# Patient Record
Sex: FEMALE | Race: WHITE | NOT HISPANIC OR LATINO | Employment: OTHER | URBAN - METROPOLITAN AREA
[De-identification: names, ages, dates, MRNs, and addresses within clinical notes are randomized per-mention and may not be internally consistent; named-entity substitution may affect disease eponyms.]

---

## 2017-04-19 ENCOUNTER — RECORDS - HEALTHEAST (OUTPATIENT)
Dept: LAB | Facility: CLINIC | Age: 72
End: 2017-04-19

## 2017-04-19 LAB
CHOLEST SERPL-MCNC: 159 MG/DL
FASTING STATUS PATIENT QL REPORTED: YES
HDLC SERPL-MCNC: 55 MG/DL
LDLC SERPL CALC-MCNC: 83 MG/DL
TRIGL SERPL-MCNC: 106 MG/DL

## 2018-04-16 ENCOUNTER — RECORDS - HEALTHEAST (OUTPATIENT)
Dept: LAB | Facility: CLINIC | Age: 73
End: 2018-04-16

## 2018-04-16 LAB
ALBUMIN SERPL-MCNC: 3.5 G/DL (ref 3.5–5)
ALP SERPL-CCNC: 71 U/L (ref 45–120)
ALT SERPL W P-5'-P-CCNC: 16 U/L (ref 0–45)
ANION GAP SERPL CALCULATED.3IONS-SCNC: 9 MMOL/L (ref 5–18)
AST SERPL W P-5'-P-CCNC: 19 U/L (ref 0–40)
BILIRUB SERPL-MCNC: 0.6 MG/DL (ref 0–1)
BUN SERPL-MCNC: 13 MG/DL (ref 8–28)
CALCIUM SERPL-MCNC: 9.6 MG/DL (ref 8.5–10.5)
CHLORIDE BLD-SCNC: 103 MMOL/L (ref 98–107)
CHOLEST SERPL-MCNC: 166 MG/DL
CO2 SERPL-SCNC: 33 MMOL/L (ref 22–31)
CREAT SERPL-MCNC: 0.78 MG/DL (ref 0.6–1.1)
FASTING STATUS PATIENT QL REPORTED: YES
GFR SERPL CREATININE-BSD FRML MDRD: >60 ML/MIN/1.73M2
GLUCOSE BLD-MCNC: 116 MG/DL (ref 70–125)
HDLC SERPL-MCNC: 58 MG/DL
LDLC SERPL CALC-MCNC: 86 MG/DL
POTASSIUM BLD-SCNC: 5.2 MMOL/L (ref 3.5–5)
PROT SERPL-MCNC: 7.3 G/DL (ref 6–8)
SODIUM SERPL-SCNC: 145 MMOL/L (ref 136–145)
TRIGL SERPL-MCNC: 110 MG/DL

## 2018-04-23 ENCOUNTER — RECORDS - HEALTHEAST (OUTPATIENT)
Dept: LAB | Facility: CLINIC | Age: 73
End: 2018-04-23

## 2018-04-23 LAB — POTASSIUM BLD-SCNC: 5.3 MMOL/L (ref 3.5–5)

## 2018-05-08 ENCOUNTER — RECORDS - HEALTHEAST (OUTPATIENT)
Dept: LAB | Facility: CLINIC | Age: 73
End: 2018-05-08

## 2018-05-09 LAB
ANION GAP SERPL CALCULATED.3IONS-SCNC: 7 MMOL/L (ref 5–18)
BUN SERPL-MCNC: 17 MG/DL (ref 8–28)
CALCIUM SERPL-MCNC: 10 MG/DL (ref 8.5–10.5)
CHLORIDE BLD-SCNC: 103 MMOL/L (ref 98–107)
CO2 SERPL-SCNC: 32 MMOL/L (ref 22–31)
CREAT SERPL-MCNC: 1.01 MG/DL (ref 0.6–1.1)
GFR SERPL CREATININE-BSD FRML MDRD: 54 ML/MIN/1.73M2
GLUCOSE BLD-MCNC: 117 MG/DL (ref 70–125)
POTASSIUM BLD-SCNC: 4.7 MMOL/L (ref 3.5–5)
SODIUM SERPL-SCNC: 142 MMOL/L (ref 136–145)

## 2018-05-25 ENCOUNTER — RECORDS - HEALTHEAST (OUTPATIENT)
Dept: LAB | Facility: CLINIC | Age: 73
End: 2018-05-25

## 2018-05-25 LAB — TSH SERPL DL<=0.005 MIU/L-ACNC: 2.14 UIU/ML (ref 0.3–5)

## 2018-05-29 LAB — 25(OH)D3 SERPL-MCNC: 62.1 NG/ML (ref 30–80)

## 2018-10-16 ENCOUNTER — RECORDS - HEALTHEAST (OUTPATIENT)
Dept: LAB | Facility: CLINIC | Age: 73
End: 2018-10-16

## 2018-10-16 LAB
ANION GAP SERPL CALCULATED.3IONS-SCNC: 10 MMOL/L (ref 5–18)
BUN SERPL-MCNC: 18 MG/DL (ref 8–28)
CALCIUM SERPL-MCNC: 10.1 MG/DL (ref 8.5–10.5)
CHLORIDE BLD-SCNC: 102 MMOL/L (ref 98–107)
CO2 SERPL-SCNC: 30 MMOL/L (ref 22–31)
CREAT SERPL-MCNC: 0.87 MG/DL (ref 0.6–1.1)
GFR SERPL CREATININE-BSD FRML MDRD: >60 ML/MIN/1.73M2
GLUCOSE BLD-MCNC: 118 MG/DL (ref 70–125)
POTASSIUM BLD-SCNC: 5.2 MMOL/L (ref 3.5–5)
SODIUM SERPL-SCNC: 142 MMOL/L (ref 136–145)

## 2018-10-29 ENCOUNTER — RECORDS - HEALTHEAST (OUTPATIENT)
Dept: LAB | Facility: CLINIC | Age: 73
End: 2018-10-29

## 2018-10-30 LAB — POTASSIUM BLD-SCNC: 4.8 MMOL/L (ref 3.5–5)

## 2019-01-22 ENCOUNTER — RECORDS - HEALTHEAST (OUTPATIENT)
Dept: LAB | Facility: CLINIC | Age: 74
End: 2019-01-22

## 2019-01-22 LAB
ALBUMIN SERPL-MCNC: 3.6 G/DL (ref 3.5–5)
ALP SERPL-CCNC: 52 U/L (ref 45–120)
ALT SERPL W P-5'-P-CCNC: 13 U/L (ref 0–45)
ANION GAP SERPL CALCULATED.3IONS-SCNC: 12 MMOL/L (ref 5–18)
AST SERPL W P-5'-P-CCNC: 18 U/L (ref 0–40)
BILIRUB SERPL-MCNC: 0.5 MG/DL (ref 0–1)
BUN SERPL-MCNC: 14 MG/DL (ref 8–28)
CALCIUM SERPL-MCNC: 9.6 MG/DL (ref 8.5–10.5)
CHLORIDE BLD-SCNC: 104 MMOL/L (ref 98–107)
CO2 SERPL-SCNC: 26 MMOL/L (ref 22–31)
CREAT SERPL-MCNC: 0.81 MG/DL (ref 0.6–1.1)
GFR SERPL CREATININE-BSD FRML MDRD: >60 ML/MIN/1.73M2
GLUCOSE BLD-MCNC: 119 MG/DL (ref 70–125)
POTASSIUM BLD-SCNC: 4.3 MMOL/L (ref 3.5–5)
PROT SERPL-MCNC: 6.9 G/DL (ref 6–8)
SODIUM SERPL-SCNC: 142 MMOL/L (ref 136–145)

## 2019-05-06 ENCOUNTER — RECORDS - HEALTHEAST (OUTPATIENT)
Dept: LAB | Facility: CLINIC | Age: 74
End: 2019-05-06

## 2019-05-06 LAB
ANION GAP SERPL CALCULATED.3IONS-SCNC: 9 MMOL/L (ref 5–18)
BUN SERPL-MCNC: 19 MG/DL (ref 8–28)
CALCIUM SERPL-MCNC: 9.8 MG/DL (ref 8.5–10.5)
CHLORIDE BLD-SCNC: 106 MMOL/L (ref 98–107)
CHOLEST SERPL-MCNC: 175 MG/DL
CO2 SERPL-SCNC: 28 MMOL/L (ref 22–31)
CREAT SERPL-MCNC: 0.79 MG/DL (ref 0.6–1.1)
FASTING STATUS PATIENT QL REPORTED: NORMAL
GFR SERPL CREATININE-BSD FRML MDRD: >60 ML/MIN/1.73M2
GLUCOSE BLD-MCNC: 108 MG/DL (ref 70–125)
HDLC SERPL-MCNC: 60 MG/DL
LDLC SERPL CALC-MCNC: 98 MG/DL
POTASSIUM BLD-SCNC: 4.7 MMOL/L (ref 3.5–5)
SODIUM SERPL-SCNC: 143 MMOL/L (ref 136–145)
TRIGL SERPL-MCNC: 84 MG/DL

## 2019-05-24 ENCOUNTER — PATIENT OUTREACH (OUTPATIENT)
Dept: CARE COORDINATION | Facility: CLINIC | Age: 74
End: 2019-05-24

## 2019-05-24 DIAGNOSIS — Z59.9 FINANCIAL DIFFICULTIES: Primary | ICD-10-CM

## 2019-05-24 SDOH — ECONOMIC STABILITY - INCOME SECURITY: PROBLEM RELATED TO HOUSING AND ECONOMIC CIRCUMSTANCES, UNSPECIFIED: Z59.9

## 2019-05-24 ASSESSMENT — ACTIVITIES OF DAILY LIVING (ADL): DEPENDENT_IADLS:: INDEPENDENT

## 2019-05-24 NOTE — PROGRESS NOTES
Contact  [facility]/Voicemail    Referral Source: Care Team- patient enrolled in [facility] followed by RN who spoke to patient about option of reverse mortgage on her condo if she needed money for repairs or other expenses.   Clinical Data:  Outreach  Outreach attempted x 1.  Busy signal, not able to leave a message.  Plan:  will try to reach patient again in 1-2 business days.    Clinic Care Coordination Contact    Clinic Care Coordination Contact  OUTREACH    Education Provided to patient: Discussed reverse mortgages and getting deferral on property taxes    Financial/Insurance:   Financial/Insurance concerns (GOAL):: No  Went to a dentist not in network and had to put 1200 on credit card that she is paying off monthly.  Wants to consider getting a reverse mortgage so she would have savings to help with expenses.     Referrals Placed: Other ([facility] financial counseling)    Patient/Caregiver understanding: Patient does not want to jeopardize her benefits if she would get reverse mortgage. Gave her phone number to call for expert information that is free. Patient will call if interested.     Plan: No further outreach by . Patient not in shared savings plans and is enrolled in [facility].    Kari Mendes,   [facility]  [phone]

## 2019-08-08 ENCOUNTER — RECORDS - HEALTHEAST (OUTPATIENT)
Dept: LAB | Facility: CLINIC | Age: 74
End: 2019-08-08

## 2019-08-08 LAB
ALBUMIN SERPL-MCNC: 3.8 G/DL (ref 3.5–5)
ALP SERPL-CCNC: 57 U/L (ref 45–120)
ALT SERPL W P-5'-P-CCNC: 17 U/L (ref 0–45)
ANION GAP SERPL CALCULATED.3IONS-SCNC: 11 MMOL/L (ref 5–18)
AST SERPL W P-5'-P-CCNC: 19 U/L (ref 0–40)
BILIRUB SERPL-MCNC: 0.5 MG/DL (ref 0–1)
BUN SERPL-MCNC: 19 MG/DL (ref 8–28)
CALCIUM SERPL-MCNC: 10.4 MG/DL (ref 8.5–10.5)
CHLORIDE BLD-SCNC: 105 MMOL/L (ref 98–107)
CO2 SERPL-SCNC: 27 MMOL/L (ref 22–31)
CREAT SERPL-MCNC: 1 MG/DL (ref 0.6–1.1)
GFR SERPL CREATININE-BSD FRML MDRD: 54 ML/MIN/1.73M2
GLUCOSE BLD-MCNC: 127 MG/DL (ref 70–125)
POTASSIUM BLD-SCNC: 4.4 MMOL/L (ref 3.5–5)
PROT SERPL-MCNC: 7.5 G/DL (ref 6–8)
SODIUM SERPL-SCNC: 143 MMOL/L (ref 136–145)
TSH SERPL DL<=0.005 MIU/L-ACNC: 2.1 UIU/ML (ref 0.3–5)

## 2019-08-21 ENCOUNTER — RECORDS - HEALTHEAST (OUTPATIENT)
Dept: LAB | Facility: CLINIC | Age: 74
End: 2019-08-21

## 2019-08-21 LAB
O+P STL MICRO: NORMAL
SHIGA TOXIN 1: NEGATIVE
SHIGA TOXIN 2: NEGATIVE

## 2019-08-23 LAB — BACTERIA SPEC CULT: NORMAL

## 2019-11-14 ENCOUNTER — RECORDS - HEALTHEAST (OUTPATIENT)
Dept: LAB | Facility: CLINIC | Age: 74
End: 2019-11-14

## 2019-11-14 LAB
ALBUMIN SERPL-MCNC: 3.4 G/DL (ref 3.5–5)
ALP SERPL-CCNC: 65 U/L (ref 45–120)
ALT SERPL W P-5'-P-CCNC: 13 U/L (ref 0–45)
ANION GAP SERPL CALCULATED.3IONS-SCNC: 8 MMOL/L (ref 5–18)
AST SERPL W P-5'-P-CCNC: 16 U/L (ref 0–40)
BILIRUB SERPL-MCNC: 0.5 MG/DL (ref 0–1)
BUN SERPL-MCNC: 15 MG/DL (ref 8–28)
CALCIUM SERPL-MCNC: 9.5 MG/DL (ref 8.5–10.5)
CHLORIDE BLD-SCNC: 104 MMOL/L (ref 98–107)
CO2 SERPL-SCNC: 31 MMOL/L (ref 22–31)
CREAT SERPL-MCNC: 0.89 MG/DL (ref 0.6–1.1)
GFR SERPL CREATININE-BSD FRML MDRD: >60 ML/MIN/1.73M2
GLUCOSE BLD-MCNC: 135 MG/DL (ref 70–125)
POTASSIUM BLD-SCNC: 5 MMOL/L (ref 3.5–5)
PROT SERPL-MCNC: 7.1 G/DL (ref 6–8)
SODIUM SERPL-SCNC: 143 MMOL/L (ref 136–145)

## 2020-05-18 ENCOUNTER — RECORDS - HEALTHEAST (OUTPATIENT)
Dept: LAB | Facility: CLINIC | Age: 75
End: 2020-05-18

## 2020-05-18 LAB
ALBUMIN SERPL-MCNC: 3.6 G/DL (ref 3.5–5)
ALP SERPL-CCNC: 65 U/L (ref 45–120)
ALT SERPL W P-5'-P-CCNC: 13 U/L (ref 0–45)
ANION GAP SERPL CALCULATED.3IONS-SCNC: 11 MMOL/L (ref 5–18)
AST SERPL W P-5'-P-CCNC: 18 U/L (ref 0–40)
BILIRUB SERPL-MCNC: 0.6 MG/DL (ref 0–1)
BUN SERPL-MCNC: 15 MG/DL (ref 8–28)
CALCIUM SERPL-MCNC: 9.6 MG/DL (ref 8.5–10.5)
CHLORIDE BLD-SCNC: 100 MMOL/L (ref 98–107)
CHOLEST SERPL-MCNC: 163 MG/DL
CO2 SERPL-SCNC: 31 MMOL/L (ref 22–31)
CREAT SERPL-MCNC: 0.82 MG/DL (ref 0.6–1.1)
FASTING STATUS PATIENT QL REPORTED: NORMAL
GFR SERPL CREATININE-BSD FRML MDRD: >60 ML/MIN/1.73M2
GLUCOSE BLD-MCNC: 118 MG/DL (ref 70–125)
HDLC SERPL-MCNC: 59 MG/DL
LDLC SERPL CALC-MCNC: 76 MG/DL
POTASSIUM BLD-SCNC: 4.3 MMOL/L (ref 3.5–5)
PROT SERPL-MCNC: 7.6 G/DL (ref 6–8)
SODIUM SERPL-SCNC: 142 MMOL/L (ref 136–145)
TRIGL SERPL-MCNC: 138 MG/DL

## 2020-11-16 ENCOUNTER — RECORDS - HEALTHEAST (OUTPATIENT)
Dept: LAB | Facility: CLINIC | Age: 75
End: 2020-11-16

## 2020-11-16 LAB
ALBUMIN SERPL-MCNC: 3.4 G/DL (ref 3.5–5)
ALP SERPL-CCNC: 69 U/L (ref 45–120)
ALT SERPL W P-5'-P-CCNC: 12 U/L (ref 0–45)
ANION GAP SERPL CALCULATED.3IONS-SCNC: 10 MMOL/L (ref 5–18)
AST SERPL W P-5'-P-CCNC: 17 U/L (ref 0–40)
BILIRUB SERPL-MCNC: 0.5 MG/DL (ref 0–1)
BUN SERPL-MCNC: 15 MG/DL (ref 8–28)
CALCIUM SERPL-MCNC: 8.9 MG/DL (ref 8.5–10.5)
CHLORIDE BLD-SCNC: 105 MMOL/L (ref 98–107)
CO2 SERPL-SCNC: 30 MMOL/L (ref 22–31)
CREAT SERPL-MCNC: 0.99 MG/DL (ref 0.6–1.1)
GFR SERPL CREATININE-BSD FRML MDRD: 55 ML/MIN/1.73M2
GLUCOSE BLD-MCNC: 117 MG/DL (ref 70–125)
POTASSIUM BLD-SCNC: 4.7 MMOL/L (ref 3.5–5)
PROT SERPL-MCNC: 7.2 G/DL (ref 6–8)
SODIUM SERPL-SCNC: 145 MMOL/L (ref 136–145)

## 2020-11-20 ENCOUNTER — RECORDS - HEALTHEAST (OUTPATIENT)
Dept: ADMINISTRATIVE | Facility: OTHER | Age: 75
End: 2020-11-20

## 2020-11-20 ENCOUNTER — RECORDS - HEALTHEAST (OUTPATIENT)
Dept: SCHEDULING | Facility: CLINIC | Age: 75
End: 2020-11-20

## 2020-11-20 DIAGNOSIS — R11.0 NAUSEA: ICD-10-CM

## 2021-05-17 ENCOUNTER — RECORDS - HEALTHEAST (OUTPATIENT)
Dept: LAB | Facility: CLINIC | Age: 76
End: 2021-05-17

## 2021-05-17 LAB
ALBUMIN SERPL-MCNC: 3.2 G/DL (ref 3.5–5)
ALP SERPL-CCNC: 70 U/L (ref 45–120)
ALT SERPL W P-5'-P-CCNC: 13 U/L (ref 0–45)
ANION GAP SERPL CALCULATED.3IONS-SCNC: 12 MMOL/L (ref 5–18)
AST SERPL W P-5'-P-CCNC: 19 U/L (ref 0–40)
BILIRUB SERPL-MCNC: 0.5 MG/DL (ref 0–1)
BUN SERPL-MCNC: 20 MG/DL (ref 8–28)
CALCIUM SERPL-MCNC: 9.2 MG/DL (ref 8.5–10.5)
CHLORIDE BLD-SCNC: 104 MMOL/L (ref 98–107)
CHOLEST SERPL-MCNC: 137 MG/DL
CO2 SERPL-SCNC: 30 MMOL/L (ref 22–31)
CREAT SERPL-MCNC: 0.9 MG/DL (ref 0.6–1.1)
FASTING STATUS PATIENT QL REPORTED: ABNORMAL
GFR SERPL CREATININE-BSD FRML MDRD: >60 ML/MIN/1.73M2
GLUCOSE BLD-MCNC: 127 MG/DL (ref 70–125)
HDLC SERPL-MCNC: 47 MG/DL
LDLC SERPL CALC-MCNC: 71 MG/DL
POTASSIUM BLD-SCNC: 4.9 MMOL/L (ref 3.5–5)
PROT SERPL-MCNC: 7.5 G/DL (ref 6–8)
SODIUM SERPL-SCNC: 146 MMOL/L (ref 136–145)
TRIGL SERPL-MCNC: 94 MG/DL

## 2021-05-25 ENCOUNTER — RECORDS - HEALTHEAST (OUTPATIENT)
Dept: LAB | Facility: CLINIC | Age: 76
End: 2021-05-25

## 2021-05-26 LAB — BACTERIA SPEC CULT: NO GROWTH

## 2021-07-28 ENCOUNTER — LAB REQUISITION (OUTPATIENT)
Dept: LAB | Facility: CLINIC | Age: 76
End: 2021-07-28

## 2021-07-28 DIAGNOSIS — M25.559 PAIN IN UNSPECIFIED HIP: ICD-10-CM

## 2021-07-28 PROCEDURE — 86141 C-REACTIVE PROTEIN HS: CPT | Performed by: PHYSICIAN ASSISTANT

## 2021-07-29 LAB — C REACTIVE PROTEIN LHE: 28.4 MG/DL (ref 0–0.8)

## 2021-08-06 ENCOUNTER — LAB REQUISITION (OUTPATIENT)
Dept: LAB | Facility: CLINIC | Age: 76
End: 2021-08-06

## 2021-08-06 DIAGNOSIS — M35.3 POLYMYALGIA RHEUMATICA (H): ICD-10-CM

## 2021-08-06 LAB — C REACTIVE PROTEIN LHE: 3.3 MG/DL (ref 0–0.8)

## 2021-08-06 PROCEDURE — 86141 C-REACTIVE PROTEIN HS: CPT | Performed by: FAMILY MEDICINE

## 2021-09-03 ENCOUNTER — LAB REQUISITION (OUTPATIENT)
Dept: LAB | Facility: CLINIC | Age: 76
End: 2021-09-03
Payer: COMMERCIAL

## 2021-09-03 DIAGNOSIS — M35.3 POLYMYALGIA RHEUMATICA (H): ICD-10-CM

## 2021-09-03 LAB — C REACTIVE PROTEIN LHE: 1.6 MG/DL (ref 0–0.8)

## 2021-09-03 PROCEDURE — 36415 COLL VENOUS BLD VENIPUNCTURE: CPT | Performed by: FAMILY MEDICINE

## 2021-09-03 PROCEDURE — 86141 C-REACTIVE PROTEIN HS: CPT | Mod: ORL | Performed by: FAMILY MEDICINE

## 2021-11-05 ENCOUNTER — HOSPITAL ENCOUNTER (OUTPATIENT)
Dept: ULTRASOUND IMAGING | Facility: CLINIC | Age: 76
Discharge: HOME | End: 2021-11-05
Attending: PHYSICIAN ASSISTANT | Admitting: PHYSICIAN ASSISTANT
Payer: COMMERCIAL

## 2021-11-05 DIAGNOSIS — M79.89 RIGHT LEG SWELLING: ICD-10-CM

## 2021-11-05 PROCEDURE — 93971 EXTREMITY STUDY: CPT | Mod: RT

## 2021-11-12 ENCOUNTER — LAB REQUISITION (OUTPATIENT)
Dept: LAB | Facility: CLINIC | Age: 76
End: 2021-11-12
Payer: COMMERCIAL

## 2021-11-12 DIAGNOSIS — M35.3 POLYMYALGIA RHEUMATICA (H): ICD-10-CM

## 2021-11-12 LAB — C REACTIVE PROTEIN LHE: 2 MG/DL (ref 0–0.8)

## 2021-11-12 PROCEDURE — 86141 C-REACTIVE PROTEIN HS: CPT | Performed by: PHYSICIAN ASSISTANT

## 2021-11-18 ENCOUNTER — LAB REQUISITION (OUTPATIENT)
Dept: LAB | Facility: CLINIC | Age: 76
End: 2021-11-18
Payer: COMMERCIAL

## 2021-11-18 DIAGNOSIS — E11.40 TYPE 2 DIABETES MELLITUS WITH DIABETIC NEUROPATHY, UNSPECIFIED (H): ICD-10-CM

## 2021-11-18 LAB
ALBUMIN SERPL-MCNC: 3 G/DL (ref 3.5–5)
ALP SERPL-CCNC: 59 U/L (ref 45–120)
ALT SERPL W P-5'-P-CCNC: 15 U/L (ref 0–45)
ANION GAP SERPL CALCULATED.3IONS-SCNC: 14 MMOL/L (ref 5–18)
AST SERPL W P-5'-P-CCNC: 14 U/L (ref 0–40)
BILIRUB SERPL-MCNC: 0.6 MG/DL (ref 0–1)
BUN SERPL-MCNC: 20 MG/DL (ref 8–28)
CALCIUM SERPL-MCNC: 9.7 MG/DL (ref 8.5–10.5)
CHLORIDE BLD-SCNC: 100 MMOL/L (ref 98–107)
CO2 SERPL-SCNC: 30 MMOL/L (ref 22–31)
CREAT SERPL-MCNC: 1.06 MG/DL (ref 0.6–1.1)
GFR SERPL CREATININE-BSD FRML MDRD: 51 ML/MIN/1.73M2
GLUCOSE BLD-MCNC: 130 MG/DL (ref 70–125)
POTASSIUM BLD-SCNC: 4.5 MMOL/L (ref 3.5–5)
PROT SERPL-MCNC: 6.4 G/DL (ref 6–8)
SODIUM SERPL-SCNC: 144 MMOL/L (ref 136–145)

## 2021-11-18 PROCEDURE — 80053 COMPREHEN METABOLIC PANEL: CPT | Mod: ORL | Performed by: FAMILY MEDICINE

## 2021-12-02 ENCOUNTER — LAB REQUISITION (OUTPATIENT)
Dept: LAB | Facility: CLINIC | Age: 76
End: 2021-12-02

## 2021-12-02 DIAGNOSIS — R35.0 FREQUENCY OF MICTURITION: ICD-10-CM

## 2021-12-02 LAB
ALBUMIN UR-MCNC: 300 MG/DL
APPEARANCE UR: ABNORMAL
BILIRUB UR QL STRIP: NEGATIVE
COLOR UR AUTO: YELLOW
GLUCOSE UR STRIP-MCNC: NEGATIVE MG/DL
HGB UR QL STRIP: ABNORMAL
KETONES UR STRIP-MCNC: NEGATIVE MG/DL
LEUKOCYTE ESTERASE UR QL STRIP: ABNORMAL
NITRATE UR QL: NEGATIVE
PH UR STRIP: 6 [PH] (ref 5–7)
RBC URINE: ABNORMAL
SP GR UR STRIP: >1.03 (ref 1–1.03)
UROBILINOGEN UR STRIP-MCNC: <2 MG/DL
WBC URINE: ABNORMAL

## 2021-12-02 PROCEDURE — 81001 URINALYSIS AUTO W/SCOPE: CPT | Performed by: FAMILY MEDICINE

## 2021-12-02 PROCEDURE — 87086 URINE CULTURE/COLONY COUNT: CPT | Performed by: FAMILY MEDICINE

## 2021-12-05 LAB
BACTERIA UR CULT: ABNORMAL
BACTERIA UR CULT: ABNORMAL

## 2021-12-20 ENCOUNTER — LAB REQUISITION (OUTPATIENT)
Dept: LAB | Facility: CLINIC | Age: 76
End: 2021-12-20

## 2021-12-20 DIAGNOSIS — N39.0 URINARY TRACT INFECTION, SITE NOT SPECIFIED: ICD-10-CM

## 2021-12-20 PROCEDURE — 87086 URINE CULTURE/COLONY COUNT: CPT | Performed by: FAMILY MEDICINE

## 2021-12-23 LAB
BACTERIA UR CULT: ABNORMAL
BACTERIA UR CULT: ABNORMAL

## 2021-12-30 ENCOUNTER — LAB REQUISITION (OUTPATIENT)
Dept: LAB | Facility: CLINIC | Age: 76
End: 2021-12-30

## 2021-12-30 DIAGNOSIS — M35.3 POLYMYALGIA RHEUMATICA (H): ICD-10-CM

## 2021-12-30 DIAGNOSIS — R06.00 DYSPNEA, UNSPECIFIED: ICD-10-CM

## 2021-12-30 LAB — C REACTIVE PROTEIN LHE: 0.4 MG/DL (ref 0–0.8)

## 2021-12-30 PROCEDURE — 86141 C-REACTIVE PROTEIN HS: CPT | Performed by: FAMILY MEDICINE

## 2022-02-21 ENCOUNTER — LAB REQUISITION (OUTPATIENT)
Dept: LAB | Facility: CLINIC | Age: 77
End: 2022-02-21

## 2022-02-21 DIAGNOSIS — E11.40 TYPE 2 DIABETES MELLITUS WITH DIABETIC NEUROPATHY, UNSPECIFIED (H): ICD-10-CM

## 2022-02-21 LAB
ALBUMIN SERPL-MCNC: 3.2 G/DL (ref 3.5–5)
ALP SERPL-CCNC: 42 U/L (ref 45–120)
ALT SERPL W P-5'-P-CCNC: <9 U/L (ref 0–45)
ANION GAP SERPL CALCULATED.3IONS-SCNC: 9 MMOL/L (ref 5–18)
AST SERPL W P-5'-P-CCNC: 13 U/L (ref 0–40)
BILIRUB SERPL-MCNC: 0.5 MG/DL (ref 0–1)
BUN SERPL-MCNC: 23 MG/DL (ref 8–28)
CALCIUM SERPL-MCNC: 10.2 MG/DL (ref 8.5–10.5)
CHLORIDE BLD-SCNC: 100 MMOL/L (ref 98–107)
CO2 SERPL-SCNC: 34 MMOL/L (ref 22–31)
CREAT SERPL-MCNC: 1.02 MG/DL (ref 0.6–1.1)
GFR SERPL CREATININE-BSD FRML MDRD: 56 ML/MIN/1.73M2
GLUCOSE BLD-MCNC: 169 MG/DL (ref 70–125)
POTASSIUM BLD-SCNC: 4.1 MMOL/L (ref 3.5–5)
PROT SERPL-MCNC: 6.4 G/DL (ref 6–8)
SODIUM SERPL-SCNC: 143 MMOL/L (ref 136–145)

## 2022-02-21 PROCEDURE — 80053 COMPREHEN METABOLIC PANEL: CPT | Performed by: FAMILY MEDICINE

## 2022-03-01 ENCOUNTER — HOSPITAL ENCOUNTER (OUTPATIENT)
Dept: ULTRASOUND IMAGING | Facility: CLINIC | Age: 77
Discharge: HOME | End: 2022-03-01
Attending: INTERNAL MEDICINE | Admitting: INTERNAL MEDICINE
Payer: COMMERCIAL

## 2022-03-01 DIAGNOSIS — I82.409 DVT (DEEP VENOUS THROMBOSIS) (H): ICD-10-CM

## 2022-03-01 PROCEDURE — 93971 EXTREMITY STUDY: CPT | Mod: RT

## 2022-04-04 ENCOUNTER — LAB REQUISITION (OUTPATIENT)
Dept: LAB | Facility: CLINIC | Age: 77
End: 2022-04-04

## 2022-04-04 DIAGNOSIS — R30.0 DYSURIA: ICD-10-CM

## 2022-04-04 PROCEDURE — 87088 URINE BACTERIA CULTURE: CPT | Performed by: FAMILY MEDICINE

## 2022-04-05 ENCOUNTER — APPOINTMENT (OUTPATIENT)
Dept: CT IMAGING | Facility: CLINIC | Age: 77
DRG: 391 | End: 2022-04-05
Attending: EMERGENCY MEDICINE
Payer: COMMERCIAL

## 2022-04-05 ENCOUNTER — HOSPITAL ENCOUNTER (INPATIENT)
Facility: CLINIC | Age: 77
LOS: 10 days | Discharge: NURSING, REHAB OR LONG-TERM CARE | DRG: 391 | End: 2022-04-15
Attending: EMERGENCY MEDICINE | Admitting: FAMILY MEDICINE
Payer: COMMERCIAL

## 2022-04-05 DIAGNOSIS — N39.0 ACUTE UTI: ICD-10-CM

## 2022-04-05 DIAGNOSIS — R52 BODY ACHES: ICD-10-CM

## 2022-04-05 DIAGNOSIS — K57.20 COLONIC DIVERTICULAR ABSCESS: Primary | ICD-10-CM

## 2022-04-05 DIAGNOSIS — K57.32 DIVERTICULITIS OF COLON: ICD-10-CM

## 2022-04-05 DIAGNOSIS — M35.3 PMR (POLYMYALGIA RHEUMATICA) (H): ICD-10-CM

## 2022-04-05 DIAGNOSIS — E83.42 HYPOMAGNESEMIA: ICD-10-CM

## 2022-04-05 LAB
ALBUMIN UR-MCNC: 50 MG/DL
ANION GAP SERPL CALCULATED.3IONS-SCNC: 15 MMOL/L (ref 5–18)
APPEARANCE UR: ABNORMAL
ATRIAL RATE - MUSE: 91 BPM
BACTERIA #/AREA URNS HPF: ABNORMAL /HPF
BILIRUB UR QL STRIP: NEGATIVE
BUN SERPL-MCNC: 14 MG/DL (ref 8–28)
CALCIUM SERPL-MCNC: 8.5 MG/DL (ref 8.5–10.5)
CHLORIDE BLD-SCNC: 101 MMOL/L (ref 98–107)
CO2 SERPL-SCNC: 26 MMOL/L (ref 22–31)
COLOR UR AUTO: YELLOW
CREAT SERPL-MCNC: 0.75 MG/DL (ref 0.6–1.1)
DIASTOLIC BLOOD PRESSURE - MUSE: 84 MMHG
ERYTHROCYTE [DISTWIDTH] IN BLOOD BY AUTOMATED COUNT: 13.9 % (ref 10–15)
FLUAV RNA SPEC QL NAA+PROBE: NEGATIVE
FLUBV RNA RESP QL NAA+PROBE: NEGATIVE
GFR SERPL CREATININE-BSD FRML MDRD: 81 ML/MIN/1.73M2
GLUCOSE BLD-MCNC: 128 MG/DL (ref 70–125)
GLUCOSE BLDC GLUCOMTR-MCNC: 125 MG/DL (ref 70–99)
GLUCOSE BLDC GLUCOMTR-MCNC: 65 MG/DL (ref 70–99)
GLUCOSE BLDC GLUCOMTR-MCNC: 72 MG/DL (ref 70–99)
GLUCOSE UR STRIP-MCNC: NEGATIVE MG/DL
HBA1C MFR BLD: 6.2 %
HCT VFR BLD AUTO: 37.7 % (ref 35–47)
HGB BLD-MCNC: 11.8 G/DL (ref 11.7–15.7)
HGB UR QL STRIP: ABNORMAL
HOLD SPECIMEN: NORMAL
INTERPRETATION ECG - MUSE: NORMAL
KETONES UR STRIP-MCNC: 20 MG/DL
LACTATE SERPL-SCNC: 0.5 MMOL/L (ref 0.7–2)
LEUKOCYTE ESTERASE UR QL STRIP: ABNORMAL
MAGNESIUM SERPL-MCNC: 1.3 MG/DL (ref 1.8–2.6)
MCH RBC QN AUTO: 30.7 PG (ref 26.5–33)
MCHC RBC AUTO-ENTMCNC: 31.3 G/DL (ref 31.5–36.5)
MCV RBC AUTO: 98 FL (ref 78–100)
MUCOUS THREADS #/AREA URNS LPF: PRESENT /LPF
NITRATE UR QL: NEGATIVE
P AXIS - MUSE: 25 DEGREES
PH UR STRIP: 6.5 [PH] (ref 5–7)
PLATELET # BLD AUTO: 431 10E3/UL (ref 150–450)
POTASSIUM BLD-SCNC: 3.8 MMOL/L (ref 3.5–5)
PR INTERVAL - MUSE: 144 MS
PROCALCITONIN SERPL-MCNC: 8.75 NG/ML (ref 0–0.49)
QRS DURATION - MUSE: 74 MS
QT - MUSE: 370 MS
QTC - MUSE: 455 MS
R AXIS - MUSE: 9 DEGREES
RBC # BLD AUTO: 3.84 10E6/UL (ref 3.8–5.2)
RBC URINE: 6 /HPF
SARS-COV-2 RNA RESP QL NAA+PROBE: NEGATIVE
SODIUM SERPL-SCNC: 142 MMOL/L (ref 136–145)
SP GR UR STRIP: 1.02 (ref 1–1.03)
SQUAMOUS EPITHELIAL: 1 /HPF
SYSTOLIC BLOOD PRESSURE - MUSE: 170 MMHG
T AXIS - MUSE: 37 DEGREES
TROPONIN I SERPL-MCNC: <0.01 NG/ML (ref 0–0.29)
UROBILINOGEN UR STRIP-MCNC: 2 MG/DL
VENTRICULAR RATE- MUSE: 91 BPM
WBC # BLD AUTO: 8.4 10E3/UL (ref 4–11)
WBC CLUMPS #/AREA URNS HPF: PRESENT /HPF
WBC URINE: >182 /HPF

## 2022-04-05 PROCEDURE — 96367 TX/PROPH/DG ADDL SEQ IV INF: CPT

## 2022-04-05 PROCEDURE — 96365 THER/PROPH/DIAG IV INF INIT: CPT | Mod: 59

## 2022-04-05 PROCEDURE — 258N000003 HC RX IP 258 OP 636: Performed by: EMERGENCY MEDICINE

## 2022-04-05 PROCEDURE — 250N000013 HC RX MED GY IP 250 OP 250 PS 637: Performed by: FAMILY MEDICINE

## 2022-04-05 PROCEDURE — 250N000011 HC RX IP 250 OP 636: Performed by: EMERGENCY MEDICINE

## 2022-04-05 PROCEDURE — 84145 PROCALCITONIN (PCT): CPT | Performed by: EMERGENCY MEDICINE

## 2022-04-05 PROCEDURE — 83735 ASSAY OF MAGNESIUM: CPT | Performed by: EMERGENCY MEDICINE

## 2022-04-05 PROCEDURE — 36415 COLL VENOUS BLD VENIPUNCTURE: CPT | Performed by: EMERGENCY MEDICINE

## 2022-04-05 PROCEDURE — 85027 COMPLETE CBC AUTOMATED: CPT | Performed by: EMERGENCY MEDICINE

## 2022-04-05 PROCEDURE — 93005 ELECTROCARDIOGRAM TRACING: CPT | Performed by: EMERGENCY MEDICINE

## 2022-04-05 PROCEDURE — 96375 TX/PRO/DX INJ NEW DRUG ADDON: CPT

## 2022-04-05 PROCEDURE — C9803 HOPD COVID-19 SPEC COLLECT: HCPCS

## 2022-04-05 PROCEDURE — 99223 1ST HOSP IP/OBS HIGH 75: CPT | Performed by: FAMILY MEDICINE

## 2022-04-05 PROCEDURE — 87086 URINE CULTURE/COLONY COUNT: CPT | Performed by: EMERGENCY MEDICINE

## 2022-04-05 PROCEDURE — 99285 EMERGENCY DEPT VISIT HI MDM: CPT | Mod: 25

## 2022-04-05 PROCEDURE — 120N000001 HC R&B MED SURG/OB

## 2022-04-05 PROCEDURE — 74177 CT ABD & PELVIS W/CONTRAST: CPT

## 2022-04-05 PROCEDURE — 84484 ASSAY OF TROPONIN QUANT: CPT | Performed by: EMERGENCY MEDICINE

## 2022-04-05 PROCEDURE — 83036 HEMOGLOBIN GLYCOSYLATED A1C: CPT | Performed by: FAMILY MEDICINE

## 2022-04-05 PROCEDURE — 80048 BASIC METABOLIC PNL TOTAL CA: CPT | Performed by: EMERGENCY MEDICINE

## 2022-04-05 PROCEDURE — 81001 URINALYSIS AUTO W/SCOPE: CPT | Performed by: EMERGENCY MEDICINE

## 2022-04-05 PROCEDURE — 83605 ASSAY OF LACTIC ACID: CPT | Performed by: EMERGENCY MEDICINE

## 2022-04-05 PROCEDURE — 87636 SARSCOV2 & INF A&B AMP PRB: CPT | Performed by: EMERGENCY MEDICINE

## 2022-04-05 PROCEDURE — 96366 THER/PROPH/DIAG IV INF ADDON: CPT

## 2022-04-05 RX ORDER — PREDNISONE 20 MG/1
20 TABLET ORAL DAILY
Status: ON HOLD | COMMUNITY
End: 2022-04-15

## 2022-04-05 RX ORDER — ACETAMINOPHEN 325 MG/1
650 TABLET ORAL EVERY 4 HOURS PRN
Status: DISCONTINUED | OUTPATIENT
Start: 2022-04-05 | End: 2022-04-15 | Stop reason: HOSPADM

## 2022-04-05 RX ORDER — ALENDRONATE SODIUM 70 MG/1
70 TABLET ORAL
COMMUNITY
End: 2023-02-01

## 2022-04-05 RX ORDER — NICOTINE POLACRILEX 4 MG
15-30 LOZENGE BUCCAL
Status: DISCONTINUED | OUTPATIENT
Start: 2022-04-05 | End: 2022-04-15 | Stop reason: HOSPADM

## 2022-04-05 RX ORDER — METFORMIN HCL 500 MG
1000 TABLET, EXTENDED RELEASE 24 HR ORAL
COMMUNITY
End: 2022-06-29

## 2022-04-05 RX ORDER — LOSARTAN POTASSIUM 25 MG/1
12.5 TABLET ORAL EVERY MORNING
COMMUNITY
End: 2023-02-01

## 2022-04-05 RX ORDER — PROCHLORPERAZINE MALEATE 5 MG
5 TABLET ORAL EVERY 6 HOURS PRN
Status: DISCONTINUED | OUTPATIENT
Start: 2022-04-05 | End: 2022-04-15 | Stop reason: HOSPADM

## 2022-04-05 RX ORDER — MONTELUKAST SODIUM 10 MG/1
10 TABLET ORAL AT BEDTIME
COMMUNITY
End: 2023-02-01

## 2022-04-05 RX ORDER — ALBUTEROL SULFATE 5 MG/ML
5 SOLUTION RESPIRATORY (INHALATION) EVERY 6 HOURS PRN
Status: DISCONTINUED | OUTPATIENT
Start: 2022-04-05 | End: 2022-04-15 | Stop reason: HOSPADM

## 2022-04-05 RX ORDER — ONDANSETRON 2 MG/ML
4 INJECTION INTRAMUSCULAR; INTRAVENOUS ONCE
Status: COMPLETED | OUTPATIENT
Start: 2022-04-05 | End: 2022-04-05

## 2022-04-05 RX ORDER — AZATHIOPRINE 50 MG/1
100 TABLET ORAL EVERY MORNING
COMMUNITY
End: 2022-04-15

## 2022-04-05 RX ORDER — MAGNESIUM SULFATE 4 G/50ML
4 INJECTION INTRAVENOUS ONCE
Status: DISCONTINUED | OUTPATIENT
Start: 2022-04-05 | End: 2022-04-05

## 2022-04-05 RX ORDER — GABAPENTIN 300 MG/1
900 CAPSULE ORAL AT BEDTIME
COMMUNITY
End: 2023-02-01

## 2022-04-05 RX ORDER — PANTOPRAZOLE SODIUM 20 MG/1
40 TABLET, DELAYED RELEASE ORAL
Status: DISCONTINUED | OUTPATIENT
Start: 2022-04-05 | End: 2022-04-15 | Stop reason: HOSPADM

## 2022-04-05 RX ORDER — IOPAMIDOL 755 MG/ML
100 INJECTION, SOLUTION INTRAVASCULAR ONCE
Status: COMPLETED | OUTPATIENT
Start: 2022-04-05 | End: 2022-04-05

## 2022-04-05 RX ORDER — ASPIRIN 81 MG/1
81 TABLET ORAL DAILY
COMMUNITY
End: 2022-06-20

## 2022-04-05 RX ORDER — ALBUTEROL SULFATE 90 UG/1
2 AEROSOL, METERED RESPIRATORY (INHALATION) EVERY 6 HOURS PRN
COMMUNITY
End: 2023-02-01

## 2022-04-05 RX ORDER — PRAVASTATIN SODIUM 20 MG
40 TABLET ORAL AT BEDTIME
Status: DISCONTINUED | OUTPATIENT
Start: 2022-04-05 | End: 2022-04-15 | Stop reason: HOSPADM

## 2022-04-05 RX ORDER — METRONIDAZOLE 500 MG/100ML
500 INJECTION, SOLUTION INTRAVENOUS EVERY 12 HOURS
Status: DISCONTINUED | OUTPATIENT
Start: 2022-04-06 | End: 2022-04-08

## 2022-04-05 RX ORDER — CEFTRIAXONE 1 G/1
1 INJECTION, POWDER, FOR SOLUTION INTRAMUSCULAR; INTRAVENOUS EVERY 24 HOURS
Status: DISCONTINUED | OUTPATIENT
Start: 2022-04-06 | End: 2022-04-08

## 2022-04-05 RX ORDER — MULTIVIT-MIN/IRON/FOLIC ACID/K 18-600-40
1000 CAPSULE ORAL DAILY
COMMUNITY
End: 2023-02-01

## 2022-04-05 RX ORDER — PREDNISONE 20 MG/1
20 TABLET ORAL DAILY
Status: DISCONTINUED | OUTPATIENT
Start: 2022-04-06 | End: 2022-04-07

## 2022-04-05 RX ORDER — ALBUTEROL SULFATE 5 MG/ML
5 SOLUTION RESPIRATORY (INHALATION) EVERY 6 HOURS PRN
COMMUNITY
End: 2023-01-27 | Stop reason: ALTCHOICE

## 2022-04-05 RX ORDER — METRONIDAZOLE 500 MG/100ML
500 INJECTION, SOLUTION INTRAVENOUS ONCE
Status: COMPLETED | OUTPATIENT
Start: 2022-04-05 | End: 2022-04-05

## 2022-04-05 RX ORDER — GABAPENTIN 300 MG/1
900 CAPSULE ORAL AT BEDTIME
Status: DISCONTINUED | OUTPATIENT
Start: 2022-04-05 | End: 2022-04-15 | Stop reason: HOSPADM

## 2022-04-05 RX ORDER — PRAVASTATIN SODIUM 40 MG
40 TABLET ORAL AT BEDTIME
COMMUNITY
End: 2023-02-01

## 2022-04-05 RX ORDER — ALBUTEROL SULFATE 90 UG/1
2 AEROSOL, METERED RESPIRATORY (INHALATION) 4 TIMES DAILY PRN
Status: DISCONTINUED | OUTPATIENT
Start: 2022-04-05 | End: 2022-04-15 | Stop reason: HOSPADM

## 2022-04-05 RX ORDER — MAGNESIUM SULFATE HEPTAHYDRATE 40 MG/ML
2 INJECTION, SOLUTION INTRAVENOUS ONCE
Status: COMPLETED | OUTPATIENT
Start: 2022-04-05 | End: 2022-04-05

## 2022-04-05 RX ORDER — CARVEDILOL 12.5 MG/1
12.5 TABLET ORAL 2 TIMES DAILY WITH MEALS
COMMUNITY
End: 2023-02-01

## 2022-04-05 RX ORDER — PROCHLORPERAZINE 25 MG
12.5 SUPPOSITORY, RECTAL RECTAL EVERY 12 HOURS PRN
Status: DISCONTINUED | OUTPATIENT
Start: 2022-04-05 | End: 2022-04-15 | Stop reason: HOSPADM

## 2022-04-05 RX ORDER — DEXTROSE MONOHYDRATE 25 G/50ML
25-50 INJECTION, SOLUTION INTRAVENOUS
Status: DISCONTINUED | OUTPATIENT
Start: 2022-04-05 | End: 2022-04-15 | Stop reason: HOSPADM

## 2022-04-05 RX ORDER — MONTELUKAST SODIUM 10 MG/1
10 TABLET ORAL AT BEDTIME
Status: DISCONTINUED | OUTPATIENT
Start: 2022-04-05 | End: 2022-04-15 | Stop reason: HOSPADM

## 2022-04-05 RX ORDER — CARVEDILOL 12.5 MG/1
12.5 TABLET ORAL 2 TIMES DAILY WITH MEALS
Status: DISCONTINUED | OUTPATIENT
Start: 2022-04-05 | End: 2022-04-15 | Stop reason: HOSPADM

## 2022-04-05 RX ORDER — UBIDECARENONE 30 MG
1 CAPSULE ORAL DAILY
COMMUNITY
End: 2023-01-27

## 2022-04-05 RX ORDER — CEFTRIAXONE 1 G/1
1 INJECTION, POWDER, FOR SOLUTION INTRAMUSCULAR; INTRAVENOUS ONCE
Status: COMPLETED | OUTPATIENT
Start: 2022-04-05 | End: 2022-04-05

## 2022-04-05 RX ORDER — LIDOCAINE 40 MG/G
CREAM TOPICAL
Status: DISCONTINUED | OUTPATIENT
Start: 2022-04-05 | End: 2022-04-15 | Stop reason: HOSPADM

## 2022-04-05 RX ORDER — FLUOXETINE 40 MG/1
40 CAPSULE ORAL EVERY MORNING
COMMUNITY
End: 2023-02-01

## 2022-04-05 RX ORDER — CEPHALEXIN 500 MG/1
500 CAPSULE ORAL EVERY 12 HOURS
Status: ON HOLD | COMMUNITY
End: 2022-04-15

## 2022-04-05 RX ADMIN — ONDANSETRON 4 MG: 2 INJECTION INTRAMUSCULAR; INTRAVENOUS at 14:47

## 2022-04-05 RX ADMIN — CEFTRIAXONE 1 G: 1 INJECTION, POWDER, FOR SOLUTION INTRAMUSCULAR; INTRAVENOUS at 15:57

## 2022-04-05 RX ADMIN — CARVEDILOL 12.5 MG: 12.5 TABLET, FILM COATED ORAL at 21:55

## 2022-04-05 RX ADMIN — SODIUM CHLORIDE 500 ML: 9 INJECTION, SOLUTION INTRAVENOUS at 14:46

## 2022-04-05 RX ADMIN — IOPAMIDOL 100 ML: 755 INJECTION, SOLUTION INTRAVENOUS at 16:15

## 2022-04-05 RX ADMIN — ACETAMINOPHEN 650 MG: 325 TABLET ORAL at 21:55

## 2022-04-05 RX ADMIN — METRONIDAZOLE 500 MG: 5 INJECTION, SOLUTION INTRAVENOUS at 17:34

## 2022-04-05 RX ADMIN — MONTELUKAST 10 MG: 10 TABLET, FILM COATED ORAL at 21:56

## 2022-04-05 RX ADMIN — PANTOPRAZOLE SODIUM 40 MG: 20 TABLET, DELAYED RELEASE ORAL at 21:56

## 2022-04-05 RX ADMIN — GABAPENTIN 900 MG: 300 CAPSULE ORAL at 21:55

## 2022-04-05 RX ADMIN — MAGNESIUM SULFATE HEPTAHYDRATE 2 G: 40 INJECTION, SOLUTION INTRAVENOUS at 14:52

## 2022-04-05 RX ADMIN — PRAVASTATIN SODIUM 40 MG: 20 TABLET ORAL at 21:55

## 2022-04-05 ASSESSMENT — ENCOUNTER SYMPTOMS
COUGH: 0
SHORTNESS OF BREATH: 0
DIARRHEA: 1
DYSURIA: 0
NECK PAIN: 0
NECK STIFFNESS: 0
APPETITE CHANGE: 1
CONFUSION: 0
FEVER: 0
NAUSEA: 1
HEADACHES: 0
BACK PAIN: 1
VOMITING: 0
ABDOMINAL PAIN: 1

## 2022-04-05 ASSESSMENT — ACTIVITIES OF DAILY LIVING (ADL)
ADLS_ACUITY_SCORE: 12

## 2022-04-05 NOTE — ED TRIAGE NOTES
Patient states she has PMR and has all over pain, she was diagnosed with at UTI last week and is on a course of antibiotics, has taken 2 pills last night and today.  Increased weakness and not able to eat.  Patient is a poor historian.

## 2022-04-05 NOTE — ED PROVIDER NOTES
EMERGENCY DEPARTMENT ENCOUNTER      NAME: Zakiya Christian  AGE: 77 year old female  YOB: 1945  MRN: [id number]  EVALUATION DATE & TIME: 2022  2:07 PM    PCP: Ami Noriega    ED PROVIDER: Evelin Gomez M.D.        Chief Complaint   Patient presents with     UTI         FINAL IMPRESSION:    1. Colonic diverticular abscess    2. Body aches    3. Hypomagnesemia    4. Acute UTI    5. Diverticulitis of colon            MEDICAL DECISION MAKIN year old female with history of polymyalgia rheumatica, hypertension, diabetes who presents emergency department with generalized body aches.  Currently being treated for UTI by primary care.  Found to have UTI as well as diverticulitis with abscess.  Possible more of a chronic diverticulitis type picture.  Overall she does have some tenderness to the abdomen, but quite mild in nature.  She states it feels like her normal polymyalgia rheumatica.  She is otherwise hemodynamically stable.  Plan is admission to the hospital for IV antibiotics and drain placement into the abscess.  I did speak with IR tonight and both IR and general radiology would be L to place a drain in this patient tomorrow.  I do not think she requires us emergently overnight and after hours.  She does not appear septic or toxic.  Patient aware of the plan and agrees.  She was accepted to the hospital by the hospitalist will go to [facility].        ED COURSE:  2:19 PM  I met with the patient to gather history and perform my exam. ED course and treatment discussed.    5:22 PM   I spoke with Dr. Noel, general surgery.  He recommends that a drain be placed and that by IR.  We will call IR to see if they were able to do this procedure here at [facility].  She is hemodynamically stable I do feel she can wait until tomorrow to have that drain placed.  Also spoke with the ED pharmacist about antibiotics.  Initially covered with ceftriaxone for the UTI and this  diverticular abscess was unexpected.  She feels that we can just add on Flagyl right now to ceftriaxone.    5:59 PM  Patient and  at bedside aware of the plan for admission to the hospital.  They are aware that she will need drain to be placed in this abscess tomorrow.  I spoke with Xiomara Teixeira and nurse with interventional radiology.  She states that that is definitely a procedure that can be done here at [facility] tomorrow either by interventional radiology or by the general radiologist.  Neither are in-house at this time.  Patient is otherwise stable I do not think she requires this emergently at this time and can wait till tomorrow morning.    6:21 PM   I discussed the case with hospitalist, Dr. Bernstein.  This admit her to inpatient to [facility].  She is otherwise hemodynamically stable.  Patient and  at bedside aware of the plan and agree.  She does not appear toxic or septic at this time.    I do not think that this represents ACS, PE, ruptured AAA, aortic dissection, bowel obstruction, bowel ischemia, cholecystitis, pancreatitis, appendicitis, kidney stone, pyelonephritis, incarcerated or strangulated hernia, ovarian torsion, viscus perforation, perforated GI ulcer, or other such etiologies at this time.      COVID-19 PPE worn during patient evaluation:  Mask: n95 and homemade masks   Eye Protection: goggles   Gown: none   Hair cover: yes  Face shield: none   Patient wearing a mask: yes     At the conclusion of the encounter I discussed the results of all of the tests and the disposition. Their questions were answered. The patient (and any family present) acknowledged understanding and were agreeable with the care plan.      CONSULTANTS:  Surgery - Dr. Sadie GA -Xiomara Cedeno RN  Hospitalist - Dr. Bernstein      MEDICATIONS GIVEN IN THE EMERGENCY:  Medications   metroNIDAZOLE (FLAGYL) infusion 500 mg (500 mg Intravenous New Bag 4/5/22 0086)   magnesium sulfate 2 g in water intermittent  infusion (0 g Intravenous Stopped 4/5/22 1552)   ondansetron (ZOFRAN) injection 4 mg (4 mg Intravenous Given 4/5/22 1447)   0.9% sodium chloride BOLUS (0 mLs Intravenous Stopped 4/5/22 1548)   iopamidol (ISOVUE-370) solution 100 mL (100 mLs Intravenous Given 4/5/22 1615)   cefTRIAXone (ROCEPHIN) 1 g vial to attach to  mL bag for ADULTS or NS 50 mL bag for PEDS (0 g Intravenous Stopped 4/5/22 1651)           NEW PRESCRIPTIONS STARTED AT TODAY'S ER VISIT     Medication List      There are no discharge medications for this visit.             CONDITION:  stable        DISPOSITION:  Med surg ip as accepted by Dr. Bernstein, hospitalist      =================================================================  =================================================================    HPI    Patient information was obtained from: patient and     Use of Intrepreter: N/A     Zakiya Christian is a 77 year old female with history of DM, obesity, and PMR who presents to the ER with complaints of 5 days now of generalized body aches, lower abdominal pain, lower back pain, nausea without vomiting, a few episodes of diarrhea, and overall just not quite feeling well.  Seen by primary care a few days ago for same.  Ultimately urine was sent and concerns for UTI and patient started on Keflex 500 mg twice daily yesterday.  She took a dose last night and today.  She states she has not been eating or drinking because of the nausea.  She is concerned she is dehydrated.  She is having difficulty getting out of bed because she fatigued and feels more comfortable in bed per her .    She is denying any actual fevers, cough, shortness of breath, dysuria.  She does complain of some generalized chest and abdominal pain.      REVIEW OF SYSTEMS  Review of Systems   Constitutional: Positive for appetite change. Negative for fever.   Respiratory: Negative for cough and shortness of breath.    Cardiovascular: Positive for chest pain.    Gastrointestinal: Positive for abdominal pain, diarrhea and nausea. Negative for vomiting.   Genitourinary: Negative for dysuria.   Musculoskeletal: Positive for back pain (lower back and lower abd pain). Negative for neck pain and neck stiffness.   Skin: Negative for rash.   Allergic/Immunologic: Negative for immunocompromised state.   Neurological: Negative for headaches.   Psychiatric/Behavioral: Negative for confusion.   All other systems reviewed and are negative.          PAST MEDICAL HISTORY:  Past Medical History:   Diagnosis Date     Diabetes (H)      Hypertension      Obese      PMR (polymyalgia rheumatica) (H)          PAST SURGICAL HISTORY:  No past surgical history on file.      CURRENT MEDICATIONS:    Prior to Admission medications    Not on File         ALLERGIES:  Allergies   Allergen Reactions     Simvastatin Hives         FAMILY HISTORY:  No family history on file.      SOCIAL HISTORY:  Social History     Socioeconomic History     Marital status: Single     Spouse name: Not on file     Number of children: Not on file     Years of education: Not on file     Highest education level: Not on file   Occupational History     Not on file   Tobacco Use     Smoking status: Not on file     Smokeless tobacco: Not on file   Substance and Sexual Activity     Alcohol use: Not on file     Drug use: Not on file     Sexual activity: Not on file   Other Topics Concern     Not on file   Social History Narrative     Not on file     Social Determinants of Health     Financial Resource Strain: Not on file   Food Insecurity: Not on file   Transportation Needs: Not on file   Physical Activity: Not on file   Stress: Not on file   Social Connections: Not on file   Intimate Partner Violence: Not on file   Housing Stability: Not on file         VITALS:  Patient Vitals for the past 24 hrs:   BP Temp Temp src Pulse Resp SpO2 Height Weight   04/05/22 1825 -- -- -- 86 21 95 % -- --   04/05/22 1808 -- -- -- 91 25 94 % -- --  "  04/05/22 1741 138/74 -- -- 98 25 94 % -- --   04/05/22 1700 (!) 161/74 -- -- 94 23 96 % -- --   04/05/22 1630 (!) 151/70 -- -- 84 22 100 % -- --   04/05/22 1610 -- -- -- 87 24 100 % -- --   04/05/22 1600 (!) 160/74 -- -- 96 23 99 % -- --   04/05/22 1530 (!) 174/70 -- -- 84 18 100 % -- --   04/05/22 1524 -- -- -- 92 19 100 % -- --   04/05/22 1504 (!) 166/79 -- -- 94 26 100 % -- --   04/05/22 1415 (!) 170/84 -- -- 98 -- 96 % -- --   04/05/22 1248 (!) 188/102 97.1  F (36.2  C) Temporal 101 16 96 % 1.575 m (5' 2\") 90.7 kg (200 lb)       Wt Readings from Last 3 Encounters:   04/05/22 90.7 kg (200 lb)         PHYSICAL EXAM    Constitutional:  Well developed, Well nourished, NAD, GCS 15  HENT:  Normocephalic, Atraumatic, Bilateral external ears normal, Nose normal. Neck- Normal range of motion, No tenderness, Supple, No stridor.   Eyes:  PERRL, EOMI, Conjunctiva normal, No discharge.  Respiratory:  Normal breath sounds, No respiratory distress, No wheezing, Speaks full sentences easily. No cough.   Cardiovascular:  Normal heart rate, Regular rhythm, No murmurs, No rubs, No gallops. Chest wall nontender.   GI:  +obesity.  Bowel sounds normal, Soft, No tenderness, No masses, No flank tenderness. No rebound or guarding  : deferred  Musculoskeletal: No cyanosis, No clubbing. Good range of motion in all major joints. No major deformities noted.   Integument:  Warm, Dry, No erythema, No rash.  No petechiae.   Neurologic:  Alert & oriented x 3, No focal deficits noted.   Psychiatric:  Affect normal, Cooperative         LAB:  All pertinent labs reviewed and interpreted.  Recent Results (from the past 24 hour(s))   Glucose by meter    Collection Time: 04/05/22 12:54 PM   Result Value Ref Range    GLUCOSE BY METER POCT 125 (H) 70 - 99 mg/dL   Extra Blue Top Tube    Collection Time: 04/05/22  1:04 PM   Result Value Ref Range    Hold Specimen JIC    Extra Red Top Tube    Collection Time: 04/05/22  1:04 PM   Result Value Ref Range "    Hold Specimen JIC    Extra Green Top (Lithium Heparin) Tube    Collection Time: 04/05/22  1:04 PM   Result Value Ref Range    Hold Specimen JIC    Extra Purple Top Tube    Collection Time: 04/05/22  1:04 PM   Result Value Ref Range    Hold Specimen     CBC (+ platelets, no diff)    Collection Time: 04/05/22  1:04 PM   Result Value Ref Range    WBC Count 8.4 4.0 - 11.0 10e3/uL    RBC Count 3.84 3.80 - 5.20 10e6/uL    Hemoglobin 11.8 11.7 - 15.7 g/dL    Hematocrit 37.7 35.0 - 47.0 %    MCV 98 78 - 100 fL    MCH 30.7 26.5 - 33.0 pg    MCHC 31.3 (L) 31.5 - 36.5 g/dL    RDW 13.9 10.0 - 15.0 %    Platelet Count 431 150 - 450 10e3/uL   Basic metabolic panel    Collection Time: 04/05/22  1:04 PM   Result Value Ref Range    Sodium 142 136 - 145 mmol/L    Potassium 3.8 3.5 - 5.0 mmol/L    Chloride 101 98 - 107 mmol/L    Carbon Dioxide (CO2) 26 22 - 31 mmol/L    Anion Gap 15 5 - 18 mmol/L    Urea Nitrogen 14 8 - 28 mg/dL    Creatinine 0.75 0.60 - 1.10 mg/dL    Calcium 8.5 8.5 - 10.5 mg/dL    Glucose 128 (H) 70 - 125 mg/dL    GFR Estimate 81 >60 mL/min/1.73m2   Magnesium    Collection Time: 04/05/22  1:04 PM   Result Value Ref Range    Magnesium 1.3 (L) 1.8 - 2.6 mg/dL   Troponin I (now)    Collection Time: 04/05/22  1:04 PM   Result Value Ref Range    Troponin I <0.01 0.00 - 0.29 ng/mL   Extra Blood Culture Bottle    Collection Time: 04/05/22  1:05 PM   Result Value Ref Range    Hold Specimen JIC    Extra Green Top (Lithium Heparin) ON ICE    Collection Time: 04/05/22  1:05 PM   Result Value Ref Range    Hold Specimen JIC    ECG 12-LEAD WITH MUSE (LHE)    Collection Time: 04/05/22  2:18 PM   Result Value Ref Range    Systolic Blood Pressure 170 mmHg    Diastolic Blood Pressure 84 mmHg    Ventricular Rate 91 BPM    Atrial Rate 91 BPM    UT Interval 144 ms    QRS Duration 74 ms     ms    QTc 455 ms    P Axis 25 degrees    R AXIS 9 degrees    T Axis 37 degrees    Interpretation ECG       Sinus rhythm  Nonspecific ST  abnormality  Abnormal ECG  No previous ECGs available  Confirmed by SEE ED PROVIDER NOTE FOR, ECG INTERPRETATION ([id number]),  DIMITRIS CARRERA ([id number]) on 4/5/2022 2:20:23 PM     UA with Microscopic reflex to Culture    Collection Time: 04/05/22  2:50 PM    Specimen: Urine, Midstream   Result Value Ref Range    Color Urine Yellow Colorless, Straw, Light Yellow, Yellow    Appearance Urine Turbid (A) Clear    Glucose Urine Negative Negative mg/dL    Bilirubin Urine Negative Negative    Ketones Urine 20  (A) Negative mg/dL    Specific Gravity Urine 1.023 1.001 - 1.030    Blood Urine 0.03 mg/dL (A) Negative    pH Urine 6.5 5.0 - 7.0    Protein Albumin Urine 50  (A) Negative mg/dL    Urobilinogen Urine 2.0 (A) <2.0 mg/dL    Nitrite Urine Negative Negative    Leukocyte Esterase Urine 500 Sara/uL (A) Negative    Bacteria Urine Few (A) None Seen /HPF    WBC Clumps Urine Present (A) None Seen /HPF    Mucus Urine Present (A) None Seen /LPF    RBC Urine 6 (H) <=2 /HPF    WBC Urine >182 (H) <=5 /HPF    Squamous Epithelials Urine 1 <=1 /HPF   Symptomatic; Unknown Influenza A/B & SARS-CoV2 (COVID-19) Virus PCR Multiplex Nasopharyngeal    Collection Time: 04/05/22  2:50 PM    Specimen: Nasopharyngeal; Swab   Result Value Ref Range    Influenza A PCR Negative Negative    Influenza B PCR Negative Negative    SARS CoV2 PCR Negative Negative   Procalcitonin    Collection Time: 04/05/22  2:50 PM   Result Value Ref Range    Procalcitonin 8.75 (H) 0.00 - 0.49 ng/mL   Lactic acid whole blood    Collection Time: 04/05/22  3:57 PM   Result Value Ref Range    Lactic Acid 0.5 (L) 0.7 - 2.0 mmol/L       No results found for: ABORH        RADIOLOGY:  Reviewed all pertinent imaging. Please see official radiology report.    CT Chest/Abdomen/Pelvis w Contrast   Final Result   IMPRESSION:   1.  Acute versus subacute diverticulitis proximal sigmoid colon complicated by a 3 cm diverticular abscess with a short sinus tract that leads superiorly to a  large irregularly-shaped 13 x 11 x 6 cm abscess in the left lower abdomen and upper pelvis that    has broad contact with multiple loops of adjacent small bowel.   2.  Moderate to marked diffuse hepatic steatosis, borderline hepatic enlargement and slight contour undulation could indicate some degree of steatohepatitis and/or fibrosis.    3.  Nephrolithiasis.   4.  Several 5 mm or less nodular opacities in each lung appear to represent foci of endobronchial secretion, and those included in field-of-view at the 03/22/2018 abdominal CT are unchanged. As a conservative measure, recommend 12 month follow-up CT    chest to ensure stability of the mid and upper lung nodules.            EKG:    Indication: Chest pain    Performed at: 14:18p  Impression: Sinus rhythm at 91 bpm.  Flipped T waves noted in lead aVR and V1.  UT interval 144 ms, QRS 74 ms,  ms.  No prior EKGs to compare to.      I have independently reviewed and interpreted the EKG(s) documented above.        PROCEDURES:  none      Evelin Goemz M.D. [facility]  Emergency Medicine and Medical Toxicology  Formerly [facility] EMERGENCY ROOM  [address] [id number] [phone]  Dept: [phone]           Evelin Gomez MD  04/05/22 3654

## 2022-04-06 ENCOUNTER — APPOINTMENT (OUTPATIENT)
Dept: CT IMAGING | Facility: CLINIC | Age: 77
DRG: 391 | End: 2022-04-06
Attending: FAMILY MEDICINE
Payer: COMMERCIAL

## 2022-04-06 LAB
ANION GAP SERPL CALCULATED.3IONS-SCNC: 12 MMOL/L (ref 5–18)
APPEARANCE FLD: ABNORMAL
BASOPHILS # BLD AUTO: 0 10E3/UL (ref 0–0.2)
BASOPHILS NFR BLD AUTO: 1 %
BUN SERPL-MCNC: 13 MG/DL (ref 8–28)
CALCIUM SERPL-MCNC: 8 MG/DL (ref 8.5–10.5)
CELL COUNT BODY FLUID SOURCE: ABNORMAL
CHLORIDE BLD-SCNC: 101 MMOL/L (ref 98–107)
CLOT PRESENCE: ABNORMAL
CO2 SERPL-SCNC: 30 MMOL/L (ref 22–31)
COLOR FLD: YELLOW
CREAT SERPL-MCNC: 0.72 MG/DL (ref 0.6–1.1)
EOSINOPHIL # BLD AUTO: 0.1 10E3/UL (ref 0–0.7)
EOSINOPHIL NFR BLD AUTO: 1 %
ERYTHROCYTE [DISTWIDTH] IN BLOOD BY AUTOMATED COUNT: 14 % (ref 10–15)
GFR SERPL CREATININE-BSD FRML MDRD: 85 ML/MIN/1.73M2
GLUCOSE BLD-MCNC: 74 MG/DL (ref 70–125)
GLUCOSE BLDC GLUCOMTR-MCNC: 72 MG/DL (ref 70–99)
GLUCOSE BLDC GLUCOMTR-MCNC: 74 MG/DL (ref 70–99)
GLUCOSE BLDC GLUCOMTR-MCNC: 76 MG/DL (ref 70–99)
GLUCOSE BLDC GLUCOMTR-MCNC: 76 MG/DL (ref 70–99)
GLUCOSE BLDC GLUCOMTR-MCNC: 80 MG/DL (ref 70–99)
GRAM STAIN RESULT: ABNORMAL
GRAM STAIN RESULT: ABNORMAL
HCT VFR BLD AUTO: 33.9 % (ref 35–47)
HGB BLD-MCNC: 10.6 G/DL (ref 11.7–15.7)
IMM GRANULOCYTES # BLD: 0 10E3/UL
IMM GRANULOCYTES NFR BLD: 1 %
LYMPHOCYTES # BLD AUTO: 0.6 10E3/UL (ref 0.8–5.3)
LYMPHOCYTES NFR BLD AUTO: 11 %
Lab: NO
MAGNESIUM SERPL-MCNC: 1.6 MG/DL (ref 1.8–2.6)
MCH RBC QN AUTO: 30.8 PG (ref 26.5–33)
MCHC RBC AUTO-ENTMCNC: 31.3 G/DL (ref 31.5–36.5)
MCV RBC AUTO: 99 FL (ref 78–100)
MONOCYTES # BLD AUTO: 0.2 10E3/UL (ref 0–1.3)
MONOCYTES NFR BLD AUTO: 3 %
NEUTROPHILS # BLD AUTO: 4.6 10E3/UL (ref 1.6–8.3)
NEUTROPHILS NFR BLD AUTO: 83 %
NRBC # BLD AUTO: 0 10E3/UL
NRBC BLD AUTO-RTO: 0 /100
PLATELET # BLD AUTO: 367 10E3/UL (ref 150–450)
POTASSIUM BLD-SCNC: 3.1 MMOL/L (ref 3.5–5)
RBC # BLD AUTO: 3.44 10E6/UL (ref 3.8–5.2)
SODIUM SERPL-SCNC: 143 MMOL/L (ref 136–145)
WBC # BLD AUTO: 5.5 10E3/UL (ref 4–11)

## 2022-04-06 PROCEDURE — 87205 SMEAR GRAM STAIN: CPT | Performed by: FAMILY MEDICINE

## 2022-04-06 PROCEDURE — 96366 THER/PROPH/DIAG IV INF ADDON: CPT

## 2022-04-06 PROCEDURE — 87075 CULTR BACTERIA EXCEPT BLOOD: CPT | Performed by: FAMILY MEDICINE

## 2022-04-06 PROCEDURE — 0W9G3ZZ DRAINAGE OF PERITONEAL CAVITY, PERCUTANEOUS APPROACH: ICD-10-PCS | Performed by: RADIOLOGY

## 2022-04-06 PROCEDURE — 250N000013 HC RX MED GY IP 250 OP 250 PS 637: Performed by: FAMILY MEDICINE

## 2022-04-06 PROCEDURE — 99233 SBSQ HOSP IP/OBS HIGH 50: CPT | Performed by: FAMILY MEDICINE

## 2022-04-06 PROCEDURE — 87070 CULTURE OTHR SPECIMN AEROBIC: CPT | Performed by: FAMILY MEDICINE

## 2022-04-06 PROCEDURE — 80048 BASIC METABOLIC PNL TOTAL CA: CPT | Performed by: FAMILY MEDICINE

## 2022-04-06 PROCEDURE — 89050 BODY FLUID CELL COUNT: CPT | Performed by: FAMILY MEDICINE

## 2022-04-06 PROCEDURE — 83735 ASSAY OF MAGNESIUM: CPT | Performed by: FAMILY MEDICINE

## 2022-04-06 PROCEDURE — 250N000009 HC RX 250: Performed by: RADIOLOGY

## 2022-04-06 PROCEDURE — 99222 1ST HOSP IP/OBS MODERATE 55: CPT | Performed by: SURGERY

## 2022-04-06 PROCEDURE — 120N000001 HC R&B MED SURG/OB

## 2022-04-06 PROCEDURE — 49406 IMAGE CATH FLUID PERI/RETRO: CPT

## 2022-04-06 PROCEDURE — 250N000012 HC RX MED GY IP 250 OP 636 PS 637: Performed by: FAMILY MEDICINE

## 2022-04-06 PROCEDURE — 250N000011 HC RX IP 250 OP 636: Performed by: FAMILY MEDICINE

## 2022-04-06 PROCEDURE — 36415 COLL VENOUS BLD VENIPUNCTURE: CPT | Performed by: FAMILY MEDICINE

## 2022-04-06 PROCEDURE — 250N000011 HC RX IP 250 OP 636: Performed by: RADIOLOGY

## 2022-04-06 PROCEDURE — 96375 TX/PRO/DX INJ NEW DRUG ADDON: CPT

## 2022-04-06 PROCEDURE — 96365 THER/PROPH/DIAG IV INF INIT: CPT | Mod: 59

## 2022-04-06 PROCEDURE — 85025 COMPLETE CBC W/AUTO DIFF WBC: CPT | Performed by: FAMILY MEDICINE

## 2022-04-06 RX ORDER — NALOXONE HYDROCHLORIDE 0.4 MG/ML
0.2 INJECTION, SOLUTION INTRAMUSCULAR; INTRAVENOUS; SUBCUTANEOUS
Status: DISCONTINUED | OUTPATIENT
Start: 2022-04-06 | End: 2022-04-15 | Stop reason: HOSPADM

## 2022-04-06 RX ORDER — FLUMAZENIL 0.1 MG/ML
0.2 INJECTION, SOLUTION INTRAVENOUS
Status: DISCONTINUED | OUTPATIENT
Start: 2022-04-06 | End: 2022-04-07

## 2022-04-06 RX ORDER — MAGNESIUM SULFATE HEPTAHYDRATE 40 MG/ML
2 INJECTION, SOLUTION INTRAVENOUS ONCE
Status: COMPLETED | OUTPATIENT
Start: 2022-04-06 | End: 2022-04-06

## 2022-04-06 RX ORDER — NALOXONE HYDROCHLORIDE 0.4 MG/ML
0.4 INJECTION, SOLUTION INTRAMUSCULAR; INTRAVENOUS; SUBCUTANEOUS
Status: DISCONTINUED | OUTPATIENT
Start: 2022-04-06 | End: 2022-04-15 | Stop reason: HOSPADM

## 2022-04-06 RX ORDER — FENTANYL CITRATE 50 UG/ML
25-50 INJECTION, SOLUTION INTRAMUSCULAR; INTRAVENOUS EVERY 5 MIN PRN
Status: DISCONTINUED | OUTPATIENT
Start: 2022-04-06 | End: 2022-04-07

## 2022-04-06 RX ORDER — POTASSIUM CHLORIDE 1500 MG/1
20 TABLET, EXTENDED RELEASE ORAL 3 TIMES DAILY
Status: DISCONTINUED | OUTPATIENT
Start: 2022-04-06 | End: 2022-04-10

## 2022-04-06 RX ADMIN — GABAPENTIN 900 MG: 300 CAPSULE ORAL at 21:53

## 2022-04-06 RX ADMIN — METRONIDAZOLE 500 MG: 5 INJECTION, SOLUTION INTRAVENOUS at 17:05

## 2022-04-06 RX ADMIN — ACETAMINOPHEN 650 MG: 325 TABLET ORAL at 19:47

## 2022-04-06 RX ADMIN — PANTOPRAZOLE SODIUM 40 MG: 20 TABLET, DELAYED RELEASE ORAL at 06:59

## 2022-04-06 RX ADMIN — MAGNESIUM SULFATE HEPTAHYDRATE 2 G: 40 INJECTION, SOLUTION INTRAVENOUS at 18:35

## 2022-04-06 RX ADMIN — MIDAZOLAM HYDROCHLORIDE 1 MG: 1 INJECTION, SOLUTION INTRAMUSCULAR; INTRAVENOUS at 12:05

## 2022-04-06 RX ADMIN — PANTOPRAZOLE SODIUM 40 MG: 20 TABLET, DELAYED RELEASE ORAL at 15:37

## 2022-04-06 RX ADMIN — CARVEDILOL 12.5 MG: 12.5 TABLET, FILM COATED ORAL at 08:12

## 2022-04-06 RX ADMIN — POTASSIUM CHLORIDE 20 MEQ: 20 TABLET, EXTENDED RELEASE ORAL at 17:57

## 2022-04-06 RX ADMIN — LIDOCAINE HYDROCHLORIDE 10 ML: 10 INJECTION, SOLUTION EPIDURAL; INFILTRATION; INTRACAUDAL; PERINEURAL at 12:29

## 2022-04-06 RX ADMIN — ACETAMINOPHEN 650 MG: 325 TABLET ORAL at 15:37

## 2022-04-06 RX ADMIN — CEFTRIAXONE 1 G: 1 INJECTION, POWDER, FOR SOLUTION INTRAMUSCULAR; INTRAVENOUS at 15:37

## 2022-04-06 RX ADMIN — CARVEDILOL 12.5 MG: 12.5 TABLET, FILM COATED ORAL at 17:05

## 2022-04-06 RX ADMIN — ACETAMINOPHEN 650 MG: 325 TABLET ORAL at 23:49

## 2022-04-06 RX ADMIN — PROCHLORPERAZINE EDISYLATE 5 MG: 5 INJECTION INTRAMUSCULAR; INTRAVENOUS at 03:08

## 2022-04-06 RX ADMIN — FLUOXETINE HYDROCHLORIDE 40 MG: 20 CAPSULE ORAL at 08:12

## 2022-04-06 RX ADMIN — PRAVASTATIN SODIUM 40 MG: 20 TABLET ORAL at 21:53

## 2022-04-06 RX ADMIN — MONTELUKAST 10 MG: 10 TABLET, FILM COATED ORAL at 23:02

## 2022-04-06 RX ADMIN — FENTANYL CITRATE 50 MCG: 50 INJECTION, SOLUTION INTRAMUSCULAR; INTRAVENOUS at 12:04

## 2022-04-06 RX ADMIN — LOSARTAN POTASSIUM 12.5 MG: 25 TABLET, FILM COATED ORAL at 17:57

## 2022-04-06 RX ADMIN — METRONIDAZOLE 500 MG: 5 INJECTION, SOLUTION INTRAVENOUS at 05:41

## 2022-04-06 RX ADMIN — ACETAMINOPHEN 650 MG: 325 TABLET ORAL at 05:41

## 2022-04-06 RX ADMIN — PREDNISONE 20 MG: 20 TABLET ORAL at 08:12

## 2022-04-06 ASSESSMENT — ACTIVITIES OF DAILY LIVING (ADL)
ADLS_ACUITY_SCORE: 13
ADLS_ACUITY_SCORE: 12
ADLS_ACUITY_SCORE: 13
ADLS_ACUITY_SCORE: 12
ADLS_ACUITY_SCORE: 13
ADLS_ACUITY_SCORE: 12
ADLS_ACUITY_SCORE: 13
ADLS_ACUITY_SCORE: 12

## 2022-04-06 NOTE — PROCEDURES
[facility]    Procedure: Computed tomography guided abcess drainage catheter placement with moderate sedation    Date/Time: 4/6/2022 12:41 PM  Performed by: Benjamin Snider MD  Authorized by: Benjamin Snider MD       UNIVERSAL PROTOCOL   Site Marked: Yes  Prior Images Obtained and Reviewed:  Yes  Required items: Required blood products, implants, devices and special equipment available    Patient identity confirmed:  Verbally with patient, arm band and provided demographic data  Patient was reevaluated immediately before administering moderate or deep sedation or anesthesia  Confirmation Checklist:  Patient's identity using two indicators, relevant allergies, procedure was appropriate and matched the consent or emergent situation and correct equipment/implants were available  Time out: Immediately prior to the procedure a time out was called    Universal Protocol: the Joint Commission Universal Protocol was followed    Preparation: Patient was prepped and draped in usual sterile fashion    ESBL (mL):  2     ANESTHESIA    Anesthesia: Local infiltration  Local Anesthetic:  Lidocaine 1% without epinephrine  Anesthetic Total (mL):  5      SEDATION  Patient Sedated: Yes    Sedation Type:  Moderate (conscious) sedation  Sedation:  Fentanyl, midazolam and see MAR for details  Vital signs: Vital signs monitored during sedation    See dictated procedure note for full details.    PROCEDURE  Describe Procedure: 10 Upper sorbian locking pigtail drainage catheter placed in abdominal abcsess with computed tomography guidance. 100 ml of purulent material aspirated .Sample sent for cultures and gram stain.Catheter drainage to Godwin Goode suction bulb. Moderate sedation with 1 mg versed and 50 mcg fentanyl intravenously. Sedation time 30 minutes. No immediate complications.  Patient Tolerance:  Patient tolerated the procedure well with no immediate complications  Length of time physician/provider  present for 1:1 monitoring during sedation: 30

## 2022-04-06 NOTE — PLAN OF CARE
Problem: Pain Acute  Goal: Acceptable Pain Control and Functional Ability  Outcome: Ongoing, Progressing  Intervention: Prevent or Manage Pain  Recent Flowsheet Documentation  Taken 4/6/2022 0326 by Estevan Johnson, RN  Medication Review/Management: medications reviewed  Taken 4/5/2022 4039 by Estevan Johnson, RN  Medication Review/Management: medications reviewed     Problem: UTI (Urinary Tract Infection)  Goal: Improved Infection Symptoms  Outcome: Ongoing, Progressing     Problem: Nausea and Vomiting  Goal: Fluid and Electrolyte Balance  Outcome: Ongoing, Progressing   Goal Outcome Evaluation:        Pt notes 6/10 pain in back while resting in bed. Prn Tylenol given. Pt has noted some nausea, w/o vomiting, relieved by IV Compazine. Pt up SBA to bathroom. IV abx. NPO for drain placement in IR today. Accucheck at 0200 was 76, spot check at 0600 was 74. VSS. [employer].

## 2022-04-06 NOTE — PRE-PROCEDURE
GENERAL PRE-PROCEDURE:   Procedure:  Computed tomography guided drain placement in abdominal fluid collection  Date/Time:  4/6/2022 11:48 AM    Verbal consent obtained?: Yes    Risks and benefits: Risks, benefits and alternatives were discussed    Consent given by:  Patient  Patient states understanding of procedure being performed: Yes    Patient's understanding of procedure matches consent: Yes    Procedure consent matches procedure scheduled: Yes    Expected level of sedation:  Moderate  Appropriately NPO:  Yes  ASA Class:  2  Mallampati  :  Grade 2- soft palate, base of uvula, tonsillar pillars, and portion of posterior pharyngeal wall visible  Lungs:  Lungs clear with good breath sounds bilaterally  Heart:  Normal heart sounds and rate  History & Physical reviewed:  History and physical reviewed and no updates needed  Statement of review:  I have reviewed the lab findings, diagnostic data, medications, and the plan for sedation

## 2022-04-06 NOTE — CONSULTS
General Surgery Consultation  Zakiya Christian MRN# [id number]   Age/Sex: 77 year old female YOB: 1945     Reason for consult: 1. Colonic diverticular abscess    2. Body aches    3. Hypomagnesemia    4. Acute UTI    5. Diverticulitis of colon            Requesting physician: Dr Bernstein                   Assessment and Plan:   Assessment:  Intra-abdominal abscess from diverticulitis of colon most likely perforation.  Large abscess mid abdomen and with connection to sigmoid colon with significant diverticular disease  Polymyalgia rheumatica and has been on chronic steroids for 4 months currently at 20 mg daily  Recurrent UTI    Plan:  -IR placement of drain today  -IV antibiotics  -Okay to start a clear liquid diet after her drain placement and do not advance her diet  -We will continue to follow patient   -Thank you for consulting us involving us in her care.       Chief Complaint:     Chief Complaint   Patient presents with     UTI        History is obtained from the patient    HPI:   Zakiya Christian is a 77 year old female with past medical history of type 2 diabetes, hypertension, daily aspirin, recent diagnosis of polymyalgia rheumatica 4 months ago, recurrent UTI, CKD, peripheral neuropathy, depression, COPD, GERD who presented to the emergency room yesterday with chief complaint of general body aches for 5 days, lower abdominal pain ongoing lower back pain and diarrhea.  Patient states that she has been having symptoms for over 3 months now.  About 4 months ago she was complaining of lower back pain and difficulty getting up from a seated position which was new.  After evaluation she was diagnosed with polymyalgia rheumatica and started on steroids patient did feel a little better but overall she did not think she improved that dramatically.  Steroids were tapered down to 10 mg and after about 5 days her pain started returning so her steroids were increased to 20 mg prednisone approximately a week  ago.  Her abdominal pain has been intermittent but consistent over the past 3 months.  She states that they are sharp and not aggravated by anything particular.  She does not take anything for the pain.  Pain comes and goes and usually is suprapubic or back pain.  She always feels like back pain is her main complaint and always lower back.  Pain is usually sharp.  Can last for a little while but overall she says it is random and intermittent with no associations.  She is also had diarrhea every time she eats.  She states that she gets hungry and then will eat and then have immediate diarrhea.  Diarrhea is loose without blood.  She does have mucus in the stool as well.  Was scheduled for colonoscopy 6 months ago but was not able to complete the prep due to feeling sick from them prep.  Has had previous colonoscopies in the past.  No history of diverticulitis.  She has been feeling chilled on and off.  Has lost about 25 pounds over the past year but states that she has been trying to lose weight.  Denies any other symptoms.  Positive pertinent review of systems is diarrhea, fatigue, weakness, lower back pain and abdominal pain.  Past surgeries include open cholecystectomy  Family history negative for colon cancer          Past Medical History:     Past Medical History:   Diagnosis Date     Diabetes (H)      Hypertension      Obese      PMR (polymyalgia rheumatica) (H)               Past Surgical History:   No past surgical history on file.          Social History:               Family History:   No family history on file.           Allergies:     Allergies   Allergen Reactions     Simvastatin Hives              Medications:     Prior to Admission medications    Medication Sig Start Date End Date Taking? Authorizing Provider   albuterol (PROAIR HFA/PROVENTIL HFA/VENTOLIN HFA) 108 (90 Base) MCG/ACT inhaler Inhale 2 puffs into the lungs 4 times daily as needed for shortness of breath / dyspnea or wheezing   Yes Unknown,  Entered By History   albuterol (PROVENTIL) (5 MG/ML) 0.5% neb solution Take 5 mg by nebulization every 6 hours as needed for wheezing or shortness of breath / dyspnea   Yes Unknown, Entered By History   alendronate (FOSAMAX) 70 MG tablet Take 70 mg by mouth every 7 days   Yes Unknown, Entered By History   Ascorbic Acid (VITAMIN C) 500 MG CAPS Take 1,000 mg by mouth daily   Yes Unknown, Entered By History   aspirin 81 MG EC tablet Take 81 mg by mouth daily   Yes Unknown, Entered By History   azaTHIOprine (IMURAN) 50 MG tablet Take 100 mg by mouth every morning    Yes Unknown, Entered By History   calcium carbonate 600 mg-vitamin D 400 units (CALTRATE) 600-400 MG-UNIT per tablet Take 1 tablet by mouth 2 times daily   Yes Unknown, Entered By History   carvedilol (COREG) 12.5 MG tablet Take 12.5 mg by mouth 2 times daily (with meals)   Yes Unknown, Entered By History   cephALEXin (KEFLEX) 500 MG capsule Take 500 mg by mouth every 12 hours   Yes Unknown, Entered By History   cholecalciferol 50 MCG (2000 UT) CAPS Take 4,000 Units by mouth daily   Yes Unknown, Entered By History   coenzyme Q-10 capsule Take 1 capsule by mouth daily   Yes Unknown, Entered By History   FLUoxetine (PROZAC) 40 MG capsule Take 40 mg by mouth every morning   Yes Unknown, Entered By History   Fluticasone-Umeclidin-Vilanterol (TRELEGY ELLIPTA) 200-62.5-25 MCG/INH oral inhaler Inhale 1 puff into the lungs every morning   Yes Unknown, Entered By History   gabapentin (NEURONTIN) 300 MG capsule Take 900 mg by mouth At Bedtime   Yes Unknown, Entered By History   losartan (COZAAR) 25 MG tablet Take 12.5 mg by mouth every morning   Yes Unknown, Entered By History   metFORMIN (GLUCOPHAGE-XR) 500 MG 24 hr tablet Take 1,000 mg by mouth daily (with breakfast)   Yes Unknown, Entered By History   montelukast (SINGULAIR) 10 MG tablet Take 10 mg by mouth At Bedtime   Yes Unknown, Entered By History   omeprazole (PRILOSEC) 40 MG DR capsule Take 40 mg by mouth  "every morning (before breakfast)   Yes Unknown, Entered By History   pravastatin (PRAVACHOL) 40 MG tablet Take 40 mg by mouth At Bedtime   Yes Unknown, Entered By History   predniSONE (DELTASONE) 20 MG tablet Take 20 mg by mouth daily   Yes Unknown, Entered By History   rivaroxaban ANTICOAGULANT (XARELTO) 20 MG TABS tablet Take 20 mg by mouth daily (with breakfast)   Yes Unknown, Entered By History              Review of Systems:   The Review of Systems is negative other than noted in the HPI            Physical Exam:     Patient Vitals for the past 24 hrs:   BP Temp Temp src Pulse Resp SpO2 Height Weight   04/06/22 0807 126/62 97.7  F (36.5  C) Oral 76 25 98 % -- --   04/06/22 0349 -- -- -- 72 24 99 % -- --   04/06/22 0312 133/71 97.7  F (36.5  C) Oral 73 22 99 % -- --   04/06/22 0247 -- -- -- 73 23 98 % -- --   04/06/22 0008 -- -- -- 72 26 98 % -- --   04/05/22 2336 (!) 144/65 97.4  F (36.3  C) Oral 74 20 -- -- --   04/05/22 2259 -- -- -- 73 27 97 % -- --   04/05/22 2228 -- -- -- 79 23 100 % -- --   04/05/22 2155 (!) 166/74 -- -- 79 -- -- -- --   04/05/22 2128 -- -- -- 79 24 100 % -- --   04/05/22 2054 -- -- -- 85 28 100 % -- --   04/05/22 2007 -- -- -- 92 17 95 % -- --   04/05/22 1940 -- -- -- 96 28 93 % -- --   04/05/22 1825 -- -- -- 86 21 95 % -- --   04/05/22 1808 -- -- -- 91 25 94 % -- --   04/05/22 1741 138/74 -- -- 98 25 94 % -- --   04/05/22 1700 (!) 161/74 -- -- 94 23 96 % -- --   04/05/22 1630 (!) 151/70 -- -- 84 22 100 % -- --   04/05/22 1610 -- -- -- 87 24 100 % -- --   04/05/22 1600 (!) 160/74 -- -- 96 23 99 % -- --   04/05/22 1530 (!) 174/70 -- -- 84 18 100 % -- --   04/05/22 1524 -- -- -- 92 19 100 % -- --   04/05/22 1504 (!) 166/79 -- -- 94 26 100 % -- --   04/05/22 1415 (!) 170/84 -- -- 98 -- 96 % -- --   04/05/22 1248 (!) 188/102 97.1  F (36.2  C) Temporal 101 16 96 % 1.575 m (5' 2\") 90.7 kg (200 lb)          Intake/Output Summary (Last 24 hours) at 4/6/2022 1022  Last data filed at 4/5/2022 " 2226  Gross per 24 hour   Intake 1200 ml   Output --   Net 1200 ml      Constitutional:   awake, alert, cooperative, no apparent distress, and appears stated age       Eyes:   PERRL, conjunctiva/corneas clear, EOM's intact; no scleral edema or icterus noted        ENT:   Normocephalic, without obvious abnormality, atraumatic, Lips, mucosa, and tongue normal        Hematologic / Lymphatic:   No general lymphadenopathy       Lungs:   Normal respiratory effort, no accessory muscle use       Cardiovascular:   Regular rate and rhythm       Abdomen:   Soft tender right lower quadrant suprapubic and left lower quadrant.  No rebound or peritoneal signs present.  Normal bowel sounds present.       Musculoskeletal:   No obvious swelling, bruising or deformity       Skin:   Skin color and texture normal for patient, no rashes or lesions              Data:         All imaging studies reviewed by me.  Reviewed images as well as radiology report    Results for orders placed or performed during the hospital encounter of 04/05/22 (from the past 24 hour(s))   Glucose by meter   Result Value Ref Range    GLUCOSE BY METER POCT 125 (H) 70 - 99 mg/dL   [place] Draw    Narrative    The following orders were created for panel order [place] Draw.  Procedure                               Abnormality         Status                     ---------                               -----------         ------                     Extra Blue Top Tube[[id number]]                              Final result               Extra Red Top Tube[[id number]]                               Final result               Extra Green Top (Lithium...[[id number]]                      Final result               Extra Purple Top Tube[[id number]]                            Final result                 Please view results for these tests on the individual orders.   Extra Blue Top Tube   Result Value Ref Range    Hold Specimen JIC    Extra Red Top Tube   Result Value Ref Range     Hold Specimen JIC    Extra Green Top (Lithium Heparin) Tube   Result Value Ref Range    Hold Specimen JIC    Extra Purple Top Tube   Result Value Ref Range    Hold Specimen     CBC (+ platelets, no diff)   Result Value Ref Range    WBC Count 8.4 4.0 - 11.0 10e3/uL    RBC Count 3.84 3.80 - 5.20 10e6/uL    Hemoglobin 11.8 11.7 - 15.7 g/dL    Hematocrit 37.7 35.0 - 47.0 %    MCV 98 78 - 100 fL    MCH 30.7 26.5 - 33.0 pg    MCHC 31.3 (L) 31.5 - 36.5 g/dL    RDW 13.9 10.0 - 15.0 %    Platelet Count 431 150 - 450 10e3/uL   Basic metabolic panel   Result Value Ref Range    Sodium 142 136 - 145 mmol/L    Potassium 3.8 3.5 - 5.0 mmol/L    Chloride 101 98 - 107 mmol/L    Carbon Dioxide (CO2) 26 22 - 31 mmol/L    Anion Gap 15 5 - 18 mmol/L    Urea Nitrogen 14 8 - 28 mg/dL    Creatinine 0.75 0.60 - 1.10 mg/dL    Calcium 8.5 8.5 - 10.5 mg/dL    Glucose 128 (H) 70 - 125 mg/dL    GFR Estimate 81 >60 mL/min/1.73m2   Magnesium   Result Value Ref Range    Magnesium 1.3 (L) 1.8 - 2.6 mg/dL   Troponin I (now)   Result Value Ref Range    Troponin I <0.01 0.00 - 0.29 ng/mL   Hemoglobin A1c   Result Value Ref Range    Hemoglobin A1C 6.2 (H) <=5.6 %   [place] Draw    Narrative    The following orders were created for panel order [place] Draw.  Procedure                               Abnormality         Status                     ---------                               -----------         ------                     Extra Blood Culture Bottle[[id number]]                       Final result               Extra Green Top (Lithium...[[id number]]                      Final result                 Please view results for these tests on the individual orders.   Extra Blood Culture Bottle   Result Value Ref Range    Hold Specimen JIC    Extra Green Top (Lithium Heparin) ON ICE   Result Value Ref Range    Hold Specimen JIC    ECG 12-LEAD WITH MUSE (LHE)   Result Value Ref Range    Systolic Blood Pressure 170 mmHg    Diastolic Blood Pressure 84 mmHg     Ventricular Rate 91 BPM    Atrial Rate 91 BPM    MO Interval 144 ms    QRS Duration 74 ms     ms    QTc 455 ms    P Axis 25 degrees    R AXIS 9 degrees    T Axis 37 degrees    Interpretation ECG       Sinus rhythm  Nonspecific ST abnormality  Abnormal ECG  No previous ECGs available  Confirmed by SEE ED PROVIDER NOTE FOR, ECG INTERPRETATION ([id number]),  JOSEDIMITRIS EDDY ([id number]) on 4/5/2022 2:20:23 PM     UA with Microscopic reflex to Culture    Specimen: Urine, Midstream   Result Value Ref Range    Color Urine Yellow Colorless, Straw, Light Yellow, Yellow    Appearance Urine Turbid (A) Clear    Glucose Urine Negative Negative mg/dL    Bilirubin Urine Negative Negative    Ketones Urine 20  (A) Negative mg/dL    Specific Gravity Urine 1.023 1.001 - 1.030    Blood Urine 0.03 mg/dL (A) Negative    pH Urine 6.5 5.0 - 7.0    Protein Albumin Urine 50  (A) Negative mg/dL    Urobilinogen Urine 2.0 (A) <2.0 mg/dL    Nitrite Urine Negative Negative    Leukocyte Esterase Urine 500 Sara/uL (A) Negative    Bacteria Urine Few (A) None Seen /HPF    WBC Clumps Urine Present (A) None Seen /HPF    Mucus Urine Present (A) None Seen /LPF    RBC Urine 6 (H) <=2 /HPF    WBC Urine >182 (H) <=5 /HPF    Squamous Epithelials Urine 1 <=1 /HPF    Narrative    Urine Culture ordered based on laboratory criteria   Symptomatic; Unknown Influenza A/B & SARS-CoV2 (COVID-19) Virus PCR Multiplex Nasopharyngeal    Specimen: Nasopharyngeal; Swab   Result Value Ref Range    Influenza A PCR Negative Negative    Influenza B PCR Negative Negative    SARS CoV2 PCR Negative Negative    Narrative    Testing was performed using the tramaine SARS-CoV-2 & Influenza A/B Assay on the tramaine [employer] System. This test should be ordered for the detection of SARS-CoV-2 and influenza viruses in individuals who meet clinical and/or epidemiological criteria. Test performance is unknown in asymptomatic patients. This test is for in vitro diagnostic use under the FDA EUA for  laboratories certified under CLIA to perform moderate and/or high complexity testing. This test has not been FDA cleared or approved. A negative result does not rule out the presence of PCR inhibitors in the specimen or target RNA in concentration below the limit of detection for the assay. If only one viral target is positive but coinfection with multiple targets is suspected, the sample should be re-tested with another FDA cleared, approved or authorized test, if coinfection would change clinical management. [facility] are certified under the Clinical Laboratory Improvement Amendments of 1988 (CLIA-88) as  qualified to perform moderate and/or high complexity laboratory testing.   Procalcitonin   Result Value Ref Range    Procalcitonin 8.75 (H) 0.00 - 0.49 ng/mL   Lactic acid whole blood   Result Value Ref Range    Lactic Acid 0.5 (L) 0.7 - 2.0 mmol/L   CT Chest/Abdomen/Pelvis w Contrast    Narrative    EXAM: CT CHEST/ABDOMEN/PELVIS W CONTRAST  LOCATION: [facility]  DATE/TIME: 4/5/2022 4:06 PM    INDICATION: 77-year-old woman with DM, obesity and polymyalgia rheumatica presents with generalized body aches, diffuse abdominal pain, urinary tract infection and hypomagnesemia.  COMPARISON: Noncontrast CT AP 03/22/2018.  TECHNIQUE: CT scan of the chest, abdomen, and pelvis was performed following injection of IV contrast. Multiplanar reformats were obtained. Dose reduction techniques were used.   CONTRAST: Isovue 370 100mL    FINDINGS:   LUNGS AND PLEURA: Several 5 mm or less nodular opacities in each lung appear to represent foci of endobronchial secretion, and those included in field-of-view at the 03/22/2018 abdominal CT are unchanged. Strands of fibrosis and/or linear atelectasis   scattered throughout the mid and lower lobes. No pleural effusion.    MEDIASTINUM/AXILLAE: Trace pericardial fluid unchanged. Heart size normal. No lymphadenopathy.    CORONARY ARTERY  CALCIFICATION: Three-vessel coronary artery calcification.     HEPATOBILIARY: Moderate to marked diffuse hepatic steatosis, borderline hepatic enlargement and slight contour undulation. Liver otherwise normal. No bile duct dilatation. Gallbladder is surgically absent.    PANCREAS: Normal.    SPLEEN: Spleen size normal.    ADRENAL GLANDS: Normal.    KIDNEY/BLADDER: Nonobstructing 4 mm stone right kidney and a nonobstructing 3 mm stone left kidney. A 4.5 x 3.4 x 3.3 cm benign angiomyolipoma left kidney has increased in size slightly over the past 4 years which is common. The angiomyolipoma and   several benign cysts in both kidneys require no follow-up.    BOWEL: Severe colonic diverticulosis. Acute versus subacute diverticulitis proximal sigmoid colon complicated by a 3 cm diverticular abscess (axial image 240) that leads superiorly to a short sinus tract and a large irregularly-shaped 13 x 11 x 6 cm   abscess in the left lower abdomen and upper pelvis that has broad contact with multiple loops of adjacent small bowel.    LYMPH NODES: No lymphadenopathy.    VASCULATURE: Normal caliber abdominal aorta.      PELVIC ORGANS: No pelvic mass or fluid.    MUSCULOSKELETAL: Unremarkable.      Impression    IMPRESSION:  1.  Acute versus subacute diverticulitis proximal sigmoid colon complicated by a 3 cm diverticular abscess with a short sinus tract that leads superiorly to a large irregularly-shaped 13 x 11 x 6 cm abscess in the left lower abdomen and upper pelvis that   has broad contact with multiple loops of adjacent small bowel.  2.  Moderate to marked diffuse hepatic steatosis, borderline hepatic enlargement and slight contour undulation could indicate some degree of steatohepatitis and/or fibrosis.   3.  Nephrolithiasis.  4.  Several 5 mm or less nodular opacities in each lung appear to represent foci of endobronchial secretion, and those included in field-of-view at the 03/22/2018 abdominal CT are unchanged. As a  conservative measure, recommend 12 month follow-up CT   chest to ensure stability of the mid and upper lung nodules.   Glucose by meter   Result Value Ref Range    GLUCOSE BY METER POCT 65 (L) 70 - 99 mg/dL   Glucose by meter   Result Value Ref Range    GLUCOSE BY METER POCT 72 70 - 99 mg/dL   Glucose by meter   Result Value Ref Range    GLUCOSE BY METER POCT 76 70 - 99 mg/dL   Glucose by meter   Result Value Ref Range    GLUCOSE BY METER POCT 74 70 - 99 mg/dL   CBC with platelets differential    Narrative    The following orders were created for panel order CBC with platelets differential.  Procedure                               Abnormality         Status                     ---------                               -----------         ------                     CBC with platelets and d...[[id number]]  Abnormal            Final result                 Please view results for these tests on the individual orders.   Basic metabolic panel   Result Value Ref Range    Sodium 143 136 - 145 mmol/L    Potassium 3.1 (L) 3.5 - 5.0 mmol/L    Chloride 101 98 - 107 mmol/L    Carbon Dioxide (CO2) 30 22 - 31 mmol/L    Anion Gap 12 5 - 18 mmol/L    Urea Nitrogen 13 8 - 28 mg/dL    Creatinine 0.72 0.60 - 1.10 mg/dL    Calcium 8.0 (L) 8.5 - 10.5 mg/dL    Glucose 74 70 - 125 mg/dL    GFR Estimate 85 >60 mL/min/1.73m2   CBC with platelets and differential   Result Value Ref Range    WBC Count 5.5 4.0 - 11.0 10e3/uL    RBC Count 3.44 (L) 3.80 - 5.20 10e6/uL    Hemoglobin 10.6 (L) 11.7 - 15.7 g/dL    Hematocrit 33.9 (L) 35.0 - 47.0 %    MCV 99 78 - 100 fL    MCH 30.8 26.5 - 33.0 pg    MCHC 31.3 (L) 31.5 - 36.5 g/dL    RDW 14.0 10.0 - 15.0 %    Platelet Count 367 150 - 450 10e3/uL    % Neutrophils 83 %    % Lymphocytes 11 %    % Monocytes 3 %    % Eosinophils 1 %    % Basophils 1 %    % Immature Granulocytes 1 %    NRBCs per 100 WBC 0 <1 /100    Absolute Neutrophils 4.6 1.6 - 8.3 10e3/uL    Absolute Lymphocytes 0.6 (L) 0.8 - 5.3 10e3/uL     Absolute Monocytes 0.2 0.0 - 1.3 10e3/uL    Absolute Eosinophils 0.1 0.0 - 0.7 10e3/uL    Absolute Basophils 0.0 0.0 - 0.2 10e3/uL    Absolute Immature Granulocytes 0.0 <=0.4 10e3/uL    Absolute NRBCs 0.0 10e3/uL   Magnesium   Result Value Ref Range    Magnesium 1.6 (L) 1.8 - 2.6 mg/dL   Glucose by meter   Result Value Ref Range    GLUCOSE BY METER POCT 76 70 - 99 mg/dL        Braden Bentley PA-C

## 2022-04-06 NOTE — PHARMACY-ADMISSION MEDICATION HISTORY
Pharmacy Note - Admission Medication History    Pertinent Provider Information:     Patient said she took prednisone and cephelaxin this AM, but doesn't believe she took any other medications     ______________________________________________________________________    Prior To Admission (PTA) med list completed and updated in EMR.       PTA Med List   Medication Sig Note Last Dose     albuterol (PROAIR HFA/PROVENTIL HFA/VENTOLIN HFA) 108 (90 Base) MCG/ACT inhaler Inhale 2 puffs into the lungs 4 times daily as needed for shortness of breath / dyspnea or wheezing  not with     albuterol (PROVENTIL) (5 MG/ML) 0.5% neb solution Take 5 mg by nebulization every 6 hours as needed for wheezing or shortness of breath / dyspnea       alendronate (FOSAMAX) 70 MG tablet Take 70 mg by mouth every 7 days  4/4/2022 at Unknown time     Ascorbic Acid (VITAMIN C) 500 MG CAPS Take 1,000 mg by mouth daily  4/4/2022 at Unknown time     aspirin 81 MG EC tablet Take 81 mg by mouth daily  4/4/2022 at Unknown time     azaTHIOprine (IMURAN) 50 MG tablet Take 100 mg by mouth every morning   4/4/2022 at Unknown time     calcium carbonate 600 mg-vitamin D 400 units (CALTRATE) 600-400 MG-UNIT per tablet Take 1 tablet by mouth 2 times daily  4/4/2022 at Unknown time     carvedilol (COREG) 12.5 MG tablet Take 12.5 mg by mouth 2 times daily (with meals)  4/4/2022 at Unknown time     cephALEXin (KEFLEX) 500 MG capsule Take 500 mg by mouth every 12 hours 4/5/2022: 5 days, prescribed 4/4/22 4/5/2022 at AM     cholecalciferol 50 MCG (2000 UT) CAPS Take 4,000 Units by mouth daily  4/4/2022 at Unknown time     coenzyme Q-10 capsule Take 1 capsule by mouth daily  4/4/2022 at Unknown time     FLUoxetine (PROZAC) 40 MG capsule Take 40 mg by mouth every morning  4/4/2022 at Unknown time     Fluticasone-Umeclidin-Vilanterol (TRELEGY ELLIPTA) 200-62.5-25 MCG/INH oral inhaler Inhale 1 puff into the lungs every morning  4/4/2022 at  brought     gabapentin  (NEURONTIN) 300 MG capsule Take 900 mg by mouth At Bedtime  4/4/2022 at Unknown time     losartan (COZAAR) 25 MG tablet Take 12.5 mg by mouth every morning  4/4/2022 at Unknown time     metFORMIN (GLUCOPHAGE-XR) 500 MG 24 hr tablet Take 1,000 mg by mouth daily (with breakfast)  4/4/2022 at Unknown time     montelukast (SINGULAIR) 10 MG tablet Take 10 mg by mouth At Bedtime  4/4/2022 at Unknown time     omeprazole (PRILOSEC) 40 MG DR capsule Take 40 mg by mouth every morning (before breakfast)  4/4/2022 at Unknown time     pravastatin (PRAVACHOL) 40 MG tablet Take 40 mg by mouth At Bedtime  4/4/2022 at Unknown time     predniSONE (DELTASONE) 20 MG tablet Take 20 mg by mouth daily  4/5/2022 at Unknown time     rivaroxaban ANTICOAGULANT (XARELTO) 20 MG TABS tablet Take 20 mg by mouth daily (with breakfast)  4/4/2022 at Unknown time       Information source(s): Patient and [facility]  Method of interview communication: in-person    Summary of Changes to PTA Med List  New: All medications entered into EPIC  Discontinued: none  Changed: none    Patient was asked about OTC/herbal products specifically.  PTA med list reflects this.    In the past week, patient estimated taking medication this percent of the time:  greater than 90%.    Allergies were reviewed, assessed, and updated with the patient.      Medications available for use during hospital stay: trelegy ellipta.     The information provided in this note is only as accurate as the sources available at the time of the update(s).    Thank you for the opportunity to participate in the care of this patient.    Rochelle Ya RPH  4/5/2022 7:33 PM

## 2022-04-06 NOTE — H&P (VIEW-ONLY)
General Surgery Consultation  Zakiya Christian MRN# [id number]   Age/Sex: 77 year old female YOB: 1945     Reason for consult: 1. Colonic diverticular abscess    2. Body aches    3. Hypomagnesemia    4. Acute UTI    5. Diverticulitis of colon            Requesting physician: Dr Bernstein                   Assessment and Plan:   Assessment:  Intra-abdominal abscess from diverticulitis of colon most likely perforation.  Large abscess mid abdomen and with connection to sigmoid colon with significant diverticular disease  Polymyalgia rheumatica and has been on chronic steroids for 4 months currently at 20 mg daily  Recurrent UTI    Plan:  -IR placement of drain today  -IV antibiotics  -Okay to start a clear liquid diet after her drain placement and do not advance her diet  -We will continue to follow patient   -Thank you for consulting us involving us in her care.       Chief Complaint:     Chief Complaint   Patient presents with     UTI        History is obtained from the patient    HPI:   Zakiya Christian is a 77 year old female with past medical history of type 2 diabetes, hypertension, daily aspirin, recent diagnosis of polymyalgia rheumatica 4 months ago, recurrent UTI, CKD, peripheral neuropathy, depression, COPD, GERD who presented to the emergency room yesterday with chief complaint of general body aches for 5 days, lower abdominal pain ongoing lower back pain and diarrhea.  Patient states that she has been having symptoms for over 3 months now.  About 4 months ago she was complaining of lower back pain and difficulty getting up from a seated position which was new.  After evaluation she was diagnosed with polymyalgia rheumatica and started on steroids patient did feel a little better but overall she did not think she improved that dramatically.  Steroids were tapered down to 10 mg and after about 5 days her pain started returning so her steroids were increased to 20 mg prednisone approximately a week  ago.  Her abdominal pain has been intermittent but consistent over the past 3 months.  She states that they are sharp and not aggravated by anything particular.  She does not take anything for the pain.  Pain comes and goes and usually is suprapubic or back pain.  She always feels like back pain is her main complaint and always lower back.  Pain is usually sharp.  Can last for a little while but overall she says it is random and intermittent with no associations.  She is also had diarrhea every time she eats.  She states that she gets hungry and then will eat and then have immediate diarrhea.  Diarrhea is loose without blood.  She does have mucus in the stool as well.  Was scheduled for colonoscopy 6 months ago but was not able to complete the prep due to feeling sick from them prep.  Has had previous colonoscopies in the past.  No history of diverticulitis.  She has been feeling chilled on and off.  Has lost about 25 pounds over the past year but states that she has been trying to lose weight.  Denies any other symptoms.  Positive pertinent review of systems is diarrhea, fatigue, weakness, lower back pain and abdominal pain.  Past surgeries include open cholecystectomy  Family history negative for colon cancer          Past Medical History:     Past Medical History:   Diagnosis Date     Diabetes (H)      Hypertension      Obese      PMR (polymyalgia rheumatica) (H)               Past Surgical History:   No past surgical history on file.          Social History:               Family History:   No family history on file.           Allergies:     Allergies   Allergen Reactions     Simvastatin Hives              Medications:     Prior to Admission medications    Medication Sig Start Date End Date Taking? Authorizing Provider   albuterol (PROAIR HFA/PROVENTIL HFA/VENTOLIN HFA) 108 (90 Base) MCG/ACT inhaler Inhale 2 puffs into the lungs 4 times daily as needed for shortness of breath / dyspnea or wheezing   Yes Unknown,  Entered By History   albuterol (PROVENTIL) (5 MG/ML) 0.5% neb solution Take 5 mg by nebulization every 6 hours as needed for wheezing or shortness of breath / dyspnea   Yes Unknown, Entered By History   alendronate (FOSAMAX) 70 MG tablet Take 70 mg by mouth every 7 days   Yes Unknown, Entered By History   Ascorbic Acid (VITAMIN C) 500 MG CAPS Take 1,000 mg by mouth daily   Yes Unknown, Entered By History   aspirin 81 MG EC tablet Take 81 mg by mouth daily   Yes Unknown, Entered By History   azaTHIOprine (IMURAN) 50 MG tablet Take 100 mg by mouth every morning    Yes Unknown, Entered By History   calcium carbonate 600 mg-vitamin D 400 units (CALTRATE) 600-400 MG-UNIT per tablet Take 1 tablet by mouth 2 times daily   Yes Unknown, Entered By History   carvedilol (COREG) 12.5 MG tablet Take 12.5 mg by mouth 2 times daily (with meals)   Yes Unknown, Entered By History   cephALEXin (KEFLEX) 500 MG capsule Take 500 mg by mouth every 12 hours   Yes Unknown, Entered By History   cholecalciferol 50 MCG (2000 UT) CAPS Take 4,000 Units by mouth daily   Yes Unknown, Entered By History   coenzyme Q-10 capsule Take 1 capsule by mouth daily   Yes Unknown, Entered By History   FLUoxetine (PROZAC) 40 MG capsule Take 40 mg by mouth every morning   Yes Unknown, Entered By History   Fluticasone-Umeclidin-Vilanterol (TRELEGY ELLIPTA) 200-62.5-25 MCG/INH oral inhaler Inhale 1 puff into the lungs every morning   Yes Unknown, Entered By History   gabapentin (NEURONTIN) 300 MG capsule Take 900 mg by mouth At Bedtime   Yes Unknown, Entered By History   losartan (COZAAR) 25 MG tablet Take 12.5 mg by mouth every morning   Yes Unknown, Entered By History   metFORMIN (GLUCOPHAGE-XR) 500 MG 24 hr tablet Take 1,000 mg by mouth daily (with breakfast)   Yes Unknown, Entered By History   montelukast (SINGULAIR) 10 MG tablet Take 10 mg by mouth At Bedtime   Yes Unknown, Entered By History   omeprazole (PRILOSEC) 40 MG DR capsule Take 40 mg by mouth  "every morning (before breakfast)   Yes Unknown, Entered By History   pravastatin (PRAVACHOL) 40 MG tablet Take 40 mg by mouth At Bedtime   Yes Unknown, Entered By History   predniSONE (DELTASONE) 20 MG tablet Take 20 mg by mouth daily   Yes Unknown, Entered By History   rivaroxaban ANTICOAGULANT (XARELTO) 20 MG TABS tablet Take 20 mg by mouth daily (with breakfast)   Yes Unknown, Entered By History              Review of Systems:   The Review of Systems is negative other than noted in the HPI            Physical Exam:     Patient Vitals for the past 24 hrs:   BP Temp Temp src Pulse Resp SpO2 Height Weight   04/06/22 0807 126/62 97.7  F (36.5  C) Oral 76 25 98 % -- --   04/06/22 0349 -- -- -- 72 24 99 % -- --   04/06/22 0312 133/71 97.7  F (36.5  C) Oral 73 22 99 % -- --   04/06/22 0247 -- -- -- 73 23 98 % -- --   04/06/22 0008 -- -- -- 72 26 98 % -- --   04/05/22 2336 (!) 144/65 97.4  F (36.3  C) Oral 74 20 -- -- --   04/05/22 2259 -- -- -- 73 27 97 % -- --   04/05/22 2228 -- -- -- 79 23 100 % -- --   04/05/22 2155 (!) 166/74 -- -- 79 -- -- -- --   04/05/22 2128 -- -- -- 79 24 100 % -- --   04/05/22 2054 -- -- -- 85 28 100 % -- --   04/05/22 2007 -- -- -- 92 17 95 % -- --   04/05/22 1940 -- -- -- 96 28 93 % -- --   04/05/22 1825 -- -- -- 86 21 95 % -- --   04/05/22 1808 -- -- -- 91 25 94 % -- --   04/05/22 1741 138/74 -- -- 98 25 94 % -- --   04/05/22 1700 (!) 161/74 -- -- 94 23 96 % -- --   04/05/22 1630 (!) 151/70 -- -- 84 22 100 % -- --   04/05/22 1610 -- -- -- 87 24 100 % -- --   04/05/22 1600 (!) 160/74 -- -- 96 23 99 % -- --   04/05/22 1530 (!) 174/70 -- -- 84 18 100 % -- --   04/05/22 1524 -- -- -- 92 19 100 % -- --   04/05/22 1504 (!) 166/79 -- -- 94 26 100 % -- --   04/05/22 1415 (!) 170/84 -- -- 98 -- 96 % -- --   04/05/22 1248 (!) 188/102 97.1  F (36.2  C) Temporal 101 16 96 % 1.575 m (5' 2\") 90.7 kg (200 lb)          Intake/Output Summary (Last 24 hours) at 4/6/2022 1022  Last data filed at 4/5/2022 " 2226  Gross per 24 hour   Intake 1200 ml   Output --   Net 1200 ml      Constitutional:   awake, alert, cooperative, no apparent distress, and appears stated age       Eyes:   PERRL, conjunctiva/corneas clear, EOM's intact; no scleral edema or icterus noted        ENT:   Normocephalic, without obvious abnormality, atraumatic, Lips, mucosa, and tongue normal        Hematologic / Lymphatic:   No general lymphadenopathy       Lungs:   Normal respiratory effort, no accessory muscle use       Cardiovascular:   Regular rate and rhythm       Abdomen:   Soft tender right lower quadrant suprapubic and left lower quadrant.  No rebound or peritoneal signs present.  Normal bowel sounds present.       Musculoskeletal:   No obvious swelling, bruising or deformity       Skin:   Skin color and texture normal for patient, no rashes or lesions              Data:         All imaging studies reviewed by me.  Reviewed images as well as radiology report    Results for orders placed or performed during the hospital encounter of 04/05/22 (from the past 24 hour(s))   Glucose by meter   Result Value Ref Range    GLUCOSE BY METER POCT 125 (H) 70 - 99 mg/dL   [place] Draw    Narrative    The following orders were created for panel order [place] Draw.  Procedure                               Abnormality         Status                     ---------                               -----------         ------                     Extra Blue Top Tube[[id number]]                              Final result               Extra Red Top Tube[[id number]]                               Final result               Extra Green Top (Lithium...[[id number]]                      Final result               Extra Purple Top Tube[[id number]]                            Final result                 Please view results for these tests on the individual orders.   Extra Blue Top Tube   Result Value Ref Range    Hold Specimen JIC    Extra Red Top Tube   Result Value Ref Range     Hold Specimen JIC    Extra Green Top (Lithium Heparin) Tube   Result Value Ref Range    Hold Specimen JIC    Extra Purple Top Tube   Result Value Ref Range    Hold Specimen     CBC (+ platelets, no diff)   Result Value Ref Range    WBC Count 8.4 4.0 - 11.0 10e3/uL    RBC Count 3.84 3.80 - 5.20 10e6/uL    Hemoglobin 11.8 11.7 - 15.7 g/dL    Hematocrit 37.7 35.0 - 47.0 %    MCV 98 78 - 100 fL    MCH 30.7 26.5 - 33.0 pg    MCHC 31.3 (L) 31.5 - 36.5 g/dL    RDW 13.9 10.0 - 15.0 %    Platelet Count 431 150 - 450 10e3/uL   Basic metabolic panel   Result Value Ref Range    Sodium 142 136 - 145 mmol/L    Potassium 3.8 3.5 - 5.0 mmol/L    Chloride 101 98 - 107 mmol/L    Carbon Dioxide (CO2) 26 22 - 31 mmol/L    Anion Gap 15 5 - 18 mmol/L    Urea Nitrogen 14 8 - 28 mg/dL    Creatinine 0.75 0.60 - 1.10 mg/dL    Calcium 8.5 8.5 - 10.5 mg/dL    Glucose 128 (H) 70 - 125 mg/dL    GFR Estimate 81 >60 mL/min/1.73m2   Magnesium   Result Value Ref Range    Magnesium 1.3 (L) 1.8 - 2.6 mg/dL   Troponin I (now)   Result Value Ref Range    Troponin I <0.01 0.00 - 0.29 ng/mL   Hemoglobin A1c   Result Value Ref Range    Hemoglobin A1C 6.2 (H) <=5.6 %   [place] Draw    Narrative    The following orders were created for panel order [place] Draw.  Procedure                               Abnormality         Status                     ---------                               -----------         ------                     Extra Blood Culture Bottle[[id number]]                       Final result               Extra Green Top (Lithium...[[id number]]                      Final result                 Please view results for these tests on the individual orders.   Extra Blood Culture Bottle   Result Value Ref Range    Hold Specimen JIC    Extra Green Top (Lithium Heparin) ON ICE   Result Value Ref Range    Hold Specimen JIC    ECG 12-LEAD WITH MUSE (LHE)   Result Value Ref Range    Systolic Blood Pressure 170 mmHg    Diastolic Blood Pressure 84 mmHg     Ventricular Rate 91 BPM    Atrial Rate 91 BPM    MA Interval 144 ms    QRS Duration 74 ms     ms    QTc 455 ms    P Axis 25 degrees    R AXIS 9 degrees    T Axis 37 degrees    Interpretation ECG       Sinus rhythm  Nonspecific ST abnormality  Abnormal ECG  No previous ECGs available  Confirmed by SEE ED PROVIDER NOTE FOR, ECG INTERPRETATION ([id number]),  JOSEDIMITRIS EDDY ([id number]) on 4/5/2022 2:20:23 PM     UA with Microscopic reflex to Culture    Specimen: Urine, Midstream   Result Value Ref Range    Color Urine Yellow Colorless, Straw, Light Yellow, Yellow    Appearance Urine Turbid (A) Clear    Glucose Urine Negative Negative mg/dL    Bilirubin Urine Negative Negative    Ketones Urine 20  (A) Negative mg/dL    Specific Gravity Urine 1.023 1.001 - 1.030    Blood Urine 0.03 mg/dL (A) Negative    pH Urine 6.5 5.0 - 7.0    Protein Albumin Urine 50  (A) Negative mg/dL    Urobilinogen Urine 2.0 (A) <2.0 mg/dL    Nitrite Urine Negative Negative    Leukocyte Esterase Urine 500 Sara/uL (A) Negative    Bacteria Urine Few (A) None Seen /HPF    WBC Clumps Urine Present (A) None Seen /HPF    Mucus Urine Present (A) None Seen /LPF    RBC Urine 6 (H) <=2 /HPF    WBC Urine >182 (H) <=5 /HPF    Squamous Epithelials Urine 1 <=1 /HPF    Narrative    Urine Culture ordered based on laboratory criteria   Symptomatic; Unknown Influenza A/B & SARS-CoV2 (COVID-19) Virus PCR Multiplex Nasopharyngeal    Specimen: Nasopharyngeal; Swab   Result Value Ref Range    Influenza A PCR Negative Negative    Influenza B PCR Negative Negative    SARS CoV2 PCR Negative Negative    Narrative    Testing was performed using the tramaine SARS-CoV-2 & Influenza A/B Assay on the tramaine [employer] System. This test should be ordered for the detection of SARS-CoV-2 and influenza viruses in individuals who meet clinical and/or epidemiological criteria. Test performance is unknown in asymptomatic patients. This test is for in vitro diagnostic use under the FDA EUA for  laboratories certified under CLIA to perform moderate and/or high complexity testing. This test has not been FDA cleared or approved. A negative result does not rule out the presence of PCR inhibitors in the specimen or target RNA in concentration below the limit of detection for the assay. If only one viral target is positive but coinfection with multiple targets is suspected, the sample should be re-tested with another FDA cleared, approved or authorized test, if coinfection would change clinical management. [facility] are certified under the Clinical Laboratory Improvement Amendments of 1988 (CLIA-88) as  qualified to perform moderate and/or high complexity laboratory testing.   Procalcitonin   Result Value Ref Range    Procalcitonin 8.75 (H) 0.00 - 0.49 ng/mL   Lactic acid whole blood   Result Value Ref Range    Lactic Acid 0.5 (L) 0.7 - 2.0 mmol/L   CT Chest/Abdomen/Pelvis w Contrast    Narrative    EXAM: CT CHEST/ABDOMEN/PELVIS W CONTRAST  LOCATION: [facility]  DATE/TIME: 4/5/2022 4:06 PM    INDICATION: 77-year-old woman with DM, obesity and polymyalgia rheumatica presents with generalized body aches, diffuse abdominal pain, urinary tract infection and hypomagnesemia.  COMPARISON: Noncontrast CT AP 03/22/2018.  TECHNIQUE: CT scan of the chest, abdomen, and pelvis was performed following injection of IV contrast. Multiplanar reformats were obtained. Dose reduction techniques were used.   CONTRAST: Isovue 370 100mL    FINDINGS:   LUNGS AND PLEURA: Several 5 mm or less nodular opacities in each lung appear to represent foci of endobronchial secretion, and those included in field-of-view at the 03/22/2018 abdominal CT are unchanged. Strands of fibrosis and/or linear atelectasis   scattered throughout the mid and lower lobes. No pleural effusion.    MEDIASTINUM/AXILLAE: Trace pericardial fluid unchanged. Heart size normal. No lymphadenopathy.    CORONARY ARTERY  CALCIFICATION: Three-vessel coronary artery calcification.     HEPATOBILIARY: Moderate to marked diffuse hepatic steatosis, borderline hepatic enlargement and slight contour undulation. Liver otherwise normal. No bile duct dilatation. Gallbladder is surgically absent.    PANCREAS: Normal.    SPLEEN: Spleen size normal.    ADRENAL GLANDS: Normal.    KIDNEY/BLADDER: Nonobstructing 4 mm stone right kidney and a nonobstructing 3 mm stone left kidney. A 4.5 x 3.4 x 3.3 cm benign angiomyolipoma left kidney has increased in size slightly over the past 4 years which is common. The angiomyolipoma and   several benign cysts in both kidneys require no follow-up.    BOWEL: Severe colonic diverticulosis. Acute versus subacute diverticulitis proximal sigmoid colon complicated by a 3 cm diverticular abscess (axial image 240) that leads superiorly to a short sinus tract and a large irregularly-shaped 13 x 11 x 6 cm   abscess in the left lower abdomen and upper pelvis that has broad contact with multiple loops of adjacent small bowel.    LYMPH NODES: No lymphadenopathy.    VASCULATURE: Normal caliber abdominal aorta.      PELVIC ORGANS: No pelvic mass or fluid.    MUSCULOSKELETAL: Unremarkable.      Impression    IMPRESSION:  1.  Acute versus subacute diverticulitis proximal sigmoid colon complicated by a 3 cm diverticular abscess with a short sinus tract that leads superiorly to a large irregularly-shaped 13 x 11 x 6 cm abscess in the left lower abdomen and upper pelvis that   has broad contact with multiple loops of adjacent small bowel.  2.  Moderate to marked diffuse hepatic steatosis, borderline hepatic enlargement and slight contour undulation could indicate some degree of steatohepatitis and/or fibrosis.   3.  Nephrolithiasis.  4.  Several 5 mm or less nodular opacities in each lung appear to represent foci of endobronchial secretion, and those included in field-of-view at the 03/22/2018 abdominal CT are unchanged. As a  conservative measure, recommend 12 month follow-up CT   chest to ensure stability of the mid and upper lung nodules.   Glucose by meter   Result Value Ref Range    GLUCOSE BY METER POCT 65 (L) 70 - 99 mg/dL   Glucose by meter   Result Value Ref Range    GLUCOSE BY METER POCT 72 70 - 99 mg/dL   Glucose by meter   Result Value Ref Range    GLUCOSE BY METER POCT 76 70 - 99 mg/dL   Glucose by meter   Result Value Ref Range    GLUCOSE BY METER POCT 74 70 - 99 mg/dL   CBC with platelets differential    Narrative    The following orders were created for panel order CBC with platelets differential.  Procedure                               Abnormality         Status                     ---------                               -----------         ------                     CBC with platelets and d...[[id number]]  Abnormal            Final result                 Please view results for these tests on the individual orders.   Basic metabolic panel   Result Value Ref Range    Sodium 143 136 - 145 mmol/L    Potassium 3.1 (L) 3.5 - 5.0 mmol/L    Chloride 101 98 - 107 mmol/L    Carbon Dioxide (CO2) 30 22 - 31 mmol/L    Anion Gap 12 5 - 18 mmol/L    Urea Nitrogen 13 8 - 28 mg/dL    Creatinine 0.72 0.60 - 1.10 mg/dL    Calcium 8.0 (L) 8.5 - 10.5 mg/dL    Glucose 74 70 - 125 mg/dL    GFR Estimate 85 >60 mL/min/1.73m2   CBC with platelets and differential   Result Value Ref Range    WBC Count 5.5 4.0 - 11.0 10e3/uL    RBC Count 3.44 (L) 3.80 - 5.20 10e6/uL    Hemoglobin 10.6 (L) 11.7 - 15.7 g/dL    Hematocrit 33.9 (L) 35.0 - 47.0 %    MCV 99 78 - 100 fL    MCH 30.8 26.5 - 33.0 pg    MCHC 31.3 (L) 31.5 - 36.5 g/dL    RDW 14.0 10.0 - 15.0 %    Platelet Count 367 150 - 450 10e3/uL    % Neutrophils 83 %    % Lymphocytes 11 %    % Monocytes 3 %    % Eosinophils 1 %    % Basophils 1 %    % Immature Granulocytes 1 %    NRBCs per 100 WBC 0 <1 /100    Absolute Neutrophils 4.6 1.6 - 8.3 10e3/uL    Absolute Lymphocytes 0.6 (L) 0.8 - 5.3 10e3/uL     Absolute Monocytes 0.2 0.0 - 1.3 10e3/uL    Absolute Eosinophils 0.1 0.0 - 0.7 10e3/uL    Absolute Basophils 0.0 0.0 - 0.2 10e3/uL    Absolute Immature Granulocytes 0.0 <=0.4 10e3/uL    Absolute NRBCs 0.0 10e3/uL   Magnesium   Result Value Ref Range    Magnesium 1.6 (L) 1.8 - 2.6 mg/dL   Glucose by meter   Result Value Ref Range    GLUCOSE BY METER POCT 76 70 - 99 mg/dL        Braden Bentley PA-C

## 2022-04-06 NOTE — PROGRESS NOTES
[facility] MEDICINE PROGRESS NOTE      Identification/Summary:  Zakiya Christian is a 77 year old old female with history of diabetes mellitus type 2 on Metformin, chronic kidney disease, peripheral neuropathy, chronic depression, COPD with asthma, anxiety and recent diagnosis about 2 months ago of polymyalgia rheumatica presented with 3-month history of nonspecific abdominal and back pain and just generally feeling poorly.  Has been seen in her clinic and eventually was diagnosed with polymyalgia rheumatica and referred to rheumatologist.  Has been on azathioprine and prednisone.  Also apparently has been treated a couple times for UTI.  Had significant increased weakness 4/5/2022 and presented to the emergency room.  Was started on cephalexin for UTI 2 days ago.  Found to have a large intra-abdominal abscess 13 x 11 x 6 cm as likely diverticular.  Now started on IV metronidazole and ceftriaxone and  general surgery and interventional radiology have seen the patient today.Hospital course is notable for IR abscess drainage today.    Assessment and Plan:    Large intra-abdominal abscess which is likely diverticular.  13 x 11 x 6 cm.  Suspect this is been present for about 3 months by her history. Now on IV antibiotics including metronidazole and ceftriaxone and has seen general surgery and interventional radiology. Status post IR drain today with cultures.     Diabetes mellitus type 2 on Metformin  Plan: Hold Metformin and treat with sliding scale insulin     Chronic kidney disease.  Creatinine and GFR normal today     Low magnesium at 1.3-->1.6.  Plan: IV magnesium and check in the morning    Hypokalemia.   Plan: oral potassium tid and check in AM     Peripheral neuropathy on gabapentin     Depression and anxiety on fluoxetine     COPD and asthma.  Plan: Continue prior meds     Essential hypertension on losartan.  Plan: Restart     GERD on PPI     Hold her aspirin and rivaroxaban for  now     Hyperlipidemia on statin     Hard of hearing     Polymyalgia rheumatica diagnosis but suspected symptoms and elevated sedimentation rate could have been due to this chronic intra-abdominal abscess.  Plan: Continue prednisone and will try to discuss with rheumatologist tomorrow     CODE STATUS: Full code     Covid status: No infection and vaccinated x3     Disposition: Inpatient and will likely be here at least a couple of days              Chief Complaint at admission  extreme weakness      HISTORY AT ADMISSION      Zakiya Christian is a 77 year old old female with past medical history as noted above.  She is been just feeling out of sorts for the past 3 months with intermittent back pain and abdominal pain.  Significant increase in weakness today where she could hardly get out of bed.  Also has had some nonspecific mild fevers and chills recently.      Heber Bernstein MD  [facility]  Phone: #[phone]    Interval History/Subjective:  Feeling better. No pain now.    Physical Exam/Objective:  Temp:  [97.4  F (36.3  C)-97.7  F (36.5  C)] 97.7  F (36.5  C)  Pulse:  [72-98] 77  Resp:  [17-28] 24  BP: (126-166)/(61-74) 152/66  SpO2:  [92 %-100 %] 99 %  Body mass index is 36.58 kg/m .    GENERAL:  Alert, appears comfortable, in no acute distress, appears stated age   HEAD:  Normocephalic, without obvious abnormality, atraumatic   LUNGS:   Clear to auscultation bilaterally, no rales, rhonchi, or wheezing, symmetric chest rise on inhalation, respirations unlabored   HEART:  Regular rate and rhythm, S1 and S2 normal, no murmur, rub, or gallop    ABDOMEN:   Soft, non-tender, no masses, no organomegaly, no rebound or guarding   EXTREMITIES: Trace edema both ankles   SKIN: Dry to touch, no exanthems in the visualized areas   NEURO: Alert, moves all four extremities freely   PSYCH: Cooperative, behavior is appropriate      Data reviewed today: I personally  reviewed all new medications, labs, imaging/diagnostics reports over the past 24 hours. Pertinent findings include:    Imaging:   Recent Results (from the past 24 hour(s))   CT Abdomen Peritonium Abscess Drainage    Narrative    EXAM:  1. PERCUTANEOUS DRAIN PLACEMENT INTRA-ABDOMINAL ABSCESS.  2. CT GUIDANCE  3. CONSCIOUS SEDATION  LOCATION: [facility]  DATE/TIME: 4/6/2022 11:23 AM    INDICATION: Patient has large intra abdominal abscess, 13 x 11 x 6 cm,  likely diverticular that needs to be drained.  TECHNIQUE: Dose reduction techniques were used.    PROCEDURE: Informed consent obtained. Site marked. Prior images reviewed. Required items made available. Patient identity confirmed verbally and with arm band. Patient reevaluated immediately before administering sedation. Universal protocol was   followed. Time out performed. The site was prepped and draped in sterile fashion. 10 mL of 1% lidocaine was infused into the local soft tissues. Using standard technique and under direct CT guidance, a 10 [place] drainage catheter was inserted into the   fluid collection.      SPECIMEN: 5 mL of purulent fluid was aspirated and sent to lab for cultures and Gram stain.    BLOOD LOSS: 2 mL.    The patient tolerated the procedure well. No immediate complications.    SEDATION: Versed 1 mg. Fentanyl 50 mcg. The procedure was performed with administration intravenous conscious sedation with appropriate preoperative, intraoperative, and postoperative evaluation.    30 minutes of supervised face to face conscious sedation time was provided by a radiology nurse under my direct supervision.      Impression    IMPRESSION:  1.  Successful CT-guided drain placement into abdominal abscess.    Reference CPT Codes: 92949, 92798, 54219       Labs:  Most Recent 3 CBC's:Recent Labs   Lab Test 04/06/22  0652 04/05/22  1304   WBC 5.5 8.4   HGB 10.6* 11.8   MCV 99 98    431     Most Recent 3 BMP's:Recent Labs   Lab  Test 04/06/22  1631 04/06/22  0810 04/06/22  0652 04/05/22  2137 04/05/22  1304 04/05/22  1254 02/21/22  1109   NA  --   --  143  --  142  --  143   POTASSIUM  --   --  3.1*  --  3.8  --  4.1   CHLORIDE  --   --  101  --  101  --  100   CO2  --   --  30  --  26  --  34*   BUN  --   --  13  --  14  --  23   CR  --   --  0.72  --  0.75  --  1.02   ANIONGAP  --   --  12  --  15  --  9   VICENTA  --   --  8.0*  --  8.5  --  10.2   GLC 80 76 74   < > 128*   < > 169*    < > = values in this interval not displayed.     Most Recent 2 LFT's:Recent Labs   Lab Test 02/21/22  1109 11/18/21  0907   AST 13 14   ALT <9 15   ALKPHOS 42* 59   BILITOTAL 0.5 0.6       Medications:   Personally Reviewed.  Medications     sodium chloride 0.9%         carvedilol  12.5 mg Oral BID w/meals     cefTRIAXone  1 g Intravenous Q24H     FLUoxetine  40 mg Oral QAM     Fluticasone-Umeclidin-Vilanterol  1 puff Inhalation QAM     gabapentin  900 mg Oral At Bedtime     insulin aspart  1-7 Units Subcutaneous TID AC     [Held by provider] losartan  12.5 mg Oral QAM     metroNIDAZOLE  500 mg Intravenous Q12H     montelukast  10 mg Oral At Bedtime     pantoprazole  40 mg Oral BID AC     pravastatin  40 mg Oral At Bedtime     predniSONE  20 mg Oral Daily     sodium chloride (PF)  3 mL Intracatheter Q8H

## 2022-04-06 NOTE — PROGRESS NOTES
Patient Name: Zakiya Christian  Medical Record Number: [id number]  Today's Date: 4/6/2022    Procedure: Image Guided Abdominal Peritoneal Abscess Drain Placement with sedation  Proceduralist: Dr. Benjamin Snider  Pathology present: n/a, fluid cultures sent    Procedure Start: 1200  Procedure end: 1230  Sedation medications administered: Fentanyl 50 mcg, Versed 1 mg    Report given to: Néstor VELÁZQUEZ RN ED  : n/a    Other Notes: Pt arrived to IR room #1 from ED room #4. Consent reviewed. Pt denies any questions or concerns regarding procedure. Pt positioned supine and monitored per protocol. Dr. Snider placed 10 fr Flexima APDL locking pigtail drain in anterior abdomen. Pt tolerated procedure without any noted complications. Pt transferred back to ED room #4.

## 2022-04-06 NOTE — H&P
[facility] MEDICINE ADMISSION HISTORY AND PHYSICAL     Assessment & Plan       Zakiya Christian is a 77 year old old female with history of diabetes mellitus type 2 on Metformin, chronic kidney disease, peripheral neuropathy, chronic depression, COPD with asthma, anxiety and recent diagnosis about 2 months ago of polymyalgia rheumatica presented with 3-month history of nonspecific abdominal and back pain and just generally feeling poorly.  Has been seen in her clinic and eventually was diagnosed with polymyalgia rheumatica and referred to rheumatologist.  Has been on azathioprine and prednisone.  Also apparently has been treated a couple times for UTI.  Had significant increased weakness today and presented to the emergency room.  Was started on cephalexin for UTI 2 days ago.  Found to have a large intra-abdominal abscess 13 x 11 x 6 cm as likely diverticular.  Now started on IV metronidazole and ceftriaxone and will have general surgery and interventional radiology see the patient tomorrow for possible abscess drainage.    Large intra-abdominal abscess which is likely diverticular.  13 x 11 x 6 cm.  Suspect this is been present for about 3 months by her history.  Plan: IV antibiotics including metronidazole and ceftriaxone and will have general surgery and interventional radiology see tomorrow.    Diabetes mellitus type 2 on Metformin  Plan: Hold Metformin and treat with sliding scale insulin    Chronic kidney disease.  Creatinine and GFR normal today    Low magnesium at 1.3.  Plan: IV magnesium and check in the morning    Peripheral neuropathy on gabapentin    Depression and anxiety on fluoxetine    COPD and asthma.  Plan: Continue prior meds    Essential hypertension on losartan.  Plan: Hold for now    GERD on PPI    Hold her aspirin and rivaroxaban for now    Hyperlipidemia on statin    Hard of hearing    Polymyalgia rheumatica diagnosis but suspected symptoms and elevated sedimentation  rate could have been due to this chronic intra-abdominal abscess.  Plan: Continue prednisone and will try to discuss with rheumatologist tomorrow    CODE STATUS: Full code    Covid status: No infection and vaccinated x3    Disposition: Inpatient and will likely be here at least a couple of days          Chief Complaint  extreme weakness     HISTORY     Zakiya Christian is a 77 year old old female with past medical history as noted above.  She is been just feeling out of sorts for the past 3 months with intermittent back pain and abdominal pain.  Significant increase in weakness today where she could hardly get out of bed.  Also has had some nonspecific mild fevers and chills recently.    Past Medical History     Past Medical History:  No date: Diabetes (H)  No date: Hypertension  No date: Obese  No date: PMR (polymyalgia rheumatica) (H)     Surgical History   No past surgical history on file.  Family History    Reviewed, and noncontributory  Social History      Quit smoking 40 years ago with 20-pack-year history    Rare alcohol use    Lives in a condo by herself    Retired as an LPN      Allergies     Allergies   Allergen Reactions     Simvastatin Hives     Prior to Admission Medications      None      Review of Systems     A 12 point comprehensive review of systems was negative except as noted above in HPI.    PHYSICAL EXAMINATION       Vitals      Temp:  [97.1  F (36.2  C)] 97.1  F (36.2  C)  Pulse:  [] 86  Resp:  [16-26] 21  BP: (138-188)/() 138/74  SpO2:  [94 %-100 %] 95 %    Examination     GENERAL:  Alert, appears comfortable, in no acute distress, appears stated age   HEAD:  Normocephalic, without obvious abnormality, atraumatic   EYES:  PERRL, conjunctiva/corneas clear, no scleral icterus, EOM's intact   NOSE: No drainage   THROAT: Lips, mucosa, and tongue normal; full upper denture and has about half her lower teeth   NECK: Supple, symmetrical, trachea midline   BACK:   Symmetric, no curvature, ROM  normal   LUNGS:   Clear to auscultation bilaterally, no rales, rhonchi, or wheezing, symmetric chest rise on inhalation, respirations unlabored   HEART:  Regular rate and rhythm, no murmur   ABDOMEN:   Soft, non-tender, moderately rotund, no masses, no organomegaly, no rebound or guarding   EXTREMITIES:  Trace edema both feet and ankles   SKIN: Dry to touch, no exanthems in the visualized areas   NEURO: Alert, peers cognitively intact, moves all four extremities freely, non-focal   PSYCH: Cooperative, behavior is appropriate      Pertinent Lab     Most Recent 3 CBC's:Recent Labs   Lab Test 04/05/22  1304   WBC 8.4   HGB 11.8   MCV 98        Most Recent 3 BMP's:Recent Labs   Lab Test 04/05/22  1304 04/05/22  1254 02/21/22  1109 11/18/21  0907     --  143 144   POTASSIUM 3.8  --  4.1 4.5   CHLORIDE 101  --  100 100   CO2 26  --  34* 30   BUN 14  --  23 20   CR 0.75  --  1.02 1.06   ANIONGAP 15  --  9 14   VICENTA 8.5  --  10.2 9.7   * 125* 169* 130*     Most Recent 2 LFT's:Recent Labs   Lab Test 02/21/22  1109 11/18/21  0907   AST 13 14   ALT <9 15   ALKPHOS 42* 59   BILITOTAL 0.5 0.6         Pertinent Radiology     Radiology Results:   Recent Results (from the past 24 hour(s))   CT Chest/Abdomen/Pelvis w Contrast    Narrative    EXAM: CT CHEST/ABDOMEN/PELVIS W CONTRAST  LOCATION: [facility]  DATE/TIME: 4/5/2022 4:06 PM    INDICATION: 77-year-old woman with DM, obesity and polymyalgia rheumatica presents with generalized body aches, diffuse abdominal pain, urinary tract infection and hypomagnesemia.  COMPARISON: Noncontrast CT AP 03/22/2018.  TECHNIQUE: CT scan of the chest, abdomen, and pelvis was performed following injection of IV contrast. Multiplanar reformats were obtained. Dose reduction techniques were used.   CONTRAST: Isovue 370 100mL    FINDINGS:   LUNGS AND PLEURA: Several 5 mm or less nodular opacities in each lung appear to represent foci of endobronchial  secretion, and those included in field-of-view at the 03/22/2018 abdominal CT are unchanged. Strands of fibrosis and/or linear atelectasis   scattered throughout the mid and lower lobes. No pleural effusion.    MEDIASTINUM/AXILLAE: Trace pericardial fluid unchanged. Heart size normal. No lymphadenopathy.    CORONARY ARTERY CALCIFICATION: Three-vessel coronary artery calcification.     HEPATOBILIARY: Moderate to marked diffuse hepatic steatosis, borderline hepatic enlargement and slight contour undulation. Liver otherwise normal. No bile duct dilatation. Gallbladder is surgically absent.    PANCREAS: Normal.    SPLEEN: Spleen size normal.    ADRENAL GLANDS: Normal.    KIDNEY/BLADDER: Nonobstructing 4 mm stone right kidney and a nonobstructing 3 mm stone left kidney. A 4.5 x 3.4 x 3.3 cm benign angiomyolipoma left kidney has increased in size slightly over the past 4 years which is common. The angiomyolipoma and   several benign cysts in both kidneys require no follow-up.    BOWEL: Severe colonic diverticulosis. Acute versus subacute diverticulitis proximal sigmoid colon complicated by a 3 cm diverticular abscess (axial image 240) that leads superiorly to a short sinus tract and a large irregularly-shaped 13 x 11 x 6 cm   abscess in the left lower abdomen and upper pelvis that has broad contact with multiple loops of adjacent small bowel.    LYMPH NODES: No lymphadenopathy.    VASCULATURE: Normal caliber abdominal aorta.      PELVIC ORGANS: No pelvic mass or fluid.    MUSCULOSKELETAL: Unremarkable.      Impression    IMPRESSION:  1.  Acute versus subacute diverticulitis proximal sigmoid colon complicated by a 3 cm diverticular abscess with a short sinus tract that leads superiorly to a large irregularly-shaped 13 x 11 x 6 cm abscess in the left lower abdomen and upper pelvis that   has broad contact with multiple loops of adjacent small bowel.  2.  Moderate to marked diffuse hepatic steatosis, borderline hepatic  enlargement and slight contour undulation could indicate some degree of steatohepatitis and/or fibrosis.   3.  Nephrolithiasis.  4.  Several 5 mm or less nodular opacities in each lung appear to represent foci of endobronchial secretion, and those included in field-of-view at the 03/22/2018 abdominal CT are unchanged. As a conservative measure, recommend 12 month follow-up CT   chest to ensure stability of the mid and upper lung nodules.       Advance Care Planning        Heber Bernstein MD  [facility]   Phone: #[phone]

## 2022-04-07 LAB
ALBUMIN SERPL-MCNC: 1.9 G/DL (ref 3.5–5)
ALP SERPL-CCNC: 68 U/L (ref 45–120)
ALT SERPL W P-5'-P-CCNC: 10 U/L (ref 0–45)
ANION GAP SERPL CALCULATED.3IONS-SCNC: 18 MMOL/L (ref 5–18)
AST SERPL W P-5'-P-CCNC: 14 U/L (ref 0–40)
BACTERIA UR CULT: ABNORMAL
BACTERIA UR CULT: NORMAL
BILIRUB SERPL-MCNC: 0.5 MG/DL (ref 0–1)
BUN SERPL-MCNC: 16 MG/DL (ref 8–28)
CALCIUM SERPL-MCNC: 8.2 MG/DL (ref 8.5–10.5)
CHLORIDE BLD-SCNC: 100 MMOL/L (ref 98–107)
CO2 SERPL-SCNC: 22 MMOL/L (ref 22–31)
CREAT SERPL-MCNC: 0.8 MG/DL (ref 0.6–1.1)
ERYTHROCYTE [DISTWIDTH] IN BLOOD BY AUTOMATED COUNT: 14.3 % (ref 10–15)
GFR SERPL CREATININE-BSD FRML MDRD: 75 ML/MIN/1.73M2
GLUCOSE BLD-MCNC: 91 MG/DL (ref 70–125)
GLUCOSE BLDC GLUCOMTR-MCNC: 148 MG/DL (ref 70–99)
GLUCOSE BLDC GLUCOMTR-MCNC: 151 MG/DL (ref 70–99)
GLUCOSE BLDC GLUCOMTR-MCNC: 221 MG/DL (ref 70–99)
GLUCOSE BLDC GLUCOMTR-MCNC: 70 MG/DL (ref 70–99)
HCT VFR BLD AUTO: 33.6 % (ref 35–47)
HGB BLD-MCNC: 10.4 G/DL (ref 11.7–15.7)
MAGNESIUM SERPL-MCNC: 2 MG/DL (ref 1.8–2.6)
MCH RBC QN AUTO: 30.7 PG (ref 26.5–33)
MCHC RBC AUTO-ENTMCNC: 31 G/DL (ref 31.5–36.5)
MCV RBC AUTO: 99 FL (ref 78–100)
PLATELET # BLD AUTO: 408 10E3/UL (ref 150–450)
POTASSIUM BLD-SCNC: 3.6 MMOL/L (ref 3.5–5)
PROT SERPL-MCNC: 6 G/DL (ref 6–8)
RBC # BLD AUTO: 3.39 10E6/UL (ref 3.8–5.2)
SODIUM SERPL-SCNC: 140 MMOL/L (ref 136–145)
WBC # BLD AUTO: 4.6 10E3/UL (ref 4–11)

## 2022-04-07 PROCEDURE — 80053 COMPREHEN METABOLIC PANEL: CPT | Performed by: FAMILY MEDICINE

## 2022-04-07 PROCEDURE — 83735 ASSAY OF MAGNESIUM: CPT | Performed by: FAMILY MEDICINE

## 2022-04-07 PROCEDURE — 85027 COMPLETE CBC AUTOMATED: CPT | Performed by: FAMILY MEDICINE

## 2022-04-07 PROCEDURE — 36415 COLL VENOUS BLD VENIPUNCTURE: CPT | Performed by: FAMILY MEDICINE

## 2022-04-07 PROCEDURE — 99233 SBSQ HOSP IP/OBS HIGH 50: CPT | Performed by: FAMILY MEDICINE

## 2022-04-07 PROCEDURE — 250N000012 HC RX MED GY IP 250 OP 636 PS 637: Performed by: FAMILY MEDICINE

## 2022-04-07 PROCEDURE — 250N000011 HC RX IP 250 OP 636: Performed by: FAMILY MEDICINE

## 2022-04-07 PROCEDURE — 120N000001 HC R&B MED SURG/OB

## 2022-04-07 PROCEDURE — 99231 SBSQ HOSP IP/OBS SF/LOW 25: CPT | Performed by: PHYSICIAN ASSISTANT

## 2022-04-07 PROCEDURE — 250N000013 HC RX MED GY IP 250 OP 250 PS 637: Performed by: FAMILY MEDICINE

## 2022-04-07 PROCEDURE — 258N000003 HC RX IP 258 OP 636: Performed by: FAMILY MEDICINE

## 2022-04-07 RX ORDER — PREDNISONE 5 MG/1
15 TABLET ORAL DAILY
Status: DISCONTINUED | OUTPATIENT
Start: 2022-04-08 | End: 2022-04-15 | Stop reason: HOSPADM

## 2022-04-07 RX ADMIN — CEFTRIAXONE 1 G: 1 INJECTION, POWDER, FOR SOLUTION INTRAMUSCULAR; INTRAVENOUS at 15:33

## 2022-04-07 RX ADMIN — CARVEDILOL 12.5 MG: 12.5 TABLET, FILM COATED ORAL at 18:44

## 2022-04-07 RX ADMIN — PANTOPRAZOLE SODIUM 40 MG: 20 TABLET, DELAYED RELEASE ORAL at 05:59

## 2022-04-07 RX ADMIN — MONTELUKAST 10 MG: 10 TABLET, FILM COATED ORAL at 20:18

## 2022-04-07 RX ADMIN — ACETAMINOPHEN 650 MG: 325 TABLET ORAL at 05:59

## 2022-04-07 RX ADMIN — LOSARTAN POTASSIUM 12.5 MG: 25 TABLET, FILM COATED ORAL at 08:45

## 2022-04-07 RX ADMIN — ACETAMINOPHEN 650 MG: 325 TABLET ORAL at 18:49

## 2022-04-07 RX ADMIN — PANTOPRAZOLE SODIUM 40 MG: 20 TABLET, DELAYED RELEASE ORAL at 15:33

## 2022-04-07 RX ADMIN — POTASSIUM CHLORIDE 20 MEQ: 20 TABLET, EXTENDED RELEASE ORAL at 20:18

## 2022-04-07 RX ADMIN — PRAVASTATIN SODIUM 40 MG: 20 TABLET ORAL at 20:18

## 2022-04-07 RX ADMIN — PREDNISONE 20 MG: 20 TABLET ORAL at 08:46

## 2022-04-07 RX ADMIN — GABAPENTIN 900 MG: 300 CAPSULE ORAL at 20:18

## 2022-04-07 RX ADMIN — FLUOXETINE HYDROCHLORIDE 40 MG: 20 CAPSULE ORAL at 08:45

## 2022-04-07 RX ADMIN — CARVEDILOL 12.5 MG: 12.5 TABLET, FILM COATED ORAL at 08:46

## 2022-04-07 RX ADMIN — POTASSIUM CHLORIDE 20 MEQ: 20 TABLET, EXTENDED RELEASE ORAL at 08:46

## 2022-04-07 RX ADMIN — POTASSIUM CHLORIDE 20 MEQ: 20 TABLET, EXTENDED RELEASE ORAL at 15:32

## 2022-04-07 RX ADMIN — ACETAMINOPHEN 650 MG: 325 TABLET ORAL at 11:24

## 2022-04-07 RX ADMIN — INSULIN ASPART 2 UNITS: 100 INJECTION, SOLUTION INTRAVENOUS; SUBCUTANEOUS at 17:06

## 2022-04-07 RX ADMIN — METRONIDAZOLE 500 MG: 5 INJECTION, SOLUTION INTRAVENOUS at 18:44

## 2022-04-07 RX ADMIN — VANCOMYCIN HYDROCHLORIDE 1500 MG: 5 INJECTION, POWDER, LYOPHILIZED, FOR SOLUTION INTRAVENOUS at 17:00

## 2022-04-07 RX ADMIN — METRONIDAZOLE 500 MG: 5 INJECTION, SOLUTION INTRAVENOUS at 05:11

## 2022-04-07 ASSESSMENT — ACTIVITIES OF DAILY LIVING (ADL)
ADLS_ACUITY_SCORE: 12
DEPENDENT_IADLS:: INDEPENDENT
ADLS_ACUITY_SCORE: 12

## 2022-04-07 NOTE — PLAN OF CARE
Problem: UTI (Urinary Tract Infection)  Goal: Improved Infection Symptoms  Outcome: Ongoing, Progressing     Problem: Pain Acute  Goal: Acceptable Pain Control and Functional Ability  Outcome: Ongoing, Progressing  Intervention: Prevent or Manage Pain  Recent Flowsheet Documentation  Taken 4/7/2022 0202 by Chio Ulrich, RN  Medication Review/Management: medications reviewed  Taken 4/6/2022 2200 by Chio Ulrich, RN  Medication Review/Management: medications reviewed   Goal Outcome Evaluation:      Pt A/O- [place]. VSS on 1L NC. C/o pain in abd/back- managed with prn tylenol. ALISSA drain CDI- moderate amount of milky purulent odorous drainage , irrigated per orders.

## 2022-04-07 NOTE — PLAN OF CARE
Problem: Pain Acute  Goal: Acceptable Pain Control and Functional Ability  Outcome: Ongoing, Progressing  Intervention: Develop Pain Management Plan  Recent Flowsheet Documentation  Taken 4/6/2022 1947 by Africa Moscoso RN  Pain Management Interventions: medication (see MAR)  Intervention: Prevent or Manage Pain  Recent Flowsheet Documentation  Taken 4/6/2022 1947 by Africa Moscoso, RN  Medication Review/Management: medications reviewed      A&Ox4, pleasant. VSS, on 1 liter O2 via NC sating in high 90's. Expiratory wheezes with diminished LS, but patient states is due to allergies. JOHN. No SOB @ rest. C/o back pain. PRN Tylenol given; effective. CLD, ADAT. No c/o nausea. BG checks. Left ALISSA drain in place with purulent thick output. Flushed with NS per order. Dressing C/D/I. Mag finished on shift; rechecking Mag and K in AM. New PIV placed on shift, SL with intermittent antibiotics. Transferred to room 220; report called to CONNOR De La Rosa.

## 2022-04-07 NOTE — PHARMACY-VANCOMYCIN DOSING SERVICE
"Pharmacy Vancomycin Initial Note  Date of Service 2022  Patient's  1945  77 year old, female    Indication: Abscess    Current estimated CrCl = Estimated Creatinine Clearance: 61.5 mL/min (based on SCr of 0.8 mg/dL).    Creatinine for last 3 days  2022:  1:04 PM Creatinine 0.75 mg/dL  2022:  6:52 AM Creatinine 0.72 mg/dL  2022:  8:57 AM Creatinine 0.80 mg/dL    Recent Vancomycin Level(s) for last 3 days  No results found for requested labs within last 72 hours.      Vancomycin IV Administrations (past 72 hours)      No vancomycin orders with administrations in past 72 hours.                Nephrotoxins and other renal medications (From now, onward)    Start     Dose/Rate Route Frequency Ordered Stop    22 0400  vancomycin 1000 mg in 0.9% NaCl 250 mL intermittent infusion 1,000 mg        \"Followed by\" Linked Group Details    1,000 mg  over 60 Minutes Intravenous EVERY 12 HOURS 22 1516      22 1600  vancomycin 1500 mg in 0.9% NaCl 250 ml intermittent infusion 1,500 mg        \"Followed by\" Linked Group Details    1,500 mg  over 90 Minutes Intravenous ONCE 22 1516            Contrast Orders - past 72 hours (72h ago, onward)            None          [employer] Prediction of Planned Initial Vancomycin Regimen  Loading dose: 1500 mg at 16:00 2022.  Regimen: 1000 mg IV every 12 hours.  Start time: 15:17 on 2022  Exposure target: AUC24 (range)400-600 mg/L.hr   AUC24,ss: 566 mg/L.hr  Probability of AUC24 > 400: 84 %  Ctrough,ss: 19.0 mg/L  Probability of Ctrough,ss > 20: 45 %  Probability of nephrotoxicity (Lodise LAVERNE ): 16 %            Plan:  1. Start vancomycin  1500mg x 1 followed by 1000 mg IV q12h.   2. Vancomycin monitoring method: AUC  3. Vancomycin therapeutic monitoring goal: 400-600 mg*h/L  4. Pharmacy will check vancomycin levels as appropriate in 1-3 Days.    5. Serum creatinine levels will be ordered daily for the first week of therapy and at " least twice weekly for subsequent weeks.      Stan Marinelli RPH

## 2022-04-07 NOTE — CONSULTS
Care Management Initial Consult    General Information  Assessment completed with: Patient,    Type of CM/SW Visit: Initial Assessment    Primary Care Provider verified and updated as needed: Yes   Readmission within the last 30 days: no previous admission in last 30 days      Reason for Consult: discharge planning  Advance Care Planning:            Communication Assessment  Patient's communication style: spoken language (English or Bilingual)    Hearing Difficulty or Deaf: no   Wear Glasses or Blind: yes    Cognitive  Cognitive/Neuro/Behavioral: WDL                      Living Environment:   People in home: alone     Current living Arrangements: house      Able to return to prior arrangements: yes       Family/Social Support:  Care provided by: self  Provides care for: no one  Marital Status: Single   (cousin)          Description of Support System: Supportive, Involved         Current Resources:   Patient receiving home care services: No     Community Resources: None  Equipment currently used at home: none  Supplies currently used at home: None    Employment/Financial:  Employment Status: retired        Financial Concerns:     Referral to Financial Worker: No       Lifestyle & Psychosocial Needs:  Social Determinants of Health     Tobacco Use: Not on file   Alcohol Use: Not on file   Financial Resource Strain: Not on file   Food Insecurity: Not on file   Transportation Needs: Not on file   Physical Activity: Not on file   Stress: Not on file   Social Connections: Not on file   Intimate Partner Violence: Not on file   Depression: Not on file   Housing Stability: Not on file       Functional Status:  Prior to admission patient needed assistance:   Dependent ADLs:: Independent  Dependent IADLs:: Independent       Mental Health Status:  Mental Health Status: No Current Concerns       Chemical Dependency Status:  Chemical Dependency Status: No Current Concerns             Values/Beliefs:  Spiritual, Cultural Beliefs,  Mu-ism Practices, Values that affect care: no               Additional Information:  PETER introduced self and CM services to Pt.  Pt reports living in a town house alone.  At baseline, Pt is independent with all ALDs and IADLs.  Pt reports having a cane at home, however does not use it.  Pt reports driving to locations near her house.  Pt does not receive any in home services and has support from a cousin. Pt confirms PCP.  SW informed Pt CM will follow care progression to assist as needed with discharge planning.  Pt states understanding.  Cousin to transport.    ALESSANDRA Pride

## 2022-04-07 NOTE — PROGRESS NOTES
"General Surgery Progress Note:    Hospital Day # 2    ASSESSMENT:   1. Colonic diverticular abscess    2. Body aches    3. Hypomagnesemia    4. Acute UTI    5. Diverticulitis of colon        Zakiya Christian is a 77 year old female intra-abdominal abscess consistent with diverticular abscess, s/p IR placement of drain on 4/6/2022.  Gram stain positive for gram-positive cocci and cultures pending    PLAN:   -Continue antibiotics  -Okay advance diet today to a low fiber diet  -Wait for culture results and continue IV antibiotics  -We will continue to follow along.      SUBJECTIVE:   Zakiya Christian is currently sleeping and wakes easily.  She tells me that she is doing well.  She denies any pain currently.  No nausea or vomiting.  Having bowel movements without any issues.  IR placement of drain yesterday they removed 100 cc of purulent fluid    Patient Vitals for the past 24 hrs:   BP Temp Temp src Pulse Resp SpO2 Height Weight   04/07/22 0819 130/58 98  F (36.7  C) Oral 78 18 95 % -- --   04/06/22 2328 134/63 97.6  F (36.4  C) Oral 71 20 97 % -- --   04/06/22 2137 (!) 152/69 97.3  F (36.3  C) Oral 71 20 97 % 1.575 m (5' 2\") 92.8 kg (204 lb 8 oz)   04/06/22 2112 -- 97.5  F (36.4  C) Axillary 64 23 99 % -- --   04/06/22 2059 -- -- -- 74 24 98 % -- --   04/06/22 2028 -- -- -- 75 22 98 % -- --   04/06/22 1946 -- -- Oral -- 22 -- -- --   04/06/22 1904 -- -- -- 78 -- 92 % -- --   04/06/22 1900 (!) 169/73 -- Oral -- -- -- -- --   04/06/22 1727 -- -- -- 77 24 99 % -- --   04/06/22 1705 (!) 152/66 -- -- 74 -- -- -- --   04/06/22 1657 -- -- -- 76 21 92 % -- --   04/06/22 1530 -- -- -- 80 26 -- -- --   04/06/22 1230 (!) 141/72 -- -- 76 18 94 % -- --   04/06/22 1225 137/72 -- -- 75 17 98 % -- --   04/06/22 1220 (!) 141/71 -- -- 75 18 97 % -- --   04/06/22 1215 134/67 -- -- 77 20 98 % -- --   04/06/22 1210 130/69 -- -- 77 20 96 % -- --   04/06/22 1205 129/63 -- -- 75 20 98 % -- --   04/06/22 1200 137/61 -- -- 74 22 99 % -- -- "   04/06/22 1155 (!) 141/66 -- -- 76 24 98 % -- --       Physical Exam:  General: NAD, pleasant    ABD: Soft tenderness suprapubic however patient remarks she needs to urinate.  No rebound peritoneal signs present.  ALISSA purulent  EXT:no CCE    Admission on 04/05/2022   Component Date Value     GLUCOSE BY METER POCT 04/05/2022 125 (A)     Hold Specimen 04/05/2022 JIC      Hold Specimen 04/05/2022 JIC      Hold Specimen 04/05/2022 JIC      Hold Specimen 04/05/2022 JIC      Hold Specimen 04/05/2022 JIC      Color Urine 04/05/2022 Yellow      Appearance Urine 04/05/2022 Turbid (A)     Glucose Urine 04/05/2022 Negative      Bilirubin Urine 04/05/2022 Negative      Ketones Urine 04/05/2022 20  (A)     Specific Gravity Urine 04/05/2022 1.023      Blood Urine 04/05/2022 0.03 mg/dL (A)     pH Urine 04/05/2022 6.5      Protein Albumin Urine 04/05/2022 50  (A)     Urobilinogen Urine 04/05/2022 2.0 (A)     Nitrite Urine 04/05/2022 Negative      Leukocyte Esterase Urine 04/05/2022 500 Sara/uL (A)     Bacteria Urine 04/05/2022 Few (A)     WBC Clumps Urine 04/05/2022 Present (A)     Mucus Urine 04/05/2022 Present (A)     RBC Urine 04/05/2022 6 (A)     WBC Urine 04/05/2022 >182 (A)     Squamous Epithelials Uri* 04/05/2022 1      WBC Count 04/05/2022 8.4      RBC Count 04/05/2022 3.84      Hemoglobin 04/05/2022 11.8      Hematocrit 04/05/2022 37.7      MCV 04/05/2022 98      MCH 04/05/2022 30.7      MCHC 04/05/2022 31.3 (A)     RDW 04/05/2022 13.9      Platelet Count 04/05/2022 431      Sodium 04/05/2022 142      Potassium 04/05/2022 3.8      Chloride 04/05/2022 101      Carbon Dioxide (CO2) 04/05/2022 26      Anion Gap 04/05/2022 15      Urea Nitrogen 04/05/2022 14      Creatinine 04/05/2022 0.75      Calcium 04/05/2022 8.5      Glucose 04/05/2022 128 (A)     GFR Estimate 04/05/2022 81      Magnesium 04/05/2022 1.3 (A)     Systolic Blood Pressure 04/05/2022 170      Diastolic Blood Pressure 04/05/2022 84      Ventricular Rate  04/05/2022 91      Atrial Rate 04/05/2022 91      NH Interval 04/05/2022 144      QRS Duration 04/05/2022 74      QT 04/05/2022 370      QTc 04/05/2022 455      P Axis 04/05/2022 25      R AXIS 04/05/2022 9      T [place] 04/05/2022 37      Interpretation ECG 04/05/2022                      Value:Sinus rhythm  Nonspecific ST abnormality  Abnormal ECG  No previous ECGs available  Confirmed by SEE ED PROVIDER NOTE FOR, ECG INTERPRETATION ([id number]),  DIMITRIS CARRERA ([id number]) on 4/5/2022 2:20:23 PM       Troponin I 04/05/2022 <0.01      Influenza A PCR 04/05/2022 Negative      Influenza B PCR 04/05/2022 Negative      SARS CoV2 PCR 04/05/2022 Negative      Lactic Acid 04/05/2022 0.5 (A)     Procalcitonin 04/05/2022 8.75 (A)     Culture 04/05/2022 No growth, less than 1 day      Hemoglobin A1C 04/05/2022 6.2 (A)     GLUCOSE BY METER POCT 04/05/2022 65 (A)     GLUCOSE BY METER POCT 04/05/2022 72      Sodium 04/06/2022 143      Potassium 04/06/2022 3.1 (A)     Chloride 04/06/2022 101      Carbon Dioxide (CO2) 04/06/2022 30      Anion Gap 04/06/2022 12      Urea Nitrogen 04/06/2022 13      Creatinine 04/06/2022 0.72      Calcium 04/06/2022 8.0 (A)     Glucose 04/06/2022 74      GFR Estimate 04/06/2022 85      GLUCOSE BY METER POCT 04/06/2022 76      GLUCOSE BY METER POCT 04/06/2022 74      WBC Count 04/06/2022 5.5      RBC Count 04/06/2022 3.44 (A)     Hemoglobin 04/06/2022 10.6 (A)     Hematocrit 04/06/2022 33.9 (A)     MCV 04/06/2022 99      MCH 04/06/2022 30.8      MCHC 04/06/2022 31.3 (A)     RDW 04/06/2022 14.0      Platelet Count 04/06/2022 367      % Neutrophils 04/06/2022 83      % Lymphocytes 04/06/2022 11      % Monocytes 04/06/2022 3      % Eosinophils 04/06/2022 1      % Basophils 04/06/2022 1      % Immature Granulocytes 04/06/2022 1      NRBCs per 100 WBC 04/06/2022 0      Absolute Neutrophils 04/06/2022 4.6      Absolute Lymphocytes 04/06/2022 0.6 (A)     Absolute Monocytes 04/06/2022 0.2      Absolute  Eosinophils 04/06/2022 0.1      Absolute Basophils 04/06/2022 0.0      Absolute Immature Granul* 04/06/2022 0.0      Absolute NRBCs 04/06/2022 0.0      Magnesium 04/06/2022 1.6 (A)     GLUCOSE BY METER POCT 04/06/2022 76      Gram Stain Result 04/06/2022 4+ Gram positive cocci (A)     Gram Stain Result 04/06/2022 No white blood cells seen (A)     Color 04/06/2022 Yellow      Clarity 04/06/2022 Turbid (A)     Cell Count Fluid Source 04/06/2022 Abdomen      Clot Presence 04/06/2022 Large Clot (>Half Volume)      Cytology Ordered 04/06/2022 No      GLUCOSE BY METER POCT 04/06/2022 80      GLUCOSE BY METER POCT 04/06/2022 72      GLUCOSE BY METER POCT 04/07/2022 70         CYNTHIA DengC

## 2022-04-08 ENCOUNTER — APPOINTMENT (OUTPATIENT)
Dept: OCCUPATIONAL THERAPY | Facility: CLINIC | Age: 77
DRG: 391 | End: 2022-04-08
Attending: FAMILY MEDICINE
Payer: COMMERCIAL

## 2022-04-08 ENCOUNTER — APPOINTMENT (OUTPATIENT)
Dept: PHYSICAL THERAPY | Facility: CLINIC | Age: 77
DRG: 391 | End: 2022-04-08
Attending: FAMILY MEDICINE
Payer: COMMERCIAL

## 2022-04-08 LAB
ANION GAP SERPL CALCULATED.3IONS-SCNC: 9 MMOL/L (ref 5–18)
BACTERIA ABSC ANAEROBE+AEROBE CULT: ABNORMAL
BACTERIA ABSC ANAEROBE+AEROBE CULT: ABNORMAL
BASOPHILS # BLD AUTO: 0 10E3/UL (ref 0–0.2)
BASOPHILS NFR BLD AUTO: 0 %
BUN SERPL-MCNC: 13 MG/DL (ref 8–28)
C REACTIVE PROTEIN LHE: 7.9 MG/DL (ref 0–0.8)
CALCIUM SERPL-MCNC: 8.2 MG/DL (ref 8.5–10.5)
CHLORIDE BLD-SCNC: 105 MMOL/L (ref 98–107)
CO2 SERPL-SCNC: 27 MMOL/L (ref 22–31)
CREAT SERPL-MCNC: 0.78 MG/DL (ref 0.6–1.1)
DEPRECATED CALCIDIOL+CALCIFEROL SERPL-MC: 74 UG/L (ref 20–75)
EOSINOPHIL # BLD AUTO: 0.1 10E3/UL (ref 0–0.7)
EOSINOPHIL NFR BLD AUTO: 1 %
ERYTHROCYTE [DISTWIDTH] IN BLOOD BY AUTOMATED COUNT: 14.5 % (ref 10–15)
ERYTHROCYTE [SEDIMENTATION RATE] IN BLOOD BY WESTERGREN METHOD: 85 MM/HR (ref 0–20)
GFR SERPL CREATININE-BSD FRML MDRD: 77 ML/MIN/1.73M2
GLUCOSE BLD-MCNC: 104 MG/DL (ref 70–125)
GLUCOSE BLDC GLUCOMTR-MCNC: 165 MG/DL (ref 70–99)
GLUCOSE BLDC GLUCOMTR-MCNC: 166 MG/DL (ref 70–99)
GLUCOSE BLDC GLUCOMTR-MCNC: 206 MG/DL (ref 70–99)
GLUCOSE BLDC GLUCOMTR-MCNC: 99 MG/DL (ref 70–99)
HCT VFR BLD AUTO: 34.2 % (ref 35–47)
HGB BLD-MCNC: 10.6 G/DL (ref 11.7–15.7)
IMM GRANULOCYTES # BLD: 0.1 10E3/UL
IMM GRANULOCYTES NFR BLD: 1 %
LYMPHOCYTES # BLD AUTO: 0.9 10E3/UL (ref 0.8–5.3)
LYMPHOCYTES NFR BLD AUTO: 15 %
MCH RBC QN AUTO: 30.5 PG (ref 26.5–33)
MCHC RBC AUTO-ENTMCNC: 31 G/DL (ref 31.5–36.5)
MCV RBC AUTO: 98 FL (ref 78–100)
MONOCYTES # BLD AUTO: 0.2 10E3/UL (ref 0–1.3)
MONOCYTES NFR BLD AUTO: 3 %
NEUTROPHILS # BLD AUTO: 4.9 10E3/UL (ref 1.6–8.3)
NEUTROPHILS NFR BLD AUTO: 80 %
NRBC # BLD AUTO: 0 10E3/UL
NRBC BLD AUTO-RTO: 0 /100
PLATELET # BLD AUTO: 325 10E3/UL (ref 150–450)
POTASSIUM BLD-SCNC: 3.9 MMOL/L (ref 3.5–5)
RBC # BLD AUTO: 3.48 10E6/UL (ref 3.8–5.2)
SODIUM SERPL-SCNC: 141 MMOL/L (ref 136–145)
WBC # BLD AUTO: 6.2 10E3/UL (ref 4–11)

## 2022-04-08 PROCEDURE — 85025 COMPLETE CBC W/AUTO DIFF WBC: CPT | Performed by: FAMILY MEDICINE

## 2022-04-08 PROCEDURE — 250N000011 HC RX IP 250 OP 636: Performed by: FAMILY MEDICINE

## 2022-04-08 PROCEDURE — 97530 THERAPEUTIC ACTIVITIES: CPT | Mod: GP

## 2022-04-08 PROCEDURE — 85652 RBC SED RATE AUTOMATED: CPT | Performed by: FAMILY MEDICINE

## 2022-04-08 PROCEDURE — 86140 C-REACTIVE PROTEIN: CPT | Performed by: FAMILY MEDICINE

## 2022-04-08 PROCEDURE — 99231 SBSQ HOSP IP/OBS SF/LOW 25: CPT | Performed by: SURGERY

## 2022-04-08 PROCEDURE — 99232 SBSQ HOSP IP/OBS MODERATE 35: CPT | Performed by: FAMILY MEDICINE

## 2022-04-08 PROCEDURE — 250N000013 HC RX MED GY IP 250 OP 250 PS 637: Performed by: FAMILY MEDICINE

## 2022-04-08 PROCEDURE — 82306 VITAMIN D 25 HYDROXY: CPT | Performed by: FAMILY MEDICINE

## 2022-04-08 PROCEDURE — 36415 COLL VENOUS BLD VENIPUNCTURE: CPT | Performed by: FAMILY MEDICINE

## 2022-04-08 PROCEDURE — 120N000001 HC R&B MED SURG/OB

## 2022-04-08 PROCEDURE — 97116 GAIT TRAINING THERAPY: CPT | Mod: GP

## 2022-04-08 PROCEDURE — 97535 SELF CARE MNGMENT TRAINING: CPT | Mod: GO

## 2022-04-08 PROCEDURE — 250N000012 HC RX MED GY IP 250 OP 636 PS 637: Performed by: FAMILY MEDICINE

## 2022-04-08 PROCEDURE — 97161 PT EVAL LOW COMPLEX 20 MIN: CPT | Mod: GP

## 2022-04-08 PROCEDURE — 258N000003 HC RX IP 258 OP 636: Performed by: FAMILY MEDICINE

## 2022-04-08 PROCEDURE — 97166 OT EVAL MOD COMPLEX 45 MIN: CPT | Mod: GO

## 2022-04-08 PROCEDURE — 80048 BASIC METABOLIC PNL TOTAL CA: CPT | Performed by: FAMILY MEDICINE

## 2022-04-08 RX ORDER — PIPERACILLIN SODIUM, TAZOBACTAM SODIUM 3; .375 G/15ML; G/15ML
3.38 INJECTION, POWDER, LYOPHILIZED, FOR SOLUTION INTRAVENOUS ONCE
Status: COMPLETED | OUTPATIENT
Start: 2022-04-08 | End: 2022-04-08

## 2022-04-08 RX ORDER — PIPERACILLIN SODIUM, TAZOBACTAM SODIUM 3; .375 G/15ML; G/15ML
3.38 INJECTION, POWDER, LYOPHILIZED, FOR SOLUTION INTRAVENOUS EVERY 8 HOURS
Status: DISCONTINUED | OUTPATIENT
Start: 2022-04-08 | End: 2022-04-15 | Stop reason: HOSPADM

## 2022-04-08 RX ADMIN — POTASSIUM CHLORIDE 20 MEQ: 20 TABLET, EXTENDED RELEASE ORAL at 20:08

## 2022-04-08 RX ADMIN — DAPTOMYCIN 750 MG: 350 INJECTION, POWDER, LYOPHILIZED, FOR SOLUTION INTRAVENOUS at 19:18

## 2022-04-08 RX ADMIN — PREDNISONE 15 MG: 5 TABLET ORAL at 09:12

## 2022-04-08 RX ADMIN — INSULIN ASPART 1 UNITS: 100 INJECTION, SOLUTION INTRAVENOUS; SUBCUTANEOUS at 13:51

## 2022-04-08 RX ADMIN — ACETAMINOPHEN 650 MG: 325 TABLET ORAL at 10:26

## 2022-04-08 RX ADMIN — ACETAMINOPHEN 650 MG: 325 TABLET ORAL at 00:15

## 2022-04-08 RX ADMIN — PANTOPRAZOLE SODIUM 40 MG: 20 TABLET, DELAYED RELEASE ORAL at 09:13

## 2022-04-08 RX ADMIN — VANCOMYCIN HYDROCHLORIDE 1000 MG: 5 INJECTION, POWDER, LYOPHILIZED, FOR SOLUTION INTRAVENOUS at 04:23

## 2022-04-08 RX ADMIN — GABAPENTIN 900 MG: 300 CAPSULE ORAL at 21:43

## 2022-04-08 RX ADMIN — PIPERACILLIN AND TAZOBACTAM 3.38 G: 3; .375 INJECTION, POWDER, LYOPHILIZED, FOR SOLUTION INTRAVENOUS at 21:43

## 2022-04-08 RX ADMIN — PANTOPRAZOLE SODIUM 40 MG: 20 TABLET, DELAYED RELEASE ORAL at 18:23

## 2022-04-08 RX ADMIN — POTASSIUM CHLORIDE 20 MEQ: 20 TABLET, EXTENDED RELEASE ORAL at 09:12

## 2022-04-08 RX ADMIN — FLUOXETINE HYDROCHLORIDE 40 MG: 20 CAPSULE ORAL at 09:12

## 2022-04-08 RX ADMIN — LOSARTAN POTASSIUM 12.5 MG: 25 TABLET, FILM COATED ORAL at 09:13

## 2022-04-08 RX ADMIN — CARVEDILOL 12.5 MG: 12.5 TABLET, FILM COATED ORAL at 18:23

## 2022-04-08 RX ADMIN — METRONIDAZOLE 500 MG: 5 INJECTION, SOLUTION INTRAVENOUS at 05:45

## 2022-04-08 RX ADMIN — PRAVASTATIN SODIUM 40 MG: 20 TABLET ORAL at 21:43

## 2022-04-08 RX ADMIN — POTASSIUM CHLORIDE 20 MEQ: 20 TABLET, EXTENDED RELEASE ORAL at 13:05

## 2022-04-08 RX ADMIN — RIVAROXABAN 20 MG: 10 TABLET, FILM COATED ORAL at 12:16

## 2022-04-08 RX ADMIN — ACETAMINOPHEN 650 MG: 325 TABLET ORAL at 16:50

## 2022-04-08 RX ADMIN — HYDROMORPHONE HYDROCHLORIDE 1 MG: 2 TABLET ORAL at 20:08

## 2022-04-08 RX ADMIN — CARVEDILOL 12.5 MG: 12.5 TABLET, FILM COATED ORAL at 09:12

## 2022-04-08 RX ADMIN — PIPERACILLIN AND TAZOBACTAM 3.38 G: 3; .375 INJECTION, POWDER, LYOPHILIZED, FOR SOLUTION INTRAVENOUS at 15:58

## 2022-04-08 RX ADMIN — MONTELUKAST 10 MG: 10 TABLET, FILM COATED ORAL at 21:43

## 2022-04-08 RX ADMIN — INSULIN ASPART 1 UNITS: 100 INJECTION, SOLUTION INTRAVENOUS; SUBCUTANEOUS at 17:44

## 2022-04-08 ASSESSMENT — ACTIVITIES OF DAILY LIVING (ADL)
ADLS_ACUITY_SCORE: 14
ADLS_ACUITY_SCORE: 12
ADLS_ACUITY_SCORE: 14
ADLS_ACUITY_SCORE: 12
ADLS_ACUITY_SCORE: 12
ADLS_ACUITY_SCORE: 14
ADLS_ACUITY_SCORE: 12
ADLS_ACUITY_SCORE: 12
ADLS_ACUITY_SCORE: 14
ADLS_ACUITY_SCORE: 14
ADLS_ACUITY_SCORE: 12
ADLS_ACUITY_SCORE: 14

## 2022-04-08 NOTE — DISCHARGE INSTRUCTIONS
Drain Placement Discharge Instructions:  You had a small tube or drain(s) placed. This was placed so the abnormal fluid collection within your body can be drained out (externally). Please follow the below instructions as you recover:    Care Instructions after drain placement:  - If you received sedation for your procedure, do not drive or operate heavy machinery for the rest of the day.  - Rest after your drain(s) were placed. Avoid strenuous activity and heavy lifting for the next 2 days. Return to your normal activities as you tolerate after the 2 day restriction.  - You may shower; however, you should not submerge site under water like in a tub bath, Jacuzzi or pool. Cover drain(s) exit site(s) with plastic wrap while showering.  - Keep dressing clean and dry as long as drain (s) are in place. If you have a gauze dressing, please change the dressing as needed to keep site(s) clean and dry.  - You may eat and drink as normal.  - You may have discomfort after the procedure near the drain exit site(s). You may take acetaminophen (Tylenol ) or ibuprofen (Motrin ) as needed for any discomfort.  - Inspect the tube(s) often for kinks.  - If you have a gravity bag, keep the bag below the drain exit site(s) to allow for free flow of drainage by gravity.  - Flush your drainage tube with 10mL sterile normal saline once daily.  - Record your daily drain(s) outputs and amount flushed on your drainage record. Bring your records to your next radiology appointment.  - Empty your drainage bag(s)/bulb(s) daily or when it is approximately half way fluid. Follow below instructions for emptying your bag(s)/bulb(s):  - Clean hands well with soap and water.  - Place a measuring container near the outlet valve of the drainage bag/bulb.  - If you have a drainage BAG: Twist the blue valve at the bottom of the bag while holding the valve over your measuring cup and open container to empty. Re-twist the valve closed on the drainage bag  "once complete.  - If you have a drainage BULB: Open the bulb cap (at the top of the bulb). Empty the fluid into the measuring cup. Squeeze bulb and hold flat. While bulb is squeezed, close the cap.  - After draining fluid record your drainage output. Bring record of your drain outputs to your next radiology appointment.  - Discard drainage into toilet once fluid drained.    Flushing Your Drainage Tube:   1. Collect flushing supplies: 10mL of sterile normal saline in syringe, alcohol pad.  2. Clean the flushing (center) port with alcohol and attach the flushing syringe by twisting into place.       3. Turn the 3 way stopcock valve \"off\" to the drainage bag/bulb.    4. Gently inject/flush the drain so fluid is moving towards your body through the drainage catheter.   5. Turn the stopcock valve back to its center position to allow for drainage to resume.           6. Remove flushing syringe from flushing port by twisting off.?    Follow-Up:   - [facility] will call you to schedule your next follow-up appointment. Please call [facility] if you are not contacted within the next week at [phone]     Please seek medical evaluation for:  - Nausea and/or vomiting.  - Diffuse abdominal pain.  - Fevers (greater than 101 F (38.3C)).    Call [facility] at [phone] with tube related questions or concerns:  - Drainage tube(s) falls out, is pulled back or felt to be out of position.   - Unable to flush drainage tube(s).   - Leakage (or purulent drainage) around drainage tube site(s).   - Extreme pain at drainage tube site(s).   - Outputs suddenly stop or significantly reduces.   - Warmth, redness, swelling or tenderness around the drainage tube(s).      Home Care Services have been arranged for you.  Home Care Agency: [facility]  Home Care Agency Telephone:  [phone]  Services: Registered Nurse, Physical Therapy and Occupational Therapy  Instructions:  Home Care agency will call you to " schedule initial visit to start services

## 2022-04-08 NOTE — PROGRESS NOTES
"  Interventional Radiology - CHART REVIEW  Inpatient - [facility]: Interventional Radiology   [phone]  04/08/2022     Culture:  pending  Antibiotic: Ceftriaxone; Flagyl; Vanco  Flushing: 10mL q8hr    O:  BP (!) 155/68 (BP Location: Left arm)   Pulse 86   Temp 97  F (36.1  C) (Oral)   Resp 16   Ht 1.575 m (5' 2\")   Wt 93.4 kg (206 lb)   SpO2 95%   BMI 37.68 kg/m        IMAGING:  EXAM:  1. PERCUTANEOUS DRAIN PLACEMENT INTRA-ABDOMINAL ABSCESS.  2. CT GUIDANCE  3. CONSCIOUS SEDATION  LOCATION: [facility]  DATE/TIME: 4/6/2022 11:23 AM     INDICATION: Patient has large intra abdominal abscess, 13 x 11 x 6 cm,  likely diverticular that needs to be drained.  TECHNIQUE: Dose reduction techniques were used.     PROCEDURE: Informed consent obtained. Site marked. Prior images reviewed. Required items made available. Patient identity confirmed verbally and with arm band. Patient reevaluated immediately before administering sedation. Universal protocol was   followed. Time out performed. The site was prepped and draped in sterile fashion. 10 mL of 1% lidocaine was infused into the local soft tissues. Using standard technique and under direct CT guidance, a 10 Belgian drainage catheter was inserted into the   fluid collection.       SPECIMEN: 5 mL of purulent fluid was aspirated and sent to lab for cultures and Gram stain.     BLOOD LOSS: 2 mL.     The patient tolerated the procedure well. No immediate complications.     SEDATION: Versed 1 mg. Fentanyl 50 mcg. The procedure was performed with administration intravenous conscious sedation with appropriate preoperative, intraoperative, and postoperative evaluation.     30 minutes of supervised face to face conscious sedation time was provided by a radiology nurse under my direct supervision.                                                                      IMPRESSION:  1.  Successful CT-guided drain placement into abdominal " abscess.       LABS:  Recent Labs   Lab 04/08/22  0821 04/07/22  0857 04/06/22  0652 04/05/22  1304   WBC 6.2 4.6 5.5 8.4   HGB 10.6* 10.4* 10.6* 11.8    408 367 431   CR 0.78 0.80 0.72 0.75     Drain Outputs (in mL):  DATE OP   4/6 190   4/7 80   4/8 10 AM           A:  77 year old yo female who presented with three month history of abdominal and back pain and overall unwell feeling. Found to have large intraabdominal abscess consistent with diverticular abscess, s/p CT guided 10F drain placement 4/6/22.       P:    - Continue present drain cares. Continue drain to ALISSA drainage drainage.  - Antibiotics per surgery.   - Drain discharge instructions entered into D/C navigator.  - Patient to flush drain with 10mL NS once daily upon discharge. NS flushes ordered in D/C navigator.  - Anticipating follow up CT and abscessogram approximately 7-10 days from drain placement date pending drain OPs and patient's clinical status. May consider sooner follow up imaging if there are changes in drain function or in patient's clinical course.   -If drain outputs remain approximately <5-10mL daily consider CT and abscessogram  - IR will continue to follow loosely. Please contact IR with drain related questions or concerns.      Total time spent on the date of the encounter is 10 minutes, including time spent counseling the patient, performing a medically appropriate evaluation, reviewing prior medical history, ordering medications and tests, documenting clinical information in the medical record, and communication of results.    Toya Barksdale, APRN CNP  Interventional Radiology  [phone]    E/M codes for reference only:  18242

## 2022-04-08 NOTE — PLAN OF CARE
Problem: UTI (Urinary Tract Infection)  Goal: Improved Infection Symptoms  Outcome: Ongoing, Progressing     Problem: Pain Acute  Goal: Acceptable Pain Control and Functional Ability  Outcome: Ongoing, Progressing  Intervention: Prevent or Manage Pain  Recent Flowsheet Documentation  Taken 4/8/2022 0910 by Brisa Calhoun RN  Medication Review/Management: medications reviewed     Problem: Plan of Care - These are the overarching goals to be used throughout the patient stay.    Goal: Absence of Hospital-Acquired Illness or Injury  Intervention: Identify and Manage Fall Risk  Recent Flowsheet Documentation  Taken 4/8/2022 0910 by Brisa Calhoun RN  Safety Promotion/Fall Prevention:    bed alarm on    nonskid shoes/slippers when out of bed   Goal Outcome Evaluation:  Patient is alert and oriented, C/o pain in lower back and left shoulder, managed by PRN tylenol and Ice pack, pt ambulated , had a small bowel movement, I incentive spirometer ordered, ALISSA drain  Was flashed, output was 5 mls.

## 2022-04-08 NOTE — PROGRESS NOTES
[facility] MEDICINE PROGRESS NOTE      Identification/Summary:  Zakiya Christian is a 77 year old old female with history of diabetes mellitus type 2 on Metformin, chronic kidney disease, peripheral neuropathy, chronic depression, COPD with asthma, anxiety and recent diagnosis about 2 months ago of polymyalgia rheumatica presented with 3-month history of nonspecific abdominal and back pain and just generally feeling poorly.  Has been seen in her clinic and eventually was diagnosed with polymyalgia rheumatica and referred to rheumatologist.  Has been on azathioprine and prednisone.  Also apparently has been treated a couple times for UTI.  Had significant increased weakness 4/5/2022 and presented to the emergency room.  Was started on cephalexin for UTI 2 days ago.  Found to have a large intra-abdominal abscess 13 x 11 x 6 cm as likely diverticular.  Now started on IV metronidazole and ceftriaxone and  general surgery and interventional radiology have seen the patient today.Hospital course is notable for IR abscess drainage 4/6 without complications.  Continues to have good drainage.  Preliminary cultures growing Enterococcus and Klebsiella with sensitivities pending.  Vancomycin added.  Patient definitely feeling better.  Discussed with rheumatology and will start to wean her prednisone and discontinue the azathioprine. Hypoxic around 90% last night started on 1 lpm. Afebrile without leukocytosis.     Assessment and Plan:  Large intra-abdominal abscess which is likely diverticular.  13 x 11 x 6 cm.  Suspect this is been present for about 3 months by her history.  Has been on IV antibiotics including metronidazole and ceftriaxone and has seen general surgery and interventional radiology. Status post IR drain 4/6 with cultures.  Preliminary cultures growing Klebsiella, Pseudomonas and Fusobacterium necrophorum and Enterococcus. Pending sensitivities. Advanced to low fiber diet and tolerating  well. Increased breakthrough incisional pain. Elevated ESR/CRP.  Plan: Infectious disease recommended changing antibiotics to daptomycin and IV piperacillin-tazobactam.  Await sensitivities  -PRN 1 mg Dilauded for breakthrough pain  -Question by nursing staff of whether drain still functioning.  IR contacted and will reevaluate on Monday.    Klebsiella Pneumoniae UTI (susceptible to all except ampicillin resistant)  - afebrile with no leukocytosis, nausea, flank pain, or ongoing dysuria  Plan: On broad spectrum antibiotics for abscess coverage    Diabetes mellitus type 2 on Metformin  BG increased following diet advancement. .   Plan: Hold Metformin and treat with sliding scale insulin    Chronic kidney disease.  Creatinine and GFR normal today     Low magnesium at 1.3-->1.6-->2.0.  Plan: Check magnesium in the morning     Hypokalemia.,  Resolved  Plan: oral potassium tid      Peripheral neuropathy on gabapentin     Depression and anxiety on fluoxetine     COPD and asthma.  Feels breathing is at baseline. Wheezing at rest. On 1 LPM. RA at baseline. Afebrile with normal WBC. Denies increased cough. Possible atelectasis 2/2 splinting from abdominal pain.    Plan: Continue prior meds  -Promote ambulation   -PT/OT  -Incentive spirometry   -Titrate O2 to maintain SPO2 > 88%    Peripheral neuropathy on gabapentin     Essential hypertension on losartan.  Plan: Continue     GERD on PPI     Hold her aspirin     Restart Rivaroxaban - on anticoagulation d/t DVT history      Hyperlipidemia on statin     Hard of hearing     Polymyalgia rheumatica diagnosis but suspected symptoms and elevated sedimentation rate could have been due to this chronic intra-abdominal abscess.  I discussed patient with her rheumatologist Dr. Gonzalez today.  He states she was initially seen by him after she had already been placed on high-dose steroids.  He agrees that this most likely is not polymyalgia rheumatica but was the abscess from the  beginning and myalgias and elevated sedimentation rate which is due to the abscess and then those led to the assumption that she had polymyalgia rheumatica because she did not have other classic symptoms of infection like fever.  Plan: Discontinue permanently the azathioprine and wean prednisone from 20 mg down to 15 mg and then decrease by 5 mg a week and then she should see Dr. Gonzalez in about 8 to 10 weeks.    Plan: Continue prior meds.  -Monitor for worsening respiratory status and pain with prednisone taper     Diet: Advance Diet as Tolerated: Low Fiber; Low Fiber  DVT Prophylaxis:  DOAC and Ambulate every shift  Code Status: Full Code    Disposition Plan    Inpatient     The patient's care was discussed with the Attending Physician, Dr. Bernstein.    Julissa ROMERO [facility]  Phone: #[phone]    Patient seen and examined  Patient discussed with physician assistant student  Agree with assessment and plan as outlined above     Heber Bernstein MD  [facility] Medicine Service    Interval History/Subjective:  Pt reports feeling much improved following abscess drain placement. Breakthrough incisional pain as well as pain in back and shoulder from laying in bed. Reports respiratory effort and wheezing are at baseline. Tolerating low fiber diet with some intermittent loose stools from antibiotics. Denies dizziness, headaches, chest pain, increased work of breathing, URI symptoms, abdominal pain beyond incision, or weakness.     Physical Exam/Objective:  Temp:  [97  F (36.1  C)-98  F (36.7  C)] 97  F (36.1  C)  Pulse:  [77-88] 86  Resp:  [16-20] 16  BP: (145-156)/(65-70) 155/68  SpO2:  [90 %-98 %] 95 %  Body mass index is 37.68 kg/m .    GENERAL:  Alert, appears comfortable, in no acute distress, appears stated age   HEAD:  Normocephalic, without obvious abnormality, atraumatic   LUNGS:   Respirations unlabored on 1LPM. Audible wheeze at rest.  Wheezing heard throughout lung fields.   HEART:  Regular rate and rhythm, S1 and S2 normal, no murmur, rub, or gallop    ABDOMEN:   Soft, non-tender, no rebound or guarding, ALISSA drain in place with scant blood tinged purulent fluid    EXTREMITIES: No edema noted    SKIN: Dry to touch, no exanthems in the visualized areas   NEURO: Alert, cognitively intact, moves all four extremities freely   PSYCH: Cooperative, behavior is appropriate      Data reviewed today: I personally reviewed all new medications, labs, imaging/diagnostics reports over the past 24 hours. Pertinent findings include:    Imaging:   No results found for this or any previous visit (from the past 24 hour(s)).    Labs:  Most Recent 3 CBC's:Recent Labs   Lab Test 04/08/22 0821 04/07/22  0857 04/06/22  0652   WBC 6.2 4.6 5.5   HGB 10.6* 10.4* 10.6*   MCV 98 99 99    408 367     Most Recent 3 BMP's:Recent Labs   Lab Test 04/08/22 0823 04/08/22 0821 04/07/22 2036 04/07/22  1240 04/07/22  0857 04/06/22  0810 04/06/22  0652   NA  --  141  --   --  140  --  143   POTASSIUM  --  3.9  --   --  3.6  --  3.1*   CHLORIDE  --  105  --   --  100  --  101   CO2  --  27  --   --  22  --  30   BUN  --  13  --   --  16  --  13   CR  --  0.78  --   --  0.80  --  0.72   ANIONGAP  --  9  --   --  18  --  12   VICENTA  --  8.2*  --   --  8.2*  --  8.0*   GLC 99 104 151*   < > 91   < > 74    < > = values in this interval not displayed.     Most Recent 6 Bacteria Isolates From Any Culture (See EPIC Reports for Culture Details):No lab results found.  Most Recent 6 glucoses:Recent Labs   Lab Test 04/08/22 0823 04/08/22 0821 04/07/22 2036 04/07/22  1652 04/07/22  1240 04/07/22  0857   GLC 99 104 151* 221* 148* 91     Most Recent Urinalysis:Recent Labs   Lab Test 04/05/22  1450   COLOR Yellow   APPEARANCE Turbid*   URINEGLC Negative   URINEBILI Negative   URINEKETONE 20 *   SG 1.023   UBLD 0.03 mg/dL*   URINEPH 6.5   PROTEIN 50 *   NITRITE Negative   LEUKEST 500 Sara/uL*    RBCU 6*   WBCU >182*     Most Recent ESR & CRP:Recent Labs   Lab Test 04/08/22  0821   SED 85*   CRP 7.9*       Medications:   Personally Reviewed.  Medications       carvedilol  12.5 mg Oral BID w/meals     cefTRIAXone  1 g Intravenous Q24H     FLUoxetine  40 mg Oral QAM     Fluticasone-Umeclidin-Vilanterol  1 puff Inhalation QAM     gabapentin  900 mg Oral At Bedtime     insulin aspart  1-7 Units Subcutaneous TID AC     losartan  12.5 mg Oral QAM     metroNIDAZOLE  500 mg Intravenous Q12H     montelukast  10 mg Oral At Bedtime     pantoprazole  40 mg Oral BID AC     potassium chloride  20 mEq Oral TID     pravastatin  40 mg Oral At Bedtime     predniSONE  15 mg Oral Daily     rivaroxaban ANTICOAGULANT  20 mg Oral Daily with breakfast     sodium chloride (PF)  10 mL Irrigation Q8H     sodium chloride (PF)  3 mL Intracatheter Q8H     vancomycin  1,000 mg Intravenous Q12H

## 2022-04-08 NOTE — PROGRESS NOTES
ALISSA drain flushed with 10 ml inside and tubing flushed with 10 ml to clear line. Only 5 ml output before subtracting irrigation amount. NP Apolonia notified of less output in ALISSA than what it was irrigated with. NP to evaluate on Monday.

## 2022-04-08 NOTE — PROGRESS NOTES
04/08/22 1430   Quick Adds   Type of Visit Initial PT Evaluation   Living Environment   People in Home alone   Current Living Arrangements house  ([facility])   Home Accessibility stairs within home   Number of Stairs, Within Home, Primary ten   Stair Railings, Within Home, Primary railings safe and in good condition   Living Environment Comments Pt reports that she has family/friends in the area who are able to help   Self-Care   Equipment Currently Used at Home none  (pt reports that she has rolling walker at home)   General Information   Onset of Illness/Injury or Date of Surgery 04/05/22   Referring Physician Heber Bernstein MD   Patient/Family Therapy Goals Statement (PT) to get better   Pertinent History of Current Problem (include personal factors and/or comorbidities that impact the POC) colonic diverticular abscess   Existing Precautions/Restrictions fall   Bed Mobility   Bed Mobility supine-sit;sit-supine;scooting/bridging   Scooting/Bridging [place] (Bed Mobility) verbal cues;minimum assist (75% patient effort)   Supine-Sit [place] (Bed Mobility) verbal cues;moderate assist (50% patient effort)   Sit-Supine [place] (Bed Mobility) supervision;moderate assist (50% patient effort)   Comment, (Bed Mobility) Min assist with bed mobility. Mod assist x 1 with supine<>sit transfer. Verbal cues for safety   Transfers   Transfers sit-stand transfer   Comment, (Transfers) CGA with FWW for sit<>stand transfers. Verbal cues for safe hand placment   Sit-Stand Transfer   Sit-Stand [place] (Transfers) verbal cues;contact guard   Assistive Device (Sit-Stand Transfers) walker, front-wheeled   Comment, (Sit-Stand Transfer) CGA with FWW with sit<>stand transfers. Verbal cues for safe hand placement.   Gait/Stairs (Locomotion)   [place] Level (Gait) verbal cues;contact guard   Assistive Device (Gait) walker, front-wheeled   Distance in Feet (Required for LE Total Joints) 200'   Pattern (Gait)  swing-through   Deviations/Abnormal Patterns (Gait) base of support, wide;smita decreased;gait speed decreased   Comment, (Gait/Stairs) Pt ambulates with CGA and FWW. Pt on 1L O2 throughout session. Pt does not report any shortness of breath when ambulating.   Clinical Impression   Criteria for Skilled Therapeutic Intervention Yes, treatment indicated   PT Diagnosis (PT) impaired functional mobility   Influenced by the following impairments weakness, pain   Functional limitations due to impairments bed mobility, transfers, ambulation   Clinical Presentation (PT Evaluation Complexity) Stable/Uncomplicated   Clinical Presentation Rationale Pt presents as medically diagnosed   Clinical Decision Making (Complexity) low complexity   Planned Therapy Interventions (PT) bed mobility training;gait training;home exercise program;patient/family education;ROM (range of motion);stair training;strengthening;stretching;transfer training   Anticipated Equipment Needs at Discharge (PT) walker, rolling   Risk & Benefits of therapy have been explained patient   PT Discharge Planning   PT Discharge Recommendation (DC Rec) Transitional Care Facility   PT Rationale for DC Rec Pt requires assist with transfers and ambulation for safety. Pt lives home alone and reports that she will need to use stairs at home   Total Evaluation Time   Total Evaluation Time (Minutes) 10   Physical Therapy Goals   PT Frequency Daily   PT Predicated Duration/Target Date for Goal Attainment 04/13/22   PT Goals Transfers;Gait;Stairs;PT Goal 1   PT: Transfers Modified independent;Sit to/from stand;Assistive device  (FWW)   PT: Gait Supervision/stand-by assist;Rolling walker;Assistive device;Greater than 200 feet  (FWW)   PT: Stairs Minimal assist;Assistive device;Greater than 10 stairs;Rail on both sides   PT: Goal 1 SBA with HEP/therex     Xiomara Mayo, PT, DPT

## 2022-04-08 NOTE — PROGRESS NOTES
[facility] MEDICINE PROGRESS NOTE      Identification/Summary:  Zakiya Christian is a 77 year old old female with history of diabetes mellitus type 2 on Metformin, chronic kidney disease, peripheral neuropathy, chronic depression, COPD with asthma, anxiety and recent diagnosis about 2 months ago of polymyalgia rheumatica presented with 3-month history of nonspecific abdominal and back pain and just generally feeling poorly.  Has been seen in her clinic and eventually was diagnosed with polymyalgia rheumatica and referred to rheumatologist.  Has been on azathioprine and prednisone.  Also apparently has been treated a couple times for UTI.  Had significant increased weakness 4/5/2022 and presented to the emergency room.  Was started on cephalexin for UTI 2 days ago.  Found to have a large intra-abdominal abscess 13 x 11 x 6 cm as likely diverticular.  Now started on IV metronidazole and ceftriaxone and  general surgery and interventional radiology have seen the patient today.Hospital course is notable for IR abscess drainage 4/6 without complications.  Continues to have good drainage.  Preliminary cultures growing Enterococcus and Klebsiella.  We will add vancomycin today.  Patient definitely feeling better.  Discussed with rheumatology and will start to wean her prednisone and discontinue the azathioprine.     Assessment and Plan:   Large intra-abdominal abscess which is likely diverticular.  13 x 11 x 6 cm.  Suspect this is been present for about 3 months by her history. Now on IV antibiotics including metronidazole and ceftriaxone and has seen general surgery and interventional radiology. Status post IR drain 4/6 with cultures.  Preliminary cultures growing Klebsiella and Enterococcus.  Plan: Add vancomycin until cultures with sensitivities back hopefully tomorrow     Diabetes mellitus type 2 on Metformin  Plan: Hold Metformin and treat with sliding scale insulin     Chronic kidney disease.   Creatinine and GFR normal today     Low magnesium at 1.3-->1.6-->2.0.  Plan: Check magnesium in the morning     Hypokalemia.,  Resolved  Plan: oral potassium tid and check in AM     Peripheral neuropathy on gabapentin     Depression and anxiety on fluoxetine     COPD and asthma.  Plan: Continue prior meds     Essential hypertension on losartan.  Plan: Restart     GERD on PPI     Hold her aspirin and rivaroxaban for now     Hyperlipidemia on statin     Hard of hearing     Polymyalgia rheumatica diagnosis but suspected symptoms and elevated sedimentation rate could have been due to this chronic intra-abdominal abscess.  I discussed patient with her rheumatologist Dr. Gonzalez today.  He states she was initially seen by him after she had already been placed on high-dose steroids.  He agrees that this most likely is not polymyalgia rheumatica but was the abscess from the beginning and myalgias and elevated sedimentation rate which is due to the abscess and then those led to the assumption that she had polymyalgia rheumatica because she did not have other classic symptoms of infection like fever.  Plan: Discontinue permanently the azathioprine and wean prednisone from 20 mg down to 15 mg and then decrease by 5 mg a week and then she should see Dr. Gonzalez in about 8 to 10 weeks.       CODE STATUS: Full code     Covid status: No infection and vaccinated x3     Disposition: Inpatient and will likely be here at least a couple of days              Chief Complaint at admission  extreme weakness      HISTORY AT ADMISSION      Zakiya Christian is a 77 year old old female with past medical history as noted above.  She is been just feeling out of sorts for the past 3 months with intermittent back pain and abdominal pain.  Significant increase in weakness today where she could hardly get out of bed.  Also has had some nonspecific mild fevers and chills recently.        Heber Bernstein MD  [place] [facility]  [facility]  [facility]  [facility]  Phone: #[phone]    Interval History/Subjective:  Patient reports she definitely feeling better.  Some intermittent back pain.  Eating well.  Excited about advancing diet from full liquids.  No chest pain or shortness of breath or lightheadedness.    Physical Exam/Objective:  Temp:  [97.3  F (36.3  C)-98  F (36.7  C)] 97.6  F (36.4  C)  Pulse:  [64-88] 88  Resp:  [18-23] 20  BP: (130-156)/(58-70) 156/70  SpO2:  [90 %-99 %] 90 %  Body mass index is 37.4 kg/m .    GENERAL:  Alert, appears comfortable, in no acute distress, appears stated age   LUNGS:   Clear to auscultation bilaterally   CHEST WALL:  No tenderness or deformity   HEART:  Regular rate and rhythm, S1 and S2 normal, no murmur, rub, or gallop    ABDOMEN:   Soft, non-tender, no masses, ALISSA drain has blood tinged purulent fluid   EXTREMITIES:  No ankle edema    SKIN: Dry to touch, no exanthems in the visualized areas   NEURO: Alert, appears cognitively intact, moves all four extremities freely   PSYCH: Cooperative, behavior is appropriate      Data reviewed today: I personally reviewed all new medications, labs, imaging/diagnostics reports over the past 24 hours. Pertinent findings include:    Labs:  Most Recent 3 CBC's:Recent Labs   Lab Test 04/07/22  0857 04/06/22  0652 04/05/22  1304   WBC 4.6 5.5 8.4   HGB 10.4* 10.6* 11.8   MCV 99 99 98    367 431     Most Recent 3 BMP's:Recent Labs   Lab Test 04/07/22  2036 04/07/22  1652 04/07/22  1240 04/07/22  0857 04/06/22  0810 04/06/22  0652 04/05/22  2137 04/05/22  1304   NA  --   --   --  140  --  143  --  142   POTASSIUM  --   --   --  3.6  --  3.1*  --  3.8   CHLORIDE  --   --   --  100  --  101  --  101   CO2  --   --   --  22  --  30  --  26   BUN  --   --   --  16  --  13  --  14   CR  --   --   --  0.80  --  0.72  --  0.75   ANIONGAP  --   --   --  18  --  12  --  15   VICENTA  --   --   --  8.2*  --  8.0*  --  8.5   * 221* 148* 91   < > 74   < > 128*    < > =  values in this interval not displayed.     Most Recent 2 LFT's:Recent Labs   Lab Test 04/07/22  0857 02/21/22  1109   AST 14 13   ALT 10 <9   ALKPHOS 68 42*   BILITOTAL 0.5 0.5       Medications:   Personally Reviewed.  Medications       carvedilol  12.5 mg Oral BID w/meals     cefTRIAXone  1 g Intravenous Q24H     FLUoxetine  40 mg Oral QAM     Fluticasone-Umeclidin-Vilanterol  1 puff Inhalation QAM     gabapentin  900 mg Oral At Bedtime     insulin aspart  1-7 Units Subcutaneous TID AC     losartan  12.5 mg Oral QAM     metroNIDAZOLE  500 mg Intravenous Q12H     montelukast  10 mg Oral At Bedtime     pantoprazole  40 mg Oral BID AC     potassium chloride  20 mEq Oral TID     pravastatin  40 mg Oral At Bedtime     [START ON 4/8/2022] predniSONE  15 mg Oral Daily     sodium chloride (PF)  10 mL Irrigation Q8H     sodium chloride (PF)  3 mL Intracatheter Q8H     [START ON 4/8/2022] vancomycin  1,000 mg Intravenous Q12H

## 2022-04-08 NOTE — PROGRESS NOTES
Care Management Follow Up    Length of Stay (days): 3    Expected Discharge Date: 04/10/2022     Additional Information:    Chart reviewed.   CM received call from NATALIIA Jean/mayito clark CM with [facility] ([phone]) requesting update on patient and to be notified at hospital discharge. Requesting discharge summary be faxed to [phone]    Diana Johnson RN

## 2022-04-08 NOTE — PROGRESS NOTES
04/08/22 1434   Quick Adds   Type of Visit Initial Occupational Therapy Evaluation   Living Environment   People in Home alone   Current Living Arrangements house   Living Environment Comments tub shower, regular toilet   Self-Care   Equipment Currently Used at Home other (see comments)  (tub rail)   Activity/Exercise/Self-Care Comment ind for ADLs   Instrumental Activities of Daily Living (IADL)   IADL Comments ind for IADLs, driving, meds   General Information   Onset of Illness/Injury or Date of Surgery 04/05/22   Referring Physician Heber Bernstein MD   Patient/Family Therapy Goal Statement (OT) d/c home with assist from cousin   Cognitive Status Examination   Orientation Status orientation to person, place and time   Visual Perception   Visual Impairment/Limitations WFL   Posture   Posture protracted shoulders;forward head position   Range of Motion Comprehensive   General Range of Motion no range of motion deficits identified  (R shoulder pain at baseline)   Strength Comprehensive (MMT)   General Manual Muscle Testing (MMT) Assessment no strength deficits identified   Bed Mobility   Bed Mobility supine-sit;sit-supine   Comment (Bed Mobility) CGA of 1, HOB elevated, bed rails for supine > sit, Mod A of 2 for sit > supine   Transfers   Transfers sit-stand transfer;toilet transfer   Sit-Stand Transfer   Sit-Stand [place] (Transfers) contact guard   Assistive Device (Sit-Stand Transfers) walker, front-wheeled   Toilet Transfer   [place] Level (Toilet Transfer) moderate assist (50% patient effort)   Activities of Daily Living   BADL Assessment/Intervention lower body dressing   Lower Body Dressing Assessment/Training   [place] Level (Lower Body Dressing) dependent (less than 25% patient effort)   Clinical Impression   Criteria for Skilled Therapeutic Interventions Met (OT) Yes, treatment indicated   OT Diagnosis dec ADL indep   OT Problem List-Impairments impacting ADL problems related  to;activity tolerance impaired;pain   Assessment of Occupational Performance 3-5 Performance Deficits   Identified Performance Deficits LB dressing, toileting, functional transfers, bathing, bed mobility   Planned Therapy Interventions (OT) ADL retraining;bed mobility training   Clinical Decision Making Complexity (OT) moderate complexity   Risk & Benefits of therapy have been explained evaluation/treatment results reviewed;participants included;patient   OT Discharge Planning   OT Discharge Recommendation (DC Rec) Transitional Care Facility;home with assist   OT Rationale for DC Rec currently requires assist for all ADLs and functional transfers, would need 24/7 to go home right now   Total Evaluation Time (Minutes)   Total Evaluation Time (Minutes) 10   OT Goals   Therapy Frequency (OT) Daily   OT Predicated Duration/Target Date for Goal Attainment 04/13/22   OT Goals Lower Body Dressing;Bed Mobility;Toilet Transfer/Toileting   OT: Lower Body Dressing Supervision/stand-by assist   OT: Bed Mobility Supervision/stand-by assist   OT: Toilet Transfer/Toileting Supervision/stand-by assist     LULU [place]/SALAS

## 2022-04-08 NOTE — PLAN OF CARE
Patient alert & oriented. VSS. Complaints of pain 5/10 in back; given PRN tylenol & ice. Pt then able to sleep comfortably. Pt on 1L of O2 NC. Left sided ALISSA drain intact. Total ALISSA output 5mL on shift. Pt on a low fiber diet.

## 2022-04-08 NOTE — PLAN OF CARE
Problem: Plan of Care - These are the overarching goals to be used throughout the patient stay.    Goal: Absence of Hospital-Acquired Illness or Injury  Intervention: Identify and Manage Fall Risk  Recent Flowsheet Documentation  Taken 4/8/2022 0910 by Brisa Calhoun RN  Safety Promotion/Fall Prevention:   bed alarm on   nonskid shoes/slippers when out of bed     Problem: UTI (Urinary Tract Infection)  Goal: Improved Infection Symptoms  4/8/2022 1454 by Brisa Calhoun, RN  Outcome: Ongoing, Progressing  4/8/2022 1404 by Brisa Calhoun, CONNOR  Outcome: Ongoing, Progressing     Problem: Pain Acute  Goal: Acceptable Pain Control and Functional Ability  4/8/2022 1454 by Brisa Calhoun, RN  Outcome: Ongoing, Progressing   Goal Outcome Evaluation:

## 2022-04-08 NOTE — PROGRESS NOTES
General Surgery Progress Note:    Hospital Day # 3    ASSESSMENT:   1. Colonic diverticular abscess    2. Body aches    3. Hypomagnesemia    4. Acute UTI    5. Diverticulitis of colon        Zakiya Christian is a 77 year old female intra-abdominal abscess consistent with diverticular abscess, s/p IR placement of drain on 4/6/2022.  Gram stain positive for gram-positive cocci and cultures pending    PLAN:   -Continue antibiotics  -Okay advance diet today to a low fiber diet  -Wait for culture results and continue IV antibiotics  -We will continue to follow along.      SUBJECTIVE:   Zakiya Christian is doing well.  She denies any pain currently.  No nausea or vomiting and tolerating her diet..  Having bowel movements without any issues.  IR placement of drain yesterday they removed 100 cc of purulent fluid initially this morning the drainage output is serosanguineous    Patient Vitals for the past 24 hrs:   BP Temp Temp src Pulse Resp SpO2 Weight   04/08/22 0830 -- -- -- -- -- -- 93.4 kg (206 lb)   04/08/22 0746 (!) 155/68 97  F (36.1  C) Oral 86 16 95 % --   04/08/22 0009 (!) 146/65 98  F (36.7  C) Oral 82 16 98 % --   04/07/22 1844 (!) 156/70 -- -- 88 -- -- --   04/07/22 1549 (!) 145/65 97.6  F (36.4  C) Oral 77 20 90 % --       Physical Exam:  General: NAD, pleasant    ABD: Soft tenderness suprapubic however patient remarks she needs to urinate.  No rebound peritoneal signs present.  Percutaneous drain is putting out serosanguineous fluid.  EXT:no CCE    Admission on 04/05/2022   Component Date Value     GLUCOSE BY METER POCT 04/05/2022 125 (A)     Hold Specimen 04/05/2022 JIC      Hold Specimen 04/05/2022 JIC      Hold Specimen 04/05/2022 JIC      Hold Specimen 04/05/2022 JIC      Hold Specimen 04/05/2022 JIC      Color Urine 04/05/2022 Yellow      Appearance Urine 04/05/2022 Turbid (A)     Glucose Urine 04/05/2022 Negative      Bilirubin Urine 04/05/2022 Negative      Ketones Urine 04/05/2022 20  (A)     Specific  Gravity Urine 04/05/2022 1.023      Blood Urine 04/05/2022 0.03 mg/dL (A)     pH Urine 04/05/2022 6.5      Protein Albumin Urine 04/05/2022 50  (A)     Urobilinogen Urine 04/05/2022 2.0 (A)     Nitrite Urine 04/05/2022 Negative      Leukocyte Esterase Urine 04/05/2022 500 Sara/uL (A)     Bacteria Urine 04/05/2022 Few (A)     WBC Clumps Urine 04/05/2022 Present (A)     Mucus Urine 04/05/2022 Present (A)     RBC Urine 04/05/2022 6 (A)     WBC Urine 04/05/2022 >182 (A)     Squamous Epithelials Uri* 04/05/2022 1      WBC Count 04/05/2022 8.4      RBC Count 04/05/2022 3.84      Hemoglobin 04/05/2022 11.8      Hematocrit 04/05/2022 37.7      MCV 04/05/2022 98      MCH 04/05/2022 30.7      MCHC 04/05/2022 31.3 (A)     RDW 04/05/2022 13.9      Platelet Count 04/05/2022 431      Sodium 04/05/2022 142      Potassium 04/05/2022 3.8      Chloride 04/05/2022 101      Carbon Dioxide (CO2) 04/05/2022 26      Anion Gap 04/05/2022 15      Urea Nitrogen 04/05/2022 14      Creatinine 04/05/2022 0.75      Calcium 04/05/2022 8.5      Glucose 04/05/2022 128 (A)     GFR Estimate 04/05/2022 81      Magnesium 04/05/2022 1.3 (A)     Systolic Blood Pressure 04/05/2022 170      Diastolic Blood Pressure 04/05/2022 84      Ventricular Rate 04/05/2022 91      Atrial Rate 04/05/2022 91      LA Interval 04/05/2022 144      QRS Duration 04/05/2022 74      QT 04/05/2022 370      QTc 04/05/2022 455      P Axis 04/05/2022 25      R AXIS 04/05/2022 9      T [place] 04/05/2022 37      Interpretation ECG 04/05/2022                      Value:Sinus rhythm  Nonspecific ST abnormality  Abnormal ECG  No previous ECGs available  Confirmed by SEE ED PROVIDER NOTE FOR, ECG INTERPRETATION ([id number]),  STOLTZMAN, DIMITRIS ([id number]) on 4/5/2022 2:20:23 PM       Troponin I 04/05/2022 <0.01      Influenza A PCR 04/05/2022 Negative      Influenza B PCR 04/05/2022 Negative      SARS CoV2 PCR 04/05/2022 Negative      Lactic Acid 04/05/2022 0.5 (A)     Procalcitonin 04/05/2022  8.75 (A)     Culture 04/05/2022 No growth, less than 1 day      Hemoglobin A1C 04/05/2022 6.2 (A)     GLUCOSE BY METER POCT 04/05/2022 65 (A)     GLUCOSE BY METER POCT 04/05/2022 72      Sodium 04/06/2022 143      Potassium 04/06/2022 3.1 (A)     Chloride 04/06/2022 101      Carbon Dioxide (CO2) 04/06/2022 30      Anion Gap 04/06/2022 12      Urea Nitrogen 04/06/2022 13      Creatinine 04/06/2022 0.72      Calcium 04/06/2022 8.0 (A)     Glucose 04/06/2022 74      GFR Estimate 04/06/2022 85      GLUCOSE BY METER POCT 04/06/2022 76      GLUCOSE BY METER POCT 04/06/2022 74      WBC Count 04/06/2022 5.5      RBC Count 04/06/2022 3.44 (A)     Hemoglobin 04/06/2022 10.6 (A)     Hematocrit 04/06/2022 33.9 (A)     MCV 04/06/2022 99      MCH 04/06/2022 30.8      MCHC 04/06/2022 31.3 (A)     RDW 04/06/2022 14.0      Platelet Count 04/06/2022 367      % Neutrophils 04/06/2022 83      % Lymphocytes 04/06/2022 11      % Monocytes 04/06/2022 3      % Eosinophils 04/06/2022 1      % Basophils 04/06/2022 1      % Immature Granulocytes 04/06/2022 1      NRBCs per 100 WBC 04/06/2022 0      Absolute Neutrophils 04/06/2022 4.6      Absolute Lymphocytes 04/06/2022 0.6 (A)     Absolute Monocytes 04/06/2022 0.2      Absolute Eosinophils 04/06/2022 0.1      Absolute Basophils 04/06/2022 0.0      Absolute Immature Granul* 04/06/2022 0.0      Absolute NRBCs 04/06/2022 0.0      Magnesium 04/06/2022 1.6 (A)     GLUCOSE BY METER POCT 04/06/2022 76      Gram Stain Result 04/06/2022 4+ Gram positive cocci (A)     Gram Stain Result 04/06/2022 No white blood cells seen (A)     Color 04/06/2022 Yellow      Clarity 04/06/2022 Turbid (A)     Cell Count Fluid Source 04/06/2022 Abdomen      Clot Presence 04/06/2022 Large Clot (>Half Volume)      Cytology Ordered 04/06/2022 No      GLUCOSE BY METER POCT 04/06/2022 80      GLUCOSE BY METER POCT 04/06/2022 72      GLUCOSE BY METER POCT 04/07/2022 70         Dominik Doe MD

## 2022-04-09 ENCOUNTER — APPOINTMENT (OUTPATIENT)
Dept: OCCUPATIONAL THERAPY | Facility: CLINIC | Age: 77
DRG: 391 | End: 2022-04-09
Payer: COMMERCIAL

## 2022-04-09 LAB
ANION GAP SERPL CALCULATED.3IONS-SCNC: 12 MMOL/L (ref 5–18)
BACTERIA ABSC ANAEROBE+AEROBE CULT: ABNORMAL
BUN SERPL-MCNC: 12 MG/DL (ref 8–28)
CALCIUM SERPL-MCNC: 8.8 MG/DL (ref 8.5–10.5)
CHLORIDE BLD-SCNC: 106 MMOL/L (ref 98–107)
CO2 SERPL-SCNC: 24 MMOL/L (ref 22–31)
CREAT SERPL-MCNC: 0.9 MG/DL (ref 0.6–1.1)
ERYTHROCYTE [DISTWIDTH] IN BLOOD BY AUTOMATED COUNT: 14.7 % (ref 10–15)
GFR SERPL CREATININE-BSD FRML MDRD: 65 ML/MIN/1.73M2
GLUCOSE BLD-MCNC: 161 MG/DL (ref 70–125)
GLUCOSE BLDC GLUCOMTR-MCNC: 135 MG/DL (ref 70–99)
GLUCOSE BLDC GLUCOMTR-MCNC: 141 MG/DL (ref 70–99)
GLUCOSE BLDC GLUCOMTR-MCNC: 164 MG/DL (ref 70–99)
GLUCOSE BLDC GLUCOMTR-MCNC: 164 MG/DL (ref 70–99)
GLUCOSE BLDC GLUCOMTR-MCNC: 82 MG/DL (ref 70–99)
HCT VFR BLD AUTO: 38.5 % (ref 35–47)
HGB BLD-MCNC: 11.7 G/DL (ref 11.7–15.7)
MAGNESIUM SERPL-MCNC: 1.5 MG/DL (ref 1.8–2.6)
MCH RBC QN AUTO: 30.7 PG (ref 26.5–33)
MCHC RBC AUTO-ENTMCNC: 30.4 G/DL (ref 31.5–36.5)
MCV RBC AUTO: 101 FL (ref 78–100)
PLATELET # BLD AUTO: 290 10E3/UL (ref 150–450)
POTASSIUM BLD-SCNC: 4.4 MMOL/L (ref 3.5–5)
RBC # BLD AUTO: 3.81 10E6/UL (ref 3.8–5.2)
SODIUM SERPL-SCNC: 142 MMOL/L (ref 136–145)
WBC # BLD AUTO: 6.2 10E3/UL (ref 4–11)

## 2022-04-09 PROCEDURE — 99232 SBSQ HOSP IP/OBS MODERATE 35: CPT | Performed by: FAMILY MEDICINE

## 2022-04-09 PROCEDURE — 97535 SELF CARE MNGMENT TRAINING: CPT | Mod: GO

## 2022-04-09 PROCEDURE — 250N000013 HC RX MED GY IP 250 OP 250 PS 637: Performed by: FAMILY MEDICINE

## 2022-04-09 PROCEDURE — 85027 COMPLETE CBC AUTOMATED: CPT | Performed by: FAMILY MEDICINE

## 2022-04-09 PROCEDURE — 120N000001 HC R&B MED SURG/OB

## 2022-04-09 PROCEDURE — 250N000012 HC RX MED GY IP 250 OP 636 PS 637: Performed by: FAMILY MEDICINE

## 2022-04-09 PROCEDURE — 83735 ASSAY OF MAGNESIUM: CPT | Performed by: FAMILY MEDICINE

## 2022-04-09 PROCEDURE — 99222 1ST HOSP IP/OBS MODERATE 55: CPT | Performed by: STUDENT IN AN ORGANIZED HEALTH CARE EDUCATION/TRAINING PROGRAM

## 2022-04-09 PROCEDURE — 80048 BASIC METABOLIC PNL TOTAL CA: CPT | Performed by: FAMILY MEDICINE

## 2022-04-09 PROCEDURE — 250N000011 HC RX IP 250 OP 636: Performed by: FAMILY MEDICINE

## 2022-04-09 PROCEDURE — 36415 COLL VENOUS BLD VENIPUNCTURE: CPT | Performed by: FAMILY MEDICINE

## 2022-04-09 PROCEDURE — 99231 SBSQ HOSP IP/OBS SF/LOW 25: CPT | Performed by: SPECIALIST

## 2022-04-09 RX ORDER — MAGNESIUM SULFATE HEPTAHYDRATE 40 MG/ML
2 INJECTION, SOLUTION INTRAVENOUS ONCE
Status: COMPLETED | OUTPATIENT
Start: 2022-04-09 | End: 2022-04-09

## 2022-04-09 RX ADMIN — HYDROMORPHONE HYDROCHLORIDE 1 MG: 2 TABLET ORAL at 04:00

## 2022-04-09 RX ADMIN — PIPERACILLIN AND TAZOBACTAM 3.38 G: 3; .375 INJECTION, POWDER, LYOPHILIZED, FOR SOLUTION INTRAVENOUS at 14:15

## 2022-04-09 RX ADMIN — PANTOPRAZOLE SODIUM 40 MG: 20 TABLET, DELAYED RELEASE ORAL at 17:42

## 2022-04-09 RX ADMIN — INSULIN ASPART 1 UNITS: 100 INJECTION, SOLUTION INTRAVENOUS; SUBCUTANEOUS at 12:52

## 2022-04-09 RX ADMIN — POTASSIUM CHLORIDE 20 MEQ: 20 TABLET, EXTENDED RELEASE ORAL at 09:20

## 2022-04-09 RX ADMIN — PIPERACILLIN AND TAZOBACTAM 3.38 G: 3; .375 INJECTION, POWDER, LYOPHILIZED, FOR SOLUTION INTRAVENOUS at 06:19

## 2022-04-09 RX ADMIN — CARVEDILOL 12.5 MG: 12.5 TABLET, FILM COATED ORAL at 19:14

## 2022-04-09 RX ADMIN — POTASSIUM CHLORIDE 20 MEQ: 20 TABLET, EXTENDED RELEASE ORAL at 13:54

## 2022-04-09 RX ADMIN — RIVAROXABAN 20 MG: 10 TABLET, FILM COATED ORAL at 09:20

## 2022-04-09 RX ADMIN — POTASSIUM CHLORIDE 20 MEQ: 20 TABLET, EXTENDED RELEASE ORAL at 19:14

## 2022-04-09 RX ADMIN — GABAPENTIN 900 MG: 300 CAPSULE ORAL at 21:16

## 2022-04-09 RX ADMIN — CARVEDILOL 12.5 MG: 12.5 TABLET, FILM COATED ORAL at 09:21

## 2022-04-09 RX ADMIN — MAGNESIUM SULFATE HEPTAHYDRATE 2 G: 40 INJECTION, SOLUTION INTRAVENOUS at 11:30

## 2022-04-09 RX ADMIN — FLUOXETINE HYDROCHLORIDE 40 MG: 20 CAPSULE ORAL at 09:20

## 2022-04-09 RX ADMIN — PREDNISONE 15 MG: 5 TABLET ORAL at 09:28

## 2022-04-09 RX ADMIN — LOSARTAN POTASSIUM 12.5 MG: 25 TABLET, FILM COATED ORAL at 09:20

## 2022-04-09 RX ADMIN — ACETAMINOPHEN 650 MG: 325 TABLET ORAL at 14:15

## 2022-04-09 RX ADMIN — PIPERACILLIN AND TAZOBACTAM 3.38 G: 3; .375 INJECTION, POWDER, LYOPHILIZED, FOR SOLUTION INTRAVENOUS at 21:17

## 2022-04-09 RX ADMIN — HYDROMORPHONE HYDROCHLORIDE 1 MG: 2 TABLET ORAL at 21:16

## 2022-04-09 RX ADMIN — PRAVASTATIN SODIUM 40 MG: 20 TABLET ORAL at 21:16

## 2022-04-09 RX ADMIN — ACETAMINOPHEN 650 MG: 325 TABLET ORAL at 10:11

## 2022-04-09 RX ADMIN — PANTOPRAZOLE SODIUM 40 MG: 20 TABLET, DELAYED RELEASE ORAL at 09:20

## 2022-04-09 RX ADMIN — INSULIN ASPART 1 UNITS: 100 INJECTION, SOLUTION INTRAVENOUS; SUBCUTANEOUS at 18:35

## 2022-04-09 RX ADMIN — MONTELUKAST 10 MG: 10 TABLET, FILM COATED ORAL at 21:17

## 2022-04-09 ASSESSMENT — ACTIVITIES OF DAILY LIVING (ADL)
ADLS_ACUITY_SCORE: 12
ADLS_ACUITY_SCORE: 11
ADLS_ACUITY_SCORE: 15
WALKING_OR_CLIMBING_STAIRS_DIFFICULTY: YES
ADLS_ACUITY_SCORE: 15
ADLS_ACUITY_SCORE: 15
WEAR_GLASSES_OR_BLIND: YES
VISION_MANAGEMENT: GLASSES
ADLS_ACUITY_SCORE: 15
ADLS_ACUITY_SCORE: 13
FALL_HISTORY_WITHIN_LAST_SIX_MONTHS: YES
CHANGE_IN_FUNCTIONAL_STATUS_SINCE_ONSET_OF_CURRENT_ILLNESS/INJURY: NO
DRESSING/BATHING_DIFFICULTY: NO
TRANSFERRING: 0-->INDEPENDENT
ADLS_ACUITY_SCORE: 11
ADLS_ACUITY_SCORE: 15
ADLS_ACUITY_SCORE: 13
ADLS_ACUITY_SCORE: 15
ADLS_ACUITY_SCORE: 13
ADLS_ACUITY_SCORE: 15
EQUIPMENT_CURRENTLY_USED_AT_HOME: OTHER (SEE COMMENTS)
WALKING_OR_CLIMBING_STAIRS: TRANSFERRING DIFFICULTY, ASSISTANCE 1 PERSON
DIFFICULTY_EATING/SWALLOWING: NO
ADLS_ACUITY_SCORE: 13
DOING_ERRANDS_INDEPENDENTLY_DIFFICULTY: NO
ADLS_ACUITY_SCORE: 13
NUMBER_OF_TIMES_PATIENT_HAS_FALLEN_WITHIN_LAST_SIX_MONTHS: 2
ADLS_ACUITY_SCORE: 15
TOILETING_ISSUES: NO
ADLS_ACUITY_SCORE: 15
TRANSFERRING: 0-->INDEPENDENT
ADLS_ACUITY_SCORE: 13
ADLS_ACUITY_SCORE: 11
ADLS_ACUITY_SCORE: 15
CONCENTRATING,_REMEMBERING_OR_MAKING_DECISIONS_DIFFICULTY: NO
ADLS_ACUITY_SCORE: 13

## 2022-04-09 NOTE — PROGRESS NOTES
[facility] MEDICINE PROGRESS NOTE      Identification/Summary:   Zakiya Christian is a 77 year old old female with history of diabetes mellitus type 2 on Metformin, chronic kidney disease, peripheral neuropathy, chronic depression, COPD with asthma, anxiety and recent diagnosis about 2 months ago of polymyalgia rheumatica presented with 3-month history of nonspecific abdominal and back pain and just generally feeling poorly.  Has been seen in her clinic and eventually was diagnosed with polymyalgia rheumatica and referred to rheumatologist.  Has been on azathioprine and prednisone.  Also apparently has been treated a couple times for UTI.  Had significant increased weakness 4/5/2022 and presented to the emergency room.  Was started on cephalexin for UTI 2 days ago.  Found to have a large intra-abdominal abscess 13 x 11 x 6 cm as likely diverticular.  Now started on IV metronidazole and ceftriaxone and  general surgery and interventional radiology have seen the patient today.Hospital course is notable for IR abscess drainage 4/6 without complications.  Continues to have good drainage.  Preliminary cultures growing Enterococcus and Klebsiella with sensitivities pending.  Vancomycin added.  Patient definitely feeling better.  Discussed with rheumatology and will start to wean her prednisone and discontinue the azathioprine.    Poor ALISSA output with concerns for patency and placement. Sensitivities returned with ID consult placed. Afebrile without leukocytosis. VSS on RA.        Assessment and Plan:  Large intra-abdominal abscess which is likely diverticular.  13 x 11 x 6 cm.  Suspect this is been present for about 3 months by her history.  Has been on IV antibiotics including metronidazole and ceftriaxone and has seen general surgery and interventional radiology. Status post IR drain 4/6 with cultures.  Preliminary cultures growing Klebsiella, Pseudomonas and Fusobacterium necrophorum and  Enterococcus. Sensitivities returned with ID consult placed. Advanced to low fiber diet and tolerating well. Increased breakthrough incisional pain. Elevated ESR/CRP.  Plan: Infectious disease recommended changing antibiotics to daptomycin and IV piperacillin-tazobactam yesterday and just doing antibiotic review with the pharmacist. Sensitivities returned with official ID consult placed.   -PRN 1 mg Dilauded for breakthrough pain  -Question by nursing staff of whether drain still functioning.  IR contacted and will reevaluate on Monday.     Klebsiella Pneumoniae UTI (susceptible to all except ampicillin resistant)  - afebrile with no leukocytosis, nausea, flank pain, or ongoing dysuria  Plan: On broad spectrum antibiotics for abscess coverage     Diabetes mellitus type 2 on Metformin  Stable blood sugars of   Plan: Hold Metformin and treat with sliding scale insulin. Continue to monitor      Chronic kidney disease.  Creatinine and GFR normal     Low magnesium at 1.3-->1.6-->2.0--1.5.  Plan: IV magnesium and check level in the morning      Hypokalemia.,  Resolved  Plan: oral potassium tid      Peripheral neuropathy on gabapentin     Depression and anxiety on fluoxetine     COPD and asthma. On RA at baseline. Afebrile with normal WBC. Denies increased cough. Required 1 LPM 02 on 4/8 but has been successfully weaned to RA. Wheezing at rest and with activity. Improved from yesterday.   Plan: Continue prior meds  -Promote ambulation   -PT/OT  -Incentive spirometry   -Titrate O2 to maintain SPO2 > 88%     Peripheral neuropathy on gabapentin     Essential hypertension on losartan.  Plan: Continue     GERD on PPI     Hold her aspirin      Rivaroxaban restarted- on anticoagulation d/t DVT history      Hyperlipidemia on statin     Hard of hearing     Polymyalgia rheumatica diagnosis but suspected symptoms and elevated sedimentation rate could have been due to this chronic intra-abdominal abscess.  I discussed patient  with her rheumatologist Dr. Gonzalez today.  He states she was initially seen by him after she had already been placed on high-dose steroids.  He agrees that this most likely is not polymyalgia rheumatica but was the abscess from the beginning and myalgias and elevated sedimentation rate which is due to the abscess and then those led to the assumption that she had polymyalgia rheumatica because she did not have other classic symptoms of infection like fever.  Plan: Discontinue permanently the azathioprine and wean prednisone from 20 mg down to 15 mg and then decrease by 5 mg a week and then she should see Dr. Gonzalez in about 8 to 10 weeks.    Plan: Continue prior meds.  -Monitor for worsening respiratory status and pain with prednisone taper     Diet: Advance Diet as Tolerated: Low Fiber; Low Fiber  DVT Prophylaxis:  DOAC  Code Status: Full Code    Disposition Plan      PT / OT recommending TCU at discharge. Patient living at home independently prior to admission. Hesitant about TCU and desires return to home if possible. Discussed reasons for TCU recommendation and benefits of this level of care. Ongoing assessment of functional status improvement during stay.     The patient's care was discussed with the Attending Physician, Dr. Bernstein  and patient.     Julissa Weber  [facility]  Phone: #[phone]    Patient seen and examined  Patient discussed with physician assistant student  Agree with assessment and plan as outlined above     Heber Bernstein MD  [facility] Medicine Service    Interval History/Subjective:  Pt reports that she continues to feel improvement in back and shoulder pain with intermittent breakthrough incisional pain. Tolerating low fiber diet with some intermittent loose stools from antibiotics. Observed patient ambulate to bathroom with standby assist with improvement noted in wheezing with exertion. No evidence of acute  bleeding or increased drain output with restarting anticoagulation. Denies dizziness, headaches, chest pain, increased work of breathing, abdominal pain beyond incision, dysuria, or weakness.    Physical Exam/Objective:  Temp:  [97.3  F (36.3  C)-98  F (36.7  C)] 97.3  F (36.3  C)  Pulse:  [76-87] 87  Resp:  [16-19] 19  BP: (137-169)/(63-84) 169/84  SpO2:  [94 %-96 %] 96 %  Body mass index is 37.97 kg/m .    GENERAL:  Alert, in no acute distress    HEAD:  Normocephalic, without obvious abnormality, atraumatic   LUNGS:   Respirations unlabored on RA. Audible wheezing with exertion. Improved from yesterday. Scattered expiratory wheezes throughout lung fields.    HEART:  Regular rate and rhythm, S1 and S2 normal, no murmur, rub, or gallop    ABDOMEN:   Soft, mild left lower quadrant tenderness around the ALISSA drain site, bowel sounds active all four quadrants, no rebound or guarding. ALISSA drain in place without drainage observed in bulb.   EXTREMITIES: Trace edema along sock line.   SKIN: Dry to touch, no exanthems in the visualized areas   NEURO: Alert, cognitively intact, moves all four extremities freely   PSYCH: Cooperative, behavior is appropriate      Data reviewed today: I personally reviewed all new medications, labs, imaging/diagnostics reports over the past 24 hours. Pertinent findings include:    Imaging:   No results found for this or any previous visit (from the past 24 hour(s)).    Labs:  Most Recent 3 CBC's:Recent Labs   Lab Test 04/08/22  0821 04/07/22  0857 04/06/22  0652   WBC 6.2 4.6 5.5   HGB 10.6* 10.4* 10.6*   MCV 98 99 99    408 367     Most Recent 3 BMP's:Recent Labs   Lab Test 04/09/22  0857 04/09/22  0226 04/08/22  2046 04/08/22  0823 04/08/22  0821 04/07/22  1240 04/07/22  0857 04/06/22  0810 04/06/22  0652   NA  --   --   --   --  141  --  140  --  143   POTASSIUM  --   --   --   --  3.9  --  3.6  --  3.1*   CHLORIDE  --   --   --   --  105  --  100  --  101   CO2  --   --   --   --  27   --  22  --  30   BUN  --   --   --   --  13  --  16  --  13   CR  --   --   --   --  0.78  --  0.80  --  0.72   ANIONGAP  --   --   --   --  9  --  18  --  12   VICENTA  --   --   --   --  8.2*  --  8.2*  --  8.0*   GLC 82 135* 206*   < > 104   < > 91   < > 74    < > = values in this interval not displayed.     Most Recent 6 Bacteria Isolates From Any Culture (See EPIC Reports for Culture Details):No lab results found.  Most Recent 6 glucoses:Recent Labs   Lab Test 04/09/22  0857 04/09/22  0226 04/08/22  2046 04/08/22  1729 04/08/22  1313 04/08/22  0823   GLC 82 135* 206* 166* 165* 99     Most Recent Urinalysis:Recent Labs   Lab Test 04/05/22  1450   COLOR Yellow   APPEARANCE Turbid*   URINEGLC Negative   URINEBILI Negative   URINEKETONE 20 *   SG 1.023   UBLD 0.03 mg/dL*   URINEPH 6.5   PROTEIN 50 *   NITRITE Negative   LEUKEST 500 Sara/uL*   RBCU 6*   WBCU >182*     Most Recent ESR & CRP:Recent Labs   Lab Test 04/08/22  0821   SED 85*   CRP 7.9*       Medications:   Personally Reviewed.  Medications       carvedilol  12.5 mg Oral BID w/meals     DAPTOmycin (CUBICIN) intermittent infusion  8 mg/kg Intravenous Q24H     FLUoxetine  40 mg Oral QAM     Fluticasone-Umeclidin-Vilanterol  1 puff Inhalation QAM     gabapentin  900 mg Oral At Bedtime     insulin aspart  1-7 Units Subcutaneous TID AC     losartan  12.5 mg Oral QAM     montelukast  10 mg Oral At Bedtime     pantoprazole  40 mg Oral BID AC     piperacillin-tazobactam  3.375 g Intravenous Q8H     potassium chloride  20 mEq Oral TID     pravastatin  40 mg Oral At Bedtime     predniSONE  15 mg Oral Daily     rivaroxaban ANTICOAGULANT  20 mg Oral Daily with breakfast     sodium chloride (PF)  10 mL Irrigation Q8H     sodium chloride (PF)  3 mL Intracatheter Q8H

## 2022-04-09 NOTE — PLAN OF CARE
Problem: UTI (Urinary Tract Infection)  Goal: Improved Infection Symptoms  Outcome: Ongoing, Progressing     Problem: Pain Acute  Goal: Acceptable Pain Control and Functional Ability  Outcome: Ongoing, Progressing  Intervention: Prevent or Manage Pain  Recent Flowsheet Documentation  Taken 4/8/2022 2341 by Elysia Cervantes, RN  Medication Review/Management: medications reviewed     Patient alert and oriented x4. Oral dilaudid for abdominal pain. Drain site CDI. Minimal serosangunious output, flushed per protocol. 2cc out despite instilling 10cc as ordered. Less than 10cc out on previous shift as well despite 10cc being instilled on that shift.  IV zosyn continued. Cultures pending

## 2022-04-09 NOTE — PLAN OF CARE
"  Problem: Plan of Care - These are the overarching goals to be used throughout the patient stay.    Goal: Absence of Hospital-Acquired Illness or Injury  Intervention: Identify and Manage Fall Risk  Recent Flowsheet Documentation  Taken 4/8/2022 1650 by Sadaf Huang, RN  Safety Promotion/Fall Prevention:   supervised activity   room door open   nonskid shoes/slippers when out of bed   bed alarm on  Goal: Optimal Comfort and Wellbeing  Outcome: Ongoing, Progressing     Problem: UTI (Urinary Tract Infection)  Goal: Improved Infection Symptoms  Outcome: Ongoing, Progressing     Problem: Pain Acute  Goal: Acceptable Pain Control and Functional Ability  Outcome: Ongoing, Progressing  Intervention: Prevent or Manage Pain  Recent Flowsheet Documentation  Taken 4/8/2022 1650 by Sadaf Huang, RN  Medication Review/Management: medications reviewed   Goal Outcome Evaluation:  Pt c/o pain 3/10, requested \"stronger pain medication\" to help her get some sleep tonight, had Dilaudid @ 2008, she has been resting comfortably since.  Uses call light appropriately, has some difficulty repositioning in bed, but can do the majority independently.  Encourage pt to cough and use IS.    "

## 2022-04-09 NOTE — CONSULTS
Consultation - INFECTIOUS DISEASE CONSULTATION  Zakiya Christian,  1945, MRN [id number]      Hypomagnesemia [E83.42]  Diverticulitis of colon [K57.32]  Body aches [R52]  Acute UTI [N39.0]  Colonic diverticular abscess [K57.20]    PCP: Ami Noriega, [phone]   Code status:  Full Code               Chief Complaint:  Acute diverticulitis with diverticular abscess     Assessment:  Zakiya Christian is a 77 year old old female with   1.  Diabetes mellitus  2.  PMR on steroid  3.  Diverticular abscess status post drain placement. Cultures with ampicillin susceptible Enterococcus facial, quinolone susceptible Pseudomonas, Klebsiella, Bacteroides.  On IV Zosyn and daptomycin.  Given susceptibility of the Enterococcus faecium, IV daptomycin will discontinue.  4.  Positive urine culture with Klebsiella.  IV Zosyn.      Recommendations:   Continue IV Zosyn.  Discontinue IV daptomycin.  Drain care per surgery.  Abdominal imaging being planned for Monday.  I concur.    Discussed with the patient, nursing staff.    Thank you for letting us be part of the patient care team. We will follow.    Latrice Barajas MD,MD  [facility].   [facility]  Office Telephone [phone].  Fax [phone]  [facility] paging    HPI:    Zakiya Christian is a 77 year old old female. History is provided by the patient, chart.    ID is asked to see this patient for diverticular abscess.  The patient has a history of COPD and asthma, PMR on steroids, diabetes mellitus.  She was admitted on 2022 with abdominal pain, fever, chills, chronic back pain and found to have a large diverticular abscess status post drain placement.  Cultures reviewed as above.  Also had positive urine cultures with Klebsiella.  Currently on IV daptomycin and Zosyn.  Feels better.  Abdominal pain much improved.  Drain in place.    ===========================================    Medical History  Past  Medical History:   Diagnosis Date     Diabetes (H)      Hypertension      Obese      PMR (polymyalgia rheumatica) (H)         Surgical History  No significant past surgical history         Social History  Reviewed, and she          Family History  No family history recurrent infection.   Psychosocial Needs  Social History     Social History Narrative     Not on file     Additional psychosocial needs reviewed per nursing assessment.       Allergies   Allergen Reactions     Simvastatin Hives        Medications Prior to Admission   Medication Sig Dispense Refill Last Dose     albuterol (PROAIR HFA/PROVENTIL HFA/VENTOLIN HFA) 108 (90 Base) MCG/ACT inhaler Inhale 2 puffs into the lungs 4 times daily as needed for shortness of breath / dyspnea or wheezing   not with     albuterol (PROVENTIL) (5 MG/ML) 0.5% neb solution Take 5 mg by nebulization every 6 hours as needed for wheezing or shortness of breath / dyspnea        alendronate (FOSAMAX) 70 MG tablet Take 70 mg by mouth every 7 days   4/4/2022 at Unknown time     Ascorbic Acid (VITAMIN C) 500 MG CAPS Take 1,000 mg by mouth daily   4/4/2022 at Unknown time     aspirin 81 MG EC tablet Take 81 mg by mouth daily   4/4/2022 at Unknown time     azaTHIOprine (IMURAN) 50 MG tablet Take 100 mg by mouth every morning    4/4/2022 at Unknown time     calcium carbonate 600 mg-vitamin D 400 units (CALTRATE) 600-400 MG-UNIT per tablet Take 1 tablet by mouth 2 times daily   4/4/2022 at Unknown time     carvedilol (COREG) 12.5 MG tablet Take 12.5 mg by mouth 2 times daily (with meals)   4/4/2022 at Unknown time     cephALEXin (KEFLEX) 500 MG capsule Take 500 mg by mouth every 12 hours   4/5/2022 at AM     cholecalciferol 50 MCG (2000 UT) CAPS Take 4,000 Units by mouth daily   4/4/2022 at Unknown time     coenzyme Q-10 capsule Take 1 capsule by mouth daily   4/4/2022 at Unknown time     FLUoxetine (PROZAC) 40 MG capsule Take 40 mg by mouth every morning   4/4/2022 at Unknown time      Fluticasone-Umeclidin-Vilanterol (TRELEGY ELLIPTA) 200-62.5-25 MCG/INH oral inhaler Inhale 1 puff into the lungs every morning   4/4/2022 at Pt brought     gabapentin (NEURONTIN) 300 MG capsule Take 900 mg by mouth At Bedtime   4/4/2022 at Unknown time     losartan (COZAAR) 25 MG tablet Take 12.5 mg by mouth every morning   4/4/2022 at Unknown time     metFORMIN (GLUCOPHAGE-XR) 500 MG 24 hr tablet Take 1,000 mg by mouth daily (with breakfast)   4/4/2022 at Unknown time     montelukast (SINGULAIR) 10 MG tablet Take 10 mg by mouth At Bedtime   4/4/2022 at Unknown time     omeprazole (PRILOSEC) 40 MG DR capsule Take 40 mg by mouth every morning (before breakfast)   4/4/2022 at Unknown time     pravastatin (PRAVACHOL) 40 MG tablet Take 40 mg by mouth At Bedtime   4/4/2022 at Unknown time     predniSONE (DELTASONE) 20 MG tablet Take 20 mg by mouth daily   4/5/2022 at Unknown time     rivaroxaban ANTICOAGULANT (XARELTO) 20 MG TABS tablet Take 20 mg by mouth daily (with breakfast)   4/4/2022 at Unknown time        Review of Systems:  Negative except for findings in the HPI Physical Exam:  Temp:  [97.3  F (36.3  C)-98  F (36.7  C)] 97.3  F (36.3  C)  Pulse:  [76-87] 87  Resp:  [16-19] 19  BP: (137-169)/(63-84) 169/84  SpO2:  [94 %-96 %] 96 %      Gen: Pleasant in no acute distress.  HEENT: NCAT. EOMI. PERRL.  Neck: No bruit, JVD or thyromegaly.  Lungs: Clear to ascultation bilat with no crackles or wheezes.  Card: RRR. NSR. No RMG. Peripheral pulses present and symmetric. No edema.  Abd: Soft NT ND. No mass. Normal bowel sounds.  Drain in place with scant output.  Skin: No rash.  Extr: No edema.  Neuro: Alert and oriented to place time and person. Cranial nerves II to XII intact. Motor and sensory intact. Normal gait.           ============================    Pertinent Labs  personally reviewed.   Recent Labs   Lab 04/09/22  1037 04/08/22  0821 04/07/22  0857   WBC 6.2 6.2 4.6   HGB 11.7 10.6* 10.4*   HCT 38.5 34.2* 33.6*     325 408       Recent Labs   Lab 04/09/22  1037 04/08/22  0821 04/07/22  0857    141 140   CO2 24 27 22   BUN 12 13 16   ALBUMIN  --   --  1.9*   ALKPHOS  --   --  68   ALT  --   --  10   AST  --   --  14       MICROBIOLOGY DATA:  Personally reviewed             Resulting Agency: [facility]       Susceptibility     Klebsiella pneumoniae     STACY     Ampicillin  Resistant 1     Ampicillin/ Sulbactam 4.0 ug/mL Susceptible     Cefazolin <=4.0 ug/mL Susceptible 2     Cefepime <=1.0 ug/mL Susceptible     Cefoxitin <=4.0 ug/mL Susceptible     Ceftazidime <=1.0 ug/mL Susceptible     Ceftriaxone <=1.0 ug/mL Susceptible     Ciprofloxacin <=0.25 ug/mL Susceptible     Gentamicin <=1.0 ug/mL Susceptible     Levofloxacin <=0.12 ug/mL Susceptible     Nitrofurantoin 32.0 ug/mL Susceptible     Piperacillin/Tazobactam <=4.0 ug/mL Susceptible     Tobramycin <=1.0 ug/mL Susceptible     Trimethoprim/Sulfamethoxazole <=1/19 ug/mL Susceptible              1 Intrinsically Resistant   2 Cefazolin STACY breakpoints are for the treatment of uncomplicated urinary tract infections. For the treatment of systemic infections, please contact the laboratory for additional testing.            Specimen Collected: 04/04/22 12:29 PM Last Resulted: 04/07/22 10:36 AM           Contains abnormal data Abscess Aerobic Bacterial Culture Routine  Order: [id number]   Collected 4/6/2022 12:36 PM     Status: Final result     Visible to patient: No (inaccessible in MyChart)    Specimen Information: Abdomen; Abscess         0 Result Notes    Culture 2+ Klebsiella pneumoniae Abnormal        1+ Pseudomonas aeruginosa Abnormal        1+ Enterococcus faecium Abnormal        Isolated in broth only Bacteroides ovatus/xylanisolvens Abnormal     On day 1 of incubation   Susceptibilities not routinely done   Isolated in broth only Bacteroides fragilis Abnormal     On day 1 of incubation   Susceptibilities not routinely done        Resulting Agency: [facility]  "      Susceptibility     Klebsiella pneumoniae Pseudomonas aeruginosa Enterococcus faecium     STACY STACY STACY     Amikacin   <=2.0 ug/mL Susceptible       Ampicillin  Resistant 1   <=2.0 ug/mL Susceptible     Ampicillin/ Sulbactam 4.0 ug/mL Susceptible         Cefepime <=1.0 ug/mL Susceptible <=1.0 ug/mL Susceptible       Ceftazidime <=1.0 ug/mL Susceptible <=1.0 ug/mL Susceptible       Ceftriaxone <=1.0 ug/mL Susceptible         Ciprofloxacin <=0.25 ug/mL Susceptible <=0.25 ug/mL Susceptible       Gentamicin <=1.0 ug/mL Susceptible <=1.0 ug/mL Susceptible       Gentamicin Synergy     Susceptible... Susceptible 2     Levofloxacin <=0.12 ug/mL Susceptible 0.25 ug/mL Susceptible       Meropenem <=0.25 ug/mL Susceptible <=0.25 ug/mL Susceptible       Penicillin     2.0 ug/mL Susceptible     Piperacillin/Tazobactam <=4.0 ug/mL Susceptible <=4.0 ug/mL Susceptible       Tobramycin <=1.0 ug/mL Susceptible <=1.0 ug/mL Susceptible       Trimethoprim/Sulfamethoxazole <=1/19 ug/mL Susceptible         Vancomycin     <=0.5 ug/mL Susceptible                  1 Intrinsically Resistant   2 No high level gentamicin resistance found - therefore combination therapy with an aminoglycoside may be indicated for serious enterococcal infections such as bacteremia and endocarditis.        Susceptibility Comments    Enterococcus faecium   Antibiotics listed as \"No Interpretation\" have no regulatory guidelines for susceptibility/resistance available.         Specimen Collected: 04/06/22 12:36 PM Last Resulted: 04/09/22 11:23 AM                 Contains abnormal data Anaerobic Bacterial Culture Routine  Order: [id number]   Collected 4/6/2022 12:36 PM     Status: Final result     Visible to patient: No (inaccessible in MyChart)    Specimen Information: Abdomen; Abscess         0 Result Notes    Culture 4+ Fusobacterium necrophorum Abnormal     Susceptibilities not routinely done   4+ Mixed Aerobic and Anaerobic candi            Resulting Agency: " [facility]           Specimen Collected: 04/06/22 12:36 PM Last Resulted: 04/08/22  2:43 PM                Pertinent Radiology  personally reviewed.      Study Result    Narrative & Impression   EXAM:  1. PERCUTANEOUS DRAIN PLACEMENT INTRA-ABDOMINAL ABSCESS.  2. CT GUIDANCE  3. CONSCIOUS SEDATION  LOCATION: [facility]  DATE/TIME: 4/6/2022 11:23 AM     INDICATION: Patient has large intra abdominal abscess, 13 x 11 x 6 cm,  likely diverticular that needs to be drained.  TECHNIQUE: Dose reduction techniques were used.     PROCEDURE: Informed consent obtained. Site marked. Prior images reviewed. Required items made available. Patient identity confirmed verbally and with arm band. Patient reevaluated immediately before administering sedation. Universal protocol was   followed. Time out performed. The site was prepped and draped in sterile fashion. 10 mL of 1% lidocaine was infused into the local soft tissues. Using standard technique and under direct CT guidance, a 10 [place] drainage catheter was inserted into the   fluid collection.       SPECIMEN: 5 mL of purulent fluid was aspirated and sent to lab for cultures and Gram stain.     BLOOD LOSS: 2 mL.     The patient tolerated the procedure well. No immediate complications.     SEDATION: Versed 1 mg. Fentanyl 50 mcg. The procedure was performed with administration intravenous conscious sedation with appropriate preoperative, intraoperative, and postoperative evaluation.     30 minutes of supervised face to face conscious sedation time was provided by a radiology nurse under my direct supervision.                                                                      IMPRESSION:  1.  Successful CT-guided drain placement into abdominal abscess.     Reference CPT Codes: 58533, 94809, 09165     Study Result    Narrative & Impression   EXAM: CT CHEST/ABDOMEN/PELVIS W CONTRAST  LOCATION: [facility]  DATE/TIME: 4/5/2022 4:06  PM     INDICATION: 77-year-old woman with DM, obesity and polymyalgia rheumatica presents with generalized body aches, diffuse abdominal pain, urinary tract infection and hypomagnesemia.  COMPARISON: Noncontrast CT AP 03/22/2018.  TECHNIQUE: CT scan of the chest, abdomen, and pelvis was performed following injection of IV contrast. Multiplanar reformats were obtained. Dose reduction techniques were used.   CONTRAST: Isovue 370 100mL     FINDINGS:   LUNGS AND PLEURA: Several 5 mm or less nodular opacities in each lung appear to represent foci of endobronchial secretion, and those included in field-of-view at the 03/22/2018 abdominal CT are unchanged. Strands of fibrosis and/or linear atelectasis   scattered throughout the mid and lower lobes. No pleural effusion.     MEDIASTINUM/AXILLAE: Trace pericardial fluid unchanged. Heart size normal. No lymphadenopathy.     CORONARY ARTERY CALCIFICATION: Three-vessel coronary artery calcification.      HEPATOBILIARY: Moderate to marked diffuse hepatic steatosis, borderline hepatic enlargement and slight contour undulation. Liver otherwise normal. No bile duct dilatation. Gallbladder is surgically absent.     PANCREAS: Normal.     SPLEEN: Spleen size normal.     ADRENAL GLANDS: Normal.     KIDNEY/BLADDER: Nonobstructing 4 mm stone right kidney and a nonobstructing 3 mm stone left kidney. A 4.5 x 3.4 x 3.3 cm benign angiomyolipoma left kidney has increased in size slightly over the past 4 years which is common. The angiomyolipoma and   several benign cysts in both kidneys require no follow-up.     BOWEL: Severe colonic diverticulosis. Acute versus subacute diverticulitis proximal sigmoid colon complicated by a 3 cm diverticular abscess (axial image 240) that leads superiorly to a short sinus tract and a large irregularly-shaped 13 x 11 x 6 cm   abscess in the left lower abdomen and upper pelvis that has broad contact with multiple loops of adjacent small bowel.     LYMPH NODES:  No lymphadenopathy.     VASCULATURE: Normal caliber abdominal aorta.       PELVIC ORGANS: No pelvic mass or fluid.     MUSCULOSKELETAL: Unremarkable.                                                                      IMPRESSION:  1.  Acute versus subacute diverticulitis proximal sigmoid colon complicated by a 3 cm diverticular abscess with a short sinus tract that leads superiorly to a large irregularly-shaped 13 x 11 x 6 cm abscess in the left lower abdomen and upper pelvis that   has broad contact with multiple loops of adjacent small bowel.  2.  Moderate to marked diffuse hepatic steatosis, borderline hepatic enlargement and slight contour undulation could indicate some degree of steatohepatitis and/or fibrosis.   3.  Nephrolithiasis.  4.  Several 5 mm or less nodular opacities in each lung appear to represent foci of endobronchial secretion, and those included in field-of-view at the 03/22/2018 abdominal CT are unchanged. As a conservative measure, recommend 12 month follow-up CT   chest to ensure stability of the mid and upper lung nodules.

## 2022-04-09 NOTE — PLAN OF CARE
Problem: Pain Acute  Goal: Acceptable Pain Control and Functional Ability  Outcome: Ongoing, Progressing  Intervention: Prevent or Manage Pain  Recent Flowsheet Documentation  Taken 4/9/2022 0920 by Brisa Calhoun RN  Medication Review/Management: medications reviewed     Problem: Plan of Care - These are the overarching goals to be used throughout the patient stay.    Goal: Absence of Hospital-Acquired Illness or Injury  Intervention: Identify and Manage Fall Risk  Recent Flowsheet Documentation  Taken 4/9/2022 0920 by Brisa Calhoun RN  Safety Promotion/Fall Prevention:   activity supervised   nonskid shoes/slippers when out of bed   Goal Outcome Evaluation:    Patient is alert and oriented. C/o pain on  the right shoulder, managed by prn tylenol x 2 and ice with effectiveness, ALISSA drain not draining, discontinued irrigation q 8hrs per MD order, pt was seen by surgery , infectious disease, and Intervention Radiology on Monday. Cont to monitor.

## 2022-04-09 NOTE — PROGRESS NOTES
No complaints.  Denies pain today.  Afebrile, vital signs stable.    Physical exam:  Morbidly obese.  Abdomen is soft, nontender.  ALISSA drain is essentially empty.  Nothing out.    Laboratories:  CBC RESULTS: Recent Labs   Lab Test 04/08/22  0821   WBC 6.2   RBC 3.48*   HGB 10.6*   HCT 34.2*   MCV 98   MCH 30.5   MCHC 31.0*   RDW 14.5        Impression: Status post percutaneous drain of diverticular abscess.  She is looking good at this point her white count is normal.  Scheduled for repeat CT scan on Monday.  Nothing to add at this time.

## 2022-04-10 ENCOUNTER — APPOINTMENT (OUTPATIENT)
Dept: CT IMAGING | Facility: CLINIC | Age: 77
DRG: 391 | End: 2022-04-10
Attending: NURSE PRACTITIONER
Payer: COMMERCIAL

## 2022-04-10 ENCOUNTER — APPOINTMENT (OUTPATIENT)
Dept: OCCUPATIONAL THERAPY | Facility: CLINIC | Age: 77
DRG: 391 | End: 2022-04-10
Payer: COMMERCIAL

## 2022-04-10 LAB
ANION GAP SERPL CALCULATED.3IONS-SCNC: 12 MMOL/L (ref 5–18)
BASOPHILS # BLD AUTO: 0 10E3/UL (ref 0–0.2)
BASOPHILS NFR BLD AUTO: 1 %
BUN SERPL-MCNC: 14 MG/DL (ref 8–28)
CALCIUM SERPL-MCNC: 8.9 MG/DL (ref 8.5–10.5)
CHLORIDE BLD-SCNC: 104 MMOL/L (ref 98–107)
CO2 SERPL-SCNC: 25 MMOL/L (ref 22–31)
CREAT SERPL-MCNC: 0.79 MG/DL (ref 0.6–1.1)
EOSINOPHIL # BLD AUTO: 0.1 10E3/UL (ref 0–0.7)
EOSINOPHIL NFR BLD AUTO: 2 %
ERYTHROCYTE [DISTWIDTH] IN BLOOD BY AUTOMATED COUNT: 14.6 % (ref 10–15)
GFR SERPL CREATININE-BSD FRML MDRD: 76 ML/MIN/1.73M2
GLUCOSE BLD-MCNC: 119 MG/DL (ref 70–125)
GLUCOSE BLDC GLUCOMTR-MCNC: 139 MG/DL (ref 70–99)
GLUCOSE BLDC GLUCOMTR-MCNC: 146 MG/DL (ref 70–99)
GLUCOSE BLDC GLUCOMTR-MCNC: 152 MG/DL (ref 70–99)
GLUCOSE BLDC GLUCOMTR-MCNC: 191 MG/DL (ref 70–99)
GLUCOSE BLDC GLUCOMTR-MCNC: 95 MG/DL (ref 70–99)
HCT VFR BLD AUTO: 36.6 % (ref 35–47)
HGB BLD-MCNC: 11.5 G/DL (ref 11.7–15.7)
IMM GRANULOCYTES # BLD: 0.1 10E3/UL
IMM GRANULOCYTES NFR BLD: 1 %
LYMPHOCYTES # BLD AUTO: 1.3 10E3/UL (ref 0.8–5.3)
LYMPHOCYTES NFR BLD AUTO: 22 %
MAGNESIUM SERPL-MCNC: 2 MG/DL (ref 1.8–2.6)
MCH RBC QN AUTO: 31 PG (ref 26.5–33)
MCHC RBC AUTO-ENTMCNC: 31.4 G/DL (ref 31.5–36.5)
MCV RBC AUTO: 99 FL (ref 78–100)
MONOCYTES # BLD AUTO: 0.2 10E3/UL (ref 0–1.3)
MONOCYTES NFR BLD AUTO: 3 %
NEUTROPHILS # BLD AUTO: 4.4 10E3/UL (ref 1.6–8.3)
NEUTROPHILS NFR BLD AUTO: 71 %
NRBC # BLD AUTO: 0 10E3/UL
NRBC BLD AUTO-RTO: 0 /100
PLATELET # BLD AUTO: 331 10E3/UL (ref 150–450)
POTASSIUM BLD-SCNC: 4.9 MMOL/L (ref 3.5–5)
RBC # BLD AUTO: 3.71 10E6/UL (ref 3.8–5.2)
SODIUM SERPL-SCNC: 141 MMOL/L (ref 136–145)
WBC # BLD AUTO: 6.1 10E3/UL (ref 4–11)

## 2022-04-10 PROCEDURE — 120N000001 HC R&B MED SURG/OB

## 2022-04-10 PROCEDURE — 97535 SELF CARE MNGMENT TRAINING: CPT | Mod: GO

## 2022-04-10 PROCEDURE — 99232 SBSQ HOSP IP/OBS MODERATE 35: CPT | Performed by: STUDENT IN AN ORGANIZED HEALTH CARE EDUCATION/TRAINING PROGRAM

## 2022-04-10 PROCEDURE — 74177 CT ABD & PELVIS W/CONTRAST: CPT

## 2022-04-10 PROCEDURE — 250N000011 HC RX IP 250 OP 636: Performed by: FAMILY MEDICINE

## 2022-04-10 PROCEDURE — 36415 COLL VENOUS BLD VENIPUNCTURE: CPT | Performed by: FAMILY MEDICINE

## 2022-04-10 PROCEDURE — 250N000012 HC RX MED GY IP 250 OP 636 PS 637: Performed by: FAMILY MEDICINE

## 2022-04-10 PROCEDURE — 250N000013 HC RX MED GY IP 250 OP 250 PS 637: Performed by: FAMILY MEDICINE

## 2022-04-10 PROCEDURE — 99232 SBSQ HOSP IP/OBS MODERATE 35: CPT | Performed by: FAMILY MEDICINE

## 2022-04-10 PROCEDURE — 99231 SBSQ HOSP IP/OBS SF/LOW 25: CPT | Performed by: PHYSICIAN ASSISTANT

## 2022-04-10 PROCEDURE — 80048 BASIC METABOLIC PNL TOTAL CA: CPT | Performed by: FAMILY MEDICINE

## 2022-04-10 PROCEDURE — 83735 ASSAY OF MAGNESIUM: CPT | Performed by: FAMILY MEDICINE

## 2022-04-10 PROCEDURE — 85025 COMPLETE CBC W/AUTO DIFF WBC: CPT | Performed by: FAMILY MEDICINE

## 2022-04-10 RX ORDER — IOPAMIDOL 755 MG/ML
100 INJECTION, SOLUTION INTRAVASCULAR ONCE
Status: COMPLETED | OUTPATIENT
Start: 2022-04-10 | End: 2022-04-10

## 2022-04-10 RX ADMIN — ALBUTEROL SULFATE 2 PUFF: 90 AEROSOL, METERED RESPIRATORY (INHALATION) at 21:17

## 2022-04-10 RX ADMIN — FLUOXETINE HYDROCHLORIDE 40 MG: 20 CAPSULE ORAL at 08:52

## 2022-04-10 RX ADMIN — ACETAMINOPHEN 650 MG: 325 TABLET ORAL at 14:07

## 2022-04-10 RX ADMIN — INSULIN ASPART 2 UNITS: 100 INJECTION, SOLUTION INTRAVENOUS; SUBCUTANEOUS at 16:39

## 2022-04-10 RX ADMIN — PRAVASTATIN SODIUM 40 MG: 20 TABLET ORAL at 21:17

## 2022-04-10 RX ADMIN — IOPAMIDOL 100 ML: 755 INJECTION, SOLUTION INTRAVENOUS at 14:40

## 2022-04-10 RX ADMIN — INSULIN ASPART 1 UNITS: 100 INJECTION, SOLUTION INTRAVENOUS; SUBCUTANEOUS at 12:52

## 2022-04-10 RX ADMIN — PIPERACILLIN AND TAZOBACTAM 3.38 G: 3; .375 INJECTION, POWDER, LYOPHILIZED, FOR SOLUTION INTRAVENOUS at 13:34

## 2022-04-10 RX ADMIN — POTASSIUM CHLORIDE 20 MEQ: 20 TABLET, EXTENDED RELEASE ORAL at 08:51

## 2022-04-10 RX ADMIN — PIPERACILLIN AND TAZOBACTAM 3.38 G: 3; .375 INJECTION, POWDER, LYOPHILIZED, FOR SOLUTION INTRAVENOUS at 05:14

## 2022-04-10 RX ADMIN — PANTOPRAZOLE SODIUM 40 MG: 20 TABLET, DELAYED RELEASE ORAL at 17:53

## 2022-04-10 RX ADMIN — MONTELUKAST 10 MG: 10 TABLET, FILM COATED ORAL at 21:17

## 2022-04-10 RX ADMIN — GABAPENTIN 900 MG: 300 CAPSULE ORAL at 21:17

## 2022-04-10 RX ADMIN — CARVEDILOL 12.5 MG: 12.5 TABLET, FILM COATED ORAL at 08:51

## 2022-04-10 RX ADMIN — LOSARTAN POTASSIUM 12.5 MG: 25 TABLET, FILM COATED ORAL at 08:51

## 2022-04-10 RX ADMIN — PANTOPRAZOLE SODIUM 40 MG: 20 TABLET, DELAYED RELEASE ORAL at 08:52

## 2022-04-10 RX ADMIN — CARVEDILOL 12.5 MG: 12.5 TABLET, FILM COATED ORAL at 17:53

## 2022-04-10 RX ADMIN — PREDNISONE 15 MG: 5 TABLET ORAL at 08:53

## 2022-04-10 RX ADMIN — HYDROMORPHONE HYDROCHLORIDE 1 MG: 2 TABLET ORAL at 20:30

## 2022-04-10 RX ADMIN — POTASSIUM CHLORIDE 20 MEQ: 20 TABLET, EXTENDED RELEASE ORAL at 13:10

## 2022-04-10 RX ADMIN — RIVAROXABAN 20 MG: 10 TABLET, FILM COATED ORAL at 08:51

## 2022-04-10 RX ADMIN — PIPERACILLIN AND TAZOBACTAM 3.38 G: 3; .375 INJECTION, POWDER, LYOPHILIZED, FOR SOLUTION INTRAVENOUS at 21:17

## 2022-04-10 ASSESSMENT — ACTIVITIES OF DAILY LIVING (ADL)
ADLS_ACUITY_SCORE: 10
ADLS_ACUITY_SCORE: 11
ADLS_ACUITY_SCORE: 10

## 2022-04-10 NOTE — PROGRESS NOTES
"  Interventional Radiology  Inpatient - [facility]: Interventional Radiology   [phone]  4/10/2022    CHART CHECK    S: Per chart review, no ALISSA drain output for past couple days. Cx positive, on antibiotics. Vitals stable.     O:  Cultures:        Antibiotics: Zosyn  Flushing: Discontinued    BP (!) 169/74 (BP Location: Right arm)   Pulse 85   Temp 98.1  F (36.7  C) (Oral)   Resp 18   Ht 1.575 m (5' 2\")   Wt 94.5 kg (208 lb 4.8 oz)   SpO2 94%   BMI 38.10 kg/m      IMAGING:  Results for orders placed or performed during the hospital encounter of 04/05/22   CT Chest/Abdomen/Pelvis w Contrast    Narrative    EXAM: CT CHEST/ABDOMEN/PELVIS W CONTRAST  LOCATION: [facility]  DATE/TIME: 4/5/2022 4:06 PM    INDICATION: 77-year-old woman with DM, obesity and polymyalgia rheumatica presents with generalized body aches, diffuse abdominal pain, urinary tract infection and hypomagnesemia.  COMPARISON: Noncontrast CT AP 03/22/2018.  TECHNIQUE: CT scan of the chest, abdomen, and pelvis was performed following injection of IV contrast. Multiplanar reformats were obtained. Dose reduction techniques were used.   CONTRAST: Isovue 370 100mL    FINDINGS:   LUNGS AND PLEURA: Several 5 mm or less nodular opacities in each lung appear to represent foci of endobronchial secretion, and those included in field-of-view at the 03/22/2018 abdominal CT are unchanged. Strands of fibrosis and/or linear atelectasis   scattered throughout the mid and lower lobes. No pleural effusion.    MEDIASTINUM/AXILLAE: Trace pericardial fluid unchanged. Heart size normal. No lymphadenopathy.    CORONARY ARTERY CALCIFICATION: Three-vessel coronary artery calcification.     HEPATOBILIARY: Moderate to marked diffuse hepatic steatosis, borderline hepatic enlargement and slight contour undulation. Liver otherwise normal. No bile duct dilatation. Gallbladder is surgically absent.    PANCREAS: Normal.    SPLEEN: " Spleen size normal.    ADRENAL GLANDS: Normal.    KIDNEY/BLADDER: Nonobstructing 4 mm stone right kidney and a nonobstructing 3 mm stone left kidney. A 4.5 x 3.4 x 3.3 cm benign angiomyolipoma left kidney has increased in size slightly over the past 4 years which is common. The angiomyolipoma and   several benign cysts in both kidneys require no follow-up.    BOWEL: Severe colonic diverticulosis. Acute versus subacute diverticulitis proximal sigmoid colon complicated by a 3 cm diverticular abscess (axial image 240) that leads superiorly to a short sinus tract and a large irregularly-shaped 13 x 11 x 6 cm   abscess in the left lower abdomen and upper pelvis that has broad contact with multiple loops of adjacent small bowel.    LYMPH NODES: No lymphadenopathy.    VASCULATURE: Normal caliber abdominal aorta.      PELVIC ORGANS: No pelvic mass or fluid.    MUSCULOSKELETAL: Unremarkable.      Impression    IMPRESSION:  1.  Acute versus subacute diverticulitis proximal sigmoid colon complicated by a 3 cm diverticular abscess with a short sinus tract that leads superiorly to a large irregularly-shaped 13 x 11 x 6 cm abscess in the left lower abdomen and upper pelvis that   has broad contact with multiple loops of adjacent small bowel.  2.  Moderate to marked diffuse hepatic steatosis, borderline hepatic enlargement and slight contour undulation could indicate some degree of steatohepatitis and/or fibrosis.   3.  Nephrolithiasis.  4.  Several 5 mm or less nodular opacities in each lung appear to represent foci of endobronchial secretion, and those included in field-of-view at the 03/22/2018 abdominal CT are unchanged. As a conservative measure, recommend 12 month follow-up CT   chest to ensure stability of the mid and upper lung nodules.   CT Abdomen Peritonium Abscess Drainage    Narrative    EXAM:  1. PERCUTANEOUS DRAIN PLACEMENT INTRA-ABDOMINAL ABSCESS.  2. CT GUIDANCE  3. CONSCIOUS SEDATION  LOCATION: [facility]  [facility]  DATE/TIME: 4/6/2022 11:23 AM    INDICATION: Patient has large intra abdominal abscess, 13 x 11 x 6 cm,  likely diverticular that needs to be drained.  TECHNIQUE: Dose reduction techniques were used.    PROCEDURE: Informed consent obtained. Site marked. Prior images reviewed. Required items made available. Patient identity confirmed verbally and with arm band. Patient reevaluated immediately before administering sedation. Universal protocol was   followed. Time out performed. The site was prepped and draped in sterile fashion. 10 mL of 1% lidocaine was infused into the local soft tissues. Using standard technique and under direct CT guidance, a 10 Polish drainage catheter was inserted into the   fluid collection.      SPECIMEN: 5 mL of purulent fluid was aspirated and sent to lab for cultures and Gram stain.    BLOOD LOSS: 2 mL.    The patient tolerated the procedure well. No immediate complications.    SEDATION: Versed 1 mg. Fentanyl 50 mcg. The procedure was performed with administration intravenous conscious sedation with appropriate preoperative, intraoperative, and postoperative evaluation.    30 minutes of supervised face to face conscious sedation time was provided by a radiology nurse under my direct supervision.      Impression    IMPRESSION:  1.  Successful CT-guided drain placement into abdominal abscess.    Reference CPT Codes: 79830, 76426, 70619       LABS:  CBC  Recent Labs   Lab 04/10/22  0915 04/09/22  1037 04/08/22  0821 04/07/22  0857   WBC 6.1 6.2 6.2 4.6   RBC 3.71* 3.81 3.48* 3.39*   HGB 11.5* 11.7 10.6* 10.4*   HCT 36.6 38.5 34.2* 33.6*   MCV 99 101* 98 99   MCH 31.0 30.7 30.5 30.7   MCHC 31.4* 30.4* 31.0* 31.0*   RDW 14.6 14.7 14.5 14.3    290 325 408     CMP  Recent Labs   Lab 04/10/22  1230 04/10/22  0915 04/10/22  0823 04/10/22  0147 04/09/22  1137 04/09/22  1037 04/08/22  0823 04/08/22  0821 04/07/22  1240 04/07/22  0857 04/06/22  0810 04/06/22  0652   NA  --   141  --   --   --  142  --  141  --  140  --  143   POTASSIUM  --  4.9  --   --   --  4.4  --  3.9  --  3.6  --  3.1*   CHLORIDE  --  104  --   --   --  106  --  105  --  100  --  101   CO2  --  25  --   --   --  24  --  27  --  22  --  30   ANIONGAP  --  12  --   --   --  12  --  9  --  18  --  12   * 119 95 139*   < > 161*   < > 104   < > 91   < > 74   BUN  --  14  --   --   --  12  --  13  --  16  --  13   CR  --  0.79  --   --   --  0.90  --  0.78  --  0.80  --  0.72   GFRESTIMATED  --  76  --   --   --  65  --  77  --  75  --  85   VICENTA  --  8.9  --   --   --  8.8  --  8.2*  --  8.2*  --  8.0*   MAG  --  2.0  --   --   --  1.5*  --   --   --  2.0  --  1.6*   PROTTOTAL  --   --   --   --   --   --   --   --   --  6.0  --   --    ALBUMIN  --   --   --   --   --   --   --   --   --  1.9*  --   --    BILITOTAL  --   --   --   --   --   --   --   --   --  0.5  --   --    ALKPHOS  --   --   --   --   --   --   --   --   --  68  --   --    AST  --   --   --   --   --   --   --   --   --  14  --   --    ALT  --   --   --   --   --   --   --   --   --  10  --   --     < > = values in this interval not displayed.       Output by Drain (mL) 04/08/22 0700 - 04/08/22 1459 04/08/22 1500 - 04/08/22 2259 04/08/22 2300 - 04/09/22 0659 04/09/22 0700 - 04/09/22 1459 04/09/22 1500 - 04/09/22 2259 04/09/22 2300 - 04/10/22 0659 04/10/22 0700 - 04/10/22 1241   Closed/Suction Drain 1 Left;Anterior Abdomen Bulb 10 Swedish 10 fr x 25 cm Flexima APDL Lot #[id number]   2  0        A:  Zakiya Christian is a 77 year old woman who presented with 3-month history of abdominal and back pain and overall unwell feeling. She was found to have large intraabdominal abscess consistent with diverticular abscess, s/p CT-guided 10Fr drain placement 4/6/22.    P:    - Continue to follow WBC and cultures.  - Continue present drain cares. Continue drain to ALISSA bulb drainage.  - Antibiotics per primary team.    - Planning CT/abscessogram since no drain  output. Could obtain CT today in preparation for abscessogram tomorrow. Will need to be NPO at [place]; Xarelto does not need to be held for abscessogram.   - IR will continue to follow. Please contact IR with drain related questions or concerns.    Carie Combs DNP, APRN, NP-C  Interventional Radiology  [phone]

## 2022-04-10 NOTE — PROGRESS NOTES
Attempted to flush JPdrain with 10cc saline, pt stated she could feel the flush, it wasn't painful, but noticeable.  Per previous notes from prior to this shift, irrigation is to be discontinued.  Writer updated charge nurse Elysia, informing her the order to flush was still current and this writer performed the flush.  However, we concur it is to be discontinued and she will discontinue the order per the previous discussions by the MD's.

## 2022-04-10 NOTE — PROGRESS NOTES
General Surgery Progress Note:    Hospital Day # 5    ASSESSMENT:   1. Colonic diverticular abscess    2. Body aches    3. Hypomagnesemia    4. Acute UTI    5. Diverticulitis of colon        Zakiya Christian is a 77 year old female intra-abdominal abscess consistent with diverticular abscess, s/p IR placement of drain on 4/6/2022.  Patient continues to do well and has no output in her drain   Cultures with ampicillin susceptible Enterococcus facial, quinolone susceptible Pseudomonas, Klebsiella, Bacteroides.  On IV Zosyn   Positive urine culture with Klebsiella.  IV Zosyn.    PLAN:   CT planned for tomorrow.  Continue IV antibiotics per ID and appreciate ID input.  Continuing to follow along      SUBJECTIVE:   Zakiya Christian is doing well.  She has no complaints.  No abdominal pain.  She been afebrile.  No output in the drain.    Patient Vitals for the past 24 hrs:   BP Temp Temp src Pulse Resp SpO2 Weight   04/10/22 0817 (!) 169/74 98.1  F (36.7  C) Oral 85 18 94 % --   04/10/22 0430 -- -- -- -- -- -- 94.5 kg (208 lb 4.8 oz)   04/10/22 0034 (!) 147/55 97.7  F (36.5  C) Oral 76 18 98 % --   04/09/22 1639 (!) 143/62 98.2  F (36.8  C) Oral 78 18 92 % --       Physical Exam:  General: NAD, pleasant  ABD: Soft nontender and drain scant serous      No results displayed because visit has over 200 results.   Recent Results (from the past 24 hour(s))   Magnesium    Collection Time: 04/09/22 10:37 AM   Result Value Ref Range    Magnesium 1.5 (L) 1.8 - 2.6 mg/dL   Basic metabolic panel    Collection Time: 04/09/22 10:37 AM   Result Value Ref Range    Sodium 142 136 - 145 mmol/L    Potassium 4.4 3.5 - 5.0 mmol/L    Chloride 106 98 - 107 mmol/L    Carbon Dioxide (CO2) 24 22 - 31 mmol/L    Anion Gap 12 5 - 18 mmol/L    Urea Nitrogen 12 8 - 28 mg/dL    Creatinine 0.90 0.60 - 1.10 mg/dL    Calcium 8.8 8.5 - 10.5 mg/dL    Glucose 161 (H) 70 - 125 mg/dL    GFR Estimate 65 >60 mL/min/1.73m2   CBC with platelets    Collection Time:  04/09/22 10:37 AM   Result Value Ref Range    WBC Count 6.2 4.0 - 11.0 10e3/uL    RBC Count 3.81 3.80 - 5.20 10e6/uL    Hemoglobin 11.7 11.7 - 15.7 g/dL    Hematocrit 38.5 35.0 - 47.0 %     (H) 78 - 100 fL    MCH 30.7 26.5 - 33.0 pg    MCHC 30.4 (L) 31.5 - 36.5 g/dL    RDW 14.7 10.0 - 15.0 %    Platelet Count 290 150 - 450 10e3/uL   Glucose by meter    Collection Time: 04/09/22 11:37 AM   Result Value Ref Range    GLUCOSE BY METER POCT 141 (H) 70 - 99 mg/dL   Glucose by meter    Collection Time: 04/09/22  6:29 PM   Result Value Ref Range    GLUCOSE BY METER POCT 164 (H) 70 - 99 mg/dL   Glucose by meter    Collection Time: 04/09/22  8:55 PM   Result Value Ref Range    GLUCOSE BY METER POCT 164 (H) 70 - 99 mg/dL   Glucose by meter    Collection Time: 04/10/22  1:47 AM   Result Value Ref Range    GLUCOSE BY METER POCT 139 (H) 70 - 99 mg/dL   Glucose by meter    Collection Time: 04/10/22  8:23 AM   Result Value Ref Range    GLUCOSE BY METER POCT 95 70 - 99 mg/dL   Basic metabolic panel    Collection Time: 04/10/22  9:15 AM   Result Value Ref Range    Sodium 141 136 - 145 mmol/L    Potassium 4.9 3.5 - 5.0 mmol/L    Chloride 104 98 - 107 mmol/L    Carbon Dioxide (CO2) 25 22 - 31 mmol/L    Anion Gap 12 5 - 18 mmol/L    Urea Nitrogen 14 8 - 28 mg/dL    Creatinine 0.79 0.60 - 1.10 mg/dL    Calcium 8.9 8.5 - 10.5 mg/dL    Glucose 119 70 - 125 mg/dL    GFR Estimate 76 >60 mL/min/1.73m2   CBC with platelets and differential    Collection Time: 04/10/22  9:15 AM   Result Value Ref Range    WBC Count 6.1 4.0 - 11.0 10e3/uL    RBC Count 3.71 (L) 3.80 - 5.20 10e6/uL    Hemoglobin 11.5 (L) 11.7 - 15.7 g/dL    Hematocrit 36.6 35.0 - 47.0 %    MCV 99 78 - 100 fL    MCH 31.0 26.5 - 33.0 pg    MCHC 31.4 (L) 31.5 - 36.5 g/dL    RDW 14.6 10.0 - 15.0 %    Platelet Count 331 150 - 450 10e3/uL    % Neutrophils 71 %    % Lymphocytes 22 %    % Monocytes 3 %    % Eosinophils 2 %    % Basophils 1 %    % Immature Granulocytes 1 %    NRBCs  per 100 WBC 0 <1 /100    Absolute Neutrophils 4.4 1.6 - 8.3 10e3/uL    Absolute Lymphocytes 1.3 0.8 - 5.3 10e3/uL    Absolute Monocytes 0.2 0.0 - 1.3 10e3/uL    Absolute Eosinophils 0.1 0.0 - 0.7 10e3/uL    Absolute Basophils 0.0 0.0 - 0.2 10e3/uL    Absolute Immature Granulocytes 0.1 <=0.4 10e3/uL    Absolute NRBCs 0.0 10e3/uL             Braden Bentley PA-C

## 2022-04-10 NOTE — PROGRESS NOTES
[facility] MEDICINE PROGRESS NOTE      Identification/Summary:   Zakiya Christian is a 77 year old old female with history of diabetes mellitus type 2 on Metformin, chronic kidney disease, peripheral neuropathy, chronic depression, COPD with asthma, anxiety and recent diagnosis about 2 months ago of polymyalgia rheumatica presented with 3-month history of nonspecific abdominal and back pain and just generally feeling poorly.  Has been seen in her clinic and eventually was diagnosed with polymyalgia rheumatica and referred to rheumatologist.  Has been on azathioprine and prednisone.  Also apparently has been treated a couple times for UTI.  Had significant increased weakness 4/5/2022 and presented to the emergency room.  Was started on cephalexin for UTI 2 days ago.  Found to have a large intra-abdominal abscess 13 x 11 x 6 cm as likely diverticular.  Now started on IV metronidazole and ceftriaxone and  general surgery and interventional radiology have seen the patient today.Hospital course is notable for IR abscess drainage 4/6 without complications.  Continues to have good drainage.  Preliminary cultures growing Enterococcus and Klebsiella with sensitivities pending.  Vancomycin added.  Patient definitely feeling better.  Discussed with rheumatology and will start to wean her prednisone and discontinue the azathioprine.     Poor ALISSA output with concerns for patency and placement. Sensitivities returned with ID consult placed. Afebrile without leukocytosis. VSS on RA.     Patient feels a bit better today.  Surgery wants a repeat CT of her abdomen for Monday, 4/11.        Assessment and Plan:  Large intra-abdominal abscess which is likely diverticular.  13 x 11 x 6 cm.  Suspect this is been present for about 3 months by her history.  Has been on IV antibiotics including metronidazole and ceftriaxone and has seen general surgery and interventional radiology. Status post IR drain 4/6 with  cultures.  Preliminary cultures growing Klebsiella, Pseudomonas and Fusobacterium necrophorum and Enterococcus. Sensitivities returned with ID consult placed. Advanced to low fiber diet and tolerating well. Increased breakthrough incisional pain. Elevated ESR/CRP.  Plan: Infectious disease recommended changing antibiotics to daptomycin and IV piperacillin-tazobactam yesterday and just doing antibiotic review with the pharmacist. Sensitivities returned with official ID consult placed.  It looks like infectious disease has changed her to IV piperacillin-tazobactam only  -PRN 1 mg Dilauded for breakthrough pain  -Question by nursing staff of whether drain still functioning.  IR contacted and will reevaluate on Monday.  -Surgery once a repeat CT of her abdomen Monday, 4/11     Klebsiella Pneumoniae UTI (susceptible to all except ampicillin resistant)  - afebrile with no leukocytosis, nausea, flank pain, or ongoing dysuria  Plan: On broad spectrum antibiotics for abscess coverage     Diabetes mellitus type 2 on Metformin  Stable blood sugars of   Plan: Hold Metformin and treat with sliding scale insulin. Continue to monitor      Chronic kidney disease.  Creatinine and GFR normal     Low magnesium at 1.3-->1.6-->2.0--1.5-->.  Plan:  check level in the morning      Hypokalemia.,  Resolved  We had her on 3 times daily potassium for 2 days   Plan: Discontinue oral potassium and check level in the morning     Peripheral neuropathy on gabapentin     Depression and anxiety on fluoxetine     COPD and asthma. On RA at baseline. Afebrile with normal WBC. Denies increased cough. Required 1 LPM 02 on 4/8 but has been successfully weaned to RA. Wheezing at rest and with activity. Improved from yesterday.   Plan: Continue prior meds  -Promote ambulation   -PT/OT  -Incentive spirometry   -Titrate O2 to maintain SPO2 > 88%     Peripheral neuropathy on gabapentin     Essential hypertension on losartan.  Plan: Continue     GERD on  PPI     Hold her aspirin      Rivaroxaban restarted- on anticoagulation d/t DVT history      Hyperlipidemia on statin     Hard of hearing     Polymyalgia rheumatica diagnosis but suspected symptoms and elevated sedimentation rate could have been due to this chronic intra-abdominal abscess.  I discussed patient with her rheumatologist Dr. Gonzalez today.  He states she was initially seen by him after she had already been placed on high-dose steroids.  He agrees that this most likely is not polymyalgia rheumatica but was the abscess from the beginning and myalgias and elevated sedimentation rate which is due to the abscess and then those led to the assumption that she had polymyalgia rheumatica because she did not have other classic symptoms of infection like fever.  Plan: Discontinue permanently the azathioprine and wean prednisone from 20 mg down to 15 mg and then decrease by 5 mg a week and then she should see Dr. Gonzalez in about 8 to 10 weeks.    Plan: Continue prior meds.  -Monitor for worsening respiratory status and pain with prednisone taper      Diet: Advance Diet as Tolerated: Low Fiber; Low Fiber  DVT Prophylaxis:  DOAC  Code Status: Full Code        Disposition Plan         PT / OT recommending TCU at discharge. Patient living at home independently prior to admission. Hesitant about TCU and desires return to home if possible. Discussed reasons for TCU recommendation and benefits of this level of care. Ongoing assessment of functional status improvement during stay.    Heber Bernstein MD  [facility]  Phone: #[phone]    Interval History/Subjective:  Patient reports she is continuing to feel a little bit better every day.  Has little bit of discomfort around the drain site.  No dyspnea today.  Eating well.  No other concerns or issues.    Physical Exam/Objective:  Temp:  [97.7  F (36.5  C)-98.2  F (36.8  C)] 98.1  F (36.7  C)  Pulse:  [76-85]  85  Resp:  [18] 18  BP: (143-169)/(55-74) 169/74  SpO2:  [92 %-98 %] 94 %  Body mass index is 38.1 kg/m .    GENERAL:  Alert, appears comfortable, in no acute distress, appears stated age   LUNGS:   Clear to auscultation bilaterally, mildly decreased breath sounds throughout with just an occasional mild wheeze   HEART:  Regular rate and rhythm, S1 and S2 normal, no murmur, rub, or gallop    ABDOMEN:   Soft, mild tenderness around the drainage tube site   EXTREMITIES: Extremities normal, atraumatic, no cyanosis or edema    SKIN: Dry to touch, no exanthems in the visualized areas   NEURO: Alert, appears cognitively intact, moves all four extremities freely   PSYCH: Cooperative, behavior is appropriate      Data reviewed today: I personally reviewed all new medications, labs, imaging/diagnostics reports over the past 24 hours. Pertinent findings include:    Labs:  Most Recent 3 CBC's:Recent Labs   Lab Test 04/10/22  0915 04/09/22  1037 04/08/22  0821   WBC 6.1 6.2 6.2   HGB 11.5* 11.7 10.6*   MCV 99 101* 98    290 325     Most Recent 3 BMP's:Recent Labs   Lab Test 04/10/22  1626 04/10/22  1230 04/10/22  0915 04/09/22  1137 04/09/22  1037 04/08/22  0823 04/08/22  0821   NA  --   --  141  --  142  --  141   POTASSIUM  --   --  4.9  --  4.4  --  3.9   CHLORIDE  --   --  104  --  106  --  105   CO2  --   --  25  --  24  --  27   BUN  --   --  14  --  12  --  13   CR  --   --  0.79  --  0.90  --  0.78   ANIONGAP  --   --  12  --  12  --  9   VICENTA  --   --  8.9  --  8.8  --  8.2*   * 146* 119   < > 161*   < > 104    < > = values in this interval not displayed.     Most Recent 2 LFT's:Recent Labs   Lab Test 04/07/22  0857 02/21/22  1109   AST 14 13   ALT 10 <9   ALKPHOS 68 42*   BILITOTAL 0.5 0.5       Medications:   Personally Reviewed.  Medications       carvedilol  12.5 mg Oral BID w/meals     FLUoxetine  40 mg Oral QAM     Fluticasone-Umeclidin-Vilanterol  1 puff Inhalation QAM     gabapentin  900 mg Oral At  Bedtime     insulin aspart  1-7 Units Subcutaneous TID AC     losartan  12.5 mg Oral QAM     montelukast  10 mg Oral At Bedtime     pantoprazole  40 mg Oral BID AC     piperacillin-tazobactam  3.375 g Intravenous Q8H     potassium chloride  20 mEq Oral TID     pravastatin  40 mg Oral At Bedtime     predniSONE  15 mg Oral Daily     rivaroxaban ANTICOAGULANT  20 mg Oral Daily with breakfast     sodium chloride (PF)  3 mL Intracatheter Q8H

## 2022-04-10 NOTE — PLAN OF CARE
Problem: Plan of Care - These are the overarching goals to be used throughout the patient stay.    Goal: Absence of Hospital-Acquired Illness or Injury  Intervention: Identify and Manage Fall Risk  Recent Flowsheet Documentation  Taken 4/10/2022 0820 by Brisa Calhoun RN  Safety Promotion/Fall Prevention: activity supervised     Problem: Pain Acute  Goal: Acceptable Pain Control and Functional Ability  Outcome: Ongoing, Progressing  Intervention: Prevent or Manage Pain  Recent Flowsheet Documentation  Taken 4/10/2022 0820 by Brisa Calhoun RN  Medication Review/Management: medications reviewed     Problem: Pain Acute  Goal: Acceptable Pain Control and Functional Ability  Intervention: Prevent or Manage Pain  Recent Flowsheet Documentation  Taken 4/10/2022 0820 by Brisa Calhoun RN  Medication Review/Management: medications reviewed   Goal Outcome Evaluation:  Patient is alert and oriented. C/o pain , offered PRN tylenol, calls and  uses calls light appropriately. Ambulated to the bathroom x3, given PRN  tylenol for generalized minimal pain, IV zosyn running, patient is having procedure tomorrow, to be NPO tonight from midnight, patient aware.

## 2022-04-10 NOTE — PLAN OF CARE
Problem: Plan of Care - These are the overarching goals to be used throughout the patient stay.    Goal: Absence of Hospital-Acquired Illness or Injury  Intervention: Identify and Manage Fall Risk  Recent Flowsheet Documentation  Taken 4/9/2022 1746 by Sadaf Huang RN  Safety Promotion/Fall Prevention:   activity supervised   nonskid shoes/slippers when out of bed  Intervention: Prevent and Manage VTE (Venous Thromboembolism) Risk  Recent Flowsheet Documentation  Taken 4/9/2022 1746 by Sadaf Huang RN  VTE Prevention/Management: (Ambulatory) other (see comments)  Goal: Optimal Comfort and Wellbeing  Outcome: Ongoing, Progressing  Intervention: Monitor Pain and Promote Comfort  Recent Flowsheet Documentation  Taken 4/9/2022 1746 by Sadaf Huang RN  Pain Management Interventions:   medication (see MAR)   cold applied  Goal: Readiness for Transition of Care  Intervention: Mutually Develop Transition Plan  Recent Flowsheet Documentation  Taken 4/9/2022 2000 by Sadaf Huang RN  Equipment Currently Used at Home: (Tub rail) other (see comments)     Problem: Plan of Care - These are the overarching goals to be used throughout the patient stay.    Goal: Optimal Comfort and Wellbeing  Outcome: Ongoing, Progressing  Intervention: Monitor Pain and Promote Comfort  Recent Flowsheet Documentation  Taken 4/9/2022 1746 by Sadaf Huang RN  Pain Management Interventions:   medication (see MAR)   cold applied     Problem: UTI (Urinary Tract Infection)  Goal: Improved Infection Symptoms  Outcome: Ongoing, Progressing     Problem: Pain Acute  Goal: Acceptable Pain Control and Functional Ability  Outcome: Ongoing, Progressing  Intervention: Develop Pain Management Plan  Recent Flowsheet Documentation  Taken 4/9/2022 1746 by Sadaf Huang RN  Pain Management Interventions:   medication (see MAR)   cold applied  Intervention: Prevent or Manage Pain  Recent Flowsheet Documentation  Taken  4/9/2022 1746 by Sadaf Huang, RN  Medication Review/Management: medications reviewed   Goal Outcome Evaluation:  Pt doing well this shift, mostly independent.  Has some difficulty getting situated in bed independently, but does well ambulating to the restroom.  Requested PRN pain medication to help sleep tonight, otherwise reports pain is well controlled.  Breathing is still diminished, shallow and audible wheezes.

## 2022-04-11 ENCOUNTER — APPOINTMENT (OUTPATIENT)
Dept: INTERVENTIONAL RADIOLOGY/VASCULAR | Facility: CLINIC | Age: 77
DRG: 391 | End: 2022-04-11
Attending: INTERNAL MEDICINE
Payer: COMMERCIAL

## 2022-04-11 LAB
ANION GAP SERPL CALCULATED.3IONS-SCNC: 10 MMOL/L (ref 5–18)
BASOPHILS # BLD AUTO: 0 10E3/UL (ref 0–0.2)
BASOPHILS NFR BLD AUTO: 1 %
BUN SERPL-MCNC: 12 MG/DL (ref 8–28)
CALCIUM SERPL-MCNC: 8.8 MG/DL (ref 8.5–10.5)
CHLORIDE BLD-SCNC: 102 MMOL/L (ref 98–107)
CO2 SERPL-SCNC: 28 MMOL/L (ref 22–31)
CREAT SERPL-MCNC: 0.8 MG/DL (ref 0.6–1.1)
EOSINOPHIL # BLD AUTO: 0.1 10E3/UL (ref 0–0.7)
EOSINOPHIL NFR BLD AUTO: 1 %
ERYTHROCYTE [DISTWIDTH] IN BLOOD BY AUTOMATED COUNT: 14.6 % (ref 10–15)
GFR SERPL CREATININE-BSD FRML MDRD: 75 ML/MIN/1.73M2
GLUCOSE BLD-MCNC: 92 MG/DL (ref 70–125)
GLUCOSE BLDC GLUCOMTR-MCNC: 103 MG/DL (ref 70–99)
GLUCOSE BLDC GLUCOMTR-MCNC: 132 MG/DL (ref 70–99)
GLUCOSE BLDC GLUCOMTR-MCNC: 142 MG/DL (ref 70–99)
GLUCOSE BLDC GLUCOMTR-MCNC: 178 MG/DL (ref 70–99)
GLUCOSE BLDC GLUCOMTR-MCNC: 76 MG/DL (ref 70–99)
HCT VFR BLD AUTO: 32.5 % (ref 35–47)
HGB BLD-MCNC: 10.3 G/DL (ref 11.7–15.7)
IMM GRANULOCYTES # BLD: 0 10E3/UL
IMM GRANULOCYTES NFR BLD: 1 %
LYMPHOCYTES # BLD AUTO: 1.2 10E3/UL (ref 0.8–5.3)
LYMPHOCYTES NFR BLD AUTO: 22 %
MAGNESIUM SERPL-MCNC: 1.6 MG/DL (ref 1.8–2.6)
MCH RBC QN AUTO: 30.7 PG (ref 26.5–33)
MCHC RBC AUTO-ENTMCNC: 31.7 G/DL (ref 31.5–36.5)
MCV RBC AUTO: 97 FL (ref 78–100)
MONOCYTES # BLD AUTO: 0.3 10E3/UL (ref 0–1.3)
MONOCYTES NFR BLD AUTO: 5 %
NEUTROPHILS # BLD AUTO: 4 10E3/UL (ref 1.6–8.3)
NEUTROPHILS NFR BLD AUTO: 70 %
NRBC # BLD AUTO: 0 10E3/UL
NRBC BLD AUTO-RTO: 0 /100
PLATELET # BLD AUTO: 300 10E3/UL (ref 150–450)
POTASSIUM BLD-SCNC: 4.7 MMOL/L (ref 3.5–5)
RBC # BLD AUTO: 3.35 10E6/UL (ref 3.8–5.2)
SODIUM SERPL-SCNC: 140 MMOL/L (ref 136–145)
WBC # BLD AUTO: 5.6 10E3/UL (ref 4–11)

## 2022-04-11 PROCEDURE — 76080 X-RAY EXAM OF FISTULA: CPT

## 2022-04-11 PROCEDURE — 120N000001 HC R&B MED SURG/OB

## 2022-04-11 PROCEDURE — 80048 BASIC METABOLIC PNL TOTAL CA: CPT | Performed by: FAMILY MEDICINE

## 2022-04-11 PROCEDURE — 99231 SBSQ HOSP IP/OBS SF/LOW 25: CPT | Performed by: SPECIALIST

## 2022-04-11 PROCEDURE — 250N000013 HC RX MED GY IP 250 OP 250 PS 637: Performed by: FAMILY MEDICINE

## 2022-04-11 PROCEDURE — 250N000011 HC RX IP 250 OP 636: Performed by: FAMILY MEDICINE

## 2022-04-11 PROCEDURE — 83735 ASSAY OF MAGNESIUM: CPT | Performed by: FAMILY MEDICINE

## 2022-04-11 PROCEDURE — 250N000012 HC RX MED GY IP 250 OP 636 PS 637: Performed by: FAMILY MEDICINE

## 2022-04-11 PROCEDURE — 0WPGX0Z REMOVAL OF DRAINAGE DEVICE FROM PERITONEAL CAVITY, EXTERNAL APPROACH: ICD-10-PCS | Performed by: RADIOLOGY

## 2022-04-11 PROCEDURE — 36415 COLL VENOUS BLD VENIPUNCTURE: CPT | Performed by: FAMILY MEDICINE

## 2022-04-11 PROCEDURE — 85025 COMPLETE CBC W/AUTO DIFF WBC: CPT | Performed by: FAMILY MEDICINE

## 2022-04-11 PROCEDURE — 99233 SBSQ HOSP IP/OBS HIGH 50: CPT | Performed by: INTERNAL MEDICINE

## 2022-04-11 PROCEDURE — 255N000002 HC RX 255 OP 636: Performed by: INTERNAL MEDICINE

## 2022-04-11 RX ORDER — MAGNESIUM HYDROXIDE/ALUMINUM HYDROXICE/SIMETHICONE 120; 1200; 1200 MG/30ML; MG/30ML; MG/30ML
30 SUSPENSION ORAL EVERY 4 HOURS PRN
Status: DISCONTINUED | OUTPATIENT
Start: 2022-04-11 | End: 2022-04-15 | Stop reason: HOSPADM

## 2022-04-11 RX ADMIN — ACETAMINOPHEN 650 MG: 325 TABLET ORAL at 20:47

## 2022-04-11 RX ADMIN — PREDNISONE 15 MG: 5 TABLET ORAL at 07:59

## 2022-04-11 RX ADMIN — MONTELUKAST 10 MG: 10 TABLET, FILM COATED ORAL at 22:23

## 2022-04-11 RX ADMIN — PIPERACILLIN AND TAZOBACTAM 3.38 G: 3; .375 INJECTION, POWDER, LYOPHILIZED, FOR SOLUTION INTRAVENOUS at 05:44

## 2022-04-11 RX ADMIN — INSULIN ASPART 1 UNITS: 100 INJECTION, SOLUTION INTRAVENOUS; SUBCUTANEOUS at 16:43

## 2022-04-11 RX ADMIN — HYDROMORPHONE HYDROCHLORIDE 1 MG: 2 TABLET ORAL at 22:24

## 2022-04-11 RX ADMIN — PANTOPRAZOLE SODIUM 40 MG: 20 TABLET, DELAYED RELEASE ORAL at 16:40

## 2022-04-11 RX ADMIN — PRAVASTATIN SODIUM 40 MG: 20 TABLET ORAL at 22:24

## 2022-04-11 RX ADMIN — CARVEDILOL 12.5 MG: 12.5 TABLET, FILM COATED ORAL at 08:03

## 2022-04-11 RX ADMIN — PANTOPRAZOLE SODIUM 40 MG: 20 TABLET, DELAYED RELEASE ORAL at 07:59

## 2022-04-11 RX ADMIN — PIPERACILLIN AND TAZOBACTAM 3.38 G: 3; .375 INJECTION, POWDER, LYOPHILIZED, FOR SOLUTION INTRAVENOUS at 16:39

## 2022-04-11 RX ADMIN — FLUOXETINE HYDROCHLORIDE 40 MG: 20 CAPSULE ORAL at 08:04

## 2022-04-11 RX ADMIN — LOSARTAN POTASSIUM 12.5 MG: 25 TABLET, FILM COATED ORAL at 08:04

## 2022-04-11 RX ADMIN — CARVEDILOL 12.5 MG: 12.5 TABLET, FILM COATED ORAL at 16:45

## 2022-04-11 RX ADMIN — RIVAROXABAN 20 MG: 10 TABLET, FILM COATED ORAL at 07:59

## 2022-04-11 RX ADMIN — GABAPENTIN 900 MG: 300 CAPSULE ORAL at 22:23

## 2022-04-11 RX ADMIN — IOHEXOL 5 ML: 350 INJECTION, SOLUTION INTRAVENOUS at 14:53

## 2022-04-11 RX ADMIN — PIPERACILLIN AND TAZOBACTAM 3.38 G: 3; .375 INJECTION, POWDER, LYOPHILIZED, FOR SOLUTION INTRAVENOUS at 22:22

## 2022-04-11 ASSESSMENT — ACTIVITIES OF DAILY LIVING (ADL)
ADLS_ACUITY_SCORE: 10

## 2022-04-11 NOTE — PROGRESS NOTES
Patient Name: Zakiya Christian  Medical Record Number: [id number]  Today's Date: 4/11/2022    Procedure: Abcess tube check (abcessogram),   Proceduralist: Dr. Heber Herrera.     Patient in preop: 1340  Patient in room: 1438  Procedure Start: 1447  Procedure end: 1450  Patient out of room: 1454    Report given to: W220Ping Ledezma.    Other Notes: Pt arrived to IR room 1 from  pre/post room 2.  Consent reviewed. Pt denies any questions or concerns regarding procedure.  Pt positioned supine and monitored per protocol. Pt tolerated procedure without any noted complications. Pt transferred back to -220-01 with R.N.  Bedside report given to KCEIA Ledezma

## 2022-04-11 NOTE — PROGRESS NOTES
Interventional Radiology Brief Post Procedure Note    Procedure:     Proceduralist: Javier        Time Out: Prior to the start of the procedure and with procedural staff participation, I verbally confirmed the patient s identity using two indicators, relevant allergies, that the procedure was appropriate and matched the consent or emergent situation, and that the correct equipment/implants were available. Immediately prior to starting the procedure I conducted the Time Out with the procedural staff and re-confirmed the patient s name, procedure, and site/side. (The Joint Commission universal protocol was followed.)  Yes        Sedation: none    Findings: Abscess drain retracted into peritoneum.  No opacification of abscess cavity    Estimated Blood Loss: None    Fluoroscopy Time: .3 minute(s)    SPECIMENS: None    Complications: 1. None     Condition: Stable    Plan: No communication to deep abscess cavity.  Tube removed. Will need attempt at CT guided replacement.  Will need to hold Xarolto for that procedure given deep location.  Consider Wednesday/Thurday tube placement if Xarolto held.    Comments: See dictated procedure note for full details.    Heber Herrera MD

## 2022-04-11 NOTE — PLAN OF CARE
Problem: Plan of Care - These are the overarching goals to be used throughout the patient stay.    Goal: Absence of Hospital-Acquired Illness or Injury  Intervention: Identify and Manage Fall Risk  Recent Flowsheet Documentation  Taken 4/11/2022 0803 by Brisa Calhoun RN  Safety Promotion/Fall Prevention: activity supervised     Problem: Pain Acute  Goal: Acceptable Pain Control and Functional Ability  Intervention: Prevent or Manage Pain  Recent Flowsheet Documentation  Taken 4/11/2022 0803 by Brisa Calhoun RN  Medication Review/Management: medications reviewed     Problem: Nausea and Vomiting  Goal: Fluid and Electrolyte Balance  4/11/2022 1347 by Brisa Calhoun RN  Outcome: Ongoing, Progressing  4/11/2022 1346 by Brisa Calhoun RN  Outcome: Ongoing, Progressing   Goal Outcome Evaluation:     Patient alert and oriented. Denies pain this shift. Has been NPO since midnight in preparation for ALISSA drain evaluation. Patient seen by IR for abscess tube check.

## 2022-04-11 NOTE — PLAN OF CARE
Problem: Plan of Care - These are the overarching goals to be used throughout the patient stay.    Goal: Absence of Hospital-Acquired Illness or Injury  Intervention: Identify and Manage Fall Risk  Recent Flowsheet Documentation  Taken 4/10/2022 1808 by Sadaf Huang, RN  Safety Promotion/Fall Prevention:   supervised activity   nonskid shoes/slippers when out of bed   patient and family education   bed alarm on  Intervention: Prevent and Manage VTE (Venous Thromboembolism) Risk  Recent Flowsheet Documentation  Taken 4/10/2022 1808 by Sadaf Huang, RN  Activity Management: ambulated to bathroom  Goal: Optimal Comfort and Wellbeing  Outcome: Ongoing, Progressing     Problem: Pain Acute  Goal: Acceptable Pain Control and Functional Ability  Outcome: Ongoing, Progressing  Intervention: Prevent or Manage Pain  Recent Flowsheet Documentation  Taken 4/10/2022 1808 by Sadaf Huang RN  Medication Review/Management: medications reviewed   Goal Outcome Evaluation:  Pt ambulating well to and from bathroom, she has some difficulty with bed mobility, but otherwise doing well.  C/o some shortness of breath stating she had some congestion.  Reports chronic pain, denies anything acute or changing.  IV abx running.

## 2022-04-11 NOTE — PROGRESS NOTES
"  Interventional Radiology  Inpatient - [facility]: Interventional Radiology   [phone]  4/11/2022    CHART CHECK    S: Per chart review, no ALISSA drain output for past couple days. Cx positive, on antibiotics. Vitals stable. CT completed yesterday: \"distal loop of the percutaneous drainage catheter has retracted anterior and is no longer within the large irregularly abscess in the left lower abdomen and upper pelvis which now measures 12 x 8 x 4 cm, previously 13 x 11 x 6 cm.\"    O:  Cultures:        Antibiotics: Zosyn  Flushing: Discontinued    BP (!) 150/67 (BP Location: Right arm)   Pulse 84   Temp 98.2  F (36.8  C) (Oral)   Resp 16   Ht 1.575 m (5' 2\")   Wt 94.5 kg (208 lb 4.8 oz)   SpO2 92%   BMI 38.10 kg/m      IMAGING:  CT abd/pelvis 4/10/22:  FINDINGS:   LOWER CHEST: Strands of fibrosis and/or linear atelectasis scattered throughout the mid and lower lobes. Visualized lungs are otherwise clear. No pleural effusion. Heart size normal with no pericardial effusion. Three-vessel coronary artery   calcification.      HEPATOBILIARY: Moderate to marked diffuse hepatic steatosis, borderline hepatic enlargement and slight contour undulation. Liver otherwise normal. No bile duct dilatation. Gallbladder is surgically absent.     PANCREAS: Normal.     SPLEEN: Spleen size normal.     ADRENAL GLANDS: Normal.     KIDNEY/BLADDER: A bubble of gas in the bladder has developed. Nonobstructing 4 mm stone right kidney and a nonobstructing 3 mm stone left kidney. A 4.5 x 3.4 x 3.3 cm benign angiomyolipoma left kidney. The angiomyolipoma and several benign cysts in both   kidneys require no follow-up.     BOWEL: The distal loop of the percutaneous drainage catheter has retracted anterior and is no longer within the large irregularly abscess in the left lower abdomen and upper pelvis which now measures 12 x 8 x 4 cm, previously 13 x 11 x 6 cm. Subacute   diverticulitis proximal sigmoid colon with a small " 1.5 cm peridiverticular abscess and adjacent short sinus tract leading to the above-mentioned abscess again seen. In addition there appears to be sinus tracts leading inferiorly to the vaginal cuff and   possibly the bladder.     LYMPH NODES: No lymphadenopathy.     VASCULATURE: Normal caliber abdominal aorta.       PELVIC ORGANS: No pelvic mass or fluid.     MUSCULOSKELETAL: Unremarkable.                                                                      IMPRESSION:  1.  The distal loop of the percutaneous drainage catheter has retracted anterior and is no longer within the large irregularly abscess in the left lower abdomen and upper pelvis which now measures 12 x 8 x 4 cm, previously 13 x 11 x 6 cm.    2.  Subacute diverticulitis proximal sigmoid colon with a small 1.5 cm peridiverticular abscess and adjacent short sinus tract leading to the above-mentioned abscess again seen. In addition there appears to be sinus tracts leading inferiorly to the   vaginal cuff and possibly the bladder.      LABS:  CBC  Recent Labs   Lab 04/11/22  0450 04/10/22  0915 04/09/22  1037 04/08/22  0821   WBC 5.6 6.1 6.2 6.2   RBC 3.35* 3.71* 3.81 3.48*   HGB 10.3* 11.5* 11.7 10.6*   HCT 32.5* 36.6 38.5 34.2*   MCV 97 99 101* 98   MCH 30.7 31.0 30.7 30.5   MCHC 31.7 31.4* 30.4* 31.0*   RDW 14.6 14.6 14.7 14.5    331 290 325     CMP  Recent Labs   Lab 04/11/22  1209 04/11/22  0733 04/11/22  0450 04/11/22  0146 04/10/22  1230 04/10/22  0915 04/09/22  1137 04/09/22  1037 04/08/22  0823 04/08/22  0821 04/07/22  1240 04/07/22  0857   NA  --   --  140  --   --  141  --  142  --  141  --  140   POTASSIUM  --   --  4.7  --   --  4.9  --  4.4  --  3.9  --  3.6   CHLORIDE  --   --  102  --   --  104  --  106  --  105  --  100   CO2  --   --  28  --   --  25  --  24  --  27  --  22   ANIONGAP  --   --  10  --   --  12  --  12  --  9  --  18   * 76 92 103*   < > 119   < > 161*   < > 104   < > 91   BUN  --   --  12  --   --  14  --   12  --  13  --  16   CR  --   --  0.80  --   --  0.79  --  0.90  --  0.78  --  0.80   GFRESTIMATED  --   --  75  --   --  76  --  65  --  77  --  75   VICENTA  --   --  8.8  --   --  8.9  --  8.8  --  8.2*  --  8.2*   MAG  --   --  1.6*  --   --  2.0  --  1.5*  --   --   --  2.0   PROTTOTAL  --   --   --   --   --   --   --   --   --   --   --  6.0   ALBUMIN  --   --   --   --   --   --   --   --   --   --   --  1.9*   BILITOTAL  --   --   --   --   --   --   --   --   --   --   --  0.5   ALKPHOS  --   --   --   --   --   --   --   --   --   --   --  68   AST  --   --   --   --   --   --   --   --   --   --   --  14   ALT  --   --   --   --   --   --   --   --   --   --   --  10    < > = values in this interval not displayed.       Output by Drain (mL) 04/09/22 0700 - 04/09/22 1459 04/09/22 1500 - 04/09/22 2259 04/09/22 2300 - 04/10/22 0659 04/10/22 0700 - 04/10/22 1459 04/10/22 1500 - 04/10/22 2259 04/10/22 2300 - 04/11/22 0659 04/11/22 0700 - 04/11/22 1315   Closed/Suction Drain 1 Left;Anterior Abdomen Bulb 10 Telugu 10 fr x 25 cm Flexima [facility] Lot #[id number] 2  0   0          A:  Zakiya Christian is a 77 year old woman who presented with 3-month history of abdominal and back pain and overall unwell feeling. She was found to have large intraabdominal abscess consistent with diverticular abscess, s/p CT-guided 10Fr drain placement 4/6/22. Cultures + Fusobacterium necrophorum. Drain with low OPs. CT completed yesterday demonstrating retracted drain.       P:    - Recommend abscessogram check today in IR with likely drain repositioning.   - Patient has been NPO since midnight.  - Above pending further evaluation/discussion by IR Radiologist.  - Further drain recommendations pending abscessogram results.    Juany Lopez PAPingC  Interventional Radiology

## 2022-04-11 NOTE — PLAN OF CARE
Vss other than BP's which have been elevated in the 150's. A&Ox4. Denies pain. PIV running zosyn. IR removed tube. Plan to do a CT guided ALISSA placement once pt is off blood thinners for a few days. Will continue to monitor.

## 2022-04-11 NOTE — PROGRESS NOTES
Care Management Follow Up    Length of Stay (days): 6    Expected Discharge Date: 04/12/2022     Concerns to be Addressed:         Patient plan of care discussed at interdisciplinary rounds: Yes    Anticipated Discharge Disposition: TCU     Anticipated Discharge Services:  TCU    Anticipated Discharge DME:  none    Patient/family educated on Medicare website which has current facility and service quality ratings:  Yes    Education Provided on the Discharge Plan:  CM met with patient. Ave per bedside RN.    Patient/Family in Agreement with the Plan:  yes    Referrals Placed by CM/SW:  TCU pending      Additional Information:  Chart reviewed. CM met with patient. Patient is agreeable to go to TCU. She requests referrals be sent to the [place] facilities. CM sent referrals per request.     Additional referral sent 2:39 PM   [facility]    Referral sent per patient request. 9:17 AM  [facility] [place] -declined due to no beds. 2:39 PM     PT recommendations: Transitional Care Facility  OT recommendations: Transitional Care Facility;home with assist      Sudha Resendez RN

## 2022-04-11 NOTE — PROGRESS NOTES
No complaints.  No pain.  Afebrile, vital signs stable.    Physical exam:  Morbidly obese.  Abdomen is soft, nontender.  Nothing in the ALISSA drain.    Laboratories:  White blood count 5.6  Hemoglobin 10.3  Electrolytes within normal limits.  Mag low at 1.6    Impression: Diverticulitis with diverticular abscess.  Status post percutaneous drainage.  There is been nothing coming out the drain.  CT scan done yesterday shows that the drain has been pulled back from the cavity.    Plan: We will talk to IR to see if they can advance the drain into the cavity.  Clinically she looks fine and she is at high risk for surgery with her comorbidities and morbid obesity.  Would be ideal if we can get this percutaneously drained.

## 2022-04-11 NOTE — PROGRESS NOTES
[facility] MEDICINE PROGRESS NOTE      Identification/Summary:  Zakiya Christian is a 77 year old old female with history of diabetes mellitus type 2 on Metformin, chronic kidney disease, peripheral neuropathy, chronic depression, COPD with asthma, anxiety and recent diagnosis about 2 months ago of polymyalgia rheumatica presented with 3-month history of nonspecific abdominal and back pain and just generally feeling poorly.  Has been seen in her clinic and eventually was diagnosed with polymyalgia rheumatica and referred to rheumatologist.  Has been on azathioprine and prednisone.  Also apparently has been treated a couple times for UTI.  Had significant increased weakness 4/5/2022 and presented to the emergency room.  Was started on cephalexin for UTI 2 days ago.  Found to have a large intra-abdominal abscess 13 x 11 x 6 cm as likely diverticular.  Now started on IV metronidazole and ceftriaxone and  general surgery and interventional radiology have seen the patient today.Hospital course is notable for IR abscess drainage 4/6 without complications.  Continues to have good drainage.  Preliminary cultures growing Enterococcus and Klebsiella with sensitivities pending.  Vancomycin added.  Patient definitely feeling better.  Discussed with rheumatology and will start to wean her prednisone and discontinue the azathioprine.    Poor ALISSA output with concerns for patency and placement. CT 4/10 confirms that drain is not within abscess. IR abscessogram today. Abx per ID recommendations.  Afebrile without leukocytosis. VSS on RA.        Assessment and Plan:  Large intra-abdominal abscess which is likely diverticular.  13 x 11 x 6 cm.  Suspect this is been present for about 3 months by her history.  Has been on IV antibiotics including metronidazole and ceftriaxone and has seen general surgery and interventional radiology. Status post IR drain 4/6 with cultures.  Preliminary cultures growing  Klebsiella, Pseudomonas and Fusobacterium necrophorum and Enterococcus. Sensitivities returned with ID consult placed. Advanced to low fiber diet and tolerating well. Elevated ESR/CRP. CT Abdomen 4/10 showing drain no longer within abscess.   Plan: Infectious disease recommended changing antibiotics to daptomycin and IV piperacillin-tazobactam via antibiotic review with the pharmacist. Sensitivities returned with official ID consult placed. Infectious disease has changed her to IV piperacillin-tazobactam only   -PRN 1 mg Dilauded for breakthrough pain  -NPO prior to procedure  -IR Abscessogram today     Klebsiella Pneumoniae UTI (susceptible to all except ampicillin resistant)  - afebrile with no leukocytosis, nausea, flank pain, or ongoing dysuria  Plan: On broad spectrum antibiotics for abscess coverage     Diabetes mellitus type 2 on Metformin  Stable blood sugars of   Plan: Hold Metformin and treat with sliding scale insulin. Continue to monitor      Chronic kidney disease.  Creatinine and GFR normal     Low magnesium at 1.3-->1.6-->2.0--1.5-->2.0-->1.6.    Plan:  check level in the morning   -Magnesium protocol      Hypokalemia (Resolved)  We had her on 3 times daily potassium for 2 days   Plan: Discontinue oral potassium   -BMP in the AM     Peripheral neuropathy on gabapentin     Depression and anxiety on fluoxetine     COPD and asthma. On RA at baseline. Afebrile with normal WBC. Denies increased cough. Required 1 LPM 02 on 4/8 but has been successfully weaned to RA. Wheezing at rest and with activity. Improved since admission   Plan: Continue prior meds  -Promote ambulation   -PT/OT  -Incentive spirometry   -Titrate O2 to maintain SPO2 > 88%     Peripheral neuropathy on gabapentin     Essential hypertension on losartan and carveidolol.  Plan: Continue     GERD on PPI     Hold her aspirin      Rivaroxaban restarted- on anticoagulation d/t DVT history      Hyperlipidemia on statin     Hard of  hearing     Polymyalgia rheumatica diagnosis but suspected symptoms and elevated sedimentation rate could have been due to this chronic intra-abdominal abscess.  I discussed patient with her rheumatologist Dr. Gonzalez today.  He states she was initially seen by him after she had already been placed on high-dose steroids.  He agrees that this most likely is not polymyalgia rheumatica but was the abscess from the beginning and myalgias and elevated sedimentation rate which is due to the abscess and then those led to the assumption that she had polymyalgia rheumatica because she did not have other classic symptoms of infection like fever.  Plan: Discontinue permanently the azathioprine and wean prednisone from 20 mg down to 15 mg and then decrease by 5 mg a week and then she should see Dr. Gonzalez in about 8 to 10 weeks.    Plan: Continue prior meds.  -Monitor for worsening respiratory status and pain with prednisone taper   -Taper to 10 mg on 4/15      Diet: NPO per Anesthesia Guidelines for Procedure/Surgery Except for: Meds, Ice Chips  DVT Prophylaxis:  DOAC  Code Status: Full Code      Disposition Plan    PT / OT recommending TCU at discharge. Patient living at home independently prior to admission. Hesitant about TCU and desires return to home if possible. Discussed reasons for TCU recommendation and benefits of this level of care. Ongoing assessment of functional status improvement during stay.    The patient's care was discussed with the Attending Physician, Dr. Cerda and patient     Julissa SALAS Weber  [facility]  Phone: #[phone]    Interval History/Subjective:  Pt reports feeling much improved with minimal pain around drain site. Loose stool X 7 yesterday. States that she is having BM every time she urinates. Denies nausea, abdominal pain, emesis, or melena. Awaiting abscessogram today for further plan moving forward. Denies dyspnea, chest pain,  headache, or dizziness. No other concerns or issues.    Physical Exam/Objective:  Temp:  [97.9  F (36.6  C)-98.2  F (36.8  C)] 98.2  F (36.8  C)  Pulse:  [76-84] 84  Resp:  [16-18] 16  BP: (143-154)/(67) 150/67  SpO2:  [92 %] 92 %  Body mass index is 38.1 kg/m .    GENERAL:  Alert, appears comfortable sitting up in chair, in no acute distress    LUNGS:   Diminished breath sounds throughout with scattered, mild expiratory wheezes.    HEART:  Regular rate and rhythm, S1 and S2 normal, no murmur, rub, or gallop    ABDOMEN:   Soft, mildly tender near ALISSA site. No peritoneal signs.    EXTREMITIES: Extremities normal, atraumatic, no cyanosis or edema    SKIN: Dry to touch, no exanthems in the visualized areas   NEURO: Alert, appears cognitively intact, moves all four extremities freely   PSYCH: Cooperative, behavior is appropriate      Data reviewed today: I personally reviewed all new medications, labs, imaging/diagnostics reports over the past 24 hours. Pertinent findings include:    Imaging:   Recent Results (from the past 24 hour(s))   CT Abdomen Pelvis w Contrast    Narrative    EXAM: CT ABDOMEN PELVIS W CONTRAST  LOCATION: [facility]  DATE/TIME: 4/10/2022 2:35 PM    INDICATION: Large intraabdominal abscess s/p CT-guided percutaneous drain placement 04/06/2022. No drain output in past couple days. CT with IV contrast (no oral) to be followed by possible abscessogram tomorrow.;  COMPARISON: 04/06/2022 CT guided drain placement and 04/05/2022 CT CAP  TECHNIQUE: CT scan of the abdomen and pelvis was performed following injection of IV contrast. Multiplanar reformats were obtained. Dose reduction techniques were used.  CONTRAST: Isovue 370 100mL    FINDINGS:   LOWER CHEST: Strands of fibrosis and/or linear atelectasis scattered throughout the mid and lower lobes. Visualized lungs are otherwise clear. No pleural effusion. Heart size normal with no pericardial effusion. Three-vessel coronary artery    calcification.     HEPATOBILIARY: Moderate to marked diffuse hepatic steatosis, borderline hepatic enlargement and slight contour undulation. Liver otherwise normal. No bile duct dilatation. Gallbladder is surgically absent.    PANCREAS: Normal.    SPLEEN: Spleen size normal.    ADRENAL GLANDS: Normal.    KIDNEY/BLADDER: A bubble of gas in the bladder has developed. Nonobstructing 4 mm stone right kidney and a nonobstructing 3 mm stone left kidney. A 4.5 x 3.4 x 3.3 cm benign angiomyolipoma left kidney. The angiomyolipoma and several benign cysts in both   kidneys require no follow-up.    BOWEL: The distal loop of the percutaneous drainage catheter has retracted anterior and is no longer within the large irregularly abscess in the left lower abdomen and upper pelvis which now measures 12 x 8 x 4 cm, previously 13 x 11 x 6 cm. Subacute   diverticulitis proximal sigmoid colon with a small 1.5 cm peridiverticular abscess and adjacent short sinus tract leading to the above-mentioned abscess again seen. In addition there appears to be sinus tracts leading inferiorly to the vaginal cuff and   possibly the bladder.    LYMPH NODES: No lymphadenopathy.    VASCULATURE: Normal caliber abdominal aorta.      PELVIC ORGANS: No pelvic mass or fluid.    MUSCULOSKELETAL: Unremarkable.      Impression    IMPRESSION:  1.  The distal loop of the percutaneous drainage catheter has retracted anterior and is no longer within the large irregularly abscess in the left lower abdomen and upper pelvis which now measures 12 x 8 x 4 cm, previously 13 x 11 x 6 cm.    2.  Subacute diverticulitis proximal sigmoid colon with a small 1.5 cm peridiverticular abscess and adjacent short sinus tract leading to the above-mentioned abscess again seen. In addition there appears to be sinus tracts leading inferiorly to the   vaginal cuff and possibly the bladder.         Labs:  Most Recent 3 CBC's:Recent Labs   Lab Test 04/11/22  0450 04/10/22  0915  04/09/22  1037   WBC 5.6 6.1 6.2   HGB 10.3* 11.5* 11.7   MCV 97 99 101*    331 290     Most Recent 3 BMP's:Recent Labs   Lab Test 04/11/22  0733 04/11/22  0450 04/11/22  0146 04/10/22  1230 04/10/22  0915 04/09/22  1137 04/09/22  1037   NA  --  140  --   --  141  --  142   POTASSIUM  --  4.7  --   --  4.9  --  4.4   CHLORIDE  --  102  --   --  104  --  106   CO2  --  28  --   --  25  --  24   BUN  --  12  --   --  14  --  12   CR  --  0.80  --   --  0.79  --  0.90   ANIONGAP  --  10  --   --  12  --  12   VICENTA  --  8.8  --   --  8.9  --  8.8   GLC 76 92 103*   < > 119   < > 161*    < > = values in this interval not displayed.     Most Recent 6 Bacteria Isolates From Any Culture (See EPIC Reports for Culture Details):No lab results found.  Most Recent 6 glucoses:Recent Labs   Lab Test 04/11/22  0733 04/11/22  0450 04/11/22  0146 04/10/22  2058 04/10/22  1626 04/10/22  1230   GLC 76 92 103* 152* 191* 146*     Most Recent ESR & CRP:Recent Labs   Lab Test 04/08/22  0821   SED 85*   CRP 7.9*       Medications:   Personally Reviewed.  Medications       carvedilol  12.5 mg Oral BID w/meals     FLUoxetine  40 mg Oral QAM     Fluticasone-Umeclidin-Vilanterol  1 puff Inhalation QAM     gabapentin  900 mg Oral At Bedtime     insulin aspart  1-7 Units Subcutaneous TID AC     losartan  12.5 mg Oral QAM     montelukast  10 mg Oral At Bedtime     pantoprazole  40 mg Oral BID AC     piperacillin-tazobactam  3.375 g Intravenous Q8H     pravastatin  40 mg Oral At Bedtime     predniSONE  15 mg Oral Daily     rivaroxaban ANTICOAGULANT  20 mg Oral Daily with breakfast     sodium chloride (PF)  3 mL Intracatheter Q8H         Patient seen and examined  D/w student, reviewed note   Pain-none   Doing ok   CT scan showing drain not in abscess cavity   IR consult for evaluating and repositioning drain     Mikaela Cerda MD

## 2022-04-11 NOTE — PROGRESS NOTES
[facility] ID Inpatient follow up       Patient:  Zakiya Christian  Date of birth 1945, Medical record number [id number]  Date of Visit:  04/11/2022  Attending Physician: Heber Bernstein MD         Assessment and Recommendations:   Assessment:  Zakiya Christian is a 77 year old female with   1.  Diabetes mellitus  2.  PMR on prednisone 15 mg daily  3.  Diverticular abscess status post drain placement. Cultures with ampicillin susceptible Enterococcus, quinolone susceptible Pseudomonas, Klebsiella, Bacteroides, Fusobacterium.      4.  Positive urine culture with Klebsiella.   5. DVT RLE, on xarelto    crp 7.9        Recommendations:   Continue IV Zosyn.  Drain is out  To be replaced this week    Discussed with the patient, staff.     We will follow.    Lakhwinder Spence M.D.  [facility].               Interval History:     HPI:    Doing well today.  No abdominal pain.  No side effect from antibiotic.   Drain is out      IMPRESSION:  1.  The distal loop of the percutaneous drainage catheter has retracted anterior and is no longer within the large irregularly abscess in the left lower abdomen and upper pelvis which now measures 12 x 8 x 4 cm, previously 13 x 11 x 6 cm.    2.  Subacute diverticulitis proximal sigmoid colon with a small 1.5 cm peridiverticular abscess and adjacent short sinus tract leading to the above-mentioned abscess again seen. In addition there appears to be sinus tracts leading inferiorly to the   vaginal cuff and possibly the bladder.       Pertinent cultures include:  No results found for: CULT    Recent Inflammatory Biomarkers:   Recent Labs   Lab Test 04/11/22  0450 04/10/22  0915 04/09/22  1037 04/08/22  0821 04/07/22  0857 04/06/22  0652 04/05/22  1450 04/05/22  1304 12/30/21  1136 11/12/21  1215 09/03/21  0958 08/06/21  1543 07/28/21  1711   CRP  --   --   --  7.9*  --   --   --   --  0.4 2.0* 1.6* 3.3* 28.4*   PCAL  --   --   --   --    --   --  8.75*  --   --   --   --   --   --    WBC 5.6 6.1 6.2 6.2 4.6 5.5  --    < >  --   --   --   --   --     < > = values in this interval not displayed.            Review of Systems:   CONSTITUTIONAL:    Temp Max: Temp (24hrs), Av  F (36.7  C), Min:97.7  F (36.5  C), Max:98.2  F (36.8  C)  All 12 systems reviewed, negative except for the above.    Negative except for findings in the HPI.           Current Medications (antimicrobials listed in bold):       carvedilol  12.5 mg Oral BID w/meals     FLUoxetine  40 mg Oral QAM     Fluticasone-Umeclidin-Vilanterol  1 puff Inhalation QAM     gabapentin  900 mg Oral At Bedtime     insulin aspart  1-7 Units Subcutaneous TID AC     losartan  12.5 mg Oral QAM     montelukast  10 mg Oral At Bedtime     pantoprazole  40 mg Oral BID AC     piperacillin-tazobactam  3.375 g Intravenous Q8H     pravastatin  40 mg Oral At Bedtime     predniSONE  15 mg Oral Daily     [Held by provider] rivaroxaban ANTICOAGULANT  20 mg Oral Daily with breakfast     sodium chloride (PF)  3 mL Intracatheter Q8H              Allergies:     Allergies   Allergen Reactions     Simvastatin Hives            Physical Exam:   Vitals were reviewed  Patient Vitals for the past 24 hrs:   BP Temp Temp src Pulse Resp SpO2 Weight   22 1645 (!) 156/78 -- -- 102 -- -- --   22 1528 (!) 143/63 98.3  F (36.8  C) Oral 77 16 (!) 89 % --   22 1450 (!) 149/74 -- -- -- 16 93 % --   22 1440 -- -- -- 86 16 93 % --   22 1410 -- -- -- 86 16 92 % --   22 1400 139/65 -- -- 84 16 90 % --   22 1345 (!) 155/71 -- -- 84 16 93 % --   22 0727 (!) 150/67 98.2  F (36.8  C) Oral 84 16 92 % --   22 0418 -- -- -- -- -- -- 94.5 kg (208 lb 4.8 oz)   22 0020 (!) 143/67 97.9  F (36.6  C) Oral 76 18 92 % --       Physical Examination:  Gen. appearance nontoxic  Eyes no conjunctivitis or icterus  Neck no stiffness or neck vein distention, no LN  Heart  no S3  Lungs clear no  wheeze  Abdomen soft , some tenderness  Extremities no synovitis,edema rt leg  Skin  no rash or emboli  Neurologic alert oriented no focal deficits           Laboratory Data:   ID Labs:  Microbiology labs:  Contains abnormal data Abscess Aerobic Bacterial Culture Routine  Order: [id number]   Collected 4/6/2022 12:36 PM     Status: Final result     Visible to patient: No (inaccessible in MyChart)    Specimen Information: Abdomen; Abscess         0 Result Notes    Culture 2+ Klebsiella pneumoniae Abnormal        1+ Pseudomonas aeruginosa Abnormal        1+ Enterococcus faecium Abnormal        Isolated in broth only Bacteroides ovatus/xylanisolvens Abnormal     On day 1 of incubation   Susceptibilities not routinely done   Isolated in broth only Bacteroides fragilis Abnormal     On day 1 of incubation   Susceptibilities not routinely done        Resulting Agency: [facility]       Susceptibility     Klebsiella pneumoniae Pseudomonas aeruginosa Enterococcus faecium     STACY STACY STACY     Amikacin   <=2.0 ug/mL Susceptible       Ampicillin  Resistant 1   <=2.0 ug/mL Susceptible     Ampicillin/ Sulbactam 4.0 ug/mL Susceptible         Cefepime <=1.0 ug/mL Susceptible <=1.0 ug/mL Susceptible       Ceftazidime <=1.0 ug/mL Susceptible <=1.0 ug/mL Susceptible       Ceftriaxone <=1.0 ug/mL Susceptible         Ciprofloxacin <=0.25 ug/mL Susceptible <=0.25 ug/mL Susceptible       Gentamicin <=1.0 ug/mL Susceptible <=1.0 ug/mL Susceptible       Gentamicin Synergy     Susceptible... Susceptible 2     Levofloxacin <=0.12 ug/mL Susceptible 0.25 ug/mL Susceptible       Meropenem <=0.25 ug/mL Susceptible <=0.25 ug/mL Susceptible       Penicillin     2.0 ug/mL Susceptible     Piperacillin/Tazobactam <=4.0 ug/mL Susceptible <=4.0 ug/mL Susceptible       Tobramycin <=1.0 ug/mL Susceptible <=1.0 ug/mL Susceptible       Trimethoprim/Sulfamethoxazole <=1/19 ug/mL Susceptible         Vancomycin     <=0.5 ug/mL Susceptible                  1  "Intrinsically Resistant   2 No high level gentamicin resistance found - therefore combination therapy with an aminoglycoside may be indicated for serious enterococcal infections such as bacteremia and endocarditis.        Susceptibility Comments    Enterococcus faecium   Antibiotics listed as \"No Interpretation\" have no regulatory guidelines for susceptibility/resistance available.         Specimen Collected: 04/06/22 12:36 PM Last Resulted: 04/09/22 11:23 AM                       Contains abnormal data Anaerobic Bacterial Culture Routine  Order: [id number]   Collected 4/6/2022 12:36 PM       Status: Final result       Visible to patient: No (inaccessible in MyChart)      Specimen Information: Abdomen; Abscess         0 Result Notes      Culture 4+ Fusobacterium necrophorum Abnormal     Susceptibilities not routinely done   4+ Mixed Aerobic and Anaerobic candi            Resulting Agency: [facility]               Recent Labs   Lab Test 04/08/22  0821 12/30/21  1136 11/12/21  1215 09/03/21  0958 08/06/21  1543 07/28/21  1711   CRP 7.9* 0.4 2.0* 1.6* 3.3* 28.4*     Recent Labs   Lab Test 04/11/22  0450 04/10/22  0915 04/09/22  1037 04/08/22  0821 04/07/22  0857 04/06/22  0652   WBC 5.6 6.1 6.2 6.2 4.6 5.5     Recent Labs   Lab Test 04/11/22  0450 04/10/22  0915 04/09/22  1037 04/08/22  0821   CR 0.80 0.79 0.90 0.78   GFRESTIMATED 75 76 65 77       Hematology Studies  Recent Labs   Lab Test 04/11/22  0450 04/10/22  0915 04/09/22  1037 04/08/22  0821 04/07/22  0857 04/06/22  0652   WBC 5.6 6.1 6.2 6.2 4.6 5.5   HCT 32.5* 36.6 38.5 34.2* 33.6* 33.9*    331 290 325 408 367       Metabolic  Recent Labs   Lab Test 04/11/22  0450 04/10/22  0915 04/09/22  1037    141 142   BUN 12 14 12   CO2 28 25 24   CR 0.80 0.79 0.90   GFRESTIMATED 75 76 65       Hepatic Studies  Recent Labs   Lab Test 04/07/22  0857 02/21/22  1109 11/18/21  0907   BILITOTAL 0.5 0.5 0.6   ALKPHOS 68 42* 59   ALBUMIN 1.9* 3.2* 3.0*   AST 14 13 14 "   ALT 10 <9 15       Immunologlobulins  Recent Labs   Lab Test 04/08/22  0821   SED 85*            Imaging Data:   Reviewed

## 2022-04-12 ENCOUNTER — APPOINTMENT (OUTPATIENT)
Dept: PHYSICAL THERAPY | Facility: CLINIC | Age: 77
DRG: 391 | End: 2022-04-12
Payer: COMMERCIAL

## 2022-04-12 ENCOUNTER — APPOINTMENT (OUTPATIENT)
Dept: OCCUPATIONAL THERAPY | Facility: CLINIC | Age: 77
DRG: 391 | End: 2022-04-12
Payer: COMMERCIAL

## 2022-04-12 LAB
GLUCOSE BLDC GLUCOMTR-MCNC: 156 MG/DL (ref 70–99)
GLUCOSE BLDC GLUCOMTR-MCNC: 188 MG/DL (ref 70–99)
GLUCOSE BLDC GLUCOMTR-MCNC: 190 MG/DL (ref 70–99)
GLUCOSE BLDC GLUCOMTR-MCNC: 190 MG/DL (ref 70–99)
GLUCOSE BLDC GLUCOMTR-MCNC: 82 MG/DL (ref 70–99)
GLUCOSE BLDC GLUCOMTR-MCNC: 92 MG/DL (ref 70–99)
HOLD SPECIMEN: NORMAL
MAGNESIUM SERPL-MCNC: 1.6 MG/DL (ref 1.8–2.6)
SARS-COV-2 RNA RESP QL NAA+PROBE: NEGATIVE

## 2022-04-12 PROCEDURE — 99232 SBSQ HOSP IP/OBS MODERATE 35: CPT | Performed by: INTERNAL MEDICINE

## 2022-04-12 PROCEDURE — 87635 SARS-COV-2 COVID-19 AMP PRB: CPT | Performed by: INTERNAL MEDICINE

## 2022-04-12 PROCEDURE — 36415 COLL VENOUS BLD VENIPUNCTURE: CPT | Performed by: INTERNAL MEDICINE

## 2022-04-12 PROCEDURE — 250N000012 HC RX MED GY IP 250 OP 636 PS 637: Performed by: FAMILY MEDICINE

## 2022-04-12 PROCEDURE — 250N000013 HC RX MED GY IP 250 OP 250 PS 637: Performed by: FAMILY MEDICINE

## 2022-04-12 PROCEDURE — 120N000001 HC R&B MED SURG/OB

## 2022-04-12 PROCEDURE — 97116 GAIT TRAINING THERAPY: CPT | Mod: GP

## 2022-04-12 PROCEDURE — 97535 SELF CARE MNGMENT TRAINING: CPT | Mod: GO

## 2022-04-12 PROCEDURE — 99231 SBSQ HOSP IP/OBS SF/LOW 25: CPT | Performed by: INTERNAL MEDICINE

## 2022-04-12 PROCEDURE — 83735 ASSAY OF MAGNESIUM: CPT | Performed by: INTERNAL MEDICINE

## 2022-04-12 PROCEDURE — 99231 SBSQ HOSP IP/OBS SF/LOW 25: CPT | Performed by: PHYSICIAN ASSISTANT

## 2022-04-12 PROCEDURE — 250N000011 HC RX IP 250 OP 636: Performed by: FAMILY MEDICINE

## 2022-04-12 RX ADMIN — ACETAMINOPHEN 650 MG: 325 TABLET ORAL at 14:02

## 2022-04-12 RX ADMIN — HYDROMORPHONE HYDROCHLORIDE 1 MG: 2 TABLET ORAL at 20:31

## 2022-04-12 RX ADMIN — PIPERACILLIN AND TAZOBACTAM 3.38 G: 3; .375 INJECTION, POWDER, LYOPHILIZED, FOR SOLUTION INTRAVENOUS at 22:27

## 2022-04-12 RX ADMIN — PANTOPRAZOLE SODIUM 40 MG: 20 TABLET, DELAYED RELEASE ORAL at 16:10

## 2022-04-12 RX ADMIN — Medication 1 MG: at 20:31

## 2022-04-12 RX ADMIN — ALBUTEROL SULFATE 2 PUFF: 90 AEROSOL, METERED RESPIRATORY (INHALATION) at 21:09

## 2022-04-12 RX ADMIN — MONTELUKAST 10 MG: 10 TABLET, FILM COATED ORAL at 20:31

## 2022-04-12 RX ADMIN — PANTOPRAZOLE SODIUM 40 MG: 20 TABLET, DELAYED RELEASE ORAL at 08:21

## 2022-04-12 RX ADMIN — GABAPENTIN 900 MG: 300 CAPSULE ORAL at 20:31

## 2022-04-12 RX ADMIN — PRAVASTATIN SODIUM 40 MG: 20 TABLET ORAL at 20:31

## 2022-04-12 RX ADMIN — CARVEDILOL 12.5 MG: 12.5 TABLET, FILM COATED ORAL at 08:21

## 2022-04-12 RX ADMIN — LOSARTAN POTASSIUM 12.5 MG: 25 TABLET, FILM COATED ORAL at 08:21

## 2022-04-12 RX ADMIN — INSULIN ASPART 2 UNITS: 100 INJECTION, SOLUTION INTRAVENOUS; SUBCUTANEOUS at 11:47

## 2022-04-12 RX ADMIN — CARVEDILOL 12.5 MG: 12.5 TABLET, FILM COATED ORAL at 17:37

## 2022-04-12 RX ADMIN — ACETAMINOPHEN 650 MG: 325 TABLET ORAL at 11:49

## 2022-04-12 RX ADMIN — PIPERACILLIN AND TAZOBACTAM 3.38 G: 3; .375 INJECTION, POWDER, LYOPHILIZED, FOR SOLUTION INTRAVENOUS at 05:29

## 2022-04-12 RX ADMIN — FLUOXETINE HYDROCHLORIDE 40 MG: 20 CAPSULE ORAL at 08:21

## 2022-04-12 RX ADMIN — PIPERACILLIN AND TAZOBACTAM 3.38 G: 3; .375 INJECTION, POWDER, LYOPHILIZED, FOR SOLUTION INTRAVENOUS at 13:53

## 2022-04-12 RX ADMIN — PREDNISONE 15 MG: 5 TABLET ORAL at 08:21

## 2022-04-12 RX ADMIN — INSULIN ASPART 1 UNITS: 100 INJECTION, SOLUTION INTRAVENOUS; SUBCUTANEOUS at 16:43

## 2022-04-12 RX ADMIN — ACETAMINOPHEN 650 MG: 325 TABLET ORAL at 07:46

## 2022-04-12 ASSESSMENT — ACTIVITIES OF DAILY LIVING (ADL)
ADLS_ACUITY_SCORE: 11
ADLS_ACUITY_SCORE: 11
ADLS_ACUITY_SCORE: 10
ADLS_ACUITY_SCORE: 10
ADLS_ACUITY_SCORE: 11
ADLS_ACUITY_SCORE: 8
ADLS_ACUITY_SCORE: 10
ADLS_ACUITY_SCORE: 8
ADLS_ACUITY_SCORE: 8
ADLS_ACUITY_SCORE: 10
ADLS_ACUITY_SCORE: 10
ADLS_ACUITY_SCORE: 11
ADLS_ACUITY_SCORE: 10
ADLS_ACUITY_SCORE: 10
ADLS_ACUITY_SCORE: 11
ADLS_ACUITY_SCORE: 10
ADLS_ACUITY_SCORE: 8
ADLS_ACUITY_SCORE: 10
ADLS_ACUITY_SCORE: 11

## 2022-04-12 NOTE — PROGRESS NOTES
[facility] MEDICINE PROGRESS NOTE      Identification/Summary:  Zakiya Christian is a 77 year old old female with history of diabetes mellitus type 2 on Metformin, chronic kidney disease, peripheral neuropathy, chronic depression, COPD with asthma, anxiety and recent diagnosis about 2 months ago of polymyalgia rheumatica presented with 3-month history of nonspecific abdominal and back pain and just generally feeling poorly.  Has been seen in her clinic and eventually was diagnosed with polymyalgia rheumatica and referred to rheumatologist.  Has been on azathioprine and prednisone.  Also apparently has been treated a couple times for UTI.  Had significant increased weakness 4/5/2022 and presented to the emergency room.  Was started on cephalexin for UTI 2 days ago.  Found to have a large intra-abdominal abscess 13 x 11 x 6 cm as likely diverticular.  Now started on IV metronidazole and ceftriaxone and  general surgery and interventional radiology have seen the patient today.Hospital course is notable for IR abscess drainage 4/6 without complications.  Continues to have good drainage.  Preliminary cultures growing Enterococcus and Klebsiella with sensitivities pending.  Vancomycin added.  Patient definitely feeling better.  Discussed with rheumatology and will start to wean her prednisone and discontinue the azathioprine.    Poor ALISSA output with concerns for patency and placement. CT 4/10 confirms that drain is not within abscess. IR abscessogram - drain couldn't be repositioned.  Abx per ID recommendations.  Afebrile without leukocytosis. VSS on RA.   Holding xarelto. Ordered CT abdomen drain placement for 4/13        Assessment and Plan:  Large intra-abdominal abscess which is likely diverticular.  13 x 11 x 6 cm.  Suspect this is been present for about 3 months by her history.  Has been on IV antibiotics including metronidazole and ceftriaxone and has seen general surgery and interventional  radiology. Status post IR drain 4/6 with cultures.  Preliminary cultures growing Klebsiella, Pseudomonas and Fusobacterium necrophorum and Enterococcus. Sensitivities returned with ID consult placed. Advanced to low fiber diet and tolerating well. Elevated ESR/CRP. CT Abdomen 4/10 showing drain no longer within abscess.   Plan: Infectious disease recommended changing antibiotics to daptomycin and IV piperacillin-tazobactam via antibiotic review with the pharmacist. Sensitivities returned with official ID consult placed. Infectious disease has changed her to IV piperacillin-tazobactam only   -PRN 1 mg Dilauded for breakthrough pain  -NPO prior to procedure  - replace drain hopefully tomorrow order placed  - holding xarelto     Klebsiella Pneumoniae UTI (susceptible to all except ampicillin resistant)  - afebrile with no leukocytosis, nausea, flank pain, or ongoing dysuria  Plan: On broad spectrum antibiotics for abscess coverage     Diabetes mellitus type 2 on Metformin  Stable blood sugars of   Plan: Hold Metformin and treat with sliding scale insulin. Continue to monitor      Chronic kidney disease.  Creatinine and GFR normal     Low magnesium at 1.3-->1.6-->2.0--1.5-->2.0-->1.6.    Plan:  check level in the morning   -Magnesium protocol      Hypokalemia (Resolved)  We had her on 3 times daily potassium for 2 days   Plan: Discontinue oral potassium   -BMP in the AM     Peripheral neuropathy on gabapentin     Depression and anxiety on fluoxetine     COPD and asthma. On RA at baseline. Afebrile with normal WBC. Denies increased cough. Required 1 LPM 02 on 4/8 but has been successfully weaned to RA. Wheezing at rest and with activity. Improved since admission   Plan: Continue prior meds  -Promote ambulation   -PT/OT  -Incentive spirometry   -Titrate O2 to maintain SPO2 > 88%     Peripheral neuropathy on gabapentin     Essential hypertension on losartan and carveidolol.  Plan: Continue     GERD on PPI     Hold  her aspirin      Rivaroxaban on hold until  Drain placement      Hyperlipidemia on statin     Hard of hearing     Polymyalgia rheumatica diagnosis but suspected symptoms and elevated sedimentation rate could have been due to this chronic intra-abdominal abscess.  I discussed patient with her rheumatologist Dr. Gonzalez He states she was initially seen by him after she had already been placed on high-dose steroids.  He agrees that this most likely is not polymyalgia rheumatica but was the abscess from the beginning and myalgias and elevated sedimentation rate which is due to the abscess and then those led to the assumption that she had polymyalgia rheumatica because she did not have other classic symptoms of infection like fever.  Plan: Discontinue permanently the azathioprine and wean prednisone from 20 mg down to 15 mg and then decrease by 5 mg a week and then she should see Dr. Gonzalez in about 8 to 10 weeks.    Plan: Continue prior meds.  -Monitor for worsening respiratory status and pain with prednisone taper   -Taper to 10 mg on 4/15      Diet: Low Fiber Diet  DVT Prophylaxis:  DOAC  Code Status: Full Code      Disposition Plan    PT / OT recommending TCU at discharge. Patient living at home independently prior to admission. Hesitant about TCU and desires return to home if possible. Discussed reasons for TCU recommendation and benefits of this level of care. Ongoing assessment of functional status improvement during stay.    The patient's care was discussed with the Attending Physician, Dr. Cerda and patient     Mikaela Cerda MD  Internal Medicine/ Hospitalist  [facility]  Office # [phone]        Interval History/Subjective:  Feeling a bit soreness in the lower abdomen, bowel movement every time she urinates.  No nausea or vomiting.  No chest pain or shortness of breath    Physical Exam/Objective:  Temp:  [97.4  F (36.3  C)-98.3  F (36.8  C)] 98  F (36.7  C)  Pulse:  [] 78  Resp:  [16-17]  17  BP: (132-156)/(60-78) 134/64  SpO2:  [89 %-96 %] 96 %  Body mass index is 38.17 kg/m .    General: Awake alert and comfortable  Lungs: CTA without rales or rhonchi  Heart: S1, S2 without murmurs  Abdomen: Soft nontender, bowel sounds positive  CNS: Nonfocal  Extremities: No edema    Data reviewed today: I personally reviewed all new medications, labs, imaging/diagnostics reports over the past 24 hours. Pertinent findings include:    Imaging:   Recent Results (from the past 24 hour(s))   IR Abscess Tube Check    Narrative    [facility]  LOCATION: [facility]  DATE: 4/11/2022    PROCEDURE: ABSCESS TUBE CHECK AND REMOVAL    INDICATION: Deep mesenteric abscess status post tube drainage. The tube has come out on CT. We are asked to evaluate tube and possibly reposition from the current access.    CONSENT: The risks, benefits and alternatives of an abscessogram with possible exchange, manipulation or removal were discussed with the patient  in detail. All questions were answered. Informed consent was given to proceed with the procedure.    MODERATE SEDATION: None.    CONTRAST: 5 mL Omnipaque into the abscess cavity.   ANTIBIOTICS: None.  ADDITIONAL MEDICATIONS: None.    FLUOROSCOPIC TIME: 0.3 minutes.  RADIATION DOSE: Air Kerma: 5 mGy.    COMPLICATIONS: No immediate complications.    STERILE BARRIER TECHNIQUE: Maximum sterile barrier technique was used. Cutaneous antisepsis was performed at the operative site with application of 2% chlorhexidine and large sterile drape. Prior to the procedure, the  and assistant performed   hand hygiene and wore hat, mask, sterile gown, and sterile gloves during the entire procedure.    PROCEDURE:    A  image was obtained.    The pre-existing drainage catheter was injected with a small amount of contrast and images were obtained. Based on the results of the study the drainage catheter was removed.    FINDINGS:  Injection of the catheter  opacifies free peritoneal cavity. No opacification of the deeper collection. For this reason the catheter is removed.      Impression    IMPRESSION:    Catheter has withdrawn into the peritoneal cavity.    Given persistence of the deep collection which is smaller, it would be prudent to attempt CT-guided drainage for control. Patient currently on Xarelto, Xarelto should be held given the deep position and CT drainage performed after an adequate interval.    PLAN:  Consider percutaneous CT-guided drainage attempt Wednesday or Thursday depending upon the last dose of Xarelto.       Labs:  Most Recent 3 CBC's:  Recent Labs   Lab Test 04/11/22  0450 04/10/22  0915 04/09/22  1037   WBC 5.6 6.1 6.2   HGB 10.3* 11.5* 11.7   MCV 97 99 101*    331 290     Most Recent 3 BMP's:  Recent Labs   Lab Test 04/12/22  0815 04/12/22  0210 04/11/22  2054 04/11/22  0733 04/11/22  0450 04/10/22  1230 04/10/22  0915 04/09/22  1137 04/09/22  1037   NA  --   --   --   --  140  --  141  --  142   POTASSIUM  --   --   --   --  4.7  --  4.9  --  4.4   CHLORIDE  --   --   --   --  102  --  104  --  106   CO2  --   --   --   --  28  --  25  --  24   BUN  --   --   --   --  12  --  14  --  12   CR  --   --   --   --  0.80  --  0.79  --  0.90   ANIONGAP  --   --   --   --  10  --  12  --  12   VICENTA  --   --   --   --  8.8  --  8.9  --  8.8   GLC 82 92 178*   < > 92   < > 119   < > 161*    < > = values in this interval not displayed.     Most Recent 6 Bacteria Isolates From Any Culture (See EPIC Reports for Culture Details):No lab results found.  Most Recent 6 glucoses:  Recent Labs   Lab Test 04/12/22  0815 04/12/22  0210 04/11/22 2054 04/11/22  1620 04/11/22  1209 04/11/22  0733   GLC 82 92 178* 142* 132* 76     Most Recent ESR & CRP:  Recent Labs   Lab Test 04/08/22  0821   SED 85*   CRP 7.9*       Medications:   Personally Reviewed.  Medications       carvedilol  12.5 mg Oral BID w/meals     FLUoxetine  40 mg Oral QAM      Fluticasone-Umeclidin-Vilanterol  1 puff Inhalation QAM     gabapentin  900 mg Oral At Bedtime     insulin aspart  1-7 Units Subcutaneous TID AC     losartan  12.5 mg Oral QAM     montelukast  10 mg Oral At Bedtime     pantoprazole  40 mg Oral BID AC     piperacillin-tazobactam  3.375 g Intravenous Q8H     pravastatin  40 mg Oral At Bedtime     predniSONE  15 mg Oral Daily     [Held by provider] rivaroxaban ANTICOAGULANT  20 mg Oral Daily with breakfast     sodium chloride (PF)  3 mL Intracatheter Q8H

## 2022-04-12 NOTE — PLAN OF CARE
O2 sats 91% on room air. Denies chest pain, denies SOB. C/o sore abdomen around old drain site; prn tylenol and prn dilaudid administered. Abdominal dressing from ALISSA drain removal CDI. Up to bathroom with SBA. Call light within reach. Bed alarm on for safety.

## 2022-04-12 NOTE — SUMMARY OF CARE
Pt. Reports a 2/10 pain in lower back, continually asks for 650 mg of acetaminophen q4hr. Their vitals are WDL and loose bowels are reported by pt. Each time they use the restroom. The Mg was 1.6 and will be rechecked in the morning. Drain is scheduled for reinsertion for tomorrow at 0700 hr. Zosyn 3.375 was administered 1353 hr and will run for 4 hrs. BG was taken at 1643 hr and it was 188, 1 unit was administered. Pt. Calm and oriented x4 and seems very pleasant.

## 2022-04-12 NOTE — PROGRESS NOTES
Care Management Follow Up    Length of Stay (days): 7    Expected Discharge Date: 04/15/2022     Concerns to be Addressed:   Drain placement      Patient plan of care discussed at interdisciplinary rounds: Yes    Anticipated Discharge Disposition: Home     Anticipated Discharge Services:  Home care    Anticipated Discharge DME:  TBD     Education Provided on the Discharge Plan:  AVS per bedside RN.     Patient/Family in Agreement with the Plan:  yes    Additional Information:  Chart reviewed. CM attempted to see patient but patient was out of the room at a procedure. PT recommendations changed.     Can accept for RN, PT and OT. CM to update home care agency when closer to discharge. 12:57 PM     OT recommendations: Transitional Care Facility;home with assist  PT recommendations: home with assist;home with home care physical therapy    Sudha Resendez RN

## 2022-04-12 NOTE — PLAN OF CARE
Problem: Plan of Care - These are the overarching goals to be used throughout the patient stay.    Goal: Plan of Care Review/Shift Note  Description: The Plan of Care Review/Shift note should be completed every shift.  The Outcome Evaluation is a brief statement about your assessment that the patient is improving, declining, or no change.  This information will be displayed automatically on your shift note.  Outcome: Ongoing, Progressing     Patient A & O. PRN Tylenol provided for back/abdominal pain. Patient had experienced epistaxis this morning, but none since. Loose bowels this morning, but resolved throughout the afternoon. On Mg protocol with planned recheck in the morning. Oxygen stats stable on room air. Xarelto on hold for possible CT with drain replacement pending order placed. Patient SBA x 1 with using the call light appropriately.

## 2022-04-12 NOTE — PROGRESS NOTES
CLINICAL NUTRITION SERVICES    Reviewed nutrition risk factors due to LOS. Pt is tolerating diet, eating well per nursing documentation. No nutrition issues identified at this time. RD will follow via >14 day los, unless consulted.

## 2022-04-12 NOTE — PROGRESS NOTES
[facility] Inpatient follow up                Assessment and Recommendations:   Assessment:  Zakiya Christian is a 77 year old female with   1.  Diabetes mellitus  2.  PMR on prednisone 15 mg daily  3.  Diverticular abscess status post drain placement. Cultures with ampicillin susceptible Enterococcus, quinolone susceptible Pseudomonas, Klebsiella, Bacteroides, Fusobacterium.      4.  Positive urine culture with Klebsiella.   5. DVT RLE, on xarelto    crp 7.9        Recommendations:   Continue IV Zosyn.  Drain is out  To be replaced this week    Discussed with the patient, staff.     We will follow.    Lakhwinder Spence M.D.  [facility].               Interval History:     HPI:    Doing well today.  No vomiting.  No abdominal pain.  No side effect from antibiotic.   Drain is out      IMPRESSION:  1.  The distal loop of the percutaneous drainage catheter has retracted anterior and is no longer within the large irregularly abscess in the left lower abdomen and upper pelvis which now measures 12 x 8 x 4 cm, previously 13 x 11 x 6 cm.    2.  Subacute diverticulitis proximal sigmoid colon with a small 1.5 cm peridiverticular abscess and adjacent short sinus tract leading to the above-mentioned abscess again seen. In addition there appears to be sinus tracts leading inferiorly to the   vaginal cuff and possibly the bladder.       Pertinent cultures include:  No results found for: CULT    Recent Inflammatory Biomarkers:   Recent Labs   Lab Test 04/11/22  0450 04/10/22  0915 04/09/22  1037 04/08/22  0821 04/07/22  0857 04/06/22  0652 04/05/22  1450 04/05/22  1304 12/30/21  1136 11/12/21  1215 09/03/21  0958 08/06/21  1543 07/28/21  1711   CRP  --   --   --  7.9*  --   --   --   --  0.4 2.0* 1.6* 3.3* 28.4*   PCAL  --   --   --   --   --   --  8.75*  --   --   --   --   --   --    WBC 5.6 6.1 6.2 6.2 4.6 5.5  --    < >  --   --   --   --   --     < > = values in this  interval not displayed.            Review of Systems:   CONSTITUTIONAL:    Temp Max: Temp (24hrs), Av  F (36.7  C), Min:97.7  F (36.5  C), Max:98.2  F (36.8  C)  All 12 systems reviewed, negative except for the above.    Negative except for findings in the HPI.           Current Medications (antimicrobials listed in bold):       carvedilol  12.5 mg Oral BID w/meals     FLUoxetine  40 mg Oral QAM     Fluticasone-Umeclidin-Vilanterol  1 puff Inhalation QAM     gabapentin  900 mg Oral At Bedtime     insulin aspart  1-7 Units Subcutaneous TID AC     losartan  12.5 mg Oral QAM     montelukast  10 mg Oral At Bedtime     pantoprazole  40 mg Oral BID AC     piperacillin-tazobactam  3.375 g Intravenous Q8H     pravastatin  40 mg Oral At Bedtime     predniSONE  15 mg Oral Daily     [Held by provider] rivaroxaban ANTICOAGULANT  20 mg Oral Daily with breakfast     sodium chloride (PF)  3 mL Intracatheter Q8H              Allergies:     Allergies   Allergen Reactions     Simvastatin Hives            Physical Exam:   Vitals were reviewed  Patient Vitals for the past 24 hrs:   BP Temp Temp src Pulse Resp SpO2 Weight   22 1132 124/58 97.8  F (36.6  C) Oral 94 28 (!) 89 % --   22 0746 134/64 98  F (36.7  C) Oral 78 17 96 % --   22 0600 -- -- -- -- -- -- 94.7 kg (208 lb 11.2 oz)   22 0440 138/60 97.8  F (36.6  C) Oral 81 16 92 % --   22 2347 132/60 97.4  F (36.3  C) Oral 77 16 91 % --   22 2050 -- -- -- -- -- 91 % --   22 1645 (!) 156/78 -- -- 102 -- -- --   22 1528 (!) 143/63 98.3  F (36.8  C) Oral 77 16 (!) 89 % --       Physical Examination:  Gen. appearance nontoxic  Eyes no conjunctivitis or icterus  Neck no stiffness or neck vein distention, no LN  Heart  no S3  Lungs clear no wheeze  Abdomen soft , some tenderness  Extremities no synovitis,edema rt leg  Skin  no rash or emboli  Neurologic alert oriented no focal deficits           Laboratory Data:   ID Labs:  Microbiology  labs:  Contains abnormal data Abscess Aerobic Bacterial Culture Routine  Order: [id number]   Collected 4/6/2022 12:36 PM     Status: Final result     Visible to patient: No (inaccessible in MyChart)    Specimen Information: Abdomen; Abscess         0 Result Notes    Culture 2+ Klebsiella pneumoniae Abnormal        1+ Pseudomonas aeruginosa Abnormal        1+ Enterococcus faecium Abnormal        Isolated in broth only Bacteroides ovatus/xylanisolvens Abnormal     On day 1 of incubation   Susceptibilities not routinely done   Isolated in broth only Bacteroides fragilis Abnormal     On day 1 of incubation   Susceptibilities not routinely done        Resulting Agency: [facility]       Susceptibility     Klebsiella pneumoniae Pseudomonas aeruginosa Enterococcus faecium     STACY STACY STACY     Amikacin   <=2.0 ug/mL Susceptible       Ampicillin  Resistant 1   <=2.0 ug/mL Susceptible     Ampicillin/ Sulbactam 4.0 ug/mL Susceptible         Cefepime <=1.0 ug/mL Susceptible <=1.0 ug/mL Susceptible       Ceftazidime <=1.0 ug/mL Susceptible <=1.0 ug/mL Susceptible       Ceftriaxone <=1.0 ug/mL Susceptible         Ciprofloxacin <=0.25 ug/mL Susceptible <=0.25 ug/mL Susceptible       Gentamicin <=1.0 ug/mL Susceptible <=1.0 ug/mL Susceptible       Gentamicin Synergy     Susceptible... Susceptible 2     Levofloxacin <=0.12 ug/mL Susceptible 0.25 ug/mL Susceptible       Meropenem <=0.25 ug/mL Susceptible <=0.25 ug/mL Susceptible       Penicillin     2.0 ug/mL Susceptible     Piperacillin/Tazobactam <=4.0 ug/mL Susceptible <=4.0 ug/mL Susceptible       Tobramycin <=1.0 ug/mL Susceptible <=1.0 ug/mL Susceptible       Trimethoprim/Sulfamethoxazole <=1/19 ug/mL Susceptible         Vancomycin     <=0.5 ug/mL Susceptible                  1 Intrinsically Resistant   2 No high level gentamicin resistance found - therefore combination therapy with an aminoglycoside may be indicated for serious enterococcal infections such as bacteremia and  "endocarditis.        Susceptibility Comments    Enterococcus faecium   Antibiotics listed as \"No Interpretation\" have no regulatory guidelines for susceptibility/resistance available.         Specimen Collected: 04/06/22 12:36 PM Last Resulted: 04/09/22 11:23 AM                       Contains abnormal data Anaerobic Bacterial Culture Routine  Order: [id number]   Collected 4/6/2022 12:36 PM       Status: Final result       Visible to patient: No (inaccessible in MyChart)      Specimen Information: Abdomen; Abscess         0 Result Notes      Culture 4+ Fusobacterium necrophorum Abnormal     Susceptibilities not routinely done   4+ Mixed Aerobic and Anaerobic candi            Resulting Agency: [facility]               Recent Labs   Lab Test 04/08/22  0821 12/30/21  1136 11/12/21  1215 09/03/21  0958 08/06/21  1543 07/28/21  1711   CRP 7.9* 0.4 2.0* 1.6* 3.3* 28.4*     Recent Labs   Lab Test 04/11/22  0450 04/10/22  0915 04/09/22  1037 04/08/22  0821 04/07/22  0857 04/06/22  0652   WBC 5.6 6.1 6.2 6.2 4.6 5.5     Recent Labs   Lab Test 04/11/22  0450 04/10/22  0915 04/09/22  1037 04/08/22  0821   CR 0.80 0.79 0.90 0.78   GFRESTIMATED 75 76 65 77       Hematology Studies  Recent Labs   Lab Test 04/11/22  0450 04/10/22  0915 04/09/22  1037 04/08/22  0821 04/07/22  0857 04/06/22  0652   WBC 5.6 6.1 6.2 6.2 4.6 5.5   HCT 32.5* 36.6 38.5 34.2* 33.6* 33.9*    331 290 325 408 367       Metabolic  Recent Labs   Lab Test 04/11/22  0450 04/10/22  0915 04/09/22  1037    141 142   BUN 12 14 12   CO2 28 25 24   CR 0.80 0.79 0.90   GFRESTIMATED 75 76 65       Hepatic Studies  Recent Labs   Lab Test 04/07/22  0857 02/21/22  1109 11/18/21  0907   BILITOTAL 0.5 0.5 0.6   ALKPHOS 68 42* 59   ALBUMIN 1.9* 3.2* 3.0*   AST 14 13 14   ALT 10 <9 15       Immunologlobulins  Recent Labs   Lab Test 04/08/22  0821   SED 85*            Imaging Data:   Reviewed     "

## 2022-04-12 NOTE — PLAN OF CARE
Problem: Plan of Care - These are the overarching goals to be used throughout the patient stay.    Goal: Readiness for Transition of Care  Outcome: Ongoing, Progressing   A&O. Denies pain. On room air. Congested cough. Continues on IV zosyn. BG at 0200 was 92. Mg protocol. IR plans to replace drain on Wed/Thurs, Xarelto held in preparation. Alarms on for safety.

## 2022-04-12 NOTE — PROGRESS NOTES
Pt. Stated a 5/10 pain in lower abdomen due to past drainage device. Student administered 650 mg of acetaminophen orally at 0730 hr. Patient stated to student nurse they felt dizzy so the student nurse took their vitals and blood glucose levels. BP was 135/68, temp 97.9, O2 sat 97%, and BG was 190. Insulin will be administered 30 min prior to lunch. Student gave patient 8 oz of water and they decided to lay down. Patient claimed they felt better after a short nap.

## 2022-04-12 NOTE — PROGRESS NOTES
General Surgery Progress Note:    Hospital Day # 7    ASSESSMENT:   1. Colonic diverticular abscess    2. Body aches    3. Hypomagnesemia    4. Acute UTI    5. Diverticulitis of colon        Zakiya Christian is a 77 year old female intra-abdominal abscess consistent with diverticular abscess, s/p IR placement of drain on 4/6/2022.  Patient continues to do well and has no output in her drain  Repeat abscessogram 4/11//2022-drain removed as it was not in the abscess pocket and not able to approach fluid pocket until off Xarelto for couple of days    PLAN:   -Appreciate IR input and cares.  -Appreciate ID input and cares as well as HMS cares  -Regular diet as tolerated  -Waiting on IR new placement of drain hopefully either tomorrow or Thursday.      SUBJECTIVE:   Zakiya Christian is a little sore from where her drain was.  No significant events overnight.  Is been afebrile.  Continues not to have any leukocytosis.    Patient Vitals for the past 24 hrs:   BP Temp Temp src Pulse Resp SpO2 Weight   04/12/22 0746 134/64 98  F (36.7  C) Oral 78 17 96 % --   04/12/22 0600 -- -- -- -- -- -- 94.7 kg (208 lb 11.2 oz)   04/12/22 0440 138/60 97.8  F (36.6  C) Oral 81 16 92 % --   04/11/22 2347 132/60 97.4  F (36.3  C) Oral 77 16 91 % --   04/11/22 2050 -- -- -- -- -- 91 % --   04/11/22 1645 (!) 156/78 -- -- 102 -- -- --   04/11/22 1528 (!) 143/63 98.3  F (36.8  C) Oral 77 16 (!) 89 % --   04/11/22 1450 (!) 149/74 -- -- -- 16 93 % --   04/11/22 1440 -- -- -- 86 16 93 % --   04/11/22 1410 -- -- -- 86 16 92 % --   04/11/22 1400 139/65 -- -- 84 16 90 % --   04/11/22 1345 (!) 155/71 -- -- 84 16 93 % --       Physical Exam:  General: NAD, pleasant    ABD: Soft tender just at the drain site with bandage over it.  No peritoneal signs present.      No results displayed because visit has over 200 results.   Recent Results (from the past 24 hour(s))   Glucose by meter    Collection Time: 04/11/22 12:09 PM   Result Value Ref Range    GLUCOSE BY  METER POCT 132 (H) 70 - 99 mg/dL   Glucose by meter    Collection Time: 04/11/22  4:20 PM   Result Value Ref Range    GLUCOSE BY METER POCT 142 (H) 70 - 99 mg/dL   Glucose by meter    Collection Time: 04/11/22  8:54 PM   Result Value Ref Range    GLUCOSE BY METER POCT 178 (H) 70 - 99 mg/dL   Glucose by meter    Collection Time: 04/12/22  2:10 AM   Result Value Ref Range    GLUCOSE BY METER POCT 92 70 - 99 mg/dL   Magnesium    Collection Time: 04/12/22  4:44 AM   Result Value Ref Range    Magnesium 1.6 (L) 1.8 - 2.6 mg/dL   Extra Purple Top Tube    Collection Time: 04/12/22  4:44 AM   Result Value Ref Range    Hold Specimen JIC    Glucose by meter    Collection Time: 04/12/22  8:15 AM   Result Value Ref Range    GLUCOSE BY METER POCT 82 70 - 99 mg/dL             CYNTHIA DengC

## 2022-04-13 ENCOUNTER — APPOINTMENT (OUTPATIENT)
Dept: CT IMAGING | Facility: CLINIC | Age: 77
DRG: 391 | End: 2022-04-13
Attending: INTERNAL MEDICINE
Payer: COMMERCIAL

## 2022-04-13 LAB
APTT PPP: 29 SECONDS (ref 22–38)
GLUCOSE BLDC GLUCOMTR-MCNC: 102 MG/DL (ref 70–99)
GLUCOSE BLDC GLUCOMTR-MCNC: 104 MG/DL (ref 70–99)
GLUCOSE BLDC GLUCOMTR-MCNC: 143 MG/DL (ref 70–99)
GLUCOSE BLDC GLUCOMTR-MCNC: 148 MG/DL (ref 70–99)
GLUCOSE BLDC GLUCOMTR-MCNC: 192 MG/DL (ref 70–99)
HGB BLD-MCNC: 9.8 G/DL (ref 11.7–15.7)
INR PPP: 1.16 (ref 0.85–1.15)
LACTATE SERPL-SCNC: 1.7 MMOL/L (ref 0.7–2)
MAGNESIUM SERPL-MCNC: 1.4 MG/DL (ref 1.8–2.6)
PLATELET # BLD AUTO: 251 10E3/UL (ref 150–450)

## 2022-04-13 PROCEDURE — 85730 THROMBOPLASTIN TIME PARTIAL: CPT | Performed by: HOSPITALIST

## 2022-04-13 PROCEDURE — 87070 CULTURE OTHR SPECIMN AEROBIC: CPT | Performed by: RADIOLOGY

## 2022-04-13 PROCEDURE — 99207 PR NO CHARGE LOS: CPT | Performed by: SPECIALIST

## 2022-04-13 PROCEDURE — 85018 HEMOGLOBIN: CPT | Performed by: HOSPITALIST

## 2022-04-13 PROCEDURE — 250N000012 HC RX MED GY IP 250 OP 636 PS 637: Performed by: FAMILY MEDICINE

## 2022-04-13 PROCEDURE — 0W9G3ZZ DRAINAGE OF PERITONEAL CAVITY, PERCUTANEOUS APPROACH: ICD-10-PCS | Performed by: RADIOLOGY

## 2022-04-13 PROCEDURE — 36415 COLL VENOUS BLD VENIPUNCTURE: CPT | Performed by: INTERNAL MEDICINE

## 2022-04-13 PROCEDURE — 83605 ASSAY OF LACTIC ACID: CPT | Performed by: INTERNAL MEDICINE

## 2022-04-13 PROCEDURE — 85049 AUTOMATED PLATELET COUNT: CPT | Performed by: HOSPITALIST

## 2022-04-13 PROCEDURE — 83735 ASSAY OF MAGNESIUM: CPT | Performed by: INTERNAL MEDICINE

## 2022-04-13 PROCEDURE — 250N000011 HC RX IP 250 OP 636: Performed by: RADIOLOGY

## 2022-04-13 PROCEDURE — 49406 IMAGE CATH FLUID PERI/RETRO: CPT

## 2022-04-13 PROCEDURE — 250N000011 HC RX IP 250 OP 636: Performed by: FAMILY MEDICINE

## 2022-04-13 PROCEDURE — 99232 SBSQ HOSP IP/OBS MODERATE 35: CPT | Performed by: INTERNAL MEDICINE

## 2022-04-13 PROCEDURE — 250N000013 HC RX MED GY IP 250 OP 250 PS 637: Performed by: INTERNAL MEDICINE

## 2022-04-13 PROCEDURE — 87075 CULTR BACTERIA EXCEPT BLOOD: CPT | Performed by: RADIOLOGY

## 2022-04-13 PROCEDURE — 85610 PROTHROMBIN TIME: CPT | Performed by: HOSPITALIST

## 2022-04-13 PROCEDURE — 36415 COLL VENOUS BLD VENIPUNCTURE: CPT | Performed by: HOSPITALIST

## 2022-04-13 PROCEDURE — 250N000013 HC RX MED GY IP 250 OP 250 PS 637: Performed by: FAMILY MEDICINE

## 2022-04-13 PROCEDURE — 120N000001 HC R&B MED SURG/OB

## 2022-04-13 RX ORDER — MAGNESIUM SULFATE 4 G/50ML
4 INJECTION INTRAVENOUS ONCE
Status: DISCONTINUED | OUTPATIENT
Start: 2022-04-13 | End: 2022-04-13

## 2022-04-13 RX ORDER — NALOXONE HYDROCHLORIDE 0.4 MG/ML
0.2 INJECTION, SOLUTION INTRAMUSCULAR; INTRAVENOUS; SUBCUTANEOUS
Status: DISCONTINUED | OUTPATIENT
Start: 2022-04-13 | End: 2022-04-15 | Stop reason: HOSPADM

## 2022-04-13 RX ORDER — FLUMAZENIL 0.1 MG/ML
0.2 INJECTION, SOLUTION INTRAVENOUS
Status: DISCONTINUED | OUTPATIENT
Start: 2022-04-13 | End: 2022-04-15 | Stop reason: HOSPADM

## 2022-04-13 RX ORDER — MAGNESIUM OXIDE 400 MG/1
400 TABLET ORAL 3 TIMES DAILY
Status: COMPLETED | OUTPATIENT
Start: 2022-04-13 | End: 2022-04-15

## 2022-04-13 RX ORDER — NALOXONE HYDROCHLORIDE 0.4 MG/ML
0.4 INJECTION, SOLUTION INTRAMUSCULAR; INTRAVENOUS; SUBCUTANEOUS
Status: DISCONTINUED | OUTPATIENT
Start: 2022-04-13 | End: 2022-04-15 | Stop reason: HOSPADM

## 2022-04-13 RX ORDER — FENTANYL CITRATE 50 UG/ML
25-50 INJECTION, SOLUTION INTRAMUSCULAR; INTRAVENOUS EVERY 5 MIN PRN
Status: DISCONTINUED | OUTPATIENT
Start: 2022-04-13 | End: 2022-04-15 | Stop reason: HOSPADM

## 2022-04-13 RX ORDER — LIDOCAINE 40 MG/G
CREAM TOPICAL
Status: DISCONTINUED | OUTPATIENT
Start: 2022-04-13 | End: 2022-04-15 | Stop reason: HOSPADM

## 2022-04-13 RX ADMIN — GABAPENTIN 900 MG: 300 CAPSULE ORAL at 21:19

## 2022-04-13 RX ADMIN — MIDAZOLAM HYDROCHLORIDE 0.5 MG: 1 INJECTION, SOLUTION INTRAMUSCULAR; INTRAVENOUS at 12:11

## 2022-04-13 RX ADMIN — MONTELUKAST 10 MG: 10 TABLET, FILM COATED ORAL at 21:18

## 2022-04-13 RX ADMIN — FENTANYL CITRATE 25 MCG: 50 INJECTION, SOLUTION INTRAMUSCULAR; INTRAVENOUS at 12:11

## 2022-04-13 RX ADMIN — MAGNESIUM OXIDE TAB 400 MG (241.3 MG ELEMENTAL MG) 400 MG: 400 (241.3 MG) TAB at 13:11

## 2022-04-13 RX ADMIN — CARVEDILOL 12.5 MG: 12.5 TABLET, FILM COATED ORAL at 18:05

## 2022-04-13 RX ADMIN — PIPERACILLIN AND TAZOBACTAM 3.38 G: 3; .375 INJECTION, POWDER, LYOPHILIZED, FOR SOLUTION INTRAVENOUS at 13:12

## 2022-04-13 RX ADMIN — FENTANYL CITRATE 25 MCG: 50 INJECTION, SOLUTION INTRAMUSCULAR; INTRAVENOUS at 12:16

## 2022-04-13 RX ADMIN — PANTOPRAZOLE SODIUM 40 MG: 20 TABLET, DELAYED RELEASE ORAL at 18:05

## 2022-04-13 RX ADMIN — PIPERACILLIN AND TAZOBACTAM 3.38 G: 3; .375 INJECTION, POWDER, LYOPHILIZED, FOR SOLUTION INTRAVENOUS at 06:42

## 2022-04-13 RX ADMIN — PIPERACILLIN AND TAZOBACTAM 3.38 G: 3; .375 INJECTION, POWDER, LYOPHILIZED, FOR SOLUTION INTRAVENOUS at 21:37

## 2022-04-13 RX ADMIN — HYDROMORPHONE HYDROCHLORIDE 1 MG: 2 TABLET ORAL at 14:33

## 2022-04-13 RX ADMIN — PREDNISONE 15 MG: 5 TABLET ORAL at 09:11

## 2022-04-13 RX ADMIN — PANTOPRAZOLE SODIUM 40 MG: 20 TABLET, DELAYED RELEASE ORAL at 09:11

## 2022-04-13 RX ADMIN — ALBUTEROL SULFATE 2 PUFF: 90 AEROSOL, METERED RESPIRATORY (INHALATION) at 21:33

## 2022-04-13 RX ADMIN — LOSARTAN POTASSIUM 12.5 MG: 25 TABLET, FILM COATED ORAL at 09:11

## 2022-04-13 RX ADMIN — MAGNESIUM OXIDE TAB 400 MG (241.3 MG ELEMENTAL MG) 400 MG: 400 (241.3 MG) TAB at 21:18

## 2022-04-13 RX ADMIN — FLUOXETINE HYDROCHLORIDE 40 MG: 20 CAPSULE ORAL at 09:11

## 2022-04-13 RX ADMIN — ALBUTEROL SULFATE 2 PUFF: 90 AEROSOL, METERED RESPIRATORY (INHALATION) at 16:27

## 2022-04-13 RX ADMIN — PRAVASTATIN SODIUM 40 MG: 20 TABLET ORAL at 21:17

## 2022-04-13 RX ADMIN — ACETAMINOPHEN 650 MG: 325 TABLET ORAL at 20:20

## 2022-04-13 RX ADMIN — CARVEDILOL 12.5 MG: 12.5 TABLET, FILM COATED ORAL at 09:11

## 2022-04-13 RX ADMIN — IOPAMIDOL 75 ML: 755 INJECTION, SOLUTION INTRAVENOUS at 12:00

## 2022-04-13 RX ADMIN — INSULIN ASPART 2 UNITS: 100 INJECTION, SOLUTION INTRAVENOUS; SUBCUTANEOUS at 18:39

## 2022-04-13 RX ADMIN — MIDAZOLAM HYDROCHLORIDE 0.5 MG: 1 INJECTION, SOLUTION INTRAMUSCULAR; INTRAVENOUS at 12:16

## 2022-04-13 ASSESSMENT — ACTIVITIES OF DAILY LIVING (ADL)
ADLS_ACUITY_SCORE: 10
ADLS_ACUITY_SCORE: 12
ADLS_ACUITY_SCORE: 10
ADLS_ACUITY_SCORE: 12
ADLS_ACUITY_SCORE: 10
ADLS_ACUITY_SCORE: 10

## 2022-04-13 NOTE — PROGRESS NOTES
Patient Name: Zakiya Christian  Medical Record Number: [id number]  Today's Date: 4/13/2022    Procedure: CT guided abdominal peritoneal abscess tube placement with sedation  Proceduralist: Dr. Hogan    Procedure Start: 1211  Procedure end: 1241  Sedation medications administered: Fentanyl 50mcg, Versed 1.0mg   Sedation time: 30 min    Report given to: CONNOR Leger  : n/a    Other Notes: Pt arrived to CT room 1 from 220. Consent reviewed. Pt denies any questions or concerns regarding procedure. Pt positioned supine and monitored per protocol. Pt tolerated procedure without any noted complications. VSS. Pt transferred back to 220.    Poornima Miller RN  04/13/22  12:40 PM    12F ALISSA drain placed to patient's left hip. 10ml of purulent drainage sent to lab for cultures. Another 65ml of purulent drainage was pulled from drain by Dr. Hogan

## 2022-04-13 NOTE — INTERVAL H&P NOTE
I have reviewed the surgical (or preoperative) H&P that is linked to this encounter, and examined the patient. There are no significant changes    Clinical Conditions Present on Arrival:  Clinically Significant Risk Factors Present on Admission

## 2022-04-13 NOTE — PLAN OF CARE
Pt alert & oriented. VSS. Pt denies pain on shift. Pt had intermittent nausea when she went to the bathroom but resolved itself when she laid back in bed. Pt NPO @ 0400 for CT guided drain placement. Pt on mag protocol. Pt able to sleep comfortably overnight.

## 2022-04-13 NOTE — PROGRESS NOTES
Surgery:  Chart check only today.  Patient is comfortable.  Repeat CT scan with attempt at new percutaneous drain is planned for today.    We will check on her again tomorrow after drain placed.

## 2022-04-13 NOTE — PLAN OF CARE
Problem: Plan of Care - These are the overarching goals to be used throughout the patient stay.    Goal: Plan of Care Review/Shift Note  Description: The Plan of Care Review/Shift note should be completed every shift.  The Outcome Evaluation is a brief statement about your assessment that the patient is improving, declining, or no change.  This information will be displayed automatically on your shift note.  Outcome: Ongoing, Progressing  Flowsheets (Taken 4/13/2022 1527)  Plan of Care Reviewed With: patient  Outcome Evaluation: Patient hd been NPO for ALISSA drain placement. ALISSA drain placed, patient resumed diabetic diet. Patient having pain in right hip, abdominal and where ALISSA site is located. PO dilaudid given. Patient calling appropriately. Patient alert and orientated. Will continue to monitor.  Overall Patient Progress: improving   Goal Outcome Evaluation:    Plan of Care Reviewed With: patient     Overall Patient Progress: improving    Outcome Evaluation: Patient hd been NPO for ALISSA drain placement. ALISSA drain placed, patient resumed diabetic diet. Patient having pain in right hip, abdominal and where ALISSA site is located. PO dilaudid given. Patient calling appropriately. Patient alert and orientated. Will continue to monitor.

## 2022-04-13 NOTE — PROGRESS NOTES
[facility] MEDICINE PROGRESS NOTE      Identification/Summary:  Zakiya Christian is a 77 year old old female with history of diabetes mellitus type 2 on Metformin, chronic kidney disease, peripheral neuropathy, chronic depression, COPD with asthma, anxiety and recent diagnosis about 2 months ago of polymyalgia rheumatica presented with 3-month history of nonspecific abdominal and back pain and just generally feeling poorly.  Has been seen in her clinic and eventually was diagnosed with polymyalgia rheumatica and referred to rheumatologist.  Has been on azathioprine and prednisone.  Also apparently has been treated a couple times for UTI.  Had significant increased weakness 4/5/2022 and presented to the emergency room.  Was started on cephalexin for UTI 2 days ago.  Found to have a large intra-abdominal abscess 13 x 11 x 6 cm as likely diverticular.  Now started on IV metronidazole and ceftriaxone and  general surgery and interventional radiology have seen the patient today.Hospital course is notable for IR abscess drainage 4/6 without complications.  Continues to have good drainage.  Preliminary cultures growing Enterococcus and Klebsiella with sensitivities pending.  Vancomycin added.  Patient definitely feeling better.  Discussed with rheumatology and will start to wean her prednisone and discontinue the azathioprine.    Poor ALISSA output with concerns for patency and placement. CT 4/10 confirms that drain is not within abscess. IR abscessogram - drain couldn't be repositioned.  Abx per ID recommendations.  Afebrile without leukocytosis. VSS on RA.   Holding xarelto. Ordered CT abdomen drain placement for 4/13  Scheduled now        Assessment and Plan:  Large intra-abdominal abscess which is likely diverticular.  13 x 11 x 6 cm.  Suspect this is been present for about 3 months by her history.  Has been on IV antibiotics including metronidazole and ceftriaxone and has seen general surgery and  interventional radiology. Status post IR drain 4/6 with cultures.  Preliminary cultures growing Klebsiella, Pseudomonas and Fusobacterium necrophorum and Enterococcus. Sensitivities returned with ID consult placed. Advanced to low fiber diet and tolerating well. Elevated ESR/CRP. CT Abdomen 4/10 showing drain no longer within abscess.   Plan: Infectious disease recommended changing antibiotics to daptomycin and IV piperacillin-tazobactam via antibiotic review with the pharmacist. Sensitivities returned with official ID consult placed. Infectious disease has changed her to IV piperacillin-tazobactam only   -PRN 1 mg Dilauded for breakthrough pain  -NPO prior to procedure, can resume diet after procedure   - replace drain scheduled 4/13   - holding xarelto , resume 4/14     Klebsiella Pneumoniae UTI (susceptible to all except ampicillin resistant)  - afebrile with no leukocytosis, nausea, flank pain, or ongoing dysuria  Plan: On broad spectrum antibiotics for abscess coverage     Diabetes mellitus type 2 on Metformin  Stable blood sugars of    Plan: Hold Metformin and treat with sliding scale insulin. Continue to monitor      Chronic kidney disease.  Creatinine and GFR normal     Low magnesium at 1.3-->1.6-->2.0--1.5-->2.0-->1.6=>  1.4   Plan:  check level in the morning   -Magnesium protocol      Hypokalemia (Resolved)  We had her on 3 times daily potassium for 2 days   Plan: Discontinue oral potassium   -BMP prn      Peripheral neuropathy on gabapentin     Depression and anxiety on fluoxetine     COPD and asthma. On RA at baseline. Afebrile with normal WBC. Denies increased cough. Required 1 LPM 02 on 4/8 but has been successfully weaned to RA. Wheezing at rest and with activity. Improved since admission   Plan: Continue prior meds  -Promote ambulation   -PT/OT  -Incentive spirometry   -Titrate O2 to maintain SPO2 > 88%     Peripheral neuropathy on gabapentin     Essential hypertension on losartan and  carveidolol.  Plan: Continue     GERD on PPI     Hold her aspirin      Rivaroxaban on hold until  Drain placement      Hyperlipidemia on statin     Hard of hearing     Polymyalgia rheumatica diagnosis but suspected symptoms and elevated sedimentation rate could have been due to this chronic intra-abdominal abscess.  Dr. Bernstein discussed patient with her rheumatologist Dr. Gonzalez He states she was initially seen by him after she had already been placed on high-dose steroids.  He agrees that this most likely is not polymyalgia rheumatica but was the abscess from the beginning and myalgias and elevated sedimentation rate which is due to the abscess and then those led to the assumption that she had polymyalgia rheumatica because she did not have other classic symptoms of infection like fever.  Plan: Discontinue permanently the azathioprine and wean prednisone from 20 mg down to 15 mg and then decrease by 5 mg a week and then she should see Dr. Gonzalez in about 8 to 10 weeks.    Plan: Continue prior meds.  -Monitor for worsening respiratory status and pain with prednisone taper   -Taper to 10 mg on 4/15      Diet: NPO per Anesthesia Guidelines for Procedure/Surgery Except for: Meds  DVT Prophylaxis:  DOAC  Code Status: Full Code      Disposition Plan    PT / OT recommending TCU at discharge. Patient living at home independently prior to admission. Hesitant about TCU and desires return to home if possible. Discussed reasons for TCU recommendation and benefits of this level of care. Ongoing assessment of functional status improvement during stay.        Mikaela Cerda MD  Internal Medicine/ Hospitalist  [facility]  Office # [phone]        Interval History/Subjective:  Sore lower abdomen, denies any nausea and vomiting   BM with every urination episode  No n/v/d     Physical Exam/Objective:  Temp:  [97.5  F (36.4  C)-99  F (37.2  C)] 99  F (37.2  C)  Pulse:  [81-95] 90  Resp:  [16-28] 16  BP:  (117-149)/(55-72) 117/55  SpO2:  [89 %-97 %] 97 %  Body mass index is 38.17 kg/m .    General: Awake alert and comfortable  Lungs: CTA without rales or rhonchi  Heart: S1, S2 without murmurs  Abdomen: Soft nontender, bowel sounds positive  CNS: Nonfocal  Extremities: No edema    Data reviewed today: I personally reviewed all new medications, labs, imaging/diagnostics reports over the past 24 hours. Pertinent findings include:    Imaging:   No results found for this or any previous visit (from the past 24 hour(s)).    Labs:  Most Recent 3 CBC's:  Recent Labs   Lab Test 04/13/22  0540 04/11/22  0450 04/10/22  0915 04/09/22  1037   WBC  --  5.6 6.1 6.2   HGB 9.8* 10.3* 11.5* 11.7   MCV  --  97 99 101*    300 331 290     Most Recent 3 BMP's:  Recent Labs   Lab Test 04/13/22 0242 04/12/22 2104 04/12/22  1640 04/11/22  0733 04/11/22  0450 04/10/22  1230 04/10/22  0915 04/09/22  1137 04/09/22  1037   NA  --   --   --   --  140  --  141  --  142   POTASSIUM  --   --   --   --  4.7  --  4.9  --  4.4   CHLORIDE  --   --   --   --  102  --  104  --  106   CO2  --   --   --   --  28  --  25  --  24   BUN  --   --   --   --  12  --  14  --  12   CR  --   --   --   --  0.80  --  0.79  --  0.90   ANIONGAP  --   --   --   --  10  --  12  --  12   VICENTA  --   --   --   --  8.8  --  8.9  --  8.8   * 156* 188*   < > 92   < > 119   < > 161*    < > = values in this interval not displayed.     Most Recent 6 Bacteria Isolates From Any Culture (See EPIC Reports for Culture Details):No lab results found.  Most Recent 6 glucoses:  Recent Labs   Lab Test 04/13/22 0242 04/12/22 2104 04/12/22  1640 04/12/22  1138 04/12/22  1049 04/12/22  0815   * 156* 188* 190* 190* 82     Most Recent ESR & CRP:  Recent Labs   Lab Test 04/08/22  0821   SED 85*   CRP 7.9*       Medications:   Personally Reviewed.  Medications       carvedilol  12.5 mg Oral BID w/meals     FLUoxetine  40 mg Oral QAM     Fluticasone-Umeclidin-Vilanterol  1  puff Inhalation QAM     gabapentin  900 mg Oral At Bedtime     insulin aspart  1-7 Units Subcutaneous TID AC     losartan  12.5 mg Oral QAM     magnesium sulfate  4 g Intravenous Once     montelukast  10 mg Oral At Bedtime     pantoprazole  40 mg Oral BID AC     piperacillin-tazobactam  3.375 g Intravenous Q8H     pravastatin  40 mg Oral At Bedtime     predniSONE  15 mg Oral Daily     [Held by provider] rivaroxaban ANTICOAGULANT  20 mg Oral Daily with breakfast     sodium chloride (PF)  3 mL Intracatheter Q8H

## 2022-04-13 NOTE — PROCEDURES
[facility]    Procedure: Imaging Procedure Note    Date/Time: 4/13/2022 12:57 PM  Performed by: Oisel Hogan MD  Authorized by: Osiel Hogan MD       UNIVERSAL PROTOCOL   Site Marked: Yes  Prior Images Obtained and Reviewed:  Yes  Required items: Required blood products, implants, devices and special equipment available    Patient identity confirmed:  Verbally with patient and arm band  Patient was reevaluated immediately before administering moderate or deep sedation or anesthesia  Confirmation Checklist:  Patient's identity using two indicators, relevant allergies, procedure was appropriate and matched the consent or emergent situation and correct equipment/implants were available  Time out: Immediately prior to the procedure a time out was called    Universal Protocol: the Joint Commission Universal Protocol was followed    Preparation: Patient was prepped and draped in usual sterile fashion    ESBL (mL):  5     ANESTHESIA    Anesthesia: Local infiltration  Local Anesthetic:  Lidocaine 1% without epinephrine  Anesthetic Total (mL):  15      SEDATION  Patient Sedated: Yes    Sedation:  Fentanyl and midazolam  Vital signs: Vital signs monitored during sedation    See dictated procedure note for full details.    PROCEDURE  Describe Procedure: Utilizing computed tomography guidance, a 12 F locking pigtail catheter was placed within the abdominal abscess.  Patient Tolerance:  Patient tolerated the procedure well with no immediate complications and patient tolerated the procedure well with no immediate complications  Length of time physician/provider present for 1:1 monitoring during sedation: 30

## 2022-04-13 NOTE — PROVIDER NOTIFICATION
Spoke with Loi in CT about patients CT guided ALISSA placement 4/13/2022. He states CT will take place around 0900/1000. States we need current H&P, hemoglobin, PTT, INR & platelet count. Pt also to be NPO after 0400.     MD notified and orders placed.

## 2022-04-13 NOTE — PRE-PROCEDURE
GENERAL PRE-PROCEDURE:   Procedure:  Computed tomography guided abdominal abscess drain placement with conscious sedation.  Date/Time:  4/13/2022 11:39 AM    Verbal consent obtained?: Yes    Written consent obtained?: Yes    Risks and benefits: Risks, benefits and alternatives were discussed    Consent given by:  Patient  Patient states understanding of procedure being performed: Yes    Patient's understanding of procedure matches consent: Yes    Procedure consent matches procedure scheduled: Yes    Expected level of sedation:  Moderate  Appropriately NPO:  Yes  ASA Class:  2  Mallampati  :  Grade 2- soft palate, base of uvula, tonsillar pillars, and portion of posterior pharyngeal wall visible  Lungs:  Crackles left base and crackles right base  Heart:  Normal heart sounds and rate  History & Physical reviewed:  History and physical reviewed and no updates needed  Statement of review:  I have reviewed the lab findings, diagnostic data, medications, and the plan for sedation

## 2022-04-14 ENCOUNTER — APPOINTMENT (OUTPATIENT)
Dept: PHYSICAL THERAPY | Facility: CLINIC | Age: 77
DRG: 391 | End: 2022-04-14
Payer: COMMERCIAL

## 2022-04-14 LAB
ANION GAP SERPL CALCULATED.3IONS-SCNC: 13 MMOL/L (ref 5–18)
BUN SERPL-MCNC: 19 MG/DL (ref 8–28)
CALCIUM SERPL-MCNC: 8.8 MG/DL (ref 8.5–10.5)
CHLORIDE BLD-SCNC: 99 MMOL/L (ref 98–107)
CO2 SERPL-SCNC: 26 MMOL/L (ref 22–31)
CREAT SERPL-MCNC: 1.31 MG/DL (ref 0.6–1.1)
ERYTHROCYTE [DISTWIDTH] IN BLOOD BY AUTOMATED COUNT: 15.3 % (ref 10–15)
GFR SERPL CREATININE-BSD FRML MDRD: 42 ML/MIN/1.73M2
GLUCOSE BLD-MCNC: 125 MG/DL (ref 70–125)
GLUCOSE BLDC GLUCOMTR-MCNC: 125 MG/DL (ref 70–99)
GLUCOSE BLDC GLUCOMTR-MCNC: 131 MG/DL (ref 70–99)
GLUCOSE BLDC GLUCOMTR-MCNC: 157 MG/DL (ref 70–99)
GLUCOSE BLDC GLUCOMTR-MCNC: 244 MG/DL (ref 70–99)
GLUCOSE BLDC GLUCOMTR-MCNC: 258 MG/DL (ref 70–99)
HCT VFR BLD AUTO: 32.6 % (ref 35–47)
HGB BLD-MCNC: 10.2 G/DL (ref 11.7–15.7)
MAGNESIUM SERPL-MCNC: 1.5 MG/DL (ref 1.8–2.6)
MCH RBC QN AUTO: 30.6 PG (ref 26.5–33)
MCHC RBC AUTO-ENTMCNC: 31.3 G/DL (ref 31.5–36.5)
MCV RBC AUTO: 98 FL (ref 78–100)
PLATELET # BLD AUTO: 278 10E3/UL (ref 150–450)
POTASSIUM BLD-SCNC: 4.1 MMOL/L (ref 3.5–5)
RBC # BLD AUTO: 3.33 10E6/UL (ref 3.8–5.2)
SODIUM SERPL-SCNC: 138 MMOL/L (ref 136–145)
WBC # BLD AUTO: 6.8 10E3/UL (ref 4–11)

## 2022-04-14 PROCEDURE — 80048 BASIC METABOLIC PNL TOTAL CA: CPT | Performed by: INTERNAL MEDICINE

## 2022-04-14 PROCEDURE — 99231 SBSQ HOSP IP/OBS SF/LOW 25: CPT | Performed by: SPECIALIST

## 2022-04-14 PROCEDURE — 36415 COLL VENOUS BLD VENIPUNCTURE: CPT | Performed by: INTERNAL MEDICINE

## 2022-04-14 PROCEDURE — 97116 GAIT TRAINING THERAPY: CPT | Mod: GP

## 2022-04-14 PROCEDURE — 99232 SBSQ HOSP IP/OBS MODERATE 35: CPT | Performed by: FAMILY MEDICINE

## 2022-04-14 PROCEDURE — 250N000013 HC RX MED GY IP 250 OP 250 PS 637: Performed by: FAMILY MEDICINE

## 2022-04-14 PROCEDURE — 250N000013 HC RX MED GY IP 250 OP 250 PS 637: Performed by: INTERNAL MEDICINE

## 2022-04-14 PROCEDURE — 120N000001 HC R&B MED SURG/OB

## 2022-04-14 PROCEDURE — 250N000012 HC RX MED GY IP 250 OP 636 PS 637: Performed by: FAMILY MEDICINE

## 2022-04-14 PROCEDURE — 99233 SBSQ HOSP IP/OBS HIGH 50: CPT | Performed by: INTERNAL MEDICINE

## 2022-04-14 PROCEDURE — 250N000011 HC RX IP 250 OP 636: Performed by: FAMILY MEDICINE

## 2022-04-14 PROCEDURE — 85027 COMPLETE CBC AUTOMATED: CPT | Performed by: INTERNAL MEDICINE

## 2022-04-14 PROCEDURE — 83735 ASSAY OF MAGNESIUM: CPT | Performed by: INTERNAL MEDICINE

## 2022-04-14 RX ORDER — FLUCONAZOLE 100 MG/1
200 TABLET ORAL DAILY
Status: DISCONTINUED | OUTPATIENT
Start: 2022-04-14 | End: 2022-04-15 | Stop reason: HOSPADM

## 2022-04-14 RX ORDER — IOPAMIDOL 755 MG/ML
100 INJECTION, SOLUTION INTRAVASCULAR ONCE
Status: COMPLETED | OUTPATIENT
Start: 2022-04-14 | End: 2022-04-13

## 2022-04-14 RX ADMIN — CARVEDILOL 12.5 MG: 12.5 TABLET, FILM COATED ORAL at 17:05

## 2022-04-14 RX ADMIN — MAGNESIUM OXIDE TAB 400 MG (241.3 MG ELEMENTAL MG) 400 MG: 400 (241.3 MG) TAB at 21:25

## 2022-04-14 RX ADMIN — MAGNESIUM OXIDE TAB 400 MG (241.3 MG ELEMENTAL MG) 400 MG: 400 (241.3 MG) TAB at 09:09

## 2022-04-14 RX ADMIN — INSULIN ASPART 3 UNITS: 100 INJECTION, SOLUTION INTRAVENOUS; SUBCUTANEOUS at 17:16

## 2022-04-14 RX ADMIN — CARVEDILOL 12.5 MG: 12.5 TABLET, FILM COATED ORAL at 09:09

## 2022-04-14 RX ADMIN — INSULIN ASPART 3 UNITS: 100 INJECTION, SOLUTION INTRAVENOUS; SUBCUTANEOUS at 13:08

## 2022-04-14 RX ADMIN — PIPERACILLIN AND TAZOBACTAM 3.38 G: 3; .375 INJECTION, POWDER, LYOPHILIZED, FOR SOLUTION INTRAVENOUS at 21:25

## 2022-04-14 RX ADMIN — MONTELUKAST 10 MG: 10 TABLET, FILM COATED ORAL at 21:25

## 2022-04-14 RX ADMIN — FLUCONAZOLE 200 MG: 100 TABLET ORAL at 18:32

## 2022-04-14 RX ADMIN — PRAVASTATIN SODIUM 40 MG: 20 TABLET ORAL at 21:25

## 2022-04-14 RX ADMIN — GABAPENTIN 900 MG: 300 CAPSULE ORAL at 21:25

## 2022-04-14 RX ADMIN — ACETAMINOPHEN 650 MG: 325 TABLET ORAL at 06:40

## 2022-04-14 RX ADMIN — FLUOXETINE HYDROCHLORIDE 40 MG: 20 CAPSULE ORAL at 09:09

## 2022-04-14 RX ADMIN — HYDROMORPHONE HYDROCHLORIDE 1 MG: 2 TABLET ORAL at 21:26

## 2022-04-14 RX ADMIN — PIPERACILLIN AND TAZOBACTAM 3.38 G: 3; .375 INJECTION, POWDER, LYOPHILIZED, FOR SOLUTION INTRAVENOUS at 14:01

## 2022-04-14 RX ADMIN — PIPERACILLIN AND TAZOBACTAM 3.38 G: 3; .375 INJECTION, POWDER, LYOPHILIZED, FOR SOLUTION INTRAVENOUS at 05:07

## 2022-04-14 RX ADMIN — PANTOPRAZOLE SODIUM 40 MG: 20 TABLET, DELAYED RELEASE ORAL at 17:05

## 2022-04-14 RX ADMIN — HYDROMORPHONE HYDROCHLORIDE 1 MG: 2 TABLET ORAL at 14:30

## 2022-04-14 RX ADMIN — PANTOPRAZOLE SODIUM 40 MG: 20 TABLET, DELAYED RELEASE ORAL at 06:40

## 2022-04-14 RX ADMIN — PREDNISONE 15 MG: 5 TABLET ORAL at 09:08

## 2022-04-14 RX ADMIN — ACETAMINOPHEN 650 MG: 325 TABLET ORAL at 11:14

## 2022-04-14 RX ADMIN — MAGNESIUM OXIDE TAB 400 MG (241.3 MG ELEMENTAL MG) 400 MG: 400 (241.3 MG) TAB at 14:09

## 2022-04-14 ASSESSMENT — ACTIVITIES OF DAILY LIVING (ADL)
ADLS_ACUITY_SCORE: 12
ADLS_ACUITY_SCORE: 12
ADLS_ACUITY_SCORE: 10
ADLS_ACUITY_SCORE: 10
ADLS_ACUITY_SCORE: 12
ADLS_ACUITY_SCORE: 10
ADLS_ACUITY_SCORE: 12
ADLS_ACUITY_SCORE: 10
ADLS_ACUITY_SCORE: 12
ADLS_ACUITY_SCORE: 12

## 2022-04-14 NOTE — PROGRESS NOTES
Sitting up in a chair.  States that she is feeling better today.  T-max 102 yesterday shortly after the drain was placed.  Now afebrile, vital signs stable.    Physical exam:  Sitting up in a chair.  Looks comfortable.  ALISSA drain has some purulent material in it but also some serous fluid now in the tubing.    Laboratories:  CBC RESULTS: Recent Labs   Lab Test 04/14/22  0729   WBC 6.8   RBC 3.33*   HGB 10.2*   HCT 32.6*   MCV 98   MCH 30.6   MCHC 31.3*   RDW 15.3*        Impression: Diverticular abscess.  Status post percutaneous drainage yesterday.  Drain appears to be in good position given what is coming out of it and in reviewing her CT scan.  Hopefully this will take care of the abscess and she can avoid surgery.  We will continue to follow with you.  Now that the drain is in place, she probably could be discharged to transitional care with follow-up with radiology in the future.

## 2022-04-14 NOTE — PLAN OF CARE
Problem: Pain Acute  Goal: Acceptable Pain Control and Functional Ability  Outcome: Ongoing, Progressing  Intervention: Prevent or Manage Pain  Recent Flowsheet Documentation  Taken 4/13/2022 1600 by Luz Marina Vaca RN  Medication Review/Management: medications reviewed     Problem: Infection  Goal: Absence of Infection Signs and Symptoms  Outcome: Ongoing, Progressing    Patient alert and oriented, answering questions appropriately and easy to arouse. Resting and sleeping between cares and medications. Patient ate a late lunch around 1530 due to earlier CT scan/ALISSA placement and stated not hungry for dinner. Weaned down to 1L O2 via NC and patient O2 saturation stable in the 90s. ALISSA drain had 25 output with 10 mL flush subtracted. Milky and tan output. IV zosyn running. Vitals taken at 2015 and sepsis BPA triggered. See previous notes. Patient now resting comfortably.

## 2022-04-14 NOTE — PROGRESS NOTES
"  Interventional Radiology - Chart Check  Inpatient - [facility]: Interventional Radiology   [phone]  04/14/2022     S:  MARIE.  JOHN continues.  Slept well per RN charting.     Culture:    Antibiotic: Vancomycin  Flushing: 10mL Q8    O:  /51 (BP Location: Left arm)   Pulse 79   Temp 97.8  F (36.6  C) (Oral)   Resp 20   Ht 1.575 m (5' 2\")   Wt 90 kg (198 lb 6.4 oz)   SpO2 99%   BMI 36.29 kg/m        IMAGING:  EXAM:  1. PERCUTANEOUS DRAIN PLACEMENT ABDOMINAL ABSCESS  2. CT GUIDANCE  3. CONSCIOUS SEDATION  LOCATION: [facility]  DATE/TIME: 4/13/2022 1:00 AM     INDICATION: intra abdominal abcess  needs re placement of drain  TECHNIQUE: Dose reduction techniques were used.  Contrast: Isovue-370 75 mL     PROCEDURE: Informed consent obtained. Site marked. Prior images reviewed. Required items made available. Patient identity confirmed verbally and with arm band. Patient reevaluated immediately before administering sedation. Universal protocol was   followed. Time out performed. The site was prepped and draped in sterile fashion. 10 mL of 1% lidocaine was infused into the local soft tissues. Using standard technique and under direct CT guidance, a 12 [place] drainage catheter was inserted into the   fluid collection.       SPECIMEN: 75 mL of purulent fluid was aspirated and sent to lab for cultures and Gram stain.     BLOOD LOSS: Minimal.     The patient tolerated the procedure well. No immediate complications.     SEDATION: Versed 1.0 mg. Fentanyl 50 mcg. The procedure was performed with administration intravenous conscious sedation with appropriate preoperative, intraoperative, and postoperative evaluation.     30 minutes of supervised face to face conscious sedation time was provided by a radiology nurse under my direct supervision.                                                                      IMPRESSION:  1.  CT-guided drain placement into an abdominal " abscess without immediate complication.    LABS:  Recent Labs   Lab 04/14/22  0729 04/13/22  0540 04/11/22  0450 04/10/22  0915 04/09/22  1037   WBC 6.8  --  5.6 6.1 6.2   HGB 10.2* 9.8* 10.3* 11.5* 11.7    251 300 331 290   INR  --  1.16*  --   --   --    CR 1.31*  --  0.80 0.79 0.90     I&O:        A:  77 year old yo female     - Intra-abdominal abscess from diverticulitis of colon, likely 2/2 perforation  - S/P drain placement 4/13/2022  - Vancomycin, cultures per above    P:    - Continue to follow WBC and Cultures.  - Continue present drain cares. Continue drain to bulb drainage.  - Antibiotics per primary team.   - Drain discharge instructions entered into D/C navigator.  - Patient to flush drain with 10mL NS daily upon discharge. NS flushes ordered in D/C navigator.  - Anticipating follow up CT and abscessogram approximately 7-10  days from drain placement date (4/20-4/22/2022) pending drain OPs and patient's clinical status. May consider sooner follow up imaging if there are changes in drain function or in patient's clinical course.  THIS WILL NEED TO BE ORDERED.    - IR will continue to follow loosely. Please contact IR with drain related questions or concerns.          YULIANA BROWER NP  Interventional Radiology  [phone]

## 2022-04-14 NOTE — PROGRESS NOTES
Care Management Follow Up    Length of Stay (days): 9    Expected Discharge Date: 04/15/2022     Concerns to be Addressed:   discharge planning    Patient plan of care discussed at interdisciplinary rounds: No    Anticipated Discharge Disposition: Home     Anticipated Discharge Services:  Home Care for RN, PT and OT visits  Anticipated Discharge DME:  None    Patient/family educated on Medicare website which has current facility and service quality ratings:    Education Provided on the Discharge Plan:  Yes, to pt  Patient/Family in Agreement with the Plan:  Yes    Referrals Placed by CM/SW:  Home Care  Private pay costs discussed: Not applicable    Additional Information:  Chart reviewed. PT recommends home with Home PT. Home Care services have been accepted by [facility] for RN, PT and OT services.     Met with pt to discuss discharge planning. She states she has worked on stairs with therapy and feels ok with plan to discharge home with home care services.  Discussed TCU option, but pt wants to return home with home care. She states her cousin can provide some assistance after discharge.  Her cousin will provide transport at discharge.     Call placed to [facility] and provided update.    1:34 PM  Received call back from [facility] and they requested updated notes.  Note faxed.    2:30 PM  Received update from hospitalist with recommendation for TCU.  He states pt is agreeable to TCU placement now.    Referrals have already been sent to TCUs in [place].  Call placed to check status of referrals.    - [facility] does not have any beds available   - [facility] - message left requesting return call    Met with pt and her cousin.  Provided update on TCU responses so far.  Provided [facility] TCU list.  Pt made additional selections for [facility], [facility] and [facility]. Referrals sent.    3:50 PM  Received call from [facility]  [facility]. She states they can tentatively accept pt pending which IV abx will be needed.    Sandra Pham RN

## 2022-04-14 NOTE — PLAN OF CARE
Problem: Plan of Care - These are the overarching goals to be used throughout the patient stay.    Goal: Optimal Comfort and Wellbeing  Outcome: Ongoing, Progressing  Intervention: Monitor Pain and Promote Comfort  Recent Flowsheet Documentation  Taken 4/14/2022 0915 by Renay Peterson RN  Pain Management Interventions: pillow support provided     Problem: Plan of Care - These are the overarching goals to be used throughout the patient stay.    Goal: Optimal Comfort and Wellbeing  Intervention: Monitor Pain and Promote Comfort  Recent Flowsheet Documentation  Taken 4/14/2022 0915 by Renay Peterson RN  Pain Management Interventions: pillow support provided     Problem: Pain Acute  Goal: Acceptable Pain Control and Functional Ability  Outcome: Ongoing, Progressing  Intervention: Develop Pain Management Plan  Recent Flowsheet Documentation  Taken 4/14/2022 0915 by Renay Peterson RN  Pain Management Interventions: pillow support provided  Intervention: Prevent or Manage Pain  Recent Flowsheet Documentation  Taken 4/14/2022 0915 by Renay Peterson RN  Medication Review/Management: medications reviewed    Pt ambulates to bathroom, sat upright in chair. Eats and drinks well. Complained of pain to back and tailbone. Administered with Tylenol as prescribed but stated does not really help. Administered with oral Dilaudid. Encouraged to stand once in a while for pressure relief. Weaned off oxygen if tolerated. Ongoing PT evaluation at this time.     Goal Outcome Evaluation:

## 2022-04-14 NOTE — PLAN OF CARE
Problem: Pain Acute  Goal: Acceptable Pain Control and Functional Ability  Outcome: Ongoing, Progressing  Intervention: Prevent or Manage Pain  Recent Flowsheet Documentation  Taken 4/14/2022 0200 by Chio Ulrich, RN  Medication Review/Management: medications reviewed     Problem: Infection  Goal: Absence of Infection Signs and Symptoms  Outcome: Ongoing, Progressing   Goal Outcome Evaluation:      Pt A/O, VSS- 1L NC, dyspnea on exertion, exp wheezes. Jeffrey drain - CDI, irrigated x1 per orders, milky/tan output. IV abx.  A1 with gb and walker. Plan- encourage activity, wean O2 as able. Homecare vs. TCU.

## 2022-04-14 NOTE — PROGRESS NOTES
[facility] MEDICINE PROGRESS NOTE      Identification/Summary:   Zakiya Christian is a 77 year old old female with history of diabetes mellitus type 2, chronic kidney disease, peripheral neuropathy, chronic depression, COPD with asthma, anxiety and recent diagnosis of polymyalgia rheumatica presented with 3-month history of nonspecific abdominal and back pain.  Eventually diagnosed with polymyalgia rheumatica and referred to rheumatologist.  Prescribed azathioprine and prednisone.  Also treated a couple times for UTI.  Increased weakness 4/5/2022 and presented to the emergency room.  Was started on cephalexin for UTI 2 days prior.  Found to have a large intra-abdominal abscess 13 x 11 x 6 cm.  Started on IV metronidazole and ceftriaxone, general surgery and interventional radiology following. Hospital course is notable for IR abscess drainage 4/6 without complications.  Preliminary cultures growing Enterococcus and Klebsiella with sensitivities pending.  Vancomycin added.  4/10 decreased ALISSA output with CT confirming poor position.  Drain removed.  Drain replaced 4/13.  Changed to just IV Zosyn per ID.   Previous provider discussed with rheumatology and will start to wean her prednisone and discontinue the azathioprine.             Assessment and Plan:  Large intra-abdominal abscess which is likely diverticular.  13 x 11 x 6 cm.  Suspect this is been present for about 3 months by her history.  Has been on IV antibiotics including metronidazole and ceftriaxone and has seen general surgery and interventional radiology. Status post IR drain 4/6 with cultures.  Preliminary cultures growing Klebsiella, Pseudomonas and Fusobacterium necrophorum and Enterococcus. Sensitivities returned with ID consult placed. Advanced to low fiber diet and tolerating well. Elevated ESR/CRP. CT Abdomen 4/10 showing drain no longer within abscess.   Infectious disease recommended changing antibiotics to daptomycin and  IV piperacillin-tazobactam via antibiotic review with the pharmacist. Sensitivities returned with official ID consult placed. Infectious disease has changed her to IV piperacillin-tazobactam only.   -PRN 1 mg Dilauded for breakthrough pain  - replace drain  4/13   - holding xarelto , resume 4/14      Klebsiella Pneumoniae UTI (susceptible to all except ampicillin resistant)   On broad spectrum antibiotics for abscess coverage     Diabetes mellitus type 2 on Metformin  Stable blood sugars of    Hold Metformin and treat with sliding scale insulin     Chronic kidney disease.  Creatinine and GFR normal     Low magnesium at 1.3-->1.6-->2.0--1.5-->2.0-->1.6=>  1.4   -Magnesium protocol      Hypokalemia (Resolved)  We had her on 3 times daily potassium for 2 days   BMP prn      Peripheral neuropathy on gabapentin     Depression and anxiety on fluoxetine     COPD and asthma. On RA at baseline. Afebrile with normal WBC. Denies increased cough. Required 1 LPM 02 on 4/8 but has been successfully weaned to RA. Wheezing at rest and with activity. Improved since admission   Continue prior meds  -Promote ambulation   -PT/OT  -Incentive spirometry   -Titrate O2 to maintain SPO2 > 88%     Peripheral neuropathy on gabapentin     Essential hypertension on losartan and carveidolol.  Plan: Continue     GERD on PPI     Hold her aspirin      Rivaroxaban on hold until  Drain placement      Hyperlipidemia on statin     Hard of hearing     Polymyalgia rheumatica   Diagnosis in question as likely abscess causing elevated sed rate etc.  Discontinue permanently the azathioprine and wean prednisone from 20 mg down to 15 mg and then decrease by 5 mg a week and then she should see Dr. Gonzalez in about 8 to 10 weeks.    Monitor for worsening respiratory status and pain with prednisone taper   -Taper to 10 mg on 4/15    Diet: Moderate Consistent Carb (60 g CHO per Meal) Diet  DVT Prophylaxis:  DOAC  Code Status: Full Code    Anticipated  possible discharge in 1-2 days when drain placement adequate and all consultants agree with discharge    Disposition Plan   Expected Discharge: 04/15/2022     Anticipated discharge location: home         The patient's care was discussed with the Bedside Nurse, Care Coordinator/ and Patient.    Tyler Oswald MD  [facility]  [facility]  Phone: #[phone]    Interval History/Subjective:  Feeling better.  Drain is draining.  Talked about disposition and she has 12 stairs to go up to get to her bedroom and bathroom in her home.  [place] that TCU might be best for this patient.  After discussion with her family and myself she is in agreement.  No fevers or chills.  No nausea or vomiting.  Talked about tapering down off of prednisone.    Physical Exam/Objective:  Temp:  [97.3  F (36.3  C)-102.1  F (38.9  C)] 97.8  F (36.6  C)  Pulse:  [] 79  Resp:  [16-28] 20  BP: ()/(48-63) 102/51  SpO2:  [91 %-99 %] 99 %  Body mass index is 36.29 kg/m .    GENERAL:  Alert, appears comfortable, in no acute distress, appears stated age   HEAD:  Normocephalic, without obvious abnormality, atraumatic   EYES:  PERRL, conjunctiva/corneas clear, no scleral icterus, EOM's intact   NECK: Supple, symmetrical, trachea midline   BACK:   Symmetric, no curvature, ROM normal   LUNGS:   Clear to auscultation bilaterally, no rales, rhonchi, or wheezing, symmetric chest rise on inhalation, respirations unlabored   CHEST WALL:  No tenderness or deformity   HEART:  Regular rate and rhythm, S1 and S2 normal, no murmur, rub, or gallop    ABDOMEN:   Soft, non-tender, bowel sounds active all four quadrants, no masses, no organomegaly, no rebound or guarding   EXTREMITIES: Extremities normal, atraumatic, no cyanosis or edema    SKIN: Dry to touch, no exanthems in the visualized areas   NEURO: Alert, oriented x3, moves all four extremities freely   PSYCH: Cooperative, behavior is  appropriate      Data reviewed today: I personally reviewed all new medications, labs, imaging/diagnostics reports over the past 24 hours. Pertinent findings include:    Imaging:   Recent Results (from the past 24 hour(s))   CT Abd Peritoneum Abscess Drain w Cath Place    Narrative    EXAM:  1. PERCUTANEOUS DRAIN PLACEMENT ABDOMINAL ABSCESS  2. CT GUIDANCE  3. CONSCIOUS SEDATION  LOCATION: [facility]  DATE/TIME: 4/13/2022 1:00 AM    INDICATION: intra abdominal abcess  needs re placement of drain  TECHNIQUE: Dose reduction techniques were used.  Contrast: Isovue-370 75 mL    PROCEDURE: Informed consent obtained. Site marked. Prior images reviewed. Required items made available. Patient identity confirmed verbally and with arm band. Patient reevaluated immediately before administering sedation. Universal protocol was   followed. Time out performed. The site was prepped and draped in sterile fashion. 10 mL of 1% lidocaine was infused into the local soft tissues. Using standard technique and under direct CT guidance, a 12 [place] drainage catheter was inserted into the   fluid collection.      SPECIMEN: 75 mL of purulent fluid was aspirated and sent to lab for cultures and Gram stain.    BLOOD LOSS: Minimal.    The patient tolerated the procedure well. No immediate complications.    SEDATION: Versed 1.0 mg. Fentanyl 50 mcg. The procedure was performed with administration intravenous conscious sedation with appropriate preoperative, intraoperative, and postoperative evaluation.    30 minutes of supervised face to face conscious sedation time was provided by a radiology nurse under my direct supervision.      Impression    IMPRESSION:  1.  CT-guided drain placement into an abdominal abscess without immediate complication.    Reference CPT Codes: 46578, 46066, 62508       Labs:  Most Recent 3 CBC's:Recent Labs   Lab Test 04/14/22  0729 04/13/22  0540 04/11/22  0450 04/10/22  0915   WBC 6.8  --  5.6  6.1   HGB 10.2* 9.8* 10.3* 11.5*   MCV 98  --  97 99    251 300 331     Most Recent 3 BMP's:Recent Labs   Lab Test 04/14/22  1252 04/14/22  0919 04/14/22  0729 04/11/22  0733 04/11/22  0450 04/10/22  1230 04/10/22  0915   NA  --   --  138  --  140  --  141   POTASSIUM  --   --  4.1  --  4.7  --  4.9   CHLORIDE  --   --  99  --  102  --  104   CO2  --   --  26  --  28  --  25   BUN  --   --  19  --  12  --  14   CR  --   --  1.31*  --  0.80  --  0.79   ANIONGAP  --   --  13  --  10  --  12   VICENTA  --   --  8.8  --  8.8  --  8.9   * 125* 125   < > 92   < > 119    < > = values in this interval not displayed.     Most Recent 2 LFT's:Recent Labs   Lab Test 04/07/22  0857 02/21/22  1109   AST 14 13   ALT 10 <9   ALKPHOS 68 42*   BILITOTAL 0.5 0.5     Most Recent 3 INR's:Recent Labs   Lab Test 04/13/22  0540   INR 1.16*       Medications:   Personally Reviewed.  Medications     sodium chloride 0.9%         carvedilol  12.5 mg Oral BID w/meals     FLUoxetine  40 mg Oral QAM     Fluticasone-Umeclidin-Vilanterol  1 puff Inhalation QAM     gabapentin  900 mg Oral At Bedtime     insulin aspart  1-7 Units Subcutaneous TID AC     losartan  12.5 mg Oral QAM     magnesium oxide  400 mg Oral TID     montelukast  10 mg Oral At Bedtime     pantoprazole  40 mg Oral BID AC     piperacillin-tazobactam  3.375 g Intravenous Q8H     pravastatin  40 mg Oral At Bedtime     predniSONE  15 mg Oral Daily     [Held by provider] rivaroxaban ANTICOAGULANT  20 mg Oral Daily with breakfast     sodium chloride (PF)  10 mL Irrigation Q8H     sodium chloride (PF)  3 mL Intracatheter Q8H     sodium chloride (PF)  3 mL Intracatheter Q8H

## 2022-04-14 NOTE — PROGRESS NOTES
[facility] Inpatient follow up                Assessment and Recommendations:   Assessment:  Zakiya Christian is a 77 year old female with   1.  Diabetes mellitus  2.  PMR on prednisone 15 mg daily  3.  Diverticular abscess status post drain placement. Cultures with ampicillin susceptible Enterococcus, quinolone susceptible Pseudomonas, Klebsiella, Bacteroides, Fusobacterium.      4.  Positive urine culture with Klebsiella.   5. DVT RLE, on xarelto  6. New drain , fever post drain , cx with Kleb , enterobacter and yeast    crp 7.9        Recommendations:   Continue IV Zosyn. Can do levaquin and metronidazole PO as outpt  Add fluconazole PO  Labs in AM    Discussed with hospitalist    We will follow.    Lakhwinder Spence M.D.  [facility].               Interval History:     HPI:    Fever spike   No vomiting.  No abdominal pain.  No side effect from antibiotic.         IMPRESSION:  1.  The distal loop of the percutaneous drainage catheter has retracted anterior and is no longer within the large irregularly abscess in the left lower abdomen and upper pelvis which now measures 12 x 8 x 4 cm, previously 13 x 11 x 6 cm.    2.  Subacute diverticulitis proximal sigmoid colon with a small 1.5 cm peridiverticular abscess and adjacent short sinus tract leading to the above-mentioned abscess again seen. In addition there appears to be sinus tracts leading inferiorly to the   vaginal cuff and possibly the bladder.       Pertinent cultures include:  No results found for: CULT    Recent Inflammatory Biomarkers:   Recent Labs   Lab Test 04/14/22  0729 04/11/22  0450 04/10/22  0915 04/09/22  1037 04/08/22  0821 04/07/22  0857 04/06/22  0652 04/05/22  1450 04/05/22  1304 12/30/21  1136 11/12/21  1215 09/03/21  0958 08/06/21  1543 07/28/21  1711   CRP  --   --   --   --  7.9*  --   --   --   --  0.4 2.0* 1.6* 3.3* 28.4*   PCAL  --   --   --   --   --   --   --  8.75*  --   --   --    --   --   --    WBC 6.8 5.6 6.1 6.2 6.2 4.6   < >  --    < >  --   --   --   --   --     < > = values in this interval not displayed.            Review of Systems:   CONSTITUTIONAL:    Temp Max: Temp (24hrs), Av  F (36.7  C), Min:97.7  F (36.5  C), Max:98.2  F (36.8  C)  All 12 systems reviewed, negative except for the above.    Negative except for findings in the HPI.           Current Medications (antimicrobials listed in bold):       carvedilol  12.5 mg Oral BID w/meals     FLUoxetine  40 mg Oral QAM     Fluticasone-Umeclidin-Vilanterol  1 puff Inhalation QAM     gabapentin  900 mg Oral At Bedtime     insulin aspart  1-7 Units Subcutaneous TID AC     losartan  12.5 mg Oral QAM     magnesium oxide  400 mg Oral TID     montelukast  10 mg Oral At Bedtime     pantoprazole  40 mg Oral BID AC     piperacillin-tazobactam  3.375 g Intravenous Q8H     pravastatin  40 mg Oral At Bedtime     predniSONE  15 mg Oral Daily     rivaroxaban ANTICOAGULANT  20 mg Oral Daily with breakfast     sodium chloride (PF)  10 mL Irrigation Q8H     sodium chloride (PF)  3 mL Intracatheter Q8H     sodium chloride (PF)  3 mL Intracatheter Q8H              Allergies:     Allergies   Allergen Reactions     Simvastatin Hives            Physical Exam:   Vitals were reviewed  Patient Vitals for the past 24 hrs:   BP Temp Temp src Pulse Resp SpO2 Weight   22 1705 115/63 -- -- 89 -- 91 % --   22 1630 (!) 88/45 97.1  F (36.2  C) Axillary 80 18 95 % --   22 0859 112/56 97.7  F (36.5  C) Oral 92 18 96 % --   22 0636 -- -- -- -- -- -- 90 kg (198 lb 6.4 oz)   22 0359 102/51 97.8  F (36.6  C) Oral 79 20 99 % --   22 0147 93/49 97.3  F (36.3  C) Oral 77 18 94 % --   22 0006 93/48 97.5  F (36.4  C) Oral 75 20 93 % --   22 118/58 -- -- 97 28 93 % --   22 -- 99.4  F (37.4  C) Oral -- -- -- --   22 108/51 (!) 101  F (38.3  C) Oral 100 20 93 % --   22 133/60 (!) 102.1  F  (38.9  C) Oral 104 20 94 % --   04/13/22 1838 -- -- -- -- -- 93 % --   04/13/22 1805 (!) 145/63 -- -- 95 -- 95 % --       Physical Examination:  Gen. appearance nontoxic  Eyes no conjunctivitis or icterus  Neck no stiffness or neck vein distention, no LN  Heart  no S3  Lungs clear no wheeze  Abdomen soft , some tenderness  Extremities no synovitis,edema rt leg  Skin  no rash or emboli  Neurologic alert oriented no focal deficits           Laboratory Data:   ID Labs:  Microbiology labs:  Contains abnormal data Abscess Aerobic Bacterial Culture Routine  Order: [id number]   Collected 4/6/2022 12:36 PM     Status: Final result     Visible to patient: No (inaccessible in MyChart)    Specimen Information: Abdomen; Abscess         0 Result Notes    Culture 2+ Klebsiella pneumoniae Abnormal        1+ Pseudomonas aeruginosa Abnormal        1+ Enterococcus faecium Abnormal        Isolated in broth only Bacteroides ovatus/xylanisolvens Abnormal     On day 1 of incubation   Susceptibilities not routinely done   Isolated in broth only Bacteroides fragilis Abnormal     On day 1 of incubation   Susceptibilities not routinely done        Resulting Agency: [facility]       Susceptibility     Klebsiella pneumoniae Pseudomonas aeruginosa Enterococcus faecium     STACY STACY STACY     Amikacin   <=2.0 ug/mL Susceptible       Ampicillin  Resistant 1   <=2.0 ug/mL Susceptible     Ampicillin/ Sulbactam 4.0 ug/mL Susceptible         Cefepime <=1.0 ug/mL Susceptible <=1.0 ug/mL Susceptible       Ceftazidime <=1.0 ug/mL Susceptible <=1.0 ug/mL Susceptible       Ceftriaxone <=1.0 ug/mL Susceptible         Ciprofloxacin <=0.25 ug/mL Susceptible <=0.25 ug/mL Susceptible       Gentamicin <=1.0 ug/mL Susceptible <=1.0 ug/mL Susceptible       Gentamicin Synergy     Susceptible... Susceptible 2     Levofloxacin <=0.12 ug/mL Susceptible 0.25 ug/mL Susceptible       Meropenem <=0.25 ug/mL Susceptible <=0.25 ug/mL Susceptible       Penicillin     2.0 ug/mL  "Susceptible     Piperacillin/Tazobactam <=4.0 ug/mL Susceptible <=4.0 ug/mL Susceptible       Tobramycin <=1.0 ug/mL Susceptible <=1.0 ug/mL Susceptible       Trimethoprim/Sulfamethoxazole <=1/19 ug/mL Susceptible         Vancomycin     <=0.5 ug/mL Susceptible                  1 Intrinsically Resistant   2 No high level gentamicin resistance found - therefore combination therapy with an aminoglycoside may be indicated for serious enterococcal infections such as bacteremia and endocarditis.        Susceptibility Comments    Enterococcus faecium   Antibiotics listed as \"No Interpretation\" have no regulatory guidelines for susceptibility/resistance available.         Specimen Collected: 04/06/22 12:36 PM Last Resulted: 04/09/22 11:23 AM                       Contains abnormal data Anaerobic Bacterial Culture Routine  Order: [id number]   Collected 4/6/2022 12:36 PM       Status: Final result       Visible to patient: No (inaccessible in MyChart)      Specimen Information: Abdomen; Abscess         0 Result Notes      Culture 4+ Fusobacterium necrophorum Abnormal     Susceptibilities not routinely done   4+ Mixed Aerobic and Anaerobic candi            Resulting Agency: [facility]               Recent Labs   Lab Test 04/08/22  0821 12/30/21  1136 11/12/21  1215 09/03/21  0958 08/06/21  1543 07/28/21  1711   CRP 7.9* 0.4 2.0* 1.6* 3.3* 28.4*     Recent Labs   Lab Test 04/14/22  0729 04/11/22  0450 04/10/22  0915 04/09/22  1037 04/08/22  0821 04/07/22  0857   WBC 6.8 5.6 6.1 6.2 6.2 4.6     Recent Labs   Lab Test 04/14/22  0729 04/11/22  0450 04/10/22  0915 04/09/22  1037   CR 1.31* 0.80 0.79 0.90   GFRESTIMATED 42* 75 76 65       Hematology Studies  Recent Labs   Lab Test 04/14/22  0729 04/13/22  0540 04/11/22  0450 04/10/22  0915 04/09/22  1037 04/08/22  0821 04/07/22  0857   WBC 6.8  --  5.6 6.1 6.2 6.2 4.6   HCT 32.6*  --  32.5* 36.6 38.5 34.2* 33.6*    251 300 331 290 325 408       Metabolic  Recent Labs   Lab Test " 04/14/22  0729 04/11/22  0450 04/10/22  0915    140 141   BUN 19 12 14   CO2 26 28 25   CR 1.31* 0.80 0.79   GFRESTIMATED 42* 75 76       Hepatic Studies  Recent Labs   Lab Test 04/07/22  0857 02/21/22  1109 11/18/21  0907   BILITOTAL 0.5 0.5 0.6   ALKPHOS 68 42* 59   ALBUMIN 1.9* 3.2* 3.0*   AST 14 13 14   ALT 10 <9 15       Immunologlobulins  Recent Labs   Lab Test 04/08/22  0821   SED 85*            Imaging Data:   Reviewed

## 2022-04-14 NOTE — PROGRESS NOTES
HR still elevated. Temperature decreasing but still elevated. Patient very diaphoretic. BP decreased from 133/60 to 108/51 within 30 minutes. MD notified via text page.

## 2022-04-15 ENCOUNTER — APPOINTMENT (OUTPATIENT)
Dept: OCCUPATIONAL THERAPY | Facility: CLINIC | Age: 77
DRG: 391 | End: 2022-04-15
Payer: COMMERCIAL

## 2022-04-15 VITALS
BODY MASS INDEX: 38.26 KG/M2 | DIASTOLIC BLOOD PRESSURE: 59 MMHG | HEART RATE: 84 BPM | RESPIRATION RATE: 18 BRPM | TEMPERATURE: 97.3 F | SYSTOLIC BLOOD PRESSURE: 124 MMHG | WEIGHT: 207.9 LBS | OXYGEN SATURATION: 94 % | HEIGHT: 62 IN

## 2022-04-15 DIAGNOSIS — K57.20 COLONIC DIVERTICULAR ABSCESS: Primary | ICD-10-CM

## 2022-04-15 LAB
ANION GAP SERPL CALCULATED.3IONS-SCNC: 9 MMOL/L (ref 5–18)
BUN SERPL-MCNC: 25 MG/DL (ref 8–28)
CALCIUM SERPL-MCNC: 8.5 MG/DL (ref 8.5–10.5)
CHLORIDE BLD-SCNC: 99 MMOL/L (ref 98–107)
CO2 SERPL-SCNC: 28 MMOL/L (ref 22–31)
CREAT SERPL-MCNC: 1.29 MG/DL (ref 0.6–1.1)
ERYTHROCYTE [DISTWIDTH] IN BLOOD BY AUTOMATED COUNT: 15.1 % (ref 10–15)
GFR SERPL CREATININE-BSD FRML MDRD: 42 ML/MIN/1.73M2
GLUCOSE BLD-MCNC: 114 MG/DL (ref 70–125)
GLUCOSE BLDC GLUCOMTR-MCNC: 129 MG/DL (ref 70–99)
GLUCOSE BLDC GLUCOMTR-MCNC: 195 MG/DL (ref 70–99)
GLUCOSE BLDC GLUCOMTR-MCNC: 91 MG/DL (ref 70–99)
HCT VFR BLD AUTO: 30.9 % (ref 35–47)
HGB BLD-MCNC: 9.6 G/DL (ref 11.7–15.7)
MAGNESIUM SERPL-MCNC: 1.7 MG/DL (ref 1.8–2.6)
MCH RBC QN AUTO: 31.1 PG (ref 26.5–33)
MCHC RBC AUTO-ENTMCNC: 31.1 G/DL (ref 31.5–36.5)
MCV RBC AUTO: 100 FL (ref 78–100)
PLATELET # BLD AUTO: 249 10E3/UL (ref 150–450)
POTASSIUM BLD-SCNC: 4.1 MMOL/L (ref 3.5–5)
RBC # BLD AUTO: 3.09 10E6/UL (ref 3.8–5.2)
SODIUM SERPL-SCNC: 136 MMOL/L (ref 136–145)
WBC # BLD AUTO: 7 10E3/UL (ref 4–11)

## 2022-04-15 PROCEDURE — 80048 BASIC METABOLIC PNL TOTAL CA: CPT | Performed by: FAMILY MEDICINE

## 2022-04-15 PROCEDURE — 250N000013 HC RX MED GY IP 250 OP 250 PS 637: Performed by: INTERNAL MEDICINE

## 2022-04-15 PROCEDURE — 99231 SBSQ HOSP IP/OBS SF/LOW 25: CPT | Performed by: SPECIALIST

## 2022-04-15 PROCEDURE — 97535 SELF CARE MNGMENT TRAINING: CPT | Mod: GO

## 2022-04-15 PROCEDURE — 250N000011 HC RX IP 250 OP 636: Performed by: FAMILY MEDICINE

## 2022-04-15 PROCEDURE — 36415 COLL VENOUS BLD VENIPUNCTURE: CPT | Performed by: FAMILY MEDICINE

## 2022-04-15 PROCEDURE — 250N000013 HC RX MED GY IP 250 OP 250 PS 637: Performed by: FAMILY MEDICINE

## 2022-04-15 PROCEDURE — 250N000012 HC RX MED GY IP 250 OP 636 PS 637: Performed by: FAMILY MEDICINE

## 2022-04-15 PROCEDURE — 85027 COMPLETE CBC AUTOMATED: CPT | Performed by: FAMILY MEDICINE

## 2022-04-15 PROCEDURE — 99239 HOSP IP/OBS DSCHRG MGMT >30: CPT | Performed by: FAMILY MEDICINE

## 2022-04-15 PROCEDURE — 83735 ASSAY OF MAGNESIUM: CPT | Performed by: FAMILY MEDICINE

## 2022-04-15 RX ORDER — LEVOFLOXACIN 750 MG/1
750 TABLET, FILM COATED ORAL EVERY OTHER DAY
Qty: 7 TABLET | Refills: 0 | DISCHARGE
Start: 2022-04-15 | End: 2022-05-04

## 2022-04-15 RX ORDER — ACETAMINOPHEN 325 MG/1
650 TABLET ORAL EVERY 6 HOURS PRN
DISCHARGE
Start: 2022-04-15 | End: 2022-06-20

## 2022-04-15 RX ORDER — METRONIDAZOLE 500 MG/1
500 TABLET ORAL 3 TIMES DAILY
Qty: 42 TABLET | Refills: 0 | DISCHARGE
Start: 2022-04-15 | End: 2022-05-04

## 2022-04-15 RX ORDER — LEVOFLOXACIN 500 MG/1
500 TABLET, FILM COATED ORAL DAILY
Qty: 14 TABLET | Refills: 0 | DISCHARGE
Start: 2022-04-15 | End: 2022-04-15

## 2022-04-15 RX ORDER — FLUCONAZOLE 200 MG/1
200 TABLET ORAL DAILY
Qty: 14 TABLET | Refills: 0 | DISCHARGE
Start: 2022-04-15 | End: 2022-05-04

## 2022-04-15 RX ORDER — PREDNISONE 5 MG/1
TABLET ORAL
Qty: 21 TABLET | Refills: 0 | Status: SHIPPED | OUTPATIENT
Start: 2022-04-15 | End: 2022-05-04

## 2022-04-15 RX ADMIN — RIVAROXABAN 20 MG: 10 TABLET, FILM COATED ORAL at 08:53

## 2022-04-15 RX ADMIN — PIPERACILLIN AND TAZOBACTAM 3.38 G: 3; .375 INJECTION, POWDER, LYOPHILIZED, FOR SOLUTION INTRAVENOUS at 05:37

## 2022-04-15 RX ADMIN — HYDROMORPHONE HYDROCHLORIDE 1 MG: 2 TABLET ORAL at 08:05

## 2022-04-15 RX ADMIN — CARVEDILOL 12.5 MG: 12.5 TABLET, FILM COATED ORAL at 08:54

## 2022-04-15 RX ADMIN — LOSARTAN POTASSIUM 12.5 MG: 25 TABLET, FILM COATED ORAL at 08:54

## 2022-04-15 RX ADMIN — FLUOXETINE HYDROCHLORIDE 40 MG: 20 CAPSULE ORAL at 08:54

## 2022-04-15 RX ADMIN — PANTOPRAZOLE SODIUM 40 MG: 20 TABLET, DELAYED RELEASE ORAL at 08:54

## 2022-04-15 RX ADMIN — PREDNISONE 15 MG: 5 TABLET ORAL at 08:54

## 2022-04-15 RX ADMIN — MAGNESIUM OXIDE TAB 400 MG (241.3 MG ELEMENTAL MG) 400 MG: 400 (241.3 MG) TAB at 08:54

## 2022-04-15 ASSESSMENT — ACTIVITIES OF DAILY LIVING (ADL)
ADLS_ACUITY_SCORE: 12

## 2022-04-15 NOTE — PLAN OF CARE
Occupational Therapy Discharge Summary    Reason for therapy discharge:    Discharged to transitional care facility.    Progress towards therapy goal(s). See goals on Care Plan in [facility] electronic health record for goal details.  Goals partially met.  Barriers to achieving goals:   discharge from facility.    Therapy recommendation(s):    Continued therapy is recommended.  Rationale/Recommendations:  continue OT at TCU.

## 2022-04-15 NOTE — PLAN OF CARE
A&O. Denies pain. ALISSA irrigated with 10mL, output 20mL. IV SL. On 1L overnight. Continues on IV zosyn. BG at 0200 was 129. Mg protocol. Cultures pending. UC+. Stby assist with walker. Potential discharge to TCU today.    Problem: Plan of Care - These are the overarching goals to be used throughout the patient stay.    Goal: Readiness for Transition of Care  Outcome: Ongoing, Progressing     Problem: Pain Acute  Goal: Acceptable Pain Control and Functional Ability  Intervention: Prevent or Manage Pain

## 2022-04-15 NOTE — PROGRESS NOTES
No complaints.  Feeling better.  Afebrile, vital signs stable.    Physical exam:  Looks comfortable.  Abdomen is soft, nontender.  ALISSA drain is serosanguineous.    Impression: Status post percutaneous drainage of a diverticular abscess.  From our standpoint she should be able to be discharged to her usual living situation or TCU with follow-up with radiology.  Follow-up with us can be somewhat as needed if radiology feels that she is not improving with the drainage.  No indication for surgery at this time.

## 2022-04-15 NOTE — PROGRESS NOTES
Care Management Discharge Note    Discharge Date: 04/15/2022       Discharge Disposition: Transitional Care (TCU)    Discharge Services:  Rehab at TCU    Discharge DME:  None    Discharge Transportation: [employer] w/c transport at 1215 today, 4/15/2022    Private pay costs discussed: transportation costs    PAS Confirmation Code: [id number]  Patient/family educated on Medicare website which has current facility and service quality ratings:      Education Provided on the Discharge Plan:  yes  Persons Notified of Discharge Plans: pt  Patient/Family in Agreement with the Plan: yes    Handoff Referral Completed: Yes    Additional Information:  Chart reviewed and update provide by hospitalist of plan to discharge to TCU today.    Met with pt to discuss acceptance by [facility] TCU.  Pt is in agreement with this placement.  Initially she stated her cousin would provide transport, but has now decided on w/c transport.    Call placed to [facility] and scheduled w/c transport at 1215 (soonest available).    Call placed to [place] in Admissions at [facility]. She confirms plans for admission and provided update that pt will be changed from IV to po abx.     Call placed to [facility] and cancelled referral for home care services.    Update provided to pt's bedside RN, charge RN and BARB.    Sandra Pham RN

## 2022-04-15 NOTE — DISCHARGE SUMMARY
[facility] MEDICINE  DISCHARGE SUMMARY     Primary Care Physician: Ami Noriega  Admission Date: 4/5/2022   Discharge Provider: Tyler Oswald MD Discharge Date: 4/15/2022   Diet:   Active Diet and Nourishment Order   Procedures     Moderate Consistent Carb (60 g CHO per Meal) Diet     Diet       Code Status: Full Code   Activity: DCACTIVITY: Activity as tolerated        Condition at Discharge: Stable     REASON FOR PRESENTATION(See Admission Note for Details)       PRINCIPAL & ACTIVE DISCHARGE DIAGNOSES     Active Problems:    Hypomagnesemia    Diverticulitis of colon    Body aches    Acute UTI    Colonic diverticular abscess      PENDING LABS     Unresulted Labs Ordered in the Past 30 Days of this Admission     Date and Time Order Name Status Description    4/15/2022 11:58 AM Glucose by meter In process     4/6/2022  7:12 PM Anaerobic culture Preliminary     4/6/2022  7:12 PM Abscess Aerobic Bacterial Culture Routine with Gram Stain Preliminary             PROCEDURES ( this hospitalization only)          RECOMMENDATIONS TO OUTPATIENT PROVIDER FOR F/U VISIT     Follow-up Appointments     Follow Up and recommended labs and tests      Follow up with care home physician.  The following labs/tests are   recommended: Weekly bmp, cbc, crp.          Follow up CT and abscessogram approximately 7-10  days from drain placement date (4/20-4/22/2022) pending drain OPs and patient's clinical status.  Order placed for 4/21.    DISPOSITION     Skilled Nursing Facility    SUMMARY OF HOSPITAL COURSE:    HPI: Unfortunate 77-year-old female with history of diabetes presented with a 3-month history of nonspecific abdominal and back pain.  Imaging revealed a large intra-abdominal abscess 13 x 11 x 6 cm.  Patient was admitted.    Large presumed diverticular intra-abdominal abscess  Patient was admitted and started on IV antibiotics with metronidazole and ceftriaxone.   Interventional radiology saw the patient and placed a drain.  Patient had some issues with drain output and ultimately drain was found to be malpositioned.  This was reinserted prior to discharge and the patient is having good drain output.  Patient was transitioned to Zosyn based on culture results.  Ultimately will be discharged to home on oral Levaquin 750 every other day for 7 doses and metronidazole 500 mg p.o. 3 times daily for 14 days.  Fluconazole was added at 200 mg a day for 14 days due to Candida seen in abscess.  Patient will need abscessogram on approximately 4/21/2022 or sooner imaging based on clinical course.  Will ask for TCU physician to follow and I did place an order for what I believe is an abscessogram prior to the patient's discharge.    Polymyalgia rheumatica  Felt that patient does not likely have polymyalgia rheumatica as findings could be from this large diverticular abscess.  Rheumatology was consulted via telephone and they recommended discontinuing azathioprine and tapering the patient's prednisone.  At discharge she will be on 10 mg a day of prednisone for 1 week then 5 mg a day for 1 week and then discontinue.  Outpatient follow-up with rheumatology in 1 to 2 months.    Klebsiella UTI  Resistant to ampicillin but should be covered by antibiotics here in the hospital and at discharge.    Type 2 diabetes  Resume Metformin at discharge    Hypomagnesemia/hypokalemia  repleted    Essential hypertension  Continue prehospital regimen of losartan and carvedilol    History of DVT  Continue Xarelto    COPD  No acute exacerbation.  Patient will continue prehospital meds and will be on a steroid taper as above for polymyalgia.    Anemia  No signs of losses.  Outpatient follow-up    Chronic kidney disease stage IIIb  Creatinine is stable at 1.29 at discharge      Discharge Medications with Med changes:     Current Discharge Medication List      START taking these medications    Details    acetaminophen (TYLENOL) 325 MG tablet Take 2 tablets (650 mg) by mouth every 6 hours as needed for mild pain, fever or headaches    Associated Diagnoses: Body aches      fluconazole (DIFLUCAN) 200 MG tablet Take 1 tablet (200 mg) by mouth daily for 14 days  Qty: 14 tablet, Refills: 0    Associated Diagnoses: Colonic diverticular abscess      levofloxacin (LEVAQUIN) 750 MG tablet Take 1 tablet (750 mg) by mouth every other day for 7 doses  Qty: 7 tablet, Refills: 0    Associated Diagnoses: Colonic diverticular abscess      metroNIDAZOLE (FLAGYL) 500 MG tablet Take 1 tablet (500 mg) by mouth 3 times daily for 14 days  Qty: 42 tablet, Refills: 0    Associated Diagnoses: Colonic diverticular abscess      sodium chloride, PF, 0.9% PF flush Irrigate with 10 mLs as directed daily  Qty: 200 mL, Refills: 3    Comments: Dispense 20 syringes  Associated Diagnoses: Colonic diverticular abscess         CONTINUE these medications which have CHANGED    Details   predniSONE (DELTASONE) 5 MG tablet Take 2 tablets (10 mg) by mouth daily for 7 days, THEN 1 tablet (5 mg) daily for 7 days.  Qty: 21 tablet, Refills: 0    Associated Diagnoses: PMR (polymyalgia rheumatica) (H)         CONTINUE these medications which have NOT CHANGED    Details   albuterol (PROAIR HFA/PROVENTIL HFA/VENTOLIN HFA) 108 (90 Base) MCG/ACT inhaler Inhale 2 puffs into the lungs 4 times daily as needed for shortness of breath / dyspnea or wheezing      albuterol (PROVENTIL) (5 MG/ML) 0.5% neb solution Take 5 mg by nebulization every 6 hours as needed for wheezing or shortness of breath / dyspnea      alendronate (FOSAMAX) 70 MG tablet Take 70 mg by mouth every 7 days      Ascorbic Acid (VITAMIN C) 500 MG CAPS Take 1,000 mg by mouth daily      aspirin 81 MG EC tablet Take 81 mg by mouth daily      calcium carbonate 600 mg-vitamin D 400 units (CALTRATE) 600-400 MG-UNIT per tablet Take 1 tablet by mouth 2 times daily      carvedilol (COREG) 12.5 MG tablet Take 12.5  mg by mouth 2 times daily (with meals)      cholecalciferol 50 MCG (2000 UT) CAPS Take 4,000 Units by mouth daily      coenzyme Q-10 capsule Take 1 capsule by mouth daily      FLUoxetine (PROZAC) 40 MG capsule Take 40 mg by mouth every morning      Fluticasone-Umeclidin-Vilanterol (TRELEGY ELLIPTA) 200-62.5-25 MCG/INH oral inhaler Inhale 1 puff into the lungs every morning      gabapentin (NEURONTIN) 300 MG capsule Take 900 mg by mouth At Bedtime      losartan (COZAAR) 25 MG tablet Take 12.5 mg by mouth every morning      metFORMIN (GLUCOPHAGE-XR) 500 MG 24 hr tablet Take 1,000 mg by mouth daily (with breakfast)      montelukast (SINGULAIR) 10 MG tablet Take 10 mg by mouth At Bedtime      omeprazole (PRILOSEC) 40 MG DR capsule Take 40 mg by mouth every morning (before breakfast)      pravastatin (PRAVACHOL) 40 MG tablet Take 40 mg by mouth At Bedtime      rivaroxaban ANTICOAGULANT (XARELTO) 20 MG TABS tablet Take 20 mg by mouth daily (with breakfast)         STOP taking these medications       azaTHIOprine (IMURAN) 50 MG tablet Comments:   Reason for Stopping:         cephALEXin (KEFLEX) 500 MG capsule Comments:   Reason for Stopping:                     Rationale for medication changes:      Diverticular abscess        Consults       SURGERY GENERAL IP CONSULT  SOCIAL WORK IP CONSULT  RHEUMATOLOGY IP CONSULT  PHARMACY TO DOSE VANCO  PHYSICAL THERAPY ADULT IP CONSULT  OCCUPATIONAL THERAPY ADULT IP CONSULT  INFECTIOUS DISEASES IP CONSULT  PHYSICAL THERAPY ADULT IP CONSULT  OCCUPATIONAL THERAPY ADULT IP CONSULT    Immunizations given this encounter     Most Recent Immunizations   Administered Date(s) Administered     COVID-19,PF,Moderna 11/18/2021           Anticoagulation Information      Recent INR results:   Recent Labs   Lab 04/13/22  0540   INR 1.16*     Warfarin doses (if applicable) or name of other anticoagulant: Xarelto      SIGNIFICANT IMAGING FINDINGS     Results for orders placed or performed during the  hospital encounter of 04/05/22   CT Chest/Abdomen/Pelvis w Contrast    Impression    IMPRESSION:  1.  Acute versus subacute diverticulitis proximal sigmoid colon complicated by a 3 cm diverticular abscess with a short sinus tract that leads superiorly to a large irregularly-shaped 13 x 11 x 6 cm abscess in the left lower abdomen and upper pelvis that   has broad contact with multiple loops of adjacent small bowel.  2.  Moderate to marked diffuse hepatic steatosis, borderline hepatic enlargement and slight contour undulation could indicate some degree of steatohepatitis and/or fibrosis.   3.  Nephrolithiasis.  4.  Several 5 mm or less nodular opacities in each lung appear to represent foci of endobronchial secretion, and those included in field-of-view at the 03/22/2018 abdominal CT are unchanged. As a conservative measure, recommend 12 month follow-up CT   chest to ensure stability of the mid and upper lung nodules.   CT Abdomen Peritonium Abscess Drainage    Impression    IMPRESSION:  1.  Successful CT-guided drain placement into abdominal abscess.    Reference CPT Codes: 44640, 24822, 84777   CT Abdomen Pelvis w Contrast    Impression    IMPRESSION:  1.  The distal loop of the percutaneous drainage catheter has retracted anterior and is no longer within the large irregularly abscess in the left lower abdomen and upper pelvis which now measures 12 x 8 x 4 cm, previously 13 x 11 x 6 cm.    2.  Subacute diverticulitis proximal sigmoid colon with a small 1.5 cm peridiverticular abscess and adjacent short sinus tract leading to the above-mentioned abscess again seen. In addition there appears to be sinus tracts leading inferiorly to the   vaginal cuff and possibly the bladder.     IR Abscess Tube Check    Impression    IMPRESSION:    Catheter has withdrawn into the peritoneal cavity.    Given persistence of the deep collection which is smaller, it would be prudent to attempt CT-guided drainage for control. Patient  currently on Xarelto, Xarelto should be held given the deep position and CT drainage performed after an adequate interval.    PLAN:  Consider percutaneous CT-guided drainage attempt Wednesday or Thursday depending upon the last dose of Xarelto.   CT Abd Peritoneum Abscess Drain w Cath Place    Impression    IMPRESSION:  1.  CT-guided drain placement into an abdominal abscess without immediate complication.    Reference CPT Codes: 60219, 97076, 65286       SIGNIFICANT LABORATORY FINDINGS     Most Recent 3 CBC's:Recent Labs   Lab Test 04/15/22  0457 04/14/22  0729 04/13/22  0540 04/11/22  0450   WBC 7.0 6.8  --  5.6   HGB 9.6* 10.2* 9.8* 10.3*    98  --  97    278 251 300     Most Recent 3 BMP's:Recent Labs   Lab Test 04/15/22  1151 04/15/22  0825 04/15/22  0457 04/14/22  0919 04/14/22  0729 04/11/22  0733 04/11/22  0450   NA  --   --  136  --  138  --  140   POTASSIUM  --   --  4.1  --  4.1  --  4.7   CHLORIDE  --   --  99  --  99  --  102   CO2  --   --  28  --  26  --  28   BUN  --   --  25  --  19  --  12   CR  --   --  1.29*  --  1.31*  --  0.80   ANIONGAP  --   --  9  --  13  --  10   VICENTA  --   --  8.5  --  8.8  --  8.8   * 91 114   < > 125   < > 92    < > = values in this interval not displayed.     Most Recent 2 LFT's:Recent Labs   Lab Test 04/07/22  0857 02/21/22  1109   AST 14 13   ALT 10 <9   ALKPHOS 68 42*   BILITOTAL 0.5 0.5     Most Recent 3 INR's:Recent Labs   Lab Test 04/13/22  0540   INR 1.16*         Discharge Orders        Primary Care - Care Coordination Referral      General info for SNF    Length of Stay Estimate: Short Term Care: Estimated # of Days <30  Condition at Discharge: Improving  Level of care:skilled   Rehabilitation Potential: Good  Admission H&P remains valid and up-to-date: Yes  Recent Chemotherapy: N/A  Use Nursing Home Standing Orders: Yes     Ameliaoux instructions    Give two-step Mantoux (PPD) Per Facility Policy Yes     Follow Up and recommended labs and  tests    Follow up with prison physician.  The following labs/tests are recommended: Weekly bmp, cbc, crp.     Reason for your hospital stay    Diverticular abscess requiring drainage     Glucose monitor nursing POCT    Bid call md if > 300     Activity - Up with nursing assistance     Physical Therapy Adult Consult    Evaluate and treat as clinically indicated.    Reason:  gait     Occupational Therapy Adult Consult    Evaluate and treat as clinically indicated.    Reason:  home safety/ADLs     Fall precautions     Diet    Follow this diet upon discharge: Orders Placed This Encounter      Moderate Consistent Carb (60 g CHO per Meal) Diet       Examination   Physical Exam   Temp:  [97.1  F (36.2  C)-98  F (36.7  C)] (P) 98  F (36.7  C)  Pulse:  [78-89] (P) 78  Resp:  [18-30] (P) 20  BP: ()/(45-63) (P) 141/63  SpO2:  [90 %-97 %] (P) 93 %  Wt Readings from Last 1 Encounters:   04/15/22 94.3 kg (207 lb 14.4 oz)       General Appearance: No apparent distress  Respiratory: Clear to auscultation bilaterally  Cardiovascular: Regular rate and rhythm  GI: Soft and nontender  Skin: Visualized skin is normal  Other: Good eye contact normal affect      Please see EMR for more detailed significant labs, imaging, consultant notes etc.    I, Tyler Oswald MD, personally saw the patient today and spent greater than 30 minutes discharging this patient.    Tyler Oswald MD  [facility]    CC:Ami Noriega

## 2022-04-15 NOTE — PLAN OF CARE
Physical Therapy Discharge Summary    Reason for therapy discharge:    Discharged to transitional care facility.    Progress towards therapy goal(s). See goals on Care Plan in [facility] electronic health record for goal details.  Goals not met.  Barriers to achieving goals:   Needs assistance or supevision with gait and stairs.    Therapy recommendation(s):    Continued therapy is recommended.  Rationale/Recommendations:  TCU.

## 2022-04-16 LAB
BACTERIA ABSC ANAEROBE+AEROBE CULT: ABNORMAL
GRAM STAIN RESULT: ABNORMAL

## 2022-04-18 ENCOUNTER — OFFICE VISIT (OUTPATIENT)
Dept: GERIATRICS | Facility: CLINIC | Age: 77
End: 2022-04-18
Payer: COMMERCIAL

## 2022-04-18 VITALS
TEMPERATURE: 96.9 F | SYSTOLIC BLOOD PRESSURE: 107 MMHG | BODY MASS INDEX: 40.58 KG/M2 | RESPIRATION RATE: 18 BRPM | DIASTOLIC BLOOD PRESSURE: 65 MMHG | HEART RATE: 84 BPM | WEIGHT: 220.5 LBS | OXYGEN SATURATION: 95 % | HEIGHT: 62 IN

## 2022-04-18 DIAGNOSIS — E11.42 DIABETIC PERIPHERAL NEUROPATHY ASSOCIATED WITH TYPE 2 DIABETES MELLITUS (H): ICD-10-CM

## 2022-04-18 DIAGNOSIS — N18.31 CHRONIC KIDNEY DISEASE, STAGE 3A (H): ICD-10-CM

## 2022-04-18 DIAGNOSIS — F33.42 RECURRENT MAJOR DEPRESSION IN FULL REMISSION (H): ICD-10-CM

## 2022-04-18 DIAGNOSIS — N30.00 ACUTE CYSTITIS WITHOUT HEMATURIA: ICD-10-CM

## 2022-04-18 DIAGNOSIS — J44.9 CHRONIC OBSTRUCTIVE PULMONARY DISEASE, UNSPECIFIED COPD TYPE (H): ICD-10-CM

## 2022-04-18 DIAGNOSIS — K57.20 COLONIC DIVERTICULAR ABSCESS: Primary | ICD-10-CM

## 2022-04-18 DIAGNOSIS — E66.01 MORBID OBESITY (H): ICD-10-CM

## 2022-04-18 DIAGNOSIS — M35.3 POLYMYALGIA RHEUMATICA (H): ICD-10-CM

## 2022-04-18 PROCEDURE — 99309 SBSQ NF CARE MODERATE MDM 30: CPT | Performed by: NURSE PRACTITIONER

## 2022-04-18 RX ORDER — TRAMADOL HYDROCHLORIDE 50 MG/1
50 TABLET ORAL EVERY 6 HOURS PRN
Qty: 10 TABLET | Refills: 0 | Status: SHIPPED | OUTPATIENT
Start: 2022-04-18 | End: 2022-04-21

## 2022-04-18 NOTE — LETTER
4/18/2022        RE: Zakiya Christian  [address]  [facility] [place] [id number]        [facility] GERIATRICS    PRIMARY CARE PROVIDER AND CLINIC:  Ami Noriega MD, [facility] [address] [facility] / [facility] Medical Record Number:  [id number]  Place of Service where encounter took place:  [facility] ([facility]) [[id number]]    Zakiya Christian  is a 77 year old  (1945), admitted to the above facility from  [facility]. Hospital stay 04/05/22 through 04/15/22..   HPI:      Patient admitted to TCU for acute rehab and medical managment following hospitalization for diverticular intra-abdominal abscess. Treated with IV metronidazole and ceftriaxone and with drain placement in interventional radiation.Transitioned to IV Zosyn based on culture results and then to oral Levaquin and metronidazole course. Fluconazole also added 2/2 noted candida in abscess. Also diagnoesed with Klebsiella UTI inpatient- covered by previously mentioned antibiotics. PMH noted for recent diagnosis of PMR, now thought symptoms may have been related to abscess instead. Rheumatology consulted inpatient and recommended discontinuing azathioprine and tapering prednisone. Also PMH DM type 2, HTN, COPD, Anemia, CKD III    Today patient is found in room lying in bed. Reports fatigue and some moderate pain to right side/abd. Denies n/v and reports has been moving bowels without issue. Drain present on left side and patient denies pain at site. Reports noting small amount of drainage. States appetite fair and sleeping ok. VS reviewed and stable.       CODE STATUS/ADVANCE DIRECTIVES DISCUSSION:  Prior  CPR/Full code   ALLERGIES:   Allergies   Allergen Reactions     Simvastatin Hives      PAST MEDICAL HISTORY:   Past Medical History:   Diagnosis Date     Diabetes (H)      Hypertension      Obese      PMR (polymyalgia rheumatica) (H)       PAST SURGICAL HISTORY:   has a past surgical history  that includes IR Abscess Tube Change (4/22/2022).  FAMILY HISTORY: family history is not on file.  SOCIAL HISTORY:     Patient's living condition: lives alone    Post Discharge Medication Reconciliation Status: discharge medications reconciled and changed, per note/orders  Current Outpatient Medications   Medication Sig     acetaminophen (TYLENOL) 325 MG tablet Take 2 tablets (650 mg) by mouth every 6 hours as needed for mild pain, fever or headaches     albuterol (PROAIR HFA/PROVENTIL HFA/VENTOLIN HFA) 108 (90 Base) MCG/ACT inhaler Inhale 2 puffs into the lungs 4 times daily as needed for shortness of breath / dyspnea or wheezing     albuterol (PROVENTIL) (5 MG/ML) 0.5% neb solution Take 5 mg by nebulization every 6 hours as needed for wheezing or shortness of breath / dyspnea     alendronate (FOSAMAX) 70 MG tablet Take 70 mg by mouth every 7 days     Ascorbic Acid (VITAMIN C) 500 MG CAPS Take 1,000 mg by mouth daily     aspirin 81 MG EC tablet Take 81 mg by mouth daily     calcium carbonate 600 mg-vitamin D 400 units (CALTRATE) 600-400 MG-UNIT per tablet Take 1 tablet by mouth 2 times daily     carvedilol (COREG) 12.5 MG tablet Take 12.5 mg by mouth 2 times daily (with meals)     cholecalciferol 50 MCG (2000 UT) CAPS Take 4,000 Units by mouth daily     coenzyme Q-10 capsule Take 1 capsule by mouth daily     fluconazole (DIFLUCAN) 200 MG tablet Take 1 tablet (200 mg) by mouth daily for 14 days     FLUoxetine (PROZAC) 40 MG capsule Take 40 mg by mouth every morning     Fluticasone-Umeclidin-Vilanterol (TRELEGY ELLIPTA) 200-62.5-25 MCG/INH oral inhaler Inhale 1 puff into the lungs every morning     gabapentin (NEURONTIN) 300 MG capsule Take 900 mg by mouth At Bedtime     levofloxacin (LEVAQUIN) 750 MG tablet Take 1 tablet (750 mg) by mouth every other day for 7 doses     losartan (COZAAR) 25 MG tablet Take 12.5 mg by mouth every morning     metFORMIN (GLUCOPHAGE-XR) 500 MG 24 hr tablet Take 1,000 mg by mouth daily  "(with breakfast)     metroNIDAZOLE (FLAGYL) 500 MG tablet Take 1 tablet (500 mg) by mouth 3 times daily for 14 days     montelukast (SINGULAIR) 10 MG tablet Take 10 mg by mouth At Bedtime     omeprazole (PRILOSEC) 40 MG DR capsule Take 40 mg by mouth every morning (before breakfast)     pravastatin (PRAVACHOL) 40 MG tablet Take 40 mg by mouth At Bedtime     predniSONE (DELTASONE) 5 MG tablet Take 2 tablets (10 mg) by mouth daily for 7 days, THEN 1 tablet (5 mg) daily for 7 days.     rivaroxaban ANTICOAGULANT (XARELTO) 20 MG TABS tablet Take 20 mg by mouth daily (with breakfast)     sodium chloride, PF, 0.9% PF flush Irrigate with 10 mLs as directed daily     traMADol (ULTRAM) 50 MG tablet Take 0.5 tablets (25 mg) by mouth every 6 hours as needed for severe pain     No current facility-administered medications for this visit.       ROS:  10 point ROS of systems including Constitutional, Eyes, Respiratory, Cardiovascular, Gastroenterology, Genitourinary, Integumentary, Musculoskeletal, Psychiatric were all negative except for pertinent positives noted in my HPI.    Vitals:  /65   Pulse 84   Temp 96.9  F (36.1  C)   Resp 18   Ht 1.575 m (5' 2\")   Wt 100 kg (220 lb 8 oz)   SpO2 95%   BMI 40.33 kg/m    Exam:    GENERAL APPEARANCE: Alert, in no distress   ENT: Mouth and posterior oropharynx normal, moist mucous membranes   EYES: EOM, conjunctivae, lids, pupils and irises normal   NECK: No adenopathy,masses or thyromegaly   RESP: respiratory effort and palpation of chest normal, Lungs clear to auscultation  CV: Palpation and auscultation of heart done , regular rate and rhythm, no murmur, rub, or gallop, peripheral edema - trace bilateral LE edema  ABDOMEN: normal bowel sounds, soft, nontender, no hepatosplenomegaly or other masses   : palpation of bladder WNL   M/S: Gait and station normal   Digits and nails normal - STUBBS - generalized weakness  SKIN: Inspection of skin and subcutaneous tissue baseline, " Palpation of skin and subcutaneous tissue baseline- drain site left abdomen with dressing intact and clean and dry. No noted redness or bruising to right side  NEURO: Cranial nerves 2-12 are grossly at patient's baseline, Examination of sensation by touch normal   PSYCH: oriented X 3, affect and mood normal             Lab/Diagnostic data:  Recent labs in [facility] reviewed by me today.     ASSESSMENT/PLAN:    Colonic diverticular abscess  - received IV ceftriaxone and metronidazole then transitioned to IV Zosyn then to oral metronidazole and Levaquin. VSS  - continue and complete metronidazole and Levaquin (done 4/29 and 4/28)  - continue and complete fluconazole 4/27  - flush and monitor drain site/drain output q shift  - prn acetaminophen for pain control  - monitor GI status, VS  - f/w surgeon and for repeat CT abd as scheduled    Acute cystitis without hematuria  - klebsiella, no active s/s  - complete antibiotics as above  - follow clinically    Polymyalgia rheumatica (H)  - recent dx - ? If symptoms r/t abscess instead  - Rheum recommended stopping azathioprine (done) and tapering prednisone  - follow clinically    Chronic obstructive pulmonary disease, unspecified COPD type (H)  - no active s/s  - may have inhaler at bedside and use as directed    Recurrent major depression in full remission (H)  - chronic, situational stressors in play. Mood calm, stable  - continue on PTA fluoxetine  - supportive approach  - monitor mood and behaviors  - ACP prn     Diabetic peripheral neuropathy associated with type 2 diabetes mellitus (H)  Lab Results   Component Value Date    A1C 6.2 04/05/2022     - continue on metformin  - no need to monitor BGL in TCU    Chronic kidney disease, stage 3a (H)  Creatinine   Date Value Ref Range Status   04/21/2022 0.83 0.60 - 1.10 mg/dL Final     -  -avoid nephrotoxins  - recheck periodic BMP to ensure stability    Morbid obesity (H)  - Body mass index is 40.33 kg/m .  - noted and  potentially aggravating factor in recover and rehab  - not the ideal time for aggressive weight loss   - dietician follows in TCU  - ongoing encouragement of healthy dietary choices and portion sizes.       Orders:  Written on unit and discussed with nursing          Electronically signed by:  ELISA Bajwa CNP                     Sincerely,        ELISA Bajwa CNP

## 2022-04-18 NOTE — PROGRESS NOTES
Spoke with: Zkaiya  Call attempt: 1  Message left: Yes, spoke with patient  Any pain: No  Any fever: No  Any redness/swelling/ abnormal drainage around puncture site: No  Were you instructed well enough to take care of yourself at home: Yes  Are you satisfied with the care you received: Yes  Any additional concerns or questions: No    Post call completed.   April 18, 2022 10:49 AM  Praveen Reinoso RN

## 2022-04-18 NOTE — PROGRESS NOTES
[facility] GERIATRICS    PRIMARY CARE PROVIDER AND CLINIC:  Ami Noriega MD, [facility] [address] [facility] [place]:  [id number]  Place of Service where encounter took place:  [facility] ([facility]) [[id number]]    Zakiya Christian  is a 77 year old  (1945), admitted to the above facility from  [facility]. Hospital stay 04/05/22 through 04/15/22..   HPI:      Patient admitted to TCU for acute rehab and medical managment following hospitalization for diverticular intra-abdominal abscess. Treated with IV metronidazole and ceftriaxone and with drain placement in interventional radiation.Transitioned to IV Zosyn based on culture results and then to oral Levaquin and metronidazole course. Fluconazole also added 2/2 noted candida in abscess. Also diagnoesed with Klebsiella UTI inpatient- covered by previously mentioned antibiotics. PMH noted for recent diagnosis of PMR, now thought symptoms may have been related to abscess instead. Rheumatology consulted inpatient and recommended discontinuing azathioprine and tapering prednisone. Also PMH DM type 2, HTN, COPD, Anemia, CKD III    Today patient is found in room lying in bed. Reports fatigue and some moderate pain to right side/abd. Denies n/v and reports has been moving bowels without issue. Drain present on left side and patient denies pain at site. Reports noting small amount of drainage. States appetite fair and sleeping ok. VS reviewed and stable.       CODE STATUS/ADVANCE DIRECTIVES DISCUSSION:  Prior  CPR/Full code   ALLERGIES:   Allergies   Allergen Reactions     Simvastatin Hives      PAST MEDICAL HISTORY:   Past Medical History:   Diagnosis Date     Diabetes (H)      Hypertension      Obese      PMR (polymyalgia rheumatica) (H)       PAST SURGICAL HISTORY:   has a past surgical history that includes IR Abscess Tube Change (4/22/2022).  FAMILY HISTORY: family history is not on  file.  SOCIAL HISTORY:     Patient's living condition: lives alone    Post Discharge Medication Reconciliation Status: discharge medications reconciled and changed, per note/orders  Current Outpatient Medications   Medication Sig     acetaminophen (TYLENOL) 325 MG tablet Take 2 tablets (650 mg) by mouth every 6 hours as needed for mild pain, fever or headaches     albuterol (PROAIR HFA/PROVENTIL HFA/VENTOLIN HFA) 108 (90 Base) MCG/ACT inhaler Inhale 2 puffs into the lungs 4 times daily as needed for shortness of breath / dyspnea or wheezing     albuterol (PROVENTIL) (5 MG/ML) 0.5% neb solution Take 5 mg by nebulization every 6 hours as needed for wheezing or shortness of breath / dyspnea     alendronate (FOSAMAX) 70 MG tablet Take 70 mg by mouth every 7 days     Ascorbic Acid (VITAMIN C) 500 MG CAPS Take 1,000 mg by mouth daily     aspirin 81 MG EC tablet Take 81 mg by mouth daily     calcium carbonate 600 mg-vitamin D 400 units (CALTRATE) 600-400 MG-UNIT per tablet Take 1 tablet by mouth 2 times daily     carvedilol (COREG) 12.5 MG tablet Take 12.5 mg by mouth 2 times daily (with meals)     cholecalciferol 50 MCG (2000 UT) CAPS Take 4,000 Units by mouth daily     coenzyme Q-10 capsule Take 1 capsule by mouth daily     fluconazole (DIFLUCAN) 200 MG tablet Take 1 tablet (200 mg) by mouth daily for 14 days     FLUoxetine (PROZAC) 40 MG capsule Take 40 mg by mouth every morning     Fluticasone-Umeclidin-Vilanterol (TRELEGY ELLIPTA) 200-62.5-25 MCG/INH oral inhaler Inhale 1 puff into the lungs every morning     gabapentin (NEURONTIN) 300 MG capsule Take 900 mg by mouth At Bedtime     levofloxacin (LEVAQUIN) 750 MG tablet Take 1 tablet (750 mg) by mouth every other day for 7 doses     losartan (COZAAR) 25 MG tablet Take 12.5 mg by mouth every morning     metFORMIN (GLUCOPHAGE-XR) 500 MG 24 hr tablet Take 1,000 mg by mouth daily (with breakfast)     metroNIDAZOLE (FLAGYL) 500 MG tablet Take 1 tablet (500 mg) by mouth 3  "times daily for 14 days     montelukast (SINGULAIR) 10 MG tablet Take 10 mg by mouth At Bedtime     omeprazole (PRILOSEC) 40 MG DR capsule Take 40 mg by mouth every morning (before breakfast)     pravastatin (PRAVACHOL) 40 MG tablet Take 40 mg by mouth At Bedtime     predniSONE (DELTASONE) 5 MG tablet Take 2 tablets (10 mg) by mouth daily for 7 days, THEN 1 tablet (5 mg) daily for 7 days.     rivaroxaban ANTICOAGULANT (XARELTO) 20 MG TABS tablet Take 20 mg by mouth daily (with breakfast)     sodium chloride, PF, 0.9% PF flush Irrigate with 10 mLs as directed daily     traMADol (ULTRAM) 50 MG tablet Take 0.5 tablets (25 mg) by mouth every 6 hours as needed for severe pain     No current facility-administered medications for this visit.       ROS:  10 point ROS of systems including Constitutional, Eyes, Respiratory, Cardiovascular, Gastroenterology, Genitourinary, Integumentary, Musculoskeletal, Psychiatric were all negative except for pertinent positives noted in my HPI.    Vitals:  /65   Pulse 84   Temp 96.9  F (36.1  C)   Resp 18   Ht 1.575 m (5' 2\")   Wt 100 kg (220 lb 8 oz)   SpO2 95%   BMI 40.33 kg/m    Exam:    GENERAL APPEARANCE: Alert, in no distress   ENT: Mouth and posterior oropharynx normal, moist mucous membranes   EYES: EOM, conjunctivae, lids, pupils and irises normal   NECK: No adenopathy,masses or thyromegaly   RESP: respiratory effort and palpation of chest normal, Lungs clear to auscultation  CV: Palpation and auscultation of heart done , regular rate and rhythm, no murmur, rub, or gallop, peripheral edema - trace bilateral LE edema  ABDOMEN: normal bowel sounds, soft, nontender, no hepatosplenomegaly or other masses   : palpation of bladder WNL   M/S: Gait and station normal   Digits and nails normal - STUBBS - generalized weakness  SKIN: Inspection of skin and subcutaneous tissue baseline, Palpation of skin and subcutaneous tissue baseline- drain site left abdomen with dressing intact " and clean and dry. No noted redness or bruising to right side  NEURO: Cranial nerves 2-12 are grossly at patient's baseline, Examination of sensation by touch normal   PSYCH: oriented X 3, affect and mood normal             Lab/Diagnostic data:  Recent labs in [facility] reviewed by me today.     ASSESSMENT/PLAN:    Colonic diverticular abscess  - received IV ceftriaxone and metronidazole then transitioned to IV Zosyn then to oral metronidazole and Levaquin. VSS  - continue and complete metronidazole and Levaquin (done 4/29 and 4/28)  - continue and complete fluconazole 4/27  - flush and monitor drain site/drain output q shift  - prn acetaminophen for pain control  - monitor GI status, VS  - f/w surgeon and for repeat CT abd as scheduled    Acute cystitis without hematuria  - klebsiella, no active s/s  - complete antibiotics as above  - follow clinically    Polymyalgia rheumatica (H)  - recent dx - ? If symptoms r/t abscess instead  - Rheum recommended stopping azathioprine (done) and tapering prednisone  - follow clinically    Chronic obstructive pulmonary disease, unspecified COPD type (H)  - no active s/s  - may have inhaler at bedside and use as directed    Recurrent major depression in full remission (H)  - chronic, situational stressors in play. Mood calm, stable  - continue on PTA fluoxetine  - supportive approach  - monitor mood and behaviors  - ACP prn     Diabetic peripheral neuropathy associated with type 2 diabetes mellitus (H)  Lab Results   Component Value Date    A1C 6.2 04/05/2022     - continue on metformin  - no need to monitor BGL in TCU    Chronic kidney disease, stage 3a (H)  Creatinine   Date Value Ref Range Status   04/21/2022 0.83 0.60 - 1.10 mg/dL Final     -  -avoid nephrotoxins  - recheck periodic BMP to ensure stability    Morbid obesity (H)  - Body mass index is 40.33 kg/m .  - noted and potentially aggravating factor in recover and rehab  - not the ideal time for aggressive weight loss   -  dietician follows in TCU  - ongoing encouragement of healthy dietary choices and portion sizes.       Orders:  Written on unit and discussed with nursing          Electronically signed by:  ELISA Bajwa CNP

## 2022-04-20 ENCOUNTER — LAB REQUISITION (OUTPATIENT)
Dept: LAB | Facility: CLINIC | Age: 77
End: 2022-04-20
Payer: COMMERCIAL

## 2022-04-20 DIAGNOSIS — K51.414: ICD-10-CM

## 2022-04-20 DIAGNOSIS — Z11.59 ENCOUNTER FOR SCREENING FOR OTHER VIRAL DISEASES: Primary | ICD-10-CM

## 2022-04-20 LAB — BACTERIA ABSC ANAEROBE+AEROBE CULT: NORMAL

## 2022-04-21 ENCOUNTER — TRANSITIONAL CARE UNIT VISIT (OUTPATIENT)
Dept: GERIATRICS | Facility: CLINIC | Age: 77
End: 2022-04-21
Payer: COMMERCIAL

## 2022-04-21 VITALS
TEMPERATURE: 97.4 F | RESPIRATION RATE: 18 BRPM | DIASTOLIC BLOOD PRESSURE: 68 MMHG | HEIGHT: 62 IN | SYSTOLIC BLOOD PRESSURE: 129 MMHG | WEIGHT: 220.5 LBS | HEART RATE: 74 BPM | OXYGEN SATURATION: 94 % | BODY MASS INDEX: 40.58 KG/M2

## 2022-04-21 DIAGNOSIS — M35.3 POLYMYALGIA RHEUMATICA (H): ICD-10-CM

## 2022-04-21 DIAGNOSIS — N18.31 CHRONIC KIDNEY DISEASE, STAGE 3A (H): ICD-10-CM

## 2022-04-21 DIAGNOSIS — F33.42 RECURRENT MAJOR DEPRESSION IN FULL REMISSION (H): ICD-10-CM

## 2022-04-21 DIAGNOSIS — E66.01 MORBID OBESITY (H): ICD-10-CM

## 2022-04-21 DIAGNOSIS — E11.42 DIABETIC PERIPHERAL NEUROPATHY ASSOCIATED WITH TYPE 2 DIABETES MELLITUS (H): ICD-10-CM

## 2022-04-21 DIAGNOSIS — N30.00 ACUTE CYSTITIS WITHOUT HEMATURIA: ICD-10-CM

## 2022-04-21 DIAGNOSIS — J44.9 CHRONIC OBSTRUCTIVE PULMONARY DISEASE, UNSPECIFIED COPD TYPE (H): ICD-10-CM

## 2022-04-21 DIAGNOSIS — K57.20 COLONIC DIVERTICULAR ABSCESS: Primary | ICD-10-CM

## 2022-04-21 LAB
ALBUMIN SERPL-MCNC: 1.8 G/DL (ref 3.5–5)
ALP SERPL-CCNC: 73 U/L (ref 45–120)
ALT SERPL W P-5'-P-CCNC: <9 U/L (ref 0–45)
ANION GAP SERPL CALCULATED.3IONS-SCNC: 10 MMOL/L (ref 5–18)
AST SERPL W P-5'-P-CCNC: 13 U/L (ref 0–40)
BILIRUB SERPL-MCNC: 0.3 MG/DL (ref 0–1)
BUN SERPL-MCNC: 11 MG/DL (ref 8–28)
CALCIUM SERPL-MCNC: 9.6 MG/DL (ref 8.5–10.5)
CHLORIDE BLD-SCNC: 100 MMOL/L (ref 98–107)
CO2 SERPL-SCNC: 30 MMOL/L (ref 22–31)
CREAT SERPL-MCNC: 0.83 MG/DL (ref 0.6–1.1)
ERYTHROCYTE [DISTWIDTH] IN BLOOD BY AUTOMATED COUNT: 15.2 % (ref 10–15)
GFR SERPL CREATININE-BSD FRML MDRD: 72 ML/MIN/1.73M2
GLUCOSE BLD-MCNC: 110 MG/DL (ref 70–125)
HCT VFR BLD AUTO: 33.3 % (ref 35–47)
HGB BLD-MCNC: 10.2 G/DL (ref 11.7–15.7)
MCH RBC QN AUTO: 30.5 PG (ref 26.5–33)
MCHC RBC AUTO-ENTMCNC: 30.6 G/DL (ref 31.5–36.5)
MCV RBC AUTO: 100 FL (ref 78–100)
PLATELET # BLD AUTO: 422 10E3/UL (ref 150–450)
POTASSIUM BLD-SCNC: 4 MMOL/L (ref 3.5–5)
PROT SERPL-MCNC: 6.2 G/DL (ref 6–8)
RBC # BLD AUTO: 3.34 10E6/UL (ref 3.8–5.2)
SODIUM SERPL-SCNC: 140 MMOL/L (ref 136–145)
WBC # BLD AUTO: 9.9 10E3/UL (ref 4–11)

## 2022-04-21 PROCEDURE — 36415 COLL VENOUS BLD VENIPUNCTURE: CPT | Mod: ORL | Performed by: NURSE PRACTITIONER

## 2022-04-21 PROCEDURE — 85027 COMPLETE CBC AUTOMATED: CPT | Mod: ORL | Performed by: NURSE PRACTITIONER

## 2022-04-21 PROCEDURE — 80053 COMPREHEN METABOLIC PANEL: CPT | Mod: ORL | Performed by: NURSE PRACTITIONER

## 2022-04-21 PROCEDURE — P9604 ONE-WAY ALLOW PRORATED TRIP: HCPCS | Mod: ORL | Performed by: NURSE PRACTITIONER

## 2022-04-21 PROCEDURE — 99309 SBSQ NF CARE MODERATE MDM 30: CPT | Performed by: NURSE PRACTITIONER

## 2022-04-21 RX ORDER — TRAMADOL HYDROCHLORIDE 50 MG/1
25 TABLET ORAL EVERY 6 HOURS PRN
Qty: 30 TABLET | Refills: 0 | Status: SHIPPED | OUTPATIENT
Start: 2022-04-21 | End: 2022-05-11

## 2022-04-21 NOTE — PROGRESS NOTES
[facility]IATRIC [facility]  [facility] Medical Record Number:  [id number]  Place of Service where encounter took place:  [facility] ([facility]) [[id number]]  Chief Complaint   Patient presents with     RECHECK       HPI:    Zakiya Christian  is a 77 year old (1945), who is being seen today for an episodic care visit.  HPI information obtained from: facility chart records, facility staff, patient report and [facility] chart review. Today's concern is:  Patient admitted to TCU for acute rehab and medical managment following hospitalization for diverticular intra-abdominal abscess. Treated with IV metronidazole and ceftriaxone and with drain placement in interventional radiation.Transitioned to IV Zosyn based on culture results and then to oral Levaquin and metronidazole course. Fluconazole also added 2/2 noted candida in abscess. Also diagnoesed with Klebsiella UTI inpatient- covered by previously mentioned antibiotics. PMH noted for recent diagnosis of PMR, now thought symptoms may have been related to abscess instead. Rheumatology consulted inpatient and recommended discontinuing azathioprine and tapering prednisone. Also PMH DM type 2, HTN, COPD, Anemia, CKD III    Seen today in TCU for recheck.     Nursing reports no new concerns.    Patient found in room lying in bed. Alert, calm, NAD. Reports mild bilateral hip pain. Denies abdominal pain, n/v or bowel issues. Reports appetite is fair. Reports sleeping ok. States is working with therapy without issue. States has f/u with surgeon's office tomorrow. VS reviewed.     Past Medical and Surgical History reviewed in Epic today.    MEDICATIONS:      Current Outpatient Medications   Medication Sig Dispense Refill     traMADol (ULTRAM) 50 MG tablet Take 0.5 tablets (25 mg) by mouth every 6 hours as needed for severe pain 30 tablet 0     acetaminophen (TYLENOL) 325 MG tablet Take 2 tablets (650 mg) by mouth every 6 hours as needed for mild pain, fever or headaches        albuterol (PROAIR HFA/PROVENTIL HFA/VENTOLIN HFA) 108 (90 Base) MCG/ACT inhaler Inhale 2 puffs into the lungs 4 times daily as needed for shortness of breath / dyspnea or wheezing       albuterol (PROVENTIL) (5 MG/ML) 0.5% neb solution Take 5 mg by nebulization every 6 hours as needed for wheezing or shortness of breath / dyspnea       alendronate (FOSAMAX) 70 MG tablet Take 70 mg by mouth every 7 days       Ascorbic Acid (VITAMIN C) 500 MG CAPS Take 1,000 mg by mouth daily       aspirin 81 MG EC tablet Take 81 mg by mouth daily       calcium carbonate 600 mg-vitamin D 400 units (CALTRATE) 600-400 MG-UNIT per tablet Take 1 tablet by mouth 2 times daily       carvedilol (COREG) 12.5 MG tablet Take 12.5 mg by mouth 2 times daily (with meals)       cholecalciferol 50 MCG (2000 UT) CAPS Take 4,000 Units by mouth daily       coenzyme Q-10 capsule Take 1 capsule by mouth daily       fluconazole (DIFLUCAN) 200 MG tablet Take 1 tablet (200 mg) by mouth daily for 14 days 14 tablet 0     FLUoxetine (PROZAC) 40 MG capsule Take 40 mg by mouth every morning       Fluticasone-Umeclidin-Vilanterol (TRELEGY ELLIPTA) 200-62.5-25 MCG/INH oral inhaler Inhale 1 puff into the lungs every morning       gabapentin (NEURONTIN) 300 MG capsule Take 900 mg by mouth At Bedtime       levofloxacin (LEVAQUIN) 750 MG tablet Take 1 tablet (750 mg) by mouth every other day for 7 doses 7 tablet 0     losartan (COZAAR) 25 MG tablet Take 12.5 mg by mouth every morning       metFORMIN (GLUCOPHAGE-XR) 500 MG 24 hr tablet Take 1,000 mg by mouth daily (with breakfast)       metroNIDAZOLE (FLAGYL) 500 MG tablet Take 1 tablet (500 mg) by mouth 3 times daily for 14 days 42 tablet 0     montelukast (SINGULAIR) 10 MG tablet Take 10 mg by mouth At Bedtime       omeprazole (PRILOSEC) 40 MG DR capsule Take 40 mg by mouth every morning (before breakfast)       pravastatin (PRAVACHOL) 40 MG tablet Take 40 mg by mouth At Bedtime       predniSONE (DELTASONE) 5 MG  "tablet Take 2 tablets (10 mg) by mouth daily for 7 days, THEN 1 tablet (5 mg) daily for 7 days. 21 tablet 0     rivaroxaban ANTICOAGULANT (XARELTO) 20 MG TABS tablet Take 20 mg by mouth daily (with breakfast)       sodium chloride, PF, 0.9% PF flush Irrigate with 10 mLs as directed daily 200 mL 3         REVIEW OF SYSTEMS:  4 point ROS including Respiratory, CV, GI and , other than that noted in the HPI,  is negative    Objective:  /68   Pulse 74   Temp 97.4  F (36.3  C)   Resp 18   Ht 1.575 m (5' 2\")   Wt 100 kg (220 lb 8 oz)   SpO2 94%   BMI 40.33 kg/m    Exam:    Resp: Effort WNL, LSCTA  CV: VS as above, no edema noted  Abd- soft, nontender, BS +- drain dressing on abd d/i- small amount of brown drainage.   [facility]  Psych- alert, calm, pleasant      Labs:   Recent labs in [facility] reviewed by me today.     ASSESSMENT/PLAN:    Colonic diverticular abscess  - received IV ceftriaxone and metronidazole then transitioned to IV Zosyn then to oral metronidazole and Levaquin. VSS  - continue and complete metronidazole and Levaquin (done 4/29 and 4/28)  - continue and complete fluconazole 4/27  - flush and monitor drain site/drain output q shift  - prn acetaminophen for pain control  - monitor GI status, VS  - f/w surgeon and for repeat CT abd as scheduled tomorrow     Acute cystitis without hematuria  - klebsiella, no active s/s  - complete antibiotics as above  - follow clinically     Polymyalgia rheumatica (H)  - recent dx - ? If symptoms r/t abscess instead. No noted flares.  - Rheum recommended stopping azathioprine (done) and tapering prednisone  - follow clinically     Chronic obstructive pulmonary disease, unspecified COPD type (H)  - no active s/s  - may continue to have inhaler at bedside and use as directed     Recurrent major depression in full remission (H)  - chronic, situational stressors in play. Mood remains calm, stable  - continue on PTA fluoxetine  - supportive approach  - monitor mood and " behaviors  - ACP prn      Diabetic peripheral neuropathy associated with type 2 diabetes mellitus (H)        Lab Results   Component Value Date     A1C 6.2 04/05/2022    controlled  - continue on metformin  - no need to monitor BGL in TCU     Chronic kidney disease, stage 3a (H)        Creatinine   Date Value Ref Range Status   04/21/2022 0.83 0.60 - 1.10 mg/dL Final      -  -avoid nephrotoxins  - recheck periodic BMP to ensure stability     Morbid obesity (H)  - Body mass index is 40.33 kg/m .  - noted and potentially aggravating factor in recover and rehab  - not the ideal time for aggressive weight loss   - dietician follows in TCU  - ongoing encouragement of healthy dietary choices and portion sizes.           Electronically signed by:  ELISA Bajwa CNP

## 2022-04-22 ENCOUNTER — HOSPITAL ENCOUNTER (OUTPATIENT)
Dept: CT IMAGING | Facility: CLINIC | Age: 77
Discharge: HOME | End: 2022-04-22
Attending: NURSE PRACTITIONER
Payer: COMMERCIAL

## 2022-04-22 ENCOUNTER — HOSPITAL ENCOUNTER (OUTPATIENT)
Dept: INTERVENTIONAL RADIOLOGY/VASCULAR | Facility: CLINIC | Age: 77
Discharge: HOME | End: 2022-04-22
Attending: NURSE PRACTITIONER
Payer: COMMERCIAL

## 2022-04-22 DIAGNOSIS — K57.20 COLONIC DIVERTICULAR ABSCESS: ICD-10-CM

## 2022-04-22 DIAGNOSIS — K57.20 COLONIC DIVERTICULAR ABSCESS: Primary | ICD-10-CM

## 2022-04-22 PROCEDURE — C1769 GUIDE WIRE: HCPCS

## 2022-04-22 PROCEDURE — 74177 CT ABD & PELVIS W/CONTRAST: CPT

## 2022-04-22 PROCEDURE — C1729 CATH, DRAINAGE: HCPCS

## 2022-04-22 PROCEDURE — 250N000009 HC RX 250: Performed by: RADIOLOGY

## 2022-04-22 PROCEDURE — 255N000002 HC RX 255 OP 636: Performed by: NURSE PRACTITIONER

## 2022-04-22 PROCEDURE — 250N000011 HC RX IP 250 OP 636: Performed by: NURSE PRACTITIONER

## 2022-04-22 PROCEDURE — 75984 XRAY CONTROL CATHETER CHANGE: CPT

## 2022-04-22 RX ORDER — LIDOCAINE 40 MG/G
CREAM TOPICAL
Status: DISCONTINUED | OUTPATIENT
Start: 2022-04-22 | End: 2022-04-23 | Stop reason: HOSPADM

## 2022-04-22 RX ORDER — IOPAMIDOL 755 MG/ML
100 INJECTION, SOLUTION INTRAVASCULAR ONCE
Status: COMPLETED | OUTPATIENT
Start: 2022-04-22 | End: 2022-04-22

## 2022-04-22 RX ADMIN — IOHEXOL 15 ML: 350 INJECTION, SOLUTION INTRAVENOUS at 09:52

## 2022-04-22 RX ADMIN — IOPAMIDOL 100 ML: 755 INJECTION, SOLUTION INTRAVENOUS at 09:13

## 2022-04-22 RX ADMIN — LIDOCAINE HYDROCHLORIDE 10 ML: 10 INJECTION, SOLUTION INFILTRATION; PERINEURAL at 09:46

## 2022-04-22 NOTE — PROGRESS NOTES
Patient Name: Zakiya Christian  Medical Record Number: [id number]  Today's Date: 4/22/2022    Procedure: IR abscessogram with tube upsize and exchange  Proceduralist: Dr. Madrid    Procedure Start: 0944  Procedure end: 0949  Sedation medications administered: NA   Sedation time: NA    Report given to: DIAZ  : DIAZ    Other Notes: Pt arrived to IR room 1 from Pre/post bay 2. Consent reviewed. Pt denies any questions or concerns regarding procedure. Pt positioned supine and monitored per protocol. Pt tolerated procedure without any noted complications. Pt transferred back to Pre/post bay 2.    12F abscess tube removed and exchanged with a 14F tube.

## 2022-04-22 NOTE — PRE-PROCEDURE
GENERAL PRE-PROCEDURE:   Procedure:  Abscessogram  Date/Time:  4/22/2022 9:29 AM    Written consent obtained?: Yes    Risks and benefits: Risks, benefits and alternatives were discussed    Consent given by:  Patient  Patient states understanding of procedure being performed: Yes    Patient's understanding of procedure matches consent: Yes    Procedure consent matches procedure scheduled: Yes    Expected level of sedation:  Moderate (as needed)  Appropriately NPO:  Yes  Mallampati  :  Grade 2- soft palate, base of uvula, tonsillar pillars, and portion of posterior pharyngeal wall visible  Lungs:  Wheezes  Heart:  Normal heart sounds and rate  History & Physical reviewed:  History and physical reviewed and no updates needed  Statement of review:  I have reviewed the lab findings, diagnostic data, medications, and the plan for sedation

## 2022-04-22 NOTE — PROGRESS NOTES
Discharge criteria met. Discharge instructions given and reviewed with patient and cousin. No further questions or concerns. Pt d/c'd home with cousin.

## 2022-04-22 NOTE — DISCHARGE INSTRUCTIONS
Drain Check/Exchange/Reposition Discharge Instructions:  Please follow the below instructions after your drain check/exchange/reposition:    Care Instructions after drain placement:  - If you received sedation for your procedure, do not drive or operate heavy machinery for the rest of the day.  - Rest after your drain was placed. Avoid strenuous activity and heavy lifting for the next 2 days. Return to your normal activities as you tolerate after the 2 day restriction.  - You may shower; however, you should not submerge site under water like in a tub bath, Jacuzzi or pool. Cover drain exit site with plastic wrap when showering.  - Keep dressing clean and dry as long as drain is in place. If you have a gauze dressing, please change the dressing as needed to keep site clean and dry.  - You may eat and drink as normal.  - You may have discomfort after the procedure near the drain exit site. You may take acetaminophen (Tylenol ) or ibuprofen (Motrin ) as needed for any discomfort.  - Inspect the tube often for kinks.  - If you have a gravity bag, keep the bag below the drain exit site to allow for free flow of drainage by gravity.  - Flush your drainage tube with 10mL sterile normal saline daily.  - Record your daily drain outputs and amount flushed on your drainage record. Bring your records to your next radiology appointment.  - Empty your drainage bag/bulb daily or when it is approximately half way fluid. Follow below instructions for emptying your bag/bulb:  - Clean hands well with soap and water.  - Place a measuring container near the outlet valve of the drainage bag/bulb.  - If you have a drainage BAG: Twist the blue valve at the bottom of the bag while holding the valve over your measuring cup and open container to empty. Re-twist the valve closed on the drainage bag once complete.  - If you have a drainage BULB: Open the bulb cap (at the top of the bulb). Empty the fluid into the measuring cup. Squeeze bulb and  "hold flat. While bulb is squeezed, close the cap.  - After draining fluid, record your drainage output. Bring this record to your next radiology drain follow up appointment.  - Discard drainage into toilet once fluid drained.    Flushing Your Drainage Tube:   1. Collect flushing supplies: 10mL of sterile normal saline in syringe, alcohol pad.  2. Clean the flushing (center) port with alcohol and attach the flushing syringe by twisting into place.  3. Turn the 3 way stopcock valve \"off\" to the drainage bag/bulb.  4. Gently inject/flush the drain so fluid is moving towards your body through the drainage catheter.   5. Turn the stopcock valve back to its center position to allow for drainage to resume.   6. Remove flushing syringe from flushing port by twisting off.  ?  Follow-Up:   - [facility] will call you to schedule your next follow-up appointment. Please call [facility] if you are not contacted within the next week at [phone]*.    Please seek medical evaluation for:  - Nausea and/or vomiting.  - Diffuse abdominal pain.  - Fevers (greater than 101 F (38.3C)).    Call [facility] at [phone]*** with tube related questions or concerns:  - Drainage tube falls out, is pulled back or felt to be out of position.   - Unable to flush drainage tube.   - Leakage (or purulent drainage) around drainage tube site.   - Extreme pain at drainage tube site.   - Outputs suddenly stop or significantly reduces.   - Warmth, redness, swelling or tenderness around the drainage tube.    "

## 2022-04-22 NOTE — IP AVS SNAPSHOT
[facility] Interventional Radiology  [address] [address]  Phone: [phone]  Fax: [phone]                                      After Visit Summary   4/22/2022    Zakiya Christian   MRN: [id number]           After Visit Summary Signature Page    I have received my discharge instructions, and my questions have been answered. I have discussed any challenges I see with this plan with the nurse or doctor.    ..........................................................................................................................................  Patient/Patient Representative Signature      ..........................................................................................................................................  Patient Representative Print Name and Relationship to Patient    ..................................................               ................................................  Date                                   Time    ..........................................................................................................................................  Reviewed by Signature/Title    ...................................................              ..............................................  Date                                               Time          22EPIC Rev 08/18

## 2022-04-22 NOTE — H&P
Interventional Radiology - Pre-Procedure Note:  4/22/2022    Procedure Requested: Abscessogram  Requested by: Xiomara Angel NP    History and Physical Reviewed: H&P documented within 30 days (by Braden Bentley PA-C on 4/6/22).  I have personally reviewed the patient's medical history and have updated the medical record as necessary.    Brief HPI: Zakiya Christian is a 77 year old female with hx DM. Admitted after presenting with 3 month history of abdominal and back pain. Imaging revealed large intra-abdominal abscess presumed to be diverticular s/p CT guided 12F drain placement 4/13.    Presents today for first abscessogram. Denies fever, chills, n/v. Denies pain or leaking at drain insertion site. Reports drain being flushed once daily with 10cc NS. Unsure of total output but does report this has gone down drastically approx the last 3 days.     IMAGING:  CT completed today - pending results    EXAM:  1. PERCUTANEOUS DRAIN PLACEMENT ABDOMINAL ABSCESS  2. CT GUIDANCE  3. CONSCIOUS SEDATION  LOCATION: [facility]  DATE/TIME: 4/13/2022 1:00 AM     INDICATION: intra abdominal abcess  needs re placement of drain  TECHNIQUE: Dose reduction techniques were used.  Contrast: Isovue-370 75 mL     PROCEDURE: Informed consent obtained. Site marked. Prior images reviewed. Required items made available. Patient identity confirmed verbally and with arm band. Patient reevaluated immediately before administering sedation. Universal protocol was   followed. Time out performed. The site was prepped and draped in sterile fashion. 10 mL of 1% lidocaine was infused into the local soft tissues. Using standard technique and under direct CT guidance, a 12 Burkinan drainage catheter was inserted into the   fluid collection.       SPECIMEN: 75 mL of purulent fluid was aspirated and sent to lab for cultures and Gram stain.     BLOOD LOSS: Minimal.     The patient tolerated the procedure well. No immediate  complications.     SEDATION: Versed 1.0 mg. Fentanyl 50 mcg. The procedure was performed with administration intravenous conscious sedation with appropriate preoperative, intraoperative, and postoperative evaluation.     30 minutes of supervised face to face conscious sedation time was provided by a radiology nurse under my direct supervision.                                                                      IMPRESSION:  1.  CT-guided drain placement into an abdominal abscess without immediate complication.       NPO: midnight  ANTICOAGULANTS: ASA 81; Xarelto  ANTIBIOTICS: Flagyl; none indicated for IR procedure.    ALLERGIES  Allergies   Allergen Reactions     Simvastatin Hives         LABS:  INR   Date Value Ref Range Status   04/13/2022 1.16 (H) 0.85 - 1.15 Final      Hemoglobin   Date Value Ref Range Status   04/21/2022 10.2 (L) 11.7 - 15.7 g/dL Final   ]  Platelet Count   Date Value Ref Range Status   04/21/2022 422 150 - 450 10e3/uL Final     Creatinine   Date Value Ref Range Status   04/21/2022 0.83 0.60 - 1.10 mg/dL Final     Potassium   Date Value Ref Range Status   04/21/2022 4.0 3.5 - 5.0 mmol/L Final         EXAM:  There were no vitals taken for this visit.  General:  Stable.  In no acute distress.    Neuro:  A&O x 3. Moves all extremities equally.  Resp:  Lungs with expiratory fine wheezing to auscultation bilaterally.  Cardio:  S1S2 and reg, without murmur, clicks or rubs  Abdomen:  Soft, non-distended, non-tender, LLQ drain with scant OP noted in ALISSA Bulb.       Pre-Sedation Assessment:  Mallampati Airway Classification:  II - Faucial pillars and soft palate may be seen, but uvula is masked by the base of the tongue  Previous reaction to anesthesia/sedation:  No  Sedation plan based on assessment: Moderate (conscious) sedation  ASA Classification: Class 2 - MILD SYSTEMIC DISEASE, NO ACUTE PROBLEMS, NO FUNCTIONAL LIMITATIONS.   Code Status: Full Code intra procedure, per discussion with patient.        ASSESSMENT/PLAN:   Abscessogram with potential drain intervention and with sedation as needed.    Procedure, risks/benefits, details, alternatives, and sedation reviewed with patient and patient verbalized understanding. All questions answered. OK to proceed with above radiology procedure.     ELISA Dow CNP  Interventional Radiology

## 2022-04-22 NOTE — PROCEDURES
Interventional Radiology Post-Procedure Note   ?   Brief Procedure Note:   Patient name: Zakiya Christian  Pt MRN:[id number]   Date of procedure: 4/22/2022     Procedure(s): Abscessogram through existing drainage catheter with drain upsize  Sedation method: None  Pre Procedure Diagnosis: mesenteric abscess  Post Procedure Diagnosis: Same  Indications: abscess follow up   ?   Attending: Cabrera Madrid M.D.  Specimen(s) removed: None   Additional studies ordered: None  Drains: 14 Fr left abdominal drain  Estimated Blood Loss: Minimal  Complications: None  Vascular closure method: N/A   Findings/Notes/Comments: Successful upsize and reposition into mesenteric abscess with new 14 Fr drain. Continue current drain cares. Patient to return to Interventional Radiology in 1 week for repeat abscessogram and possible drain removal or exchange.  ?   Please see dictation in PACS or under the Imaging tab in EPIC for detailed procedure note.     Cabrera Madrid M.D.   Vascular and Interventional Radiology   Pager: [phone]   After Hours / Scheduling: [phone]     4/22/2022  10:11 AM

## 2022-04-24 PROBLEM — E11.42 DIABETIC PERIPHERAL NEUROPATHY ASSOCIATED WITH TYPE 2 DIABETES MELLITUS (H): Status: ACTIVE | Noted: 2022-04-24

## 2022-04-24 PROBLEM — E66.01 MORBID OBESITY (H): Status: ACTIVE | Noted: 2022-04-24

## 2022-04-24 PROBLEM — N18.31 CHRONIC KIDNEY DISEASE, STAGE 3A (H): Status: ACTIVE | Noted: 2022-04-24

## 2022-04-24 PROBLEM — M35.3 POLYMYALGIA RHEUMATICA (H): Status: ACTIVE | Noted: 2022-04-24

## 2022-04-24 PROBLEM — F33.42 RECURRENT MAJOR DEPRESSION IN FULL REMISSION (H): Status: ACTIVE | Noted: 2022-04-24

## 2022-04-25 ENCOUNTER — TRANSITIONAL CARE UNIT VISIT (OUTPATIENT)
Dept: GERIATRICS | Facility: CLINIC | Age: 77
End: 2022-04-25
Payer: COMMERCIAL

## 2022-04-25 VITALS
HEIGHT: 62 IN | TEMPERATURE: 97.2 F | SYSTOLIC BLOOD PRESSURE: 108 MMHG | OXYGEN SATURATION: 93 % | WEIGHT: 222 LBS | HEART RATE: 92 BPM | DIASTOLIC BLOOD PRESSURE: 60 MMHG | BODY MASS INDEX: 40.85 KG/M2 | RESPIRATION RATE: 18 BRPM

## 2022-04-25 DIAGNOSIS — F33.42 RECURRENT MAJOR DEPRESSION IN FULL REMISSION (H): ICD-10-CM

## 2022-04-25 DIAGNOSIS — K57.20 COLONIC DIVERTICULAR ABSCESS: Primary | ICD-10-CM

## 2022-04-25 DIAGNOSIS — E83.42 HYPOMAGNESEMIA: ICD-10-CM

## 2022-04-25 DIAGNOSIS — E11.42 DIABETIC PERIPHERAL NEUROPATHY ASSOCIATED WITH TYPE 2 DIABETES MELLITUS (H): ICD-10-CM

## 2022-04-25 DIAGNOSIS — J44.9 CHRONIC OBSTRUCTIVE PULMONARY DISEASE, UNSPECIFIED COPD TYPE (H): ICD-10-CM

## 2022-04-25 DIAGNOSIS — N39.0 ACUTE UTI: ICD-10-CM

## 2022-04-25 DIAGNOSIS — E66.01 MORBID OBESITY (H): ICD-10-CM

## 2022-04-25 DIAGNOSIS — M35.3 POLYMYALGIA RHEUMATICA (H): ICD-10-CM

## 2022-04-25 DIAGNOSIS — N18.31 CHRONIC KIDNEY DISEASE, STAGE 3A (H): ICD-10-CM

## 2022-04-25 PROCEDURE — 99309 SBSQ NF CARE MODERATE MDM 30: CPT | Performed by: NURSE PRACTITIONER

## 2022-04-25 NOTE — PROGRESS NOTES
"[facility] GERIATRICS    Chief Complaint   Patient presents with     RECHECK     HPI:  Zakiya Christian is a 77 year old  (1945), who is being seen today for an episodic care visit at: [facility] ([facility]) [[id number]]. Today's concern is:       Patient admitted to TCU for acute rehab and medical managment following hospitalization for diverticular intra-abdominal abscess. Treated with IV metronidazole and ceftriaxone and with drain placement in interventional radiation.Transitioned to IV Zosyn based on culture results and then to oral Levaquin and metronidazole course. Fluconazole also added 2/2 noted candida in abscess. Also diagnoesed with Klebsiella UTI inpatient- covered by previously mentioned antibiotics. PMH noted for recent diagnosis of PMR, now thought symptoms may have been related to abscess instead. Rheumatology consulted inpatient and recommended discontinuing azathioprine and tapering prednisone. Also PMH DM type 2, HTN, COPD, Anemia, CKD III    Patient f/w surgeon last week and abscess drain switched out to larger one. Otherwise good report.     Today patient is found in room lying in bed. Alert, calm, NAD. Reports mild bilateral hip pain. States generally current pain regimen is effective. Reports fatigue and still with decreased appetite. Reports is on ensure and will take it if it is the chocolate flavor. Denies n/v or abdominal pain today. VS reviewed and stable.     Allergies, and PMH/PSH reviewed in [facility] today.  REVIEW OF SYSTEMS:  4 point ROS including Respiratory, CV, GI and , other than that noted in the HPI,  is negative    Objective:   /60   Pulse 92   Temp 97.2  F (36.2  C)   Resp 18   Ht 1.575 m (5' 2\")   Wt 100.7 kg (222 lb)   SpO2 93%   BMI 40.60 kg/m      Resp: Effort WNL, LSCTA   CV: VS as above, no edema  Abd- soft, nontender, BS +- ALISSA drain in place, dressing D/I  [facility] [facility]  Psych- alert, calm, pleasant      Recent labs in [facility] reviewed by me today. "     Assessment/Plan:    Colonic diverticular abscess  - received IV ceftriaxone and metronidazole then transitioned to IV Zosyn then to oral metronidazole and Levaquin. VSS  - continue and complete metronidazole and Levaquin (done 4/29 and 4/28)  - continue and complete fluconazole 4/27  - flush and monitor drain site/drain output q shift  - Tramadol 25 mg q 6 prn added with improved pain control  - also continues on prn acetaminophen for pain control  - monitor GI status, VS  - f/w surgeon and for repeat CT abd as scheduled later this week     Acute cystitis without hematuria  - klebsiella, no active s/s  - complete antibiotics as above  - follow clinically     Polymyalgia rheumatica (H)  - recent dx - ? If symptoms r/t abscess instead. No noted flares  - Rheum recommended stopping azathioprine (done) and tapering prednisone which is being done  - follow clinically     Chronic obstructive pulmonary disease, unspecified COPD type (H)  - no active s/s  - may continue to have inhaler at bedside and use as directed     Recurrent major depression in full remission (H)  - chronic, situational stressors in play. Mood remains calm, stable  - continue on PTA fluoxetine  - supportive approach  - monitor mood and behaviors  - ACP prn      Diabetic peripheral neuropathy associated with type 2 diabetes mellitus (H)            Lab Results   Component Value Date     A1C 6.2 04/05/2022    controlled  - continue on metformin  - no need to monitor BGL in TCU     Chronic kidney disease, stage 3a (H)            Creatinine   Date Value Ref Range Status   04/21/2022 0.83 0.60 - 1.10 mg/dL Final      -  -avoid nephrotoxins  - recheck periodic BMP to ensure stability     Morbid obesity (H)  Poor Appetite  - Body mass index is 40.33 kg/m .  - noted and potentially aggravating factor in recover and rehab  - not the ideal time for aggressive weight loss - and patient appetite and intake only fair. Continues on Ensure.   - dietician follows  in TCU  - ongoing encouragement of healthy dietary choices and portion sizes.                  Electronically signed by: ELISA Bajwa CNP

## 2022-04-25 NOTE — LETTER
"    4/25/2022        RE: Zakiya Christian  [address]  [facility] [place] [address]        [facility] GERIATRICS    Chief Complaint   Patient presents with     RECHECK     HPI:  Zakiya Christian is a 77 year old  (1945), who is being seen today for an episodic care visit at: [facility] ([facility]) [[id number]]. Today's concern is:       Patient admitted to U for acute rehab and medical managment following hospitalization for diverticular intra-abdominal abscess. Treated with IV metronidazole and ceftriaxone and with drain placement in interventional radiation.Transitioned to IV Zosyn based on culture results and then to oral Levaquin and metronidazole course. Fluconazole also added 2/2 noted candida in abscess. Also diagnoesed with Klebsiella UTI inpatient- covered by previously mentioned antibiotics. PMH noted for recent diagnosis of PMR, now thought symptoms may have been related to abscess instead. Rheumatology consulted inpatient and recommended discontinuing azathioprine and tapering prednisone. Also PMH DM type 2, HTN, COPD, Anemia, CKD III    Patient f/w surgeon last week and abscess drain switched out to larger one. Otherwise good report.     Today patient is found in room lying in bed. Alert, calm, NAD. Reports mild bilateral hip pain. States generally current pain regimen is effective. Reports fatigue and still with decreased appetite. Reports is on ensure and will take it if it is the chocolate flavor. Denies n/v or abdominal pain today. VS reviewed and stable.     Allergies, and PMH/PSH reviewed in EP[facility] today.  REVIEW OF SYSTEMS:  4 point ROS including Respiratory, CV, GI and , other than that noted in the HPI,  is negative    Objective:   /60   Pulse 92   Temp 97.2  F (36.2  C)   Resp 18   Ht 1.575 m (5' 2\")   Wt 100.7 kg (222 lb)   SpO2 93%   BMI 40.60 kg/m      Resp: Effort WNL, LSCTA   CV: VS as above, no edema  Abd- soft, nontender, BS +- ALISSA drain in place, dressing " D/I  [facility] [place]  Psych- alert, calm, pleasant      Recent labs in [facility] reviewed by me today.     Assessment/Plan:    Colonic diverticular abscess  - received IV ceftriaxone and metronidazole then transitioned to IV Zosyn then to oral metronidazole and Levaquin. VSS  - continue and complete metronidazole and Levaquin (done 4/29 and 4/28)  - continue and complete fluconazole 4/27  - flush and monitor drain site/drain output q shift  - Tramadol 25 mg q 6 prn added with improved pain control  - also continues on prn acetaminophen for pain control  - monitor GI status, VS  - f/w surgeon and for repeat CT abd as scheduled later this week     Acute cystitis without hematuria  - klebsiella, no active s/s  - complete antibiotics as above  - follow clinically     Polymyalgia rheumatica (H)  - recent dx - ? If symptoms r/t abscess instead. No noted flares  - Rheum recommended stopping azathioprine (done) and tapering prednisone which is being done  - follow clinically     Chronic obstructive pulmonary disease, unspecified COPD type (H)  - no active s/s  - may continue to have inhaler at bedside and use as directed     Recurrent major depression in full remission (H)  - chronic, situational stressors in play. Mood remains calm, stable  - continue on PTA fluoxetine  - supportive approach  - monitor mood and behaviors  - ACP prn      Diabetic peripheral neuropathy associated with type 2 diabetes mellitus (H)            Lab Results   Component Value Date     A1C 6.2 04/05/2022    controlled  - continue on metformin  - no need to monitor BGL in TCU     Chronic kidney disease, stage 3a (H)            Creatinine   Date Value Ref Range Status   04/21/2022 0.83 0.60 - 1.10 mg/dL Final      -  -avoid nephrotoxins  - recheck periodic BMP to ensure stability     Morbid obesity (H)  Poor Appetite  - Body mass index is 40.33 kg/m .  - noted and potentially aggravating factor in recover and rehab  - not the ideal time for aggressive weight  loss - and patient appetite and intake only fair. Continues on Ensure.   - dietician follows in TCU  - ongoing encouragement of healthy dietary choices and portion sizes.                  Electronically signed by: ELISA Bajwa CNP           Sincerely,        ELISA Bajwa CNP

## 2022-04-26 NOTE — PROGRESS NOTES
IR Procedure Pre-call    Spoke with: Gene Voicemail      Arrival and procedure time: Friday 4/29/2022 at 0840  Patient has : Yes  Patient has someone to stay overnight:Yes If angiogram must have responsible adult for 24 hours.  Patient is taking blood thinners:No   Patient has H&P within 30 days:Yes   Patient has covid test:Yes   Patient NPO 8 hours prior: Yes    Pre-call completed.   April 26, 2022 12:19 PM  Kian Escobar RN

## 2022-04-28 ENCOUNTER — TRANSITIONAL CARE UNIT VISIT (OUTPATIENT)
Dept: GERIATRICS | Facility: CLINIC | Age: 77
End: 2022-04-28
Payer: COMMERCIAL

## 2022-04-28 VITALS
BODY MASS INDEX: 40.85 KG/M2 | HEART RATE: 80 BPM | WEIGHT: 222 LBS | HEIGHT: 62 IN | SYSTOLIC BLOOD PRESSURE: 131 MMHG | OXYGEN SATURATION: 91 % | TEMPERATURE: 97 F | DIASTOLIC BLOOD PRESSURE: 71 MMHG | RESPIRATION RATE: 19 BRPM

## 2022-04-28 DIAGNOSIS — F33.42 RECURRENT MAJOR DEPRESSION IN FULL REMISSION (H): ICD-10-CM

## 2022-04-28 DIAGNOSIS — J44.9 CHRONIC OBSTRUCTIVE PULMONARY DISEASE, UNSPECIFIED COPD TYPE (H): ICD-10-CM

## 2022-04-28 DIAGNOSIS — N18.31 CHRONIC KIDNEY DISEASE, STAGE 3A (H): ICD-10-CM

## 2022-04-28 DIAGNOSIS — N30.00 ACUTE CYSTITIS WITHOUT HEMATURIA: ICD-10-CM

## 2022-04-28 DIAGNOSIS — E66.01 MORBID OBESITY (H): ICD-10-CM

## 2022-04-28 DIAGNOSIS — M35.3 POLYMYALGIA RHEUMATICA (H): ICD-10-CM

## 2022-04-28 DIAGNOSIS — E11.42 DIABETIC PERIPHERAL NEUROPATHY ASSOCIATED WITH TYPE 2 DIABETES MELLITUS (H): ICD-10-CM

## 2022-04-28 DIAGNOSIS — R63.0 POOR APPETITE: ICD-10-CM

## 2022-04-28 DIAGNOSIS — K57.20 COLONIC DIVERTICULAR ABSCESS: Primary | ICD-10-CM

## 2022-04-28 PROCEDURE — 99309 SBSQ NF CARE MODERATE MDM 30: CPT | Performed by: NURSE PRACTITIONER

## 2022-04-28 RX ORDER — LIDOCAINE 40 MG/G
CREAM TOPICAL
Status: CANCELLED | OUTPATIENT
Start: 2022-04-28

## 2022-04-28 NOTE — PROGRESS NOTES
"[facility] GERIATRICS    Chief Complaint   Patient presents with     RECHECK     HPI:  Zakiya Christian is a 77 year old  (1945), who is being seen today for an episodic care visit at: [facility] ([facility]) [[id number]].     Zakiya Christian  is a 77 year old (1945), who is being seen today for an episodic care visit.  HPI information obtained from: facility chart records, facility staff, patient report and [facility] chart review. Today's concern is:  Patient admitted to TCU for acute rehab and medical managment following hospitalization for diverticular intra-abdominal abscess. Treated with IV metronidazole and ceftriaxone and with drain placement in interventional radiation.Transitioned to IV Zosyn based on culture results and then to oral Levaquin and metronidazole course. Fluconazole also added 2/2 noted candida in abscess. Also diagnoesed with Klebsiella UTI inpatient- covered by previously mentioned antibiotics. PMH noted for recent diagnosis of PMR, now thought symptoms may have been related to abscess instead. Rheumatology consulted inpatient and recommended discontinuing azathioprine and tapering prednisone. Also PMH DM type 2, HTN, COPD, Anemia, CKD III    Patient did go out for drainage tube replacement last week- larger bore drain put in. Tolerated procedure well. Has remained medically stable in TCU and has been working with therapy.     Today patient is found in room lying in bed. Alert, calm, NAD. Reports mild low back pain which she states is chronic. States current pain regimen is effective in controlling pain. Denies discomfort around ALISSA drain. Reports appetite is only fair stating she is a \"picky eater.\". Denies noting n/v and reports is moving bowels regularly.     Allergies, and PMH/PSH reviewed in [facility] today.  REVIEW OF SYSTEMS:  4 point ROS including Respiratory, CV, GI and , other than that noted in the HPI,  is negative    Objective:   /71   Pulse 80   Temp 97  F (36.1 " " C)   Resp 19   Ht 1.575 m (5' 2\")   Wt 100.7 kg (222 lb)   SpO2 91%   BMI 40.60 kg/m      Resp: Effort WNL, LSCTA   CV: VS as above  Abd- soft, nontender, BS +- ALISSA drain intact LLQ- (was just emptied)  [facility] [place]  Psych- alert, calm, pleasant      Recent labs in [facility] reviewed by me today.     Assessment/Plan:  Colonic diverticular abscess  - received IV ceftriaxone and metronidazole then transitioned to IV Zosyn then to oral metronidazole and Levaquin. VSS  - continue and complete metronidazole and Levaquin (done 4/29 and 4/28)  - continue and complete fluconazole 4/27  - flush and monitor drain site/drain output q shift  - prn acetaminophen for pain control  - monitor GI status, VS  - f/w surgeon and for repeat CT abd as scheduled tomorrow     Acute cystitis without hematuria  - klebsiella, no active s/s  - completed antibiotics as above       Polymyalgia rheumatica (H)  - recent dx - ? If symptoms r/t abscess instead. No noted flares  - Rheum recommended stopping azathioprine (done) and tapering prednisone  - follow clinically     Chronic obstructive pulmonary disease, unspecified COPD type (H)  - no active s/s  - may continue to have inhaler at bedside and use as directed     Recurrent major depression in full remission (H)  - chronic, situational stressors in play. Mood remains calm and stable  - continue on PTA fluoxetine  - supportive approach  - monitor mood and behaviors  - ACP prn      Diabetic peripheral neuropathy associated with type 2 diabetes mellitus (H)            Lab Results   Component Value Date     A1C 6.2 04/05/2022    controlled  - continue on metformin  - no need to monitor BGL in TCU     Chronic kidney disease, stage 3a (H)            Creatinine   Date Value Ref Range Status   04/21/2022 0.83 0.60 - 1.10 mg/dL Final      -  -avoid nephrotoxins  - recheck periodic BMP to ensure stability    Poor Appetite  - no associated s/s, no recent weight  - continue on BID Ensure- enc to " drink/monitor  - follow intake/weights     Morbid obesity (H)  - Body mass index is 40.33 kg/m .  - noted and potentially aggravating factor in recover and rehab  - not the ideal time for aggressive weight loss   - dietician follows in TCU  - ongoing encouragement of healthy dietary choices and portion sizes.            Electronically signed by: ELISA Bajwa CNP

## 2022-04-28 NOTE — LETTER
"    4/28/2022        RE: Zakiya Christian  [address]  [facility] [place] [address]        [facility] GERIATRICS    Chief Complaint   Patient presents with     RECHECK     HPI:  Zakiya Christian is a 77 year old  (1945), who is being seen today for an episodic care visit at: [facility] ([facility]) [[id number]].     Zakiya Christian  is a 77 year old (1945), who is being seen today for an episodic care visit.  HPI information obtained from: facility chart records, facility staff, patient report and [facility] chart review. Today's concern is:  Patient admitted to TCU for acute rehab and medical managment following hospitalization for diverticular intra-abdominal abscess. Treated with IV metronidazole and ceftriaxone and with drain placement in interventional radiation.Transitioned to IV Zosyn based on culture results and then to oral Levaquin and metronidazole course. Fluconazole also added 2/2 noted candida in abscess. Also diagnoesed with Klebsiella UTI inpatient- covered by previously mentioned antibiotics. PMH noted for recent diagnosis of PMR, now thought symptoms may have been related to abscess instead. Rheumatology consulted inpatient and recommended discontinuing azathioprine and tapering prednisone. Also PMH DM type 2, HTN, COPD, Anemia, CKD III    Patient did go out for drainage tube replacement last week- larger bore drain put in. Tolerated procedure well. Has remained medically stable in TCU and has been working with therapy.     Today patient is found in room lying in bed. Alert, calm, NAD. Reports mild low back pain which she states is chronic. States current pain regimen is effective in controlling pain. Denies discomfort around ALISSA drain. Reports appetite is only fair stating she is a \"picky eater.\". Denies noting n/v and reports is moving bowels regularly.     Allergies, and PMH/PSH reviewed in [facility] today.  REVIEW OF SYSTEMS:  4 point ROS including Respiratory, CV, GI and , other than " "that noted in the HPI,  is negative    Objective:   /71   Pulse 80   Temp 97  F (36.1  C)   Resp 19   Ht 1.575 m (5' 2\")   Wt 100.7 kg (222 lb)   SpO2 91%   BMI 40.60 kg/m      Resp: Effort WNL, LSCTA   CV: VS as above  Abd- soft, nontender, BS +- ALISSA drain intact LLQ- (was just emptied)  [facility]  Psych- alert, calm, pleasant      Recent labs in [facility] reviewed by me today.     Assessment/Plan:  Colonic diverticular abscess  - received IV ceftriaxone and metronidazole then transitioned to IV Zosyn then to oral metronidazole and Levaquin. VSS  - continue and complete metronidazole and Levaquin (done 4/29 and 4/28)  - continue and complete fluconazole 4/27  - flush and monitor drain site/drain output q shift  - prn acetaminophen for pain control  - monitor GI status, VS  - f/w surgeon and for repeat CT abd as scheduled tomorrow     Acute cystitis without hematuria  - klebsiella, no active s/s  - completed antibiotics as above       Polymyalgia rheumatica (H)  - recent dx - ? If symptoms r/t abscess instead. No noted flares  - Rheum recommended stopping azathioprine (done) and tapering prednisone  - follow clinically     Chronic obstructive pulmonary disease, unspecified COPD type (H)  - no active s/s  - may continue to have inhaler at bedside and use as directed     Recurrent major depression in full remission (H)  - chronic, situational stressors in play. Mood remains calm and stable  - continue on PTA fluoxetine  - supportive approach  - monitor mood and behaviors  - ACP prn      Diabetic peripheral neuropathy associated with type 2 diabetes mellitus (H)            Lab Results   Component Value Date     A1C 6.2 04/05/2022    controlled  - continue on metformin  - no need to monitor BGL in TCU     Chronic kidney disease, stage 3a (H)            Creatinine   Date Value Ref Range Status   04/21/2022 0.83 0.60 - 1.10 mg/dL Final      -  -avoid nephrotoxins  - recheck periodic BMP to ensure stability    Poor " Appetite  - no associated s/s, no recent weight  - continue on BID Ensure- enc to drink/monitor  - follow intake/weights     Morbid obesity (H)  - Body mass index is 40.33 kg/m .  - noted and potentially aggravating factor in recover and rehab  - not the ideal time for aggressive weight loss   - dietician follows in TCU  - ongoing encouragement of healthy dietary choices and portion sizes.            Electronically signed by: ELISA Bajwa CNP           Sincerely,        ELISA Bajwa CNP

## 2022-04-29 ENCOUNTER — HOSPITAL ENCOUNTER (OUTPATIENT)
Dept: CT IMAGING | Facility: CLINIC | Age: 77
Discharge: HOME | End: 2022-04-29
Attending: NURSE PRACTITIONER
Payer: COMMERCIAL

## 2022-04-29 ENCOUNTER — HOSPITAL ENCOUNTER (OUTPATIENT)
Dept: INTERVENTIONAL RADIOLOGY/VASCULAR | Facility: CLINIC | Age: 77
Discharge: HOME | End: 2022-04-29
Attending: NURSE PRACTITIONER
Payer: COMMERCIAL

## 2022-04-29 VITALS — SYSTOLIC BLOOD PRESSURE: 149 MMHG | DIASTOLIC BLOOD PRESSURE: 70 MMHG

## 2022-04-29 DIAGNOSIS — K57.20 COLONIC DIVERTICULAR ABSCESS: ICD-10-CM

## 2022-04-29 DIAGNOSIS — K57.20 COLONIC DIVERTICULAR ABSCESS: Primary | ICD-10-CM

## 2022-04-29 PROCEDURE — 76080 X-RAY EXAM OF FISTULA: CPT

## 2022-04-29 PROCEDURE — 74177 CT ABD & PELVIS W/CONTRAST: CPT

## 2022-04-29 PROCEDURE — 250N000011 HC RX IP 250 OP 636: Performed by: NURSE PRACTITIONER

## 2022-04-29 PROCEDURE — 255N000002 HC RX 255 OP 636: Performed by: NURSE PRACTITIONER

## 2022-04-29 RX ORDER — NALOXONE HYDROCHLORIDE 0.4 MG/ML
0.4 INJECTION, SOLUTION INTRAMUSCULAR; INTRAVENOUS; SUBCUTANEOUS
Status: DISCONTINUED | OUTPATIENT
Start: 2022-04-29 | End: 2022-04-30 | Stop reason: HOSPADM

## 2022-04-29 RX ORDER — FENTANYL CITRATE 50 UG/ML
25-50 INJECTION, SOLUTION INTRAMUSCULAR; INTRAVENOUS EVERY 5 MIN PRN
Status: DISCONTINUED | OUTPATIENT
Start: 2022-04-29 | End: 2022-04-30 | Stop reason: HOSPADM

## 2022-04-29 RX ORDER — NALOXONE HYDROCHLORIDE 0.4 MG/ML
0.2 INJECTION, SOLUTION INTRAMUSCULAR; INTRAVENOUS; SUBCUTANEOUS
Status: DISCONTINUED | OUTPATIENT
Start: 2022-04-29 | End: 2022-04-30 | Stop reason: HOSPADM

## 2022-04-29 RX ORDER — IOPAMIDOL 755 MG/ML
100 INJECTION, SOLUTION INTRAVASCULAR ONCE
Status: COMPLETED | OUTPATIENT
Start: 2022-04-29 | End: 2022-04-29

## 2022-04-29 RX ORDER — FLUMAZENIL 0.1 MG/ML
0.2 INJECTION, SOLUTION INTRAVENOUS
Status: DISCONTINUED | OUTPATIENT
Start: 2022-04-29 | End: 2022-04-30 | Stop reason: HOSPADM

## 2022-04-29 RX ADMIN — IOPAMIDOL 100 ML: 755 INJECTION, SOLUTION INTRAVENOUS at 09:04

## 2022-04-29 RX ADMIN — IOHEXOL 5 ML: 350 INJECTION, SOLUTION INTRAVENOUS at 09:45

## 2022-04-29 NOTE — PRE-PROCEDURE
GENERAL PRE-PROCEDURE:   Procedure:  Abscessogram  Date/Time:  4/29/2022 9:27 AM    Written consent obtained?: Yes    Risks and benefits: Risks, benefits and alternatives were discussed    Consent given by:  Patient  Patient states understanding of procedure being performed: Yes    Patient's understanding of procedure matches consent: Yes    Procedure consent matches procedure scheduled: Yes    Appropriately NPO:  Yes  ASA Class:  3  Mallampati  :  Grade 2- soft palate, base of uvula, tonsillar pillars, and portion of posterior pharyngeal wall visible  Lungs:  Lungs clear with good breath sounds bilaterally  Heart:  Normal heart sounds and rate  History & Physical reviewed:  History and physical reviewed and no updates needed  Statement of review:  I have reviewed the lab findings, diagnostic data, medications, and the plan for sedation

## 2022-04-29 NOTE — PROCEDURES
[facility]    Procedure: IR Procedure Note    Date/Time: 4/29/2022 9:47 AM  Performed by: Demetri Herrera MD  Authorized by: Demetri Herrera MD       UNIVERSAL PROTOCOL   Site Marked: NA  Prior Images Obtained and Reviewed:  Yes  Required items: Required blood products, implants, devices and special equipment available    Patient identity confirmed:  Verbally with patient, arm band, provided demographic data and hospital-assigned identification number  Patient was reevaluated immediately before administering moderate or deep sedation or anesthesia  Confirmation Checklist:  Patient's identity using two indicators, relevant allergies, procedure was appropriate and matched the consent or emergent situation and correct equipment/implants were available  Time out: Immediately prior to the procedure a time out was called    Universal Protocol: the Joint Commission Universal Protocol was followed    Preparation: Patient was prepped and draped in usual sterile fashion       ANESTHESIA    Anesthesia: Local infiltration  Local Anesthetic:  Lidocaine 1% without epinephrine      SEDATION    Patient Sedated: No    See dictated procedure note for full details.  Findings: Abscessogram as per PACS.    Specimens: none    Complications: None    Condition: Stable    Plan: Interventional Radiology Post-Procedure Note    Procedure: Abscessogram.    Attending: Demetri Herrera MD    Findings: Small residual collection without definite fistula.    Plan: Continue flushing as per prior orders. Abscessogram in 10-14 days. No CT necessary.      PROCEDURE    Patient Tolerance:  Patient tolerated the procedure well with no immediate complications  Length of time physician/provider present for 1:1 monitoring during sedation: 0       03-Nov-2018 11:40

## 2022-04-29 NOTE — PRE-PROCEDURE
GENERAL PRE-PROCEDURE:   Procedure:  Abscessogram  Date/Time:  4/29/2022 9:36 AM    Verbal consent obtained?: Yes    Written consent obtained?: Yes    Risks and benefits: Risks, benefits and alternatives were discussed    Consent given by:  Patient  Patient states understanding of procedure being performed: Yes    Patient's understanding of procedure matches consent: Yes    Procedure consent matches procedure scheduled: Yes    Appropriately NPO:  Yes  ASA Class:  2  Mallampati  :  Grade 2- soft palate, base of uvula, tonsillar pillars, and portion of posterior pharyngeal wall visible  Lungs:  Lungs clear with good breath sounds bilaterally  Heart:  Normal heart sounds and rate  History & Physical reviewed:  History and physical reviewed and no updates needed  Statement of review:  I have reviewed the lab findings, diagnostic data, medications, and the plan for sedation

## 2022-04-29 NOTE — INTERVAL H&P NOTE
"I have reviewed the surgical (or preoperative) H&P that is linked to this encounter, and examined the patient. There are no significant changes    Clinical Conditions Present on Arrival:  Clinically Significant Risk Factors Present on Admission                 # Coagulation Defect: home medication list includes an anticoagulant medication  # Platelet Defect: home medication list includes an antiplatelet medication  # Severe Obesity: Estimated body mass index is 40.6 kg/m  as calculated from the following:    Height as of 4/28/22: 1.575 m (5' 2\").    Weight as of 4/28/22: 100.7 kg (222 lb).       "

## 2022-04-29 NOTE — PROGRESS NOTES
Patient Name: Zakiya Christian  Medical Record Number: [id number]  Today's Date: 4/29/2022    Procedure: IR Abscessogram  Proceduralist: Dr Herrera  Pathology present: DIAZ    Procedure Start: 0939  Procedure end: 0941  Sedation medications administered: None     Report given to: DIAZ  : DIAZ    Other Notes: Pt arrived to IR room  from pre/post rm 2. Consent reviewed. Pt denies any questions or concerns regarding procedure. Pt positioned supine and monitored per protocol. Pt tolerated procedure without any noted complications. Pt transferred back to pre/post rm 2.    Xiomara Cedeno RN

## 2022-04-29 NOTE — DISCHARGE INSTRUCTIONS
Drain Placement Discharge Instructions:  You had a small tube or drain(s) placed. This was placed so the abnormal fluid collection within your body can be drained out (externally). Please follow the below instructions as you recover:    Care Instructions after drain placement:  - If you received sedation for your procedure, do not drive or operate heavy machinery for the rest of the day.  - Rest after your drain(s) were placed. Avoid strenuous activity and heavy lifting for the next 2 days. Return to your normal activities as you tolerate after the 2 day restriction.  - You may shower; however, you should not submerge site under water like in a tub bath, Jacuzzi or pool. Cover drain(s) exit site(s) with plastic wrap while showering.  - Keep dressing clean and dry as long as drain (s) are in place. If you have a gauze dressing, please change the dressing as needed to keep site(s) clean and dry.  - You may eat and drink as normal.  - You may have discomfort after the procedure near the drain exit site(s). You may take acetaminophen (Tylenol ) or ibuprofen (Motrin ) as needed for any discomfort.  - Inspect the tube(s) often for kinks.  - If you have a gravity bag, keep the bag below the drain exit site(s) to allow for free flow of drainage by gravity.  - Flush your drainage tube with ***mL sterile normal saline ***.  - Record your daily drain(s) outputs and amount flushed on your drainage record. Bring your records to your next radiology appointment.  - Empty your drainage bag(s)/bulb(s) daily or when it is approximately half way fluid. Follow below instructions for emptying your bag(s)/bulb(s):  - Clean hands well with soap and water.  - Place a measuring container near the outlet valve of the drainage bag/bulb.  - If you have a drainage BAG: Twist the blue valve at the bottom of the bag while holding the valve over your measuring cup and open container to empty. Re-twist the valve closed on the drainage bag once  "complete.  - If you have a drainage BULB: Open the bulb cap (at the top of the bulb). Empty the fluid into the measuring cup. Squeeze bulb and hold flat. While bulb is squeezed, close the cap.  - After draining fluid record your drainage output. Bring record of your drain outputs to your next radiology appointment.  - Discard drainage into toilet once fluid drained.    Flushing Your Drainage Tube:   1. Collect flushing supplies: ***mL of sterile normal saline in syringe, alcohol pad.  2. Clean the flushing (center) port with alcohol and attach the flushing syringe by twisting into place.       3. Turn the 3 way stopcock valve \"off\" to the drainage bag/bulb.    4. Gently inject/flush the drain so fluid is moving towards your body through the drainage catheter.   5. Turn the stopcock valve back to its center position to allow for drainage to resume.           6. Remove flushing syringe from flushing port by twisting off.?    Follow-Up:   - [facility] will call you to schedule your next follow-up appointment. Please call [facility] if you are not contacted within the next week at [phone]***     Please seek medical evaluation for:  - Nausea and/or vomiting.  - Diffuse abdominal pain.  - Fevers (greater than 101 F (38.3C)).    Call [facility] at [phone]*** with tube related questions or concerns:  - Drainage tube(s) falls out, is pulled back or felt to be out of position.   - Unable to flush drainage tube(s).   - Leakage (or purulent drainage) around drainage tube site(s).   - Extreme pain at drainage tube site(s).   - Outputs suddenly stop or significantly reduces.   - Warmth, redness, swelling or tenderness around the drainage tube(s).   "

## 2022-04-29 NOTE — H&P
Interventional Radiology - Pre-Procedure Note:  2022    Procedure Requested: abscessogram  Requested by: Xiomara Angel CNP    History and Physical Reviewed: H&P documented within 30 days (by Sheree, Braden RAYMOND PA-C on 22).  I have personally reviewed the patient's medical history and have updated the medical record as necessary.    Brief HPI: Zakiya Christian is a 77 year old female with hx DM. Admitted after presenting with 3 month history of abdominal and back pain. Imaging revealed large intra-abdominal abscess presumed to be diverticular s/p CT guided 12F drain placement . Last abscessogram 22; s/p reposition/upsize to a 14 Algerian drainage catheter into the mesenteric abscess. Presents today for routine CT/abscessogram.     Patient denies, fever, chills, pain, and leakage from drain.     Drainage OP: patient does not have exact amounts of output; claims to have a little each day.   Flushing:NS 10cc daily  Culture:   Culture 4+ Klebsiella variicola/pneumoniae Abnormal        4+ Enterobacter cloacae complex Abnormal        4+ Hafnia alvei Abnormal        4+ Candida glabrata complex Abnormal     Susceptibilities not routinely done   4+ Lactobacillus species Abnormal               IMAGIN22 abd/pel CT: PENDING    22 last abscessogram:   FINDINGS:  Abscessogram by a contrast injection through the catheter demonstrated a moderate  sized fluid collection  and a fistulous connection to the colon. This was confirmed in multiple obliquities. After exchange, repeat abscessogram was performed again demonstrating the fluid collection and appropriate placement of the new drainage catheter.                                                                      IMPRESSION:  Successful reposition/upsize to a 14 Algerian drainage catheter into the mesenteric abscess. Significantly improved outflow achieved. Continue ALISSA suction bulb drainage and flushing. Return to IR in one week for follow-up with CT of  "the abdomen/pelvis with contrast.    NPO: midnight  ANTICOAGULANTS: aspirin, xarelto; no hold.  ANTIBIOTICS: flagyl    ALLERGIES  Allergies   Allergen Reactions     Simvastatin Hives         LABS:  INR   Date Value Ref Range Status   04/13/2022 1.16 (H) 0.85 - 1.15 Final      Hemoglobin   Date Value Ref Range Status   04/21/2022 10.2 (L) 11.7 - 15.7 g/dL Final   ]  Platelet Count   Date Value Ref Range Status   04/21/2022 422 150 - 450 10e3/uL Final     Creatinine   Date Value Ref Range Status   04/21/2022 0.83 0.60 - 1.10 mg/dL Final     Potassium   Date Value Ref Range Status   04/21/2022 4.0 3.5 - 5.0 mmol/L Final         EXAM:  Vital signs:                         Estimated body mass index is 40.6 kg/m  as calculated from the following:    Height as of 4/28/22: 1.575 m (5' 2\").    Weight as of 4/28/22: 100.7 kg (222 lb).    General:  Stable.  In no acute distress.    Neuro:  A&O x 3. Moves all extremities equally.  Resp:  Lungs clear to auscultation bilaterally.  Cardio:  S1S2 and reg, without murmur, clicks or rubs  Abdomen:  Left abdominal drain intact to ALISSA bulb; holding suction; tan drainage noted in tubing/bulb.     Pre-Sedation Assessment:  Mallampati Airway Classification:  II - Faucial pillars and soft palate may be seen, but uvula is masked by the base of the tongue  Previous reaction to anesthesia/sedation:  No  Sedation plan based on assessment: Moderate (conscious) sedation  ASA Classification: Class 2 - MILD SYSTEMIC DISEASE, NO ACUTE PROBLEMS, NO FUNCTIONAL LIMITATIONS.   Code Status: Full Code intra procedure, per discussion with patient.       ASSESSMENT/PLAN:   Abscessogram with sedation and possible reposition, exchange, and/or removal of tubing.     Procedure, risks/benefits, details, alternatives, and sedation reviewed with patient and verbalized understanding. All questions answered. OK to proceed with above radiology procedure.     ELISA Flroes CNP  Interventional Radiology    "

## 2022-04-29 NOTE — IP AVS SNAPSHOT
[facility] Interventional Radiology  [address] [address]  Phone: [phone]  Fax: [phone]                                      After Visit Summary   4/29/2022    Zakiya Christian   MRN: [id number]           After Visit Summary Signature Page    I have received my discharge instructions, and my questions have been answered. I have discussed any challenges I see with this plan with the nurse or doctor.    ..........................................................................................................................................  Patient/Patient Representative Signature      ..........................................................................................................................................  Patient Representative Print Name and Relationship to Patient    ..................................................               ................................................  Date                                   Time    ..........................................................................................................................................  Reviewed by Signature/Title    ...................................................              ..............................................  Date                                               Time          22EPIC Rev 08/18

## 2022-05-02 ENCOUNTER — TRANSITIONAL CARE UNIT VISIT (OUTPATIENT)
Dept: GERIATRICS | Facility: CLINIC | Age: 77
End: 2022-05-02
Payer: COMMERCIAL

## 2022-05-02 VITALS
TEMPERATURE: 96.5 F | DIASTOLIC BLOOD PRESSURE: 67 MMHG | OXYGEN SATURATION: 92 % | HEIGHT: 62 IN | BODY MASS INDEX: 40.85 KG/M2 | WEIGHT: 222 LBS | RESPIRATION RATE: 17 BRPM | HEART RATE: 92 BPM | SYSTOLIC BLOOD PRESSURE: 118 MMHG

## 2022-05-02 DIAGNOSIS — N18.31 CHRONIC KIDNEY DISEASE, STAGE 3A (H): ICD-10-CM

## 2022-05-02 DIAGNOSIS — E11.42 DIABETIC PERIPHERAL NEUROPATHY ASSOCIATED WITH TYPE 2 DIABETES MELLITUS (H): ICD-10-CM

## 2022-05-02 DIAGNOSIS — M35.3 POLYMYALGIA RHEUMATICA (H): ICD-10-CM

## 2022-05-02 DIAGNOSIS — R63.0 POOR APPETITE: ICD-10-CM

## 2022-05-02 DIAGNOSIS — H11.31 SUBCONJUNCTIVAL HEMORRHAGE OF RIGHT EYE: ICD-10-CM

## 2022-05-02 DIAGNOSIS — J44.9 CHRONIC OBSTRUCTIVE PULMONARY DISEASE, UNSPECIFIED COPD TYPE (H): ICD-10-CM

## 2022-05-02 DIAGNOSIS — K57.20 COLONIC DIVERTICULAR ABSCESS: Primary | ICD-10-CM

## 2022-05-02 DIAGNOSIS — F33.42 RECURRENT MAJOR DEPRESSION IN FULL REMISSION (H): ICD-10-CM

## 2022-05-02 DIAGNOSIS — E66.01 MORBID OBESITY (H): ICD-10-CM

## 2022-05-02 DIAGNOSIS — N30.00 ACUTE CYSTITIS WITHOUT HEMATURIA: ICD-10-CM

## 2022-05-02 PROCEDURE — 99309 SBSQ NF CARE MODERATE MDM 30: CPT | Performed by: NURSE PRACTITIONER

## 2022-05-02 NOTE — PROGRESS NOTES
"[facility] GERIATRICS    Chief Complaint   Patient presents with     RECHECK     HPI:  Zakiya Christian is a 77 year old  (1945), who is being seen today for an episodic care visit at: [facility] (U) [[id number]].     Patient admitted to TCU for acute rehab and medical managment following hospitalization for diverticular intra-abdominal abscess. Treated with IV metronidazole and ceftriaxone and with drain placement in interventional radiation.Transitioned to IV Zosyn based on culture results and then to oral Levaquin and metronidazole course. Fluconazole also added 2/2 noted candida in abscess. Also diagnoesed with Klebsiella UTI inpatient- covered by previously mentioned antibiotics. PMH noted for recent diagnosis of PMR, now thought symptoms may have been related to abscess instead. Rheumatology consulted inpatient and recommended discontinuing azathioprine and tapering prednisone. Also PMH DM type 2, HTN, COPD, Anemia, CKD III     Patient did go out for drainage tube replacement last week- larger bore drain put in. Tolerated procedure well. Has remained medically stable in TCU and has been working with therapy.     Nursing reports noting new bleeding in right eye. Has held Eliquis this morning    Patient is being seen today for recheck.     Today is found sitting up in chair finishing [employer]. Denies pain. Denies n/v or abdominal discomfort. Reports drain is not bothering her. States is moving bowels without issue.States appetite remains poor. States is a \"picky\" eater.Reports does drink Ensure if it is chocolate flavored. Reports does have another appt scheduled for abscess and drain check. Discussed ruptured blood vessel right eye. Patient denies pain or vision changes.        Allergies, and PMH/PSH reviewed in [facility] today.  REVIEW OF SYSTEMS:  4 point ROS including Respiratory, CV, GI and , other than that noted in the HPI,  is negative    Objective:   /67   Pulse 92   Temp (!) 96.5  F (35.8 " " C)   Resp 17   Ht 1.575 m (5' 2\")   Wt 100.7 kg (222 lb)   SpO2 92%   BMI 40.60 kg/m    Eyes- small subconjunctival hemorrhage noted right eye  Resp: Effort WNL, LSCTA   CV: VS as above, no edema  Abd- soft, nontender, BS +- ALISSA drain with small amount serous drainage, dressing to drain LLQ is D/I  [facility]- [facility]  Psych- alert, calm, pleasant      Recent labs in [facility] reviewed by me today.     Assessment/Plan:    Colonic diverticular abscess  - received IV ceftriaxone and metronidazole then transitioned to IV Zosyn then to oral metronidazole and Levaquin. VSS  - continue and complete metronidazole and Levaquin (done 4/29 and 4/28). GI status stable, drain in place  - completed fluconazole 4/27  - flush and monitor drain site/drain output q shift- discussed with nursing  - prn acetaminophen for pain control  - monitor GI status, VS  - f/w surgeon and for repeat CT abd recheck of drain     Acute cystitis without hematuria  - klebsiella, no active s/s  - completed antibiotics as above  - resolved        Polymyalgia rheumatica (H)  - recent dx - ? If symptoms r/t abscess instead. No noted flres  - Rheum recommended stopping azathioprine (done) and tapering prednisone (tolerating well)  - follow clinically     Chronic obstructive pulmonary disease, unspecified COPD type (H)  - no active s/s  - may continue to have inhaler at bedside and use as directed     Recurrent major depression in full remission (H)  - chronic, situational stressors in play. Mood remains calm and stable  - continue on PTA fluoxetine  - supportive approach  - monitor mood and behaviors  - ACP prn      Diabetic peripheral neuropathy associated with type 2 diabetes mellitus (H)            Lab Results   Component Value Date     A1C 6.2 04/05/2022    controlled  - continue on metformin  - no need to monitor BGL in TCU     Chronic kidney disease, stage 3a (H)            Creatinine   Date Value Ref Range Status   04/21/2022 0.83 0.60 - 1.10 mg/dL Final "      -  -avoid nephrotoxins  - recheck periodic BMP to ensure stability     Poor Appetite  - no associated s/s noted today  - continue on BID Ensure- enc to drink/monitor  - follow intake/weights     Morbid obesity (H)  - Body mass index is 40.33 kg/m .  - noted and potentially aggravating factor in recover and rehab  - not the ideal time for aggressive weight loss   - dietician follows in TCU  - ongoing encouragement of healthy dietary choices and portion sizes.     Subconjunctival hemorrhage  - No associated pain or vision changes. Discussed with MD  - may continue patient's Xarelto and ASA  - monitor right eye until resolved- nsg to report any new eye pain or vision changes to provider    Discussed clinical status and orders with nursing    Electronically signed by: ELISA Bajwa CNP

## 2022-05-02 NOTE — LETTER
"    5/2/2022        RE: Zakiya Christian  [address]  [facility] [address]        [facility] GERIATR[facility]    Chief Complaint   Patient presents with     RECHECK     HPI:  Zakiya Christian is a 77 year old  (1945), who is being seen today for an episodic care visit at: [facility] ([facility]) [[id number]].     Patient admitted to TCU for acute rehab and medical managment following hospitalization for diverticular intra-abdominal abscess. Treated with IV metronidazole and ceftriaxone and with drain placement in interventional radiation.Transitioned to IV Zosyn based on culture results and then to oral Levaquin and metronidazole course. Fluconazole also added 2/2 noted candida in abscess. Also diagnoesed with Klebsiella UTI inpatient- covered by previously mentioned antibiotics. PMH noted for recent diagnosis of PMR, now thought symptoms may have been related to abscess instead. Rheumatology consulted inpatient and recommended discontinuing azathioprine and tapering prednisone. Also PMH DM type 2, HTN, COPD, Anemia, CKD III     Patient did go out for drainage tube replacement last week- larger bore drain put in. Tolerated procedure well. Has remained medically stable in TCU and has been working with therapy.     Nursing reports noting new bleeding in right eye. Has held Eliquis this morning    Patient is being seen today for recheck.     Today is found sitting up in chair finishing brunch. Denies pain. Denies n/v or abdominal discomfort. Reports drain is not bothering her. States is moving bowels without issue.States appetite remains poor. States is a \"picky\" eater.Reports does drink Ensure if it is chocolate flavored. Reports does have another appt scheduled for abscess and drain check. Discussed ruptured blood vessel right eye. Patient denies pain or vision changes.        Allergies, and PMH/PSH reviewed in [employer] [facility] today.  REVIEW OF SYSTEMS:  4 point ROS including Respiratory, CV, GI and , other than that " "noted in the HPI,  is negative    Objective:   /67   Pulse 92   Temp (!) 96.5  F (35.8  C)   Resp 17   Ht 1.575 m (5' 2\")   Wt 100.7 kg (222 lb)   SpO2 92%   BMI 40.60 kg/m    Eyes- small subconjunctival hemorrhage noted right eye  Resp: Effort WNL, LSCTA   CV: VS as above, no edema  Abd- soft, nontender, BS +- ALISSA drain with small amount serous drainage, dressing to drain LLQ is D/I  [facility]  Psych- alert, calm, pleasant      Recent labs in [facility] reviewed by me today.     Assessment/Plan:    Colonic diverticular abscess  - received IV ceftriaxone and metronidazole then transitioned to IV Zosyn then to oral metronidazole and Levaquin. VSS  - continue and complete metronidazole and Levaquin (done 4/29 and 4/28). GI status stable, drain in place  - completed fluconazole 4/27  - flush and monitor drain site/drain output q shift- discussed with nursing  - prn acetaminophen for pain control  - monitor GI status, VS  - f/w surgeon and for repeat CT abd recheck of drain     Acute cystitis without hematuria  - klebsiella, no active s/s  - completed antibiotics as above  - resolved        Polymyalgia rheumatica (H)  - recent dx - ? If symptoms r/t abscess instead. No noted flres  - Rheum recommended stopping azathioprine (done) and tapering prednisone (tolerating well)  - follow clinically     Chronic obstructive pulmonary disease, unspecified COPD type (H)  - no active s/s  - may continue to have inhaler at bedside and use as directed     Recurrent major depression in full remission (H)  - chronic, situational stressors in play. Mood remains calm and stable  - continue on PTA fluoxetine  - supportive approach  - monitor mood and behaviors  - ACP prn      Diabetic peripheral neuropathy associated with type 2 diabetes mellitus (H)            Lab Results   Component Value Date     A1C 6.2 04/05/2022    controlled  - continue on metformin  - no need to monitor BGL in TCU     Chronic kidney disease, stage 3a (H)    "         Creatinine   Date Value Ref Range Status   04/21/2022 0.83 0.60 - 1.10 mg/dL Final      -  -avoid nephrotoxins  - recheck periodic BMP to ensure stability     Poor Appetite  - no associated s/s noted today  - continue on BID Ensure- enc to drink/monitor  - follow intake/weights     Morbid obesity (H)  - Body mass index is 40.33 kg/m .  - noted and potentially aggravating factor in recover and rehab  - not the ideal time for aggressive weight loss   - dietician follows in TCU  - ongoing encouragement of healthy dietary choices and portion sizes.     Subconjunctival hemorrhage  - No associated pain or vision changes. Discussed with MD  - may continue patient's Xarelto and ASA  - monitor right eye until resolved- nsg to report any new eye pain or vision changes to provider    Discussed clinical status and orders with nursing    Electronically signed by: ELISA Bajwa CNP           Sincerely,        ELISA Bajwa CNP

## 2022-05-04 DIAGNOSIS — Z11.59 ENCOUNTER FOR SCREENING FOR OTHER VIRAL DISEASES: Primary | ICD-10-CM

## 2022-05-05 ENCOUNTER — TRANSITIONAL CARE UNIT VISIT (OUTPATIENT)
Dept: GERIATRICS | Facility: CLINIC | Age: 77
End: 2022-05-05
Payer: COMMERCIAL

## 2022-05-05 ENCOUNTER — TELEPHONE (OUTPATIENT)
Dept: RADIOLOGY | Facility: CLINIC | Age: 77
End: 2022-05-05
Payer: COMMERCIAL

## 2022-05-05 DIAGNOSIS — K12.1 ORAL ULCER: ICD-10-CM

## 2022-05-05 DIAGNOSIS — L02.91 ABSCESS: Primary | ICD-10-CM

## 2022-05-05 DIAGNOSIS — H11.31 SUBCONJUNCTIVAL BLEED, RIGHT: Primary | ICD-10-CM

## 2022-05-05 DIAGNOSIS — R19.4 INCREASED BOWEL FREQUENCY: ICD-10-CM

## 2022-05-05 PROCEDURE — 99309 SBSQ NF CARE MODERATE MDM 30: CPT | Performed by: NURSE PRACTITIONER

## 2022-05-05 NOTE — PROGRESS NOTES
[place] [facility]  [place] Medical Record Number:  [id number]  Place of Service where encounter took place:  [facility] ([facility]) [[id number]]  Chief Complaint   Patient presents with     Nursing Home Acute       HPI:    Zakiya Christian  is a 77 year old (1945), who is being seen today for an episodic care visit.  HPI information obtained from: facility chart records, facility staff, patient report and [facility] chart review. Today's concern is:  1. Subconjunctival bleed, right    2. Increased bowel frequency    3. Oral ulcer        Patient seen in U for recheck of subconjunctival bleed right eye.     Today found sitting in chair. Alert, calm, NAD. Denies eyes pain or vision  Changes. Reports ulceration in mouth for which dentist has recommended salt water rinses. Also reports noting has bowel movement every time she voids. Denies abdominal discomfort, n/v. Reports no pain at abd ALISSA drain but states has not noted staff flushing of late.     Past Medical and Surgical History reviewed in Epic today.    MEDICATIONS:      Current Outpatient Medications   Medication Sig Dispense Refill     acetaminophen (TYLENOL) 325 MG tablet Take 2 tablets (650 mg) by mouth every 6 hours as needed for mild pain, fever or headaches       albuterol (PROAIR HFA/PROVENTIL HFA/VENTOLIN HFA) 108 (90 Base) MCG/ACT inhaler Inhale 2 puffs into the lungs 4 times daily as needed for shortness of breath / dyspnea or wheezing       albuterol (PROVENTIL) (5 MG/ML) 0.5% neb solution Take 5 mg by nebulization every 6 hours as needed for wheezing or shortness of breath / dyspnea       alendronate (FOSAMAX) 70 MG tablet Take 70 mg by mouth every 7 days       Ascorbic Acid (VITAMIN C) 500 MG CAPS Take 1,000 mg by mouth daily       aspirin 81 MG EC tablet Take 81 mg by mouth daily       calcium carbonate 600 mg-vitamin D 400 units (CALTRATE) 600-400 MG-UNIT per tablet Take 1 tablet by mouth 2 times daily       carvedilol (COREG) 12.5 MG  tablet Take 12.5 mg by mouth 2 times daily (with meals)       cholecalciferol 50 MCG (2000 UT) CAPS Take 4,000 Units by mouth daily       coenzyme Q-10 capsule Take 1 capsule by mouth daily       FLUoxetine (PROZAC) 40 MG capsule Take 40 mg by mouth every morning       Fluticasone-Umeclidin-Vilanterol (TRELEGY ELLIPTA) 200-62.5-25 MCG/INH oral inhaler Inhale 1 puff into the lungs every morning       gabapentin (NEURONTIN) 300 MG capsule Take 900 mg by mouth At Bedtime       losartan (COZAAR) 25 MG tablet Take 12.5 mg by mouth every morning       metFORMIN (GLUCOPHAGE-XR) 500 MG 24 hr tablet Take 1,000 mg by mouth daily (with breakfast)       montelukast (SINGULAIR) 10 MG tablet Take 10 mg by mouth At Bedtime       omeprazole (PRILOSEC) 40 MG DR capsule Take 40 mg by mouth every morning (before breakfast)       pravastatin (PRAVACHOL) 40 MG tablet Take 40 mg by mouth At Bedtime       rivaroxaban ANTICOAGULANT (XARELTO) 20 MG TABS tablet Take 20 mg by mouth daily (with breakfast)       sodium chloride, PF, 0.9% PF flush Irrigate with 10 mLs as directed daily 200 mL 3     traMADol (ULTRAM) 50 MG tablet Take 0.5 tablets (25 mg) by mouth every 6 hours as needed for severe pain And 25 mg po BID 30 tablet 0         REVIEW OF SYSTEMS:  4 point ROS including Respiratory, CV, GI and , other than that noted in the HPI,  is negative    Objective:  VS reviewed in [facility] and stable  Exam:  Eyes- resolving subconjunctival hemorrhage right eye   Resp: Effort WNL, LSCTA  CV: VSS, trace bilateral LE edema  Abd- soft, nontender, BS +- drain site with dressing D/I-   [facility]  Psych- alert, calm, pleasant      Labs:   Recent labs in [facility] reviewed by me today.     ASSESSMENT/PLAN:  (H11.31) Subconjunctival bleed, right  (primary encounter diagnosis)  Comment: resolving slowly  Plan: - continue to monitor for any eye pain or vision changes until resolved    (R19.4) Increased bowel frequency  Comment: - patient notes small BMs with  voiding  Plan: Meds reviewed. Is not on bowel meds. Monitor bowel pattern for now. If persists consider adding banantrol/imodium    (K12.1) Oral ulcer  Comment: patient discussed with dentist  Plan: Order TID salt water rinses and monitoring until resolved    Writer discussed patient's concern regarding ALISSA drain/flushes with nurse manager.   Encourage nursing to continue flushing drain as ordered. Monitor ALISSA output until f/w GI next.     Orders written on unit and discussed with nursing      Electronically signed by:  ELISA Bajwa CNP

## 2022-05-05 NOTE — LETTER
5/5/2022        RE: Zakiya Christian  [address]  [facility] [address]        [place] [facility]  [place] Medical Record Number:  [id number]  [facility] [[id number]]  Chief Complaint   Patient presents with     Nursing Home Acute       HPI:    Zakiya Christian  is a 77 year old (1945), who is being seen today for an episodic care visit.  HPI information obtained from: facility chart records, facility staff, patient report and [facility] chart review. Today's concern is:  1. Subconjunctival bleed, right    2. Increased bowel frequency    3. Oral ulcer        Patient seen in TCU for recheck of subconjunctival bleed right eye.     Today found sitting in chair. Alert, calm, NAD. Denies eyes pain or vision  Changes. Reports ulceration in mouth for which dentist has recommended salt water rinses. Also reports noting has bowel movement every time she voids. Denies abdominal discomfort, n/v. Reports no pain at abd ALISSA drain but states has not noted staff flushing of late.     Past Medical and Surgical History reviewed in Epic today.    MEDICATIONS:      Current Outpatient Medications   Medication Sig Dispense Refill     acetaminophen (TYLENOL) 325 MG tablet Take 2 tablets (650 mg) by mouth every 6 hours as needed for mild pain, fever or headaches       albuterol (PROAIR HFA/PROVENTIL HFA/VENTOLIN HFA) 108 (90 Base) MCG/ACT inhaler Inhale 2 puffs into the lungs 4 times daily as needed for shortness of breath / dyspnea or wheezing       albuterol (PROVENTIL) (5 MG/ML) 0.5% neb solution Take 5 mg by nebulization every 6 hours as needed for wheezing or shortness of breath / dyspnea       alendronate (FOSAMAX) 70 MG tablet Take 70 mg by mouth every 7 days       Ascorbic Acid (VITAMIN C) 500 MG CAPS Take 1,000 mg by mouth daily       aspirin 81 MG EC tablet Take 81 mg by mouth daily       calcium carbonate 600 mg-vitamin D 400 units (CALTRATE)  600-400 MG-UNIT per tablet Take 1 tablet by mouth 2 times daily       carvedilol (COREG) 12.5 MG tablet Take 12.5 mg by mouth 2 times daily (with meals)       cholecalciferol 50 MCG (2000 UT) CAPS Take 4,000 Units by mouth daily       coenzyme Q-10 capsule Take 1 capsule by mouth daily       FLUoxetine (PROZAC) 40 MG capsule Take 40 mg by mouth every morning       Fluticasone-Umeclidin-Vilanterol (TRELEGY ELLIPTA) 200-62.5-25 MCG/INH oral inhaler Inhale 1 puff into the lungs every morning       gabapentin (NEURONTIN) 300 MG capsule Take 900 mg by mouth At Bedtime       losartan (COZAAR) 25 MG tablet Take 12.5 mg by mouth every morning       metFORMIN (GLUCOPHAGE-XR) 500 MG 24 hr tablet Take 1,000 mg by mouth daily (with breakfast)       montelukast (SINGULAIR) 10 MG tablet Take 10 mg by mouth At Bedtime       omeprazole (PRILOSEC) 40 MG DR capsule Take 40 mg by mouth every morning (before breakfast)       pravastatin (PRAVACHOL) 40 MG tablet Take 40 mg by mouth At Bedtime       rivaroxaban ANTICOAGULANT (XARELTO) 20 MG TABS tablet Take 20 mg by mouth daily (with breakfast)       sodium chloride, PF, 0.9% PF flush Irrigate with 10 mLs as directed daily 200 mL 3     traMADol (ULTRAM) 50 MG tablet Take 0.5 tablets (25 mg) by mouth every 6 hours as needed for severe pain And 25 mg po BID 30 tablet 0         REVIEW OF SYSTEMS:  4 point ROS including Respiratory, CV, GI and , other than that noted in the HPI,  is negative    Objective:  VS reviewed in [facility] and stable  Exam:  Eyes- resolving subconjunctival hemorrhage right eye   Resp: Effort WNL, LSCTA  CV: VSS, trace bilateral LE edema  Abd- soft, nontender, BS +- drain site with dressing D/I-   [facility] [place]  Psych- alert, calm, pleasant      Labs:   Recent labs in [facility] reviewed by me today.     ASSESSMENT/PLAN:  (H11.31) Subconjunctival bleed, right  (primary encounter diagnosis)  Comment: resolving slowly  Plan: - continue to monitor for any eye pain or vision changes  until resolved    (R19.4) Increased bowel frequency  Comment: - patient notes small BMs with voiding  Plan: Meds reviewed. Is not on bowel meds. Monitor bowel pattern for now. If persists consider adding banantrol/imodium    (K12.1) Oral ulcer  Comment: patient discussed with dentist  Plan: Order TID salt water rinses and monitoring until resolved    Writer discussed patient's concern regarding ALISSA drain/flushes with nurse manager.   Encourage nursing to continue flushing drain as ordered. Monitor ALISSA output until f/w GI next.     Orders written on unit and discussed with nursing      Electronically signed by:  ELISA Bajwa CNP                 Sincerely,        ELISA Bajwa CNP

## 2022-05-05 NOTE — TELEPHONE ENCOUNTER
Interventional Radiology -  Telephone Encounter Note  05/05/22 12:36 PM    Patient Name Requesting Call:  Meir Nursing Care Manager    Reason for Call:  Broken stop cock    Subjective:  Zakiya Christian is a 77 year old female with hx DM. Admitted after presenting with 3 month history of abdominal and back pain. Imaging revealed large intra-abdominal abscess presumed to be diverticular s/p CT guided 12F drain placement 4/13.    Meir reports that the stop cock is broken and leaking.      PMH/PSH: Reviewed    ALLERGIES:  Allergies   Allergen Reactions     Simvastatin Hives       MEDICATIONS:  Current Outpatient Medications   Medication     acetaminophen (TYLENOL) 325 MG tablet     albuterol (PROAIR HFA/PROVENTIL HFA/VENTOLIN HFA) 108 (90 Base) MCG/ACT inhaler     albuterol (PROVENTIL) (5 MG/ML) 0.5% neb solution     alendronate (FOSAMAX) 70 MG tablet     Ascorbic Acid (VITAMIN C) 500 MG CAPS     aspirin 81 MG EC tablet     calcium carbonate 600 mg-vitamin D 400 units (CALTRATE) 600-400 MG-UNIT per tablet     carvedilol (COREG) 12.5 MG tablet     cholecalciferol 50 MCG (2000 UT) CAPS     coenzyme Q-10 capsule     FLUoxetine (PROZAC) 40 MG capsule     Fluticasone-Umeclidin-Vilanterol (TRELEGY ELLIPTA) 200-62.5-25 MCG/INH oral inhaler     gabapentin (NEURONTIN) 300 MG capsule     losartan (COZAAR) 25 MG tablet     metFORMIN (GLUCOPHAGE-XR) 500 MG 24 hr tablet     montelukast (SINGULAIR) 10 MG tablet     omeprazole (PRILOSEC) 40 MG DR capsule     pravastatin (PRAVACHOL) 40 MG tablet     rivaroxaban ANTICOAGULANT (XARELTO) 20 MG TABS tablet     sodium chloride, PF, 0.9% PF flush     traMADol (ULTRAM) 50 MG tablet     No current facility-administered medications for this visit.       Relevant Labs: Reviewed      Relevant Imaging:     Narrative & Impression   [facility]  LOCATION: [facility]  DATE: 4/29/2022     PROCEDURE:   1. 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 Patient reports little output from the drain over the last week although is on sure of the actual volume. The bulb shows approximately 10   mL of purulent fluid. The patient presents to interventional radiology today for repeat abscessogram and possible drain change or removal.     CONSENT: The risks, benefits and alternatives of the procedure were discussed with the patient  in detail. All questions were answered. Informed consent was given to proceed with the procedure.     SEDATION: None.     CONTRAST: Liters Omni 350  ANTIBIOTICS: None.  ADDITIONAL MEDICATIONS: None.     FLUOROSCOPIC TIME: 0.3   RADIATION DOSE: Air Kerma: Routine mGy.     COMPLICATIONS: No immediate complications.     STERILE BARRIER TECHNIQUE: Maximum sterile barrier technique was used. Cutaneous antisepsis was performed at the operative site with application of 2% chlorhexidine and large sterile drape. Prior to the procedure, the  and assistant performed   hand hygiene and wore hat, mask, sterile gown, and sterile gloves during the entire procedure.     PROCEDURE:   A  image was obtained. Aspiration from the existing drainage catheter yielded small volume purulent drainage. Contrast was slowly injected into the catheter and images were obtained in anteroposterior and oblique projections.     At this point, the catheter was attached to a Godwin-Goode bulb. A clean and sterile dressing was applied. The patient tolerated the procedure well.      FINDINGS:  Aspiration from the existing abscess catheter yielded small volume purulent drainage. Abscessogram by a contrast injection through the catheter demonstrated a small sized fluid collection without a fistulous connection to the colon.                                                                      IMPRESSION:  Small persistent pericatheter collection without  definite fistulous claudication with the adjacent bowel.     Plan: The patient should return in 10-14 days for repeat abscessogram. No need for CT prior to the procedure. The patient should continue to flush as previously ordered.         Assessment/Plan:     Patient will be seen at  IR by RN or tech for evaluation and exchange of stop cock    Spoke to IR charge RN at , who will work to assist in care coordination       Total length of Call: 10 minutes spend in consultation/telephone communication with patient, reviewing records and data, coordination of care and establishing plan.          YULIANA BROWER NP   5/5/2022   12:36 PM  [facility]  Interventional Radiology   [phone]

## 2022-05-10 ENCOUNTER — TRANSITIONAL CARE UNIT VISIT (OUTPATIENT)
Dept: GERIATRICS | Facility: CLINIC | Age: 77
End: 2022-05-10
Payer: COMMERCIAL

## 2022-05-10 VITALS
SYSTOLIC BLOOD PRESSURE: 118 MMHG | HEIGHT: 62 IN | OXYGEN SATURATION: 94 % | HEART RATE: 84 BPM | TEMPERATURE: 97.1 F | RESPIRATION RATE: 18 BRPM | BODY MASS INDEX: 40.6 KG/M2 | DIASTOLIC BLOOD PRESSURE: 66 MMHG

## 2022-05-10 DIAGNOSIS — H11.31 SUBCONJUNCTIVAL HEMORRHAGE OF RIGHT EYE: ICD-10-CM

## 2022-05-10 DIAGNOSIS — N18.31 CHRONIC KIDNEY DISEASE, STAGE 3A (H): ICD-10-CM

## 2022-05-10 DIAGNOSIS — J44.9 CHRONIC OBSTRUCTIVE PULMONARY DISEASE, UNSPECIFIED COPD TYPE (H): ICD-10-CM

## 2022-05-10 DIAGNOSIS — M35.3 POLYMYALGIA RHEUMATICA (H): ICD-10-CM

## 2022-05-10 DIAGNOSIS — N30.00 ACUTE CYSTITIS WITHOUT HEMATURIA: ICD-10-CM

## 2022-05-10 DIAGNOSIS — E66.01 MORBID OBESITY (H): ICD-10-CM

## 2022-05-10 DIAGNOSIS — R63.0 POOR APPETITE: ICD-10-CM

## 2022-05-10 DIAGNOSIS — F33.42 RECURRENT MAJOR DEPRESSION IN FULL REMISSION (H): ICD-10-CM

## 2022-05-10 DIAGNOSIS — I10 ESSENTIAL HYPERTENSION: ICD-10-CM

## 2022-05-10 DIAGNOSIS — R60.0 LOWER EXTREMITY EDEMA: ICD-10-CM

## 2022-05-10 DIAGNOSIS — K57.20 COLONIC DIVERTICULAR ABSCESS: Primary | ICD-10-CM

## 2022-05-10 DIAGNOSIS — E11.42 DIABETIC PERIPHERAL NEUROPATHY ASSOCIATED WITH TYPE 2 DIABETES MELLITUS (H): ICD-10-CM

## 2022-05-10 PROCEDURE — 99309 SBSQ NF CARE MODERATE MDM 30: CPT | Performed by: NURSE PRACTITIONER

## 2022-05-10 NOTE — LETTER
"    5/10/2022        RE: Zakiya Christian  [address]  [facility] [address]        [facility]    Chief Complaint   Patient presents with     RECHECK     HPI:  Zakiya Christian is a 77 year old  (1945), who is being seen today for an episodic care visit at: [facility] ([facility]) [[id number]]. Today's concern is:     Patient admitted to TCU for acute rehab and medical managment following hospitalization for diverticular intra-abdominal abscess. Treated with IV metronidazole and ceftriaxone and with drain placement in interventional radiation.Transitioned to IV Zosyn based on culture results and then to oral Levaquin and metronidazole course. Fluconazole also added 2/2 noted candida in abscess. Also diagnoesed with Klebsiella UTI inpatient- covered by previously mentioned antibiotics. PMH noted for recent diagnosis of PMR, now thought symptoms may have been related to abscess instead. Rheumatology consulted inpatient and recommended discontinuing azathioprine and tapering prednisone. Also PMH DM type 2, HTN, COPD, Anemia, CKD III    Patient did go out for drainage tube replacement from TCU late April - larger bore drain put in. Tolerated procedure well. Developed a subconctival hemorrhage in right eye last week- no associated s/s and has been stable and slowly resolving. Has remained medically stable in TCU and has been working with therapy    Patient found in room today sitting up in chair. Is alert, calm, NAD. Denies SOB, chest pain or dizziness. Denies noting new right eye pain or vision changes. Denies other concerns though does state is looking forward to going home.     Allergies, and PMH/PSH reviewed in [facility] today.  REVIEW OF SYSTEMS:  4 point ROS including Respiratory, CV, GI and , other than that noted in the HPI,  is negative    Objective:   /66   Pulse 84   Temp 97.1  F (36.2  C)   Resp 18   Ht 1.575 m (5' 2\")   SpO2 94%   BMI 40.60 kg/m      Resp: Effort WNL, " LSCTA  CV: VSS, trace bilateral LE edema  Abd- soft, nontender, BS +- drain intact, with small amount of serous drainage  [facility]- STUBBS  Psych- alert, calm, pleasant      Recent labs in [facility] reviewed by me today.     Assessment/Plan:    Colonic diverticular abscess  - received IV ceftriaxone and metronidazole then transitioned to IV Zosyn then to oral metronidazole and Levaquin. VSS  - has complete metronidazole and Levaquin (done 4/29 and 4/28). GI status stable. Drain in place  - completed fluconazole 4/27  - flush and monitor drain site/drain output q shift- discussed with nursing  - prn acetaminophen for pain control  - monitor GI status, VS  - f/w surgeon and for repeat CT abd recheck of drain 5!3     Acute cystitis without hematuria  - klebsiella, no active s/s  - completed antibiotics as above  - resolved        Polymyalgia rheumatica (H)  - recent dx - ? If symptoms r/t abscess instead. No noted flares with stopping meds as below  - Rheum recommended stopping azathioprine (done) and tapering prednisone (tolerating well)  - follow clinically     Chronic obstructive pulmonary disease, unspecified COPD type (H)  - no active s/s noted today  - continue on montelukast. May continue to have inhaler at bedside and use as directed    Essential Hypertension  -- controlled  - continues on PTA Coreg and Losartan     Recurrent major depression in full remission (H)  - chronic, situational stressors in play. Mood remains calm and stable  - continue on PTA fluoxetine  - supportive approach  - monitor mood and behaviors  - ACP prn      Diabetic peripheral neuropathy associated with type 2 diabetes mellitus (H)            Lab Results   Component Value Date     A1C 6.2 04/05/2022    Controlled  - continue on metformin  - no need to monitor BGL in TCU     Chronic kidney disease, stage 3a (H)            Creatinine   Date Value Ref Range Status   04/21/2022 0.83 0.60 - 1.10 mg/dL Final      -  -avoid nephrotoxins  - recheck  periodic BMP to ensure stability     Poor Appetite  - No associated s/s.  intake is improving of late per NN  - continue on BID Ensure- enc to drink/monitor  - follow intake/weights     Morbid obesity (H)  - Body mass index is 40.6 kg/m .    - noted and potentially aggravating factor in recovery and rehab  - not the ideal time for aggressive weight loss   - dietician follows in TCU  - ongoing encouragement of healthy dietary choices and portion sizes.     Bowel Frequency  - no diarrhea or associated s/s.. Is on banantrol.  - monitor bowel pattern     Subconjunctival hemorrhage  - No associated pain or vision changes.. Resolving. Discussed with MD  - may continue patient's Xarelto and ASA  - monitor right eye until resolved- nsg to report any new eye pain or vision changes to provider    Orders:  Orders written on unit    Electronically signed by: ELISA Bajwa CNP           Sincerely,        ELISA Bajwa CNP

## 2022-05-10 NOTE — PROGRESS NOTES
"[facility]ATR[facility]    Chief Complaint   Patient presents with     RECHECK     HPI:  Zakiya Christian is a 77 year old  (1945), who is being seen today for an episodic care visit at: [facility] (U) [[id number]]. Today's concern is:     Patient admitted to TCU for acute rehab and medical managment following hospitalization for diverticular intra-abdominal abscess. Treated with IV metronidazole and ceftriaxone and with drain placement in interventional radiation.Transitioned to IV Zosyn based on culture results and then to oral Levaquin and metronidazole course. Fluconazole also added 2/2 noted candida in abscess. Also diagnoesed with Klebsiella UTI inpatient- covered by previously mentioned antibiotics. PMH noted for recent diagnosis of PMR, now thought symptoms may have been related to abscess instead. Rheumatology consulted inpatient and recommended discontinuing azathioprine and tapering prednisone. Also PMH DM type 2, HTN, COPD, Anemia, CKD III    Patient did go out for drainage tube replacement from TCU late April - larger bore drain put in. Tolerated procedure well. Developed a subconctival hemorrhage in right eye last week- no associated s/s and has been stable and slowly resolving. Has remained medically stable in TCU and has been working with therapy    Patient found in room today sitting up in chair. Is alert, calm, NAD. Denies SOB, chest pain or dizziness. Denies noting new right eye pain or vision changes. Reports has a sore on butt that nursing put foam dressing on.     Allergies, and PMH/PSH reviewed in [facility] today.  REVIEW OF SYSTEMS:  4 point ROS including Respiratory, CV, GI and , other than that noted in the HPI,  is negative    Objective:   /66   Pulse 84   Temp 97.1  F (36.2  C)   Resp 18   Ht 1.575 m (5' 2\")   SpO2 94%   BMI 40.60 kg/m      Resp: Effort WNL, LSCTA  CV: VSS, trace - 1+  bilateral LE edema  Abd- soft, nontender, BS +- drain intact, with small amount of " serous drainage  Musc- STUBBS  Skin- drain dressing and site with some dried crusted blood on it.  Psych- alert, calm, pleasant      Recent labs in [facility] reviewed by me today.     Assessment/Plan:    Colonic diverticular abscess  - received IV ceftriaxone and metronidazole then transitioned to IV Zosyn then to oral metronidazole and Levaquin. VSS  - has complete metronidazole and Levaquin (done 4/29 and 4/28). GI status stable. Drain in place  - completed fluconazole 4/27  - flush and monitor drain site/drain output q shift- discussed with nursing- requested nursing clean site and apply new dressing   - prn acetaminophen for pain control  - monitor GI status, VS  - f/w surgeon and for repeat CT abd recheck of drain 5/13       Acute cystitis without hematuria  - klebsiella, no active s/s  - completed antibiotics as above  - resolved        Polymyalgia rheumatica (H)  - recent dx - ? If symptoms r/t abscess instead. No noted flares with stopping meds as below  - Rheum recommended stopping azathioprine (done) and tapering prednisone (tolerating well)  - follow clinically     Chronic obstructive pulmonary disease, unspecified COPD type (H)  - no active s/s noted today  - continue on montelukast. May continue to have inhaler at bedside and use as directed    Essential Hypertension  LE edema  -- controlled  - continues on PTA Coreg and Losartan  - add compression socks    Buttocks wound  - clean and apply mepilex foam. Change daily and prn  - APM  - offload pressure q 2 hours  - dietician following and patient is on Ensure  Discussed with nursing     Recurrent major depression in full remission (H)  - chronic, situational stressors in play. Mood remains calm and stable  - continue on PTA fluoxetine  - supportive approach  - monitor mood and behaviors  - ACP prn      Diabetic peripheral neuropathy associated with type 2 diabetes mellitus (H)            Lab Results   Component Value Date     A1C 6.2 04/05/2022    Controlled  -  continue on metformin  - no need to monitor BGL in TCU     Chronic kidney disease, stage 3a (H)            Creatinine   Date Value Ref Range Status   04/21/2022 0.83 0.60 - 1.10 mg/dL Final      -  -avoid nephrotoxins  - recheck periodic BMP to ensure stability     Poor Appetite  - No associated s/s.  intake is improving of late per NN  - continue on BID Ensure- enc to drink/monitor  - follow intake/weights     Morbid obesity (H)  - Body mass index is 40.6 kg/m .    - noted and potentially aggravating factor in recovery and rehab  - not the ideal time for aggressive weight loss   - dietician follows in TCU  - ongoing encouragement of healthy dietary choices and portion sizes.     Bowel Frequency  - no diarrhea or associated s/s.. Is on banantrol.  - monitor bowel pattern     Subconjunctival hemorrhage  - No associated pain or vision changes.. Resolving. Discussed with MD  - may continue patient's Xarelto and ASA  - monitor right eye until resolved- nsg to report any new eye pain or vision changes to provider    Orders:  Orders written on unit    Electronically signed by: ELISA Bajwa CNP

## 2022-05-10 NOTE — PROGRESS NOTES
IR Procedure Pre-call    Spoke with: juvenal Pitt  Arrival and procedure time: 0900 / 1000  Patient has : Yes  Patient has someone to stay overnight:Yes If angiogram must have responsible adult for 24 hours.  Patient is taking blood thinners:No   Patient has H&P within 30 days:Yes 5/10/22  Patient has covid test:Yes , at [facility]  Patient NPO 8 hours prior: Yes    Pre-call completed.   May 10, 2022 3:09 PM  Praveen Reinoso RN    Pt's nurse at long term care facility was also given NPO instructions and arrival time, as well as need for pre-procedure Covid test and  home from procedure.

## 2022-05-11 RX ORDER — TRAMADOL HYDROCHLORIDE 50 MG/1
25 TABLET ORAL EVERY 6 HOURS PRN
Qty: 30 TABLET | Refills: 0 | Status: SHIPPED | OUTPATIENT
Start: 2022-05-11 | End: 2022-06-15 | Stop reason: DRUGHIGH

## 2022-05-13 ENCOUNTER — HOSPITAL ENCOUNTER (OUTPATIENT)
Dept: INTERVENTIONAL RADIOLOGY/VASCULAR | Facility: CLINIC | Age: 77
Discharge: HOME | End: 2022-05-13
Attending: NURSE PRACTITIONER | Admitting: RADIOLOGY
Payer: COMMERCIAL

## 2022-05-13 VITALS
SYSTOLIC BLOOD PRESSURE: 142 MMHG | OXYGEN SATURATION: 98 % | RESPIRATION RATE: 16 BRPM | HEART RATE: 87 BPM | DIASTOLIC BLOOD PRESSURE: 61 MMHG

## 2022-05-13 DIAGNOSIS — K57.20 COLONIC DIVERTICULAR ABSCESS: ICD-10-CM

## 2022-05-13 DIAGNOSIS — R11.0 NAUSEA: Primary | ICD-10-CM

## 2022-05-13 LAB — GLUCOSE BLDC GLUCOMTR-MCNC: 93 MG/DL (ref 70–99)

## 2022-05-13 PROCEDURE — 255N000002 HC RX 255 OP 636: Performed by: NURSE PRACTITIONER

## 2022-05-13 PROCEDURE — 49424 ASSESS CYST CONTRAST INJECT: CPT

## 2022-05-13 PROCEDURE — 82962 GLUCOSE BLOOD TEST: CPT

## 2022-05-13 RX ORDER — ONDANSETRON 4 MG/1
4 TABLET, FILM COATED ORAL EVERY 6 HOURS PRN
Qty: 30 TABLET | Refills: 0 | Status: SHIPPED | OUTPATIENT
Start: 2022-05-13 | End: 2022-06-29

## 2022-05-13 RX ORDER — LIDOCAINE 40 MG/G
CREAM TOPICAL
Status: DISCONTINUED | OUTPATIENT
Start: 2022-05-13 | End: 2022-05-14 | Stop reason: HOSPADM

## 2022-05-13 RX ADMIN — IOHEXOL 10 ML: 350 INJECTION, SOLUTION INTRAVENOUS at 10:27

## 2022-05-13 NOTE — PROGRESS NOTES
Patient Name: Zakiya Christian  Medical Record Number: [id number]  Today's Date: 5/13/2022    Procedure: Image Guided Abscess Tube Check & Removal  Proceduralist: Dr. Cabrera Taylor  Pathology present: n/a    Procedure Start: 1015  Procedure end: 1025  Sedation medications administered: n/a     Report given to: CONNOR Hart IR  : n/a    Other Notes: Pt arrived to IR room #1 from IR Pre / Post 3. Consent reviewed. Pt denies any questions or concerns regarding procedure. Pt positioned supine and monitored per protocol. Pt tolerated procedure without any noted complications. Pt transferred back to IR Pre / Post 3.

## 2022-05-13 NOTE — IP AVS SNAPSHOT
[facility] Interventional Radiology  [address] [address]  Phone: [phone]  Fax: [phone]                                      After Visit Summary   5/13/2022    Zakiya Christian   MRN: [id number]           After Visit Summary Signature Page    I have received my discharge instructions, and my questions have been answered. I have discussed any challenges I see with this plan with the nurse or doctor.    ..........................................................................................................................................  Patient/Patient Representative Signature      ..........................................................................................................................................  Patient Representative Print Name and Relationship to Patient    ..................................................               ................................................  Date                                   Time    ..........................................................................................................................................  Reviewed by Signature/Title    ...................................................              ..............................................  Date                                               Time          22EPIC Rev 08/18

## 2022-05-13 NOTE — DISCHARGE INSTRUCTIONS
Drain Placement Discharge Instructions:  You had a small tube or drain(s) placed. This was placed so the abnormal fluid collection within your body can be drained out (externally). Please follow the below instructions as you recover:    Care Instructions after drain placement:  - If you received sedation for your procedure, do not drive or operate heavy machinery for the rest of the day.  - Rest after your drain(s) were placed. Avoid strenuous activity and heavy lifting for the next 2 days. Return to your normal activities as you tolerate after the 2 day restriction.  - You may shower; however, you should not submerge site under water like in a tub bath, Jacuzzi or pool. Cover drain(s) exit site(s) with plastic wrap while showering.  - Keep dressing clean and dry as long as drain (s) are in place. If you have a gauze dressing, please change the dressing as needed to keep site(s) clean and dry.  - You may eat and drink as normal.  - You may have discomfort after the procedure near the drain exit site(s). You may take acetaminophen (Tylenol ) or ibuprofen (Motrin ) as needed for any discomfort.  - Inspect the tube(s) often for kinks.  - If you have a gravity bag, keep the bag below the drain exit site(s) to allow for free flow of drainage by gravity.  - Record your daily drain(s) outputs and amount flushed on your drainage record. Bring your records to your next radiology appointment.  - Empty your drainage bag(s)/bulb(s) daily or when it is approximately half way fluid. Follow below instructions for emptying your bag(s)/bulb(s):  - Clean hands well with soap and water.  - Place a measuring container near the outlet valve of the drainage bag/bulb.  - If you have a drainage BAG: Twist the blue valve at the bottom of the bag while holding the valve over your measuring cup and open container to empty. Re-twist the valve closed on the drainage bag once complete.  - If you have a drainage BULB: Open the bulb cap (at the  top of the bulb). Empty the fluid into the measuring cup. Squeeze bulb and hold flat. While bulb is squeezed, close the cap.  - After draining fluid record your drainage output. Bring record of your drain outputs to your next radiology appointment.  - Discard drainage into toilet once fluid drained.    Follow-Up:   - [facility] will call you to schedule your next follow-up appointment. Please call [facility] if you are not contacted within the next week at [phone].     Please seek medical evaluation for:  - Nausea and/or vomiting.  - Diffuse abdominal pain.  - Fevers (greater than 101 F (38.3C)).    Call [facility] at [phone] with tube related questions or concerns:  - Drainage tube(s) falls out, is pulled back or felt to be out of position.   - Unable to flush drainage tube(s).   - Leakage (or purulent drainage) around drainage tube site(s).   - Extreme pain at drainage tube site(s).   - Outputs suddenly stop or significantly reduces.   - Warmth, redness, swelling or tenderness around the drainage tube(s).     Drain/Tube Removal Discharge Instructions:  Please follow the below instructions following your drain/tube removal.    Care Instructions after drain/tube removal:  - OK to shower after removal of your drain/tube.  - Avoid stagnant water such as tub baths, Jacuzzis and pools for 3 days after drain/tube removal.  - Keep previous drain/tube exit site covered with dressing of your preference until drainage stops. Change dressings daily and as needed to keep site dry and clean.  - Expect drainage will stop in approximately 3 days time.    Seek medical evaluation for the following:  - Fever (greater than 101 F (38.3C)).  - Purulent (yellow/green/foul smelling) drainage from previous drain exit site.  - Develop pain at or near the previous drain exit site.  - Persistent drainage lasting longer than three days from previous drain exit site.

## 2022-05-13 NOTE — PROCEDURES
[facility]    Procedure: IR Procedure Note    Date/Time: 5/13/2022 10:27 AM  Performed by: Cabrera Taylor MD  Authorized by: Cabrera Taylor MD       UNIVERSAL PROTOCOL   Site Marked: NA  Prior Images Obtained and Reviewed:  Yes  Required items: Required blood products, implants, devices and special equipment available    Patient identity confirmed:  Verbally with patient, arm band, provided demographic data and hospital-assigned identification number  Patient was reevaluated immediately before administering moderate or deep sedation or anesthesia  Confirmation Checklist:  Patient's identity using two indicators, relevant allergies, procedure was appropriate and matched the consent or emergent situation and correct equipment/implants were available  Time out: Immediately prior to the procedure a time out was called    Universal Protocol: the Joint Commission Universal Protocol was followed    Preparation: Patient was prepped and draped in usual sterile fashion       ANESTHESIA    Anesthesia: Local infiltration  Local Anesthetic:  Lidocaine 1% without epinephrine      SEDATION    Patient Sedated: No    See dictated procedure note for full details.  Findings: No residual drainable fluid. No fistula. Drain removed.    Specimens: none    Complications: None    Condition: Stable    Plan: Gauze dressing. No IR f/u      PROCEDURE  Describe Procedure: No residual drainable fluid. No fistula. Drain removed.  Patient Tolerance:  Patient tolerated the procedure well with no immediate complications  Length of time physician/provider present for 1:1 monitoring during sedation: 0

## 2022-05-16 ENCOUNTER — TRANSITIONAL CARE UNIT VISIT (OUTPATIENT)
Dept: GERIATRICS | Facility: CLINIC | Age: 77
End: 2022-05-16
Payer: COMMERCIAL

## 2022-05-16 VITALS
HEART RATE: 66 BPM | HEIGHT: 62 IN | DIASTOLIC BLOOD PRESSURE: 68 MMHG | SYSTOLIC BLOOD PRESSURE: 131 MMHG | RESPIRATION RATE: 17 BRPM | OXYGEN SATURATION: 96 % | BODY MASS INDEX: 40.6 KG/M2 | TEMPERATURE: 97.5 F

## 2022-05-16 DIAGNOSIS — Z53.9 ERRONEOUS ENCOUNTER--DISREGARD: Primary | ICD-10-CM

## 2022-05-16 NOTE — LETTER
5/16/2022        RE: Zakiya Christian  [address]  [facility] [place] [address]        Erroneous encounter, disregard          Sincerely,        ELISA Bajwa CNP

## 2022-05-23 ENCOUNTER — LAB REQUISITION (OUTPATIENT)
Dept: LAB | Facility: CLINIC | Age: 77
End: 2022-05-23
Payer: COMMERCIAL

## 2022-05-23 ENCOUNTER — TRANSITIONAL CARE UNIT VISIT (OUTPATIENT)
Dept: GERIATRICS | Facility: CLINIC | Age: 77
End: 2022-05-23
Payer: COMMERCIAL

## 2022-05-23 VITALS
HEART RATE: 84 BPM | HEIGHT: 62 IN | BODY MASS INDEX: 40.6 KG/M2 | DIASTOLIC BLOOD PRESSURE: 60 MMHG | TEMPERATURE: 97.1 F | RESPIRATION RATE: 20 BRPM | SYSTOLIC BLOOD PRESSURE: 107 MMHG | OXYGEN SATURATION: 91 %

## 2022-05-23 DIAGNOSIS — N30.00 ACUTE CYSTITIS WITHOUT HEMATURIA: ICD-10-CM

## 2022-05-23 DIAGNOSIS — R63.0 POOR APPETITE: ICD-10-CM

## 2022-05-23 DIAGNOSIS — L02.91 CUTANEOUS ABSCESS, UNSPECIFIED: ICD-10-CM

## 2022-05-23 DIAGNOSIS — R60.0 LOWER EXTREMITY EDEMA: ICD-10-CM

## 2022-05-23 DIAGNOSIS — I10 ESSENTIAL HYPERTENSION: ICD-10-CM

## 2022-05-23 DIAGNOSIS — E66.01 MORBID OBESITY (H): ICD-10-CM

## 2022-05-23 DIAGNOSIS — R03.0 ELEVATED BLOOD-PRESSURE READING, WITHOUT DIAGNOSIS OF HYPERTENSION: ICD-10-CM

## 2022-05-23 DIAGNOSIS — M35.3 POLYMYALGIA RHEUMATICA (H): ICD-10-CM

## 2022-05-23 DIAGNOSIS — E11.42 DIABETIC PERIPHERAL NEUROPATHY ASSOCIATED WITH TYPE 2 DIABETES MELLITUS (H): ICD-10-CM

## 2022-05-23 DIAGNOSIS — K57.20 COLONIC DIVERTICULAR ABSCESS: Primary | ICD-10-CM

## 2022-05-23 DIAGNOSIS — H11.31 SUBCONJUNCTIVAL HEMORRHAGE OF RIGHT EYE: ICD-10-CM

## 2022-05-23 DIAGNOSIS — J44.9 CHRONIC OBSTRUCTIVE PULMONARY DISEASE, UNSPECIFIED COPD TYPE (H): ICD-10-CM

## 2022-05-23 DIAGNOSIS — E11.9 TYPE 2 DIABETES MELLITUS WITHOUT COMPLICATIONS (H): ICD-10-CM

## 2022-05-23 DIAGNOSIS — N18.31 CHRONIC KIDNEY DISEASE, STAGE 3A (H): ICD-10-CM

## 2022-05-23 DIAGNOSIS — F33.42 RECURRENT MAJOR DEPRESSION IN FULL REMISSION (H): ICD-10-CM

## 2022-05-23 PROCEDURE — 99309 SBSQ NF CARE MODERATE MDM 30: CPT | Performed by: NURSE PRACTITIONER

## 2022-05-23 NOTE — LETTER
"    5/23/2022        RE: Zakiya Christian  [address]  [facility] [place] [address]        [facility] GERIATR[facility]    Chief Complaint   Patient presents with     RECHECK     HPI:  Zakiya Christian is a 77 year old  (1945), who is being seen today for an episodic care visit at: [facility] ([facility]) [[id number]]. Today's concern is:     Patient admitted to TCU for acute rehab and medical managment following hospitalization for diverticular intra-abdominal abscess. Treated with IV metronidazole and ceftriaxone and with drain placement in interventional radiation.Transitioned to IV Zosyn based on culture results and then to oral Levaquin and metronidazole course. Fluconazole also added 2/2 noted candida in abscess. Also diagnoesed with Klebsiella UTI inpatient- covered by previously mentioned antibiotics. PMH noted for recent diagnosis of PMR, now thought symptoms may have been related to abscess instead. Rheumatology consulted inpatient and recommended discontinuing azathioprine and tapering prednisone. Also PMH DM type 2, HTN, COPD, Anemia, CKD III    Seen today for recheck.    Patient's drainage tube has now been removed with no residual fluid reportedly present per imaging.    Patient has been participating with rehab and is making good functional gains. Has a fair amount of stairs at home so is working with PT on this as well.     Patient found in room sitting up in chair. Alert, calm, NAD. Denies pain. Denies SOB, chest pain or dizziness. States appetite fair. Denies n/v or diarrhea. VSS    Reports some concerns with discharge/discharge planning. States nephew will be helping to put in any adaptive equipment she may need for safety.     Allergies, and PMH/PSH reviewed in [facility] today.  REVIEW OF SYSTEMS:  4 point ROS including Respiratory, CV, GI and , other than that noted in the HPI,  is negative    Objective:   /60   Pulse 84   Temp 97.1  F (36.2  C)   Resp 20   Ht 1.575 m (5' 2\")   SpO2 91%  "  BMI 40.60 kg/m      Resp: Effort WNL, LSCTA   CV: VSS- slight puffy legs but no real edema noted  Abd- soft, nontender, BS +  Musc- STUBBS  Skin- small scab present LLQ abdomen where drain was  Psych- alert, calm, pleasant      Recent labs in [facility] reviewed by me today.     Assessment/Plan:    Colonic diverticular abscess  - received IV ceftriaxone and metronidazole then transitioned to IV Zosyn then to oral metronidazole and Levaquin. VSS  - has complete metronidazole and Levaquin (done 4/29 and 4/28). GI status stable. Drain now out and site clear and healing  - Monitor old drain site daily until healed  - completed fluconazole 4/27  - monitor GI status, VS  - f/w surgeon as directed    Chronic low back pain  - chronic, stable  - continues on scheduled and prn Tramadol and acetaminophen  - follow clinically        Acute cystitis without hematuria  - klebsiella, no active s/s  - completed antibiotics as above  - resolved        Polymyalgia rheumatica (H)  - recent dx - ? If symptoms r/t abscess instead. No noted flares after stopping meds as below  - Rheum recommended stopping azathioprine (done) and tapering prednisone (tolerating well)  - follow clinically     Chronic obstructive pulmonary disease, unspecified COPD type (H)  - no active s/s noted today  - continue on montelukast. May continue to have inhaler at bedside and use as directed     Essential Hypertension  LE edema  -- BP controlled  - continues on PTA Coreg and Losartan  - added compression socks     Buttocks wound  - clean and apply mepilex foam. Change daily and prn  - APM  - offload pressure q 2 hours  - dietician following and patient is on Ensure       Recurrent major depression in full remission (H)  - chronic, situational stressors in play. Mood remains calm and stable  - continue on PTA fluoxetine  - supportive approach  - monitor mood and behaviors  - ACP prn      Diabetic peripheral neuropathy associated with type 2 diabetes mellitus (H)             Lab Results   Component Value Date     A1C 6.2 04/05/2022    Controlled  - continue on metformin  - no need to monitor BGL in TCU     Chronic kidney disease, stage 3a (H)            Creatinine   Date Value Ref Range Status   04/21/2022 0.83 0.60 - 1.10 mg/dL Final      -  -avoid nephrotoxins  - recheck periodic BMP to ensure stability     Poor Appetite  - No associated s/s.  intake is improving of late per NN  - continue on BID Ensure- enc to drink/monitor  - follow intake/weights     Morbid obesity (H)  - Body mass index is 40.6 kg/m .     - noted and potentially aggravating factor in recovery and rehab  - not the ideal time for aggressive weight loss   - dietician follows in TCU  - ongoing encouragement of healthy dietary choices and portion sizes.      Bowel Frequency  - no diarrhea or associated s/s.. Is on banantrol.  - monitor bowel pattern     Subconjunctival hemorrhage  - No associated pain or vision changes.. Resolved    Physical deconditioning  - 2/2 above   - making gains in rehab, needs to be able to do quite a few stairs to return home.  - PT/OT to optimize function, safety and independence  -Supportive cares and treatments  - ongoing discharge planning, SW follow and care conferences per unit protocol- SW updated with patient's discharge concerns today           Electronically signed by: ELISA Bajwa CNP           Sincerely,        ELISA Bajwa CNP

## 2022-05-23 NOTE — PROGRESS NOTES
"[facility] GERIATRICS    Chief Complaint   Patient presents with     RECHECK     HPI:  Zakiya Christian is a 77 year old  (1945), who is being seen today for an episodic care visit at: [facility] ([facility]) [[id number]]. Today's concern is:     Patient admitted to TCU for acute rehab and medical managment following hospitalization for diverticular intra-abdominal abscess. Treated with IV metronidazole and ceftriaxone and with drain placement in interventional radiation.Transitioned to IV Zosyn based on culture results and then to oral Levaquin and metronidazole course. Fluconazole also added 2/2 noted candida in abscess. Also diagnoesed with Klebsiella UTI inpatient- covered by previously mentioned antibiotics. PMH noted for recent diagnosis of PMR, now thought symptoms may have been related to abscess instead. Rheumatology consulted inpatient and recommended discontinuing azathioprine and tapering prednisone. Also PMH DM type 2, HTN, COPD, Anemia, CKD III    Seen today for recheck.    Patient's drainage tube has now been removed with no residual fluid reportedly present per imaging.    Patient has been participating with rehab and is making good functional gains. Has a fair amount of stairs at home so is working with PT on this as well.     Patient found in room sitting up in chair. Alert, calm, NAD. Denies pain. Denies SOB, chest pain or dizziness. States appetite fair. Denies n/v or diarrhea. VSS    Reports some concerns with discharge/discharge planning. States nephew will be helping to put in any adaptive equipment she may need for safety.     Allergies, and PMH/PSH reviewed in EPIC today.  REVIEW OF SYSTEMS:  4 point ROS including Respiratory, CV, GI and , other than that noted in the HPI,  is negative    Objective:   /60   Pulse 84   Temp 97.1  F (36.2  C)   Resp 20   Ht 1.575 m (5' 2\")   SpO2 91%   BMI 40.60 kg/m      Resp: Effort WNL, LSCTA   CV: VSS- slight puffy legs but no real edema " noted  Abd- soft, nontender, BS +  Musc- STUBBS  Skin- small scab present LLQ abdomen where drain was  Psych- alert, calm, pleasant      Recent labs in [facility] reviewed by me today.     Assessment/Plan:    Colonic diverticular abscess  - received IV ceftriaxone and metronidazole then transitioned to IV Zosyn then to oral metronidazole and Levaquin. VSS  - has complete metronidazole and Levaquin (done 4/29 and 4/28). GI status stable. Drain now out and site clear and healing  - Monitor old drain site daily until healed  - completed fluconazole 4/27  - monitor GI status, VS  - f/w surgeon as directed    Chronic low back pain  - chronic, stable  - continues on scheduled and prn Tramadol and acetaminophen  - follow clinically        Acute cystitis without hematuria  - klebsiella, no active s/s  - completed antibiotics as above  - resolved        Polymyalgia rheumatica (H)  - recent dx - ? If symptoms r/t abscess instead. No noted flares after stopping meds as below  - Rheum recommended stopping azathioprine (done) and tapering prednisone (tolerating well)  - follow clinically     Chronic obstructive pulmonary disease, unspecified COPD type (H)  - no active s/s noted today  - continue on montelukast. May continue to have inhaler at bedside and use as directed     Essential Hypertension  LE edema  -- BP controlled  - continues on PTA Coreg and Losartan  - added compression socks     Buttocks wound  - clean and apply mepilex foam. Change daily and prn  - APM  - offload pressure q 2 hours  - dietician following and patient is on Ensure       Recurrent major depression in full remission (H)  - chronic, situational stressors in play. Mood remains calm and stable  - continue on PTA fluoxetine  - supportive approach  - monitor mood and behaviors  - ACP prn      Diabetic peripheral neuropathy associated with type 2 diabetes mellitus (H)            Lab Results   Component Value Date     A1C 6.2 04/05/2022    Controlled  - continue on  metformin  - no need to monitor BGL in TCU     Chronic kidney disease, stage 3a (H)            Creatinine   Date Value Ref Range Status   04/21/2022 0.83 0.60 - 1.10 mg/dL Final      -  -avoid nephrotoxins  - recheck periodic BMP to ensure stability     Poor Appetite  - No associated s/s.  intake is improving of late per NN  - continue on BID Ensure- enc to drink/monitor  - follow intake/weights     Morbid obesity (H)  - Body mass index is 40.6 kg/m .     - noted and potentially aggravating factor in recovery and rehab  - not the ideal time for aggressive weight loss   - dietician follows in TCU  - ongoing encouragement of healthy dietary choices and portion sizes.      Bowel Frequency  - no diarrhea or associated s/s.. Is on banantrol.  - monitor bowel pattern    H/O DVT  RLE  - continues on ASA and Xarelto     Subconjunctival hemorrhage  - No associated pain or vision changes.. Resolved    Physical deconditioning  - 2/2 above   - making gains in rehab, needs to be able to do quite a few stairs to return home.  - PT/OT to optimize function, safety and independence  -Supportive cares and treatments  - ongoing discharge planning, SW follow and care conferences per unit protocol- SW updated with patient's discharge concerns today           Electronically signed by: ELISA Bajwa CNP

## 2022-05-24 ENCOUNTER — TELEPHONE (OUTPATIENT)
Dept: GERIATRICS | Facility: CLINIC | Age: 77
End: 2022-05-24

## 2022-05-24 LAB
ALBUMIN SERPL-MCNC: 2 G/DL (ref 3.5–5)
ALP SERPL-CCNC: 68 U/L (ref 45–120)
ALT SERPL W P-5'-P-CCNC: <9 U/L (ref 0–45)
ANION GAP SERPL CALCULATED.3IONS-SCNC: 10 MMOL/L (ref 5–18)
AST SERPL W P-5'-P-CCNC: 17 U/L (ref 0–40)
BASOPHILS # BLD AUTO: 0 10E3/UL (ref 0–0.2)
BASOPHILS NFR BLD AUTO: 1 %
BILIRUB SERPL-MCNC: 0.4 MG/DL (ref 0–1)
BUN SERPL-MCNC: 14 MG/DL (ref 8–28)
CALCIUM SERPL-MCNC: 9.4 MG/DL (ref 8.5–10.5)
CHLORIDE BLD-SCNC: 99 MMOL/L (ref 98–107)
CO2 SERPL-SCNC: 31 MMOL/L (ref 22–31)
CREAT SERPL-MCNC: 0.77 MG/DL (ref 0.6–1.1)
EOSINOPHIL # BLD AUTO: 0.2 10E3/UL (ref 0–0.7)
EOSINOPHIL NFR BLD AUTO: 4 %
ERYTHROCYTE [DISTWIDTH] IN BLOOD BY AUTOMATED COUNT: 16.3 % (ref 10–15)
GFR SERPL CREATININE-BSD FRML MDRD: 79 ML/MIN/1.73M2
GLUCOSE BLD-MCNC: 86 MG/DL (ref 70–125)
HCT VFR BLD AUTO: 37.5 % (ref 35–47)
HGB BLD-MCNC: 11 G/DL (ref 11.7–15.7)
IMM GRANULOCYTES # BLD: 0 10E3/UL
IMM GRANULOCYTES NFR BLD: 1 %
LYMPHOCYTES # BLD AUTO: 1.4 10E3/UL (ref 0.8–5.3)
LYMPHOCYTES NFR BLD AUTO: 23 %
MCH RBC QN AUTO: 30.9 PG (ref 26.5–33)
MCHC RBC AUTO-ENTMCNC: 29.3 G/DL (ref 31.5–36.5)
MCV RBC AUTO: 105 FL (ref 78–100)
MONOCYTES # BLD AUTO: 0.5 10E3/UL (ref 0–1.3)
MONOCYTES NFR BLD AUTO: 8 %
NEUTROPHILS # BLD AUTO: 3.8 10E3/UL (ref 1.6–8.3)
NEUTROPHILS NFR BLD AUTO: 63 %
NRBC # BLD AUTO: 0 10E3/UL
NRBC BLD AUTO-RTO: 0 /100
PLATELET # BLD AUTO: 276 10E3/UL (ref 150–450)
POTASSIUM BLD-SCNC: 4.2 MMOL/L (ref 3.5–5)
PROT SERPL-MCNC: 6.5 G/DL (ref 6–8)
RBC # BLD AUTO: 3.56 10E6/UL (ref 3.8–5.2)
SODIUM SERPL-SCNC: 140 MMOL/L (ref 136–145)
WBC # BLD AUTO: 6 10E3/UL (ref 4–11)

## 2022-05-24 PROCEDURE — 85025 COMPLETE CBC W/AUTO DIFF WBC: CPT | Mod: ORL | Performed by: NURSE PRACTITIONER

## 2022-05-24 PROCEDURE — P9604 ONE-WAY ALLOW PRORATED TRIP: HCPCS | Mod: ORL | Performed by: NURSE PRACTITIONER

## 2022-05-24 PROCEDURE — 80053 COMPREHEN METABOLIC PANEL: CPT | Mod: ORL | Performed by: NURSE PRACTITIONER

## 2022-05-24 PROCEDURE — 36415 COLL VENOUS BLD VENIPUNCTURE: CPT | Mod: ORL | Performed by: NURSE PRACTITIONER

## 2022-05-24 NOTE — TELEPHONE ENCOUNTER
[facility] Lab Note     Provider: ELISA Bajwa CNP   Facility: [facility] Facility Type:  TCU    Allergies   Allergen Reactions     Simvastatin Hives       Labs Reviewed by provider: Heme 1, CMP     Verbal Order/Direction given by Provider: No new orders.      Provider giving Order:  ELISA Bajwa CNP     Verbal Order given to: Collette([phone])    Shadi Brito RN

## 2022-05-31 ENCOUNTER — TELEPHONE (OUTPATIENT)
Dept: GERIATRICS | Facility: CLINIC | Age: 77
End: 2022-05-31

## 2022-05-31 ENCOUNTER — NURSING HOME VISIT (OUTPATIENT)
Dept: GERIATRICS | Facility: CLINIC | Age: 77
End: 2022-05-31
Payer: COMMERCIAL

## 2022-05-31 DIAGNOSIS — U07.1 COVID-19: Primary | ICD-10-CM

## 2022-05-31 DIAGNOSIS — Z86.718 PERSONAL HISTORY OF DVT (DEEP VEIN THROMBOSIS): ICD-10-CM

## 2022-05-31 DIAGNOSIS — Z53.9 ERRONEOUS ENCOUNTER--DISREGARD: Primary | ICD-10-CM

## 2022-05-31 RX ORDER — ENOXAPARIN SODIUM 100 MG/ML
40 INJECTION SUBCUTANEOUS EVERY 12 HOURS
Qty: 6.4 ML | Refills: 0 | Status: SHIPPED | OUTPATIENT
Start: 2022-05-31 | End: 2022-05-31 | Stop reason: DRUGHIGH

## 2022-05-31 RX ORDER — ENOXAPARIN SODIUM 100 MG/ML
80 INJECTION SUBCUTANEOUS EVERY 12 HOURS
Qty: 12.8 ML | Refills: 0 | Status: SHIPPED | OUTPATIENT
Start: 2022-05-31 | End: 2022-06-08

## 2022-05-31 NOTE — TELEPHONE ENCOUNTER
Patient: Zakiya Christian     1945    ORDERS    Paxlovid:  Nirmatrelvir 150 mg tabs - 2 tabs (300 mg)  Ritonavir 100 mg - 1 tab  Give 3 tabs po BID x 5 days for COVID 19.    Discontinue Xarelto    Lovenox 80 mg subcutaneous q 12 hours x 8 days for h/o DVT    CBC  - COVID 19, DVT/anticoagulation    ELISA Bajwa CNP on 2022 at 4:31 PM

## 2022-06-02 ENCOUNTER — TRANSITIONAL CARE UNIT VISIT (OUTPATIENT)
Dept: GERIATRICS | Facility: CLINIC | Age: 77
End: 2022-06-02
Payer: COMMERCIAL

## 2022-06-02 ENCOUNTER — LAB REQUISITION (OUTPATIENT)
Dept: LAB | Facility: CLINIC | Age: 77
End: 2022-06-02
Payer: COMMERCIAL

## 2022-06-02 VITALS
HEART RATE: 85 BPM | DIASTOLIC BLOOD PRESSURE: 60 MMHG | RESPIRATION RATE: 18 BRPM | OXYGEN SATURATION: 93 % | HEIGHT: 62 IN | TEMPERATURE: 97.7 F | WEIGHT: 188.6 LBS | SYSTOLIC BLOOD PRESSURE: 139 MMHG | BODY MASS INDEX: 34.71 KG/M2

## 2022-06-02 DIAGNOSIS — U07.1 COVID-19: ICD-10-CM

## 2022-06-02 DIAGNOSIS — R11.0 NAUSEA: ICD-10-CM

## 2022-06-02 DIAGNOSIS — J44.9 CHRONIC OBSTRUCTIVE PULMONARY DISEASE, UNSPECIFIED COPD TYPE (H): ICD-10-CM

## 2022-06-02 DIAGNOSIS — R19.7 DIARRHEA, UNSPECIFIED TYPE: ICD-10-CM

## 2022-06-02 DIAGNOSIS — U07.1 COVID-19: Primary | ICD-10-CM

## 2022-06-02 DIAGNOSIS — R63.0 POOR APPETITE: ICD-10-CM

## 2022-06-02 PROCEDURE — 99309 SBSQ NF CARE MODERATE MDM 30: CPT | Mod: CS | Performed by: NURSE PRACTITIONER

## 2022-06-02 NOTE — LETTER
"    6/2/2022        RE: Zakiya Christian  [address]  [place] [address]        [facility] GERIATRICS    Chief Complaint   Patient presents with     RECHECK     HPI:  Zakiya Christian is a 77 year old  (1945), who is being seen today for an episodic care visit at: [facility] ([facility]) [[id number]]. Today's concern is:     Patient in U for acute rehab. Reportedly tested positive for COVID 19 on 5/31 on routine screening. Also noted to have a cough and fatigue. Was started on Paxlovid course. Ivette held 2/2 interaction with Paxlovid per discussion regarding medication interactions with pharmacist. Bridging with Lovenox.    Today patient is found in room sitting up in bed after just getting out of the bathroom. She reports some nausea, diarrhea and fatigue as well as a cough. Discussed the diarrhea may be a side effect of the Paxlovid. Denies noting watery stools or associated abdominal pain. Reports \"not hungry\" and so has not been taking much po. Encouraged patient to attempt to take sips of fluids, clear soda frequently.     Allergies, and PMH/PSH reviewed in [facility]IC today.  REVIEW OF SYSTEMS:  4 point ROS including Respiratory, CV, GI and , other than that noted in the HPI,  is negative    Objective:   /60   Pulse 85   Temp 97.7  F (36.5  C)   Resp 18   Ht 1.575 m (5' 2\")   Wt 85.5 kg (188 lb 9.6 oz)   SpO2 93%   BMI 34.50 kg/m      Resp: Effort WNL, some wheezing and- congested cough noted  CV: VS as above, trace bilateral LE edema  Abd- soft, nontender, BS +  Musc- STUBBS- generalized weakness  Psych- alert, calm,      Recent labs in EPIC reviewed by me today.     Assessment/Plan:  COVID-19  Diagnosed 5/31. Associated cough, fatigue, nausea, poor intake and diarrhea (? R/t to Paxlovid)  - In contagious state. Will need to stay in contact/droplet isolation for at least 10 days  Chronic obstructive pulmonary disease, unspecified COPD type (H)  - wheezing noted. Patient not sure " where inhaler is- used to have it at bedside  - discussed with nursing, patient can have albuterol in room and self administer as directed  - continue on Trelegy Ellipta and Singulair  - close monitoring of respiratory status and VS    Nausea  - in setting of above. Monitor closely. Encourage po fluids  - continue prn Zorfran  Diarrhea, unspecified type  - just started ? 2/2 COVID 19 or Paxlovid  - is on banantrol.  - monitor bowel pattern  - check BMP 6/3  Poor appetite  - in setting of above. Has lost some weight over course of TCU. Dietician following  - monitor intake/weights closely in setting of new acute illness    Orders:  Written on unit and discussed with nursing    Electronically signed by: ELISA Bajwa CNP           Sincerely,        ELISA Bajwa CNP

## 2022-06-02 NOTE — PROGRESS NOTES
"[facility] GERIATRICS    Chief Complaint   Patient presents with     RECHECK     HPI:  Zakiya Christian is a 77 year old  (1945), who is being seen today for an episodic care visit at: [facility] ([facility]) [[id number]]. Today's concern is:     Patient in TCU for acute rehab. Reportedly tested positive for COVID 19 on 5/31 on routine screening. Also noted to have a cough and fatigue. Was started on Paxlovid course. Ivette held 2/2 interaction with Paxlovid per discussion regarding medication interactions with pharmacist. Bridging with Lovenox.    Today patient is found in room sitting up in bed after just getting out of the bathroom. She reports some nausea, diarrhea and fatigue as well as a cough. Discussed the diarrhea may be a side effect of the Paxlovid. Denies noting watery stools or associated abdominal pain. Reports \"not hungry\" and so has not been taking much po. Encouraged patient to attempt to take sips of fluids, clear soda frequently.     Allergies, and PMH/PSH reviewed in EPIC today.  REVIEW OF SYSTEMS:  4 point ROS including Respiratory, CV, GI and , other than that noted in the HPI,  is negative    Objective:   /60   Pulse 85   Temp 97.7  F (36.5  C)   Resp 18   Ht 1.575 m (5' 2\")   Wt 85.5 kg (188 lb 9.6 oz)   SpO2 93%   BMI 34.50 kg/m      Resp: Effort WNL, some wheezing and- congested cough noted  CV: VS as above, trace bilateral LE edema  Abd- soft, nontender, BS +  Musc- STUBBS- generalized weakness  Psych- alert, calm,      Recent labs in [facility] reviewed by me today.     Assessment/Plan:  COVID-19  Diagnosed 5/31. Associated cough, fatigue, nausea, poor intake and diarrhea (? R/t to Paxlovid)  - In contagious state. Will need to stay in contact/droplet isolation for at least 10 days  Chronic obstructive pulmonary disease, unspecified COPD type (H)  - wheezing noted. Patient not sure where inhaler is- used to have it at bedside  - discussed with nursing, patient can have albuterol " in room and self administer as directed  - continue on Trelegy Ellipta and Singulair  - close monitoring of respiratory status and VS    Nausea  - in setting of above. Monitor closely. Encourage po fluids  - continue prn Zorfran  Diarrhea, unspecified type  - just started ? 2/2 COVID 19 or Paxlovid  - is on banantrol.  - monitor bowel pattern  - check BMP 6/3  Poor appetite  - in setting of above. Has lost some weight over course of TCU. Dietician following  - monitor intake/weights closely in setting of new acute illness    Orders:  Written on unit and discussed with nursing    Electronically signed by: ELISA Bajwa CNP

## 2022-06-03 LAB
ANION GAP SERPL CALCULATED.3IONS-SCNC: 11 MMOL/L (ref 5–18)
BUN SERPL-MCNC: 12 MG/DL (ref 8–28)
CALCIUM SERPL-MCNC: 8.6 MG/DL (ref 8.5–10.5)
CHLORIDE BLD-SCNC: 101 MMOL/L (ref 98–107)
CO2 SERPL-SCNC: 27 MMOL/L (ref 22–31)
CREAT SERPL-MCNC: 0.8 MG/DL (ref 0.6–1.1)
GFR SERPL CREATININE-BSD FRML MDRD: 75 ML/MIN/1.73M2
GLUCOSE BLD-MCNC: 80 MG/DL (ref 70–125)
POTASSIUM BLD-SCNC: 3.6 MMOL/L (ref 3.5–5)
SODIUM SERPL-SCNC: 139 MMOL/L (ref 136–145)

## 2022-06-03 PROCEDURE — 36415 COLL VENOUS BLD VENIPUNCTURE: CPT | Mod: ORL | Performed by: NURSE PRACTITIONER

## 2022-06-03 PROCEDURE — 80048 BASIC METABOLIC PNL TOTAL CA: CPT | Mod: ORL | Performed by: NURSE PRACTITIONER

## 2022-06-03 PROCEDURE — P9604 ONE-WAY ALLOW PRORATED TRIP: HCPCS | Mod: ORL | Performed by: NURSE PRACTITIONER

## 2022-06-04 ENCOUNTER — LAB REQUISITION (OUTPATIENT)
Dept: LAB | Facility: CLINIC | Age: 77
End: 2022-06-04
Payer: COMMERCIAL

## 2022-06-04 DIAGNOSIS — I82.401 ACUTE EMBOLISM AND THROMBOSIS OF UNSPECIFIED DEEP VEINS OF RIGHT LOWER EXTREMITY (H): ICD-10-CM

## 2022-06-04 DIAGNOSIS — U07.1 COVID-19: ICD-10-CM

## 2022-06-06 ENCOUNTER — TRANSITIONAL CARE UNIT VISIT (OUTPATIENT)
Dept: GERIATRICS | Facility: CLINIC | Age: 77
End: 2022-06-06
Payer: COMMERCIAL

## 2022-06-06 VITALS
BODY MASS INDEX: 34.5 KG/M2 | RESPIRATION RATE: 16 BRPM | TEMPERATURE: 96.2 F | HEIGHT: 62 IN | DIASTOLIC BLOOD PRESSURE: 63 MMHG | HEART RATE: 68 BPM | OXYGEN SATURATION: 94 % | SYSTOLIC BLOOD PRESSURE: 121 MMHG

## 2022-06-06 DIAGNOSIS — R19.7 DIARRHEA, UNSPECIFIED TYPE: ICD-10-CM

## 2022-06-06 DIAGNOSIS — N18.31 CHRONIC KIDNEY DISEASE, STAGE 3A (H): ICD-10-CM

## 2022-06-06 DIAGNOSIS — M35.3 POLYMYALGIA RHEUMATICA (H): ICD-10-CM

## 2022-06-06 DIAGNOSIS — R11.0 NAUSEA: ICD-10-CM

## 2022-06-06 DIAGNOSIS — I10 ESSENTIAL HYPERTENSION: ICD-10-CM

## 2022-06-06 DIAGNOSIS — J44.9 CHRONIC OBSTRUCTIVE PULMONARY DISEASE, UNSPECIFIED COPD TYPE (H): ICD-10-CM

## 2022-06-06 DIAGNOSIS — F33.42 RECURRENT MAJOR DEPRESSION IN FULL REMISSION (H): ICD-10-CM

## 2022-06-06 DIAGNOSIS — U07.1 COVID-19: Primary | ICD-10-CM

## 2022-06-06 DIAGNOSIS — R63.0 POOR APPETITE: ICD-10-CM

## 2022-06-06 DIAGNOSIS — E11.42 DIABETIC PERIPHERAL NEUROPATHY ASSOCIATED WITH TYPE 2 DIABETES MELLITUS (H): ICD-10-CM

## 2022-06-06 LAB
ERYTHROCYTE [DISTWIDTH] IN BLOOD BY AUTOMATED COUNT: 15.5 % (ref 10–15)
HCT VFR BLD AUTO: 39.9 % (ref 35–47)
HGB BLD-MCNC: 12.4 G/DL (ref 11.7–15.7)
MCH RBC QN AUTO: 30.8 PG (ref 26.5–33)
MCHC RBC AUTO-ENTMCNC: 31.1 G/DL (ref 31.5–36.5)
MCV RBC AUTO: 99 FL (ref 78–100)
PLATELET # BLD AUTO: 281 10E3/UL (ref 150–450)
RBC # BLD AUTO: 4.02 10E6/UL (ref 3.8–5.2)
WBC # BLD AUTO: 7.7 10E3/UL (ref 4–11)

## 2022-06-06 PROCEDURE — 36415 COLL VENOUS BLD VENIPUNCTURE: CPT | Mod: ORL | Performed by: NURSE PRACTITIONER

## 2022-06-06 PROCEDURE — 85027 COMPLETE CBC AUTOMATED: CPT | Mod: ORL | Performed by: NURSE PRACTITIONER

## 2022-06-06 PROCEDURE — P9604 ONE-WAY ALLOW PRORATED TRIP: HCPCS | Mod: ORL | Performed by: NURSE PRACTITIONER

## 2022-06-06 PROCEDURE — 99310 SBSQ NF CARE HIGH MDM 45: CPT | Mod: CS | Performed by: NURSE PRACTITIONER

## 2022-06-06 NOTE — PROGRESS NOTES
"[facility] GERIATRICS    Chief Complaint   Patient presents with     RECHECK     HPI:  Zakiya Christian is a 77 year old  (1945), who is being seen today for an episodic care visit at: [facility] ([facility]) [[id number]].     Patient in U for acute rehab. Reportedly tested positive for COVID 19 on 5/31 on routine screening. Also noted to have a cough and fatigue. Was started on Paxlovid course. Ivette held 2/2 interaction with Paxlovid per discussion regarding medication interactions with pharmacist. Bridging with Lovenox. Seen today for recheck.     Patient found sitting up in bed. Appears winded. Is alert, slight anxious. Reports persistent nausea and some vomiting. States diarrhea is slowing down. Reports still does not feel like eating anything d/t nausea and nothing looks good. Reports is mildly SOB and coughing quite a bit. Denies sore throat, fever or chills. VS reviewed and are stable.        Allergies, and PMH/PSH reviewed in EP[facility] today.    REVIEW OF SYSTEMS:  10 point ROS including Constitutional, Eyes, Respiratory, Cardiovascular, Gastroenterology, Genitourinary, Integumentary, Musculoskeletal, Psychiatric were all negative except for pertinent positives noted in my HPI.     Objective:   /63   Pulse 68   Temp (!) 96.2  F (35.7  C)   Resp 16   Ht 1.575 m (5' 2\")   SpO2 94%   BMI 34.50 kg/m      GENERAL APPEARANCE: Alert, in no distress   ENT: Mouth and posterior oropharynx normal, moist mucous membranes   EYES: EOM, conjunctivae, lids, pupils and irises normal   NECK: No noted adenopathy,masses or thyromegaly   RESP: respiratory effort slight increased, LS decreased and with scattered wheezes throughout, congested cough normal  CV: VS as above and stable. No edema noted  ABDOMEN: normal bowel sounds, soft, nontender, no hepatosplenomegaly or other masses   : palpation of bladder WNL   M/S: Gait and station normal   Digits and nails normal - STUBBS, generalized weakness  SKIN: Inspection of " skin and subcutaneous tissue baseline, Palpation of skin and subcutaneous tissue baseline,   NEURO: Cranial nerves 2-12 are grossly at patient's baseline, Examination of sensation by touch normal   PSYCH: oriented X 3, affect and mood slight down and anxious            Recent labs in EPIC reviewed by me today.     Assessment/Plan:  COVID-19  - initial dx 5/31 on routine screening- congestion, mild dyspnea and fatigue. Started on Paxlovid 5 day course 6/1 and this was completed yesterday. Remains in contact/droplet isolation d/t contagious state. Afebrile  Chronic obstructive pulmonary disease, unspecified COPD type (H)  - increased cough and congestion noted today.   - ordered CXR 2 views (which was negative for acute pathology on review)  - will add scheduled albuterol nebs BID x 2 days and continue prn  - will add 7 days of scheduled guaifenesin  - continue on Trelegy Ellipta and Singulair  - monitor respiratory and VS closely    Nausea  - with occasional vomiting. No noted abdominal pain or fever  - ongoing encouragement of sips of fluids and advance diet as tolerating.  - continue prn Zofran  Diarrhea, unspecified type  - improving now that Paxlovid is completed  - monitor bowel pattern  Poor appetite  - continue on nutritional supplements and advance diet as tolerated. 6/3 BMP stable    Diabetic peripheral neuropathy associated with type 2 diabetes mellitus (H)  - chronic, controlled. Not monitoring BGL in TCU  - continues on metformin  - continues on gabapentin  - stop scheduled Tramadol 2/2 fatigue and minimal reports of pain- continue low dose prn    Chronic kidney disease, stage 3a (H)  - Chronic, Cr WNL last check 6/3  -  -avoid nephrotoxins  - encourage adequate hydration  - recheck periodic BMP to ensure stability    Essential hypertension  - chronic, controlled  - continues on losartan and Coreg    Recurrent major depression in full remission (H)  - situational stressors in play. Patient slight anxious  today and frustrated with illness and contact isolation  - supportive approach  - continue on Prozac  - monitor mood and behaviors    Orders:  Written on unit and discussed with patient and nursing      Electronically signed by: ELISA Bajwa CNP

## 2022-06-06 NOTE — LETTER
"    6/6/2022        RE: Zakiya Christian  [address]  [facility] [place] [address]        [facility] GERIATRICS    Chief Complaint   Patient presents with     RECHECK     HPI:  Zakiya Christian is a 77 year old  (1945), who is being seen today for an episodic care visit at: [facility] ([facility]) [[id number]].     Patient in [facility] for acute rehab. Reportedly tested positive for COVID 19 on 5/31 on routine screening. Also noted to have a cough and fatigue. Was started on Paxlovid course. Ivette held 2/2 interaction with Paxlovid per discussion regarding medication interactions with pharmacist. Bridging with Lovenox. Seen today for recheck.     Patient found sitting up in bed. Appears winded. Is alert, slight anxious. Reports persistent nausea and some vomiting. States diarrhea is slowing down. Reports still does not feel like eating anything d/t nausea and nothing looks good. Reports is mildly SOB and coughing quite a bit. Denies sore throat, fever or chills. VS reviewed and are stable.        Allergies, and PMH/PSH reviewed in EPIC today.    REVIEW OF SYSTEMS:  10 point ROS including Constitutional, Eyes, Respiratory, Cardiovascular, Gastroenterology, Genitourinary, Integumentary, Musculoskeletal, Psychiatric were all negative except for pertinent positives noted in my HPI.     Objective:   /63   Pulse 68   Temp (!) 96.2  F (35.7  C)   Resp 16   Ht 1.575 m (5' 2\")   SpO2 94%   BMI 34.50 kg/m      GENERAL APPEARANCE: Alert, in no distress   ENT: Mouth and posterior oropharynx normal, moist mucous membranes   EYES: EOM, conjunctivae, lids, pupils and irises normal   NECK: No noted adenopathy,masses or thyromegaly   RESP: respiratory effort slight increased, LS decreased and with scattered wheezes throughout, congested cough normal  CV: VS as above and stable. No edema noted  ABDOMEN: normal bowel sounds, soft, nontender, no hepatosplenomegaly or other masses   : palpation of bladder WNL   M/S: " Gait and station normal   Digits and nails normal - STUBBS, generalized weakness  SKIN: Inspection of skin and subcutaneous tissue baseline, Palpation of skin and subcutaneous tissue baseline,   NEURO: Cranial nerves 2-12 are grossly at patient's baseline, Examination of sensation by touch normal   PSYCH: oriented X 3, affect and mood slight down and anxious            Recent labs in EP[facility] reviewed by me today.     Assessment/Plan:  COVID-19  - initial dx 5/31 on routine screening- congestion, mild dyspnea and fatigue. Started on Paxlovid 5 day course 6/1 and this was completed yesterday. Remains in contact/droplet isolation d/t contagious state. Afebrile  Chronic obstructive pulmonary disease, unspecified COPD type (H)  - increased cough and congestion noted today.   - ordered CXR 2 views (which was negative for acute pathology on review)  - will add scheduled albuterol nebs BID x 2 days and continue prn  - will add 7 days of scheduled guaifenesin  - continue on Trelegy Ellipta and Singulair  - monitor respiratory and VS closely    Nausea  - with occasional vomiting. No noted abdominal pain or fever  - ongoing encouragement of sips of fluids and advance diet as tolerating.  - continue prn Zofran  Diarrhea, unspecified type  - improving now that Paxlovid is completed  - monitor bowel pattern  Poor appetite  - continue on nutritional supplements and advance diet as tolerated. 6/3 BMP stable    Diabetic peripheral neuropathy associated with type 2 diabetes mellitus (H)  - chronic, controlled. Not monitoring BGL in TCU  - continues on metformin  - continues on gabapentin  - stop scheduled Tramadol 2/2 fatigue and minimal reports of pain- continue low dose prn    Chronic kidney disease, stage 3a (H)  - Chronic, Cr WNL last check 6/3  -  -avoid nephrotoxins  - encourage adequate hydration  - recheck periodic BMP to ensure stability    Essential hypertension  - chronic, controlled  - continues on losartan and  Coreg    Recurrent major depression in full remission (H)  - situational stressors in play. Patient slight anxious today and frustrated with illness and contact isolation  - supportive approach  - continue on Prozac  - monitor mood and behaviors    Orders:  Written on unit and discussed with patient and nursing      Electronically signed by: ELISA Bajwa CNP           Sincerely,        ELISA Bajwa CNP

## 2022-06-08 NOTE — PATIENT INSTRUCTIONS
Patient: Zakiya Christian    : 1945    Orders:    ENSURE Lovenox is discontinued today as ordered.    Then resume Xarelto at 20 mg po daily .      ELISA Bajwa CNP on 2022 at 2:14 PM'

## 2022-06-09 ENCOUNTER — TRANSITIONAL CARE UNIT VISIT (OUTPATIENT)
Dept: GERIATRICS | Facility: CLINIC | Age: 77
End: 2022-06-09
Payer: COMMERCIAL

## 2022-06-09 VITALS
OXYGEN SATURATION: 95 % | DIASTOLIC BLOOD PRESSURE: 53 MMHG | BODY MASS INDEX: 34.52 KG/M2 | HEIGHT: 62 IN | HEART RATE: 86 BPM | RESPIRATION RATE: 20 BRPM | TEMPERATURE: 97.2 F | WEIGHT: 187.6 LBS | SYSTOLIC BLOOD PRESSURE: 112 MMHG

## 2022-06-09 DIAGNOSIS — R19.7 DIARRHEA, UNSPECIFIED TYPE: ICD-10-CM

## 2022-06-09 DIAGNOSIS — R11.0 NAUSEA: ICD-10-CM

## 2022-06-09 DIAGNOSIS — J44.9 CHRONIC OBSTRUCTIVE PULMONARY DISEASE, UNSPECIFIED COPD TYPE (H): ICD-10-CM

## 2022-06-09 DIAGNOSIS — R63.0 POOR APPETITE: ICD-10-CM

## 2022-06-09 DIAGNOSIS — U07.1 COVID-19: Primary | ICD-10-CM

## 2022-06-09 PROCEDURE — 99309 SBSQ NF CARE MODERATE MDM 30: CPT | Performed by: NURSE PRACTITIONER

## 2022-06-09 NOTE — PROGRESS NOTES
"[facility] GERIATRICS    Chief Complaint   Patient presents with     Nursing Home Acute     HPI:  Zakiya Christian is a 77 year old  (1945), who is being seen today for an episodic care visit at: [facility] ([facility]) [[id number]]. Today's concern is:    Patient seen today for recheck for COVID 19 and associated n/v and diarrhea with poor appetite and COPD. Has completed Paxlovid course and lovenox now switched back to Xarelto. Added guaifenesin and scheduled albuterol nebs last week 2/2 increased SOB, cough and wheezing.     Today patient is found sitting up in bed. Alert, calm. Reports some SOB and wheezing yet. States nebs help but doesn't always get them (back to prn). States nausea is improved and no diarrhea today - in general improving as well. Denies fever, chills, abdominal pain. Reports appetite still poor.       Allergies, and PMH/PSH reviewed in [facility] today.  REVIEW OF SYSTEMS:  4 point ROS including Respiratory, CV, GI and , other than that noted in the HPI,  is negative    Objective:   /53   Pulse 86   Temp 97.2  F (36.2  C)   Resp 20   Ht 1.575 m (5' 2\")   Wt 85.1 kg (187 lb 9.6 oz)   SpO2 95%   BMI 34.31 kg/m      Resp: Effort WNL, LSCTA **  CV: S1-2, no S3-4, no murmurs noted- **  Abd- soft, nontender, BS +  Musc- STUBBS  Psych- alert, calm, pleasant      Recent labs in [facility] reviewed by me today.     Assessment/Plan:  COVID-19  - dx 5/31. Recheck screen 6/7 also positive. SOB, wheezing persist. Resolving cough. Afebrile.   - completed Paxlovid 6/5.   - continues in contact/droplet isolation d/t contagious state  - recent CXR negative for acute pathology  Chronic obstructive pulmonary disease, unspecified COPD type (H)  - exacerbation in setting of COVID 19 infection  - will add back scheduled albuterol nebs for a few days and continue prn  - continue Trelegy Ellipata and Singulaire  - continue on guiafenesin  - add 6 days prednisone 40 x 3 then 20 x 3  - monitor resp status " closely    Nausea  - improving slowly.   - continue prn Zofran  Poor appetite  - encourage sips fluids, advance diet as tolerated  - follow intake/weights    Diarrhea, unspecified type  - seemed to have started and stopped in association with Paxlovid course  - resolving      Orders:  Written on unit and discussed with patient and nursing    Electronically signed by: ELISA Bajwa CNP

## 2022-06-09 NOTE — LETTER
"    6/9/2022        RE: Zakiya Christian  [address]  [facility] [place] [address]        [facility] GERIATRICS    Chief Complaint   Patient presents with     Nursing Home Acute     HPI:  Zakiya Christian is a 77 year old  (1945), who is being seen today for an episodic care visit at: [facility] ([facility]) [[id number]]. Today's concern is:    Patient seen today for recheck for COVID 19 and associated n/v and diarrhea with poor appetite and COPD. Has completed Paxlovid course and lovenox now switched back to Xarelto. Added guaifenesin and scheduled albuterol nebs last week 2/2 increased SOB, cough and wheezing.     Today patient is found sitting up in bed. Alert, calm. Reports some SOB and wheezing yet. States nebs help but doesn't always get them (back to prn). States nausea is improved and no diarrhea today - in general improving as well. Denies fever, chills, abdominal pain. Reports appetite still poor.       Allergies, and PMH/PSH reviewed in [facility] today.  REVIEW OF SYSTEMS:  4 point ROS including Respiratory, CV, GI and , other than that noted in the HPI,  is negative    Objective:   /53   Pulse 86   Temp 97.2  F (36.2  C)   Resp 20   Ht 1.575 m (5' 2\")   Wt 85.1 kg (187 lb 9.6 oz)   SpO2 95%   BMI 34.31 kg/m      Resp: Effort WNL, LSCTA **  CV: S1-2, no S3-4, no murmurs noted- **  Abd- soft, nontender, BS +  Musc- STUBBS  Psych- alert, calm, pleasant      Recent labs in [facility] reviewed by me today.     Assessment/Plan:  COVID-19  - dx 5/31. Recheck screen 6/7 also positive. SOB, wheezing persist. Resolving cough. Afebrile.   - completed Paxlovid 6/5.   - continues in contact/droplet isolation d/t contagious state  - recent CXR negative for acute pathology  Chronic obstructive pulmonary disease, unspecified COPD type (H)  - exacerbation in setting of COVID 19 infection  - will add back scheduled albuterol nebs for a few days and continue prn  - continue Trelegy Ellipata and Singulaire  - " continue on guiafenesin  - add 6 days prednisone 40 x 3 then 20 x 3  - monitor resp status closely    Nausea  - improving slowly.   - continue prn Zofran  Poor appetite  - encourage sips fluids, advance diet as tolerated  - follow intake/weights    Diarrhea, unspecified type  - seemed to have started and stopped in association with Paxlovid course  - resolving      Orders:  Written on unit and discussed with patient and nursing    Electronically signed by: ELISA Bajwa CNP             Sincerely,        ELISA Bajwa CNP

## 2022-06-13 ENCOUNTER — TRANSITIONAL CARE UNIT VISIT (OUTPATIENT)
Dept: GERIATRICS | Facility: CLINIC | Age: 77
End: 2022-06-13
Payer: COMMERCIAL

## 2022-06-13 VITALS
OXYGEN SATURATION: 97 % | HEART RATE: 72 BPM | BODY MASS INDEX: 34.52 KG/M2 | SYSTOLIC BLOOD PRESSURE: 133 MMHG | HEIGHT: 62 IN | TEMPERATURE: 97.3 F | DIASTOLIC BLOOD PRESSURE: 71 MMHG | RESPIRATION RATE: 18 BRPM | WEIGHT: 187.6 LBS

## 2022-06-13 DIAGNOSIS — R19.7 DIARRHEA, UNSPECIFIED TYPE: ICD-10-CM

## 2022-06-13 DIAGNOSIS — J44.9 CHRONIC OBSTRUCTIVE PULMONARY DISEASE, UNSPECIFIED COPD TYPE (H): ICD-10-CM

## 2022-06-13 DIAGNOSIS — U07.1 COVID-19: Primary | ICD-10-CM

## 2022-06-13 DIAGNOSIS — R11.0 NAUSEA: ICD-10-CM

## 2022-06-13 DIAGNOSIS — R63.0 POOR APPETITE: ICD-10-CM

## 2022-06-13 PROCEDURE — 99308 SBSQ NF CARE LOW MDM 20: CPT | Performed by: NURSE PRACTITIONER

## 2022-06-13 NOTE — LETTER
"    6/13/2022        RE: Zakiya Christian  [address]  [place] [address]        [facility] GERIATRICS    Chief Complaint   Patient presents with     RECHECK     HPI:  Zakiya Christian is a 77 year old  (1945), who is being seen today for an episodic care visit at: [facility] ([facility]) [[id number]].     Patient seen today in [facility] for recheck of COVID 19 and related medical issues: COPD exacerbation, n/v poor appetite and diarrhea when taking Paxlovid. Started on prednisone and albuterol nebs last week 2/2 SOB, wheezing- COPD exacerbation. Diarrhea slowing at last visit corresponding with end of Paxlovid course.    Today patient found in room out of bed, dressed and up in chair. Alert, calm and NAD. Smiling. Reports is feeling \"much better\". Reports no further diarrhea and abatement of n/v. States appettie is returning. Denies SOB currently. States cough and wheezing present but notably improving.     Allergies, and PMH/PSH reviewed in [facility] today.  REVIEW OF SYSTEMS:  4 point ROS including Respiratory, CV, GI and , other than that noted in the HPI,  is negative    Objective:   /71   Pulse 72   Temp 97.3  F (36.3  C)   Resp 18   Ht 1.575 m (5' 2\")   Wt 85.1 kg (187 lb 9.6 oz)   SpO2 97%   BMI 34.31 kg/m      Resp: Effort WNL, LS with just occasional expiratory wheeze  CV: VSS as above, no edema noted  Abd- soft, nontender, BS +  Musc- STUBBS  Psych- alert, calm, pleasant      Recent labs in [facility] reviewed by me today.     Assessment/Plan:  COVID-19  Dx 5/31 and symptomatic with cough, fatigue, n/v. CXR negative. Treated with Paxlovid which was completed 6/5. Rescreen 6/7 positive. Isolation now completed and symptoms abating.  Chronic obstructive pulmonary disease, unspecified COPD type (H)  - exacerbation during COVID. Increased SOB, wheezing. Started on 6 day Prednisone course 40/20. Will be done this week. Symptoms much improved.  - continues on Trelegy Ellipta, Singulaire and prn " albuterol  - continue to monitor resp status and VS    Nausea  Poor appetite  - improved. Patient reports appetite improving  - continue prn Zofran  - follow clinically    Diarrhea, unspecified type  2/2 Paxlovid  - resolved      Electronically signed by: ELISA Bajwa CNP             Sincerely,        ELISA Bajwa CNP

## 2022-06-13 NOTE — PROGRESS NOTES
"[facility] GERIATRICS    Chief Complaint   Patient presents with     RECHECK     HPI:  Zakiya Christian is a 77 year old  (1945), who is being seen today for an episodic care visit at: [facility] ([facility]) [[id number]].     Patient seen today in [facility] for recheck of COVID 19 and related medical issues: COPD exacerbation, n/v poor appetite and diarrhea when taking Paxlovid. Started on prednisone and albuterol nebs last week 2/2 SOB, wheezing- COPD exacerbation. Diarrhea slowing at last visit corresponding with end of Paxlovid course.    Today patient found in room out of bed, dressed and up in chair. Alert, calm and NAD. Smiling. Reports is feeling \"much better\". Reports no further diarrhea and abatement of n/v. States appettie is returning. Denies SOB currently. States cough and wheezing present but notably improving.     Allergies, and PMH/PSH reviewed in [facility] today.  REVIEW OF SYSTEMS:  4 point ROS including Respiratory, CV, GI and , other than that noted in the HPI,  is negative    Objective:   /71   Pulse 72   Temp 97.3  F (36.3  C)   Resp 18   Ht 1.575 m (5' 2\")   Wt 85.1 kg (187 lb 9.6 oz)   SpO2 97%   BMI 34.31 kg/m      Resp: Effort WNL, LS with just occasional expiratory wheeze  CV: VSS as above, no edema noted  Abd- soft, nontender, BS +  Musc- STUBBS  Psych- alert, calm, pleasant      Recent labs in [facility] reviewed by me today.     Assessment/Plan:  COVID-19  Dx 5/31 and symptomatic with cough, fatigue, n/v. CXR negative. Treated with Paxlovid which was completed 6/5. Rescreen 6/7 positive. Isolation now completed and symptoms abating.  Chronic obstructive pulmonary disease, unspecified COPD type (H)  - exacerbation during COVID. Increased SOB, wheezing. Started on 6 day Prednisone course 40/20. Will be done this week. Symptoms much improved.  - continues on Trelegy Ellipta, Singulaire and prn albuterol  - continue to monitor resp status and VS    Nausea  Poor appetite  - improved. Patient " reports appetite improving  - continue prn Zofran  - follow clinically    Diarrhea, unspecified type  2/2 Paxlovid  - resolved      Electronically signed by: ELISA Bajwa CNP

## 2022-06-15 RX ORDER — TRAMADOL HYDROCHLORIDE 50 MG/1
25 TABLET ORAL EVERY 6 HOURS PRN
Qty: 10 TABLET | Refills: 0
Start: 2022-06-15 | End: 2022-06-29

## 2022-06-20 RX ORDER — ACETAMINOPHEN 500 MG
500 TABLET ORAL 4 TIMES DAILY
COMMUNITY
End: 2023-02-01

## 2022-06-21 ENCOUNTER — TRANSITIONAL CARE UNIT VISIT (OUTPATIENT)
Dept: GERIATRICS | Facility: CLINIC | Age: 77
End: 2022-06-21
Payer: COMMERCIAL

## 2022-06-21 VITALS
DIASTOLIC BLOOD PRESSURE: 72 MMHG | HEIGHT: 62 IN | BODY MASS INDEX: 34.71 KG/M2 | SYSTOLIC BLOOD PRESSURE: 139 MMHG | HEART RATE: 86 BPM | TEMPERATURE: 97.6 F | RESPIRATION RATE: 20 BRPM | WEIGHT: 188.6 LBS | OXYGEN SATURATION: 96 %

## 2022-06-21 DIAGNOSIS — Z86.16 HISTORY OF 2019 NOVEL CORONAVIRUS DISEASE (COVID-19): Primary | ICD-10-CM

## 2022-06-21 DIAGNOSIS — E11.69 TYPE 2 DIABETES MELLITUS WITH OTHER SPECIFIED COMPLICATION, WITHOUT LONG-TERM CURRENT USE OF INSULIN (H): ICD-10-CM

## 2022-06-21 DIAGNOSIS — I10 PRIMARY HYPERTENSION: ICD-10-CM

## 2022-06-21 DIAGNOSIS — Z86.718 HISTORY OF DEEP VENOUS THROMBOSIS: ICD-10-CM

## 2022-06-21 PROCEDURE — 99309 SBSQ NF CARE MODERATE MDM 30: CPT | Performed by: INTERNAL MEDICINE

## 2022-06-21 RX ORDER — CALCIUM POLYCARBOPHIL 625 MG 625 MG/1
2 TABLET ORAL 2 TIMES DAILY WITH MEALS
COMMUNITY
End: 2023-02-01

## 2022-06-21 NOTE — PROGRESS NOTES
[facility] GERIATRICS  TCU EPISODIC VISIT  June 21, 2022    [facility] Medical Record Number:  [id number]  Place of Service where encounter took place:  [place] [facility] ([facility]) [[id number]]    Chief Complaint   Patient presents with     RECHECK     Recent COVID, DM II, HTN, physical deconditioning       HPI:    Zakiya Christian is a 77 year old  (1945), who is being seen today for an episodic care visit.  HPI information obtained from: facility chart records, facility staff, patient report and [facility] chart review.    Today's concern is:      ALLERGIES:    Allergies   Allergen Reactions     Simvastatin Hives        MEDICATIONS:  Current Outpatient Medications   Medication Sig Dispense Refill     acetaminophen (TYLENOL) 500 MG tablet Take 500 mg by mouth 4 times daily And q6h PRN - max 2 PRN/day       albuterol (PROAIR HFA/PROVENTIL HFA/VENTOLIN HFA) 108 (90 Base) MCG/ACT inhaler Inhale 2 puffs into the lungs 4 times daily as needed for shortness of breath / dyspnea or wheezing       albuterol (PROVENTIL) (5 MG/ML) 0.5% neb solution Take 5 mg by nebulization every 6 hours as needed for wheezing or shortness of breath / dyspnea       Ascorbic Acid (VITAMIN C) 500 MG CAPS Take 1,000 mg by mouth daily       calcium carbonate 600 mg-vitamin D 400 units (CALTRATE) 600-400 MG-UNIT per tablet Take 1 tablet by mouth 2 times daily       calcium polycarbophil (FIBERCON) 625 MG tablet Take 2 tablets by mouth daily       carvedilol (COREG) 12.5 MG tablet Take 12.5 mg by mouth 2 times daily (with meals)       cholecalciferol 50 MCG (2000 UT) CAPS Take 4,000 Units by mouth daily       coenzyme Q-10 capsule Take 1 capsule by mouth daily       FLUoxetine (PROZAC) 40 MG capsule Take 40 mg by mouth every morning       Fluticasone-Umeclidin-Vilanterol (TRELEGY ELLIPTA) 200-62.5-25 MCG/INH oral inhaler Inhale 1 puff into the lungs every morning       gabapentin (NEURONTIN) 300 MG capsule Take 900 mg by mouth At Bedtime        losartan (COZAAR) 25 MG tablet Take 12.5 mg by mouth every morning       metFORMIN (GLUCOPHAGE-XR) 500 MG 24 hr tablet Take 1,000 mg by mouth daily (with breakfast)       montelukast (SINGULAIR) 10 MG tablet Take 10 mg by mouth At Bedtime       omeprazole (PRILOSEC) 40 MG DR capsule Take 40 mg by mouth every morning (before breakfast)       ondansetron (ZOFRAN) 4 MG tablet Take 1 tablet (4 mg) by mouth every 6 hours as needed for nausea 30 tablet 0     pravastatin (PRAVACHOL) 40 MG tablet Take 40 mg by mouth At Bedtime       rivaroxaban ANTICOAGULANT (XARELTO) 20 MG TABS tablet Take 20 mg by mouth daily (with dinner)       traMADol (ULTRAM) 50 MG tablet Take 0.5 tablets (25 mg) by mouth every 6 hours as needed for severe pain 10 tablet 0     alendronate (FOSAMAX) 70 MG tablet Take 70 mg by mouth every 7 days       Medications reviewed:  Medications reconciled to facility chart and changes were made to reflect current medications as identified as above med list. Below are the changes that were made:   are the changes that were made:   Medications stopped since last EPIC medication reconciliation:   Medications Discontinued During This Encounter   Medication Reason     acetaminophen (TYLENOL) 325 MG tablet      aspirin 81 MG EC tablet      BANANA FLAKES PO      sodium chloride, PF, 0.9% PF flush      Medications started since last [facility] medication reconciliation:  Orders Placed This Encounter   Medications     DISCONTD: BANANA FLAKES PO     Sig: Take 1 packet by mouth 2 times daily     rivaroxaban ANTICOAGULANT (XARELTO) 20 MG TABS tablet     Sig: Take 20 mg by mouth daily (with dinner)     acetaminophen (TYLENOL) 500 MG tablet     Sig: Take 500 mg by mouth 4 times daily And q6h PRN - max 2 PRN/day     calcium polycarbophil (FIBERCON) 625 MG tablet     Sig: Take 2 tablets by mouth daily       REVIEW OF SYSTEMS:  4 point ROS neg other than the symptoms noted above in the HPI.    PHYSICAL EXAM:  /72   Pulse 86  "  Temp 97.6  F (36.4  C)   Resp 20   Ht 1.575 m (5' 2\")   Wt 85.5 kg (188 lb 9.6 oz)   SpO2 96%   BMI 34.50 kg/m     Gen: sitting up in bed, alert, cooperative and in no acute distress  Resp: breathing non labored, no tachypnea   Ext: no LE edema  Neuro: CX II-XII grossly in tact; ROM in all four extremities grossly in tact  Psych: alert and oriented x3; normal affect    ASSESSMENT / PLAN:    Recent COVID 19 (5/31/22), Recovered   Anorexia, Resolving   Was treated with Paxlovid. Poor appetite during this time due to metallic taste from the [place]. Weight is down 220 --> 188 lbs in 3 weeks. No respiratory symptoms. Has recovered  -- follow clinically  --  Nutrition following     HTN  SBPs 120s-130s. HR 70s-80s.   -- carvedilol 12.5 mg BID, losartan 12.5 mg daily   -- follow BPs and adjust medications as needed    COPD  No exacerbation with recent COVID  -- fluticasone-umeclidin-vilanterol 200-62.5-25 mcg daily, montelukast 10 mg at bedtime  -- follow clinically    DM, Type II  Hgb A1c 6.2 in April. No recent sugars in PCC  -- metformin 1000 mg daily with breakfast  -- sugars PRN    RLE DVT (Nov 2021)  Doppler in March with ongoing nonocclusive DVT in the proximal right femoral vein, occlusive in the mid femoral vein segments - now felt to be chronic and per radiology, \"verall improved from 11/05/2021\".   -- rivaroxaban 20 mg daily       Electronically signed by  Luz Marina Pantoja MD           "

## 2022-06-21 NOTE — LETTER
6/21/2022        RE: Zakiya Christian  [address]  [facility] [place] [address]        [facility] GERIATRICS  TCU EPISODIC VISIT  June 21, 2022    [facility] Medical Record Number:  [id number]  Place of Service where encounter took place:  [place] [employer] [facility] (TCU) [[id number]]    Chief Complaint   Patient presents with     RECHECK     Recent COVID, DM II, HTN, physical deconditioning       HPI:    Zakiya Christian is a 77 year old  (1945), who is being seen today for an episodic care visit.  HPI information obtained from: facility chart records, facility staff, patient report and [facility] chart review.    Today's concern is:      ALLERGIES:    Allergies   Allergen Reactions     Simvastatin Hives        MEDICATIONS:  Current Outpatient Medications   Medication Sig Dispense Refill     acetaminophen (TYLENOL) 500 MG tablet Take 500 mg by mouth 4 times daily And q6h PRN - max 2 PRN/day       albuterol (PROAIR HFA/PROVENTIL HFA/VENTOLIN HFA) 108 (90 Base) MCG/ACT inhaler Inhale 2 puffs into the lungs 4 times daily as needed for shortness of breath / dyspnea or wheezing       albuterol (PROVENTIL) (5 MG/ML) 0.5% neb solution Take 5 mg by nebulization every 6 hours as needed for wheezing or shortness of breath / dyspnea       Ascorbic Acid (VITAMIN C) 500 MG CAPS Take 1,000 mg by mouth daily       calcium carbonate 600 mg-vitamin D 400 units (CALTRATE) 600-400 MG-UNIT per tablet Take 1 tablet by mouth 2 times daily       calcium polycarbophil (FIBERCON) 625 MG tablet Take 2 tablets by mouth daily       carvedilol (COREG) 12.5 MG tablet Take 12.5 mg by mouth 2 times daily (with meals)       cholecalciferol 50 MCG (2000 UT) CAPS Take 4,000 Units by mouth daily       coenzyme Q-10 capsule Take 1 capsule by mouth daily       FLUoxetine (PROZAC) 40 MG capsule Take 40 mg by mouth every morning       Fluticasone-Umeclidin-Vilanterol (TRELEGY ELLIPTA) 200-62.5-25 MCG/INH oral inhaler Inhale 1 puff into the  lungs every morning       gabapentin (NEURONTIN) 300 MG capsule Take 900 mg by mouth At Bedtime       losartan (COZAAR) 25 MG tablet Take 12.5 mg by mouth every morning       metFORMIN (GLUCOPHAGE-XR) 500 MG 24 hr tablet Take 1,000 mg by mouth daily (with breakfast)       montelukast (SINGULAIR) 10 MG tablet Take 10 mg by mouth At Bedtime       omeprazole (PRILOSEC) 40 MG DR capsule Take 40 mg by mouth every morning (before breakfast)       ondansetron (ZOFRAN) 4 MG tablet Take 1 tablet (4 mg) by mouth every 6 hours as needed for nausea 30 tablet 0     pravastatin (PRAVACHOL) 40 MG tablet Take 40 mg by mouth At Bedtime       rivaroxaban ANTICOAGULANT (XARELTO) 20 MG TABS tablet Take 20 mg by mouth daily (with dinner)       traMADol (ULTRAM) 50 MG tablet Take 0.5 tablets (25 mg) by mouth every 6 hours as needed for severe pain 10 tablet 0     alendronate (FOSAMAX) 70 MG tablet Take 70 mg by mouth every 7 days       Medications reviewed:  Medications reconciled to facility chart and changes were made to reflect current medications as identified as above med list. Below are the changes that were made:   are the changes that were made:   Medications stopped since last EPIC medication reconciliation:   Medications Discontinued During This Encounter   Medication Reason     acetaminophen (TYLENOL) 325 MG tablet      aspirin 81 MG EC tablet      BANANA FLAKES PO      sodium chloride, PF, 0.9% PF flush      Medications started since last [facility] medication reconciliation:  Orders Placed This Encounter   Medications     DISCONTD: BANANA FLAKES PO     Sig: Take 1 packet by mouth 2 times daily     rivaroxaban ANTICOAGULANT (XARELTO) 20 MG TABS tablet     Sig: Take 20 mg by mouth daily (with dinner)     acetaminophen (TYLENOL) 500 MG tablet     Sig: Take 500 mg by mouth 4 times daily And q6h PRN - max 2 PRN/day     calcium polycarbophil (FIBERCON) 625 MG tablet     Sig: Take 2 tablets by mouth daily       REVIEW OF SYSTEMS:  4  "point ROS neg other than the symptoms noted above in the HPI.    PHYSICAL EXAM:  /72   Pulse 86   Temp 97.6  F (36.4  C)   Resp 20   Ht 1.575 m (5' 2\")   Wt 85.5 kg (188 lb 9.6 oz)   SpO2 96%   BMI 34.50 kg/m     Gen: sitting up in bed, alert, cooperative and in no acute distress  Resp: breathing non labored, no tachypnea   Ext: no LE edema  Neuro: CX II-XII grossly in tact; ROM in all four extremities grossly in tact  Psych: alert and oriented x3; normal affect    ASSESSMENT / PLAN:    Recent COVID 19 (5/31/22), Recovered   Anorexia, Resolving   Was treated with Paxlovid. Poor appetite during this time due to metallic taste from the [place]. Weight is down 220 --> 188 lbs in 3 weeks. No respiratory symptoms. Has recovered  -- follow clinically  --  Nutrition following     HTN  SBPs 120s-130s. HR 70s-80s.   -- carvedilol 12.5 mg BID, losartan 12.5 mg daily   -- follow BPs and adjust medications as needed    COPD  No exacerbation with recent COVID  -- fluticasone-umeclidin-vilanterol 200-62.5-25 mcg daily, montelukast 10 mg at bedtime  -- follow clinically    DM, Type II  Hgb A1c 6.2 in April. No recent sugars in PCC  -- metformin 1000 mg daily with breakfast  -- sugars PRN    RLE DVT (Nov 2021)  Doppler in March with ongoing nonocclusive DVT in the proximal right femoral vein, occlusive in the mid femoral vein segments - now felt to be chronic and per radiology, \"verall improved from 11/05/2021\".   -- rivaroxaban 20 mg daily       Electronically signed by  Luz Marina Pantoja MD                 Sincerely,        Luz Marina Pantoja MD    "

## 2022-06-28 ENCOUNTER — HOSPITAL ENCOUNTER (OUTPATIENT)
Facility: CLINIC | Age: 77
Setting detail: OBSERVATION
Discharge: HOME | End: 2022-07-01
Attending: STUDENT IN AN ORGANIZED HEALTH CARE EDUCATION/TRAINING PROGRAM | Admitting: STUDENT IN AN ORGANIZED HEALTH CARE EDUCATION/TRAINING PROGRAM
Payer: COMMERCIAL

## 2022-06-28 ENCOUNTER — APPOINTMENT (OUTPATIENT)
Dept: CT IMAGING | Facility: CLINIC | Age: 77
End: 2022-06-28
Attending: STUDENT IN AN ORGANIZED HEALTH CARE EDUCATION/TRAINING PROGRAM
Payer: COMMERCIAL

## 2022-06-28 DIAGNOSIS — K57.92 DIVERTICULITIS: ICD-10-CM

## 2022-06-28 PROBLEM — H91.90 HEARING LOSS: Status: ACTIVE | Noted: 2022-06-28

## 2022-06-28 PROBLEM — E11.21 DIABETIC RENAL DISEASE (H): Status: ACTIVE | Noted: 2022-06-28

## 2022-06-28 PROBLEM — I83.90 VARICOSE VEINS: Status: ACTIVE | Noted: 2022-06-28

## 2022-06-28 PROBLEM — M81.0 SENILE OSTEOPOROSIS: Status: ACTIVE | Noted: 2022-06-28

## 2022-06-28 PROBLEM — I12.9 CHRONIC KIDNEY DISEASE DUE TO HYPERTENSION: Status: ACTIVE | Noted: 2022-06-28

## 2022-06-28 PROBLEM — E78.2 MIXED HYPERLIPIDEMIA: Status: ACTIVE | Noted: 2022-06-28

## 2022-06-28 PROBLEM — F41.9 ANXIETY: Status: ACTIVE | Noted: 2022-06-28

## 2022-06-28 PROBLEM — G62.9 PERIPHERAL NEUROPATHY: Status: ACTIVE | Noted: 2022-06-28

## 2022-06-28 LAB
ALBUMIN SERPL-MCNC: 2.1 G/DL (ref 3.5–5)
ALP SERPL-CCNC: 69 U/L (ref 45–120)
ALT SERPL W P-5'-P-CCNC: <9 U/L (ref 0–45)
ANION GAP SERPL CALCULATED.3IONS-SCNC: 10 MMOL/L (ref 5–18)
AST SERPL W P-5'-P-CCNC: 14 U/L (ref 0–40)
BASOPHILS # BLD AUTO: 0.1 10E3/UL (ref 0–0.2)
BASOPHILS NFR BLD AUTO: 1 %
BILIRUB SERPL-MCNC: 0.3 MG/DL (ref 0–1)
BUN SERPL-MCNC: 14 MG/DL (ref 8–28)
CALCIUM SERPL-MCNC: 8.8 MG/DL (ref 8.5–10.5)
CHLORIDE BLD-SCNC: 105 MMOL/L (ref 98–107)
CO2 SERPL-SCNC: 28 MMOL/L (ref 22–31)
CREAT SERPL-MCNC: 0.73 MG/DL (ref 0.6–1.1)
EOSINOPHIL # BLD AUTO: 0.2 10E3/UL (ref 0–0.7)
EOSINOPHIL NFR BLD AUTO: 3 %
ERYTHROCYTE [DISTWIDTH] IN BLOOD BY AUTOMATED COUNT: 14.3 % (ref 10–15)
FLUAV RNA SPEC QL NAA+PROBE: NEGATIVE
FLUBV RNA RESP QL NAA+PROBE: NEGATIVE
GFR SERPL CREATININE-BSD FRML MDRD: 84 ML/MIN/1.73M2
GLUCOSE BLD-MCNC: 93 MG/DL (ref 70–125)
HCT VFR BLD AUTO: 37 % (ref 35–47)
HGB BLD-MCNC: 11.5 G/DL (ref 11.7–15.7)
IMM GRANULOCYTES # BLD: 0 10E3/UL
IMM GRANULOCYTES NFR BLD: 0 %
LYMPHOCYTES # BLD AUTO: 1.7 10E3/UL (ref 0.8–5.3)
LYMPHOCYTES NFR BLD AUTO: 24 %
MCH RBC QN AUTO: 30.2 PG (ref 26.5–33)
MCHC RBC AUTO-ENTMCNC: 31.1 G/DL (ref 31.5–36.5)
MCV RBC AUTO: 97 FL (ref 78–100)
MONOCYTES # BLD AUTO: 0.4 10E3/UL (ref 0–1.3)
MONOCYTES NFR BLD AUTO: 6 %
NEUTROPHILS # BLD AUTO: 4.8 10E3/UL (ref 1.6–8.3)
NEUTROPHILS NFR BLD AUTO: 66 %
NRBC # BLD AUTO: 0 10E3/UL
NRBC BLD AUTO-RTO: 0 /100
PLATELET # BLD AUTO: 308 10E3/UL (ref 150–450)
POTASSIUM BLD-SCNC: 3.7 MMOL/L (ref 3.5–5)
PROT SERPL-MCNC: 6.5 G/DL (ref 6–8)
RBC # BLD AUTO: 3.81 10E6/UL (ref 3.8–5.2)
RSV RNA SPEC NAA+PROBE: NEGATIVE
SARS-COV-2 RNA RESP QL NAA+PROBE: POSITIVE
SODIUM SERPL-SCNC: 143 MMOL/L (ref 136–145)
TROPONIN I SERPL-MCNC: <0.01 NG/ML (ref 0–0.29)
WBC # BLD AUTO: 7.2 10E3/UL (ref 4–11)

## 2022-06-28 PROCEDURE — 250N000011 HC RX IP 250 OP 636: Performed by: STUDENT IN AN ORGANIZED HEALTH CARE EDUCATION/TRAINING PROGRAM

## 2022-06-28 PROCEDURE — 84484 ASSAY OF TROPONIN QUANT: CPT | Performed by: STUDENT IN AN ORGANIZED HEALTH CARE EDUCATION/TRAINING PROGRAM

## 2022-06-28 PROCEDURE — 85025 COMPLETE CBC W/AUTO DIFF WBC: CPT | Performed by: STUDENT IN AN ORGANIZED HEALTH CARE EDUCATION/TRAINING PROGRAM

## 2022-06-28 PROCEDURE — 82310 ASSAY OF CALCIUM: CPT | Performed by: STUDENT IN AN ORGANIZED HEALTH CARE EDUCATION/TRAINING PROGRAM

## 2022-06-28 PROCEDURE — C9803 HOPD COVID-19 SPEC COLLECT: HCPCS

## 2022-06-28 PROCEDURE — 74177 CT ABD & PELVIS W/CONTRAST: CPT

## 2022-06-28 PROCEDURE — 96375 TX/PRO/DX INJ NEW DRUG ADDON: CPT | Mod: 59

## 2022-06-28 PROCEDURE — 87637 SARSCOV2&INF A&B&RSV AMP PRB: CPT | Performed by: STUDENT IN AN ORGANIZED HEALTH CARE EDUCATION/TRAINING PROGRAM

## 2022-06-28 PROCEDURE — 36415 COLL VENOUS BLD VENIPUNCTURE: CPT | Performed by: STUDENT IN AN ORGANIZED HEALTH CARE EDUCATION/TRAINING PROGRAM

## 2022-06-28 PROCEDURE — 258N000003 HC RX IP 258 OP 636: Performed by: STUDENT IN AN ORGANIZED HEALTH CARE EDUCATION/TRAINING PROGRAM

## 2022-06-28 PROCEDURE — 99285 EMERGENCY DEPT VISIT HI MDM: CPT | Mod: 25

## 2022-06-28 PROCEDURE — 96361 HYDRATE IV INFUSION ADD-ON: CPT

## 2022-06-28 RX ORDER — ONDANSETRON 2 MG/ML
4 INJECTION INTRAMUSCULAR; INTRAVENOUS ONCE
Status: COMPLETED | OUTPATIENT
Start: 2022-06-28 | End: 2022-06-28

## 2022-06-28 RX ORDER — IOPAMIDOL 755 MG/ML
100 INJECTION, SOLUTION INTRAVASCULAR ONCE
Status: COMPLETED | OUTPATIENT
Start: 2022-06-29 | End: 2022-06-28

## 2022-06-28 RX ADMIN — SODIUM CHLORIDE, POTASSIUM CHLORIDE, SODIUM LACTATE AND CALCIUM CHLORIDE 1000 ML: 600; 310; 30; 20 INJECTION, SOLUTION INTRAVENOUS at 20:38

## 2022-06-28 RX ADMIN — IOPAMIDOL 100 ML: 755 INJECTION, SOLUTION INTRAVENOUS at 23:55

## 2022-06-28 RX ADMIN — ONDANSETRON 4 MG: 2 INJECTION INTRAMUSCULAR; INTRAVENOUS at 20:38

## 2022-06-29 ENCOUNTER — APPOINTMENT (OUTPATIENT)
Dept: RADIOLOGY | Facility: CLINIC | Age: 77
End: 2022-06-29
Attending: STUDENT IN AN ORGANIZED HEALTH CARE EDUCATION/TRAINING PROGRAM
Payer: COMMERCIAL

## 2022-06-29 PROBLEM — K57.92 DIVERTICULITIS: Status: ACTIVE | Noted: 2022-06-29

## 2022-06-29 LAB
ALBUMIN SERPL-MCNC: 1.9 G/DL (ref 3.5–5)
ALP SERPL-CCNC: 63 U/L (ref 45–120)
ALT SERPL W P-5'-P-CCNC: <9 U/L (ref 0–45)
ANION GAP SERPL CALCULATED.3IONS-SCNC: 7 MMOL/L (ref 5–18)
AST SERPL W P-5'-P-CCNC: 13 U/L (ref 0–40)
BASOPHILS # BLD AUTO: 0 10E3/UL (ref 0–0.2)
BASOPHILS NFR BLD AUTO: 1 %
BILIRUB SERPL-MCNC: 0.2 MG/DL (ref 0–1)
BNP SERPL-MCNC: 76 PG/ML (ref 0–147)
BUN SERPL-MCNC: 9 MG/DL (ref 8–28)
C REACTIVE PROTEIN LHE: 1.7 MG/DL (ref 0–?)
CALCIUM SERPL-MCNC: 8.3 MG/DL (ref 8.5–10.5)
CHLORIDE BLD-SCNC: 107 MMOL/L (ref 98–107)
CO2 SERPL-SCNC: 27 MMOL/L (ref 22–31)
CREAT SERPL-MCNC: 0.74 MG/DL (ref 0.6–1.1)
EOSINOPHIL # BLD AUTO: 0.2 10E3/UL (ref 0–0.7)
EOSINOPHIL NFR BLD AUTO: 5 %
ERYTHROCYTE [DISTWIDTH] IN BLOOD BY AUTOMATED COUNT: 14.5 % (ref 10–15)
ERYTHROCYTE [SEDIMENTATION RATE] IN BLOOD BY WESTERGREN METHOD: 73 MM/HR (ref 0–20)
GFR SERPL CREATININE-BSD FRML MDRD: 82 ML/MIN/1.73M2
GLUCOSE BLD-MCNC: 128 MG/DL (ref 70–125)
GLUCOSE BLDC GLUCOMTR-MCNC: 66 MG/DL (ref 70–99)
GLUCOSE BLDC GLUCOMTR-MCNC: 69 MG/DL (ref 70–99)
GLUCOSE BLDC GLUCOMTR-MCNC: 71 MG/DL (ref 70–99)
GLUCOSE BLDC GLUCOMTR-MCNC: 77 MG/DL (ref 70–99)
GLUCOSE BLDC GLUCOMTR-MCNC: 97 MG/DL (ref 70–99)
GLUCOSE BLDC GLUCOMTR-MCNC: 99 MG/DL (ref 70–99)
HCT VFR BLD AUTO: 31.9 % (ref 35–47)
HGB BLD-MCNC: 9.9 G/DL (ref 11.7–15.7)
IMM GRANULOCYTES # BLD: 0 10E3/UL
IMM GRANULOCYTES NFR BLD: 0 %
LYMPHOCYTES # BLD AUTO: 1.2 10E3/UL (ref 0.8–5.3)
LYMPHOCYTES NFR BLD AUTO: 24 %
MCH RBC QN AUTO: 30.2 PG (ref 26.5–33)
MCHC RBC AUTO-ENTMCNC: 31 G/DL (ref 31.5–36.5)
MCV RBC AUTO: 97 FL (ref 78–100)
MONOCYTES # BLD AUTO: 0.4 10E3/UL (ref 0–1.3)
MONOCYTES NFR BLD AUTO: 8 %
NEUTROPHILS # BLD AUTO: 3.2 10E3/UL (ref 1.6–8.3)
NEUTROPHILS NFR BLD AUTO: 62 %
NRBC # BLD AUTO: 0 10E3/UL
NRBC BLD AUTO-RTO: 0 /100
PLATELET # BLD AUTO: 283 10E3/UL (ref 150–450)
POTASSIUM BLD-SCNC: 3.5 MMOL/L (ref 3.5–5)
PROT SERPL-MCNC: 5.7 G/DL (ref 6–8)
RBC # BLD AUTO: 3.28 10E6/UL (ref 3.8–5.2)
SODIUM SERPL-SCNC: 141 MMOL/L (ref 136–145)
WBC # BLD AUTO: 5 10E3/UL (ref 4–11)

## 2022-06-29 PROCEDURE — 99220 PR INITIAL OBSERVATION CARE,LEVEL III: CPT | Performed by: INTERNAL MEDICINE

## 2022-06-29 PROCEDURE — 82040 ASSAY OF SERUM ALBUMIN: CPT | Performed by: INTERNAL MEDICINE

## 2022-06-29 PROCEDURE — 87493 C DIFF AMPLIFIED PROBE: CPT | Performed by: INTERNAL MEDICINE

## 2022-06-29 PROCEDURE — 82962 GLUCOSE BLOOD TEST: CPT | Mod: 91

## 2022-06-29 PROCEDURE — 80053 COMPREHEN METABOLIC PANEL: CPT | Performed by: INTERNAL MEDICINE

## 2022-06-29 PROCEDURE — 258N000003 HC RX IP 258 OP 636: Performed by: INTERNAL MEDICINE

## 2022-06-29 PROCEDURE — 258N000001 HC RX 258: Performed by: INTERNAL MEDICINE

## 2022-06-29 PROCEDURE — 71045 X-RAY EXAM CHEST 1 VIEW: CPT

## 2022-06-29 PROCEDURE — 96361 HYDRATE IV INFUSION ADD-ON: CPT

## 2022-06-29 PROCEDURE — 96376 TX/PRO/DX INJ SAME DRUG ADON: CPT

## 2022-06-29 PROCEDURE — 86140 C-REACTIVE PROTEIN: CPT | Performed by: INTERNAL MEDICINE

## 2022-06-29 PROCEDURE — G0378 HOSPITAL OBSERVATION PER HR: HCPCS

## 2022-06-29 PROCEDURE — 99207 PR APP CREDIT; MD BILLING SHARED VISIT: CPT | Performed by: INTERNAL MEDICINE

## 2022-06-29 PROCEDURE — 250N000013 HC RX MED GY IP 250 OP 250 PS 637: Performed by: INTERNAL MEDICINE

## 2022-06-29 PROCEDURE — 85652 RBC SED RATE AUTOMATED: CPT | Performed by: INTERNAL MEDICINE

## 2022-06-29 PROCEDURE — 250N000011 HC RX IP 250 OP 636: Performed by: INTERNAL MEDICINE

## 2022-06-29 PROCEDURE — 250N000011 HC RX IP 250 OP 636: Performed by: STUDENT IN AN ORGANIZED HEALTH CARE EDUCATION/TRAINING PROGRAM

## 2022-06-29 PROCEDURE — 36415 COLL VENOUS BLD VENIPUNCTURE: CPT | Performed by: INTERNAL MEDICINE

## 2022-06-29 PROCEDURE — 96365 THER/PROPH/DIAG IV INF INIT: CPT | Mod: XU

## 2022-06-29 PROCEDURE — 85025 COMPLETE CBC W/AUTO DIFF WBC: CPT | Performed by: INTERNAL MEDICINE

## 2022-06-29 PROCEDURE — 96375 TX/PRO/DX INJ NEW DRUG ADDON: CPT

## 2022-06-29 PROCEDURE — 87506 IADNA-DNA/RNA PROBE TQ 6-11: CPT | Performed by: INTERNAL MEDICINE

## 2022-06-29 PROCEDURE — 83880 ASSAY OF NATRIURETIC PEPTIDE: CPT | Mod: GZ | Performed by: INTERNAL MEDICINE

## 2022-06-29 RX ORDER — PRAVASTATIN SODIUM 20 MG
40 TABLET ORAL AT BEDTIME
Status: DISCONTINUED | OUTPATIENT
Start: 2022-06-29 | End: 2022-07-01 | Stop reason: HOSPADM

## 2022-06-29 RX ORDER — ACETAMINOPHEN 325 MG/1
650 TABLET ORAL EVERY 6 HOURS PRN
Status: DISCONTINUED | OUTPATIENT
Start: 2022-06-29 | End: 2022-06-29

## 2022-06-29 RX ORDER — GABAPENTIN 300 MG/1
900 CAPSULE ORAL AT BEDTIME
Status: DISCONTINUED | OUTPATIENT
Start: 2022-06-29 | End: 2022-07-01 | Stop reason: HOSPADM

## 2022-06-29 RX ORDER — MONTELUKAST SODIUM 10 MG/1
10 TABLET ORAL AT BEDTIME
Status: DISCONTINUED | OUTPATIENT
Start: 2022-06-29 | End: 2022-07-01 | Stop reason: HOSPADM

## 2022-06-29 RX ORDER — ACETAMINOPHEN 650 MG/1
650 SUPPOSITORY RECTAL EVERY 6 HOURS PRN
Status: DISCONTINUED | OUTPATIENT
Start: 2022-06-29 | End: 2022-07-01 | Stop reason: HOSPADM

## 2022-06-29 RX ORDER — ASPIRIN 81 MG/1
81 TABLET ORAL DAILY
COMMUNITY
End: 2023-02-01

## 2022-06-29 RX ORDER — ONDANSETRON 4 MG/1
4 TABLET, ORALLY DISINTEGRATING ORAL EVERY 6 HOURS PRN
Status: DISCONTINUED | OUTPATIENT
Start: 2022-06-29 | End: 2022-07-01 | Stop reason: HOSPADM

## 2022-06-29 RX ORDER — HYDROCODONE BITARTRATE AND ACETAMINOPHEN 5; 325 MG/1; MG/1
1 TABLET ORAL EVERY 4 HOURS PRN
Status: DISCONTINUED | OUTPATIENT
Start: 2022-06-29 | End: 2022-07-01 | Stop reason: HOSPADM

## 2022-06-29 RX ORDER — ALBUTEROL SULFATE 5 MG/ML
5 SOLUTION RESPIRATORY (INHALATION) EVERY 6 HOURS PRN
Status: DISCONTINUED | OUTPATIENT
Start: 2022-06-29 | End: 2022-07-01 | Stop reason: HOSPADM

## 2022-06-29 RX ORDER — PIPERACILLIN SODIUM, TAZOBACTAM SODIUM 3; .375 G/15ML; G/15ML
3.38 INJECTION, POWDER, LYOPHILIZED, FOR SOLUTION INTRAVENOUS EVERY 8 HOURS
Status: DISCONTINUED | OUTPATIENT
Start: 2022-06-29 | End: 2022-06-30

## 2022-06-29 RX ORDER — ACETAMINOPHEN 500 MG
500 TABLET ORAL EVERY 6 HOURS PRN
COMMUNITY
End: 2022-08-09

## 2022-06-29 RX ORDER — DEXTROSE MONOHYDRATE 25 G/50ML
25-50 INJECTION, SOLUTION INTRAVENOUS
Status: DISCONTINUED | OUTPATIENT
Start: 2022-06-29 | End: 2022-07-01 | Stop reason: HOSPADM

## 2022-06-29 RX ORDER — ALBUTEROL SULFATE 90 UG/1
2 AEROSOL, METERED RESPIRATORY (INHALATION) EVERY 6 HOURS PRN
Status: DISCONTINUED | OUTPATIENT
Start: 2022-06-29 | End: 2022-07-01 | Stop reason: HOSPADM

## 2022-06-29 RX ORDER — ACETAMINOPHEN 650 MG/1
650 SUPPOSITORY RECTAL EVERY 6 HOURS PRN
Status: DISCONTINUED | OUTPATIENT
Start: 2022-06-29 | End: 2022-06-29

## 2022-06-29 RX ORDER — TRAMADOL HYDROCHLORIDE 50 MG/1
25 TABLET ORAL EVERY 6 HOURS PRN
Status: ON HOLD | COMMUNITY
End: 2022-07-01

## 2022-06-29 RX ORDER — ACETAMINOPHEN 325 MG/1
650 TABLET ORAL EVERY 4 HOURS PRN
Status: DISCONTINUED | OUTPATIENT
Start: 2022-06-29 | End: 2022-07-01 | Stop reason: HOSPADM

## 2022-06-29 RX ORDER — SODIUM CHLORIDE 9 MG/ML
INJECTION, SOLUTION INTRAVENOUS CONTINUOUS
Status: DISCONTINUED | OUTPATIENT
Start: 2022-06-29 | End: 2022-06-30

## 2022-06-29 RX ORDER — CALCIUM POLYCARBOPHIL 625 MG 625 MG/1
1250 TABLET ORAL DAILY
Status: DISCONTINUED | OUTPATIENT
Start: 2022-06-29 | End: 2022-07-01 | Stop reason: HOSPADM

## 2022-06-29 RX ORDER — ONDANSETRON 2 MG/ML
4 INJECTION INTRAMUSCULAR; INTRAVENOUS EVERY 6 HOURS PRN
Status: DISCONTINUED | OUTPATIENT
Start: 2022-06-29 | End: 2022-07-01 | Stop reason: HOSPADM

## 2022-06-29 RX ORDER — IPRATROPIUM BROMIDE AND ALBUTEROL SULFATE 2.5; .5 MG/3ML; MG/3ML
3 SOLUTION RESPIRATORY (INHALATION) EVERY 4 HOURS PRN
Status: DISCONTINUED | OUTPATIENT
Start: 2022-06-29 | End: 2022-07-01 | Stop reason: HOSPADM

## 2022-06-29 RX ORDER — ONDANSETRON 4 MG/1
4 TABLET, FILM COATED ORAL EVERY 6 HOURS PRN
COMMUNITY
End: 2023-02-01

## 2022-06-29 RX ORDER — CARVEDILOL 12.5 MG/1
12.5 TABLET ORAL 2 TIMES DAILY WITH MEALS
Status: DISCONTINUED | OUTPATIENT
Start: 2022-06-29 | End: 2022-07-01 | Stop reason: HOSPADM

## 2022-06-29 RX ORDER — ONDANSETRON 4 MG/1
4 TABLET, FILM COATED ORAL EVERY 6 HOURS PRN
Status: DISCONTINUED | OUTPATIENT
Start: 2022-06-29 | End: 2022-06-29

## 2022-06-29 RX ORDER — PANTOPRAZOLE SODIUM 20 MG/1
40 TABLET, DELAYED RELEASE ORAL
Status: DISCONTINUED | OUTPATIENT
Start: 2022-06-29 | End: 2022-07-01 | Stop reason: HOSPADM

## 2022-06-29 RX ORDER — PIPERACILLIN SODIUM, TAZOBACTAM SODIUM 3; .375 G/15ML; G/15ML
3.38 INJECTION, POWDER, LYOPHILIZED, FOR SOLUTION INTRAVENOUS ONCE
Status: COMPLETED | OUTPATIENT
Start: 2022-06-29 | End: 2022-06-29

## 2022-06-29 RX ORDER — NICOTINE POLACRILEX 4 MG
15-30 LOZENGE BUCCAL
Status: DISCONTINUED | OUTPATIENT
Start: 2022-06-29 | End: 2022-07-01 | Stop reason: HOSPADM

## 2022-06-29 RX ORDER — ASPIRIN 81 MG/1
81 TABLET ORAL DAILY
Status: DISCONTINUED | OUTPATIENT
Start: 2022-06-29 | End: 2022-07-01 | Stop reason: HOSPADM

## 2022-06-29 RX ORDER — VITAMIN B COMPLEX
4000 TABLET ORAL DAILY
Status: DISCONTINUED | OUTPATIENT
Start: 2022-06-29 | End: 2022-07-01 | Stop reason: HOSPADM

## 2022-06-29 RX ADMIN — SODIUM CHLORIDE: 9 INJECTION, SOLUTION INTRAVENOUS at 17:19

## 2022-06-29 RX ADMIN — ASPIRIN 81 MG: 81 TABLET, COATED ORAL at 14:10

## 2022-06-29 RX ADMIN — ACETAMINOPHEN 650 MG: 325 TABLET ORAL at 12:29

## 2022-06-29 RX ADMIN — Medication 1 TABLET: at 20:55

## 2022-06-29 RX ADMIN — PANTOPRAZOLE SODIUM 40 MG: 20 TABLET, DELAYED RELEASE ORAL at 14:21

## 2022-06-29 RX ADMIN — PRAVASTATIN SODIUM 40 MG: 20 TABLET ORAL at 20:55

## 2022-06-29 RX ADMIN — FLUOXETINE 40 MG: 20 CAPSULE ORAL at 14:21

## 2022-06-29 RX ADMIN — UMECLIDINIUM 1 PUFF: 62.5 AEROSOL, POWDER ORAL at 14:16

## 2022-06-29 RX ADMIN — CARVEDILOL 12.5 MG: 6.25 TABLET, FILM COATED ORAL at 17:30

## 2022-06-29 RX ADMIN — ONDANSETRON 4 MG: 4 TABLET, ORALLY DISINTEGRATING ORAL at 08:30

## 2022-06-29 RX ADMIN — PIPERACILLIN AND TAZOBACTAM 3.38 G: 3; .375 INJECTION, POWDER, LYOPHILIZED, FOR SOLUTION INTRAVENOUS at 02:57

## 2022-06-29 RX ADMIN — Medication 4000 UNITS: at 14:10

## 2022-06-29 RX ADMIN — MONTELUKAST 10 MG: 10 TABLET, FILM COATED ORAL at 20:55

## 2022-06-29 RX ADMIN — LOSARTAN POTASSIUM 12.5 MG: 25 TABLET, FILM COATED ORAL at 14:18

## 2022-06-29 RX ADMIN — PIPERACILLIN AND TAZOBACTAM 3.38 G: 3; .375 INJECTION, POWDER, LYOPHILIZED, FOR SOLUTION INTRAVENOUS at 17:31

## 2022-06-29 RX ADMIN — DEXTROSE MONOHYDRATE 25 ML: 25 INJECTION, SOLUTION INTRAVENOUS at 17:26

## 2022-06-29 RX ADMIN — GABAPENTIN 900 MG: 300 CAPSULE ORAL at 20:55

## 2022-06-29 RX ADMIN — ONDANSETRON 4 MG: 4 TABLET, ORALLY DISINTEGRATING ORAL at 20:54

## 2022-06-29 RX ADMIN — SODIUM CHLORIDE: 9 INJECTION, SOLUTION INTRAVENOUS at 04:29

## 2022-06-29 RX ADMIN — PIPERACILLIN AND TAZOBACTAM 3.38 G: 3; .375 INJECTION, POWDER, LYOPHILIZED, FOR SOLUTION INTRAVENOUS at 08:56

## 2022-06-29 RX ADMIN — DEXTROSE MONOHYDRATE 25 ML: 25 INJECTION, SOLUTION INTRAVENOUS at 12:09

## 2022-06-29 NOTE — ED TRIAGE NOTES
Patient arrives via [facility] from a TCU. In with N/V/D.  1 emesis earlier today. 3 large loose stools today.  VSS.  in the rig.     Triage Assessment     Row Name 06/28/22 1936       Respiratory WDL    Respiratory WDL WDL       Skin Circulation/Temperature WDL    Skin Circulation/Temperature WDL WDL       Cardiac WDL    Cardiac WDL WDL       Peripheral/Neurovascular WDL    Peripheral Neurovascular WDL WDL       Cognitive/Neuro/Behavioral WDL    Cognitive/Neuro/Behavioral WDL WDL

## 2022-06-29 NOTE — H&P
[facility] MEDICINE ADMISSION HISTORY AND PHYSICAL       Assessment & Plan      1. Vomiting, lower abdominal pain,  and Diarrhea -- CT abdomen was done and showed -- Probable sigmoid diverticulitis.  No perforation or abscess formation. WBC was normal.     She had complicated admission last April 2022 -- Large presumed diverticular intra-abdominal abscess. Last May 13, 2022 -- Well-drained left abdominal abscess without fistula. Drain was removed.    We will check for C diff as well.     - continue zosyn for now, given complicated intra-abdominal infection.   - AM labs  - C diff     2. 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 No perinephric hematoma.    Not sure if this finding, will explain her low back pain. She denies flank pain.     - urology referral  - she is on xarelto - may need to hold -- last dose was yesterday. She is not unstable.    - Hgb check    3. She was COVID (+) - May 31, 2022 -- s/p Paxlovid. Reported better, with residual cough   - Chest XR --  No acute abnormality.    4. She has history of COPD - her sat was 94% on RA when I checked. She still on RA now     5. HTN - PTA meds     6. DM, Type II  - sliding scale insulin/FS   - may need to hold DM meds while NPO    7. RLE DVT  - on xarelto     8. Chronic low back pain   - pain meds PRN        VTE prophylaxis: on xarelto   Diet:  Regular    Code Status: Full,   COVID test result:  (+)  COVID vaccination: completed   Barriers to discharge: admitting clinical condition  Discharge Disposition and goals:  Unable to determine at this point, pending clinical progress and response to treatment. Patient may need transfer to SNF or ACR if unsafe to go home and needed treatment inappropriate at home setting OR may need home health care evaluation if care can be delivered at home settings. Consider referral to care manager/     PPE - I was wearing PPE when I met the patient - N95 mask, Surgical mask, Isolation gown, Gloves, Safety glasses.          Care plan was created based on available information provided, including patient's condition at the time of encounter.   This plan was discussed with patient and/or family members using layman's terms and have agreed to proceed.   At the end of night shift (9PM - 730A), this case will be presented to the AM Hospitalist.    It is recommended to revise care plan and review history if there is change in condition and/or new clinical information is not available during my encounter.     All or some of home medication/s were not resumed on admission due to safety reasons or contraindications. Dosing and frequency may also have been modified. Please resume/review them during your visit.     70 minutes of total visit duration and greater than 50% was spent in direct evaluation of patient and coordination of care including discussion of diagnostic test results and recommended treatment. .      Derrick Bolden MD, MPH, FACP, [facility]  Internal Medicine - Hospitalist        Chief Complaint Diarrhea      HISTORY     - She came in with vomiting, lower abdominal pain and diarrhea.     - She had complicated admission last April 2022 for  -- Large presumed diverticular intra-abdominal abscess. Last May 13, 2022 -- Well-drained left abdominal abscess without fistula. Drain was removed.    - She said, she got better. Her diarrhea was off and on. Until in the last 2 days, where it was more persistent and at least 5 episodes per day. She also reported lower abdominal pain. On the day of admission, she vomited. No fevers    - She also complained of lower back pain, she used to have chronic LBP. No falls. No leg weakness. She denies flank pains.    - She tested positive COVID and was given Paxlovid. She said, she got better - breathing wise but still have some residual cough. No chest pain.     - In the ED, CT abdomen  was done and showed -- Probable sigmoid diverticulitis.  No perforation or abscess formation.WBC was normal.     - She was tested for COVID (+). She was given Zosyn.    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 No perinephric hematoma.    - I did check her O2 sat and its 94% on RA. Her last dose of xarelto was yesterday.     - ROS --- No headache. No dizziness. No weakness.  No palpitations. No urinary symptoms. No bleeding symptoms. No weight loss. Rest of 12 point ROS was reviewed and negative.       Past Medical History     Past Medical History:   Diagnosis Date     Diabetes (H)      Hypertension      Obese      PMR (polymyalgia rheumatica) (H)          Surgical History     Past Surgical History:   Procedure Laterality Date     IR ABSCESS TUBE CHANGE  4/22/2022        Family History      History reviewed. No pertinent family history.      Social History      .  Social History     Socioeconomic History     Marital status: Single     Spouse name: Not on file     Number of children: Not on file     Years of education: Not on file     Highest education level: Not on file   Occupational History     Not on file   Tobacco Use     Smoking status: Not on file     Smokeless tobacco: Not on file   Substance and Sexual Activity     Alcohol use: Not on file     Drug use: Not on file     Sexual activity: Not on file   Other Topics Concern     Not on file   Social History Narrative     Not on file     Social Determinants of Health     Financial Resource Strain: Not on file   Food Insecurity: Not on file   Transportation Needs: Not on file   Physical Activity: Not on file   Stress: Not on file   Social Connections: Not on file   Intimate Partner Violence: Not on file   Housing Stability: Not on file          Allergies        Allergies   Allergen Reactions     Simvastatin Hives         Prior to Admission Medications      No  current facility-administered medications on file prior to encounter.  acetaminophen (TYLENOL) 500 MG tablet, Take 500 mg by mouth 4 times daily And q6h PRN - max 2 PRN/day  albuterol (PROAIR HFA/PROVENTIL HFA/VENTOLIN HFA) 108 (90 Base) MCG/ACT inhaler, Inhale 2 puffs into the lungs 4 times daily as needed for shortness of breath / dyspnea or wheezing  albuterol (PROVENTIL) (5 MG/ML) 0.5% neb solution, Take 5 mg by nebulization every 6 hours as needed for wheezing or shortness of breath / dyspnea  alendronate (FOSAMAX) 70 MG tablet, Take 70 mg by mouth every 7 days  Ascorbic Acid (VITAMIN C) 500 MG CAPS, Take 1,000 mg by mouth daily  calcium carbonate 600 mg-vitamin D 400 units (CALTRATE) 600-400 MG-UNIT per tablet, Take 1 tablet by mouth 2 times daily  calcium polycarbophil (FIBERCON) 625 MG tablet, Take 2 tablets by mouth daily  carvedilol (COREG) 12.5 MG tablet, Take 12.5 mg by mouth 2 times daily (with meals)  cholecalciferol 50 MCG (2000 UT) CAPS, Take 4,000 Units by mouth daily  coenzyme Q-10 capsule, Take 1 capsule by mouth daily  FLUoxetine (PROZAC) 40 MG capsule, Take 40 mg by mouth every morning  Fluticasone-Umeclidin-Vilanterol (TRELEGY ELLIPTA) 200-62.5-25 MCG/INH oral inhaler, Inhale 1 puff into the lungs every morning  gabapentin (NEURONTIN) 300 MG capsule, Take 900 mg by mouth At Bedtime  losartan (COZAAR) 25 MG tablet, Take 12.5 mg by mouth every morning  metFORMIN (GLUCOPHAGE-XR) 500 MG 24 hr tablet, Take 1,000 mg by mouth daily (with breakfast)  montelukast (SINGULAIR) 10 MG tablet, Take 10 mg by mouth At Bedtime  omeprazole (PRILOSEC) 40 MG DR capsule, Take 40 mg by mouth every morning (before breakfast)  ondansetron (ZOFRAN) 4 MG tablet, Take 1 tablet (4 mg) by mouth every 6 hours as needed for nausea  pravastatin (PRAVACHOL) 40 MG tablet, Take 40 mg by mouth At Bedtime  rivaroxaban ANTICOAGULANT (XARELTO) 20 MG TABS tablet, Take 20 mg by mouth daily (with dinner)  traMADol (ULTRAM) 50 MG  "tablet, Take 0.5 tablets (25 mg) by mouth every 6 hours as needed for severe pain  [DISCONTINUED] azaTHIOprine (IMURAN) 50 MG tablet, Take 100 mg by mouth every morning             Review of Systems     A 12 point comprehensive review of systems was negative except as noted above in HPI.    PHYSICAL EXAMINATION       Vitals      Vitals: /55   Pulse 91   Temp 97.3  F (36.3  C) (Oral)   Resp 22   Ht 1.6 m (5' 3\")   SpO2 91%   BMI 33.41 kg/m    BMI= Body mass index is 33.41 kg/m .      Examination     General Appearance:  Alert, cooperative, no distress  Head:    Normocephalic, without obvious abnormality, atraumatic  EENT:  PERRL, conjunctiva/corneas clear, EOM's intact.   Neck:   Supple, symmetrical, trachea midline, no adenopathy; no NVE  Back:  Symmetric, no curvature, no CVA tenderness  Chest/Lungs: Clear to auscultation bilaterally, respirations unlabored, No tenderness or deformity. No abdominal breathing or use of accessory muscles.   Heart:    Regular rate and rhythm, S1 and S2 normal, no murmur, rub   or gallop  Abdomen: Soft, non-tender, bowel sounds active all four quadrants, not peritoneal on palpation. Not distended  Extremities:  Extremities normal, atraumatic, no swelling   Skin:  Skin color, texture, turgor normal, no rashes or lesion  Neurologic:  Awake and alert, No lateralizing or localizing signs            Pertinent Lab     Results for orders placed or performed during the hospital encounter of 06/28/22   CT Abdomen Pelvis w Contrast    Impression    IMPRESSION:   1.  Probable sigmoid diverticulitis. No perforation or abscess formation.  2.  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 No perinephric hematoma.   Comprehensive metabolic panel   Result Value Ref Range    Sodium 143 136 - 145 mmol/L    Potassium 3.7 3.5 - 5.0 mmol/L    Chloride 105 98 - 107 mmol/L    Carbon Dioxide (CO2) 28 22 - 31 mmol/L    Anion " Gap 10 5 - 18 mmol/L    Urea Nitrogen 14 8 - 28 mg/dL    Creatinine 0.73 0.60 - 1.10 mg/dL    Calcium 8.8 8.5 - 10.5 mg/dL    Glucose 93 70 - 125 mg/dL    Alkaline Phosphatase 69 45 - 120 U/L    AST 14 0 - 40 U/L    ALT <9 0 - 45 U/L    Protein Total 6.5 6.0 - 8.0 g/dL    Albumin 2.1 (L) 3.5 - 5.0 g/dL    Bilirubin Total 0.3 0.0 - 1.0 mg/dL    GFR Estimate 84 >60 mL/min/1.73m2   Result Value Ref Range    Troponin I <0.01 0.00 - 0.29 ng/mL   Symptomatic; Unknown Influenza A/B & SARS-CoV2 (COVID-19) Virus PCR Multiplex Nasopharyngeal    Specimen: Nasopharyngeal; Swab   Result Value Ref Range    Influenza A PCR Negative Negative    Influenza B PCR Negative Negative    RSV PCR Negative Negative    SARS CoV2 PCR Positive (A) Negative   CBC with platelets and differential   Result Value Ref Range    WBC Count 7.2 4.0 - 11.0 10e3/uL    RBC Count 3.81 3.80 - 5.20 10e6/uL    Hemoglobin 11.5 (L) 11.7 - 15.7 g/dL    Hematocrit 37.0 35.0 - 47.0 %    MCV 97 78 - 100 fL    MCH 30.2 26.5 - 33.0 pg    MCHC 31.1 (L) 31.5 - 36.5 g/dL    RDW 14.3 10.0 - 15.0 %    Platelet Count 308 150 - 450 10e3/uL    % Neutrophils 66 %    % Lymphocytes 24 %    % Monocytes 6 %    % Eosinophils 3 %    % Basophils 1 %    % Immature Granulocytes 0 %    NRBCs per 100 WBC 0 <1 /100    Absolute Neutrophils 4.8 1.6 - 8.3 10e3/uL    Absolute Lymphocytes 1.7 0.8 - 5.3 10e3/uL    Absolute Monocytes 0.4 0.0 - 1.3 10e3/uL    Absolute Eosinophils 0.2 0.0 - 0.7 10e3/uL    Absolute Basophils 0.1 0.0 - 0.2 10e3/uL    Absolute Immature Granulocytes 0.0 <=0.4 10e3/uL    Absolute NRBCs 0.0 10e3/uL           Pertinent Radiology

## 2022-06-29 NOTE — PHARMACY-ADMISSION MEDICATION HISTORY
Pharmacy Note - Admission Medication History    Pertinent Provider Information: per florecita garcia MAR     ______________________________________________________________________    Prior To Admission (PTA) med list completed and updated in EMR.       PTA Med List   Medication Sig Last Dose     acetaminophen (TYLENOL) 500 MG tablet Take 500 mg by mouth every 6 hours as needed for mild pain (max 2 PRN doses per day) 6/19/2022     acetaminophen (TYLENOL) 500 MG tablet Take 500 mg by mouth 4 times daily And q6h PRN - max 2 PRN/day 6/27/2022     albuterol (PROAIR HFA/PROVENTIL HFA/VENTOLIN HFA) 108 (90 Base) MCG/ACT inhaler Inhale 2 puffs into the lungs every 6 hours as needed for shortness of breath / dyspnea or wheezing Past Month at Unknown time     albuterol (PROVENTIL) (5 MG/ML) 0.5% neb solution Take 5 mg by nebulization every 6 hours as needed for wheezing or shortness of breath / dyspnea Unknown at Unknown time     alendronate (FOSAMAX) 70 MG tablet Take 70 mg by mouth every 7 days 6/26/2022     Ascorbic Acid (VITAMIN C) 500 MG CAPS Take 1,000 mg by mouth daily 6/27/2022     aspirin 81 MG EC tablet Take 81 mg by mouth daily 6/27/2022     calcium carbonate 600 mg-vitamin D 400 units (CALTRATE) 600-400 MG-UNIT per tablet Take 1 tablet by mouth 2 times daily 6/27/2022     calcium polycarbophil (FIBERCON) 625 MG tablet Take 2 tablets by mouth daily 6/27/2022     carvedilol (COREG) 12.5 MG tablet Take 12.5 mg by mouth 2 times daily (with meals) 6/27/2022     cholecalciferol 50 MCG (2000 UT) CAPS Take 4,000 Units by mouth daily 6/27/2022     coenzyme Q-10 capsule Take 1 capsule by mouth daily 6/27/2022     FLUoxetine (PROZAC) 40 MG capsule Take 40 mg by mouth every morning 6/27/2022     Fluticasone-Umeclidin-Vilanterol (TRELEGY ELLIPTA) 200-62.5-25 MCG/INH oral inhaler Inhale 1 puff into the lungs every morning 6/27/2022     gabapentin (NEURONTIN) 300 MG capsule Take 900 mg by mouth At Bedtime 6/27/2022     losartan  (COZAAR) 25 MG tablet Take 12.5 mg by mouth every morning 6/27/2022     metFORMIN (GLUCOPHAGE) 1000 MG tablet Take 1,000 mg by mouth daily (with breakfast) 6/27/2022     montelukast (SINGULAIR) 10 MG tablet Take 10 mg by mouth At Bedtime 6/27/2022     omeprazole (PRILOSEC) 20 MG DR capsule Take 40 mg by mouth every morning (before breakfast) 6/27/2022     ondansetron (ZOFRAN) 4 MG tablet Take 4 mg by mouth every 6 hours as needed for nausea 6/18/2022     pravastatin (PRAVACHOL) 40 MG tablet Take 40 mg by mouth At Bedtime 6/27/2022     rivaroxaban ANTICOAGULANT (XARELTO) 20 MG TABS tablet Take 20 mg by mouth daily (with dinner) 6/27/2022     traMADol (ULTRAM) 50 MG tablet Take 25 mg by mouth every 6 hours as needed for severe pain 6/20/2022       Information source(s): Facility ([facility]/) medication list/MAR  Method of interview communication: in-person    Summary of Changes to PTA Med List  New: none  Discontinued: none  Changed: changed metformin    Patient was asked about OTC/herbal products specifically.  PTA med list reflects this.    In the past week, patient estimated taking medication this percent of the time:  greater than 90%.    Allergies were reviewed, assessed, and updated with the patient.      Patient did not bring any medications to the hospital and can't retrieve from home. No multi-dose medications are available for use during hospital stay.     The information provided in this note is only as accurate as the sources available at the time of the update(s).    Thank you for the opportunity to participate in the care of this patient.    Jenna Liu [facility]  6/29/2022 10:22 AM

## 2022-06-29 NOTE — PROGRESS NOTES
[facility] Medicine PROGRESS NOTE      Identification/Summary:   Zakiya Christian is a 78yo female who is admitted for sigmoid diverticulitis on IV zosyn and who was found to have an expanding angiomyolipoma with internal hemorrhage, which is being observed.     Assessment and Plan:  Patient appearing overall clinically well today. Labs unable to be drawn today, will try when abx is done and review tomorrow. Diverticulitis expected to resolve with continued administration of IV zosyn. Candidate for discharge when abd pain controlled, PO intake tolerated. Angiomyolipoma w/o evidence of perinephric hematoma. W/ acute events while inpatient, appropriate to follow up outpatient for monitoring and management. COVID infection most likely not acute, no need to treat at this time.     GI:  # Acute sigmoid diverticulitis  # nausea  # vomiting  - continue IV zosyn  - advance diet as tolerated  - CBC and BMP in am  - C. Diff PCR and stool panel pending     # GERD  - pantoprazole auto subbed for PTA omeprazole    RENAL:  # angiomyolipoma w/ internal hemorrhage  - trend hgb and monitor pt pain level; if significant change, repeat imaging  - per urology, hold Xarelto to allow for hemostasis  - urology following, appreciate recommendations  - f/u outpatient w/ urology pending no acute events during admission    PULM:  # COPD  # asthma  - continue PTA Ellipta  - DUONEB prn  - PTA albuterol neb prn  - PTA albuterol inhaler prn  - PTA montelukast    # COVID positive  Pt tested positive for COVID on 5/31, unlikely she is acutely infected currently.    CV:  # HTN  - continue PTA carvedilol and losartan  - BNP pending    #HLD  - continue PTA pravastatin     MSK:  # low back pain  # subjective weakness  - ESR/CRP to evaluate for inflammatory process, possibly PMR  - continue PTA tramadol for severe pain    # osteoporosis  - continue PTA cholecalciferol and Ca carbonate- vit D    ENDO:  # T2DM w peripheral neuropathy and CKD Stage  III  - discontinue metformin  - start novolog TID and before bedtime  - continue PTA gabapentin and aspirin    HEME:  # hx DVT  - takes PTA xarelto, currently held for hemostasis    PSYCH:  # depression  # anxiety  - continue PTA fluoxetine    Diet: Clear Liquid Diet  Fluids: NS 75mL/hr  Pain meds: acetaminophen, norco, gabapentin, tramadol  Therapy: n/a.  DVT Prophylaxis: HOLD xarelto; ambulation  Code Status: Full Code  Disposition: Inpatient    ADDENDUM   Patient seen and examined, discussed with student  Symptoms mostly resolved  No LLQ tenderness   Await C diff testing   Zosyn for now   CRP 1.7  Unlikely PMR flare     Mikaela Cerda MD      Interval History/Subjective:  Patient doing well today. Still experiencing back pain and some mild suprapubic abdominal pain. Endorses tension headache. She has a cough that has been present since end of May when she tested positive for COVID. She does not feel as though she ever fully recovered from COVID, has felt ill since. Somewhat SOB today, on O2 in ED, but not on O2 at home. No chest pain.  C/o diarrhea x at least 6months, also has had vomiting. No blood in stools or emesis. No recent travel, no raw meat ingestion, no contact with children. Has been staying in TCU.     Has been experiencing lower back pain, L>R over the last year with associated perceived weakness of the legs. She feels her legs feel heavy when she tries to go up the stairs, has difficulty going from seated to standing, and has some shoulder pain as well. Has a history of being treated with steroids for 6-8 months for PMR, but steroids were discontinued prior to her recent abscess drainage as symptoms were attributed to abscess as opposed to PMR. Does report a fall at her current facility, no reported injury.     Physical Exam/Objective:  Gen: No acute distress, lying comfortably in bed.   ENT: No scleral icterus, no rhinorrhea.  Pulm: Expiratory wheezes diffusely BL. Occasional crackles. May be some  transmitted upper airway sounds. Nasal cannula in place, on 2L of O2.  CV: Regular rate and rhythm on auscultation and by radial pulse. Abnormally protruding mass in sternal area. Non-tender, firm.   GI: Abdomen is soft, non-tender, non-distended with active bowel sounds.  MSK: Mild peripheral edema, no noticeable swelling/erythema or warmth of joints.  Neuro: Equal dorsi and plantar flexion BL. Equal straight leg raise strength BL. Equal sensation BL lower extremity.  Derm: No rashes on examined areas of skin, no jaundice, skin is dry and warm. Pink/pale striae noted on abdomen. Incision scar from abscess drainage.   Psych: Pleasant mood, appropriate affect.     Medications:     aspirin  81 mg Oral Daily     calcium carbonate-vitamin D  1 tablet Oral BID     calcium polycarbophil  1,250 mg Oral Daily     carvedilol  12.5 mg Oral BID w/meals     FLUoxetine  40 mg Oral QAM     fluticasone-vilanterol  1 puff Inhalation Daily    And     umeclidinium  1 puff Inhalation Daily     gabapentin  900 mg Oral At Bedtime     insulin aspart  1-7 Units Subcutaneous TID AC     insulin aspart  1-5 Units Subcutaneous At Bedtime     losartan  12.5 mg Oral QAM     montelukast  10 mg Oral At Bedtime     pantoprazole  40 mg Oral QAM AC     piperacillin-tazobactam  3.375 g Intravenous Q8H     pravastatin  40 mg Oral At Bedtime     [Held by provider] rivaroxaban ANTICOAGULANT  20 mg Oral Daily with supper     Vitamin D3  4,000 Units Oral Daily       Yonny [facility] Center[facility]  [facility]

## 2022-06-29 NOTE — ED PROVIDER NOTES
"  Emergency Department Encounter         FINAL IMPRESSION:  Bleeding renal cyst, diverticulitis        ED COURSE AND MEDICAL DECISION MAKING       ED Course as of 06/29/22 0042   Tue Jun 28, 2022 1949 Patient is an obese 77-year-old with a history of colonic diverticular abscess status postdrainage in April of this year, hypertension hyperlipidemia, [facility] vomiting and diarrhea, both nonbloody and nonmelanotic.  States she has had nausea vomiting and diarrhea intermittently the past few weeks however today was much worse.  States that \"I just have not felt right since being here.\"  Denies any chest pain or trouble breathing although she was mildly hypoxic on arrival.  Denies any pleuritic pain.  No significant leg swelling.  Normal urination.  No abdominal pain.  On arrival she looks well.  Heart and lungs normal.  No wheezing.  No crackles.  Abdomen is benign and obese.  Legs are normal.  I took her off oxygen as I think the initial pulse oximeter reading may have been inaccurate.  We will monitor her very closely.  Otherwise we will do basic labs give her some fluids antiemetics and reevaluate   2333 Patient feeling better however she is very concerned about the possibility of her previous abscess getting worse.  She would really like an CT.  Will obtain CT imaging and reevaluate.     -CT showing renal cyst with a possible bleeding component internally that is enlarging.  No perinephric blood.  Hemoglobin is stable.  Patient's vitals are stable.  She looks well clinically.  No significant abdominal pain.  Discussed case with urology who is recommending patient to be admitted as an observation for repeat evaluation hemoglobin the morning.  Discussed case with hospitalist        7:45 PM I met with the patient to gather history and to perform my initial exam. I discussed the plan for care while in the Emergency Department.   12:43 AM Spoke with urologist, Dr. Tariq. Suggested " patient be admitted.       At the conclusion of the encounter I discussed the results of all the tests and the disposition. The questions were answered. The patient or family acknowledged understanding and was agreeable with the care plan.        MEDICATIONS GIVEN IN THE EMERGENCY DEPARTMENT:  Medications - No data to display    NEW PRESCRIPTIONS STARTED AT TODAY'S ED VISIT:  New Prescriptions    No medications on file       HPI     Patient information obtained from: Patient    Use of Interpretor: N/A     Zakiya Christian is a 77 year old female with a pertinent history of hypertension, hyperlipidemia, diabetes, COPD, and CKD stage 3 who presents to this ED by EMS for evaluation of nausea, vomiting, and diarrhea.     Today patient started experiencing vomiting, nausea, and diarrhea that comes in waves. States her legs are swollen. Endorses that two months ago she had an abdominal abscess that had to be drained. She then got sent to transitional care where she got COVID, and since then hasn't felt right.    Denies being on oxygen. Denies shortness of breath, chest pain, black or bloody diarrhea, black or blood vomit, bladder problems, or any other complaints at this time.     REVIEW OF SYSTEMS:  Review of Systems   Constitutional: Negative for fever, malaise  HEENT: Negative runny nose, sore throat, ear pain, neck pain  Respiratory: Negative for shortness of breath, cough, congestion  Cardiovascular: Negative for chest pain. Positive for leg swelling.   Gastrointestinal: Negative for abdominal distention, abdominal pain, constipation. Positive for vomiting, nausea, diarrhea  Genitourinary: Negative for dysuria and hematuria.   Integument: Negative for rash, skin breakdown  Neurological: Negative for paresthesias, weakness, headache.  Musculoskeletal: Negative for joint pain, joint swelling      All other systems reviewed and are negative.          MEDICAL HISTORY     Past Medical History:   Diagnosis Date     Diabetes  "(H)      Hypertension      Obese      PMR (polymyalgia rheumatica) (H)        Past Surgical History:   Procedure Laterality Date     IR ABSCESS TUBE CHANGE  4/22/2022            acetaminophen (TYLENOL) 500 MG tablet  albuterol (PROAIR HFA/PROVENTIL HFA/VENTOLIN HFA) 108 (90 Base) MCG/ACT inhaler  albuterol (PROVENTIL) (5 MG/ML) 0.5% neb solution  alendronate (FOSAMAX) 70 MG tablet  Ascorbic Acid (VITAMIN C) 500 MG CAPS  calcium carbonate 600 mg-vitamin D 400 units (CALTRATE) 600-400 MG-UNIT per tablet  calcium polycarbophil (FIBERCON) 625 MG tablet  carvedilol (COREG) 12.5 MG tablet  cholecalciferol 50 MCG (2000 UT) CAPS  coenzyme Q-10 capsule  FLUoxetine (PROZAC) 40 MG capsule  Fluticasone-Umeclidin-Vilanterol (TRELEGY ELLIPTA) 200-62.5-25 MCG/INH oral inhaler  gabapentin (NEURONTIN) 300 MG capsule  losartan (COZAAR) 25 MG tablet  metFORMIN (GLUCOPHAGE-XR) 500 MG 24 hr tablet  montelukast (SINGULAIR) 10 MG tablet  omeprazole (PRILOSEC) 40 MG DR capsule  ondansetron (ZOFRAN) 4 MG tablet  pravastatin (PRAVACHOL) 40 MG tablet  rivaroxaban ANTICOAGULANT (XARELTO) 20 MG TABS tablet  traMADol (ULTRAM) 50 MG tablet            PHYSICAL EXAM     BP (!) 164/71   Pulse 92   Temp 97.3  F (36.3  C) (Oral)   Resp 22   Ht 1.6 m (5' 3\")   SpO2 (!) 89%   BMI 33.41 kg/m        PHYSICAL EXAM:     General: Patient appears well, nontoxic, comfortable  HEENT: Moist mucous membranes, no tongue swelling.  No head trauma.  No midline neck pain.  Cardiovascular: Normal rate, normal rhythm, no extremity edema.  No appreciable murmur.  Respiratory: No signs of respiratory distress, lungs are clear to auscultation bilaterally with no wheezes rhonchi or rales.  Abdominal: Soft, nontender, nondistended, no palpable masses, no guarding, no rebound. Abdomen is benign and obese.  Musculoskeletal: Full range of motion of joints, no deformities appreciated.  Neurological: Alert and oriented, grossly neurologically intact.  Psychological: Normal " affect and mood.  Integument: No rashes appreciated          RESULTS       Labs Ordered and Resulted from Time of ED Arrival to Time of ED Departure - No data to display    No orders to display           PROCEDURES:  Procedures:  Procedures       I, Sharlene Quinn am serving as a scribe to document services personally performed by Kian Trevino DO, based on my observations and the provider's statements to me.  I, Kian Trevino DO, attest that Sharlene Quinn is acting in a scribe capacity, has observed my performance of the services and has documented them in accordance with my direction.    Kian Trevino DO  Emergency Medicine  [facility] EMERGENCY ROOM     Kian Trevino DO  06/29/22 0116

## 2022-06-29 NOTE — ED NOTES
Pt had Covid in April. Spoke with resident/student with hospitalist group. Plans to make pt Covid- recovered.

## 2022-06-29 NOTE — CONSULTS
[facility] CONSULT       Type of consult: inpatient  Place of service: [facility]   Reason for consult: left renal angiomyolipoma   Requested by: Dr. Bolden     History of present illness:   Zakiya Christian is a 77 year old female admitted to the hospital for probable sigmoid diverticulitis. Urology was consulted for left renal angiomyolipoma noted on CT abdomen and pelvis. History is obtained from patient,  and chart review.     Zakiya sought ED evaluation for vomiting, lower abdominal pain and diarrhea. No urinary symptoms.  ED with bland CMP, creatinine normal. CBC with hg 11.5 (9.6-12.4 over the past few months). CT abdo and pelvis with probable sigmoid diveritcultitis and Interval enlargement of the left renal angiomyolipoma with increased density centrally suggesting internal hemorrhage. No perinephric hematoma. She is on xeralto for recent DVT. C difficult culture pending. Her Covid test was positive, she did test plosive May 31, 2022. If note the angiomyolipoma left kidney was seen on CT 4/5/2022, at that time was 4.5 x 3.4x3.3 cm without hematoma present.     Zakiya states 1 year of aching left back pain that radiates to the right. No hematuria. She is a previus smoker of about a PPD x 20-25 years. Zakiya denies prior urologic conditions or surgeries. She has no family whispery of urologic disease.     Past medical history:  Past Medical History:   Diagnosis Date     Diabetes (H)      Hypertension      Obese      PMR (polymyalgia rheumatica) (H)        Past surgical history  Past Surgical History:   Procedure Laterality Date     IR ABSCESS TUBE CHANGE  4/22/2022       Social history  Social History     Socioeconomic History     Marital status: Single     Spouse name: Not on file     Number of children: Not on file     Years of education: Not on file     Highest education level: Not on file   Occupational History     Not on file   Tobacco Use     Smoking status: Not on file     Smokeless  tobacco: Not on file   Substance and Sexual Activity     Alcohol use: Not on file     Drug use: Not on file     Sexual activity: Not on file   Other Topics Concern     Not on file   Social History Narrative     Not on file     Social Determinants of Health     Financial Resource Strain: Not on file   Food Insecurity: Not on file   Transportation Needs: Not on file   Physical Activity: Not on file   Stress: Not on file   Social Connections: Not on file   Intimate Partner Violence: Not on file   Housing Stability: Not on file         Medications  Current Facility-Administered Medications   Medication     acetaminophen (TYLENOL) tablet 650 mg    Or     acetaminophen (TYLENOL) Suppository 650 mg     albuterol (PROVENTIL HFA/VENTOLIN HFA) inhaler     albuterol (PROVENTIL) neb solution 5 mg     aspirin EC tablet 81 mg     calcium carbonate-vitamin D (OS-VICENTA with D) per tablet 1 tablet     calcium polycarbophil (FIBERCON) tablet 1,250 mg     carvedilol (COREG) tablet 12.5 mg     glucose gel 15-30 g    Or     dextrose 50 % injection 25-50 mL    Or     glucagon injection 1 mg     FLUoxetine (PROzac) capsule 40 mg     fluticasone-vilanterol (BREO ELLIPTA) 200-25 MCG/INH inhaler 1 puff    And     umeclidinium (INCRUSE ELLIPTA) 62.5 MCG/INH inhaler 1 puff     gabapentin (NEURONTIN) capsule 900 mg     HYDROcodone-acetaminophen (NORCO) 5-325 MG per tablet 1 tablet     insulin aspart (NovoLOG) injection (RAPID ACTING)     insulin aspart (NovoLOG) injection (RAPID ACTING)     ipratropium - albuterol 0.5 mg/2.5 mg/3 mL (DUONEB) neb solution 3 mL     losartan (COZAAR) half-tab 12.5 mg     melatonin tablet 1 mg     montelukast (SINGULAIR) tablet 10 mg     ondansetron (ZOFRAN ODT) ODT tab 4 mg    Or     ondansetron (ZOFRAN) injection 4 mg     pantoprazole (PROTONIX) EC tablet 40 mg     piperacillin-tazobactam (ZOSYN) 3.375 g vial to attach to  mL bag     pravastatin (PRAVACHOL) tablet 40 mg     prochlorperazine (COMPAZINE)  "injection 5 mg     [Held by provider] rivaroxaban ANTICOAGULANT (XARELTO) tablet 20 mg     sodium chloride 0.9% infusion     traMADol (ULTRAM) half-tab 25 mg     Vitamin D3 (CHOLECALCIFEROL) tablet 4,000 Units     Current Outpatient Medications   Medication     acetaminophen (TYLENOL) 500 MG tablet     acetaminophen (TYLENOL) 500 MG tablet     albuterol (PROAIR HFA/PROVENTIL HFA/VENTOLIN HFA) 108 (90 Base) MCG/ACT inhaler     albuterol (PROVENTIL) (5 MG/ML) 0.5% neb solution     alendronate (FOSAMAX) 70 MG tablet     Ascorbic Acid (VITAMIN C) 500 MG CAPS     aspirin 81 MG EC tablet     calcium carbonate 600 mg-vitamin D 400 units (CALTRATE) 600-400 MG-UNIT per tablet     calcium polycarbophil (FIBERCON) 625 MG tablet     carvedilol (COREG) 12.5 MG tablet     cholecalciferol 50 MCG (2000 UT) CAPS     coenzyme Q-10 capsule     FLUoxetine (PROZAC) 40 MG capsule     Fluticasone-Umeclidin-Vilanterol (TRELEGY ELLIPTA) 200-62.5-25 MCG/INH oral inhaler     gabapentin (NEURONTIN) 300 MG capsule     losartan (COZAAR) 25 MG tablet     metFORMIN (GLUCOPHAGE) 1000 MG tablet     montelukast (SINGULAIR) 10 MG tablet     omeprazole (PRILOSEC) 20 MG DR capsule     ondansetron (ZOFRAN) 4 MG tablet     pravastatin (PRAVACHOL) 40 MG tablet     rivaroxaban ANTICOAGULANT (XARELTO) 20 MG TABS tablet     traMADol (ULTRAM) 50 MG tablet       Allergies  Allergies   Allergen Reactions     Simvastatin Hives       Review of systems  12 point review of system is otherwise negative except what is stated in HPI    Physical exam:  /59   Pulse 82   Temp 97.3  F (36.3  C) (Oral)   Resp 22   Ht 1.6 m (5' 3\")   SpO2 100%   BMI 33.41 kg/m     GENERAL: NAD, alert, cooperative  HEAD: normocephalic/atraumatic   ABDOMEN: Soft, non tender, non distended, no palpable masses, no rebound or peritoneal signs, and no CVA tenderness  SKIN: no rashes or lesions  MUSCULOSKELETAL: moves all four extremities equally, no pedal edema  PSYCHOLOGICAL: alert " and oriented, answers questions appropriately    Labs:   Lab Results   Component Value Date    WBC 7.2 06/28/2022    HGB 11.5 (L) 06/28/2022    HCT 37.0 06/28/2022     06/28/2022    CHOL 137 05/17/2021    TRIG 94 05/17/2021    HDL 47 (L) 05/17/2021    ALT <9 06/28/2022    AST 14 06/28/2022     06/28/2022    BUN 14 06/28/2022    CO2 28 06/28/2022    TSH 2.10 08/08/2019    INR 1.16 (H) 04/13/2022       Lab Results   Component Value Date    NITRITE Negative 04/05/2022    BACTERIA Few (A) 04/05/2022        I have personally reviewed these labs.     Imaging:  EXAM: CT ABDOMEN PELVIS W CONTRAST  LOCATION: [facility]  DATE/TIME: 6/28/2022 11:43 PM    IMPRESSION:   1.  Probable sigmoid diverticulitis. No perforation or abscess formation.  2.  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 No perinephric hematoma.      I have reviewed the imaging reports above.     Assessment/plan:   Zakiya Christian is being seen by [facility] for .5 cm angiomyolipoma at the lower pole the left kidney. There is new soft tissue or fluid within the angiomyolipoma suggesting internal hemorrhage.    - Trend Hg, if trending down would need repat imaging to assess for increasing hematoma and likely angioembolization through IR  -If no contraindication recommend holding Xeralto for a few days to allow for hemostasis  -If hg remains stable then no emergenct intervention required, Given size of angiomyolipoma will need close outpatient follow up with renal mass specialist to discuss surgical management.   - urology will continue to follow.   - patient case was discussed with: Dr. Denis   - Old records reviewed: notes since admission        Thank you for consulting [facility] regarding this patient's care. Please contact us with questions or concerns.     Analisa Torrez PA-C  [facility]   [phone]

## 2022-06-29 NOTE — PLAN OF CARE
Vss other than BP's which are elevated. Was on 2L NC and currently on RA sating in the low 90's. A&O. Continue on clears and tolerating well. Denies pain. BG was 66, dextrose given and BG is now WNL. Plan to discharge back to [facility] TCU once medically stable. Will continue to monitor.

## 2022-06-30 LAB
ANION GAP SERPL CALCULATED.3IONS-SCNC: 7 MMOL/L (ref 5–18)
BUN SERPL-MCNC: 7 MG/DL (ref 8–28)
C COLI+JEJUNI+LARI FUSA STL QL NAA+PROBE: NOT DETECTED
C DIFF TOX B STL QL: NEGATIVE
CALCIUM SERPL-MCNC: 8.2 MG/DL (ref 8.5–10.5)
CHLORIDE BLD-SCNC: 107 MMOL/L (ref 98–107)
CO2 SERPL-SCNC: 28 MMOL/L (ref 22–31)
CREAT SERPL-MCNC: 0.67 MG/DL (ref 0.6–1.1)
EC STX1 GENE STL QL NAA+PROBE: NOT DETECTED
EC STX2 GENE STL QL NAA+PROBE: NOT DETECTED
ERYTHROCYTE [DISTWIDTH] IN BLOOD BY AUTOMATED COUNT: 14.5 % (ref 10–15)
GFR SERPL CREATININE-BSD FRML MDRD: 89 ML/MIN/1.73M2
GLUCOSE BLD-MCNC: 79 MG/DL (ref 70–125)
GLUCOSE BLDC GLUCOMTR-MCNC: 158 MG/DL (ref 70–99)
GLUCOSE BLDC GLUCOMTR-MCNC: 71 MG/DL (ref 70–99)
GLUCOSE BLDC GLUCOMTR-MCNC: 73 MG/DL (ref 70–99)
GLUCOSE BLDC GLUCOMTR-MCNC: 78 MG/DL (ref 70–99)
GLUCOSE BLDC GLUCOMTR-MCNC: 91 MG/DL (ref 70–99)
HCT VFR BLD AUTO: 34.5 % (ref 35–47)
HGB BLD-MCNC: 10.3 G/DL (ref 11.7–15.7)
MCH RBC QN AUTO: 30.1 PG (ref 26.5–33)
MCHC RBC AUTO-ENTMCNC: 29.9 G/DL (ref 31.5–36.5)
MCV RBC AUTO: 101 FL (ref 78–100)
NOROV GI+II ORF1-ORF2 JNC STL QL NAA+PR: NOT DETECTED
PLATELET # BLD AUTO: 273 10E3/UL (ref 150–450)
POTASSIUM BLD-SCNC: 3.3 MMOL/L (ref 3.5–5)
RBC # BLD AUTO: 3.42 10E6/UL (ref 3.8–5.2)
RVA NSP5 STL QL NAA+PROBE: NOT DETECTED
SALMONELLA SP RPOD STL QL NAA+PROBE: NOT DETECTED
SHIGELLA SP+EIEC IPAH STL QL NAA+PROBE: NOT DETECTED
SODIUM SERPL-SCNC: 142 MMOL/L (ref 136–145)
V CHOL+PARA RFBL+TRKH+TNAA STL QL NAA+PR: NOT DETECTED
WBC # BLD AUTO: 5.5 10E3/UL (ref 4–11)
Y ENTERO RECN STL QL NAA+PROBE: NOT DETECTED

## 2022-06-30 PROCEDURE — 250N000013 HC RX MED GY IP 250 OP 250 PS 637: Performed by: INTERNAL MEDICINE

## 2022-06-30 PROCEDURE — 96376 TX/PRO/DX INJ SAME DRUG ADON: CPT

## 2022-06-30 PROCEDURE — 85027 COMPLETE CBC AUTOMATED: CPT | Performed by: INTERNAL MEDICINE

## 2022-06-30 PROCEDURE — 36415 COLL VENOUS BLD VENIPUNCTURE: CPT | Performed by: INTERNAL MEDICINE

## 2022-06-30 PROCEDURE — 258N000003 HC RX IP 258 OP 636: Performed by: INTERNAL MEDICINE

## 2022-06-30 PROCEDURE — 82962 GLUCOSE BLOOD TEST: CPT

## 2022-06-30 PROCEDURE — 99225 PR SUBSEQUENT OBSERVATION CARE,LEVEL II: CPT | Performed by: INTERNAL MEDICINE

## 2022-06-30 PROCEDURE — 250N000011 HC RX IP 250 OP 636: Performed by: INTERNAL MEDICINE

## 2022-06-30 PROCEDURE — G0378 HOSPITAL OBSERVATION PER HR: HCPCS | Mod: CS

## 2022-06-30 PROCEDURE — 80048 BASIC METABOLIC PNL TOTAL CA: CPT | Performed by: INTERNAL MEDICINE

## 2022-06-30 RX ORDER — NALOXONE HYDROCHLORIDE 0.4 MG/ML
0.4 INJECTION, SOLUTION INTRAMUSCULAR; INTRAVENOUS; SUBCUTANEOUS
Status: DISCONTINUED | OUTPATIENT
Start: 2022-06-30 | End: 2022-07-01 | Stop reason: HOSPADM

## 2022-06-30 RX ORDER — NALOXONE HYDROCHLORIDE 0.4 MG/ML
0.2 INJECTION, SOLUTION INTRAMUSCULAR; INTRAVENOUS; SUBCUTANEOUS
Status: DISCONTINUED | OUTPATIENT
Start: 2022-06-30 | End: 2022-07-01 | Stop reason: HOSPADM

## 2022-06-30 RX ADMIN — ASPIRIN 81 MG: 81 TABLET, COATED ORAL at 09:11

## 2022-06-30 RX ADMIN — AMOXICILLIN AND CLAVULANATE POTASSIUM 1 TABLET: 875; 125 TABLET, FILM COATED ORAL at 21:53

## 2022-06-30 RX ADMIN — ACETAMINOPHEN 650 MG: 325 TABLET ORAL at 21:55

## 2022-06-30 RX ADMIN — PRAVASTATIN SODIUM 40 MG: 20 TABLET ORAL at 21:53

## 2022-06-30 RX ADMIN — PANTOPRAZOLE SODIUM 40 MG: 20 TABLET, DELAYED RELEASE ORAL at 09:11

## 2022-06-30 RX ADMIN — CARVEDILOL 12.5 MG: 6.25 TABLET, FILM COATED ORAL at 09:13

## 2022-06-30 RX ADMIN — PIPERACILLIN AND TAZOBACTAM 3.38 G: 3; .375 INJECTION, POWDER, LYOPHILIZED, FOR SOLUTION INTRAVENOUS at 01:42

## 2022-06-30 RX ADMIN — GABAPENTIN 900 MG: 300 CAPSULE ORAL at 21:53

## 2022-06-30 RX ADMIN — MONTELUKAST 10 MG: 10 TABLET, FILM COATED ORAL at 21:52

## 2022-06-30 RX ADMIN — CARVEDILOL 12.5 MG: 6.25 TABLET, FILM COATED ORAL at 17:46

## 2022-06-30 RX ADMIN — AMOXICILLIN AND CLAVULANATE POTASSIUM 1 TABLET: 875; 125 TABLET, FILM COATED ORAL at 09:20

## 2022-06-30 RX ADMIN — Medication 2000 UNITS: at 09:10

## 2022-06-30 RX ADMIN — SODIUM CHLORIDE: 9 INJECTION, SOLUTION INTRAVENOUS at 01:42

## 2022-06-30 RX ADMIN — Medication 1 TABLET: at 21:52

## 2022-06-30 RX ADMIN — Medication 1 TABLET: at 09:11

## 2022-06-30 RX ADMIN — FLUOXETINE 40 MG: 20 CAPSULE ORAL at 09:10

## 2022-06-30 NOTE — PROGRESS NOTES
"A&Ox4. Up A1 FWW gb. Denies pain. CDiff -. Covid recovered, no iso required. R PIV NS 75 ml/h and zosyn infusing per orders. BG has been running borderline low, on clear liquids given apple juice to maintain above 70. Plan is for discharge today back to TCU. Pleasant and cooperative, hourly rounding completed, continuing to monitor.    Blood pressure 99/48, pulse 80, temperature 97.7  F (36.5  C), temperature source Oral, resp. rate 20, height 1.6 m (5' 3\"), SpO2 92 %.    April Lea RN   "

## 2022-06-30 NOTE — PROGRESS NOTES
Place of Service:  [facility]     Reason for follow up: left renal angiomyolipoma     SUBJECTIVE:  Events: no acute events overnight    Patient reports doing well. No concerning  flank pain. Tolerating diet. Ambulating in bathroom with nursing help on exam    OBJECTIVE:  PHYSICAL EXAM:  Temp: 97.8  F (36.6  C) Temp src: Oral BP: 124/57 Pulse: 77   Resp: 18 SpO2: 92 % O2 Device: None (Room air) Oxygen Delivery: 1 LPM  General: NAD, alert, cooperative, ambulating in bathroom  Head: normocephalic, without abnormality / atraumatic  Skin: No rashes or lesions  Musculoskeletal: moves all four extremities equally; no calf edema or tenderness  Psychological: alert and oriented, answers questions appropriately    LABS:  Creatinine   Date Value Ref Range Status   06/30/2022 0.67 0.60 - 1.10 mg/dL Final     WBC Count   Date Value Ref Range Status   06/30/2022 5.5 4.0 - 11.0 10e3/uL Final     Hemoglobin   Date Value Ref Range Status   06/30/2022 10.3 (L) 11.7 - 15.7 g/dL Final     Platelet Count   Date Value Ref Range Status   06/30/2022 273 150 - 450 10e3/uL Final         ASSESSMENT/PLAN:  Zakiya Christian is being seen by [facility] for 5.5 cm angiomyolipoma at the lower pole the left kidney. There is new soft tissue or fluid within the angiomyolipoma suggesting internal hemorrhage.     - Hg remains stable.   -Trend Hg, if trending down would need repat imaging to assess for increasing hematoma and likely angioembolization through IR  -If no contraindication recommend holding Xeralto for a few days to allow for hemostasis  -If hg remains stable then no emergenct intervention required, Given size of angiomyolipoma will need close outpatient follow up with renal mass specialist to discuss surgical management. Email sent to office staff who will contact patient to schedule follow up apt. Office information in discharge navigator   - urology remotely follow       Analisa Torrez PA-C  [facility]    [phone]       Addendum: 2:39 pm on 6/30/2022  Follow up apt has been scheduled   Thursday 7/7/2022 at 3 pm with Dr. Tamayo at the [address]  [facility] [address]

## 2022-06-30 NOTE — PROGRESS NOTES
Care Management Follow Up    Length of Stay (days): 0    Expected Discharge Date: 07/01/2022     Concerns to be Addressed: diet advancement, Urology Consult     Patient plan of care discussed at interdisciplinary rounds: Yes    Anticipated Discharge Disposition:  ([facility] ())     Anticipated Discharge Services: None    Anticipated Discharge DME: None    Patient/family educated on Medicare website which has current facility and service quality ratings: no (Currently from a facility and returning to it.)    Education Provided on the Discharge Plan:  CM met with patient. AVS per bedside RN.    Patient/Family in Agreement with the Plan: yes    Referrals Placed by CM/SW: Post Acute Facilities      Additional Information:  Chart reviewed. CM met with patient and Oneyda (friend- bedside). Patient is agreeable to retuning to [facility] (). Anticipate discharge tomorrow. Patient states that her friend Oneyda will transport her back to [facility] (). CM has called twice and left VM to update [facility] () that anticipate discharge tomorrow. CM to follow.       Sudha Resendez RN

## 2022-06-30 NOTE — PLAN OF CARE
"PRIMARY DIAGNOSIS: \"GENERIC\" NURSING  OUTPATIENT/OBSERVATION GOALS TO BE MET BEFORE DISCHARGE:  ADLs back to baseline: Yes    Activity and level of assistance: Up with standby assistance.    Pain status: Pain free.    Return to near baseline physical activity: Yes     Discharge Planner Nurse   Safe discharge environment identified: Yes  Barriers to discharge: Yes: Reported diarrhea after diet advanced to low fiber.  Provider notified, diet changed to full liquid, with additional stool labs ordered.       Entered by: Mimi Escalante RN 06/30/2022 1:27 PM       "

## 2022-06-30 NOTE — PLAN OF CARE
"PRIMARY DIAGNOSIS: \"GENERIC\" NURSING  OUTPATIENT/OBSERVATION GOALS TO BE MET BEFORE DISCHARGE:  ADLs back to baseline: Yes    Activity and level of assistance: Up with standby assistance.    Pain status: Pain free    Return to near baseline physical activity: Yes     Discharge Planner Nurse   Safe discharge environment identified: Yes  Barriers to discharge: No       Entered by: Mimi Escalante RN 06/30/2022 10:52 AM     Please review provider order for any additional goals.   Nurse to notify provider when observation goals have been met and patient is ready for discharge.  "

## 2022-06-30 NOTE — PROGRESS NOTES
"[facility]  Hospitalist Progress Note    Admit Date:  6/28/2022  Date of Service (when I saw the patient): 06/30/2022   Provider:  Lilliam Man, DO    Assessment & Plan   Zakiya Christian is a 77 year old female who was admitted on 6/28/2022.  She arrived from TCU with nausea/vomiting and loose stools. C diff negative; CT imaging on admission with evidence of \"probable sigmoid diverticulitis\" but not abscess or perforation noted.  She was started on IV zosyn.  CT also with evidence of an expanding angiomyolipoma with internal hemorrhage.  Urology consult request    1.  N/V/D      Suspect d/t acute sigmoid diverticulitis vs. Gastroenteritis   Her nausea/vomiting has resolved  Still having some 'looser stools\"  c diff negative; enteric pathogens pending  Has tolerated clear liquids - will trial full liquids    Will trial switching to po augmentin this am    No fevers or leukocytosis     2. 5.5 cm angiomyolipoma lower pole of the left kidney w/ internal hemorrhage  Urology consulted and following  Serial hemoglobins have been stable and trending upward this am    Holding xarelto for a few days to allow for hemostasis  Urology recommends close outpt urology f/up     3. GERD  - pantoprazole auto subbed for PTA omeprazole     4.  H/o COPD   - continue PTA Ellipta  - DUONEB prn  - PTA albuterol neb prn  - PTA albuterol inhaler prn  - PTA montelukast     5.  Recent COVID infection, symptomatic  Pt tested positive for COVID on 5/31/22   unlikely she is acutely infected currently as respiratory symptoms have greatly improved     6.  HTN  - continue PTA carvedilol and losartan      7. HLD  - continue PTA pravastatin      8. Low back pain, subjective weakness    Has been recovering at TCU  CRP mildly elevated (1.7), ESR 73 - both lower than when last check 4/22  Continue to rehab at TCU   - continue PTA tramadol for severe pain  Will need close outpt f/up     9. osteoporosis  - continue PTA " cholecalciferol and Ca carbonate- vit D     10. T2DM w peripheral neuropathy and CKD Stage III  - discontinue metformin  Blood sugars have been lower (70's)  Has not needed prn insulin  May need IVF with D5 if unable to advance diet     11.  hx DVT  - takes PTA xarelto, currently held for hemostasis     12. H/o depression, anxiety  - continue PTA fluoxetine     Diet: Full Liquid Diet  Pain meds: acetaminophen, norco, gabapentin, tramadol  Therapy: n/a.  DVT Prophylaxis: HOLD xarelto; ambulation  Code Status: Full Code    Diet: Full Liquid Diet    Morrell Catheter: Not present  Code Status: Full Code      Disposition Plan  - will see how diet advancement is tolerated this am; hope to be able to discharge back to TCU later today or 7/1/22     Expected Discharge Date: 06/30/2022      Destination: nursing home (Transitional Care Unit.)  Discharge Comments: return to TCU     Entered: Lilliam Man,  06/30/2022, 12:19 PM       We are operating under sub optimal conditions in the setting of a world wide pandemic, hospitals are running at full capacity with limited bed availability. We are providing the best possible patient care with limited resources.    Interval History   Tolerating clear liquids.  No further abd pain, N/V.  No CP, HA or SOB.  Cough from recent COVID infection improved and near-resolved.  No F/C.  Hoping to return to TCU later today.     -Data reviewed today: I reviewed all new labs and imaging results over the last 24 hours. I personally reviewed no images or EKG's today.    Physical Exam   Temp: 97.8  F (36.6  C) Temp src: Oral BP: 124/57 Pulse: 77   Resp: 18 SpO2: 92 % O2 Device: None (Room air) Oxygen Delivery: 1 LPM  There were no vitals filed for this visit.  Vital Signs with Ranges  Temp:  [97.6  F (36.4  C)-98.7  F (37.1  C)] 97.8  F (36.6  C)  Pulse:  [77-83] 77  Resp:  [18-25] 18  BP: ()/(48-70) 124/57  FiO2 (%):  [1 %] 1 %  SpO2:  [90 %-100 %] 92 %  I/O last 3 completed  shifts:  In: 860 [P.O.:860]  Out: -     GEN:  Alert, oriented x 3, comfortable, no overt distress.  Elderly female, pale  HEENT:  Normocephalic/atraumatic, no scleral icterus, no nasal discharge, mouth and membranes fairly moist  NECK:  No clear thyromegaly or JVD  CV:  Regular rate and rhythm, no loud murmur to ausc.  S1 + S2 noted, no S3 or S4.  LUNGS:  Clear to auscultation ant/lat bilaterally.  No clear rales/rhonchi/wheezing auscultated bilaterally.  No costal retractions bilaterally.  Symmetric chest rise on inhalation noted.  ABD:  Active bowel sounds, soft, non-tender and not really distended on exam.  No clear rebound/guarding/rigidity.  No masses palpated.  No obvious HSM to exam.  EXT:  No significant pretibial edema or cyanosis bilaterally. No joint synovitis noted.  No calf-tenderness or asymmetry noted.  SKIN:  Dry to touch, no rashes or jaundice noted.  PSYCH:  Mood appropriate, Not tearful or depressed.  Maintains direct eye contact.  NEURO:  No tremors at rest, speech is clear and appropriate.  CN 2-12 intact    Data   Labs:  Recent Labs   Lab 06/30/22  0947 06/30/22  0625 06/30/22  0153 06/29/22  1333 06/29/22  1234 06/29/22  0735 06/28/22 2033   NA  --  142  --   --  141  --  143   POTASSIUM  --  3.3*  --   --  3.5  --  3.7   CHLORIDE  --  107  --   --  107  --  105   CO2  --  28  --   --  27  --  28   ANIONGAP  --  7  --   --  7  --  10   GLC 78 79 73   < > 128*   < > 93   BUN  --  7*  --   --  9  --  14   CR  --  0.67  --   --  0.74  --  0.73   GFRESTIMATED  --  89  --   --  82  --  84   VICENTA  --  8.2*  --   --  8.3*  --  8.8    < > = values in this interval not displayed.     Recent Labs   Lab 06/30/22  0625 06/29/22  1234 06/28/22 2033   WBC 5.5 5.0 7.2   HGB 10.3* 9.9* 11.5*   HCT 34.5* 31.9* 37.0   * 97 97    283 308     Recent Labs   Lab 06/30/22  0947 06/30/22  0625 06/30/22  0153 06/29/22  1333 06/29/22  1234 06/29/22  0735 06/28/22  2033   NA  --  142  --   --  141  --   143   POTASSIUM  --  3.3*  --   --  3.5  --  3.7   CHLORIDE  --  107  --   --  107  --  105   CO2  --  28  --   --  27  --  28   ANIONGAP  --  7  --   --  7  --  10   GLC 78 79 73   < > 128*   < > 93   BUN  --  7*  --   --  9  --  14   CR  --  0.67  --   --  0.74  --  0.73   GFRESTIMATED  --  89  --   --  82  --  84   VICENTA  --  8.2*  --   --  8.3*  --  8.8   PROTTOTAL  --   --   --   --  5.7*  --  6.5   ALBUMIN  --   --   --   --  1.9*  --  2.1*   BILITOTAL  --   --   --   --  0.2  --  0.3   ALKPHOS  --   --   --   --  63  --  69   AST  --   --   --   --  13  --  14   ALT  --   --   --   --  <9  --  <9    < > = values in this interval not displayed.      Recent Imaging:   No results found for this or any previous visit (from the past 24 hour(s)).    Medications       amoxicillin-clavulanate  1 tablet Oral Q12H [facility] (08/20)     aspirin  81 mg Oral Daily     calcium carbonate-vitamin D  1 tablet Oral BID     calcium polycarbophil  1,250 mg Oral Daily     carvedilol  12.5 mg Oral BID w/meals     FLUoxetine  40 mg Oral QAM     fluticasone-vilanterol  1 puff Inhalation Daily    And     umeclidinium  1 puff Inhalation Daily     gabapentin  900 mg Oral At Bedtime     insulin aspart  1-7 Units Subcutaneous TID AC     insulin aspart  1-5 Units Subcutaneous At Bedtime     losartan  12.5 mg Oral QAM     montelukast  10 mg Oral At Bedtime     pantoprazole  40 mg Oral QAM AC     pravastatin  40 mg Oral At Bedtime     [Held by provider] rivaroxaban ANTICOAGULANT  20 mg Oral Daily with supper     Vitamin D3  4,000 Units Oral Daily

## 2022-06-30 NOTE — UTILIZATION REVIEW
Concurrent stay review; Secondary Review Determination     Under the authority of the Utilization Management Committee, the utilization review process indicated a secondary review on Zakiya Christian.  The review outcome is based on review of the medical records, discussions with staff, and applying clinical experience noted on the date of the review.        (x) Observation Status Appropriate - Concurrent stay review    RATIONALE FOR DETERMINATION   77 yr old female presented with abdominal pain on 6/28/22 and found to have sigmoid diverticulitis.  Treated with IV zosyn and now converted to PO abx.  Monitoring while advancing diet for N/V or pain.  Has angiomyolipoma with hgb stable and outpatient evaluation further planned.  If it is determined that she cannot tolerate diet advance and not able to discharge needing backing off on diet and possible IVF then change to inpatient.  Discussed with Dr. Man.    Patient is clinically improving and there is no clear indication to change patient's status to inpatient. The severity of illness, intensity of service provided, expected LOS and risk for adverse outcome make the care appropriate for observation.    The information on this document is developed by the utilization review team in order for the business office to ensure compliance.  This only denotes the appropriateness of proper admission status and does not reflect the quality of care rendered.         The definitions of Inpatient Status and Observation Status used in making the determination above are those provided in the CMS Coverage Manual, Chapter 1 and Chapter 6, section 70.4.      Sincerely,   Ema Serrano MD  Utilization Review  Physician Advisor  [facility]

## 2022-07-01 VITALS
HEART RATE: 82 BPM | RESPIRATION RATE: 18 BRPM | DIASTOLIC BLOOD PRESSURE: 58 MMHG | HEIGHT: 63 IN | TEMPERATURE: 97.9 F | OXYGEN SATURATION: 93 % | BODY MASS INDEX: 33.41 KG/M2 | SYSTOLIC BLOOD PRESSURE: 124 MMHG

## 2022-07-01 LAB
GLUCOSE BLDC GLUCOMTR-MCNC: 108 MG/DL (ref 70–99)
GLUCOSE BLDC GLUCOMTR-MCNC: 71 MG/DL (ref 70–99)
HGB BLD-MCNC: 11.3 G/DL (ref 11.7–15.7)
POTASSIUM BLD-SCNC: 3.2 MMOL/L (ref 3.5–5)

## 2022-07-01 PROCEDURE — 250N000013 HC RX MED GY IP 250 OP 250 PS 637: Performed by: INTERNAL MEDICINE

## 2022-07-01 PROCEDURE — 85018 HEMOGLOBIN: CPT | Performed by: INTERNAL MEDICINE

## 2022-07-01 PROCEDURE — 36415 COLL VENOUS BLD VENIPUNCTURE: CPT | Performed by: INTERNAL MEDICINE

## 2022-07-01 PROCEDURE — 99217 PR OBSERVATION CARE DISCHARGE: CPT | Performed by: INTERNAL MEDICINE

## 2022-07-01 PROCEDURE — G0378 HOSPITAL OBSERVATION PER HR: HCPCS | Mod: CS

## 2022-07-01 PROCEDURE — 84132 ASSAY OF SERUM POTASSIUM: CPT | Performed by: INTERNAL MEDICINE

## 2022-07-01 PROCEDURE — 82962 GLUCOSE BLOOD TEST: CPT

## 2022-07-01 RX ORDER — POTASSIUM CHLORIDE 1.5 G/1.58G
40 POWDER, FOR SOLUTION ORAL 2 TIMES DAILY
Status: DISCONTINUED | OUTPATIENT
Start: 2022-07-01 | End: 2022-07-01 | Stop reason: HOSPADM

## 2022-07-01 RX ADMIN — FLUOXETINE 40 MG: 20 CAPSULE ORAL at 09:43

## 2022-07-01 RX ADMIN — POTASSIUM CHLORIDE 40 MEQ: 1.5 POWDER, FOR SOLUTION ORAL at 11:21

## 2022-07-01 RX ADMIN — Medication 1 TABLET: at 09:43

## 2022-07-01 RX ADMIN — PANTOPRAZOLE SODIUM 40 MG: 20 TABLET, DELAYED RELEASE ORAL at 09:54

## 2022-07-01 RX ADMIN — ASPIRIN 81 MG: 81 TABLET, COATED ORAL at 09:43

## 2022-07-01 RX ADMIN — CARVEDILOL 12.5 MG: 6.25 TABLET, FILM COATED ORAL at 09:43

## 2022-07-01 RX ADMIN — Medication 4000 UNITS: at 09:45

## 2022-07-01 RX ADMIN — AMOXICILLIN AND CLAVULANATE POTASSIUM 1 TABLET: 875; 125 TABLET, FILM COATED ORAL at 09:45

## 2022-07-01 NOTE — PROGRESS NOTES
Care Management Discharge Note    Discharge Date: 07/01/2022       Discharge Disposition:  [facility] () TCU    Discharge Services:  [facility] () TCU    Discharge Transportation: family or friend will provide    PAS Confirmation Code:  Not needed    Patient/family educated on Medicare website which has current facility and service quality ratings: no (Currently from a facility and returning to it.)    Education Provided on the Discharge Plan:  CM met with patient. AVS per bedside RN.     Persons Notified of Discharge Plans: patient    Patient/Family in Agreement with the Plan: yes    Handoff Referral Completed: Yes    Additional Information:  Chart reviewed. CM met with patient. MD placed discharge orders. CM coordinated with patient, charge RN, bedside RN, [facility], facility and MD. Friend will transport back to TCU at time of discharge. CM faxed orders to [facility] () TCU.       [facility] referral sent per protocol.     Sudha Resendez, RN

## 2022-07-01 NOTE — DISCHARGE SUMMARY
[facility]    Discharge Summary  Hospitalist    Date of Admission:  6/28/2022  Date of Discharge:  7/1/2022  1:00 PM  Provider:  Lilliam Man DO    Discharge Diagnoses   1.  Acute sigmoid diverticulitis  2.  N/V/D - resolved  3.  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  Recent symptomatic COVID infection, 5/31/22  5.  Hypokalemia  6.  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  Please see the admission history and physical for full details.    Hospital Course   Zakiya Christian was admitted on 6/28/2022.  The following problems were addressed during her hospitalization:    1.  Acute sigmoid diverticulitis    Ms. Christian is a 76 yo female who presents with abdominal discomfort and associated N/V/D.  CT of the abd/pelvis with findings suggestive of sigmoid diverticulitis.  She was started on IVF, IV zosyn and clear liquid diet.  She clinically improved and was tolerating a low fiber diet prior to discharge.  She was transitioned to po augmentin on day prior to discharge and tolerated this medication well.  She will complete a 10 day abx course.      Her metformin was held d/t recent loose stools related to acute illness and can be restarted in the next 3 days.      2.  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  Her PTA xarelto was held on admission and will continue to be held for an additional 3 days after discharge.  She had serial hemoglobins which were monitored and vitals remained stable.  There was no clinical evidence of active bleeding .  Her hgb was trending upwards on day of  "discharge.      Urology was consulted during her stay.  She has close oupt f/up scheduled with Dr. Tamayo at the [facility] 7/7/22.    3.  Recent COVID infection, symptomatic       Reactive airways    Ms. Christian has a PMH of reactive airway disease that clinically worsened with recent COVID infection.  She states that she is \"always wheezing\".  CXR on admission with mild atelectasis only.  She was note hypoxic and was continued on her scheduled inhalers as prior to admission.      4.  Hypokalemia    Mild at time of discharge.  She was given 40meq po kcl prior to discharge.  She will have BMP in the 3 days.      Significant Results and Procedures   none    Pending Results   Unresulted Labs Ordered in the Past 30 Days of this Admission     No orders found from 5/29/2022 to 6/29/2022.          Code Status   Full Code       Primary Care Physician   Ami Noriega    Physical Exam   Temp: 97.7  F (36.5  C) Temp src: Oral BP: 113/63 Pulse: 83   Resp: 18 SpO2: 92 % O2 Device: None (Room air)    There were no vitals filed for this visit.  Vital Signs with Ranges  Temp:  [97.6  F (36.4  C)-98.5  F (36.9  C)] 97.7  F (36.5  C)  Pulse:  [76-84] 83  Resp:  [16-18] 18  BP: (103-136)/(51-63) 113/63  SpO2:  [90 %-92 %] 92 %  I/O last 3 completed shifts:  In: 480 [P.O.:480]  Out: -     PT SEEN AND EXAMINED ON DAY OF D/C  GEN:  Alert, oriented x 3, comfortable, NAD.    HEENT:  Normocephalic/atraumatic, PERRL, no scleral icterus, no nasal discharge, mouth moist  CV:  Regular rate and rhythm, no murmur to ausc.  S1 + S2 noted, no S3 or S4.  LUNGS:  Scattered end-expiratory wheezes ant bilaterally, otherwise clear to auscultation bilaterally.  No clear rales or rhonchi auscultated bilaterally.  No costal retractions bilaterally.  Symmetric chest rise on inhalation noted.  ABD:  Active bowel sounds, soft, non-tender, not really distended.  No rebound/guarding/rigidity.  No masses palpated.  No obvious HSM to exam.  EXT:  No edema or " cyanosis bilaterally. No joint synovitis noted.  No calf-tenderness or asymmetry noted.  SKIN:  Dry to touch, no rashes or jaundice noted.  PSYCH:  Mood appropriate, Not tearful or depressed.  Maintains direct eye contact.  NEURO:  No tremors at rest, speech is clear and appropriate    Discharge Disposition   Discharged to rehabilitation facility    Consultations This Hospital Stay   UROLOGY IP CONSULT  CARE MANAGEMENT / SOCIAL WORK IP CONSULT  PHYSICAL THERAPY ADULT IP CONSULT  OCCUPATIONAL THERAPY ADULT IP CONSULT    Time Spent on this Encounter   Lilliam PORTER DO, personally saw the patient today and spent greater than 30 minutes discharging this patient.    Discharge Orders      Follow-up and recommended labs and tests    Please follow up with [facility] to discuss surgical management of your 5.5 cm angiomyolipoma in 1-2 weeks.You may call to confirm appointment as needed. [facility], [phone]     Follow-up and recommended labs and tests    You have an appointment scheduled with [facility]   Thursday 7/7/2022 at 3 pm with Dr. Tamayo   [facility]  [address]  [place] [address]  . You may call to confirm appointment as needed. [facility], [phone]     General info for SNF    Length of Stay Estimate: Short Term Care: Estimated # of Days <30  Condition at Discharge: Improving  Level of care:skilled   Rehabilitation Potential: Good  Admission H&P remains valid and up-to-date: Yes  Recent Chemotherapy: N/A  Use Nursing Home Standing Orders: Yes     Mantoux instructions    Give two-step Mantoux (PPD) Per Facility Policy Yes     Follow Up and recommended labs and tests    Follow up with prison physician.  The following labs/tests are recommended: hgb, bmp in 3 days.  Need to review restarting metformin and xarelto at that time     Reason for your hospital stay    You were admitted for abd pain and found to have  diverticulitis     Glucose monitor nursing POCT    Before meals and at bedtime     Additional Discharge Instructions    HOLD PTA xarelto and metformin for 3 days and then to be reviewed by NH physician     Activity - Up with nursing assistance     Full Code     Physical Therapy Adult Consult    Evaluate and treat as clinically indicated.    Reason: weakness     Occupational Therapy Adult Consult    Evaluate and treat as clinically indicated.    Reason:  weakness     Fall precautions     Diet    Follow this diet upon discharge:       Low Fiber Diet, diabetic diet       Continue low fiber for 3-5 days     Discharge Medications   Current Discharge Medication List      START taking these medications    Details   amoxicillin-clavulanate (AUGMENTIN) 875-125 MG tablet Take 1 tablet by mouth every 12 hours for 8 days  Qty: 16 tablet, Refills: 0    Associated Diagnoses: Diverticulitis         CONTINUE these medications which have NOT CHANGED    Details   !! acetaminophen (TYLENOL) 500 MG tablet Take 500 mg by mouth every 6 hours as needed for mild pain (max 2 PRN doses per day)      !! acetaminophen (TYLENOL) 500 MG tablet Take 500 mg by mouth 4 times daily And q6h PRN - max 2 PRN/day      albuterol (PROAIR HFA/PROVENTIL HFA/VENTOLIN HFA) 108 (90 Base) MCG/ACT inhaler Inhale 2 puffs into the lungs every 6 hours as needed for shortness of breath / dyspnea or wheezing      albuterol (PROVENTIL) (5 MG/ML) 0.5% neb solution Take 5 mg by nebulization every 6 hours as needed for wheezing or shortness of breath / dyspnea      alendronate (FOSAMAX) 70 MG tablet Take 70 mg by mouth every 7 days      Ascorbic Acid (VITAMIN C) 500 MG CAPS Take 1,000 mg by mouth daily      aspirin 81 MG EC tablet Take 81 mg by mouth daily      calcium carbonate 600 mg-vitamin D 400 units (CALTRATE) 600-400 MG-UNIT per tablet Take 1 tablet by mouth 2 times daily      calcium polycarbophil (FIBERCON) 625 MG tablet Take 2 tablets by mouth daily       carvedilol (COREG) 12.5 MG tablet Take 12.5 mg by mouth 2 times daily (with meals)      cholecalciferol 50 MCG (2000 UT) CAPS Take 4,000 Units by mouth daily      coenzyme Q-10 capsule Take 1 capsule by mouth daily      FLUoxetine (PROZAC) 40 MG capsule Take 40 mg by mouth every morning      Fluticasone-Umeclidin-Vilanterol (TRELEGY ELLIPTA) 200-62.5-25 MCG/INH oral inhaler Inhale 1 puff into the lungs every morning      gabapentin (NEURONTIN) 300 MG capsule Take 900 mg by mouth At Bedtime      losartan (COZAAR) 25 MG tablet Take 12.5 mg by mouth every morning      montelukast (SINGULAIR) 10 MG tablet Take 10 mg by mouth At Bedtime      omeprazole (PRILOSEC) 20 MG DR capsule Take 40 mg by mouth every morning (before breakfast)      ondansetron (ZOFRAN) 4 MG tablet Take 4 mg by mouth every 6 hours as needed for nausea      pravastatin (PRAVACHOL) 40 MG tablet Take 40 mg by mouth At Bedtime      traMADol (ULTRAM) 50 MG tablet Take 25 mg by mouth every 6 hours as needed for severe pain       !! - Potential duplicate medications found. Please discuss with provider.      STOP taking these medications       metFORMIN (GLUCOPHAGE) 1000 MG tablet Comments:   Reason for Stopping:         rivaroxaban ANTICOAGULANT (XARELTO) 20 MG TABS tablet Comments:   Reason for Stopping:             Allergies   Allergies   Allergen Reactions     Simvastatin Hives     Data   Recent Labs   Lab 07/01/22  0941 06/30/22  0625 06/29/22  1234 06/28/22 2033   WBC  --  5.5 5.0 7.2   HGB 11.3* 10.3* 9.9* 11.5*   HCT  --  34.5* 31.9* 37.0   MCV  --  101* 97 97   PLT  --  273 283 308     Recent Labs   Lab 07/01/22  1157 07/01/22  0941 07/01/22  0715 06/30/22  2150 06/30/22  0947 06/30/22  0625 06/29/22  1333 06/29/22  1234 06/29/22  0735 06/28/22  2033   NA  --   --   --   --   --  142  --  141  --  143   POTASSIUM  --  3.2*  --   --   --  3.3*  --  3.5  --  3.7   CHLORIDE  --   --   --   --   --  107  --  107  --  105   CO2  --   --   --   --   --   28  --  27  --  28   ANIONGAP  --   --   --   --   --  7  --  7  --  10   *  --  71 91   < > 79   < > 128*   < > 93   BUN  --   --   --   --   --  7*  --  9  --  14   CR  --   --   --   --   --  0.67  --  0.74  --  0.73   GFRESTIMATED  --   --   --   --   --  89  --  82  --  84   VICENTA  --   --   --   --   --  8.2*  --  8.3*  --  8.8    < > = values in this interval not displayed.     Recent Labs   Lab 07/01/22  1157 07/01/22  0941 07/01/22  0715 06/30/22  2150 06/30/22  0947 06/30/22  0625 06/29/22  1333 06/29/22  1234 06/29/22  0735 06/28/22 2033   NA  --   --   --   --   --  142  --  141  --  143   POTASSIUM  --  3.2*  --   --   --  3.3*  --  3.5  --  3.7   CHLORIDE  --   --   --   --   --  107  --  107  --  105   CO2  --   --   --   --   --  28  --  27  --  28   ANIONGAP  --   --   --   --   --  7  --  7  --  10   *  --  71 91   < > 79   < > 128*   < > 93   BUN  --   --   --   --   --  7*  --  9  --  14   CR  --   --   --   --   --  0.67  --  0.74  --  0.73   GFRESTIMATED  --   --   --   --   --  89 --  82  --  84   VICENTA  --   --   --   --   --  8.2*  --  8.3*  --  8.8   PROTTOTAL  --   --   --   --   --   --   --  5.7*  --  6.5   ALBUMIN  --   --   --   --   --   --   --  1.9*  --  2.1*   BILITOTAL  --   --   --   --   --   --   --  0.2  --  0.3   ALKPHOS  --   --   --   --   --   --   --  63  --  69   AST  --   --   --   --   --   --   --  13  --  14   ALT  --   --   --   --   --   --   --  <9  --  <9    < > = values in this interval not displayed.     Results for orders placed or performed during the hospital encounter of 06/28/22   CT Abdomen Pelvis w Contrast    Narrative    EXAM: CT ABDOMEN PELVIS W CONTRAST  LOCATION: [facility]  DATE/TIME: 6/28/2022 11:43 PM    INDICATION: Vomiting, diarrhea, recent abscess with drain.  COMPARISON: 4/29/2022.  TECHNIQUE: CT scan of the abdomen and pelvis was performed following injection of IV contrast. Multiplanar reformats were  obtained. Dose reduction techniques were used.  CONTRAST: [id number] 100 mL    FINDINGS:   LOWER CHEST: Mild atelectasis and scarring at the lung bases. There are coronary artery atherosclerotic calcifications. The heart size is normal.    HEPATOBILIARY: The gallbladder is absent.    PANCREAS: Duodenal diverticulum at the pancreas head. The pancreas otherwise appears normal.    SPLEEN: Normal.    ADRENAL GLANDS: Normal.    KIDNEYS/BLADDER: There is a 3 mm stone in the lower pole of the right kidney. 5.5 cm angiomyolipoma at the lower pole the left kidney. There is new soft tissue or fluid within the angiomyolipoma suggesting internal hemorrhage. A few renal cortical cysts   bilaterally.    BOWEL: There are colonic diverticula without acute diverticulitis. A segment of proximal sigmoid colon in the left pelvis appears inflamed. No bowel obstruction. No evidence of perforation or abscess formation. There is some tethering of small bowel   loops in the left lower quadrant near the site of previous drainage catheter.    LYMPH NODES: Normal.    VASCULATURE: Atherosclerotic calcification of the aorta and its branches. No aneurysm.    PELVIC ORGANS: Normal.    MUSCULOSKELETAL: Degenerative disease in the spine.      Impression    IMPRESSION:   1.  Probable sigmoid diverticulitis. No perforation or abscess formation.  2.  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 No perinephric hematoma.   XR Chest Port 1 View    Narrative    EXAM: XR CHEST PORT 1 VIEW  LOCATION: [facility]  DATE/TIME: 6/29/2022 2:35 AM    INDICATION: Shortness of breath.  COMPARISON: 6/6/2022.    FINDINGS: The heart size is normal. The thoracic aorta is calcified. Mild atelectasis and scarring at the lung bases. The lungs are otherwise clear. No pneumothorax. Degenerative disease in the spine.      Impression    IMPRESSION: No acute abnormality.

## 2022-07-01 NOTE — PROGRESS NOTES
"PRIMARY DIAGNOSIS: \"GENERIC\" NURSING  OUTPATIENT/OBSERVATION GOALS TO BE MET BEFORE DISCHARGE:  1. ADLs back to baseline: Yes    2. Activity and level of assistance: SBA.    3. Pain status: Mild headache. Tylenol given.     4. Return to near baseline physical activity: Yes     Discharge Planner Nurse   Safe discharge environment identified: Yes  Barriers to discharge: No       Entered by: Danielle Mejias RN 07/01/2022 6:59 AM     Please review provider order for any additional goals.   Nurse to notify provider when observation goals have been met and patient is ready for discharge.  "

## 2022-07-01 NOTE — PROGRESS NOTES
Status [id number]:    Tylenol given for complaints of headache. Continues on PO abx. Loose stool x1 this shift. Pt tolerating low fiber diet. Able to make needs known. Anticipate discharge to TCU today.

## 2022-07-01 NOTE — PLAN OF CARE
"PRIMARY DIAGNOSIS: \"GENERIC\" NURSING  OUTPATIENT/OBSERVATION GOALS TO BE MET BEFORE DISCHARGE:  ADLs back to baseline: Yes    Activity and level of assistance: Up with standby assistance.    Pain status: Pain free.    Return to near baseline physical activity: Yes     Discharge Planner Nurse   Safe discharge environment identified: Yes  Barriers to discharge: No       Entered by: Randy Stafford RN 07/01/2022 10:53 AM     Please review provider order for any additional goals.   Nurse to notify provider when observation goals have been met and patient is ready for discharge.Goal Outcome Evaluation:    Plan of Care Reviewed With: patient     Overall Patient Progress: improving    Outcome Evaluation: pt will discharge back to TCU this afternoon.      "

## 2022-07-07 ENCOUNTER — TRANSITIONAL CARE UNIT VISIT (OUTPATIENT)
Dept: GERIATRICS | Facility: CLINIC | Age: 77
End: 2022-07-07
Payer: COMMERCIAL

## 2022-07-07 VITALS
BODY MASS INDEX: 33.13 KG/M2 | WEIGHT: 187 LBS | SYSTOLIC BLOOD PRESSURE: 126 MMHG | DIASTOLIC BLOOD PRESSURE: 74 MMHG | TEMPERATURE: 97.1 F | HEIGHT: 63 IN | OXYGEN SATURATION: 96 % | RESPIRATION RATE: 18 BRPM | HEART RATE: 72 BPM

## 2022-07-07 DIAGNOSIS — R11.0 NAUSEA: ICD-10-CM

## 2022-07-07 DIAGNOSIS — K57.32 DIVERTICULITIS OF COLON: Primary | ICD-10-CM

## 2022-07-07 DIAGNOSIS — I10 PRIMARY HYPERTENSION: ICD-10-CM

## 2022-07-07 DIAGNOSIS — E11.69 TYPE 2 DIABETES MELLITUS WITH OTHER SPECIFIED COMPLICATION, WITHOUT LONG-TERM CURRENT USE OF INSULIN (H): ICD-10-CM

## 2022-07-07 DIAGNOSIS — Z86.718 HISTORY OF DEEP VENOUS THROMBOSIS: ICD-10-CM

## 2022-07-07 DIAGNOSIS — J44.9 CHRONIC OBSTRUCTIVE PULMONARY DISEASE, UNSPECIFIED COPD TYPE (H): ICD-10-CM

## 2022-07-07 PROCEDURE — 99309 SBSQ NF CARE MODERATE MDM 30: CPT | Performed by: NURSE PRACTITIONER

## 2022-07-07 NOTE — PROGRESS NOTES
"[facility] GERIATRICS    PRIMARY CARE PROVIDER AND CLINIC:  Ami Noriega MD, [facility] [address] [place]*  Chief Complaint   Patient presents with     Hospital F/U      [place] Medical Record Number:  [id number]  Place of Service where encounter took place:  [facility] [[id number]]    Zakiya Christian  is a 77 year old  (1945), admitted to the above facility from  [facility]. Hospital stay 6/28 through 7/1..   HPI:    Patient readmitted to TCU for acute rehab following hospitalization as above for acute diverticulitis. 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 Patient treated with IVF, Zosyn and clear liquid diet and improved. Transitioned to Augmentin and low fiber diet. Xarelto was held and serial hemoglobins followed and stabilized.     Today patient is found sitting up in a chair in her room. Alert, calm, NAD. Reports mild nausea and states ate some toast for breakfast. Reports some \"diarrhea\" stating has a little bit of stool whenever voids. Denies cramping or abdominal pain. Reports feeling overwhelmed with everything in terms of selling her house and having to look for another place to live. States she is open to visiting with house psychologist.    CODE STATUS/ADVANCE DIRECTIVES DISCUSSION:  Full Code  CPR/Full code   ALLERGIES:   Allergies   Allergen Reactions     Simvastatin Hives      PAST MEDICAL HISTORY:   Past Medical History:   Diagnosis Date     Diabetes (H)      Hypertension      Obese      PMR (polymyalgia rheumatica) (H)       PAST SURGICAL HISTORY:   has a past surgical history that includes IR Abscess Tube Change (4/22/2022).  FAMILY HISTORY: family history is not on file.  SOCIAL HISTORY:     Patient's living condition: lives alone    Post Discharge Medication Reconciliation Status: discharge medications reconciled and changed, per note/orders  Current Outpatient " Medications   Medication Sig     acetaminophen (TYLENOL) 500 MG tablet Take 500 mg by mouth every 6 hours as needed for mild pain (max 2 PRN doses per day)     acetaminophen (TYLENOL) 500 MG tablet Take 500 mg by mouth 4 times daily And q6h PRN - max 2 PRN/day     albuterol (PROAIR HFA/PROVENTIL HFA/VENTOLIN HFA) 108 (90 Base) MCG/ACT inhaler Inhale 2 puffs into the lungs every 6 hours as needed for shortness of breath / dyspnea or wheezing     albuterol (PROVENTIL) (5 MG/ML) 0.5% neb solution Take 5 mg by nebulization every 6 hours as needed for wheezing or shortness of breath / dyspnea     alendronate (FOSAMAX) 70 MG tablet Take 70 mg by mouth every 7 days     Ascorbic Acid (VITAMIN C) 500 MG CAPS Take 1,000 mg by mouth daily     aspirin 81 MG EC tablet Take 81 mg by mouth daily     calcium carbonate 600 mg-vitamin D 400 units (CALTRATE) 600-400 MG-UNIT per tablet Take 1 tablet by mouth 2 times daily     calcium polycarbophil (FIBERCON) 625 MG tablet Take 2 tablets by mouth daily     carvedilol (COREG) 12.5 MG tablet Take 12.5 mg by mouth 2 times daily (with meals)     cholecalciferol 50 MCG (2000 UT) CAPS Take 4,000 Units by mouth daily     coenzyme Q-10 capsule Take 1 capsule by mouth daily     FLUoxetine (PROZAC) 40 MG capsule Take 40 mg by mouth every morning     Fluticasone-Umeclidin-Vilanterol (TRELEGY ELLIPTA) 200-62.5-25 MCG/INH oral inhaler Inhale 1 puff into the lungs every morning     gabapentin (NEURONTIN) 300 MG capsule Take 900 mg by mouth At Bedtime     losartan (COZAAR) 25 MG tablet Take 12.5 mg by mouth every morning     montelukast (SINGULAIR) 10 MG tablet Take 10 mg by mouth At Bedtime     omeprazole (PRILOSEC) 20 MG DR capsule Take 40 mg by mouth every morning (before breakfast)     ondansetron (ZOFRAN) 4 MG tablet Take 4 mg by mouth every 6 hours as needed for nausea     potassium chloride ER (KLOR-CON M) 10 MEQ CR tablet Take 1 tablet (10 mEq) by mouth daily     pravastatin (PRAVACHOL) 40 MG  "tablet Take 40 mg by mouth At Bedtime     No current facility-administered medications for this visit.       ROS:  10 point ROS of systems including Constitutional, Eyes, Respiratory, Cardiovascular, Gastroenterology, Genitourinary, Integumentary, Musculoskeletal, Psychiatric were all negative except for pertinent positives noted in my HPI.    Vitals:  /74   Pulse 72   Temp 97.1  F (36.2  C)   Resp 18   Ht 1.6 m (5' 3\")   Wt 84.8 kg (187 lb)   SpO2 96%   BMI 33.13 kg/m    Exam:  GENERAL APPEARANCE: Alert, in no distress   ENT: Mouth and posterior oropharynx normal, moist mucous membranes   EYES: EOM, conjunctivae, lids, pupils and irises normal   NECK: No adenopathy,masses or thyromegaly   RESP: respiratory effort and palpation of chest normal, Lungs clear to auscultation  CV: VS as above, trace LE edema noted  ABDOMEN: normal bowel sounds, soft, nontender, no hepatosplenomegaly or other masses   : palpation of bladder WNL   M/S: Gait and station normal   Digits and nails normal - STUBBS  SKIN: Inspection of skin and subcutaneous tissue baseline, Palpation of skin and subcutaneous tissue baseline  NEURO: Cranial nerves 2-12 are grossly at patient's baseline, Examination of sensation by touch normal   PSYCH: oriented X 3, affect and mood normal             Lab/Diagnostic data:  Recent labs in [facility] reviewed by me today.     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 Will resume   - continue to monitor BGL    Primary hypertension  - chronic, controlled  - continue on losartan and Coreg    History of deep venous thrombosis  - Xarelto held d/t bleeding.   - check CBC and if hgb remains stable will resume    Chronic obstructive pulmonary disease, unspecified COPD type (H)  - chronic, resp status is stable  - continue on Singulair, Trelegy Ellipta and prn albuterol    Depression  - chronic, situational stressors in play. Expressing overwhelm. Open to visit from house psychologist  - ACP referral  - supportive approach  - continue on Prozac  - monitor mood and behavior    Orders:  - written on unit and discussed with nursing          Electronically signed by:  ELISA Bajwa CNP

## 2022-07-07 NOTE — LETTER
"    7/7/2022        RE: Zakiya Christian  [address]  [facility] [address]        [facility] GERIATRICS    PRIMARY CARE PROVIDER AND CLINIC:  Ami Noriega MD, [facility] CLINIC [address] [facility] / [place]*  Chief Complaint   Patient presents with     Hospital F/U      [place] Medical Record Number:  [id number]  Place of Service where encounter took place:  [facility] ([facility]) [[id number]]    Zakiya Christian  is a 77 year old  (1945), admitted to the above facility from  [facility]. Hospital stay 6/28 through 7/1..   HPI:    Patient readmitted to TCU for acute rehab following hospitalization as above for acute diverticulitis. 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 Patient treated with IVF, Zosyn and clear liquid diet and improved. Transitioned to Augmentin and low fiber diet. Xarelto was held and serial hemoglobins followed and stabilized.     Today patient is found sitting up in a chair in her room. Alert, calm, NAD. Reports mild nausea and states ate some toast for breakfast. Reports some \"diarrhea\" stating has a little bit of stool whenever voids. Denies cramping or abdominal pain. Reports feeling overwhelmed with everything in terms of selling her house and having to look for another place to live. States she is open to visiting with house psychologist.    CODE STATUS/ADVANCE DIRECTIVES DISCUSSION:  Full Code  CPR/Full code   ALLERGIES:   Allergies   Allergen Reactions     Simvastatin Hives      PAST MEDICAL HISTORY:   Past Medical History:   Diagnosis Date     Diabetes (H)      Hypertension      Obese      PMR (polymyalgia rheumatica) (H)       PAST SURGICAL HISTORY:   has a past surgical history that includes IR Abscess Tube Change (4/22/2022).  FAMILY HISTORY: family history is not on file.  SOCIAL HISTORY:     Patient's living condition: lives alone    Post Discharge Medication Reconciliation " Status: discharge medications reconciled and changed, per note/orders  Current Outpatient Medications   Medication Sig     acetaminophen (TYLENOL) 500 MG tablet Take 500 mg by mouth every 6 hours as needed for mild pain (max 2 PRN doses per day)     acetaminophen (TYLENOL) 500 MG tablet Take 500 mg by mouth 4 times daily And q6h PRN - max 2 PRN/day     albuterol (PROAIR HFA/PROVENTIL HFA/VENTOLIN HFA) 108 (90 Base) MCG/ACT inhaler Inhale 2 puffs into the lungs every 6 hours as needed for shortness of breath / dyspnea or wheezing     albuterol (PROVENTIL) (5 MG/ML) 0.5% neb solution Take 5 mg by nebulization every 6 hours as needed for wheezing or shortness of breath / dyspnea     alendronate (FOSAMAX) 70 MG tablet Take 70 mg by mouth every 7 days     Ascorbic Acid (VITAMIN C) 500 MG CAPS Take 1,000 mg by mouth daily     aspirin 81 MG EC tablet Take 81 mg by mouth daily     calcium carbonate 600 mg-vitamin D 400 units (CALTRATE) 600-400 MG-UNIT per tablet Take 1 tablet by mouth 2 times daily     calcium polycarbophil (FIBERCON) 625 MG tablet Take 2 tablets by mouth daily     carvedilol (COREG) 12.5 MG tablet Take 12.5 mg by mouth 2 times daily (with meals)     cholecalciferol 50 MCG (2000 UT) CAPS Take 4,000 Units by mouth daily     coenzyme Q-10 capsule Take 1 capsule by mouth daily     FLUoxetine (PROZAC) 40 MG capsule Take 40 mg by mouth every morning     Fluticasone-Umeclidin-Vilanterol (TRELEGY ELLIPTA) 200-62.5-25 MCG/INH oral inhaler Inhale 1 puff into the lungs every morning     gabapentin (NEURONTIN) 300 MG capsule Take 900 mg by mouth At Bedtime     losartan (COZAAR) 25 MG tablet Take 12.5 mg by mouth every morning     montelukast (SINGULAIR) 10 MG tablet Take 10 mg by mouth At Bedtime     omeprazole (PRILOSEC) 20 MG DR capsule Take 40 mg by mouth every morning (before breakfast)     ondansetron (ZOFRAN) 4 MG tablet Take 4 mg by mouth every 6 hours as needed for nausea     potassium chloride ER (KLOR-CON M)  "10 MEQ CR tablet Take 1 tablet (10 mEq) by mouth daily     pravastatin (PRAVACHOL) 40 MG tablet Take 40 mg by mouth At Bedtime     No current facility-administered medications for this visit.       ROS:  10 point ROS of systems including Constitutional, Eyes, Respiratory, Cardiovascular, Gastroenterology, Genitourinary, Integumentary, Musculoskeletal, Psychiatric were all negative except for pertinent positives noted in my HPI.    Vitals:  /74   Pulse 72   Temp 97.1  F (36.2  C)   Resp 18   Ht 1.6 m (5' 3\")   Wt 84.8 kg (187 lb)   SpO2 96%   BMI 33.13 kg/m    Exam:  GENERAL APPEARANCE: Alert, in no distress   ENT: Mouth and posterior oropharynx normal, moist mucous membranes   EYES: EOM, conjunctivae, lids, pupils and irises normal   NECK: No adenopathy,masses or thyromegaly   RESP: respiratory effort and palpation of chest normal, Lungs clear to auscultation  CV: VS as above, trace LE edema noted  ABDOMEN: normal bowel sounds, soft, nontender, no hepatosplenomegaly or other masses   : palpation of bladder WNL   M/S: Gait and station normal   Digits and nails normal - STUBBS  SKIN: Inspection of skin and subcutaneous tissue baseline, Palpation of skin and subcutaneous tissue baseline  NEURO: Cranial nerves 2-12 are grossly at patient's baseline, Examination of sensation by touch normal   PSYCH: oriented X 3, affect and mood normal             Lab/Diagnostic data:  Recent labs in [facility] reviewed by me today.     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 Will resume   - continue to monitor BGL    Primary hypertension  - chronic, controlled  - continue on losartan and Coreg    History of deep venous thrombosis  - Xarelto held d/t bleeding.   - check CBC and if hgb remains stable will resume    Chronic obstructive pulmonary disease, unspecified COPD type (H)  - chronic, resp status is stable  - continue on Singulair, Trelegy Ellipta and prn albuterol    Depression  - chronic, situational stressors in play. Expressing overwhelm. Open to visit from house psychologist  - ACP referral  - supportive approach  - continue on Prozac  - monitor mood and behavior    Orders:  - written on unit and discussed with nursing          Electronically signed by:  ELISA Bajwa CNP                     Sincerely,        ELISA Bajwa CNP

## 2022-07-09 ENCOUNTER — LAB REQUISITION (OUTPATIENT)
Dept: LAB | Facility: CLINIC | Age: 77
End: 2022-07-09
Payer: COMMERCIAL

## 2022-07-09 DIAGNOSIS — K57.20 DIVERTICULITIS OF LARGE INTESTINE WITH PERFORATION AND ABSCESS WITHOUT BLEEDING: ICD-10-CM

## 2022-07-11 ENCOUNTER — TELEPHONE (OUTPATIENT)
Dept: GERIATRICS | Facility: CLINIC | Age: 77
End: 2022-07-11

## 2022-07-11 DIAGNOSIS — E87.6 HYPOKALEMIA: Primary | ICD-10-CM

## 2022-07-11 LAB
ALBUMIN SERPL BCG-MCNC: 2.2 G/DL (ref 3.5–5.2)
ALP SERPL-CCNC: 61 U/L (ref 35–104)
ALT SERPL W P-5'-P-CCNC: <5 U/L (ref 10–35)
ANION GAP SERPL CALCULATED.3IONS-SCNC: 7 MMOL/L (ref 7–15)
AST SERPL W P-5'-P-CCNC: 19 U/L (ref 10–35)
BILIRUB SERPL-MCNC: 0.2 MG/DL
BUN SERPL-MCNC: 7 MG/DL (ref 8–23)
CALCIUM SERPL-MCNC: 8.4 MG/DL (ref 8.8–10.2)
CHLORIDE SERPL-SCNC: 107 MMOL/L (ref 98–107)
CREAT SERPL-MCNC: 0.67 MG/DL (ref 0.51–0.95)
DEPRECATED HCO3 PLAS-SCNC: 30 MMOL/L (ref 22–29)
ERYTHROCYTE [DISTWIDTH] IN BLOOD BY AUTOMATED COUNT: 14.5 % (ref 10–15)
GFR SERPL CREATININE-BSD FRML MDRD: 89 ML/MIN/1.73M2
GLUCOSE SERPL-MCNC: 79 MG/DL (ref 70–99)
HCT VFR BLD AUTO: 35 % (ref 35–47)
HGB BLD-MCNC: 10.6 G/DL (ref 11.7–15.7)
MCH RBC QN AUTO: 30 PG (ref 26.5–33)
MCHC RBC AUTO-ENTMCNC: 30.3 G/DL (ref 31.5–36.5)
MCV RBC AUTO: 99 FL (ref 78–100)
PLATELET # BLD AUTO: 198 10E3/UL (ref 150–450)
POTASSIUM SERPL-SCNC: 3.2 MMOL/L (ref 3.4–5.3)
PROT SERPL-MCNC: 5.6 G/DL (ref 6.4–8.3)
RBC # BLD AUTO: 3.53 10E6/UL (ref 3.8–5.2)
SODIUM SERPL-SCNC: 144 MMOL/L (ref 136–145)
WBC # BLD AUTO: 6.1 10E3/UL (ref 4–11)

## 2022-07-11 PROCEDURE — P9604 ONE-WAY ALLOW PRORATED TRIP: HCPCS | Mod: ORL | Performed by: NURSE PRACTITIONER

## 2022-07-11 PROCEDURE — 36415 COLL VENOUS BLD VENIPUNCTURE: CPT | Mod: ORL | Performed by: NURSE PRACTITIONER

## 2022-07-11 PROCEDURE — 80053 COMPREHEN METABOLIC PANEL: CPT | Mod: ORL | Performed by: NURSE PRACTITIONER

## 2022-07-11 PROCEDURE — 85027 COMPLETE CBC AUTOMATED: CPT | Mod: ORL | Performed by: NURSE PRACTITIONER

## 2022-07-11 RX ORDER — POTASSIUM CHLORIDE 750 MG/1
10 TABLET, EXTENDED RELEASE ORAL DAILY
Start: 2022-07-12 | End: 2023-01-20

## 2022-07-11 NOTE — TELEPHONE ENCOUNTER
[facility] Lab Note     Provider: ELISA Bajwa CNP   Facility: [facility] Facility Type:  TCU    Allergies   Allergen Reactions     Simvastatin Hives       Labs Reviewed by provider: CBC/CMP     Verbal Order/Direction given by Provider:   - Potassium Chl 40mEq one time only today  - On 7/12 start Potassium Chl 10mEq PO daily  - Recheck BMP on 7/18/22    Provider giving Order:  ELISA Bajwa CNP     Verbal Order given to: Blake Lewis RN

## 2022-07-12 ENCOUNTER — TRANSITIONAL CARE UNIT VISIT (OUTPATIENT)
Dept: GERIATRICS | Facility: CLINIC | Age: 77
End: 2022-07-12
Payer: COMMERCIAL

## 2022-07-12 VITALS
RESPIRATION RATE: 18 BRPM | HEIGHT: 63 IN | OXYGEN SATURATION: 95 % | TEMPERATURE: 97.2 F | BODY MASS INDEX: 32.89 KG/M2 | SYSTOLIC BLOOD PRESSURE: 128 MMHG | DIASTOLIC BLOOD PRESSURE: 58 MMHG | HEART RATE: 82 BPM | WEIGHT: 185.6 LBS

## 2022-07-12 DIAGNOSIS — R19.7 DIARRHEA, UNSPECIFIED TYPE: ICD-10-CM

## 2022-07-12 DIAGNOSIS — K57.32 DIVERTICULITIS OF COLON: ICD-10-CM

## 2022-07-12 DIAGNOSIS — R11.0 NAUSEA: ICD-10-CM

## 2022-07-12 PROCEDURE — 99309 SBSQ NF CARE MODERATE MDM 30: CPT | Performed by: NURSE PRACTITIONER

## 2022-07-12 NOTE — PROGRESS NOTES
"[facility] GERIATRICS    PRIMARY CARE PROVIDER AND CLINIC:  Ami Noriega MD, [facility] [address] [facility] [place]  Chief Complaint   Patient presents with     MIKKI      [place] Medical Record Number:  [id number]  [id number]     Patient with recent h/o intrabdominal abscess (likely diverticular) and diverticulitis with chronic h/o ? Associated diarrhea reported \"not feeling good\" and of diarrhea yesterday. Has completed oral antibiotics as of 7/9.    Today is found lying in bed. Alert, calm, NAD. Reports is \"feeling better\" today. Denies n/v or abdominal pain. Reports still noting some bowel frequency particularly when voiding. Reports ate breakfast. Discussed recent lab results and addition of banana flakes or bananas and patient is open to this.     Zakiya Christian  is a 77 year old  (1945), admitted to the above facility from  [facility]. Hospital stay 06/28/22 through 07/01/22..   HPI:        CODE STATUS/ADVANCE DIRECTIVES DISCUSSION:  Full Code  CPR/Full code   ALLERGIES:   Allergies   Allergen Reactions     Simvastatin Hives      PAST MEDICAL HISTORY:   Past Medical History:   Diagnosis Date     Diabetes (H)      Hypertension      Obese      PMR (polymyalgia rheumatica) (H)       PAST SURGICAL HISTORY:   has a past surgical history that includes IR Abscess Tube Change (4/22/2022).  FAMILY HISTORY: family history is not on file.  SOCIAL HISTORY:     Patient's living condition: lives alone      Current Outpatient Medications   Medication Sig     acetaminophen (TYLENOL) 500 MG tablet Take 500 mg by mouth every 6 hours as needed for mild pain (max 2 PRN doses per day)     acetaminophen (TYLENOL) 500 MG tablet Take 500 mg by mouth 4 times daily And q6h PRN - max 2 PRN/day     albuterol (PROAIR HFA/PROVENTIL HFA/VENTOLIN HFA) 108 (90 Base) MCG/ACT inhaler Inhale 2 puffs into the lungs every " 6 hours as needed for shortness of breath / dyspnea or wheezing     albuterol (PROVENTIL) (5 MG/ML) 0.5% neb solution Take 5 mg by nebulization every 6 hours as needed for wheezing or shortness of breath / dyspnea     alendronate (FOSAMAX) 70 MG tablet Take 70 mg by mouth every 7 days     Ascorbic Acid (VITAMIN C) 500 MG CAPS Take 1,000 mg by mouth daily     aspirin 81 MG EC tablet Take 81 mg by mouth daily     calcium carbonate 600 mg-vitamin D 400 units (CALTRATE) 600-400 MG-UNIT per tablet Take 1 tablet by mouth 2 times daily     calcium polycarbophil (FIBERCON) 625 MG tablet Take 2 tablets by mouth daily     carvedilol (COREG) 12.5 MG tablet Take 12.5 mg by mouth 2 times daily (with meals)     cholecalciferol 50 MCG (2000 UT) CAPS Take 4,000 Units by mouth daily     coenzyme Q-10 capsule Take 1 capsule by mouth daily     FLUoxetine (PROZAC) 40 MG capsule Take 40 mg by mouth every morning     Fluticasone-Umeclidin-Vilanterol (TRELEGY ELLIPTA) 200-62.5-25 MCG/INH oral inhaler Inhale 1 puff into the lungs every morning     gabapentin (NEURONTIN) 300 MG capsule Take 900 mg by mouth At Bedtime     losartan (COZAAR) 25 MG tablet Take 12.5 mg by mouth every morning     montelukast (SINGULAIR) 10 MG tablet Take 10 mg by mouth At Bedtime     omeprazole (PRILOSEC) 20 MG DR capsule Take 40 mg by mouth every morning (before breakfast)     ondansetron (ZOFRAN) 4 MG tablet Take 4 mg by mouth every 6 hours as needed for nausea     potassium chloride ER (KLOR-CON M) 10 MEQ CR tablet Take 1 tablet (10 mEq) by mouth daily     pravastatin (PRAVACHOL) 40 MG tablet Take 40 mg by mouth At Bedtime     rivaroxaban ANTICOAGULANT (XARELTO) 20 MG TABS tablet Take 1 tablet (20 mg) by mouth daily (with dinner)     No current facility-administered medications for this visit.       ROS:  4 point ROS including Respiratory, CV, GI and , other than that noted in the HPI,  is negative    Vitals:  /58   Pulse 82   Temp 97.2  F (36.2  C)   " Resp 18   Ht 1.6 m (5' 3\")   Wt 84.2 kg (185 lb 9.6 oz)   SpO2 95%   BMI 32.88 kg/m    Exam:    Resp: Effort WNL, LSCTA   CV: VS as above, trace LE edema biltat  Abd- soft, nontender, BS +  Musc- STUBBS  Psych- alert, calm, pleasant      Lab/Diagnostic data:    Most Recent 3 CBC's:  Recent Labs   Lab Test 07/11/22  0651 07/01/22  0941 06/30/22  0625 06/29/22  1234   WBC 6.1  --  5.5 5.0   HGB 10.6* 11.3* 10.3* 9.9*   MCV 99  --  101* 97     --  273 283     Most Recent 3 BMP's:  Recent Labs   Lab Test 07/11/22  0651 07/01/22  1157 07/01/22  0941 07/01/22  0715 06/30/22  0947 06/30/22  0625 06/29/22  1333 06/29/22  1234     --   --   --   --  142  --  141   POTASSIUM 3.2*  --  3.2*  --   --  3.3*  --  3.5   CHLORIDE 107  --   --   --   --  107  --  107   CO2 30*  --   --   --   --  28  --  27   BUN 7.0*  --   --   --   --  7*  --  9   CR 0.67  --   --   --   --  0.67  --  0.74   ANIONGAP 7  --   --   --   --  7  --  7   VICENTA 8.4*  --   --   --   --  8.2*  --  8.3*   GLC 79 108*  --  71   < > 79   < > 128*    < > = values in this interval not displayed.       ASSESSMENT/PLAN:    Diarrhea, unspecified type  - this has been chronic since admission and waxes and wanes. No associated s/s currently.  - discussed with patient and dietician and nursing add either banana or banana flakes daily (whichever can be most persistently carried out)  - monitor bowel pattern, clincially  - if persist would recommend referral to outpatient GI    Nausea  - chronic - intermittent. Not present today  - continue prn Zofran  - follow clinically    Diverticulitis of colon  -resolved    Orders:  Written on unit and discussed with patient, dietician, nursing        Electronically signed by:  ELISA Bajwa CNP                 "

## 2022-07-12 NOTE — LETTER
"    7/12/2022        RE: Zakiya Christian  [address]  [facility] [address]        [facility] GERIATRICS    PRIMARY CARE PROVIDER AND CLINIC:  Ami Noriega MD, [facility] CLINIC [address] [facility] [place]  Chief Complaint   Patient presents with     RECHECK      [place] Medical Record Number:  [id number]  Place of Service where encounter took place:  [facility] (TCU) [[id number]]     Patient with recent h/o intrabdominal abscess (likely diverticular) and diverticulitis with chronic h/o ? Associated diarrhea reported \"not feeling good\" and of diarrhea yesterday. Has completed oral antibiotics as of 7/9.    Today is found lying in bed. Alert, calm, NAD. Reports is \"feeling better\" today. Denies n/v or abdominal pain. Reports still noting some bowel frequency particularly when voiding. Reports ate breakfast. Discussed recent lab results and addition of banana flakes or bananas and patient is open to this.     Zakiya Christian  is a 77 year old  (1945), admitted to the above facility from  [facility]. Hospital stay 06/28/22 through 07/01/22..   HPI:        CODE STATUS/ADVANCE DIRECTIVES DISCUSSION:  Full Code  CPR/Full code   ALLERGIES:   Allergies   Allergen Reactions     Simvastatin Hives      PAST MEDICAL HISTORY:   Past Medical History:   Diagnosis Date     Diabetes (H)      Hypertension      Obese      PMR (polymyalgia rheumatica) (H)       PAST SURGICAL HISTORY:   has a past surgical history that includes IR Abscess Tube Change (4/22/2022).  FAMILY HISTORY: family history is not on file.  SOCIAL HISTORY:     Patient's living condition: lives alone      Current Outpatient Medications   Medication Sig     acetaminophen (TYLENOL) 500 MG tablet Take 500 mg by mouth every 6 hours as needed for mild pain (max 2 PRN doses per day)     acetaminophen (TYLENOL) 500 MG tablet Take 500 mg by mouth 4 times daily And q6h PRN - max 2 PRN/day     albuterol (PROAIR " HFA/PROVENTIL HFA/VENTOLIN HFA) 108 (90 Base) MCG/ACT inhaler Inhale 2 puffs into the lungs every 6 hours as needed for shortness of breath / dyspnea or wheezing     albuterol (PROVENTIL) (5 MG/ML) 0.5% neb solution Take 5 mg by nebulization every 6 hours as needed for wheezing or shortness of breath / dyspnea     alendronate (FOSAMAX) 70 MG tablet Take 70 mg by mouth every 7 days     Ascorbic Acid (VITAMIN C) 500 MG CAPS Take 1,000 mg by mouth daily     aspirin 81 MG EC tablet Take 81 mg by mouth daily     calcium carbonate 600 mg-vitamin D 400 units (CALTRATE) 600-400 MG-UNIT per tablet Take 1 tablet by mouth 2 times daily     calcium polycarbophil (FIBERCON) 625 MG tablet Take 2 tablets by mouth daily     carvedilol (COREG) 12.5 MG tablet Take 12.5 mg by mouth 2 times daily (with meals)     cholecalciferol 50 MCG (2000 UT) CAPS Take 4,000 Units by mouth daily     coenzyme Q-10 capsule Take 1 capsule by mouth daily     FLUoxetine (PROZAC) 40 MG capsule Take 40 mg by mouth every morning     Fluticasone-Umeclidin-Vilanterol (TRELEGY ELLIPTA) 200-62.5-25 MCG/INH oral inhaler Inhale 1 puff into the lungs every morning     gabapentin (NEURONTIN) 300 MG capsule Take 900 mg by mouth At Bedtime     losartan (COZAAR) 25 MG tablet Take 12.5 mg by mouth every morning     montelukast (SINGULAIR) 10 MG tablet Take 10 mg by mouth At Bedtime     omeprazole (PRILOSEC) 20 MG DR capsule Take 40 mg by mouth every morning (before breakfast)     ondansetron (ZOFRAN) 4 MG tablet Take 4 mg by mouth every 6 hours as needed for nausea     potassium chloride ER (KLOR-CON M) 10 MEQ CR tablet Take 1 tablet (10 mEq) by mouth daily     pravastatin (PRAVACHOL) 40 MG tablet Take 40 mg by mouth At Bedtime     rivaroxaban ANTICOAGULANT (XARELTO) 20 MG TABS tablet Take 1 tablet (20 mg) by mouth daily (with dinner)     No current facility-administered medications for this visit.       ROS:  4 point ROS including Respiratory, CV, GI and , other  "than that noted in the HPI,  is negative    Vitals:  /58   Pulse 82   Temp 97.2  F (36.2  C)   Resp 18   Ht 1.6 m (5' 3\")   Wt 84.2 kg (185 lb 9.6 oz)   SpO2 95%   BMI 32.88 kg/m    Exam:    Resp: Effort WNL, LSCTA   CV: VS as above, trace LE edema biltat  Abd- soft, nontender, BS +  Musc- STUBBS  Psych- alert, calm, pleasant      Lab/Diagnostic data:    Most Recent 3 CBC's:  Recent Labs   Lab Test 07/11/22  0651 07/01/22  0941 06/30/22  0625 06/29/22  1234   WBC 6.1  --  5.5 5.0   HGB 10.6* 11.3* 10.3* 9.9*   MCV 99  --  101* 97     --  273 283     Most Recent 3 BMP's:  Recent Labs   Lab Test 07/11/22  0651 07/01/22  1157 07/01/22  0941 07/01/22  0715 06/30/22  0947 06/30/22  0625 06/29/22  1333 06/29/22  1234     --   --   --   --  142  --  141   POTASSIUM 3.2*  --  3.2*  --   --  3.3*  --  3.5   CHLORIDE 107  --   --   --   --  107  --  107   CO2 30*  --   --   --   --  28  --  27   BUN 7.0*  --   --   --   --  7*  --  9   CR 0.67  --   --   --   --  0.67  --  0.74   ANIONGAP 7  --   --   --   --  7  --  7   VICENTA 8.4*  --   --   --   --  8.2*  --  8.3*   GLC 79 108*  --  71   < > 79   < > 128*    < > = values in this interval not displayed.       ASSESSMENT/PLAN:    Diarrhea, unspecified type  - this has been chronic since admission and waxes and wanes. No associated s/s currently.  - discussed with patient and dietician and nursing add either banana or banana flakes daily (whichever can be most persistently carried out)  - monitor bowel pattern, clincially  - if persist would recommend referral to outpatient GI    Nausea  - chronic - intermittent. Not present today  - continue prn Zofran  - follow clinically    Diverticulitis of colon  -resolved    Orders:  Written on unit and discussed with patient, dietician, nursing        Electronically signed by:  ELISA Bajwa CNP                       Sincerely,        ELISA Bajwa CNP    "

## 2022-07-13 ENCOUNTER — TELEPHONE (OUTPATIENT)
Dept: GERIATRICS | Facility: CLINIC | Age: 77
End: 2022-07-13

## 2022-07-13 NOTE — TELEPHONE ENCOUNTER
[facility] Triage Nurse Telephone Encounter    Provider: ELISA Bajwa CNP   Facility: [facility] Facility Type:  TCU    Caller: Ana  Call Back Number: [phone]    Allergies:    Allergies   Allergen Reactions     Simvastatin Hives        Reason for call: Nurse called to report that patient has had 2 loose stools today.  Her stools are yellowish in color and has mucous in them.  Patient denies cramping or abdominal pain.  Patient is experiencing nausea which isn't new and patient has been taking Zofran daily for this.  No change in patient's appetite and she has been eating good.  Patient is getting tired of having loose stools and wants imodium.      Verbal Order/Direction given by Provider: Push fluids and continue to monitor.  Update NP tomorrow.     Provider giving Order:  Luz Marina Cervantes MD    Verbal Order given to: Ana Epps RN

## 2022-07-14 ENCOUNTER — LAB REQUISITION (OUTPATIENT)
Dept: LAB | Facility: CLINIC | Age: 77
End: 2022-07-14
Payer: COMMERCIAL

## 2022-07-14 DIAGNOSIS — E87.6 HYPOKALEMIA: ICD-10-CM

## 2022-07-15 ENCOUNTER — LAB REQUISITION (OUTPATIENT)
Dept: LAB | Facility: CLINIC | Age: 77
End: 2022-07-15
Payer: COMMERCIAL

## 2022-07-15 DIAGNOSIS — Z86.718 PERSONAL HISTORY OF DVT (DEEP VEIN THROMBOSIS): Primary | ICD-10-CM

## 2022-07-15 DIAGNOSIS — R79.9 ABNORMAL FINDING OF BLOOD CHEMISTRY, UNSPECIFIED: ICD-10-CM

## 2022-07-15 NOTE — PROGRESS NOTES
Patient: Zakiya Christian     1945      ORDERS    Resume Xarelto at 20 mg po at bedtime- start tonight ( h/o DVT)    CBC     ELISA Bajwa CNP on 7/15/2022 at 8:49 AM

## 2022-07-18 ENCOUNTER — TRANSITIONAL CARE UNIT VISIT (OUTPATIENT)
Dept: GERIATRICS | Facility: CLINIC | Age: 77
End: 2022-07-18
Payer: COMMERCIAL

## 2022-07-18 VITALS
HEIGHT: 63 IN | HEART RATE: 88 BPM | RESPIRATION RATE: 16 BRPM | WEIGHT: 184.2 LBS | DIASTOLIC BLOOD PRESSURE: 72 MMHG | BODY MASS INDEX: 32.64 KG/M2 | TEMPERATURE: 96.2 F | OXYGEN SATURATION: 96 % | SYSTOLIC BLOOD PRESSURE: 134 MMHG

## 2022-07-18 DIAGNOSIS — K57.32 DIVERTICULITIS OF COLON: ICD-10-CM

## 2022-07-18 DIAGNOSIS — R11.0 NAUSEA: ICD-10-CM

## 2022-07-18 DIAGNOSIS — R19.7 DIARRHEA, UNSPECIFIED TYPE: Primary | ICD-10-CM

## 2022-07-18 DIAGNOSIS — Z86.718 PERSONAL HISTORY OF DVT (DEEP VEIN THROMBOSIS): ICD-10-CM

## 2022-07-18 PROCEDURE — 99309 SBSQ NF CARE MODERATE MDM 30: CPT | Performed by: NURSE PRACTITIONER

## 2022-07-18 NOTE — PROGRESS NOTES
"[facility] GERIATRICS    Chief Complaint   Patient presents with     RECHECK     HPI:  Zakiya Christian is a 77 year old  (1945), who is being seen today for an episodic care visit at: [facility] ([facility]) [[id number]]     Patient seen in U for follow up:    1. Diarrhea, unspecified type    2. Nausea    3. Diverticulitis of colon    4. Personal history of DVT (deep vein thrombosis)      Today patient reports nausea has lessened. Still has intermittent diarrhea/bowel frequency. Denies fever, abdominal pain. Reports appetite wax and wane. States did eat a banana today. Is requesting further education regarding appropriate foods. Denies pain. Reports ongoing stress r/t living transition and financial implications. States ACP has not yet come to see her.       Allergies, and PMH/PSH reviewed in [facility] today.  REVIEW OF SYSTEMS:  4 point ROS including Respiratory, CV, GI and , other than that noted in the HPI,  is negative    Objective:   /72   Pulse 88   Temp (!) 96.2  F (35.7  C)   Resp 16   Ht 1.6 m (5' 3\")   Wt 83.6 kg (184 lb 3.2 oz)   SpO2 96%   BMI 32.63 kg/m      Resp: Effort WNL, LSCTA  CV: VS as above, no edema noted  Abd- soft, nontender, BS +  Musc- STUBBS  Psych- alert, calm, pleasant      Recent labs in [facility] reviewed by me today.     Assessment/Plan:  Diarrhea, unspecified type  - according to PCC only 1-2 stools per day and usually formed. By report seems to have some bowel.   - encouraged banana per day  - monitor bowel pattern    Nausea  - chronic intermittent- likely multifactorial  - encourage taking meds with food  - prn Zofran  - monitor    Diverticulitis of colon  - Completed antibiotic course. Resolved  - will request dietician review appropriate dietary restrictions    Personal history of DVT (deep vein thrombosis)  - Xarelto held inpatient d/t renal mass/hemorrage. Urology states not actively bleeding, but risk is there.  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

## 2022-07-18 NOTE — LETTER
"    7/18/2022        RE: Zakiya Christian  [address]  [facility] [address]        [facility] GERIATRICS    Chief Complaint   Patient presents with     RECHECK     HPI:  Zakiya Christian is a 77 year old  (1945), who is being seen today for an episodic care visit at: [facility] ([facility]) [[id number]]     Patient seen in U for follow up:    1. Diarrhea, unspecified type    2. Nausea    3. Diverticulitis of colon    4. Personal history of DVT (deep vein thrombosis)      Today patient reports nausea has lessened. Still has intermittent diarrhea/bowel frequency. Denies fever, abdominal pain. Reports appetite wax and wane. States did eat a banana today. Is requesting further education regarding appropriate foods. Denies pain. Reports ongoing stress r/t living transition and financial implications. States ACP has not yet come to see her.       Allergies, and PMH/PSH reviewed in [facility] today.  REVIEW OF SYSTEMS:  4 point ROS including Respiratory, CV, GI and , other than that noted in the HPI,  is negative    Objective:   /72   Pulse 88   Temp (!) 96.2  F (35.7  C)   Resp 16   Ht 1.6 m (5' 3\")   Wt 83.6 kg (184 lb 3.2 oz)   SpO2 96%   BMI 32.63 kg/m      Resp: Effort WNL, LSCTA  CV: VS as above, no edema noted  Abd- soft, nontender, BS +  Musc- STUBBS  Psych- alert, calm, pleasant      Recent labs in [facility] reviewed by me today.     Assessment/Plan:  Diarrhea, unspecified type  - according to PCC only 1-2 stools per day and usually formed. By report seems to have some bowel.   - encouraged banana per day  - monitor bowel pattern    Nausea  - chronic intermittent- likely multifactorial  - encourage taking meds with food  - prn Zofran  - monitor    Diverticulitis of colon  - Completed antibiotic course. Resolved  - will request dietician review appropriate dietary restrictions    Personal history of DVT (deep vein thrombosis)  - Xarelto held inpatient d/t renal mass/hemorrage. Urology states not " actively bleeding, but risk is there.  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

## 2022-07-19 LAB
ALBUMIN SERPL BCG-MCNC: 2.7 G/DL (ref 3.5–5.2)
ALP SERPL-CCNC: 84 U/L (ref 35–104)
ALT SERPL W P-5'-P-CCNC: <5 U/L (ref 10–35)
ANION GAP SERPL CALCULATED.3IONS-SCNC: 10 MMOL/L (ref 7–15)
AST SERPL W P-5'-P-CCNC: 25 U/L (ref 10–35)
BILIRUB SERPL-MCNC: 0.3 MG/DL
BUN SERPL-MCNC: 8.7 MG/DL (ref 8–23)
CALCIUM SERPL-MCNC: 8.8 MG/DL (ref 8.8–10.2)
CHLORIDE SERPL-SCNC: 104 MMOL/L (ref 98–107)
CREAT SERPL-MCNC: 0.68 MG/DL (ref 0.51–0.95)
DEPRECATED HCO3 PLAS-SCNC: 28 MMOL/L (ref 22–29)
ERYTHROCYTE [DISTWIDTH] IN BLOOD BY AUTOMATED COUNT: 14.7 % (ref 10–15)
GFR SERPL CREATININE-BSD FRML MDRD: 89 ML/MIN/1.73M2
GLUCOSE SERPL-MCNC: 77 MG/DL (ref 70–99)
HCT VFR BLD AUTO: 42.2 % (ref 35–47)
HGB BLD-MCNC: 12.8 G/DL (ref 11.7–15.7)
MCH RBC QN AUTO: 29.9 PG (ref 26.5–33)
MCHC RBC AUTO-ENTMCNC: 30.3 G/DL (ref 31.5–36.5)
MCV RBC AUTO: 99 FL (ref 78–100)
PLATELET # BLD AUTO: 345 10E3/UL (ref 150–450)
POTASSIUM SERPL-SCNC: 3.3 MMOL/L (ref 3.4–5.3)
PROT SERPL-MCNC: 7.2 G/DL (ref 6.4–8.3)
RBC # BLD AUTO: 4.28 10E6/UL (ref 3.8–5.2)
SODIUM SERPL-SCNC: 142 MMOL/L (ref 136–145)
WBC # BLD AUTO: 6.1 10E3/UL (ref 4–11)

## 2022-07-19 PROCEDURE — P9604 ONE-WAY ALLOW PRORATED TRIP: HCPCS | Mod: ORL | Performed by: NURSE PRACTITIONER

## 2022-07-19 PROCEDURE — 80053 COMPREHEN METABOLIC PANEL: CPT | Mod: ORL | Performed by: NURSE PRACTITIONER

## 2022-07-19 PROCEDURE — 85027 COMPLETE CBC AUTOMATED: CPT | Mod: ORL | Performed by: NURSE PRACTITIONER

## 2022-07-19 PROCEDURE — 36415 COLL VENOUS BLD VENIPUNCTURE: CPT | Mod: ORL | Performed by: NURSE PRACTITIONER

## 2022-07-20 ENCOUNTER — DOCUMENTATION ONLY (OUTPATIENT)
Dept: OTHER | Facility: CLINIC | Age: 77
End: 2022-07-20

## 2022-07-25 ENCOUNTER — TRANSITIONAL CARE UNIT VISIT (OUTPATIENT)
Dept: GERIATRICS | Facility: CLINIC | Age: 77
End: 2022-07-25
Payer: COMMERCIAL

## 2022-07-25 VITALS
DIASTOLIC BLOOD PRESSURE: 68 MMHG | OXYGEN SATURATION: 96 % | WEIGHT: 184.2 LBS | BODY MASS INDEX: 32.64 KG/M2 | HEIGHT: 63 IN | TEMPERATURE: 97.9 F | RESPIRATION RATE: 17 BRPM | SYSTOLIC BLOOD PRESSURE: 123 MMHG | HEART RATE: 62 BPM

## 2022-07-25 DIAGNOSIS — R19.7 DIARRHEA, UNSPECIFIED TYPE: Primary | ICD-10-CM

## 2022-07-25 DIAGNOSIS — R11.0 NAUSEA: ICD-10-CM

## 2022-07-25 DIAGNOSIS — Z86.718 PERSONAL HISTORY OF DVT (DEEP VEIN THROMBOSIS): ICD-10-CM

## 2022-07-25 DIAGNOSIS — K57.32 DIVERTICULITIS OF COLON: ICD-10-CM

## 2022-07-25 PROCEDURE — 99309 SBSQ NF CARE MODERATE MDM 30: CPT | Performed by: NURSE PRACTITIONER

## 2022-07-25 NOTE — LETTER
"    7/25/2022        RE: Zakiya Christian  [address]  [facility] [place] [address]        [facility] GERIATRICS    Chief Complaint   Patient presents with     RECHECK     HPI:  Zakiya Christian is a 77 year old  (1945), who is being seen today for an episodic care visit at: [facility] ([facility]) [[id number]]. Today's concern is:     1. Diarrhea, unspecified type    2. Nausea    3. Diverticulitis of colon    4. Personal history of DVT (deep vein thrombosis)      Patient reports intermittent diarrhea yet and notes it does seem to worsen when stressed or nervous. Reports is \"trying\" to eat a banana a day but is \"tired of them.\". Reports nausea improved. Denies noting abdominal pain. Recent hosp for Diverticulitis noted. Has completed antibiotics and diet advanced beyond low fiber. Recently resumed on Xarelto and tolerating well.    Allergies, and PMH/PSH reviewed in [facility] today.  REVIEW OF SYSTEMS:  4 point ROS including Respiratory, CV, GI and , other than that noted in the HPI,  is negative    Objective:   /68   Pulse 62   Temp 97.9  F (36.6  C)   Resp 17   Ht 1.6 m (5' 3\")   Wt 83.6 kg (184 lb 3.2 oz)   SpO2 96%   BMI 32.63 kg/m      Resp: Effort WNL, LSCTA   CV: VS as above, trace puffiness to bilateral LE  Abd- soft, nontender, BS +  Musc- STUBBS  Psych- alert, calm, pleasant      Recent labs in [facility] reviewed by me today.     Assessment/Plan:  Diarrhea, unspecified type  - chronic intermittent in TCU- no associated s/s noted today  - encourage banana a day  - Adult GI  Referral - Consult Only  - monitor bowel pattern    Nausea  - lessening. Continues on prn Zofran    Diverticulitis of colon  - completed antibiotic and advanced to regular diet.  - Adult GI  Referral - Consult Only  RESOLVED    Personal history of DVT (deep vein thrombosis)  - Noted. Xarelto held inpatient, resumed last week.   - recheck CBC end of week to ensure stability    Orders:  Written on unit and " discussed with patient and nursing    Electronically signed by: ELISA Bajwa CNP           Sincerely,        ELISA Bajwa CNP

## 2022-07-25 NOTE — PROGRESS NOTES
"[facility] GERIATRICS    Chief Complaint   Patient presents with     RECHECK     HPI:  Zakiya Christian is a 77 year old  (1945), who is being seen today for an episodic care visit at: [facility] ([facility]) [[id number]]. Today's concern is:     1. Diarrhea, unspecified type    2. Nausea    3. Diverticulitis of colon    4. Personal history of DVT (deep vein thrombosis)      Patient reports intermittent diarrhea yet and notes it does seem to worsen when stressed or nervous. Reports is \"trying\" to eat a banana a day but is \"tired of them.\". Reports nausea improved. Denies noting abdominal pain. Recent hosp for Diverticulitis noted. Has completed antibiotics and diet advanced beyond low fiber. Recently resumed on Xarelto and tolerating well.    Allergies, and PMH/PSH reviewed in [facility] today.  REVIEW OF SYSTEMS:  4 point ROS including Respiratory, CV, GI and , other than that noted in the HPI,  is negative    Objective:   /68   Pulse 62   Temp 97.9  F (36.6  C)   Resp 17   Ht 1.6 m (5' 3\")   Wt 83.6 kg (184 lb 3.2 oz)   SpO2 96%   BMI 32.63 kg/m      Resp: Effort WNL, LSCTA   CV: VS as above, trace puffiness to bilateral LE  Abd- soft, nontender, BS +  Musc- STUBBS  Psych- alert, calm, pleasant      Recent labs in [facility] reviewed by me today.     Assessment/Plan:  Diarrhea, unspecified type  - chronic intermittent in TCU- no associated s/s noted today  - encourage banana a day  - Adult GI  Referral - Consult Only  - monitor bowel pattern    Nausea  - lessening. Continues on prn Zofran    Diverticulitis of colon  - completed antibiotic and advanced to regular diet.  - Adult GI  Referral - Consult Only  RESOLVED    Personal history of DVT (deep vein thrombosis)  - Noted. Xarelto held inpatient, resumed last week.   - recheck CBC end of week to ensure stability    Orders:  Written on unit and discussed with patient and nursing    Electronically signed by: Sabi Martinez, ELISA CNP     "

## 2022-07-27 ENCOUNTER — LAB REQUISITION (OUTPATIENT)
Dept: LAB | Facility: CLINIC | Age: 77
End: 2022-07-27
Payer: COMMERCIAL

## 2022-07-27 DIAGNOSIS — R19.7 DIARRHEA, UNSPECIFIED: ICD-10-CM

## 2022-07-28 ENCOUNTER — TELEPHONE (OUTPATIENT)
Dept: GERIATRICS | Facility: CLINIC | Age: 77
End: 2022-07-28

## 2022-07-28 LAB
ANION GAP SERPL CALCULATED.3IONS-SCNC: 8 MMOL/L (ref 7–15)
BASOPHILS # BLD AUTO: 0.1 10E3/UL (ref 0–0.2)
BASOPHILS NFR BLD AUTO: 1 %
BUN SERPL-MCNC: 8.7 MG/DL (ref 8–23)
CALCIUM SERPL-MCNC: 8.3 MG/DL (ref 8.8–10.2)
CHLORIDE SERPL-SCNC: 105 MMOL/L (ref 98–107)
CREAT SERPL-MCNC: 0.67 MG/DL (ref 0.51–0.95)
DEPRECATED HCO3 PLAS-SCNC: 29 MMOL/L (ref 22–29)
EOSINOPHIL # BLD AUTO: 0.3 10E3/UL (ref 0–0.7)
EOSINOPHIL NFR BLD AUTO: 4 %
ERYTHROCYTE [DISTWIDTH] IN BLOOD BY AUTOMATED COUNT: 14.6 % (ref 10–15)
GFR SERPL CREATININE-BSD FRML MDRD: 89 ML/MIN/1.73M2
GLUCOSE SERPL-MCNC: 83 MG/DL (ref 70–99)
HCT VFR BLD AUTO: 32.9 % (ref 35–47)
HGB BLD-MCNC: 10.1 G/DL (ref 11.7–15.7)
IMM GRANULOCYTES # BLD: 0 10E3/UL
IMM GRANULOCYTES NFR BLD: 0 %
LYMPHOCYTES # BLD AUTO: 1.8 10E3/UL (ref 0.8–5.3)
LYMPHOCYTES NFR BLD AUTO: 25 %
MCH RBC QN AUTO: 30.4 PG (ref 26.5–33)
MCHC RBC AUTO-ENTMCNC: 30.7 G/DL (ref 31.5–36.5)
MCV RBC AUTO: 99 FL (ref 78–100)
MONOCYTES # BLD AUTO: 0.4 10E3/UL (ref 0–1.3)
MONOCYTES NFR BLD AUTO: 6 %
NEUTROPHILS # BLD AUTO: 4.6 10E3/UL (ref 1.6–8.3)
NEUTROPHILS NFR BLD AUTO: 64 %
NRBC # BLD AUTO: 0 10E3/UL
NRBC BLD AUTO-RTO: 0 /100
PLATELET # BLD AUTO: 267 10E3/UL (ref 150–450)
POTASSIUM SERPL-SCNC: 3.5 MMOL/L (ref 3.4–5.3)
RBC # BLD AUTO: 3.32 10E6/UL (ref 3.8–5.2)
SODIUM SERPL-SCNC: 142 MMOL/L (ref 136–145)
WBC # BLD AUTO: 7.2 10E3/UL (ref 4–11)

## 2022-07-28 PROCEDURE — 36415 COLL VENOUS BLD VENIPUNCTURE: CPT | Mod: ORL | Performed by: INTERNAL MEDICINE

## 2022-07-28 PROCEDURE — 85025 COMPLETE CBC W/AUTO DIFF WBC: CPT | Mod: ORL | Performed by: INTERNAL MEDICINE

## 2022-07-28 PROCEDURE — 80048 BASIC METABOLIC PNL TOTAL CA: CPT | Mod: ORL | Performed by: INTERNAL MEDICINE

## 2022-07-28 PROCEDURE — P9604 ONE-WAY ALLOW PRORATED TRIP: HCPCS | Mod: ORL | Performed by: INTERNAL MEDICINE

## 2022-07-28 NOTE — TELEPHONE ENCOUNTER
[facility] Lab Note     Provider: ELISA Bajwa CNP   Facility: [facility] Facility Type:  TCU    Allergies   Allergen Reactions     Simvastatin Hives       Labs Reviewed by provider: CBC and BMP      Verbal Order/Direction given by Provider: NNO    Provider giving Order:  ELISA Bajwa CNP     Verbal Order given to: Am Nurse    Edilia Jean Baptiste RN

## 2022-08-01 ENCOUNTER — TRANSITIONAL CARE UNIT VISIT (OUTPATIENT)
Dept: GERIATRICS | Facility: CLINIC | Age: 77
End: 2022-08-01
Payer: COMMERCIAL

## 2022-08-01 VITALS
OXYGEN SATURATION: 94 % | SYSTOLIC BLOOD PRESSURE: 132 MMHG | BODY MASS INDEX: 32.63 KG/M2 | HEIGHT: 63 IN | DIASTOLIC BLOOD PRESSURE: 68 MMHG | TEMPERATURE: 98 F | HEART RATE: 72 BPM | RESPIRATION RATE: 16 BRPM

## 2022-08-01 DIAGNOSIS — R11.0 NAUSEA: Primary | ICD-10-CM

## 2022-08-01 DIAGNOSIS — R19.7 DIARRHEA, UNSPECIFIED TYPE: ICD-10-CM

## 2022-08-01 PROCEDURE — 99309 SBSQ NF CARE MODERATE MDM 30: CPT | Performed by: NURSE PRACTITIONER

## 2022-08-01 NOTE — LETTER
"    8/1/2022        RE: Zakiya Christian  [address]  [facility] [address]        [facility] GERIATRICS    Chief Complaint   Patient presents with     RECHECK     HPI:  Zakiya Christian is a 77 year old  (1945), who is being seen today for an episodic care visit at: [facility] ([facility]) [[id number]]. Today's concern is:     Seen in U today for recheck of chronic intermittent nausea and diarrhea following recent hospitalizations for colonic abscess and then diverticulitis which have both since resolved.     Review of PCC notes note 1-2 stools a day and most are documented as \"formed\". Dietician has met with patient to discuss dietary recommendations based on above. GI referral placed recently and they were instructed to call cousin \"Oneyda\" to schedule this appt.    Today patient reports she is feeling ok and ate a good breakfast. Denies nausea currently and no diarrhea today. We again discussed recent GI issues, resolution process, dietary recommendations and GI referral.     Allergies, and PMH/PSH reviewed in [facility] today.  REVIEW OF SYSTEMS:  4 point ROS including Respiratory, CV, GI and , other than that noted in the HPI,  is negative    Objective:   /68   Pulse 72   Temp 98  F (36.7  C)   Resp 16   Ht 1.6 m (5' 3\")   SpO2 94%   BMI 32.63 kg/m      Resp: Effort WNL, LSCTA   CV: VS as above, slight chronic puffiness to bilateral LE again noted  Abd- soft, nontender, BS +  Musc- STUBBS  Psych- alert, calm, pleasant      Recent labs in [facility] reviewed by me today.     Assessment/Plan:  Nausea  Diarrhea, unspecified type  - chronic intermittent- noted recent diverticulitis and colonic abscess both resolved. Dietician did speak with patient regarding dietary suggestions/recommendations.   - GI referral made  - ongoing monitoring        Electronically signed by: ELISA Bajwa CNP           Sincerely,        ELISA Bajwa CNP    "

## 2022-08-01 NOTE — PROGRESS NOTES
"[facility] GERIATRICS    Chief Complaint   Patient presents with     RECHECK     HPI:  Zakiya Christian is a 77 year old  (1945), who is being seen today for an episodic care visit at: [facility] ([facility]) [[id number]]. Today's concern is:     Seen in TCU today for recheck of chronic intermittent nausea and diarrhea following recent hospitalizations for colonic abscess and then diverticulitis which have both since resolved.     Review of PCC notes note 1-2 stools a day and most are documented as \"formed\". Dietician has met with patient to discuss dietary recommendations based on above. GI referral placed recently and they were instructed to call cousin \"Oneyda\" to schedule this appt.    Today patient reports she is feeling ok and ate a good breakfast. Denies nausea currently and no diarrhea today. We again discussed recent GI issues, resolution process, dietary recommendations and GI referral.     Allergies, and PMH/PSH reviewed in [facility] today.  REVIEW OF SYSTEMS:  4 point ROS including Respiratory, CV, GI and , other than that noted in the HPI,  is negative    Objective:   /68   Pulse 72   Temp 98  F (36.7  C)   Resp 16   Ht 1.6 m (5' 3\")   SpO2 94%   BMI 32.63 kg/m      Resp: Effort WNL, LSCTA   CV: VS as above, slight chronic puffiness to bilateral LE again noted  Abd- soft, nontender, BS +  Musc- STUBBS  Psych- alert, calm, pleasant      Recent labs in [facility] reviewed by me today.     Assessment/Plan:  Nausea  Diarrhea, unspecified type  - chronic intermittent- noted recent diverticulitis and colonic abscess both resolved. Dietician did speak with patient regarding dietary suggestions/recommendations.   - GI referral made  - ongoing monitoring        Electronically signed by: ELISA Bajwa CNP     "

## 2022-08-09 ENCOUNTER — TRANSITIONAL CARE UNIT VISIT (OUTPATIENT)
Dept: GERIATRICS | Facility: CLINIC | Age: 77
End: 2022-08-09

## 2022-08-09 VITALS
HEIGHT: 63 IN | OXYGEN SATURATION: 95 % | SYSTOLIC BLOOD PRESSURE: 133 MMHG | WEIGHT: 190.5 LBS | HEART RATE: 83 BPM | TEMPERATURE: 97.3 F | DIASTOLIC BLOOD PRESSURE: 63 MMHG | BODY MASS INDEX: 33.75 KG/M2 | RESPIRATION RATE: 18 BRPM

## 2022-08-09 DIAGNOSIS — R19.5 LOOSE STOOLS: ICD-10-CM

## 2022-08-09 DIAGNOSIS — R10.9 ABDOMINAL CRAMPING: Primary | ICD-10-CM

## 2022-08-09 PROCEDURE — 99308 SBSQ NF CARE LOW MDM 20: CPT | Performed by: INTERNAL MEDICINE

## 2022-08-09 PROCEDURE — 87493 C DIFF AMPLIFIED PROBE: CPT | Mod: ORL | Performed by: NURSE PRACTITIONER

## 2022-08-09 NOTE — LETTER
8/9/2022        RE: Zakiya Christian  [address]  [facility] [place] [address]        [facility] GERIATRICS  ACUTE EPISODIC VISIT  August 9, 2022    [facility] Medical Record Number:  [id number]  [facility] [[id number]    Chief Complaint   Patient presents with     Nursing Home Acute     GI concerns       HPI:    Zakiya Christian is a 77 year old  (1945), who is being seen today for an episodic care visit at the request of staff re: ongoing GI symptoms.  HPI information obtained from: facility chart records, facility staff, patient report and [facility] chart review.    Today, Ms. Christian is seen in her room ambulating with her walker - she was going to get dressed for the day. She is a good historian. She tells me she continues to have ongoing loose stools and crampy abdominal pain that is sometimes related to needing to have a BM and sometimes just happens and resolves without a BM. She said this has been going on for over a year. She does not like to take the banana flakes so hasn't been taking them. No dark, red or maroon stools. Her understanding was that a GI referral was placed, but she's not heard the status of this.     Reviewing her weights she was 220 lbs in April (this was a wheelchair weight) then 177 lbs standing for May-June and in late July slowly started to trend up to 189 --> 191 and now 193 lbs. She had a negative C diff resulted today.     ALLERGIES:    Allergies   Allergen Reactions     Simvastatin Hives        MEDICATIONS:  Current Outpatient Medications   Medication Sig Dispense Refill     acetaminophen (TYLENOL) 500 MG tablet Take 500 mg by mouth 4 times daily And q6h PRN - max 2 PRN/day       albuterol (PROAIR HFA/PROVENTIL HFA/VENTOLIN HFA) 108 (90 Base) MCG/ACT inhaler Inhale 2 puffs into the lungs every 6 hours as needed for shortness of breath / dyspnea or wheezing       albuterol (PROVENTIL) (5 MG/ML) 0.5%  neb solution Take 5 mg by nebulization every 6 hours as needed for wheezing or shortness of breath / dyspnea       alendronate (FOSAMAX) 70 MG tablet Take 70 mg by mouth every 7 days       Ascorbic Acid (VITAMIN C) 500 MG CAPS Take 1,000 mg by mouth daily       aspirin 81 MG EC tablet Take 81 mg by mouth daily       calcium carbonate 600 mg-vitamin D 400 units (CALTRATE) 600-400 MG-UNIT per tablet Take 1 tablet by mouth 2 times daily       calcium polycarbophil (FIBERCON) 625 MG tablet Take 2 tablets by mouth daily       carvedilol (COREG) 12.5 MG tablet Take 12.5 mg by mouth 2 times daily (with meals)       cholecalciferol 50 MCG (2000 UT) CAPS Take 4,000 Units by mouth daily       coenzyme Q-10 capsule Take 1 capsule by mouth daily       FLUoxetine (PROZAC) 40 MG capsule Take 40 mg by mouth every morning       Fluticasone-Umeclidin-Vilanterol (TRELEGY ELLIPTA) 200-62.5-25 MCG/INH oral inhaler Inhale 1 puff into the lungs every morning       gabapentin (NEURONTIN) 300 MG capsule Take 900 mg by mouth At Bedtime       losartan (COZAAR) 25 MG tablet Take 12.5 mg by mouth every morning       montelukast (SINGULAIR) 10 MG tablet Take 10 mg by mouth At Bedtime       omeprazole (PRILOSEC) 20 MG DR capsule Take 40 mg by mouth every morning (before breakfast)       ondansetron (ZOFRAN) 4 MG tablet Take 4 mg by mouth every 6 hours as needed for nausea       potassium chloride ER (KLOR-CON M) 10 MEQ CR tablet Take 1 tablet (10 mEq) by mouth daily       pravastatin (PRAVACHOL) 40 MG tablet Take 40 mg by mouth At Bedtime       rivaroxaban ANTICOAGULANT (XARELTO) 20 MG TABS tablet Take 1 tablet (20 mg) by mouth daily (with dinner)       Medications reviewed:  Medications reconciled to facility chart and changes were made to reflect current medications as identified as above med list. Below are the changes that were made:   Medications stopped since last [facility] medication reconciliation:   Medications Discontinued During This  "Encounter   Medication Reason     acetaminophen (TYLENOL) 500 MG tablet Medication Reconciliation Clean Up       Medications started since last [facility] medication reconciliation:  No orders of the defined types were placed in this encounter.      REVIEW OF SYSTEMS:  4 point ROS neg other than the symptoms noted above in the HPI.    PHYSICAL EXAM:  /63   Pulse 83   Temp 97.3  F (36.3  C)   Resp 18   Ht 1.6 m (5' 3\")   Wt 86.4 kg (190 lb 8 oz)   SpO2 95%   BMI 33.75 kg/m    Gen: ambulating about her room with her walker, alert, cooperative and in no acute distress  Resp: breathing non labored, no tachypnea   Neuro: CX II-XII grossly in tact; ROM in all four extremities grossly in tact  Psych: alert and oriented x3; normal affect    ASSESSMENT / PLAN:    Abdominal Cramping, Loose Stools  C diff negative today. No fevers. She's not been losing weight, she's gained some slow weight over the past month. She describes this as chronic over past year and she would like to see GI. She said no blood in stools.   -- continue daily fiber supplements   -- will follow up re: GI referral - I believe it was placed last week; however, facility is awaiting a call back?      Electronically signed by  Luz Marina Pantoja MD             Sincerely,        Luz Marina Pantoja MD      "

## 2022-08-09 NOTE — PROGRESS NOTES
[facility] GERIATRICS  ACUTE EPISODIC VISIT  August 9, 2022    [facility] Medical Record Number:  [id number]  Place of Service where encounter took place:  [facility]) [[id number]]    Chief Complaint   Patient presents with     Nursing Home Acute     GI concerns       HPI:    Zakiya Christian is a 77 year old  (1945), who is being seen today for an episodic care visit at the request of staff re: ongoing GI symptoms.  HPI information obtained from: facility chart records, facility staff, patient report and [facility] chart review.    Today, Ms. Christian is seen in her room ambulating with her walker - she was going to get dressed for the day. She is a good historian. She tells me she continues to have ongoing loose stools and crampy abdominal pain that is sometimes related to needing to have a BM and sometimes just happens and resolves without a BM. She said this has been going on for over a year. She does not like to take the banana flakes so hasn't been taking them. No dark, red or maroon stools. Her understanding was that a GI referral was placed, but she's not heard the status of this.     Reviewing her weights she was 220 lbs in April (this was a wheelchair weight) then 177 lbs standing for May-June and in late July slowly started to trend up to 189 --> 191 and now 193 lbs. She had a negative C diff resulted today.     ALLERGIES:    Allergies   Allergen Reactions     Simvastatin Hives        MEDICATIONS:  Current Outpatient Medications   Medication Sig Dispense Refill     acetaminophen (TYLENOL) 500 MG tablet Take 500 mg by mouth 4 times daily And q6h PRN - max 2 PRN/day       albuterol (PROAIR HFA/PROVENTIL HFA/VENTOLIN HFA) 108 (90 Base) MCG/ACT inhaler Inhale 2 puffs into the lungs every 6 hours as needed for shortness of breath / dyspnea or wheezing       albuterol (PROVENTIL) (5 MG/ML) 0.5% neb solution Take 5 mg by nebulization every 6 hours as needed for wheezing or shortness of  breath / dyspnea       alendronate (FOSAMAX) 70 MG tablet Take 70 mg by mouth every 7 days       Ascorbic Acid (VITAMIN C) 500 MG CAPS Take 1,000 mg by mouth daily       aspirin 81 MG EC tablet Take 81 mg by mouth daily       calcium carbonate 600 mg-vitamin D 400 units (CALTRATE) 600-400 MG-UNIT per tablet Take 1 tablet by mouth 2 times daily       calcium polycarbophil (FIBERCON) 625 MG tablet Take 2 tablets by mouth daily       carvedilol (COREG) 12.5 MG tablet Take 12.5 mg by mouth 2 times daily (with meals)       cholecalciferol 50 MCG (2000 UT) CAPS Take 4,000 Units by mouth daily       coenzyme Q-10 capsule Take 1 capsule by mouth daily       FLUoxetine (PROZAC) 40 MG capsule Take 40 mg by mouth every morning       Fluticasone-Umeclidin-Vilanterol (TRELEGY ELLIPTA) 200-62.5-25 MCG/INH oral inhaler Inhale 1 puff into the lungs every morning       gabapentin (NEURONTIN) 300 MG capsule Take 900 mg by mouth At Bedtime       losartan (COZAAR) 25 MG tablet Take 12.5 mg by mouth every morning       montelukast (SINGULAIR) 10 MG tablet Take 10 mg by mouth At Bedtime       omeprazole (PRILOSEC) 20 MG DR capsule Take 40 mg by mouth every morning (before breakfast)       ondansetron (ZOFRAN) 4 MG tablet Take 4 mg by mouth every 6 hours as needed for nausea       potassium chloride ER (KLOR-CON M) 10 MEQ CR tablet Take 1 tablet (10 mEq) by mouth daily       pravastatin (PRAVACHOL) 40 MG tablet Take 40 mg by mouth At Bedtime       rivaroxaban ANTICOAGULANT (XARELTO) 20 MG TABS tablet Take 1 tablet (20 mg) by mouth daily (with dinner)       Medications reviewed:  Medications reconciled to facility chart and changes were made to reflect current medications as identified as above med list. Below are the changes that were made:   Medications stopped since last [facility] medication reconciliation:   Medications Discontinued During This Encounter   Medication Reason     acetaminophen (TYLENOL) 500 MG tablet Medication Reconciliation  "Clean Up       Medications started since last [facility] medication reconciliation:  No orders of the defined types were placed in this encounter.      REVIEW OF SYSTEMS:  4 point ROS neg other than the symptoms noted above in the HPI.    PHYSICAL EXAM:  /63   Pulse 83   Temp 97.3  F (36.3  C)   Resp 18   Ht 1.6 m (5' 3\")   Wt 86.4 kg (190 lb 8 oz)   SpO2 95%   BMI 33.75 kg/m    Gen: ambulating about her room with her walker, alert, cooperative and in no acute distress  Resp: breathing non labored, no tachypnea   Neuro: CX II-XII grossly in tact; ROM in all four extremities grossly in tact  Psych: alert and oriented x3; normal affect    ASSESSMENT / PLAN:    Abdominal Cramping, Loose Stools  C diff negative today. No fevers. She's not been losing weight, she's gained some slow weight over the past month. She describes this as chronic over past year and she would like to see GI. She said no blood in stools.   -- continue daily fiber supplements   -- will follow up re: GI referral - I believe it was placed last week; however, facility is awaiting a call back?      Electronically signed by  Luz Marina Pantoja MD       "

## 2022-08-12 ENCOUNTER — LAB REQUISITION (OUTPATIENT)
Dept: LAB | Facility: CLINIC | Age: 77
End: 2022-08-12
Payer: COMMERCIAL

## 2022-08-12 DIAGNOSIS — I10 ESSENTIAL (PRIMARY) HYPERTENSION: ICD-10-CM

## 2022-08-15 ENCOUNTER — TRANSITIONAL CARE UNIT VISIT (OUTPATIENT)
Dept: GERIATRICS | Facility: CLINIC | Age: 77
End: 2022-08-15
Payer: COMMERCIAL

## 2022-08-15 VITALS
WEIGHT: 188 LBS | TEMPERATURE: 97.4 F | SYSTOLIC BLOOD PRESSURE: 116 MMHG | OXYGEN SATURATION: 95 % | DIASTOLIC BLOOD PRESSURE: 68 MMHG | RESPIRATION RATE: 18 BRPM | HEIGHT: 63 IN | BODY MASS INDEX: 33.31 KG/M2 | HEART RATE: 72 BPM

## 2022-08-15 DIAGNOSIS — R11.0 NAUSEA: Primary | ICD-10-CM

## 2022-08-15 DIAGNOSIS — R19.7 DIARRHEA, UNSPECIFIED TYPE: ICD-10-CM

## 2022-08-15 LAB
ANION GAP SERPL CALCULATED.3IONS-SCNC: 6 MMOL/L (ref 7–15)
BUN SERPL-MCNC: 10.1 MG/DL (ref 8–23)
CALCIUM SERPL-MCNC: 8.3 MG/DL (ref 8.8–10.2)
CHLORIDE SERPL-SCNC: 103 MMOL/L (ref 98–107)
CREAT SERPL-MCNC: 0.78 MG/DL (ref 0.51–0.95)
DEPRECATED HCO3 PLAS-SCNC: 33 MMOL/L (ref 22–29)
GFR SERPL CREATININE-BSD FRML MDRD: 77 ML/MIN/1.73M2
GLUCOSE SERPL-MCNC: 77 MG/DL (ref 70–99)
POTASSIUM SERPL-SCNC: 3.6 MMOL/L (ref 3.4–5.3)
SODIUM SERPL-SCNC: 142 MMOL/L (ref 136–145)

## 2022-08-15 PROCEDURE — P9604 ONE-WAY ALLOW PRORATED TRIP: HCPCS | Mod: ORL | Performed by: NURSE PRACTITIONER

## 2022-08-15 PROCEDURE — 80048 BASIC METABOLIC PNL TOTAL CA: CPT | Mod: ORL | Performed by: NURSE PRACTITIONER

## 2022-08-15 PROCEDURE — 36415 COLL VENOUS BLD VENIPUNCTURE: CPT | Mod: ORL | Performed by: NURSE PRACTITIONER

## 2022-08-15 PROCEDURE — 99309 SBSQ NF CARE MODERATE MDM 30: CPT | Performed by: NURSE PRACTITIONER

## 2022-08-15 NOTE — PROGRESS NOTES
"[facility] GERIATRICS    Chief Complaint   Patient presents with     RECHECK     HPI:  Zakiya Christian is a 77 year old  (1945), who is being seen today for an episodic care visit at: [facility] ([facility]) [[id number]]. Today's concern is:     Chronic intermittent nausea and diarrhea. GI referral has been placed and have discussed at length with patient and patient's niece.     Today patient found in room. Is alert, calm, NAD. Reports \"feel good today\". [facility] has sold town home and family will be helping her to move into new place on 8/29 - Writer was asked to share this information with  and this was done. Patient reports ate good this morning. Denies current nausea or diarrhea. Again reiterates she has noted a pattern that diarrhea worsens when she is anxious about something. Writer encouraged patient to share this information also with GI when she sees them.       Allergies, and PMH/PSH reviewed in [facility] today.  REVIEW OF SYSTEMS:  4 point ROS including Respiratory, CV, GI and , other than that noted in the HPI,  is negative    Objective:   /68   Pulse 72   Temp 97.4  F (36.3  C)   Resp 18   Ht 1.6 m (5' 3\")   Wt 85.3 kg (188 lb)   SpO2 95%   BMI 33.30 kg/m      Resp: Effort WNL, LSCTA   CV: VSS, trace bilateral LE edema  Abd- soft, nontender, BS +  Musc- STUBBS  Psych- alert, calm, pleasant      Recent labs in [facility] reviewed by me today.     Assessment/Plan:  Nausea  Diarrhea, unspecified type  - chronic intermittent the entirety of TCU stays. ? R/t emotional distress. Afebrile and WBC counts have been normal. No noted abdominal pain or discomfort. Review of TCU EMR reveals rare watery stools- usually formed and 2 stools in a day max.  - GI referral placed. Writer has had extensive discussion with patient and with patient's out of state niece per her request.        Electronically signed by: ELISA Bajwa CNP     "

## 2022-08-15 NOTE — LETTER
"    8/15/2022        RE: Zakiya Christian  [address]  [facility] [address]        [facility] GERIATRICS    Chief Complaint   Patient presents with     RECHECK     HPI:  Zakiya Christian is a 77 year old  (1945), who is being seen today for an episodic care visit at: [facility] ([facility]) [[id number]]. Today's concern is:     Chronic intermittent nausea and diarrhea. GI referral has been placed and have discussed at length with patient and patient's niece.     Today patient found in room. Is alert, calm, NAD. Reports \"feel good today\". [facility] has sold town home and family will be helping her to move into new place on 8/29 - Writer was asked to share this information with  and this was done. Patient reports ate good this morning. Denies current nausea or diarrhea. Again reiterates she has noted a pattern that diarrhea worsens when she is anxious about something. Writer encouraged patient to share this information also with GI when she sees them.       Allergies, and PMH/PSH reviewed in [facility] today.  REVIEW OF SYSTEMS:  4 point ROS including Respiratory, CV, GI and , other than that noted in the HPI,  is negative    Objective:   /68   Pulse 72   Temp 97.4  F (36.3  C)   Resp 18   Ht 1.6 m (5' 3\")   Wt 85.3 kg (188 lb)   SpO2 95%   BMI 33.30 kg/m      Resp: Effort WNL, LSCTA   CV: VSS, trace bilateral LE edema  Abd- soft, nontender, BS +  Musc- STUBBS  Psych- alert, calm, pleasant      Recent labs in [facility] reviewed by me today.     Assessment/Plan:  Nausea  Diarrhea, unspecified type  - chronic intermittent the entirety of TCU stays. ? R/t emotional distress. Afebrile and WBC counts have been normal. No noted abdominal pain or discomfort. Review of TCU EMR reveals rare watery stools- usually formed and 2 stools in a day max.  - GI referral placed. Writer has had extensive discussion with patient and with patient's out of state niece per her request.        Electronically signed " by: ELISA Bajwa CNP           Sincerely,        ELISA Bajwa CNP

## 2022-08-23 DIAGNOSIS — Z53.9 ERRONEOUS ENCOUNTER--DISREGARD: Primary | ICD-10-CM

## 2022-08-29 ENCOUNTER — MEDICAL CORRESPONDENCE (OUTPATIENT)
Dept: HEALTH INFORMATION MANAGEMENT | Facility: CLINIC | Age: 77
End: 2022-08-29

## 2022-08-29 ENCOUNTER — DISCHARGE SUMMARY NURSING HOME (OUTPATIENT)
Dept: GERIATRICS | Facility: CLINIC | Age: 77
End: 2022-08-29
Payer: COMMERCIAL

## 2022-08-29 VITALS
TEMPERATURE: 97.1 F | OXYGEN SATURATION: 95 % | WEIGHT: 189.3 LBS | DIASTOLIC BLOOD PRESSURE: 66 MMHG | SYSTOLIC BLOOD PRESSURE: 132 MMHG | BODY MASS INDEX: 33.54 KG/M2 | RESPIRATION RATE: 16 BRPM | HEIGHT: 63 IN | HEART RATE: 70 BPM

## 2022-08-29 DIAGNOSIS — R19.7 DIARRHEA, UNSPECIFIED TYPE: ICD-10-CM

## 2022-08-29 DIAGNOSIS — K57.20 COLONIC DIVERTICULAR ABSCESS: Primary | ICD-10-CM

## 2022-08-29 DIAGNOSIS — F33.42 RECURRENT MAJOR DEPRESSION IN FULL REMISSION (H): ICD-10-CM

## 2022-08-29 DIAGNOSIS — N18.31 CHRONIC KIDNEY DISEASE, STAGE 3A (H): ICD-10-CM

## 2022-08-29 DIAGNOSIS — Z86.16 HISTORY OF 2019 NOVEL CORONAVIRUS DISEASE (COVID-19): ICD-10-CM

## 2022-08-29 DIAGNOSIS — R60.0 LOWER EXTREMITY EDEMA: ICD-10-CM

## 2022-08-29 DIAGNOSIS — R26.89 IMBALANCE: ICD-10-CM

## 2022-08-29 DIAGNOSIS — Z86.718 PERSONAL HISTORY OF DVT (DEEP VEIN THROMBOSIS): ICD-10-CM

## 2022-08-29 DIAGNOSIS — J44.9 CHRONIC OBSTRUCTIVE PULMONARY DISEASE, UNSPECIFIED COPD TYPE (H): ICD-10-CM

## 2022-08-29 DIAGNOSIS — R53.81 PHYSICAL DECONDITIONING: ICD-10-CM

## 2022-08-29 DIAGNOSIS — I10 PRIMARY HYPERTENSION: ICD-10-CM

## 2022-08-29 DIAGNOSIS — E11.69 TYPE 2 DIABETES MELLITUS WITH OTHER SPECIFIED COMPLICATION, WITHOUT LONG-TERM CURRENT USE OF INSULIN (H): ICD-10-CM

## 2022-08-29 DIAGNOSIS — K57.32 DIVERTICULITIS OF COLON: ICD-10-CM

## 2022-08-29 PROCEDURE — 99316 NF DSCHRG MGMT 30 MIN+: CPT | Performed by: NURSE PRACTITIONER

## 2022-08-29 NOTE — PROGRESS NOTES
[facility] GERIATRICS DISCHARGE SUMMARY  PATIENT'S NAME: Zakiya Christian  YOB: 1945  MEDICAL RECORD NUMBER:  [id number]  Place of Service where encounter took place:  [facility] (TCU) [[id number]]    PRIMARY CARE PROVIDER AND CLINIC RESPONSIBLE AFTER TRANSFER:   Ami Noriega MD, [facility] [address] [facility] [place]*    Assisted Living: [facility]     Transferring providers: Sabi Martinez, ELISA ABDI, Luz Marina Pantoja MD  Recent Hospitalization/ED:   [facility] stay 4/5/2022 through 4/15/2022  Date of SNF Admission: April 15, 2022  Date of SNF (anticipated) Discharge: August 29, 2022  Discharged to: new assisted living for patient   Cognitive Scores: SLUMS: 25/30  Physical Function: Ambulating with 4WW short distances, generally dressing with supervision.   DME: 2WW    CODE STATUS/ADVANCE DIRECTIVES DISCUSSION:  Full Code     ALLERGIES: Simvastatin    NURSING FACILITY COURSE   Medication Changes/Rationale:     As outlined    Summary of nursing facility stay:     Patient admitted to TCU for acute rehab and medical managment following hospitalization for diverticular intra-abdominal abscess. Treated with IV metronidazole and ceftriaxone and with drain placement in interventional radiation.Transitioned to IV Zosyn based on culture results and then to oral Levaquin and metronidazole course. Fluconazole also added 2/2 noted candida in abscess. Also diagnoesed with Klebsiella UTI inpatient- covered by previously mentioned antibiotics. PMH noted for recent diagnosis of PMR, now thought symptoms may have been related to abscess instead. Rheumatology consulted inpatient and recommended discontinuing azathioprine and tapering prednisone. Also PMH DM type 2, HTN, COPD, Anemia, CKD III.    Patient was hospitalized for Diverticultis in June as below as well.     Has been medically stable since. Discharge functional status is as above      Issues addressed  in TCU    Colonic diverticular abscess  received IV ceftriaxone and metronidazole then transitioned to IV Zosyn then to oral metronidazole and Levaquin. VSS  - has complete metronidazole and Levaquin (done 4/29 and 4/28). GI status stable. Drain now out and site clear and healing  - Monitor old drain site daily until healed  - completed fluconazole 4/27  - monitor GI status, VS  - f/w surgeon as directed       Diverticulitis of colon  - required hospitalization at [facility] 6/28 - 7/1. 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   - f/w Urology x 1. No active bleed. Xarelto held for a short time. Hgb stable.   - f/w urology as directed    Diarrhea, unspecified type  chronic intermittent the entirety of TCU stays. Also some periodic mild nausea ? R/t emotional distress. Afebrile and WBC counts have been normal. No noted abdominal pain or discomfort. Review of TCU EMR reveals rare watery stools- usually formed and 2 stools in a day max.  - continues on prn Zofran  - GI referral placed. Writer has had extensive discussion with patient and with patient's out of state niece per her request.       Chronic obstructive pulmonary disease, unspecified COPD type (H)  Exacerbation in June 2022 when dx with COVID 19. Treated with Prednisone course. Resolved. Respiratory status stable  - remains on PTA Trelegy Ellipata and Singulaire    Type 2 diabetes mellitus with other specified complication, without long-term current use of insulin (H)  Lab Results   Component Value Date    A1C 6.2 04/05/2022     - metformin held inpatient. BGL remained stable/controlled without it so it was not resumed d/t concerns GI side effects in setting of ongoing GI issues.    Chronic kidney disease, stage 3a (H)  Cr WNL of late.    -avoid nephrotoxins  - recheck periodic BMP to ensure stability      Primary hypertension  - chronic, controlled.   - continue  on Coreg, losartan and lasix    Lower extremity edema  - chronic, mild  - continue to wear compression and elevate periodically  - is on low dose lasix    History of 2019 novel coronavirus disease (COVID-19)  Dx 5/31- some mild respiratory symptoms. Treated with Paxlovid 6/5.   Resolved    Personal history of DVT (deep vein thrombosis)  - Noted.   - continues on Xarelto    Recurrent major depression in full remission (H)  - chronic, situational stressors in play and have been hard for patient. ACP referral made while in TCU. Mood has improved as discharge plan has come together and with sale of her home.   - continues on fluoxetine    Physical Deconditioning  Imbalance  - participated with rehab therapists but has completed and been done with the acute rehab for some time. Remained in facility while awaiting discharge plan. Discharge functional status is as above  Face to face for 2 WW attached.       Discharge Medications:      Current Outpatient Medications   Medication Sig Dispense Refill     acetaminophen (TYLENOL) 500 MG tablet Take 500 mg by mouth 4 times daily And q6h PRN - max 2 PRN/day       albuterol (PROAIR HFA/PROVENTIL HFA/VENTOLIN HFA) 108 (90 Base) MCG/ACT inhaler Inhale 2 puffs into the lungs every 6 hours as needed for shortness of breath / dyspnea or wheezing       albuterol (PROVENTIL) (5 MG/ML) 0.5% neb solution Take 5 mg by nebulization every 6 hours as needed for wheezing or shortness of breath / dyspnea       alendronate (FOSAMAX) 70 MG tablet Take 70 mg by mouth every 7 days       Ascorbic Acid (VITAMIN C) 500 MG CAPS Take 1,000 mg by mouth daily       aspirin 81 MG EC tablet Take 81 mg by mouth daily       calcium carbonate 600 mg-vitamin D 400 units (CALTRATE) 600-400 MG-UNIT per tablet Take 1 tablet by mouth 2 times daily       calcium polycarbophil (FIBERCON) 625 MG tablet Take 2 tablets by mouth daily       carvedilol (COREG) 12.5 MG tablet Take 12.5 mg by mouth 2 times daily (with  "meals)       cholecalciferol 50 MCG (2000 UT) CAPS Take 4,000 Units by mouth daily       coenzyme Q-10 capsule Take 1 capsule by mouth daily       FLUoxetine (PROZAC) 40 MG capsule Take 40 mg by mouth every morning       Fluticasone-Umeclidin-Vilanterol (TRELEGY ELLIPTA) 200-62.5-25 MCG/INH oral inhaler Inhale 1 puff into the lungs every morning       gabapentin (NEURONTIN) 300 MG capsule Take 900 mg by mouth At Bedtime       losartan (COZAAR) 25 MG tablet Take 12.5 mg by mouth every morning       montelukast (SINGULAIR) 10 MG tablet Take 10 mg by mouth At Bedtime       omeprazole (PRILOSEC) 20 MG DR capsule Take 40 mg by mouth every morning (before breakfast)       ondansetron (ZOFRAN) 4 MG tablet Take 4 mg by mouth every 6 hours as needed for nausea       potassium chloride ER (KLOR-CON M) 10 MEQ CR tablet Take 1 tablet (10 mEq) by mouth daily       pravastatin (PRAVACHOL) 40 MG tablet Take 40 mg by mouth At Bedtime       rivaroxaban ANTICOAGULANT (XARELTO) 20 MG TABS tablet Take 1 tablet (20 mg) by mouth daily (with dinner)            Controlled medications:   not applicable/none     Past Medical History:   Past Medical History:   Diagnosis Date     Diabetes (H)      Hypertension      Obese      PMR (polymyalgia rheumatica) (H)      Physical Exam:   Vitals: /66   Pulse 70   Temp 97.1  F (36.2  C)   Resp 16   Ht 1.6 m (5' 3\")   Wt 85.9 kg (189 lb 4.8 oz)   SpO2 95%   BMI 33.53 kg/m    BMI: Body mass index is 33.53 kg/m .  Resp: Effort WNL  CV: VS as abve  Musc- STUBBS  Psych- alert, calm, NAD       SNF labs:   Most Recent 3 CBC's:Recent Labs   Lab Test 07/28/22  0749 07/19/22  0741 07/11/22  0651   WBC 7.2 6.1 6.1   HGB 10.1* 12.8 10.6*   MCV 99 99 99    345 198     Most Recent 3 BMP's:Recent Labs   Lab Test 08/15/22  0644 07/28/22  0749 07/19/22  0741    142 142   POTASSIUM 3.6 3.5 3.3*   CHLORIDE 103 105 104   CO2 33* 29 28   BUN 10.1 8.7 8.7   CR 0.78 0.67 0.68   ANIONGAP 6* 8 10   VICENTA " 8.3* 8.3* 8.8   GLC 77 83 77       DISCHARGE PLAN:    Follow up labs: No labs orders/due    Medical Follow Up:      Follow up with primary care provider in 1 weeks  Follow up with specialist as outlined above       Discharge Services: Home Care:  Occupational Therapy, Physical Therapy and Home Health Aide    TOTAL DISCHARGE TIME:   Greater than 30 minutes  Electronically signed by:  ELISA Bajwa CNP     Documentation of Face to Face and Certification for Home Health Services    I certify that patient: Zakiya Christian is under my care and that I, or a nurse practitioner or physician's assistant working with me, had a face-to-face encounter that meets the physician face-to-face encounter requirements with this patient on: 8/29/2022.    This encounter with the patient was in whole, or in part, for the following medical condition, which is the primary reason for home health care: as above.    I certify that, based on my findings, the following services are medically necessary home health services: Occupational Therapy and Physical Therapy.    My clinical findings support the need for the above services because: Occupational Therapy Services are needed to assess and treat cognitive ability and address ADL safety due to impairment in difficulty with MRADLs. and Physical Therapy Services are needed to assess and treat the following functional impairments: difficulty walking.    Further, I certify that my clinical findings support that this patient is homebound (i.e. absences from home require considerable and taxing effort and are for medical reasons or [employer] services or infrequently or of short duration when for other reasons) because: Requires assistance of another person or specialized equipment to access medical services because patient: Requires supervision of another for safe transfer...    Based on the above findings. I certify that this patient is confined to the home and needs intermittent  skilled nursing care, physical therapy and/or speech therapy.  The patient is under my care, and I have initiated the establishment of the plan of care.  This patient will be followed by a physician who will periodically review the plan of care.  Physician/Provider to provide follow up care: Ami Noriega    Attending [facility] physician (the Medicare certified [facility] provider): ELISA Bajwa CNP  Physician Signature: See electronic signature associated with these discharge orders.  Date: 8/29/2022

## 2022-08-29 NOTE — PROGRESS NOTES
"  Face to Face and Medical Necessity Statement for DME Provider visit    Demographic Information on Zakiya Christian:  Gender: female  : 1945  [address] [address]  [phone] (home)     Medical Record: [id number]  Social Security Number: [id number]  Primary Care Provider: Ami Noriega  Insurance: Payor: BLUE PLUS / Plan: BLUE PLUS ADVANTAGE DUAL MSHO / Product Type: Medicare /     HPI:   Zakiya Christian is a 77 year old  (1945), who is being seen today for a face to face provider visit at [facility]; medical necessity statement for DME included. This patient requires the following:  DME Ordered and Medical Necessity Statement     2WW    The patient has a mobility limitation of physical decondition and imbalance that significantly impairs her ability to participate in one or more mobility-related activities of daily living in the home. The patient is able to safely use the walker and the functional mobility deficit can be sufficiently resolved with the use of a walker        Pt needing above DME with expected length of need of 99 months  due to medical necessity associated with following diagnosis:       Physical deconditioning    Imbalance    PMH   has a past medical history of Diabetes (H), Hypertension, Obese, and PMR (polymyalgia rheumatica) (H).      EXAM  Vitals: /66   Pulse 70   Temp 97.1  F (36.2  C)   Resp 16   Ht 1.6 m (5' 3\")   Wt 85.9 kg (189 lb 4.8 oz)   SpO2 95%   BMI 33.53 kg/m  ;BMI= Body mass index is 33.53 kg/m .    Resp: Effort WNL  CV: VS as above  [facility]  Psych- alert, calm, NAD      ASSESSMENT/PLAN:  1. Colonic diverticular abscess    2. Diverticulitis of colon    3. Diarrhea, unspecified type    4. Chronic obstructive pulmonary disease, unspecified COPD type (H)    5. Type 2 diabetes mellitus with other specified complication, without long-term current use of insulin (H)    6. Chronic kidney disease, stage 3a (H)    7. " Primary hypertension    8. Lower extremity edema    9. History of 2019 novel coronavirus disease (COVID-19)    10. Personal history of DVT (deep vein thrombosis)    11. Recurrent major depression in full remission (H)        Orders:  DME as above    ELECTRONICALLY SIGNED BY ANNALEE CERTIFIED PROVIDER:  ELISA Bajwa CNP   NPI: [id number]  [facility]  [address] [place] [address], [address]  [place] [address]

## 2022-08-29 NOTE — LETTER
8/29/2022        RE: Zakiya Christian  [address]  [place] [address]        [facility] GERIATRICS DISCHARGE SUMMARY  PATIENT'S NAME: Zakiya Christian  YOB: 1945  MEDICAL RECORD NUMBER:  [id number]  Place of Service where encounter took place:  [facility] (TCU) [[id number]]    PRIMARY CARE PROVIDER AND CLINIC RESPONSIBLE AFTER TRANSFER:   Ami Noriega MD, [facility] [address] [facility], [facility] [place]*    Assisted Living: [facility]     Transferring providers: Sabi Martinez, ELISA ABDI, Luz Marina Pantoja MD  Recent Hospitalization/ED:   [facility] stay 4/5/2022 through 4/15/2022  Date of SNF Admission: April 15, 2022  Date of SNF (anticipated) Discharge: August 29, 2022  Discharged to: new assisted living for patient   Cognitive Scores: SLUMS: 25/30  Physical Function: Ambulating with 4WW short distances, generally dressing with supervision.   DME: 2WW    CODE STATUS/ADVANCE DIRECTIVES DISCUSSION:  Full Code     ALLERGIES: Simvastatin    NURSING FACILITY COURSE   Medication Changes/Rationale:     As outlined    Summary of nursing facility stay:     Patient admitted to TCU for acute rehab and medical managment following hospitalization for diverticular intra-abdominal abscess. Treated with IV metronidazole and ceftriaxone and with drain placement in interventional radiation.Transitioned to IV Zosyn based on culture results and then to oral Levaquin and metronidazole course. Fluconazole also added 2/2 noted candida in abscess. Also diagnoesed with Klebsiella UTI inpatient- covered by previously mentioned antibiotics. PMH noted for recent diagnosis of PMR, now thought symptoms may have been related to abscess instead. Rheumatology consulted inpatient and recommended discontinuing azathioprine and tapering prednisone. Also PMH DM type 2, HTN, COPD, Anemia, CKD III.    Patient was hospitalized for Diverticultis in June as below as well.      Has been medically stable since. Discharge functional status is as above      Issues addressed in TCU    Colonic diverticular abscess  received IV ceftriaxone and metronidazole then transitioned to IV Zosyn then to oral metronidazole and Levaquin. VSS  - has complete metronidazole and Levaquin (done 4/29 and 4/28). GI status stable. Drain now out and site clear and healing  - Monitor old drain site daily until healed  - completed fluconazole 4/27  - monitor GI status, VS  - f/w surgeon as directed       Diverticulitis of colon  - required hospitalization at [facility] 6/28 - 7/1. 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   - f/w Urology x 1. No active bleed. Xarelto held for a short time. Hgb stable.   - f/w urology as directed    Diarrhea, unspecified type  chronic intermittent the entirety of TCU stays. Also some periodic mild nausea ? R/t emotional distress. Afebrile and WBC counts have been normal. No noted abdominal pain or discomfort. Review of TCU EMR reveals rare watery stools- usually formed and 2 stools in a day max.  - continues on prn Zofran  - GI referral placed. Writer has had extensive discussion with patient and with patient's out of state niece per her request.       Chronic obstructive pulmonary disease, unspecified COPD type (H)  Exacerbation in June 2022 when dx with COVID 19. Treated with Prednisone course. Resolved. Respiratory status stable  - remains on PTA Trelegy Ellipata and Singulaire    Type 2 diabetes mellitus with other specified complication, without long-term current use of insulin (H)  Lab Results   Component Value Date    A1C 6.2 04/05/2022     - metformin held inpatient. BGL remained stable/controlled without it so it was not resumed d/t concerns GI side effects in setting of ongoing GI issues.    Chronic kidney disease, stage 3a (H)  Cr WNL of late.    -avoid nephrotoxins  -  recheck periodic BMP to ensure stability      Primary hypertension  - chronic, controlled.   - continue on Coreg, losartan and lasix    Lower extremity edema  - chronic, mild  - continue to wear compression and elevate periodically  - is on low dose lasix    History of 2019 novel coronavirus disease (COVID-19)  Dx 5/31- some mild respiratory symptoms. Treated with Paxlovid 6/5.   Resolved    Personal history of DVT (deep vein thrombosis)  - Noted.   - continues on Xarelto    Recurrent major depression in full remission (H)  - chronic, situational stressors in play and have been hard for patient. ACP referral made while in TCU. Mood has improved as discharge plan has come together and with sale of her home.   - continues on fluoxetine    Physical Deconditioning  Imbalance  - participated with rehab therapists but has completed and been done with the acute rehab for some time. Remained in facility while awaiting discharge plan. Discharge functional status is as above  Face to face for 2 WW attached.       Discharge Medications:      Current Outpatient Medications   Medication Sig Dispense Refill     acetaminophen (TYLENOL) 500 MG tablet Take 500 mg by mouth 4 times daily And q6h PRN - max 2 PRN/day       albuterol (PROAIR HFA/PROVENTIL HFA/VENTOLIN HFA) 108 (90 Base) MCG/ACT inhaler Inhale 2 puffs into the lungs every 6 hours as needed for shortness of breath / dyspnea or wheezing       albuterol (PROVENTIL) (5 MG/ML) 0.5% neb solution Take 5 mg by nebulization every 6 hours as needed for wheezing or shortness of breath / dyspnea       alendronate (FOSAMAX) 70 MG tablet Take 70 mg by mouth every 7 days       Ascorbic Acid (VITAMIN C) 500 MG CAPS Take 1,000 mg by mouth daily       aspirin 81 MG EC tablet Take 81 mg by mouth daily       calcium carbonate 600 mg-vitamin D 400 units (CALTRATE) 600-400 MG-UNIT per tablet Take 1 tablet by mouth 2 times daily       calcium polycarbophil (FIBERCON) 625 MG tablet Take 2  "tablets by mouth daily       carvedilol (COREG) 12.5 MG tablet Take 12.5 mg by mouth 2 times daily (with meals)       cholecalciferol 50 MCG (2000 UT) CAPS Take 4,000 Units by mouth daily       coenzyme Q-10 capsule Take 1 capsule by mouth daily       FLUoxetine (PROZAC) 40 MG capsule Take 40 mg by mouth every morning       Fluticasone-Umeclidin-Vilanterol (TRELEGY ELLIPTA) 200-62.5-25 MCG/INH oral inhaler Inhale 1 puff into the lungs every morning       gabapentin (NEURONTIN) 300 MG capsule Take 900 mg by mouth At Bedtime       losartan (COZAAR) 25 MG tablet Take 12.5 mg by mouth every morning       montelukast (SINGULAIR) 10 MG tablet Take 10 mg by mouth At Bedtime       omeprazole (PRILOSEC) 20 MG DR capsule Take 40 mg by mouth every morning (before breakfast)       ondansetron (ZOFRAN) 4 MG tablet Take 4 mg by mouth every 6 hours as needed for nausea       potassium chloride ER (KLOR-CON M) 10 MEQ CR tablet Take 1 tablet (10 mEq) by mouth daily       pravastatin (PRAVACHOL) 40 MG tablet Take 40 mg by mouth At Bedtime       rivaroxaban ANTICOAGULANT (XARELTO) 20 MG TABS tablet Take 1 tablet (20 mg) by mouth daily (with dinner)            Controlled medications:   not applicable/none     Past Medical History:   Past Medical History:   Diagnosis Date     Diabetes (H)      Hypertension      Obese      PMR (polymyalgia rheumatica) (H)      Physical Exam:   Vitals: /66   Pulse 70   Temp 97.1  F (36.2  C)   Resp 16   Ht 1.6 m (5' 3\")   Wt 85.9 kg (189 lb 4.8 oz)   SpO2 95%   BMI 33.53 kg/m    BMI: Body mass index is 33.53 kg/m .  Resp: Effort WNL  CV: VS as abve  Musc- STUBBS  Psych- alert, calm, NAD       SNF labs:   Most Recent 3 CBC's:Recent Labs   Lab Test 07/28/22  0749 07/19/22  0741 07/11/22  0651   WBC 7.2 6.1 6.1   HGB 10.1* 12.8 10.6*   MCV 99 99 99    345 198     Most Recent 3 BMP's:Recent Labs   Lab Test 08/15/22  0644 07/28/22  0749 07/19/22  0741    142 142   POTASSIUM 3.6 3.5 3.3* "   CHLORIDE 103 105 104   CO2 33* 29 28   BUN 10.1 8.7 8.7   CR 0.78 0.67 0.68   ANIONGAP 6* 8 10   VICENTA 8.3* 8.3* 8.8   GLC 77 83 77       DISCHARGE PLAN:    Follow up labs: No labs orders/due    Medical Follow Up:      Follow up with primary care provider in 1 weeks  Follow up with specialist as outlined above       Discharge Services: Home Care:  Occupational Therapy, Physical Therapy and Home Health Aide    TOTAL DISCHARGE TIME:   Greater than 30 minutes  Electronically signed by:  ELISA Bajwa CNP     Documentation of Face to Face and Certification for Home Health Services    I certify that patient: Zakiya Christian is under my care and that I, or a nurse practitioner or physician's assistant working with me, had a face-to-face encounter that meets the physician face-to-face encounter requirements with this patient on: 8/29/2022.    This encounter with the patient was in whole, or in part, for the following medical condition, which is the primary reason for home health care: as above.    I certify that, based on my findings, the following services are medically necessary home health services: Occupational Therapy and Physical Therapy.    My clinical findings support the need for the above services because: Occupational Therapy Services are needed to assess and treat cognitive ability and address ADL safety due to impairment in difficulty with MRADLs. and Physical Therapy Services are needed to assess and treat the following functional impairments: difficulty walking.    Further, I certify that my clinical findings support that this patient is homebound (i.e. absences from home require considerable and taxing effort and are for medical reasons or [facility] services or infrequently or of short duration when for other reasons) because: Requires assistance of another person or specialized equipment to access medical services because patient: Requires supervision of another for safe transfer...    Based  on the above findings. I certify that this patient is confined to the home and needs intermittent skilled nursing care, physical therapy and/or speech therapy.  The patient is under my care, and I have initiated the establishment of the plan of care.  This patient will be followed by a physician who will periodically review the plan of care.  Physician/Provider to provide follow up care: Ami Noriega    Attending hospital physician (the Medicare certified PECOS provider): ELISA Bajwa CNP  Physician Signature: See electronic signature associated with these discharge orders.  Date: 2022              Face to Face and Medical Necessity Statement for DME Provider visit    Demographic Information on Zakiya Christian:  Gender: female  : 1945  [address] [address]  [phone] (home)     Medical Record: [id number]  Social Security Number: [id number]  Primary Care Provider: Ami Noriega  Insurance: Payor: BLUE PLUS / Plan: BLUE PLUS ADVANTAGE DUAL MSHO / Product Type: Medicare /     HPI:   Zakiya Christian is a 77 year old  (1945), who is being seen today for a face to face provider visit at [facility]; medical necessity statement for DME included. This patient requires the following:  DME Ordered and Medical Necessity Statement     2WW    The patient has a mobility limitation of physical decondition and imbalance that significantly impairs her ability to participate in one or more mobility-related activities of daily living in the home. The patient is able to safely use the walker and the functional mobility deficit can be sufficiently resolved with the use of a walker        Pt needing above DME with expected length of need of 99 months  due to medical necessity associated with following diagnosis:       Physical deconditioning    Imbalance    PMH   has a past medical history of Diabetes (H), Hypertension, Obese, and PMR  "(polymyalgia rheumatica) (H).      EXAM  Vitals: /66   Pulse 70   Temp 97.1  F (36.2  C)   Resp 16   Ht 1.6 m (5' 3\")   Wt 85.9 kg (189 lb 4.8 oz)   SpO2 95%   BMI 33.53 kg/m  ;BMI= Body mass index is 33.53 kg/m .    Resp: Effort WNL  CV: VS as above  [facility]  Psych- alert, calm, NAD      ASSESSMENT/PLAN:  1. Colonic diverticular abscess    2. Diverticulitis of colon    3. Diarrhea, unspecified type    4. Chronic obstructive pulmonary disease, unspecified COPD type (H)    5. Type 2 diabetes mellitus with other specified complication, without long-term current use of insulin (H)    6. Chronic kidney disease, stage 3a (H)    7. Primary hypertension    8. Lower extremity edema    9. History of 2019 novel coronavirus disease (COVID-19)    10. Personal history of DVT (deep vein thrombosis)    11. Recurrent major depression in full remission (H)        Orders:  DME as above    ELECTRONICALLY SIGNED BY [facility] CERTIFIED PROVIDER:  ELISA Bajwa CNP   NPI: [id number]  [facility]  [address] [place] [address], [address]  [place] [address]              Sincerely,        ELISA Bajwa CNP    "

## 2022-09-08 ENCOUNTER — LAB REQUISITION (OUTPATIENT)
Dept: LAB | Facility: CLINIC | Age: 77
End: 2022-09-08

## 2022-09-08 DIAGNOSIS — E11.22 TYPE 2 DIABETES MELLITUS WITH DIABETIC CHRONIC KIDNEY DISEASE (H): ICD-10-CM

## 2022-09-08 DIAGNOSIS — K57.80 DIVERTICULITIS OF INTESTINE, PART UNSPECIFIED, WITH PERFORATION AND ABSCESS WITHOUT BLEEDING: ICD-10-CM

## 2022-09-08 LAB
ANION GAP SERPL CALCULATED.3IONS-SCNC: 12 MMOL/L (ref 7–15)
BUN SERPL-MCNC: 20.5 MG/DL (ref 8–23)
CALCIUM SERPL-MCNC: 9.1 MG/DL (ref 8.8–10.2)
CHLORIDE SERPL-SCNC: 105 MMOL/L (ref 98–107)
CREAT SERPL-MCNC: 1.1 MG/DL (ref 0.51–0.95)
DEPRECATED HCO3 PLAS-SCNC: 24 MMOL/L (ref 22–29)
ERYTHROCYTE [SEDIMENTATION RATE] IN BLOOD BY WESTERGREN METHOD: 89 MM/HR (ref 0–30)
GFR SERPL CREATININE-BSD FRML MDRD: 51 ML/MIN/1.73M2
GLUCOSE SERPL-MCNC: 96 MG/DL (ref 70–99)
POTASSIUM SERPL-SCNC: 4.8 MMOL/L (ref 3.4–5.3)
SODIUM SERPL-SCNC: 141 MMOL/L (ref 136–145)

## 2022-09-08 PROCEDURE — 86140 C-REACTIVE PROTEIN: CPT | Performed by: FAMILY MEDICINE

## 2022-09-08 PROCEDURE — 85652 RBC SED RATE AUTOMATED: CPT | Performed by: FAMILY MEDICINE

## 2022-09-08 PROCEDURE — 80048 BASIC METABOLIC PNL TOTAL CA: CPT | Performed by: FAMILY MEDICINE

## 2022-09-09 LAB — CRP SERPL-MCNC: <3 MG/L

## 2022-09-27 NOTE — PLAN OF CARE
Problem: Nausea and Vomiting  Goal: Fluid and Electrolyte Balance  Outcome: Ongoing, Progressing  Pt alert and oriented, report generalized back and shoulder pain, managed appropriately with reposition, prn medication, and ice pack. Magnesium replace per protocol, recheck next am on the last dose., ALISSA drain irrigated, 20cc output, insulin given per sliding scale, pt ambulating in room with gait belt and walker.  Problem: Plan of Care - These are the overarching goals to be used throughout the patient stay.    Goal: Optimal Comfort and Wellbeing  Outcome: Ongoing, Progressing  Pt LS clear and diminished, remained on room air throughout the shift, encouraging IS used, cough and deep breathing, awaiting TCU placement, continue to monitor.               electronic

## 2022-10-13 ENCOUNTER — LAB REQUISITION (OUTPATIENT)
Dept: LAB | Facility: CLINIC | Age: 77
End: 2022-10-13
Payer: COMMERCIAL

## 2022-10-13 DIAGNOSIS — M35.3 POLYMYALGIA RHEUMATICA (H): ICD-10-CM

## 2022-10-18 LAB
CRP SERPL HS-MCNC: 3.15 MG/L
ERYTHROCYTE [SEDIMENTATION RATE] IN BLOOD BY WESTERGREN METHOD: 60 MM/HR (ref 0–30)

## 2022-10-18 PROCEDURE — 86141 C-REACTIVE PROTEIN HS: CPT | Mod: ORL | Performed by: FAMILY MEDICINE

## 2022-10-18 PROCEDURE — 85652 RBC SED RATE AUTOMATED: CPT | Mod: ORL | Performed by: FAMILY MEDICINE

## 2022-10-18 PROCEDURE — P9604 ONE-WAY ALLOW PRORATED TRIP: HCPCS | Mod: ORL | Performed by: FAMILY MEDICINE

## 2022-10-18 PROCEDURE — 36415 COLL VENOUS BLD VENIPUNCTURE: CPT | Mod: ORL | Performed by: FAMILY MEDICINE

## 2022-12-01 ENCOUNTER — APPOINTMENT (OUTPATIENT)
Dept: CT IMAGING | Facility: CLINIC | Age: 77
End: 2022-12-01
Attending: STUDENT IN AN ORGANIZED HEALTH CARE EDUCATION/TRAINING PROGRAM
Payer: COMMERCIAL

## 2022-12-01 ENCOUNTER — HOSPITAL ENCOUNTER (EMERGENCY)
Facility: CLINIC | Age: 77
Discharge: HOME | End: 2022-12-01
Attending: EMERGENCY MEDICINE | Admitting: EMERGENCY MEDICINE
Payer: COMMERCIAL

## 2022-12-01 VITALS
BODY MASS INDEX: 33.59 KG/M2 | RESPIRATION RATE: 18 BRPM | HEIGHT: 63 IN | TEMPERATURE: 97.9 F | OXYGEN SATURATION: 95 % | WEIGHT: 189.6 LBS | SYSTOLIC BLOOD PRESSURE: 117 MMHG | HEART RATE: 77 BPM | DIASTOLIC BLOOD PRESSURE: 58 MMHG
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
## 2022-12-01 DIAGNOSIS — N17.9 ACUTE KIDNEY INJURY (H): ICD-10-CM

## 2022-12-01 DIAGNOSIS — S01.81XA FACIAL LACERATION, INITIAL ENCOUNTER: ICD-10-CM

## 2022-12-01 LAB
ANION GAP SERPL CALCULATED.3IONS-SCNC: 9 MMOL/L (ref 5–18)
BUN SERPL-MCNC: 35 MG/DL (ref 8–28)
CALCIUM SERPL-MCNC: 9.6 MG/DL (ref 8.5–10.5)
CHLORIDE BLD-SCNC: 103 MMOL/L (ref 98–107)
CO2 SERPL-SCNC: 27 MMOL/L (ref 22–31)
CREAT SERPL-MCNC: 1.36 MG/DL (ref 0.6–1.1)
ERYTHROCYTE [DISTWIDTH] IN BLOOD BY AUTOMATED COUNT: 15.1 % (ref 10–15)
GFR SERPL CREATININE-BSD FRML MDRD: 40 ML/MIN/1.73M2
GLUCOSE BLD-MCNC: 136 MG/DL (ref 70–125)
HCT VFR BLD AUTO: 36 % (ref 35–47)
HGB BLD-MCNC: 11 G/DL (ref 11.7–15.7)
MCH RBC QN AUTO: 30 PG (ref 26.5–33)
MCHC RBC AUTO-ENTMCNC: 30.6 G/DL (ref 31.5–36.5)
MCV RBC AUTO: 98 FL (ref 78–100)
PLATELET # BLD AUTO: 260 10E3/UL (ref 150–450)
POTASSIUM BLD-SCNC: 5 MMOL/L (ref 3.5–5)
RBC # BLD AUTO: 3.67 10E6/UL (ref 3.8–5.2)
SODIUM SERPL-SCNC: 139 MMOL/L (ref 136–145)
WBC # BLD AUTO: 10.4 10E3/UL (ref 4–11)

## 2022-12-01 PROCEDURE — 12002 RPR S/N/AX/GEN/TRNK2.6-7.5CM: CPT

## 2022-12-01 PROCEDURE — 85027 COMPLETE CBC AUTOMATED: CPT | Performed by: EMERGENCY MEDICINE

## 2022-12-01 PROCEDURE — 271N000002 HC RX 271: Performed by: STUDENT IN AN ORGANIZED HEALTH CARE EDUCATION/TRAINING PROGRAM

## 2022-12-01 PROCEDURE — 70450 CT HEAD/BRAIN W/O DYE: CPT

## 2022-12-01 PROCEDURE — 36415 COLL VENOUS BLD VENIPUNCTURE: CPT | Performed by: EMERGENCY MEDICINE

## 2022-12-01 PROCEDURE — 99284 EMERGENCY DEPT VISIT MOD MDM: CPT | Mod: 25

## 2022-12-01 PROCEDURE — 250N000011 HC RX IP 250 OP 636: Performed by: STUDENT IN AN ORGANIZED HEALTH CARE EDUCATION/TRAINING PROGRAM

## 2022-12-01 PROCEDURE — 80048 BASIC METABOLIC PNL TOTAL CA: CPT | Performed by: EMERGENCY MEDICINE

## 2022-12-01 PROCEDURE — 250N000009 HC RX 250: Performed by: STUDENT IN AN ORGANIZED HEALTH CARE EDUCATION/TRAINING PROGRAM

## 2022-12-01 RX ORDER — METHYLCELLULOSE 4000CPS 30 %
POWDER (GRAM) MISCELLANEOUS
Status: COMPLETED | OUTPATIENT
Start: 2022-12-01 | End: 2022-12-01

## 2022-12-01 RX ADMIN — EPINEPHRINE BITARTRATE 3 ML: 1 POWDER at 10:48

## 2022-12-01 RX ADMIN — Medication: at 10:48

## 2022-12-01 ASSESSMENT — ACTIVITIES OF DAILY LIVING (ADL): ADLS_ACUITY_SCORE: 33

## 2022-12-01 NOTE — ED NOTES
"ED Provider In Triage Note  [facility]  Encounter Date: Dec 1, 2022    Chief Complaint   Patient presents with     Fall     Head Injury       Brief HPI:   Zakiya Christian is a 77 year old female presenting to the Emergency Department with a chief complaint of;    Fall on blood thinners (Xarelto), struck head  Was shopping at the store, leaned forward to  an 8-pack of some beverage and fell over, struck face on ground  No LOC, neuro intact, no headache, no N/V  4-5 cm lac to right upper brow  On xarelto for DVT a few months ago she thinks    Tetanus up to date; last   Td/Tdap  08/18/2020          Brief Physical Exam:  /58   Pulse 77   Temp 97.9  F (36.6  C) (Oral)   Resp 18   Ht 1.6 m (5' 3\")   Wt 86 kg (189 lb 9.5 oz)   SpO2 95%   BMI 33.59 kg/m    General: Non-toxic appearing  HEENT: Atraumatic  Resp: No respiratory distress  Abdomen: Non-peritoneal  Neuro: Alert, oriented, answers questions appropriately  Psych: Behavior appropriate      Plan Initiated in Triage:  Orders Placed This Encounter     Head CT w/o contrast     lidocaine/EPINEPHrine/tetracaine (LET) solution KIT 3 mL     methylcellulose powder       PIT Dispo:   Return to lobby while awaiting workup and ED bed availability    Ken Raymundo MD on 12/1/2022 at 10:49 AM    Patient was evaluated by the Physician in Triage due to a limitation of available rooms in the Emergency Department. A plan of care was discussed based on the information obtained on the initial evaluation and patient was consuled to return back to the Emergency Department lobby after this initial evalutaiton until results were obtained or a room became available in the Emergency Department. Patient was counseled not to leave prior to receiving the results of their workup.     MD DEEPTHI Gusman [facility] EMERGENCY ROOM  [facility] [facility] [address] [phone]     Ken Raymundo, " MD  12/01/22 1054       Ken Raymundo MD  12/01/22 1056

## 2022-12-01 NOTE — ED PROVIDER NOTES
EMERGENCY DEPARTMENT ENCOUNTER      NAME: Zakiya Christian  AGE: 77 year old female  YOB: 1945  MRN: [id number]  EVALUATION DATE & TIME: No admission date for patient encounter.    PCP: Ami Noriega    ED PROVIDER: Benjamin Dong M.D.      Chief Complaint   Patient presents with     Fall     Head Injury         FINAL IMPRESSION:  Fall  4.5 cm facial laceration  Acute kidney injury      ED COURSE & MEDICAL DECISION MAKING:    Pertinent Labs & Imaging studies reviewed. (See chart for details)  77 year old female presents to the Emergency Department for evaluation of injuries after mechanical fall.  Patient was at a local grocery store picking something up off the floor when she lost her balance pitching forward and falling striking her head.  No loss of consciousness.  Patient did suffer a large laceration over the right brow.  Patient does take Eliquis routinely.  Sent for immediate CT imaging.  CT without evidence of obvious hemorrhage.  Review of records indicate no recent laboratory evaluation.  Will obtain basic metabolic and CBC to assess for potential electrolyte imbalance, dehydration, anemia which be contributory.  We will proceed with closure of her laceration.  Patient with a full-thickness approximately 4.5 cm laceration along the right supraorbital ridge.  Some slight periorbital ecchymosis and soft tissue swelling.  No hyphema.  Remainder of exam is unremarkable.  No evidence of extremity injury.  Neurologically intact.  Patient appears non toxic with stable vitals signs.     11:21 AM. CT reviewed. No evidence of obvious hemorrhage.  11:25 AM I updated patient on results. Waiting for room to examine.  11:49 AM I met with the patient for the initial interview and physical examination. Discussed plan for treatment and workup in the ED. I repaired patients laceration.  1:43 PM.  Laboratory evaluation with slight elevation of creatinine suggesting acute kidney injury.  Patient  encouraged to drink extra fluids.  At the conclusion of the encounter I discussed the results of all of the tests and the disposition. The questions were answered and return precautions provided. The patient or family acknowledged understanding and was agreeable with the care plan.       PPE: Provider wore gloves, N95 mask, and surgical cap.     MEDICATIONS GIVEN IN THE EMERGENCY:  Medications   lidocaine/EPINEPHrine/tetracaine (LET) solution KIT 3 mL (3 mLs Topical Given 12/1/22 1048)   methylcellulose powder ( Topical Given 12/1/22 1048)       NEW PRESCRIPTIONS STARTED AT TODAY'S ER VISIT  New Prescriptions    No medications on file          =================================================================    HPI    Patient information was obtained from: Patient    Use of Intrepreter: N/A        Zakiya Christian is a 77 year old female with a pertient medical history of DM, CKD, and hypertension who presents to the ED for evaluation of a fall. Patient states that she was grocery shopping and tried to  some beverages and lost her balance. She fell forward and hit her head on the ground. Denies loss of consciousness, vomiting, diarrhea, or headache. Laceration located above the right eyebrow with some aching pains. Patient is on blood thinners (Xarelto).      REVIEW OF SYSTEMS   Constitutional: Denies fever, chills  Respiratory: Denies productive cough or increased work of breathing  Cardiovascular: Denies chest pain, palpitations  GI: Denies abdominal pain, nausea, vomiting, or change in bowel or bladder habits   Musculoskeletal: Denies any new muscle/joint swelling  Skin: Denies rash. Endorses laceration (above right eyebrow)  Neurologic: Denies focal weakness, headache, or loss of consciousness.  All systems negative except as marked.     PAST MEDICAL HISTORY:  Past Medical History:   Diagnosis Date     Diabetes (H)      Hypertension      Obese      PMR (polymyalgia rheumatica) (H)        PAST SURGICAL  HISTORY:  Past Surgical History:   Procedure Laterality Date     IR ABSCESS TUBE CHANGE  4/22/2022         CURRENT MEDICATIONS:    No current facility-administered medications for this encounter.    Current Outpatient Medications:      acetaminophen (TYLENOL) 500 MG tablet, Take 500 mg by mouth 4 times daily And q6h PRN - max 2 PRN/day, Disp: , Rfl:      albuterol (PROAIR HFA/PROVENTIL HFA/VENTOLIN HFA) 108 (90 Base) MCG/ACT inhaler, Inhale 2 puffs into the lungs every 6 hours as needed for shortness of breath / dyspnea or wheezing, Disp: , Rfl:      albuterol (PROVENTIL) (5 MG/ML) 0.5% neb solution, Take 5 mg by nebulization every 6 hours as needed for wheezing or shortness of breath / dyspnea, Disp: , Rfl:      alendronate (FOSAMAX) 70 MG tablet, Take 70 mg by mouth every 7 days, Disp: , Rfl:      Ascorbic Acid (VITAMIN C) 500 MG CAPS, Take 1,000 mg by mouth daily, Disp: , Rfl:      aspirin 81 MG EC tablet, Take 81 mg by mouth daily, Disp: , Rfl:      calcium carbonate 600 mg-vitamin D 400 units (CALTRATE) 600-400 MG-UNIT per tablet, Take 1 tablet by mouth 2 times daily, Disp: , Rfl:      calcium polycarbophil (FIBERCON) 625 MG tablet, Take 2 tablets by mouth daily, Disp: , Rfl:      carvedilol (COREG) 12.5 MG tablet, Take 12.5 mg by mouth 2 times daily (with meals), Disp: , Rfl:      cholecalciferol 50 MCG (2000 UT) CAPS, Take 4,000 Units by mouth daily, Disp: , Rfl:      coenzyme Q-10 capsule, Take 1 capsule by mouth daily, Disp: , Rfl:      FLUoxetine (PROZAC) 40 MG capsule, Take 40 mg by mouth every morning, Disp: , Rfl:      Fluticasone-Umeclidin-Vilanterol (TRELEGY ELLIPTA) 200-62.5-25 MCG/INH oral inhaler, Inhale 1 puff into the lungs every morning, Disp: , Rfl:      gabapentin (NEURONTIN) 300 MG capsule, Take 900 mg by mouth At Bedtime, Disp: , Rfl:      losartan (COZAAR) 25 MG tablet, Take 12.5 mg by mouth every morning, Disp: , Rfl:      montelukast (SINGULAIR) 10 MG tablet, Take 10 mg by mouth At  "Bedtime, Disp: , Rfl:      omeprazole (PRILOSEC) 20 MG DR capsule, Take 40 mg by mouth every morning (before breakfast), Disp: , Rfl:      ondansetron (ZOFRAN) 4 MG tablet, Take 4 mg by mouth every 6 hours as needed for nausea, Disp: , Rfl:      potassium chloride ER (KLOR-CON M) 10 MEQ CR tablet, Take 1 tablet (10 mEq) by mouth daily, Disp: , Rfl:      pravastatin (PRAVACHOL) 40 MG tablet, Take 40 mg by mouth At Bedtime, Disp: , Rfl:      rivaroxaban ANTICOAGULANT (XARELTO) 20 MG TABS tablet, Take 1 tablet (20 mg) by mouth daily (with dinner), Disp: , Rfl:     ALLERGIES:  Allergies   Allergen Reactions     Simvastatin Hives       FAMILY HISTORY:  No family history on file.    SOCIAL HISTORY:   Social History     Socioeconomic History     Marital status: Single       VITALS:  Patient Vitals for the past 24 hrs:   BP Temp Temp src Pulse Resp SpO2 Height Weight   12/01/22 1041 117/58 97.9  F (36.6  C) Oral 77 18 95 % 1.6 m (5' 3\") 86 kg (189 lb 9.5 oz)        PHYSICAL EXAM    Constitutional:  Awake, alert, in no apparent distress  HENT:  Normocephalic, Atraumatic. Bilateral external ears normal. Oropharynx moist. Nose normal. Neck- Normal range of motion   Eyes:  PERRL, EOMI with no signs of entrapment, Conjunctiva normal, No discharge.  Moderate right periorbital edema with ecchymosis greater inferiorly and superiorly.  4 cm facial laceration above the eyebrow on the right.  No hyphema.  Respiratory:  Normal breath sounds, No respiratory distress, No wheezing.    Cardiovascular:  Normal heart rate, Normal rhythm, No appreciable rubs or gallops.   GI:  Soft, No tenderness, No distension, No palpable masses  Musculoskeletal:   No edema. Good range of motion in all major joints. No tenderness to palpation or major deformities noted.  Integument:  Warm, Dry, No erythema, No rash. 4.5 cm full thickness laceration the entire length of the right eyebrow. Slight periorbital swelling and ecchymosis more inferior than " superior.  Neurologic:  Alert & oriented, Normal motor function, Normal sensory function, No focal deficits noted.   Psychiatric:  Affect normal, Judgment normal, Mood normal.     LAB:  All pertinent labs reviewed and interpreted.  Results for orders placed or performed during the hospital encounter of 12/01/22   Head CT w/o contrast     Status: None    Narrative    EXAM: CT HEAD W/O CONTRAST  LOCATION: [facility]  DATE/TIME: 12/1/2022 11:08 AM    INDICATION: Fall, right forehead laceration.  COMPARISON: CT brain 9/18/2022.  TECHNIQUE: Routine CT Head without IV contrast. Multiplanar reformats. Dose reduction techniques were used.    FINDINGS:  INTRACRANIAL CONTENTS: No finding for intracranial hemorrhage or mass. There is extensive low-attenuation change within the cerebral white matter both hemispheres, nonspecific but compatible with areas of gliosis and/or chronic myelin loss. No convincing   finding for acute infarct. No transcortical areas of low attenuation change. No mass effect or midline shift.    Mild-to-moderate prominence lateral and 3rd ventricles. Cerebellar tonsils are normally positioned. Sella is unremarkable for technique. Corpus callosum is normally formed.    VISUALIZED ORBITS/SINUSES/MASTOIDS: Right forehead and superficial periorbital soft tissue swelling/contusion without intraorbital extension. Prior cataract surgeries. No orbital fracture. Visualized paranasal sinuses, middle ear cavities, and mastoid   air cells are clear.    BONES/SOFT TISSUES: Right forehead superficial soft tissue swelling/contusion. No radiopaque foreign body. No calvarial fracture.      Impression    IMPRESSION:  1.  Right forehead and superficial periorbital soft tissue contusion/hemorrhage. No post septal extension of hemorrhage.    2.  No finding for intracranial hemorrhage, mass, or acute infarct.    3.  Mild-to-moderate volume loss and moderate-to-advanced presumed sequela of chronic  microvascular ischemic change, similar to prior.   Basic metabolic panel     Status: Abnormal   Result Value Ref Range    Sodium 139 136 - 145 mmol/L    Potassium 5.0 3.5 - 5.0 mmol/L    Chloride 103 98 - 107 mmol/L    Carbon Dioxide (CO2) 27 22 - 31 mmol/L    Anion Gap 9 5 - 18 mmol/L    Urea Nitrogen 35 (H) 8 - 28 mg/dL    Creatinine 1.36 (H) 0.60 - 1.10 mg/dL    Calcium 9.6 8.5 - 10.5 mg/dL    Glucose 136 (H) 70 - 125 mg/dL    GFR Estimate 40 (L) >60 mL/min/1.73m2   CBC (+ platelets, no diff)     Status: Abnormal   Result Value Ref Range    WBC Count 10.4 4.0 - 11.0 10e3/uL    RBC Count 3.67 (L) 3.80 - 5.20 10e6/uL    Hemoglobin 11.0 (L) 11.7 - 15.7 g/dL    Hematocrit 36.0 35.0 - 47.0 %    MCV 98 78 - 100 fL    MCH 30.0 26.5 - 33.0 pg    MCHC 30.6 (L) 31.5 - 36.5 g/dL    RDW 15.1 (H) 10.0 - 15.0 %    Platelet Count 260 150 - 450 10e3/uL       RADIOLOGY:  Reviewed all pertinent imaging. Please see official radiology report.  Head CT w/o contrast    (Results Pending)       PROCEDURES:   PROCEDURE: Laceration Repair   INDICATIONS: Laceration   PROCEDURE PROVIDER: Dr Benjamin Dong   SITE: Above right brow   TYPE/SIZE:  Gaping linear supraorbital ridge  4.5 cm (total length)   FUNCTIONAL ASSESSMENT: Distal  intact   MEDICATION: 4mLs of 1% lidocaine with epinephrine   PREPARATION:  Normal saline   DEBRIDEMENT:  None   CLOSURE:   5-0 Vicryl Rapide running suture    Total number of sutures/staples placed: 12        I, Julee Peterson, am serving as a scribe to document services personally performed by Benjamin Dong MD, based on my observation and the provider's statements to me. I, Benjamin Dong MD attest that Julee Peterson is acting in a scribe capacity, has observed my performance of the services and has documented them in accordance with my direction.    Benjamin oDng M.D.  Emergency Medicine  [facility] EMERGENCY ROOM     Benjamin Dong MD  12/01/22 [id number]

## 2022-12-01 NOTE — ED TRIAGE NOTES
Pt lost balance picking up something heavy at [employer] and fell forward hitting her head. Is on blood thinners. Laceration above right eyebrow     Triage Assessment     Row Name 12/01/22 1044       Triage Assessment (Adult)    Airway WDL WDL       Respiratory WDL    Respiratory WDL WDL       Skin Circulation/Temperature WDL    Skin Circulation/Temperature WDL WDL       Cardiac WDL    Cardiac WDL WDL       Peripheral/Neurovascular WDL    Peripheral Neurovascular WDL WDL       Cognitive/Neuro/Behavioral WDL    Cognitive/Neuro/Behavioral WDL WDL

## 2022-12-07 DIAGNOSIS — D17.71 ANGIOMYOLIPOMA OF LEFT KIDNEY: Primary | ICD-10-CM

## 2022-12-09 ENCOUNTER — HOSPITAL ENCOUNTER (OUTPATIENT)
Dept: CT IMAGING | Facility: CLINIC | Age: 77
Discharge: HOME | End: 2022-12-09
Attending: RADIOLOGY | Admitting: RADIOLOGY
Payer: COMMERCIAL

## 2022-12-09 DIAGNOSIS — D17.71 ANGIOMYOLIPOMA OF LEFT KIDNEY: ICD-10-CM

## 2022-12-09 PROCEDURE — 250N000011 HC RX IP 250 OP 636: Performed by: RADIOLOGY

## 2022-12-09 PROCEDURE — 74174 CTA ABD&PLVS W/CONTRAST: CPT

## 2022-12-09 RX ORDER — IOPAMIDOL 755 MG/ML
100 INJECTION, SOLUTION INTRAVASCULAR ONCE
Status: COMPLETED | OUTPATIENT
Start: 2022-12-09 | End: 2022-12-09

## 2022-12-09 RX ADMIN — IOPAMIDOL 100 ML: 755 INJECTION, SOLUTION INTRAVENOUS at 10:09

## 2022-12-16 ENCOUNTER — LAB REQUISITION (OUTPATIENT)
Dept: LAB | Facility: CLINIC | Age: 77
End: 2022-12-16

## 2022-12-16 DIAGNOSIS — D17.71 ANGIOMYOLIPOMA OF LEFT KIDNEY: Primary | ICD-10-CM

## 2022-12-16 DIAGNOSIS — D17.9 ANGIOMYOLIPOMA: Primary | ICD-10-CM

## 2022-12-16 DIAGNOSIS — R93.5 ABNORMAL FINDINGS ON DIAGNOSTIC IMAGING OF OTHER ABDOMINAL REGIONS, INCLUDING RETROPERITONEUM: ICD-10-CM

## 2022-12-16 PROCEDURE — 87086 URINE CULTURE/COLONY COUNT: CPT | Performed by: FAMILY MEDICINE

## 2022-12-19 LAB
BACTERIA UR CULT: ABNORMAL
BACTERIA UR CULT: ABNORMAL

## 2023-01-05 ENCOUNTER — LAB REQUISITION (OUTPATIENT)
Dept: LAB | Facility: CLINIC | Age: 78
End: 2023-01-05
Payer: COMMERCIAL

## 2023-01-05 DIAGNOSIS — R30.0 DYSURIA: ICD-10-CM

## 2023-01-05 LAB
ALBUMIN UR-MCNC: NEGATIVE MG/DL
APPEARANCE UR: ABNORMAL
BACTERIA #/AREA URNS HPF: ABNORMAL /HPF
BILIRUB UR QL STRIP: NEGATIVE
COLOR UR AUTO: YELLOW
GLUCOSE UR STRIP-MCNC: NEGATIVE MG/DL
HGB UR QL STRIP: NEGATIVE
HYALINE CASTS: 10 /LPF
KETONES UR STRIP-MCNC: NEGATIVE MG/DL
LEUKOCYTE ESTERASE UR QL STRIP: ABNORMAL
NITRATE UR QL: NEGATIVE
PH UR STRIP: 5.5 [PH] (ref 5–7)
RBC URINE: 4 /HPF
SP GR UR STRIP: 1.01 (ref 1–1.03)
SQUAMOUS EPITHELIAL: 1 /HPF
TRANSITIONAL EPI: 1 /HPF
UROBILINOGEN UR STRIP-MCNC: NORMAL MG/DL
WBC CLUMPS #/AREA URNS HPF: PRESENT /HPF
WBC URINE: >182 /HPF

## 2023-01-05 PROCEDURE — 81001 URINALYSIS AUTO W/SCOPE: CPT | Mod: ORL | Performed by: FAMILY MEDICINE

## 2023-01-05 PROCEDURE — 87086 URINE CULTURE/COLONY COUNT: CPT | Mod: ORL | Performed by: FAMILY MEDICINE

## 2023-01-06 ENCOUNTER — LAB REQUISITION (OUTPATIENT)
Dept: LAB | Facility: CLINIC | Age: 78
End: 2023-01-06
Payer: COMMERCIAL

## 2023-01-06 DIAGNOSIS — R70.0 ELEVATED ERYTHROCYTE SEDIMENTATION RATE: ICD-10-CM

## 2023-01-06 DIAGNOSIS — R68.89 OTHER GENERAL SYMPTOMS AND SIGNS: ICD-10-CM

## 2023-01-06 DIAGNOSIS — R79.82 ELEVATED C-REACTIVE PROTEIN (CRP): ICD-10-CM

## 2023-01-06 LAB — BACTERIA UR CULT: ABNORMAL

## 2023-01-11 LAB
BASOPHILS # BLD AUTO: 0.1 10E3/UL (ref 0–0.2)
BASOPHILS NFR BLD AUTO: 1 %
CRP SERPL-MCNC: <3 MG/L
EOSINOPHIL # BLD AUTO: 0.2 10E3/UL (ref 0–0.7)
EOSINOPHIL NFR BLD AUTO: 2 %
ERYTHROCYTE [DISTWIDTH] IN BLOOD BY AUTOMATED COUNT: 14.6 % (ref 10–15)
ERYTHROCYTE [SEDIMENTATION RATE] IN BLOOD BY WESTERGREN METHOD: 61 MM/HR (ref 0–30)
HCT VFR BLD AUTO: 31.4 % (ref 35–47)
HGB BLD-MCNC: 9.3 G/DL (ref 11.7–15.7)
IMM GRANULOCYTES # BLD: 0 10E3/UL
IMM GRANULOCYTES NFR BLD: 0 %
LYMPHOCYTES # BLD AUTO: 1.9 10E3/UL (ref 0.8–5.3)
LYMPHOCYTES NFR BLD AUTO: 23 %
MCH RBC QN AUTO: 29 PG (ref 26.5–33)
MCHC RBC AUTO-ENTMCNC: 29.6 G/DL (ref 31.5–36.5)
MCV RBC AUTO: 98 FL (ref 78–100)
MONOCYTES # BLD AUTO: 0.5 10E3/UL (ref 0–1.3)
MONOCYTES NFR BLD AUTO: 6 %
NEUTROPHILS # BLD AUTO: 5.7 10E3/UL (ref 1.6–8.3)
NEUTROPHILS NFR BLD AUTO: 68 %
NRBC # BLD AUTO: 0 10E3/UL
NRBC BLD AUTO-RTO: 0 /100
PLATELET # BLD AUTO: 308 10E3/UL (ref 150–450)
RBC # BLD AUTO: 3.21 10E6/UL (ref 3.8–5.2)
WBC # BLD AUTO: 8.3 10E3/UL (ref 4–11)

## 2023-01-11 PROCEDURE — 36415 COLL VENOUS BLD VENIPUNCTURE: CPT | Mod: ORL | Performed by: FAMILY MEDICINE

## 2023-01-11 PROCEDURE — 85652 RBC SED RATE AUTOMATED: CPT | Mod: ORL | Performed by: FAMILY MEDICINE

## 2023-01-11 PROCEDURE — 85025 COMPLETE CBC W/AUTO DIFF WBC: CPT | Mod: ORL | Performed by: FAMILY MEDICINE

## 2023-01-11 PROCEDURE — P9604 ONE-WAY ALLOW PRORATED TRIP: HCPCS | Mod: ORL | Performed by: FAMILY MEDICINE

## 2023-01-11 PROCEDURE — 86140 C-REACTIVE PROTEIN: CPT | Mod: ORL | Performed by: FAMILY MEDICINE

## 2023-01-20 ENCOUNTER — HOSPITAL ENCOUNTER (INPATIENT)
Facility: CLINIC | Age: 78
LOS: 6 days | Discharge: NURSING, REHAB OR LONG-TERM CARE | DRG: 872 | End: 2023-01-26
Attending: EMERGENCY MEDICINE | Admitting: STUDENT IN AN ORGANIZED HEALTH CARE EDUCATION/TRAINING PROGRAM
Payer: COMMERCIAL

## 2023-01-20 ENCOUNTER — APPOINTMENT (OUTPATIENT)
Dept: CT IMAGING | Facility: CLINIC | Age: 78
DRG: 872 | End: 2023-01-20
Attending: STUDENT IN AN ORGANIZED HEALTH CARE EDUCATION/TRAINING PROGRAM
Payer: COMMERCIAL

## 2023-01-20 ENCOUNTER — APPOINTMENT (OUTPATIENT)
Dept: CT IMAGING | Facility: CLINIC | Age: 78
DRG: 872 | End: 2023-01-20
Attending: EMERGENCY MEDICINE
Payer: COMMERCIAL

## 2023-01-20 DIAGNOSIS — K65.9 ABDOMINAL INFECTION (H): ICD-10-CM

## 2023-01-20 DIAGNOSIS — B96.20 E COLI BACTEREMIA: ICD-10-CM

## 2023-01-20 DIAGNOSIS — R78.81 E COLI BACTEREMIA: ICD-10-CM

## 2023-01-20 DIAGNOSIS — A41.9 SEPSIS, DUE TO UNSPECIFIED ORGANISM, UNSPECIFIED WHETHER ACUTE ORGAN DYSFUNCTION PRESENT (H): ICD-10-CM

## 2023-01-20 DIAGNOSIS — K65.1 INTRA-ABDOMINAL ABSCESS (H): Primary | ICD-10-CM

## 2023-01-20 DIAGNOSIS — Z87.19 HISTORY OF DIVERTICULAR ABSCESS: ICD-10-CM

## 2023-01-20 LAB
ALBUMIN UR-MCNC: 50 MG/DL
ANION GAP SERPL CALCULATED.3IONS-SCNC: 11 MMOL/L (ref 5–18)
APPEARANCE UR: ABNORMAL
BASOPHILS # BLD AUTO: 0.1 10E3/UL (ref 0–0.2)
BASOPHILS NFR BLD AUTO: 0 %
BILIRUB UR QL STRIP: NEGATIVE
BUN SERPL-MCNC: 24 MG/DL (ref 8–28)
C REACTIVE PROTEIN LHE: 5 MG/DL (ref 0–?)
CALCIUM SERPL-MCNC: 8.9 MG/DL (ref 8.5–10.5)
CHLORIDE BLD-SCNC: 103 MMOL/L (ref 98–107)
CO2 SERPL-SCNC: 26 MMOL/L (ref 22–31)
COLOR UR AUTO: YELLOW
CREAT SERPL-MCNC: 1.49 MG/DL (ref 0.6–1.1)
EOSINOPHIL # BLD AUTO: 0.1 10E3/UL (ref 0–0.7)
EOSINOPHIL NFR BLD AUTO: 0 %
ERYTHROCYTE [DISTWIDTH] IN BLOOD BY AUTOMATED COUNT: 15.2 % (ref 10–15)
FLUAV RNA SPEC QL NAA+PROBE: NEGATIVE
FLUBV RNA RESP QL NAA+PROBE: NEGATIVE
GFR SERPL CREATININE-BSD FRML MDRD: 35 ML/MIN/1.73M2
GLUCOSE BLD-MCNC: 94 MG/DL (ref 70–125)
GLUCOSE BLDC GLUCOMTR-MCNC: 70 MG/DL (ref 70–99)
GLUCOSE UR STRIP-MCNC: NEGATIVE MG/DL
HCT VFR BLD AUTO: 29.6 % (ref 35–47)
HGB BLD-MCNC: 9.2 G/DL (ref 11.7–15.7)
HGB UR QL STRIP: ABNORMAL
IMM GRANULOCYTES # BLD: 0.1 10E3/UL
IMM GRANULOCYTES NFR BLD: 0 %
KETONES UR STRIP-MCNC: NEGATIVE MG/DL
LACTATE SERPL-SCNC: 2 MMOL/L (ref 0.7–2)
LACTATE SERPL-SCNC: 2.3 MMOL/L (ref 0.7–2)
LEUKOCYTE ESTERASE UR QL STRIP: ABNORMAL
LYMPHOCYTES # BLD AUTO: 0.2 10E3/UL (ref 0.8–5.3)
LYMPHOCYTES NFR BLD AUTO: 1 %
MCH RBC QN AUTO: 29.6 PG (ref 26.5–33)
MCHC RBC AUTO-ENTMCNC: 31.1 G/DL (ref 31.5–36.5)
MCV RBC AUTO: 95 FL (ref 78–100)
MONOCYTES # BLD AUTO: 0.3 10E3/UL (ref 0–1.3)
MONOCYTES NFR BLD AUTO: 2 %
MUCOUS THREADS #/AREA URNS LPF: PRESENT /LPF
NEUTROPHILS # BLD AUTO: 15.4 10E3/UL (ref 1.6–8.3)
NEUTROPHILS NFR BLD AUTO: 97 %
NITRATE UR QL: NEGATIVE
NRBC # BLD AUTO: 0 10E3/UL
NRBC BLD AUTO-RTO: 0 /100
PH UR STRIP: 6 [PH] (ref 5–7)
PLATELET # BLD AUTO: 156 10E3/UL (ref 150–450)
POTASSIUM BLD-SCNC: 4.5 MMOL/L (ref 3.5–5)
PROCALCITONIN SERPL-MCNC: 34.56 NG/ML (ref 0–0.49)
RBC # BLD AUTO: 3.11 10E6/UL (ref 3.8–5.2)
RBC URINE: >182 /HPF
RSV RNA SPEC NAA+PROBE: NEGATIVE
SARS-COV-2 RNA RESP QL NAA+PROBE: NEGATIVE
SODIUM SERPL-SCNC: 140 MMOL/L (ref 136–145)
SP GR UR STRIP: >1.05 (ref 1–1.03)
SQUAMOUS EPITHELIAL: 1 /HPF
UROBILINOGEN UR STRIP-MCNC: 2 MG/DL
WBC # BLD AUTO: 16.1 10E3/UL (ref 4–11)
WBC URINE: >182 /HPF

## 2023-01-20 PROCEDURE — 120N000001 HC R&B MED SURG/OB

## 2023-01-20 PROCEDURE — 86140 C-REACTIVE PROTEIN: CPT | Performed by: EMERGENCY MEDICINE

## 2023-01-20 PROCEDURE — 87637 SARSCOV2&INF A&B&RSV AMP PRB: CPT | Performed by: EMERGENCY MEDICINE

## 2023-01-20 PROCEDURE — 84145 PROCALCITONIN (PCT): CPT | Performed by: EMERGENCY MEDICINE

## 2023-01-20 PROCEDURE — 70450 CT HEAD/BRAIN W/O DYE: CPT

## 2023-01-20 PROCEDURE — 250N000011 HC RX IP 250 OP 636: Performed by: EMERGENCY MEDICINE

## 2023-01-20 PROCEDURE — 72125 CT NECK SPINE W/O DYE: CPT

## 2023-01-20 PROCEDURE — 99222 1ST HOSP IP/OBS MODERATE 55: CPT | Performed by: STUDENT IN AN ORGANIZED HEALTH CARE EDUCATION/TRAINING PROGRAM

## 2023-01-20 PROCEDURE — 36415 COLL VENOUS BLD VENIPUNCTURE: CPT | Performed by: EMERGENCY MEDICINE

## 2023-01-20 PROCEDURE — 74177 CT ABD & PELVIS W/CONTRAST: CPT

## 2023-01-20 PROCEDURE — 71275 CT ANGIOGRAPHY CHEST: CPT

## 2023-01-20 PROCEDURE — 87077 CULTURE AEROBIC IDENTIFY: CPT | Performed by: EMERGENCY MEDICINE

## 2023-01-20 PROCEDURE — 250N000011 HC RX IP 250 OP 636: Performed by: STUDENT IN AN ORGANIZED HEALTH CARE EDUCATION/TRAINING PROGRAM

## 2023-01-20 PROCEDURE — 96374 THER/PROPH/DIAG INJ IV PUSH: CPT

## 2023-01-20 PROCEDURE — 87186 SC STD MICRODIL/AGAR DIL: CPT | Performed by: EMERGENCY MEDICINE

## 2023-01-20 PROCEDURE — 258N000003 HC RX IP 258 OP 636: Performed by: STUDENT IN AN ORGANIZED HEALTH CARE EDUCATION/TRAINING PROGRAM

## 2023-01-20 PROCEDURE — 36415 COLL VENOUS BLD VENIPUNCTURE: CPT | Performed by: STUDENT IN AN ORGANIZED HEALTH CARE EDUCATION/TRAINING PROGRAM

## 2023-01-20 PROCEDURE — 83605 ASSAY OF LACTIC ACID: CPT | Performed by: EMERGENCY MEDICINE

## 2023-01-20 PROCEDURE — 258N000003 HC RX IP 258 OP 636: Performed by: EMERGENCY MEDICINE

## 2023-01-20 PROCEDURE — 81001 URINALYSIS AUTO W/SCOPE: CPT | Performed by: EMERGENCY MEDICINE

## 2023-01-20 PROCEDURE — 99285 EMERGENCY DEPT VISIT HI MDM: CPT | Mod: 25,CS

## 2023-01-20 PROCEDURE — C9803 HOPD COVID-19 SPEC COLLECT: HCPCS

## 2023-01-20 PROCEDURE — 87149 DNA/RNA DIRECT PROBE: CPT | Performed by: EMERGENCY MEDICINE

## 2023-01-20 PROCEDURE — 80048 BASIC METABOLIC PNL TOTAL CA: CPT | Performed by: STUDENT IN AN ORGANIZED HEALTH CARE EDUCATION/TRAINING PROGRAM

## 2023-01-20 PROCEDURE — 96361 HYDRATE IV INFUSION ADD-ON: CPT

## 2023-01-20 PROCEDURE — 85025 COMPLETE CBC W/AUTO DIFF WBC: CPT | Performed by: STUDENT IN AN ORGANIZED HEALTH CARE EDUCATION/TRAINING PROGRAM

## 2023-01-20 PROCEDURE — 82962 GLUCOSE BLOOD TEST: CPT

## 2023-01-20 RX ORDER — CARVEDILOL 6.25 MG/1
12.5 TABLET ORAL 2 TIMES DAILY WITH MEALS
Status: DISCONTINUED | OUTPATIENT
Start: 2023-01-20 | End: 2023-01-26 | Stop reason: HOSPADM

## 2023-01-20 RX ORDER — PIPERACILLIN SODIUM, TAZOBACTAM SODIUM 3; .375 G/15ML; G/15ML
3.38 INJECTION, POWDER, LYOPHILIZED, FOR SOLUTION INTRAVENOUS ONCE
Status: COMPLETED | OUTPATIENT
Start: 2023-01-20 | End: 2023-01-20

## 2023-01-20 RX ORDER — PANTOPRAZOLE SODIUM 20 MG/1
40 TABLET, DELAYED RELEASE ORAL
Status: DISCONTINUED | OUTPATIENT
Start: 2023-01-21 | End: 2023-01-26 | Stop reason: HOSPADM

## 2023-01-20 RX ORDER — FLUTICASONE FUROATE AND VILANTEROL 200; 25 UG/1; UG/1
1 POWDER RESPIRATORY (INHALATION) DAILY
Status: DISCONTINUED | OUTPATIENT
Start: 2023-01-21 | End: 2023-01-26 | Stop reason: HOSPADM

## 2023-01-20 RX ORDER — MONTELUKAST SODIUM 10 MG/1
10 TABLET ORAL AT BEDTIME
Status: DISCONTINUED | OUTPATIENT
Start: 2023-01-20 | End: 2023-01-26 | Stop reason: HOSPADM

## 2023-01-20 RX ORDER — PREDNISONE 1 MG/1
2-4 TABLET ORAL DAILY
COMMUNITY
End: 2023-02-01

## 2023-01-20 RX ORDER — ONDANSETRON 4 MG/1
4 TABLET, FILM COATED ORAL EVERY 6 HOURS PRN
Status: DISCONTINUED | OUTPATIENT
Start: 2023-01-20 | End: 2023-01-26 | Stop reason: HOSPADM

## 2023-01-20 RX ORDER — OMEPRAZOLE 40 MG/1
40 CAPSULE, DELAYED RELEASE ORAL DAILY
COMMUNITY
Start: 2023-01-12 | End: 2023-02-01

## 2023-01-20 RX ORDER — DEXTROSE MONOHYDRATE 25 G/50ML
25-50 INJECTION, SOLUTION INTRAVENOUS
Status: DISCONTINUED | OUTPATIENT
Start: 2023-01-20 | End: 2023-01-26 | Stop reason: HOSPADM

## 2023-01-20 RX ORDER — POTASSIUM CHLORIDE 750 MG/1
20 TABLET, EXTENDED RELEASE ORAL DAILY
COMMUNITY
End: 2023-02-01

## 2023-01-20 RX ORDER — PIPERACILLIN SODIUM, TAZOBACTAM SODIUM 3; .375 G/15ML; G/15ML
3.38 INJECTION, POWDER, LYOPHILIZED, FOR SOLUTION INTRAVENOUS EVERY 8 HOURS
Status: DISCONTINUED | OUTPATIENT
Start: 2023-01-21 | End: 2023-01-24

## 2023-01-20 RX ORDER — ALBUTEROL SULFATE 90 UG/1
2 AEROSOL, METERED RESPIRATORY (INHALATION) EVERY 6 HOURS PRN
Status: DISCONTINUED | OUTPATIENT
Start: 2023-01-20 | End: 2023-01-26 | Stop reason: HOSPADM

## 2023-01-20 RX ORDER — NYSTATIN 100000 U/G
CREAM TOPICAL 2 TIMES DAILY
COMMUNITY
End: 2023-02-01

## 2023-01-20 RX ORDER — METRONIDAZOLE 500 MG/1
500 TABLET ORAL 3 TIMES DAILY
Status: ON HOLD | COMMUNITY
Start: 2023-01-20 | End: 2023-01-26

## 2023-01-20 RX ORDER — CIPROFLOXACIN 500 MG/1
500 TABLET, FILM COATED ORAL 2 TIMES DAILY
COMMUNITY
Start: 2023-01-20 | End: 2023-01-30

## 2023-01-20 RX ORDER — IOPAMIDOL 755 MG/ML
75 INJECTION, SOLUTION INTRAVASCULAR ONCE
Status: COMPLETED | OUTPATIENT
Start: 2023-01-20 | End: 2023-01-20

## 2023-01-20 RX ORDER — FUROSEMIDE 20 MG
20 TABLET ORAL DAILY
COMMUNITY
End: 2023-02-01

## 2023-01-20 RX ORDER — ACETAMINOPHEN 500 MG
500 TABLET ORAL 4 TIMES DAILY
Status: DISCONTINUED | OUTPATIENT
Start: 2023-01-20 | End: 2023-01-26 | Stop reason: HOSPADM

## 2023-01-20 RX ORDER — AZATHIOPRINE 50 MG/1
100 TABLET ORAL DAILY
COMMUNITY
End: 2023-02-01

## 2023-01-20 RX ORDER — NICOTINE POLACRILEX 4 MG
15-30 LOZENGE BUCCAL
Status: DISCONTINUED | OUTPATIENT
Start: 2023-01-20 | End: 2023-01-26 | Stop reason: HOSPADM

## 2023-01-20 RX ORDER — ALBUTEROL SULFATE 5 MG/ML
5 SOLUTION RESPIRATORY (INHALATION) EVERY 6 HOURS PRN
Status: DISCONTINUED | OUTPATIENT
Start: 2023-01-20 | End: 2023-01-22

## 2023-01-20 RX ORDER — LIDOCAINE 40 MG/G
CREAM TOPICAL
Status: DISCONTINUED | OUTPATIENT
Start: 2023-01-20 | End: 2023-01-26 | Stop reason: HOSPADM

## 2023-01-20 RX ADMIN — PIPERACILLIN AND TAZOBACTAM 3.38 G: 3; .375 INJECTION, POWDER, FOR SOLUTION INTRAVENOUS at 19:34

## 2023-01-20 RX ADMIN — IOPAMIDOL 75 ML: 755 INJECTION, SOLUTION INTRAVENOUS at 16:46

## 2023-01-20 RX ADMIN — VANCOMYCIN HYDROCHLORIDE 1750 MG: 5 INJECTION, POWDER, LYOPHILIZED, FOR SOLUTION INTRAVENOUS at 21:20

## 2023-01-20 RX ADMIN — SODIUM CHLORIDE 1000 ML: 9 INJECTION, SOLUTION INTRAVENOUS at 15:53

## 2023-01-20 RX ADMIN — SODIUM CHLORIDE 1000 ML: 9 INJECTION, SOLUTION INTRAVENOUS at 18:35

## 2023-01-20 RX ADMIN — SODIUM CHLORIDE 500 ML: 9 INJECTION, SOLUTION INTRAVENOUS at 15:25

## 2023-01-20 ASSESSMENT — ACTIVITIES OF DAILY LIVING (ADL)
ADLS_ACUITY_SCORE: 35
ADLS_ACUITY_SCORE: 39

## 2023-01-20 NOTE — ED TRIAGE NOTES
Pt brought in by ems because she fell this am and is currently being treated for a UTI. Pt also had sats in the 80's per EMS. Put on oxygen by them. They also had bp of 89/60. They started NS and pt has received about 250 ML.   Pt denies hitting head. Does report being on Xaralto.Pt states her upper back hurts a little.

## 2023-01-20 NOTE — ED PROVIDER NOTES
EMERGENCY DEPARTMENT ENCOUNTER      NAME: Zakiya Christian  AGE: 78 year old female  YOB: 1945  MRN: [id number]  EVALUATION DATE & TIME: 2023  2:34 PM    PCP: Ami Noriega    ED PROVIDER: Stan Benavides M.D.      Chief Complaint   Patient presents with     Fall     Shortness of Breath         FINAL IMPRESSION:  1. Sepsis, due to unspecified organism, unspecified whether acute organ dysfunction present (H)    2. Abdominal infection (H)          ED COURSE & MEDICAL DECISION MAKIN year old female presents to the Emergency Department for evaluation of generalized weakness.  She arrives to the emergency department with generalized weakness and a ground-level fall at home.  She reports some acute on chronic abdominal pain.  She was briefly hypotensive in the field but recovered blood pressure by time of evaluation here.  She does not have any evidence of external trauma.  She is anticoagulated on Eliquis.  She was seen initially as a trauma evaluation in triage, at that point her blood pressure was normal.  Initiated a work-up as below.  Her medical history is most pertinent for what appears to be relatively recent diagnosis of a colovesical fistula.  I discussed things with her outpatient providers to her entire, she was recently treated with a course of Augmentin about 2 weeks ago for a urinary tract infection which grew out E. coli.  She also had some chronic inflammation and an abscess on a CT from last month which was planned to be repeated in the near future.  Imaging was obtained as below.  There was no trauma on her head, cervical spine, or chest abdomen pelvis CT scans.  Pelvic CT today does show some continued presence of colovesical fistula and air in the bladder consistent with this.  Patient's labs are concerning for an elevated white blood cell count and procalcitonin consistent with systemic infection.  Lactic acid initially somewhat elevated however patient  demonstrating otherwise hemodynamic stability here.  Urine analysis is consistent with recurrent or ongoing infection and will be sent for culture.  Patient was started on Zosyn.  CT also shows a abscess in the right uterine myometrium.  I do not think at this point the patient has established care with urogynecology or colorectal surgery regarding these issues.  Discussed case briefly with the on-call gynecologist who suggested a gynecology/oncology consult would probably be the next most appropriate step although unlikely to be any plan for any emergent surgery at this point unless not responding to IV antibiotics.  I did briefly discussed the case with the on-call gynecology oncology consultant but [facility], however they do not have any ability to consult formally here but could reconvene if patient not responding well to IV antibiotics.  Case was discussed with hospitalist here at [facility] Dr. Weaver.       2:35 PM I met with the patient for an interview and initial exam. Plans for treatment were discussed.  6:00 PM Spoke with hospitalist Dr. Weaver  7:48 PM I spoke with Dr. Garcia, GYN/ONC. We further discussed the patient's treatment plan.      Medical Decision Making    History:    Supplemental history from: Documented in chart, if applicable    External Record(s) reviewed: Documented in chart, if applicable.    Work Up:    Chart documentation includes differential considered and any EKGs or imaging independently interpreted by provider, where specified.    In additional to work up documented, I considered the following work up: Documented in chart, if applicable.    External consultation:    Discussion of management with another provider: Documented in chart, if applicable and OB-Gyn    Complicating factors:    Care impacted by chronic illness: Diabetes, Hypertension and Other: PMA    Care affected by social determinants of health:  N/A    Disposition considerations: Admit.            MEDICATIONS GIVEN IN THE EMERGENCY:  Medications   lidocaine 1 % 0.1-1 mL (has no administration in time range)   lidocaine (LMX4) cream (has no administration in time range)   sodium chloride (PF) 0.9% PF flush 3 mL (3 mLs Intracatheter Given 1/20/23 2124)   sodium chloride (PF) 0.9% PF flush 3 mL (has no administration in time range)   melatonin tablet 1 mg (has no administration in time range)   Patient is already receiving anticoagulation with heparin, enoxaparin (LOVENOX), warfarin (COUMADIN)  or other anticoagulant medication (has no administration in time range)   acetaminophen (TYLENOL) tablet 500 mg (has no administration in time range)   albuterol (PROVENTIL HFA/VENTOLIN HFA) inhaler (has no administration in time range)   albuterol (PROVENTIL) neb solution 5 mg (has no administration in time range)   carvedilol (COREG) tablet 12.5 mg (has no administration in time range)   FLUoxetine (PROzac) capsule 40 mg (has no administration in time range)   fluticasone-vilanterol (BREO ELLIPTA) 200-25 MCG/ACT inhaler 1 puff (has no administration in time range)     And   umeclidinium (INCRUSE ELLIPTA) 62.5 MCG/ACT inhaler 1 puff (has no administration in time range)   montelukast (SINGULAIR) tablet 10 mg (has no administration in time range)   omeprazole (priLOSEC) CR capsule 40 mg (has no administration in time range)   ondansetron (ZOFRAN) tablet 4 mg (has no administration in time range)   rivaroxaban ANTICOAGULANT (XARELTO) tablet 20 mg ( Oral Automatically Held 1/23/23 1700)   piperacillin-tazobactam (ZOSYN) 3.375 g vial to attach to  mL bag (has no administration in time range)   vancomycin (VANCOCIN) 1,750 mg in 0.9% NaCl 500 mL intermittent infusion (1,750 mg Intravenous Given 1/20/23 2120)   glucose gel 15-30 g (has no administration in time range)     Or   dextrose 50 % injection 25-50 mL (has no administration in time range)     Or   glucagon  injection 1 mg (has no administration in time range)   0.9% sodium chloride BOLUS (0 mLs Intravenous Stopped 1/20/23 1552)   0.9% sodium chloride BOLUS (0 mLs Intravenous Stopped 1/20/23 1835)   iopamidol (ISOVUE-370) solution 75 mL (75 mLs Intravenous Given 1/20/23 1646)   0.9% sodium chloride BOLUS (1,000 mLs Intravenous New Bag 1/20/23 1835)   piperacillin-tazobactam (ZOSYN) 3.375 g vial to attach to  mL bag (3.375 g Intravenous Given 1/20/23 1934)       NEW PRESCRIPTIONS STARTED AT TODAY'S ER VISIT  New Prescriptions    No medications on file          =================================================================    HPI    Patient information was obtained from: Patient    Use of : N/A       Zakiya Christian is a 78 year old female with a pertinent history of diabetes, HTN, Morbid obesity, HLD, PMR who presents to this ED by EMS for evaluation of fall and shortness of breath.    As per chart review,    On 12/01/2022, the patient visited [facility] Emergency Room for an evaluation of fall. Patient fell at the local grocery store and hit her head. She is suffering a large laceration over the right brow around 4.5 cm. No evidence of obvious hemorrhage from head CT. Laceration repair above her right brow with 12 sutures in place. Laboratory evaluation with slight elevation of creatinine suggesting acute kidney injury. Patient was discharged    The patient slid earlier today at assisted living. En-route to ED, she was placed on oxygen due to her O2 sats being around the 80s. Upon arrival to ED, she denies using oxygen assitance despite her feeling shortness of breath. She is currently being treated for a UTI. During her fall, she denies any head trauma. She landed on her left side. She endorses lower back pain. She is taking Xarelto and Eliquis. She denies symptoms of abdominal pain, flu like symptoms, and any other associated symptoms at this moment.      REVIEW OF SYSTEMS    All systems reviewed and negative except as noted in HPI.    PAST MEDICAL HISTORY:  Past Medical History:   Diagnosis Date     Diabetes (H)      Hypertension      Obese      PMR (polymyalgia rheumatica) (H)        PAST SURGICAL HISTORY:  Past Surgical History:   Procedure Laterality Date     IR ABSCESS TUBE CHANGE  4/22/2022           CURRENT MEDICATIONS:    Current Facility-Administered Medications   Medication     acetaminophen (TYLENOL) tablet 500 mg     albuterol (PROVENTIL HFA/VENTOLIN HFA) inhaler     albuterol (PROVENTIL) neb solution 5 mg     carvedilol (COREG) tablet 12.5 mg     glucose gel 15-30 g    Or     dextrose 50 % injection 25-50 mL    Or     glucagon injection 1 mg     [START ON 1/21/2023] FLUoxetine (PROzac) capsule 40 mg     [START ON 1/21/2023] fluticasone-vilanterol (BREO ELLIPTA) 200-25 MCG/ACT inhaler 1 puff    And     [START ON 1/21/2023] umeclidinium (INCRUSE ELLIPTA) 62.5 MCG/ACT inhaler 1 puff     lidocaine (LMX4) cream     lidocaine 1 % 0.1-1 mL     melatonin tablet 1 mg     montelukast (SINGULAIR) tablet 10 mg     [START ON 1/21/2023] omeprazole (priLOSEC) CR capsule 40 mg     ondansetron (ZOFRAN) tablet 4 mg     Patient is already receiving anticoagulation with heparin, enoxaparin (LOVENOX), warfarin (COUMADIN)  or other anticoagulant medication     [START ON 1/21/2023] piperacillin-tazobactam (ZOSYN) 3.375 g vial to attach to  mL bag     [Held by provider] rivaroxaban ANTICOAGULANT (XARELTO) tablet 20 mg     sodium chloride (PF) 0.9% PF flush 3 mL     sodium chloride (PF) 0.9% PF flush 3 mL     vancomycin (VANCOCIN) 1,750 mg in 0.9% NaCl 500 mL intermittent infusion     Current Outpatient Medications   Medication     acetaminophen (TYLENOL) 500 MG tablet     albuterol (PROAIR HFA/PROVENTIL HFA/VENTOLIN HFA) 108 (90 Base) MCG/ACT inhaler     albuterol (PROVENTIL) (5 MG/ML) 0.5% neb solution     alendronate (FOSAMAX) 70 MG tablet     Ascorbic Acid (VITAMIN C) 500 MG CAPS      "aspirin 81 MG EC tablet     calcium carbonate 600 mg-vitamin D 400 units (CALTRATE) 600-400 MG-UNIT per tablet     calcium polycarbophil (FIBERCON) 625 MG tablet     carvedilol (COREG) 12.5 MG tablet     cholecalciferol 50 MCG (2000 UT) CAPS     coenzyme Q-10 capsule     FLUoxetine (PROZAC) 40 MG capsule     Fluticasone-Umeclidin-Vilanterol (TRELEGY ELLIPTA) 200-62.5-25 MCG/INH oral inhaler     gabapentin (NEURONTIN) 300 MG capsule     losartan (COZAAR) 25 MG tablet     montelukast (SINGULAIR) 10 MG tablet     omeprazole (PRILOSEC) 20 MG DR capsule     ondansetron (ZOFRAN) 4 MG tablet     potassium chloride ER (KLOR-CON M) 10 MEQ CR tablet     pravastatin (PRAVACHOL) 40 MG tablet     rivaroxaban ANTICOAGULANT (XARELTO) 20 MG TABS tablet         ALLERGIES:  Allergies   Allergen Reactions     Simvastatin Hives       FAMILY HISTORY:  No family history on file.    SOCIAL HISTORY:   Social History     Socioeconomic History     Marital status: Single       VITALS:  /56   Pulse 86   Temp 98.1  F (36.7  C) (Oral)   Resp 20   Ht 1.6 m (5' 3\")   Wt 88.5 kg (195 lb)   SpO2 98%   BMI 34.54 kg/m      PHYSICAL EXAM    Constitutional: Chronically ill-appearing elderly female patient, laying in bed, no acute distress  HENT: Normocephalic, Atraumatic. Neck Supple.  Eyes: EOMI, Conjunctiva normal.  Respiratory: Breathing comfortably on room 2L supplemental oxygen via NC. Speaks full sentences easily. Lungs clear to ascultation.  Cardiovascular: Normal heart rate, Regular rhythm. No peripheral edema.  Abdomen: Soft, mild bilateral lower abdominal tenderness to deep palpation.  No guarding or peritoneal signs.  Musculoskeletal: Good range of motion in all major joints. No major deformities noted.  Integument: Warm, Dry.  Neurologic: Alert & awake, Normal motor function, Normal sensory function, No focal deficits noted.   Psychiatric: Cooperative. Affect appropriate.     LAB:  All pertinent labs reviewed and " interpreted.  Labs Ordered and Resulted from Time of ED Arrival to Time of ED Departure   BASIC METABOLIC PANEL - Abnormal       Result Value    Sodium 140      Potassium 4.5      Chloride 103      Carbon Dioxide (CO2) 26      Anion Gap 11      Urea Nitrogen 24      Creatinine 1.49 (*)     Calcium 8.9      Glucose 94      GFR Estimate 35 (*)    CRP INFLAMMATION - Abnormal    CRP 5.0 (*)    LACTIC ACID WHOLE BLOOD - Abnormal    Lactic Acid 2.3 (*)    PROCALCITONIN - Abnormal    Procalcitonin 34.56 (*)    CBC WITH PLATELETS AND DIFFERENTIAL - Abnormal    WBC Count 16.1 (*)     RBC Count 3.11 (*)     Hemoglobin 9.2 (*)     Hematocrit 29.6 (*)     MCV 95      MCH 29.6      MCHC 31.1 (*)     RDW 15.2 (*)     Platelet Count 156      % Neutrophils 97      % Lymphocytes 1      % Monocytes 2      % Eosinophils 0      % Basophils 0      % Immature Granulocytes 0      NRBCs per 100 WBC 0      Absolute Neutrophils 15.4 (*)     Absolute Lymphocytes 0.2 (*)     Absolute Monocytes 0.3      Absolute Eosinophils 0.1      Absolute Basophils 0.1      Absolute Immature Granulocytes 0.1      Absolute NRBCs 0.0     ROUTINE UA WITH MICROSCOPIC REFLEX TO CULTURE - Abnormal    Color Urine Yellow      Appearance Urine Turbid (*)     Glucose Urine Negative      Bilirubin Urine Negative      Ketones Urine Negative      Specific Gravity Urine >1.050 (*)     Blood Urine >1.0 mg/dL (*)     pH Urine 6.0      Protein Albumin Urine 50 (*)     Urobilinogen Urine 2.0 (*)     Nitrite Urine Negative      Leukocyte Esterase Urine 500 Sara/uL (*)     Mucus Urine Present (*)     RBC Urine >182 (*)     WBC Urine >182 (*)     Squamous Epithelials Urine 1     INFLUENZA A/B & SARS-COV2 PCR MULTIPLEX - Normal    Influenza A PCR Negative      Influenza B PCR Negative      RSV PCR Negative      SARS CoV2 PCR Negative     LACTIC ACID WHOLE BLOOD - Normal    Lactic Acid 2.0     GLUCOSE BY METER - Normal    GLUCOSE BY METER POCT 70     GLUCOSE MONITOR NURSING POCT    BLOOD CULTURE   BLOOD CULTURE   URINE CULTURE       RADIOLOGY:  Reviewed all pertinent imaging. Please see official radiology report.  CT Abdomen Pelvis w Contrast   Final Result   IMPRESSION:    1.  No evidence of acute traumatic injury in the abdomen or pelvis.   2.  Interval decrease in the right side pelvic abscess as noted above which on the current study appears to be myometrial in nature involving the right side of the uterine fundus.   3.  Air in the bladder from a presumed a colovesical fistula given her prior history. If needed this can be confirmed with a standard filling cystogram (not a CT cystogram).   4.  Extensive sigmoid diverticulitis without evidence of acute inflammation. Tethering of the adjacent small bowel and the left adnexa to the mid sigmoid again noted.   5.  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   6.  Other noncritical findings as noted above.      CT Chest Pulmonary Embolism w Contrast   Final Result   IMPRESSION:   1.  No pulmonary emboli identified. No acute thoracic abnormality evident.   2.  Tracheobronchomalacia.   3.  Prior bilateral rib fractures. No acute fracture identified.      Cervical spine CT w/o contrast   Final Result   IMPRESSION:   HEAD CT:   1.  No acute intracranial process.      CERVICAL SPINE CT:   1.  No CT evidence for acute fracture or post traumatic subluxation.      Head CT w/o contrast   Final Result   IMPRESSION:   HEAD CT:   1.  No acute intracranial process.      CERVICAL SPINE CT:   1.  No CT evidence for acute fracture or post traumatic subluxation.          I, Ziggy Rodriguez, am serving as a scribe to document services personally performed by Dr. Stan Benavides, based on my observation and the provider's statements to me. I, Stan Benavides MD attest that Ziggy Rodriguez is acting in a scribe capacity, has observed my performance of the services and has documented them in accordance with my direction.    Stan Benavides M.D.  Emergency  [facility] EMERGENCY ROOM  [address] [address]  [phone]  Dept: [phone]       Stan Benavides MD  01/20/23 3214

## 2023-01-21 PROBLEM — Z87.19 HISTORY OF DIVERTICULAR ABSCESS: Status: ACTIVE | Noted: 2023-01-21

## 2023-01-21 PROBLEM — K65.1 INTRA-ABDOMINAL ABSCESS (H): Status: ACTIVE | Noted: 2023-01-21

## 2023-01-21 PROBLEM — J44.9 CHRONIC OBSTRUCTIVE PULMONARY DISEASE, UNSPECIFIED COPD TYPE (H): Status: ACTIVE | Noted: 2022-04-24

## 2023-01-21 LAB
ACINETOBACTER SPECIES: NOT DETECTED
ANION GAP SERPL CALCULATED.3IONS-SCNC: 9 MMOL/L (ref 5–18)
BASOPHILS # BLD AUTO: 0.1 10E3/UL (ref 0–0.2)
BASOPHILS NFR BLD AUTO: 0 %
BUN SERPL-MCNC: 27 MG/DL (ref 8–28)
CALCIUM SERPL-MCNC: 7.9 MG/DL (ref 8.5–10.5)
CHLORIDE BLD-SCNC: 105 MMOL/L (ref 98–107)
CITROBACTER SPECIES: NOT DETECTED
CO2 SERPL-SCNC: 24 MMOL/L (ref 22–31)
CREAT SERPL-MCNC: 1.23 MG/DL (ref 0.6–1.1)
CTX-M: NOT DETECTED
ENTEROBACTER SPECIES: NOT DETECTED
EOSINOPHIL # BLD AUTO: 0.1 10E3/UL (ref 0–0.7)
EOSINOPHIL NFR BLD AUTO: 1 %
ERYTHROCYTE [DISTWIDTH] IN BLOOD BY AUTOMATED COUNT: 15.3 % (ref 10–15)
ESCHERICHIA COLI: DETECTED
GFR SERPL CREATININE-BSD FRML MDRD: 44 ML/MIN/1.73M2
GLUCOSE BLD-MCNC: 94 MG/DL (ref 70–125)
GLUCOSE BLD-MCNC: 94 MG/DL (ref 70–125)
GLUCOSE BLDC GLUCOMTR-MCNC: 75 MG/DL (ref 70–99)
GLUCOSE BLDC GLUCOMTR-MCNC: 79 MG/DL (ref 70–99)
HCT VFR BLD AUTO: 28.8 % (ref 35–47)
HGB BLD-MCNC: 8.9 G/DL (ref 11.7–15.7)
IMM GRANULOCYTES # BLD: 0.1 10E3/UL
IMM GRANULOCYTES NFR BLD: 1 %
IMP: NOT DETECTED
KLEBSIELLA OXYTOCA: NOT DETECTED
KLEBSIELLA PNEUMONIAE: NOT DETECTED
KPC: NOT DETECTED
LACTATE SERPL-SCNC: 1.1 MMOL/L (ref 0.7–2)
LYMPHOCYTES # BLD AUTO: 0.7 10E3/UL (ref 0.8–5.3)
LYMPHOCYTES NFR BLD AUTO: 5 %
MAGNESIUM SERPL-MCNC: 1.4 MG/DL (ref 1.8–2.6)
MAGNESIUM SERPL-MCNC: 2 MG/DL (ref 1.8–2.6)
MCH RBC QN AUTO: 29.5 PG (ref 26.5–33)
MCHC RBC AUTO-ENTMCNC: 30.9 G/DL (ref 31.5–36.5)
MCV RBC AUTO: 95 FL (ref 78–100)
MONOCYTES # BLD AUTO: 0.4 10E3/UL (ref 0–1.3)
MONOCYTES NFR BLD AUTO: 3 %
NDM: NOT DETECTED
NEUTROPHILS # BLD AUTO: 13.3 10E3/UL (ref 1.6–8.3)
NEUTROPHILS NFR BLD AUTO: 90 %
NRBC # BLD AUTO: 0 10E3/UL
NRBC BLD AUTO-RTO: 0 /100
OXA (DETECTED/NOT DETECTED): NOT DETECTED
PLATELET # BLD AUTO: 138 10E3/UL (ref 150–450)
POTASSIUM BLD-SCNC: 4.1 MMOL/L (ref 3.5–5)
PROTEUS SPECIES: NOT DETECTED
PSEUDOMONAS AERUGINOSA: NOT DETECTED
RBC # BLD AUTO: 3.02 10E6/UL (ref 3.8–5.2)
SODIUM SERPL-SCNC: 138 MMOL/L (ref 136–145)
VIM: NOT DETECTED
WBC # BLD AUTO: 14.7 10E3/UL (ref 4–11)

## 2023-01-21 PROCEDURE — 99222 1ST HOSP IP/OBS MODERATE 55: CPT | Performed by: SURGERY

## 2023-01-21 PROCEDURE — 99233 SBSQ HOSP IP/OBS HIGH 50: CPT | Performed by: HOSPITALIST

## 2023-01-21 PROCEDURE — 120N000001 HC R&B MED SURG/OB

## 2023-01-21 PROCEDURE — 83735 ASSAY OF MAGNESIUM: CPT | Performed by: HOSPITALIST

## 2023-01-21 PROCEDURE — 36415 COLL VENOUS BLD VENIPUNCTURE: CPT | Performed by: STUDENT IN AN ORGANIZED HEALTH CARE EDUCATION/TRAINING PROGRAM

## 2023-01-21 PROCEDURE — 36415 COLL VENOUS BLD VENIPUNCTURE: CPT | Performed by: HOSPITALIST

## 2023-01-21 PROCEDURE — 250N000013 HC RX MED GY IP 250 OP 250 PS 637: Performed by: STUDENT IN AN ORGANIZED HEALTH CARE EDUCATION/TRAINING PROGRAM

## 2023-01-21 PROCEDURE — 250N000013 HC RX MED GY IP 250 OP 250 PS 637: Performed by: HOSPITALIST

## 2023-01-21 PROCEDURE — 83605 ASSAY OF LACTIC ACID: CPT | Performed by: HOSPITALIST

## 2023-01-21 PROCEDURE — 258N000003 HC RX IP 258 OP 636: Performed by: STUDENT IN AN ORGANIZED HEALTH CARE EDUCATION/TRAINING PROGRAM

## 2023-01-21 PROCEDURE — 80048 BASIC METABOLIC PNL TOTAL CA: CPT | Performed by: STUDENT IN AN ORGANIZED HEALTH CARE EDUCATION/TRAINING PROGRAM

## 2023-01-21 PROCEDURE — 82962 GLUCOSE BLOOD TEST: CPT

## 2023-01-21 PROCEDURE — 85025 COMPLETE CBC W/AUTO DIFF WBC: CPT | Performed by: STUDENT IN AN ORGANIZED HEALTH CARE EDUCATION/TRAINING PROGRAM

## 2023-01-21 PROCEDURE — 99418 PROLNG IP/OBS E/M EA 15 MIN: CPT | Performed by: HOSPITALIST

## 2023-01-21 PROCEDURE — 82947 ASSAY GLUCOSE BLOOD QUANT: CPT | Performed by: STUDENT IN AN ORGANIZED HEALTH CARE EDUCATION/TRAINING PROGRAM

## 2023-01-21 PROCEDURE — 250N000011 HC RX IP 250 OP 636: Performed by: STUDENT IN AN ORGANIZED HEALTH CARE EDUCATION/TRAINING PROGRAM

## 2023-01-21 PROCEDURE — 250N000011 HC RX IP 250 OP 636: Performed by: HOSPITALIST

## 2023-01-21 PROCEDURE — 250N000012 HC RX MED GY IP 250 OP 636 PS 637: Performed by: HOSPITALIST

## 2023-01-21 RX ORDER — AZATHIOPRINE 50 MG/1
100 TABLET ORAL DAILY
Status: DISCONTINUED | OUTPATIENT
Start: 2023-01-21 | End: 2023-01-26 | Stop reason: HOSPADM

## 2023-01-21 RX ORDER — POTASSIUM CHLORIDE 1500 MG/1
20 TABLET, EXTENDED RELEASE ORAL DAILY
Status: DISCONTINUED | OUTPATIENT
Start: 2023-01-21 | End: 2023-01-26 | Stop reason: HOSPADM

## 2023-01-21 RX ORDER — HYDROMORPHONE HCL IN WATER/PF 6 MG/30 ML
.2-.4 PATIENT CONTROLLED ANALGESIA SYRINGE INTRAVENOUS
Status: DISCONTINUED | OUTPATIENT
Start: 2023-01-21 | End: 2023-01-26 | Stop reason: HOSPADM

## 2023-01-21 RX ORDER — MAGNESIUM SULFATE 4 G/50ML
4 INJECTION INTRAVENOUS ONCE
Status: COMPLETED | OUTPATIENT
Start: 2023-01-21 | End: 2023-01-21

## 2023-01-21 RX ORDER — FUROSEMIDE 20 MG
20 TABLET ORAL DAILY
Status: DISCONTINUED | OUTPATIENT
Start: 2023-01-21 | End: 2023-01-26 | Stop reason: HOSPADM

## 2023-01-21 RX ORDER — NYSTATIN 100000 U/G
CREAM TOPICAL 2 TIMES DAILY
Status: DISCONTINUED | OUTPATIENT
Start: 2023-01-21 | End: 2023-01-26 | Stop reason: HOSPADM

## 2023-01-21 RX ADMIN — AZATHIOPRINE 100 MG: 50 TABLET ORAL at 12:54

## 2023-01-21 RX ADMIN — ALBUTEROL SULFATE 2 PUFF: 90 AEROSOL, METERED RESPIRATORY (INHALATION) at 00:14

## 2023-01-21 RX ADMIN — PANTOPRAZOLE SODIUM 40 MG: 20 TABLET, DELAYED RELEASE ORAL at 06:42

## 2023-01-21 RX ADMIN — MAGNESIUM SULFATE HEPTAHYDRATE 4 G: 80 INJECTION, SOLUTION INTRAVENOUS at 14:32

## 2023-01-21 RX ADMIN — FLUOXETINE 40 MG: 20 CAPSULE ORAL at 09:56

## 2023-01-21 RX ADMIN — ACETAMINOPHEN 500 MG: 500 TABLET ORAL at 12:53

## 2023-01-21 RX ADMIN — PIPERACILLIN AND TAZOBACTAM 3.38 G: 3; .375 INJECTION, POWDER, FOR SOLUTION INTRAVENOUS at 10:24

## 2023-01-21 RX ADMIN — ACETAMINOPHEN 500 MG: 500 TABLET ORAL at 21:43

## 2023-01-21 RX ADMIN — ACETAMINOPHEN 500 MG: 500 TABLET ORAL at 00:04

## 2023-01-21 RX ADMIN — VANCOMYCIN HYDROCHLORIDE 1000 MG: 5 INJECTION, POWDER, LYOPHILIZED, FOR SOLUTION INTRAVENOUS at 21:44

## 2023-01-21 RX ADMIN — UMECLIDINIUM 1 PUFF: 62.5 AEROSOL, POWDER ORAL at 09:58

## 2023-01-21 RX ADMIN — PREDNISONE 4 MG: 2.5 TABLET ORAL at 12:54

## 2023-01-21 RX ADMIN — PIPERACILLIN AND TAZOBACTAM 3.38 G: 3; .375 INJECTION, POWDER, FOR SOLUTION INTRAVENOUS at 00:06

## 2023-01-21 RX ADMIN — MONTELUKAST 10 MG: 10 TABLET, FILM COATED ORAL at 00:11

## 2023-01-21 RX ADMIN — MONTELUKAST 10 MG: 10 TABLET, FILM COATED ORAL at 21:43

## 2023-01-21 RX ADMIN — PIPERACILLIN AND TAZOBACTAM 3.38 G: 3; .375 INJECTION, POWDER, FOR SOLUTION INTRAVENOUS at 17:17

## 2023-01-21 RX ADMIN — NYSTATIN: 100000 CREAM TOPICAL at 12:53

## 2023-01-21 RX ADMIN — FLUTICASONE FUROATE AND VILANTEROL TRIFENATATE 1 PUFF: 200; 25 POWDER RESPIRATORY (INHALATION) at 09:58

## 2023-01-21 RX ADMIN — HYDROMORPHONE HYDROCHLORIDE 0.2 MG: 0.2 INJECTION, SOLUTION INTRAMUSCULAR; INTRAVENOUS; SUBCUTANEOUS at 09:55

## 2023-01-21 RX ADMIN — ACETAMINOPHEN 500 MG: 500 TABLET ORAL at 17:16

## 2023-01-21 RX ADMIN — ACETAMINOPHEN 500 MG: 500 TABLET ORAL at 09:56

## 2023-01-21 RX ADMIN — POTASSIUM CHLORIDE 20 MEQ: 1500 TABLET, EXTENDED RELEASE ORAL at 12:53

## 2023-01-21 ASSESSMENT — ACTIVITIES OF DAILY LIVING (ADL)
ADLS_ACUITY_SCORE: 40
ADLS_ACUITY_SCORE: 39
ADLS_ACUITY_SCORE: 42
ADLS_ACUITY_SCORE: 44
ADLS_ACUITY_SCORE: 44
ADLS_ACUITY_SCORE: 41
ADLS_ACUITY_SCORE: 41
ADLS_ACUITY_SCORE: 44
ADLS_ACUITY_SCORE: 42
ADLS_ACUITY_SCORE: 40
ADLS_ACUITY_SCORE: 44
ADLS_ACUITY_SCORE: 40

## 2023-01-21 NOTE — PHARMACY-VANCOMYCIN DOSING SERVICE
"Pharmacy Vancomycin Initial Note  Date of Service 2023  Patient's  1945  78 year old, female    Indication: Abscess, Intra-abdominal infection and Sepsis    Current estimated CrCl = Estimated Creatinine Clearance: 32.3 mL/min (A) (based on SCr of 1.49 mg/dL (H)).    Creatinine for last 3 days  2023:  3:21 PM Creatinine 1.49 mg/dL    Recent Vancomycin Level(s) for last 3 days  No results found for requested labs within last 72 hours.      Vancomycin IV Administrations (past 72 hours)                   vancomycin (VANCOCIN) 1,750 mg in 0.9% NaCl 500 mL intermittent infusion (mg) 1,750 mg Given 23                Nephrotoxins and other renal medications (From now, onward)    Start     Dose/Rate Route Frequency Ordered Stop    23 0100  piperacillin-tazobactam (ZOSYN) 3.375 g vial to attach to  mL bag        Note to Pharmacy: For [facility], [facility] and [facility]: For Zosyn-naive patients, use the \"Zosyn initial dose + extended infusion\" order panel.    3.375 g  over 240 Minutes Intravenous EVERY 8 HOURS 23            Contrast Orders - past 72 hours (72h ago, onward)    Start     Dose/Rate Route Frequency Stop    23 1700  iopamidol (ISOVUE-370) solution 75 mL         75 mL Intravenous ONCE 23 1646          [employer] Prediction of Planned Initial Vancomycin Regimen  Loading dose: 1750mg @ 2120 23  Regimen: 1000 mg IV every 24 hours.  Start time: 00:38 on 2023  Exposure target: AUC24 (range)400-600 mg/L.hr   AUC24,ss: 501 mg/L.hr  Probability of AUC24 > 400: 74 %  Ctrough,ss: 16.4 mg/L  Probability of Ctrough,ss > 20: 31 %  Probability of nephrotoxicity (Lodise LAVERNE ): 12 %          Plan:  1. Start vancomycin  1000 mg IV q24h.   2. Vancomycin monitoring method: AUC  3. Vancomycin therapeutic monitoring goal: 400-600 mg*h/L  4. Pharmacy will check vancomycin levels as appropriate in 1-3 Days.    5. Serum creatinine levels will be ordered daily for the first " week of therapy and at least twice weekly for subsequent weeks.      Stan Marinelli RP

## 2023-01-21 NOTE — PHARMACY-ADMISSION MEDICATION HISTORY
Pharmacy Note - Admission Medication History    Pertinent Provider Information: New antibiotic prescriptions written for patient 1/20. Hasn't started came into ER instead     ______________________________________________________________________    Prior To Admission (PTA) med list completed and updated in EMR.       PTA Med List   Medication Sig Note Last Dose     acetaminophen (TYLENOL) 500 MG tablet Take 500 mg by mouth 4 times daily And q6h PRN - max 2 PRN/day  1/20/2023 at 1200     albuterol (PROAIR HFA/PROVENTIL HFA/VENTOLIN HFA) 108 (90 Base) MCG/ACT inhaler Inhale 2 puffs into the lungs every 6 hours as needed for shortness of breath / dyspnea or wheezing  Unknown     albuterol (PROVENTIL) (5 MG/ML) 0.5% neb solution Take 5 mg by nebulization every 6 hours as needed for wheezing or shortness of breath / dyspnea  Unknown     alendronate (FOSAMAX) 70 MG tablet Take 70 mg by mouth every 7 days 1/20/2023: Mondays Past Week     Ascorbic Acid (VITAMIN C) 500 MG CAPS Take 1,000 mg by mouth daily  1/20/2023     aspirin 81 MG EC tablet Take 81 mg by mouth daily  1/20/2023     azaTHIOprine (IMURAN) 50 MG tablet Take 100 mg by mouth daily  1/20/2023     calcium carbonate 600 mg-vitamin D 400 units (CALTRATE) 600-400 MG-UNIT per tablet Take 1 tablet by mouth 2 times daily (with meals)  1/20/2023     calcium polycarbophil (FIBERCON) 625 MG tablet Take 2 tablets by mouth 2 times daily (with meals)  1/20/2023     carvedilol (COREG) 12.5 MG tablet Take 12.5 mg by mouth 2 times daily (with meals)  1/20/2023 at x 1     cholecalciferol 50 MCG (2000 UT) CAPS Take 4,000 Units by mouth daily  1/20/2023     coenzyme Q-10 capsule Take 1 capsule by mouth daily  1/20/2023     FLUoxetine (PROZAC) 40 MG capsule Take 40 mg by mouth every morning  1/20/2023     Fluticasone-Umeclidin-Vilanterol (TRELEGY ELLIPTA) 200-62.5-25 MCG/INH oral inhaler Inhale 1 puff into the lungs every morning  1/20/2023     furosemide (LASIX) 20 MG tablet Take  20 mg by mouth daily  1/20/2023     gabapentin (NEURONTIN) 300 MG capsule Take 900 mg by mouth At Bedtime  1/19/2023     losartan (COZAAR) 25 MG tablet Take 12.5 mg by mouth every morning  1/20/2023     montelukast (SINGULAIR) 10 MG tablet Take 10 mg by mouth At Bedtime  1/19/2023     nystatin (MYCOSTATIN) 982380 UNIT/GM external cream Apply topically 2 times daily  1/20/2023     ondansetron (ZOFRAN) 4 MG tablet Take 4 mg by mouth every 6 hours as needed for nausea  Unknown     potassium chloride ER (KLOR-CON M) 10 MEQ CR tablet Take 20 mEq by mouth daily  1/20/2023     pravastatin (PRAVACHOL) 40 MG tablet Take 40 mg by mouth At Bedtime  1/19/2023     predniSONE (DELTASONE) 1 MG tablet Take 2-4 mg by mouth daily Slow taper. 4mg daily 1/11/23-2/10/23. 3mg 2/11/23-3/12/23. Then 2mg 3/13/23 - 3/15/23 then stop  1/20/2023     rivaroxaban ANTICOAGULANT (XARELTO) 20 MG TABS tablet Take 1 tablet (20 mg) by mouth daily (with dinner)  1/19/2023       Information source(s): Facility ([facility]/) medication list/MAR  Method of interview communication: N/A    Summary of Changes to PTA Med List  New: Cipro, Flagyl  Discontinued: None  Changed: New prednisone taper    Patient was asked about OTC/herbal products specifically.  PTA med list reflects this.    In the past week, patient estimated taking medication this percent of the time:  greater than 90%.    Medication Affordability:       Allergies were reviewed, assessed, and updated with the patient.      Patient did not bring any medications to the hospital and can't retrieve from home. No multi-dose medications are available for use during hospital stay.     The information provided in this note is only as accurate as the sources available at the time of the update(s).    Thank you for the opportunity to participate in the care of this patient.    Stan Marinelli [facility]  1/20/2023 11:28 PM

## 2023-01-21 NOTE — CONSULTS
General Surgery Consult    Zakiya Christian MRN# [id number]     Date of Admission: 1/20/2023    Reason for Consult  Pelvic abscess    History of Present Illness  Patient is a 78-year-old woman with a medical history including COPD and polymyalgia rheumatica on prednisone who presents after a fall and generalized weakness.  The patient has had a long history of diverticulitis.  She was first diagnosed back in April and required some drains placed at that time.  His drains were removed sometime afterwards.  More recently it has been found that she does a pelvic abscess and a colovesical fistula.  She was recently seen by colorectal surgery to discuss management of this.  She tells me that she has had ongoing bloody purulent discharge in either her urine or vaginally.  It is difficult for her to tell which.  This is not new for her.  Additionally, she tells me that she is currently on a prednisone taper for her polymyalgia rheumatica.  She denies significant abdominal pain.  She has been having normal bowel function.  She has been eating.    Past Medical History:  Past Medical History:   Diagnosis Date     Diabetes (H)      Hypertension      Obese      PMR (polymyalgia rheumatica) (H)        Past Surgical History:  Past Surgical History:   Procedure Laterality Date     IR ABSCESS TUBE CHANGE  4/22/2022   Prior cholecystectomy    Allergies:     Allergies   Allergen Reactions     Simvastatin Hives     Medications:  No current facility-administered medications on file prior to encounter.  acetaminophen (TYLENOL) 500 MG tablet, Take 500 mg by mouth 4 times daily And q6h PRN - max 2 PRN/day  albuterol (PROAIR HFA/PROVENTIL HFA/VENTOLIN HFA) 108 (90 Base) MCG/ACT inhaler, Inhale 2 puffs into the lungs every 6 hours as needed for shortness of breath / dyspnea or wheezing  albuterol (PROVENTIL) (5 MG/ML) 0.5% neb solution, Take 5 mg by nebulization every 6 hours as needed for wheezing or shortness of breath / dyspnea  alendronate  (FOSAMAX) 70 MG tablet, Take 70 mg by mouth every 7 days  Ascorbic Acid (VITAMIN C) 500 MG CAPS, Take 1,000 mg by mouth daily  aspirin 81 MG EC tablet, Take 81 mg by mouth daily  azaTHIOprine (IMURAN) 50 MG tablet, Take 100 mg by mouth daily  calcium carbonate 600 mg-vitamin D 400 units (CALTRATE) 600-400 MG-UNIT per tablet, Take 1 tablet by mouth 2 times daily (with meals)  calcium polycarbophil (FIBERCON) 625 MG tablet, Take 2 tablets by mouth 2 times daily (with meals)  carvedilol (COREG) 12.5 MG tablet, Take 12.5 mg by mouth 2 times daily (with meals)  cholecalciferol 50 MCG (2000 UT) CAPS, Take 4,000 Units by mouth daily  coenzyme Q-10 capsule, Take 1 capsule by mouth daily  FLUoxetine (PROZAC) 40 MG capsule, Take 40 mg by mouth every morning  Fluticasone-Umeclidin-Vilanterol (TRELEGY ELLIPTA) 200-62.5-25 MCG/INH oral inhaler, Inhale 1 puff into the lungs every morning  furosemide (LASIX) 20 MG tablet, Take 20 mg by mouth daily  gabapentin (NEURONTIN) 300 MG capsule, Take 900 mg by mouth At Bedtime  losartan (COZAAR) 25 MG tablet, Take 12.5 mg by mouth every morning  montelukast (SINGULAIR) 10 MG tablet, Take 10 mg by mouth At Bedtime  nystatin (MYCOSTATIN) 211030 UNIT/GM external cream, Apply topically 2 times daily  ondansetron (ZOFRAN) 4 MG tablet, Take 4 mg by mouth every 6 hours as needed for nausea  potassium chloride ER (KLOR-CON M) 10 MEQ CR tablet, Take 20 mEq by mouth daily  pravastatin (PRAVACHOL) 40 MG tablet, Take 40 mg by mouth At Bedtime  predniSONE (DELTASONE) 1 MG tablet, Take 2-4 mg by mouth daily Slow taper. 4mg daily 1/11/23-2/10/23. 3mg 2/11/23-3/12/23. Then 2mg 3/13/23 - 3/15/23 then stop  rivaroxaban ANTICOAGULANT (XARELTO) 20 MG TABS tablet, Take 1 tablet (20 mg) by mouth daily (with dinner)  ciprofloxacin (CIPRO) 500 MG tablet, Take 500 mg by mouth 2 times daily  metroNIDAZOLE (FLAGYL) 500 MG tablet, Take 500 mg by mouth 3 times daily  omeprazole (PRILOSEC) 40 MG DR capsule, Take 40 mg  "by mouth daily      Social History:  Stopped smoking 40 years ago  Social History     Socioeconomic History     Marital status: Single     Spouse name: Not on file     Number of children: Not on file     Years of education: Not on file     Highest education level: Not on file   Occupational History     Not on file   Tobacco Use     Smoking status: Not on file     Smokeless tobacco: Not on file   Substance and Sexual Activity     Alcohol use: Not on file     Drug use: Not on file     Sexual activity: Not on file   Other Topics Concern     Not on file   Social History Narrative     Not on file     Social Determinants of Health     Financial Resource Strain: Not on file   Food Insecurity: Not on file   Transportation Needs: Not on file   Physical Activity: Not on file   Stress: Not on file   Social Connections: Not on file   Intimate Partner Violence: Not on file   Housing Stability: Not on file     Family History:  No family history on file.    ROS:  12 point review negative except as stated in H&P    Exam:  /52   Pulse 87   Temp 97.6  F (36.4  C) (Oral)   Resp 18   Ht 1.6 m (5' 3\")   Wt 90.3 kg (199 lb)   SpO2 96%   BMI 35.25 kg/m    General: Alert, interactive, NAD  Resp: Patient has audible wheezing and has slightly labored breathing.  Cardiac: RRR  Abdomen: Abdomen is soft and nondistended.  She is focally tender in the right lower quadrant but otherwise completely benign.  She has a well-healed cholecystectomy scar.    Labs:   Personally reviewed  WBC 16.1  Creatinine 1.49 up from a normal baseline  Lactate 2.0    Imaging:   Personally reviewed   CT:  IMPRESSION:   1.  No evidence of acute traumatic injury in the abdomen or pelvis.  2.  Interval decrease in the right side pelvic abscess as noted above which on the current study appears to be myometrial in nature involving the right side of the uterine fundus.  3.  Air in the bladder from a presumed a colovesical fistula given her prior history. If " needed this can be confirmed with a standard filling cystogram (not a CT cystogram).  4.  Extensive sigmoid diverticulitis without evidence of acute inflammation. Tethering of the adjacent small bowel and the left adnexa to the mid sigmoid again noted.  5.  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  6.  Other noncritical findings as noted above.    Assessment: 78 year old female with a complex medical history including COPD and currently taking prednisone for polymyalgia rheumatica who has a known colovesical fistula now presenting with generalized weakness.  On her imaging, her previously known pelvic abscess has decreased in size.  It is now approximately 2 cm.  This is too small for drain.  Additionally recently saw colorectal surgery to discuss her colovesical fistula management.  It seems like she decompensated clinically likely due to urinary tract infection secondary to this colovesical fistula.  I not see any evidence of active diverticulitis.      Due to the fact that the patient is a high risk surgical candidate, does not have a clear indication for urgent surgery, and is completely uninterested in an ostomy, I would recommend we treat her conservatively with antibiotics and I expect that she will improve.  She can then follow-up in the outpatient setting with her colorectal surgeon to discuss timing for surgery.  Of course if she decompensates we would have to consider likely Roth's procedure.  I think that is unlikely though.  This is a very chronic process for her.    Plan:   -No imminent surgical plans  -IV antibiotics.  Consider ID consult to make recommendations about duration  -Okay for clear liquid diet from surgery standpoint.  Could probably advance if she feels well otherwise  -PT OT  -We will follow while she is admitted    Ovidio Hamm MD  General Surgeon  [facility]  [facility] - [facility]  [address] [place] [address]  [address]  [place]  [address]?  Office: [phone]

## 2023-01-21 NOTE — H&P
[facility]    History and Physical - Hospitalist Service       Date of Admission:  1/20/2023    Assessment & Plan      Zakiya Christian is a 78 year old female admitted on 1/20/2023.  She has a past medical history significant for COPD, hypertension, DVT, chronic lower back pain, diverticulitis, intra-abdominal abscess who presented with generalized weakness, nausea, vomiting and possible abdominal discomfort.    #Sepsis  #Intra-abdominal abscess possibly involving the myometrium  -Nonspecific symptoms of generalized weakness, chills nausea and vomiting.  -CT abdomen pelvis showed right-sided pelvic abscess possibly involving the myometrium  -Patient reported possible vaginal discharge/bleeding  -Received Zosyn in the emergency department.  -Blood pressures currently around 120/50.  -Lactic acid resolved.    Plan:  [] Consult gynecology  [] Continue Zosyn start vancomycin.  [] Follow-up with blood cultures.  [] Consider ID consultation.  [] Follow-up of blood cultures.      #Questionable acute kidney injury  -Baseline creatinine 6 months ago was around 0.7, nevertheless creatinine on 12/1/2022 was 1.3.  -Creatinine presentation 1.49.  -Most likely due to decreased p.o. intake.    Plan:  [] Continue to monitor.      #History of DVT  -Was on Xarelto 20 mg daily at home.    Plan:  [] Hold Xarelto until further evaluation gynecology    #COPD  -No acute exacerbation.  -Currently saturating 97% on 1 L nasal cannula.  Patient does not use oxygen at home.    Plan:  [] Wean off nasal cannula with goal SPO2 88 to 90%.  [] Continue home medications.               Diet:  Regular diet  DVT Prophylaxis: VTE Prophylaxis contraindicated due to possible surgical intervention  Morrell Catheter: Not present  Lines: None     Cardiac Monitoring: None  Code Status: Full Code      Disposition Plan        I have personally reviewed the following data over the past 24 hrs:    16.1 (H)  \   9.2 (L)   / 156     140 103 24  /  94   4.5 26 1.49 (H) \       Procal: 34.56 (H) CRP: 5.0 (H) Lactic Acid: 2.0         Imaging results reviewed over the past 24 hrs:   Recent Results (from the past 24 hour(s))   Head CT w/o contrast    Narrative    EXAM: CT HEAD W/O CONTRAST, CT CERVICAL SPINE W/O CONTRAST  LOCATION: [facility]  DATE/TIME: 1/20/2023 4:47 PM    INDICATION: Head and neck injury, on thinners, pain in lower cervical upper thoracic  COMPARISON: 12/01/2022 CT head, 09/18/2022 CT cervical spine  TECHNIQUE:   1) Routine CT Head without IV contrast. Multiplanar reformats. Dose reduction techniques were used.  2) Routine CT Cervical Spine without IV contrast. Multiplanar reformats. Dose reduction techniques were used.    FINDINGS:   HEAD CT:   INTRACRANIAL CONTENTS: No intracranial hemorrhage, extraaxial collection, or mass effect.  No CT evidence of acute infarct. Moderate presumed chronic small vessel ischemic changes. Moderate generalized volume loss. No hydrocephalus.     VISUALIZED ORBITS/SINUSES/MASTOIDS: No intraorbital abnormality. No paranasal sinus mucosal disease. No middle ear or mastoid effusion.    BONES/SOFT TISSUES: No acute abnormality.    CERVICAL SPINE CT:   VERTEBRA: Normal vertebral body heights. Slight right cervical curve. No fracture or posttraumatic subluxation.     CANAL/FORAMINA: Mild degenerative changes without significant canal narrowing at any level. Multilevel mild neural foraminal narrowing.    PARASPINAL: Prominent medial deviation of ICAs within the neck. 7 mm nodular opacity left lung apex; probably present previously however it was incompletely visualized. If indicated, CT chest may be performed.      Impression    IMPRESSION:  HEAD CT:  1.  No acute intracranial process.    CERVICAL SPINE CT:  1.  No CT evidence for acute fracture or post traumatic subluxation.   Cervical spine CT w/o contrast    Narrative    EXAM: CT HEAD W/O CONTRAST, CT CERVICAL SPINE W/O  CONTRAST  LOCATION: [facility]  DATE/TIME: 1/20/2023 4:47 PM    INDICATION: Head and neck injury, on thinners, pain in lower cervical upper thoracic  COMPARISON: 12/01/2022 CT head, 09/18/2022 CT cervical spine  TECHNIQUE:   1) Routine CT Head without IV contrast. Multiplanar reformats. Dose reduction techniques were used.  2) Routine CT Cervical Spine without IV contrast. Multiplanar reformats. Dose reduction techniques were used.    FINDINGS:   HEAD CT:   INTRACRANIAL CONTENTS: No intracranial hemorrhage, extraaxial collection, or mass effect.  No CT evidence of acute infarct. Moderate presumed chronic small vessel ischemic changes. Moderate generalized volume loss. No hydrocephalus.     VISUALIZED ORBITS/SINUSES/MASTOIDS: No intraorbital abnormality. No paranasal sinus mucosal disease. No middle ear or mastoid effusion.    BONES/SOFT TISSUES: No acute abnormality.    CERVICAL SPINE CT:   VERTEBRA: Normal vertebral body heights. Slight right cervical curve. No fracture or posttraumatic subluxation.     CANAL/FORAMINA: Mild degenerative changes without significant canal narrowing at any level. Multilevel mild neural foraminal narrowing.    PARASPINAL: Prominent medial deviation of ICAs within the neck. 7 mm nodular opacity left lung apex; probably present previously however it was incompletely visualized. If indicated, CT chest may be performed.      Impression    IMPRESSION:  HEAD CT:  1.  No acute intracranial process.    CERVICAL SPINE CT:  1.  No CT evidence for acute fracture or post traumatic subluxation.   CT Chest Pulmonary Embolism w Contrast    Narrative    EXAM: CT CHEST PULMONARY EMBOLISM W CONTRAST  LOCATION: [facility]  DATE/TIME: 1/20/2023 4:50 PM    INDICATION: Weakness, hypoxia, low blood pressure in the field  COMPARISON: 4/5/2022 and 12/9/2022  TECHNIQUE: CT chest pulmonary angiogram during arterial phase injection of IV contrast. Multiplanar  reformats and MIP reconstructions were performed. Dose reduction techniques were used.   CONTRAST: 75ml Isovue 370    FINDINGS:  ANGIOGRAM CHEST: Pulmonary arteries are normal caliber and negative for pulmonary emboli. Thoracic aorta demonstrates significant atherosclerotic plaque but is negative for dissection. No CT evidence of right heart strain.    LUNGS AND PLEURA: Mild motion artifact. Scattered tiny peripheral pulmonary nodules unchanged with largest left upper lobe on image 91 measuring 4 x 3 mm. Stable linear scarring right middle lobe. Some flattening of trachea and mainstem bronchi   consistent with tracheobronchomalacia. No new focal infiltrate.    MEDIASTINUM/AXILLAE: No lymphadenopathy.    CORONARY ARTERY CALCIFICATION: Severe.    UPPER ABDOMEN: Normal.    MUSCULOSKELETAL: Subacute fractures of right ribs 6-9 now have surrounding callus. There are healed fractures involving left ribs 5 and 6 which were not seen on previous exam.      Impression    IMPRESSION:  1.  No pulmonary emboli identified. No acute thoracic abnormality evident.  2.  Tracheobronchomalacia.  3.  Prior bilateral rib fractures. No acute fracture identified.   CT Abdomen Pelvis w Contrast    Narrative    EXAM: CT ABDOMEN PELVIS W CONTRAST  LOCATION: [facility]  DATE/TIME: 1/20/2023 4:51 PM    INDICATION: Weakness, hypotension, fall with acute on chronic abdominal pain with previously known fistula and abscess  COMPARISON: CTA AP 12/09/2022 and older studies.  TECHNIQUE: CT scan of the abdomen and pelvis was performed following injection of IV contrast. Multiplanar reformats were obtained. Dose reduction techniques were used.  CONTRAST: 75ml Isovue 370    FINDINGS:   LOWER CHEST: Please see chest CTA report    HEPATOBILIARY: Prior cholecystectomy.    PANCREAS: Mild diffuse atrophy.    SPLEEN: Normal.    ADRENAL GLANDS: Normal.    KIDNEYS/BLADDER: No change in the nonobstructing small calculus in the lower pole  calyx on the right. 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 No change in the curvilinear elliptical density   centrally within the fat, new since the April 2022 but unchanged from the recent study ( AML embolization 10/5/2022). There are also bilateral renal cysts which need no follow-up. Air is seen in the bladder.    BOWEL: Redundant colon especially the sigmoid with extensive distal colonic diverticulosis. Tethering of the small bowel wall and the left adnexa to the mid sigmoid colon again noted. No acute inflammatory changes. No bowel obstruction. Incidental   descending duodenal diverticulum.    LYMPH NODES: Normal.    VASCULATURE: Moderate arterial calcifications. No aneurysm.    PELVIC ORGANS: There has been interval decrease in the rim-enhancing fluid collection in the pelvis which is less well-defined now measures approximately 2 cm (central cavity  was 3.5 cm before). On the current study this appears intramural within the   right uterine fundus    MUSCULOSKELETAL: Subacute right lateral seventh 8 9 rib fractures are seen with callus formation no acute fractures. Moderate facet arthropathy in the lower lumbar spine.      Impression    IMPRESSION:   1.  No evidence of acute traumatic injury in the abdomen or pelvis.  2.  Interval decrease in the right side pelvic abscess as noted above which on the current study appears to be myometrial in nature involving the right side of the uterine fundus.  3.  Air in the bladder from a presumed a colovesical fistula given her prior history. If needed this can be confirmed with a standard filling cystogram (not a CT cystogram).  4.  Extensive sigmoid diverticulitis without evidence of acute inflammation. Tethering of the adjacent small bowel and the left adnexa to the mid sigmoid again noted.  5.  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  6.  Other noncritical findings as noted above.           PAGE MENENDEZ MD  Hospitalist Service  [facility]  Securely message with [employer] (more info)  Text page via [facility] Paging/Directory     ______________________________________________________________________    Chief Complaint   Generalized weakness    History is obtained from the patient    History of Present Illness   Zakiya Christian is a 78 year old female admitted on 1/20/2023.  She has a past medical history significant for COPD, hypertension, DVT, chronic lower back pain, diverticulitis who presented with generalized weakness, nausea, vomiting and possible abdominal discomfort.    Patient stated that her symptoms of generalized weakness has been going on for the last few weeks.  Over the last 2 to 3 weeks she noticed what she believes vaginal versus urinary discharge.  She noticed bloody and purulent material possibly coming from her vagina.  She reported some intermittent nonspecific abdominal discomfort and her weakness has worsened over the last few days.  She had nonbilious vomiting on 1/19 with nausea and possible chills.  She denies any chest pain or worsening shortness of breath.  She reported a fall on 1/19 without any head trauma.    Per ED physician, blood pressure upon EMS arrival was low with systolic blood pressure around 80.  Labs significant for creatinine 1.4, lactic acid 2.3, procalcitonin 34, WBC 16, hemoglobin 9.2.  CT head negative.  CT chest showed tracheobronchomalacia.  CT cervical spine negative.  CT abdomen and pelvis with contrast showed right-sided pelvic abscess possibly in the myometrium, her in the bladder most likely due to her history of colovesical fistula.  Patient was started on Zosyn and received IV fluid with appropriate response.  Blood pressures currently around 120/50.            Past Medical History    Past Medical History:   Diagnosis Date     Diabetes (H)      Hypertension      Obese      PMR (polymyalgia rheumatica) (H)        Past Surgical  History   Past Surgical History:   Procedure Laterality Date     IR ABSCESS TUBE CHANGE  4/22/2022       Prior to Admission Medications   Prior to Admission Medications   Prescriptions Last Dose Informant Patient Reported? Taking?   Ascorbic Acid (VITAMIN C) 500 MG CAPS   Yes No   Sig: Take 1,000 mg by mouth daily   FLUoxetine (PROZAC) 40 MG capsule   Yes No   Sig: Take 40 mg by mouth every morning   Fluticasone-Umeclidin-Vilanterol (TRELEGY ELLIPTA) 200-62.5-25 MCG/INH oral inhaler   Yes No   Sig: Inhale 1 puff into the lungs every morning   acetaminophen (TYLENOL) 500 MG tablet   Yes No   Sig: Take 500 mg by mouth 4 times daily And q6h PRN - max 2 PRN/day   albuterol (PROAIR HFA/PROVENTIL HFA/VENTOLIN HFA) 108 (90 Base) MCG/ACT inhaler   Yes No   Sig: Inhale 2 puffs into the lungs every 6 hours as needed for shortness of breath / dyspnea or wheezing   albuterol (PROVENTIL) (5 MG/ML) 0.5% neb solution   Yes No   Sig: Take 5 mg by nebulization every 6 hours as needed for wheezing or shortness of breath / dyspnea   alendronate (FOSAMAX) 70 MG tablet   Yes No   Sig: Take 70 mg by mouth every 7 days   aspirin 81 MG EC tablet   Yes No   Sig: Take 81 mg by mouth daily   calcium carbonate 600 mg-vitamin D 400 units (CALTRATE) 600-400 MG-UNIT per tablet   Yes No   Sig: Take 1 tablet by mouth 2 times daily   calcium polycarbophil (FIBERCON) 625 MG tablet   Yes No   Sig: Take 2 tablets by mouth daily   carvedilol (COREG) 12.5 MG tablet   Yes No   Sig: Take 12.5 mg by mouth 2 times daily (with meals)   cholecalciferol 50 MCG (2000 UT) CAPS   Yes No   Sig: Take 4,000 Units by mouth daily   coenzyme Q-10 capsule   Yes No   Sig: Take 1 capsule by mouth daily   gabapentin (NEURONTIN) 300 MG capsule   Yes No   Sig: Take 900 mg by mouth At Bedtime   losartan (COZAAR) 25 MG tablet   Yes No   Sig: Take 12.5 mg by mouth every morning   montelukast (SINGULAIR) 10 MG tablet   Yes No   Sig: Take 10 mg by mouth At Bedtime   omeprazole  (PRILOSEC) 20 MG DR capsule   Yes No   Sig: Take 40 mg by mouth every morning (before breakfast)   ondansetron (ZOFRAN) 4 MG tablet   Yes No   Sig: Take 4 mg by mouth every 6 hours as needed for nausea   potassium chloride ER (KLOR-CON M) 10 MEQ CR tablet   No No   Sig: Take 1 tablet (10 mEq) by mouth daily   pravastatin (PRAVACHOL) 40 MG tablet   Yes No   Sig: Take 40 mg by mouth At Bedtime   rivaroxaban ANTICOAGULANT (XARELTO) 20 MG TABS tablet   No No   Sig: Take 1 tablet (20 mg) by mouth daily (with dinner)      Facility-Administered Medications: None        Physical Exam   Vital Signs: Temp: 98.1  F (36.7  C) Temp src: Oral BP: 116/56 Pulse: 91   Resp: 20 SpO2: 97 % O2 Device: Nasal cannula Oxygen Delivery: 2 LPM  Weight: 195 lbs 0 oz    Physical Exam  Constitutional:       General: She is not in acute distress.     Appearance: Normal appearance. She is obese. She is not ill-appearing.   HENT:      Mouth/Throat:      Mouth: Mucous membranes are moist.   Cardiovascular:      Rate and Rhythm: Normal rate.      Heart sounds: No murmur heard.  Pulmonary:      Effort: Pulmonary effort is normal. No respiratory distress.      Breath sounds: No wheezing.   Abdominal:      General: Abdomen is flat. There is no distension.      Palpations: Abdomen is soft.      Tenderness: There is no abdominal tenderness.      Comments: Cholecystectomy surgical scar noted.   Musculoskeletal:         General: No swelling.   Skin:     General: Skin is warm and dry.   Neurological:      General: No focal deficit present.      Mental Status: She is alert.   Psychiatric:         Mood and Affect: Mood normal.         Behavior: Behavior normal.         Medical Decision Making       60 MINUTES SPENT BY ME on the date of service doing chart review, history, exam, documentation & further activities per the note.      Data     I have personally reviewed the following data over the past 24 hrs:    16.1 (H)  \   9.2 (L)   / 156     140 103 24 /  94    4.5 26 1.49 (H) \       Procal: 34.56 (H) CRP: 5.0 (H) Lactic Acid: 2.0         Imaging results reviewed over the past 24 hrs:   Recent Results (from the past 24 hour(s))   Head CT w/o contrast    Narrative    EXAM: CT HEAD W/O CONTRAST, CT CERVICAL SPINE W/O CONTRAST  LOCATION: [facility]  DATE/TIME: 1/20/2023 4:47 PM    INDICATION: Head and neck injury, on thinners, pain in lower cervical upper thoracic  COMPARISON: 12/01/2022 CT head, 09/18/2022 CT cervical spine  TECHNIQUE:   1) Routine CT Head without IV contrast. Multiplanar reformats. Dose reduction techniques were used.  2) Routine CT Cervical Spine without IV contrast. Multiplanar reformats. Dose reduction techniques were used.    FINDINGS:   HEAD CT:   INTRACRANIAL CONTENTS: No intracranial hemorrhage, extraaxial collection, or mass effect.  No CT evidence of acute infarct. Moderate presumed chronic small vessel ischemic changes. Moderate generalized volume loss. No hydrocephalus.     VISUALIZED ORBITS/SINUSES/MASTOIDS: No intraorbital abnormality. No paranasal sinus mucosal disease. No middle ear or mastoid effusion.    BONES/SOFT TISSUES: No acute abnormality.    CERVICAL SPINE CT:   VERTEBRA: Normal vertebral body heights. Slight right cervical curve. No fracture or posttraumatic subluxation.     CANAL/FORAMINA: Mild degenerative changes without significant canal narrowing at any level. Multilevel mild neural foraminal narrowing.    PARASPINAL: Prominent medial deviation of ICAs within the neck. 7 mm nodular opacity left lung apex; probably present previously however it was incompletely visualized. If indicated, CT chest may be performed.      Impression    IMPRESSION:  HEAD CT:  1.  No acute intracranial process.    CERVICAL SPINE CT:  1.  No CT evidence for acute fracture or post traumatic subluxation.   Cervical spine CT w/o contrast    Narrative    EXAM: CT HEAD W/O CONTRAST, CT CERVICAL SPINE W/O CONTRAST  LOCATION:   [facility]  DATE/TIME: 1/20/2023 4:47 PM    INDICATION: Head and neck injury, on thinners, pain in lower cervical upper thoracic  COMPARISON: 12/01/2022 CT head, 09/18/2022 CT cervical spine  TECHNIQUE:   1) Routine CT Head without IV contrast. Multiplanar reformats. Dose reduction techniques were used.  2) Routine CT Cervical Spine without IV contrast. Multiplanar reformats. Dose reduction techniques were used.    FINDINGS:   HEAD CT:   INTRACRANIAL CONTENTS: No intracranial hemorrhage, extraaxial collection, or mass effect.  No CT evidence of acute infarct. Moderate presumed chronic small vessel ischemic changes. Moderate generalized volume loss. No hydrocephalus.     VISUALIZED ORBITS/SINUSES/MASTOIDS: No intraorbital abnormality. No paranasal sinus mucosal disease. No middle ear or mastoid effusion.    BONES/SOFT TISSUES: No acute abnormality.    CERVICAL SPINE CT:   VERTEBRA: Normal vertebral body heights. Slight right cervical curve. No fracture or posttraumatic subluxation.     CANAL/FORAMINA: Mild degenerative changes without significant canal narrowing at any level. Multilevel mild neural foraminal narrowing.    PARASPINAL: Prominent medial deviation of ICAs within the neck. 7 mm nodular opacity left lung apex; probably present previously however it was incompletely visualized. If indicated, CT chest may be performed.      Impression    IMPRESSION:  HEAD CT:  1.  No acute intracranial process.    CERVICAL SPINE CT:  1.  No CT evidence for acute fracture or post traumatic subluxation.   CT Chest Pulmonary Embolism w Contrast    Narrative    EXAM: CT CHEST PULMONARY EMBOLISM W CONTRAST  LOCATION: [facility]  DATE/TIME: 1/20/2023 4:50 PM    INDICATION: Weakness, hypoxia, low blood pressure in the field  COMPARISON: 4/5/2022 and 12/9/2022  TECHNIQUE: CT chest pulmonary angiogram during arterial phase injection of IV contrast. Multiplanar reformats and MIP  reconstructions were performed. Dose reduction techniques were used.   CONTRAST: 75ml Isovue 370    FINDINGS:  ANGIOGRAM CHEST: Pulmonary arteries are normal caliber and negative for pulmonary emboli. Thoracic aorta demonstrates significant atherosclerotic plaque but is negative for dissection. No CT evidence of right heart strain.    LUNGS AND PLEURA: Mild motion artifact. Scattered tiny peripheral pulmonary nodules unchanged with largest left upper lobe on image 91 measuring 4 x 3 mm. Stable linear scarring right middle lobe. Some flattening of trachea and mainstem bronchi   consistent with tracheobronchomalacia. No new focal infiltrate.    MEDIASTINUM/AXILLAE: No lymphadenopathy.    CORONARY ARTERY CALCIFICATION: Severe.    UPPER ABDOMEN: Normal.    MUSCULOSKELETAL: Subacute fractures of right ribs 6-9 now have surrounding callus. There are healed fractures involving left ribs 5 and 6 which were not seen on previous exam.      Impression    IMPRESSION:  1.  No pulmonary emboli identified. No acute thoracic abnormality evident.  2.  Tracheobronchomalacia.  3.  Prior bilateral rib fractures. No acute fracture identified.   CT Abdomen Pelvis w Contrast    Narrative    EXAM: CT ABDOMEN PELVIS W CONTRAST  LOCATION: [facility]  DATE/TIME: 1/20/2023 4:51 PM    INDICATION: Weakness, hypotension, fall with acute on chronic abdominal pain with previously known fistula and abscess  COMPARISON: CTA AP 12/09/2022 and older studies.  TECHNIQUE: CT scan of the abdomen and pelvis was performed following injection of IV contrast. Multiplanar reformats were obtained. Dose reduction techniques were used.  CONTRAST: 75ml Isovue 370    FINDINGS:   LOWER CHEST: Please see chest CTA report    HEPATOBILIARY: Prior cholecystectomy.    PANCREAS: Mild diffuse atrophy.    SPLEEN: Normal.    ADRENAL GLANDS: Normal.    KIDNEYS/BLADDER: No change in the nonobstructing small calculus in the lower pole calyx on the  right. 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 No change in the curvilinear elliptical density   centrally within the fat, new since the April 2022 but unchanged from the recent study ( AML embolization 10/5/2022). There are also bilateral renal cysts which need no follow-up. Air is seen in the bladder.    BOWEL: Redundant colon especially the sigmoid with extensive distal colonic diverticulosis. Tethering of the small bowel wall and the left adnexa to the mid sigmoid colon again noted. No acute inflammatory changes. No bowel obstruction. Incidental   descending duodenal diverticulum.    LYMPH NODES: Normal.    VASCULATURE: Moderate arterial calcifications. No aneurysm.    PELVIC ORGANS: There has been interval decrease in the rim-enhancing fluid collection in the pelvis which is less well-defined now measures approximately 2 cm (central cavity  was 3.5 cm before). On the current study this appears intramural within the   right uterine fundus    MUSCULOSKELETAL: Subacute right lateral seventh 8 9 rib fractures are seen with callus formation no acute fractures. Moderate facet arthropathy in the lower lumbar spine.      Impression    IMPRESSION:   1.  No evidence of acute traumatic injury in the abdomen or pelvis.  2.  Interval decrease in the right side pelvic abscess as noted above which on the current study appears to be myometrial in nature involving the right side of the uterine fundus.  3.  Air in the bladder from a presumed a colovesical fistula given her prior history. If needed this can be confirmed with a standard filling cystogram (not a CT cystogram).  4.  Extensive sigmoid diverticulitis without evidence of acute inflammation. Tethering of the adjacent small bowel and the left adnexa to the mid sigmoid again noted.  5.  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  6.  Other noncritical findings as noted above.

## 2023-01-21 NOTE — PROGRESS NOTES
[facility]    Medicine Progress Note - Hospitalist Service    Date of Admission:  1/20/2023    Assessment & Plan      Zakiya Christian is a 78 year old female admitted on 1/20/2023.  She has a past medical history significant for COPD, hypertension, DVT, chronic lower back pain, diverticulitis, intra-abdominal abscess who presented with generalized weakness, nausea, vomiting and possible abdominal discomfort. Admitted on IV antibiotics for pelvic abscess. Unclear why gynecologic oncology was consulted given no prior history of any malignancy or metastatic disease. General gynecology consulted and discussed case. Subsequently general surgery was consulted. 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 Also she actually has never had a hysterectomy (and previous mention in EMR is not accurate). Acute pain team following for abscess associated pain as well as PMR.    #Sepsis  #Intra-abdominal abscess possibly involving the myometrium  #Initial intra-abdominal abscess of diverticular colon source in 4/2022  -CT abdomen pelvis showed right-sided pelvic abscess possibly involving the myometrium  -Patient reported possible vaginal discharge/bleeding   -Received Zosyn in the emergency department.  -Blood pressures currently around 120/50.  -Lactic acid resolved.  -Consulted gynecology.  -Directly discussed with gynecologic oncology. Discussed with general gynecology.  -Continue Zosyn start vancomycin.  -Follow-up with blood cultures.  -Order IV zosyn and IV vancomycin broad coverage for now.  -General surgery consulted given diverticular colon abscess is initial source - same abscess per EMR chart review and discussion with patient. Notify me if this is deemed not the case after initial surgery eval.   -See extensive discussion above.   -Pain team for abscess pain as well as PMR; IV  "dilaudid available PRN     #History of renal angiomyolipoma  -Previously embolized and fully treated per patient  -NOT maligancy    #SHRUTHI  -Hold potentially nephrogenic or nephrotoxic medications.   -Administer IVF bolus now and initiate maintenance IVF.  -Recheck BMP and Cr tomorrow AM.   -If not improved or normalized, consider further evaluation with FeNa and renal ultrasound.    #History of DVT  -Was on Xarelto 20 mg daily at home.  -Xarelto until initial general surgery evaluation.    #COPD  -No acute exacerbation.  -Currently saturating 97% on 1 L nasal cannula.  Patient does not use oxygen at home.  --Wean off nasal cannula with goal SPO2 88 to 90%.  -Continue home medications.    #PMR  -Order home medications.  -IV dilaudid available PRN   -Pain team consulted         Diet: Combination Diet Regular Diet Adult    DVT Prophylaxis: Pneumatic Compression Devices, Anti-embolisim stockings (TEDs), Ambulate every shift and VTE Prophylaxis contraindicated due to pending initial surgical evaluation  Morrell Catheter: Not present  Lines: None     Cardiac Monitoring: None  Code Status: Full Code      Clinically Significant Risk Factors Present on Admission               # Drug Induced Coagulation Defect: home medication list includes an anticoagulant medication    # Hypertension: home medication list includes antihypertensive(s)      # Obesity: Estimated body mass index is 35.25 kg/m  as calculated from the following:    Height as of this encounter: 1.6 m (5' 3\").    Weight as of this encounter: 90.3 kg (199 lb).           Disposition Plan      Expected Discharge Date: 01/24/2023                  Go Cruz MD  Hospitalist Service  [facility]  Securely message with [employer] (more info)  Text page via [employer] Paging/Directory   ______________________________________________________________________    Interval History   NAEO.     -Paged at start of shift by GYN ONC  -Discussed with Dr. Garcia: " Gyn Onc not available at . ?Any prior Onc hx.   -Consulted General Gynecology; discussed with Dr. Aguilar and chart and EMR, prior imaging reviewed. Appreciate helpful discussion. Reports of hysterectomy in chart and prior CT scan. Initial abscess in 4/2022 from diverticular source and has been followed serially until now. Current pelvic abscess appears to be same diverticular abscess form 4/2022. Also patient has history of angiomyolipoma of kidneys, appears benign. No obvious malignancy or Onc history at all.  -Consult placed to General Surgery  -Discussed with RN at bedside  -Confirmed with patient that she has never had a hysterectomy and uterus is intact. Thus prior report in EMR not accurate. Also confirmed with Zakiya that she has had a benign angiomyolipoma that was fully treated and embolized by IR previously. Zakiya confirms that she has never had any malignancy or malignancy cancer history at all. Discussed POC with Zakiya extensively - IV antibiotics,  IV dilaudid for severe pain, acute pain team consult, and general surgery consult. Answered all questions she had thoroughly and to her stated satisfaction.  -Discussed POC with RN again to close loop.     Physical Exam   Vital Signs: Temp: 97.6  F (36.4  C) Temp src: Oral BP: 101/52 Pulse: 87   Resp: 18 SpO2: 96 % O2 Device: None (Room air) Oxygen Delivery: 1 LPM  Weight: 199 lbs 0 oz    GENERAL:  Alert, appears comfortable, in no acute distress, appears stated age   HEAD:  Normocephalic, without obvious abnormality, atraumatic   EYES:  PERRL, conjunctiva/corneas clear, no scleral icterus, EOM's intact   NOSE: Nares normal, septum midline, mucosa normal, no drainage   THROAT: Lips, mucosa, and tongue normal; teeth and gums normal, mouth moist   NECK: Supple, symmetrical, trachea midline   BACK:   Symmetric, no curvature, ROM normal   LUNGS:   Clear to auscultation bilaterally, no rales, rhonchi, or wheezing, symmetric chest rise on inhalation, respirations  unlabored   CHEST WALL:  No tenderness or deformity   HEART:  Regular rate and rhythm, S1 and S2 normal, no murmur, rub, or gallop    ABDOMEN:   Soft, mild moderated RLL tenderness, no rigidity, bowel sounds active all four quadrants, no masses, no organomegaly, no rebound or guarding   EXTREMITIES: Extremities normal, atraumatic, no cyanosis or edema    SKIN: Dry to touch, no exanthems in the visualized areas   NEURO: Alert, oriented x 4, moves all four extremities freely/spontaneously   PSYCH: Cooperative, behavior is appropriate        Medical Decision Making     104 MINUTES SPENT BY ME on the date of service doing chart review, history, exam, documentation & further activities per the note.       -Discussed with Gynecologic Oncology, and General Oncology, and consulted General Surgery.    -Personally reviewed several CT scans from 1/20/23 and also 12/2022 and 6/2022 and 4/2022    -IV parenteral controlled substances    Data     I have personally reviewed the following data over the past 24 hrs:    16.1 (H)  \   9.2 (L)   / 156     140 103 24 /  75   4.5 26 1.49 (H) \       Procal: 34.56 (H) CRP: 5.0 (H) Lactic Acid: 2.0         Imaging results reviewed over the past 24 hrs:   Recent Results (from the past 24 hour(s))   Head CT w/o contrast    Narrative    EXAM: CT HEAD W/O CONTRAST, CT CERVICAL SPINE W/O CONTRAST  LOCATION: [facility]  DATE/TIME: 1/20/2023 4:47 PM    INDICATION: Head and neck injury, on thinners, pain in lower cervical upper thoracic  COMPARISON: 12/01/2022 CT head, 09/18/2022 CT cervical spine  TECHNIQUE:   1) Routine CT Head without IV contrast. Multiplanar reformats. Dose reduction techniques were used.  2) Routine CT Cervical Spine without IV contrast. Multiplanar reformats. Dose reduction techniques were used.    FINDINGS:   HEAD CT:   INTRACRANIAL CONTENTS: No intracranial hemorrhage, extraaxial collection, or mass effect.  No CT evidence of acute infarct.  Moderate presumed chronic small vessel ischemic changes. Moderate generalized volume loss. No hydrocephalus.     VISUALIZED ORBITS/SINUSES/MASTOIDS: No intraorbital abnormality. No paranasal sinus mucosal disease. No middle ear or mastoid effusion.    BONES/SOFT TISSUES: No acute abnormality.    CERVICAL SPINE CT:   VERTEBRA: Normal vertebral body heights. Slight right cervical curve. No fracture or posttraumatic subluxation.     CANAL/FORAMINA: Mild degenerative changes without significant canal narrowing at any level. Multilevel mild neural foraminal narrowing.    PARASPINAL: Prominent medial deviation of ICAs within the neck. 7 mm nodular opacity left lung apex; probably present previously however it was incompletely visualized. If indicated, CT chest may be performed.      Impression    IMPRESSION:  HEAD CT:  1.  No acute intracranial process.    CERVICAL SPINE CT:  1.  No CT evidence for acute fracture or post traumatic subluxation.   Cervical spine CT w/o contrast    Narrative    EXAM: CT HEAD W/O CONTRAST, CT CERVICAL SPINE W/O CONTRAST  LOCATION: [facility]  DATE/TIME: 1/20/2023 4:47 PM    INDICATION: Head and neck injury, on thinners, pain in lower cervical upper thoracic  COMPARISON: 12/01/2022 CT head, 09/18/2022 CT cervical spine  TECHNIQUE:   1) Routine CT Head without IV contrast. Multiplanar reformats. Dose reduction techniques were used.  2) Routine CT Cervical Spine without IV contrast. Multiplanar reformats. Dose reduction techniques were used.    FINDINGS:   HEAD CT:   INTRACRANIAL CONTENTS: No intracranial hemorrhage, extraaxial collection, or mass effect.  No CT evidence of acute infarct. Moderate presumed chronic small vessel ischemic changes. Moderate generalized volume loss. No hydrocephalus.     VISUALIZED ORBITS/SINUSES/MASTOIDS: No intraorbital abnormality. No paranasal sinus mucosal disease. No middle ear or mastoid effusion.    BONES/SOFT TISSUES: No acute  abnormality.    CERVICAL SPINE CT:   VERTEBRA: Normal vertebral body heights. Slight right cervical curve. No fracture or posttraumatic subluxation.     CANAL/FORAMINA: Mild degenerative changes without significant canal narrowing at any level. Multilevel mild neural foraminal narrowing.    PARASPINAL: Prominent medial deviation of ICAs within the neck. 7 mm nodular opacity left lung apex; probably present previously however it was incompletely visualized. If indicated, CT chest may be performed.      Impression    IMPRESSION:  HEAD CT:  1.  No acute intracranial process.    CERVICAL SPINE CT:  1.  No CT evidence for acute fracture or post traumatic subluxation.   CT Chest Pulmonary Embolism w Contrast    Narrative    EXAM: CT CHEST PULMONARY EMBOLISM W CONTRAST  LOCATION: [facility]  DATE/TIME: 1/20/2023 4:50 PM    INDICATION: Weakness, hypoxia, low blood pressure in the field  COMPARISON: 4/5/2022 and 12/9/2022  TECHNIQUE: CT chest pulmonary angiogram during arterial phase injection of IV contrast. Multiplanar reformats and MIP reconstructions were performed. Dose reduction techniques were used.   CONTRAST: 75ml Isovue 370    FINDINGS:  ANGIOGRAM CHEST: Pulmonary arteries are normal caliber and negative for pulmonary emboli. Thoracic aorta demonstrates significant atherosclerotic plaque but is negative for dissection. No CT evidence of right heart strain.    LUNGS AND PLEURA: Mild motion artifact. Scattered tiny peripheral pulmonary nodules unchanged with largest left upper lobe on image 91 measuring 4 x 3 mm. Stable linear scarring right middle lobe. Some flattening of trachea and mainstem bronchi   consistent with tracheobronchomalacia. No new focal infiltrate.    MEDIASTINUM/AXILLAE: No lymphadenopathy.    CORONARY ARTERY CALCIFICATION: Severe.    UPPER ABDOMEN: Normal.    MUSCULOSKELETAL: Subacute fractures of right ribs 6-9 now have surrounding callus. There are healed fractures  involving left ribs 5 and 6 which were not seen on previous exam.      Impression    IMPRESSION:  1.  No pulmonary emboli identified. No acute thoracic abnormality evident.  2.  Tracheobronchomalacia.  3.  Prior bilateral rib fractures. No acute fracture identified.   CT Abdomen Pelvis w Contrast    Narrative    EXAM: CT ABDOMEN PELVIS W CONTRAST  LOCATION: [facility]  DATE/TIME: 1/20/2023 4:51 PM    INDICATION: Weakness, hypotension, fall with acute on chronic abdominal pain with previously known fistula and abscess  COMPARISON: CTA AP 12/09/2022 and older studies.  TECHNIQUE: CT scan of the abdomen and pelvis was performed following injection of IV contrast. Multiplanar reformats were obtained. Dose reduction techniques were used.  CONTRAST: 75ml Isovue 370    FINDINGS:   LOWER CHEST: Please see chest CTA report    HEPATOBILIARY: Prior cholecystectomy.    PANCREAS: Mild diffuse atrophy.    SPLEEN: Normal.    ADRENAL GLANDS: Normal.    KIDNEYS/BLADDER: No change in the nonobstructing small calculus in the lower pole calyx on the right. 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 No change in the curvilinear elliptical density   centrally within the fat, new since the April 2022 but unchanged from the recent study ( AML embolization 10/5/2022). There are also bilateral renal cysts which need no follow-up. Air is seen in the bladder.    BOWEL: Redundant colon especially the sigmoid with extensive distal colonic diverticulosis. Tethering of the small bowel wall and the left adnexa to the mid sigmoid colon again noted. No acute inflammatory changes. No bowel obstruction. Incidental   descending duodenal diverticulum.    LYMPH NODES: Normal.    VASCULATURE: Moderate arterial calcifications. No aneurysm.    PELVIC ORGANS: There has been interval decrease in the rim-enhancing fluid collection in the pelvis which is less well-defined now  measures approximately 2 cm (central cavity  was 3.5 cm before). On the current study this appears intramural within the   right uterine fundus    MUSCULOSKELETAL: Subacute right lateral seventh 8 9 rib fractures are seen with callus formation no acute fractures. Moderate facet arthropathy in the lower lumbar spine.      Impression    IMPRESSION:   1.  No evidence of acute traumatic injury in the abdomen or pelvis.  2.  Interval decrease in the right side pelvic abscess as noted above which on the current study appears to be myometrial in nature involving the right side of the uterine fundus.  3.  Air in the bladder from a presumed a colovesical fistula given her prior history. If needed this can be confirmed with a standard filling cystogram (not a CT cystogram).  4.  Extensive sigmoid diverticulitis without evidence of acute inflammation. Tethering of the adjacent small bowel and the left adnexa to the mid sigmoid again noted.  5.  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  6.  Other noncritical findings as noted above.

## 2023-01-21 NOTE — PLAN OF CARE
"Goal Outcome Evaluation:      Plan of Care Reviewed With: patient    Overall Patient Progress: improving    Outcome Evaluation: Urinalysis collected. Weaning oxygen as tolerated. On 1L nasal cannula while sleeping, room air while awake. IV antibiotics infused. Up with one assist to bedside commode. No overt signs of bleeding. Blood glucose 70 at bedtime; juice given and snack box provided. Audible wheezing; patient declined nebulizer but did take her albuterol inhaler.       Problem: Plan of Care - These are the overarching goals to be used throughout the patient stay.    Goal: Readiness for Transition of Care  Outcome: Not Progressing     Problem: Hypertension Comorbidity  Goal: Blood Pressure in Desired Range  Outcome: Not Progressing  Intervention: Maintain Blood Pressure Management     Problem: Muscle Strength Impairment  Goal: Improved Muscle Strength  Outcome: Not Progressing  Intervention: Optimize Muscle Strength  Recent Flowsheet Documentation  Taken 1/20/2023 2130 by Nilsa Leblanc, RN  Activity Management:    up to bedside commode    back to bed  Activity Assistance Provided: assistance, 1 person     Problem: Plan of Care - These are the overarching goals to be used throughout the patient stay.    Goal: Plan of Care Review  Outcome: Progressing    Goal: Patient-Specific Goal (Individualized)  Outcome: Progressing  Flowsheets (Taken 1/21/2023 0159)  Individualized Care Needs: Up with assistance to commode  Anxieties, Fears or Concerns: \"I've been bleeding down there\"  Patient/Family-Specific Goals (Include Timeframe): Prevent falls throughout the admission  Goal: Absence of Hospital-Acquired Illness or Injury  Outcome: Progressing  Intervention: Identify and Manage Fall Risk  Recent Flowsheet Documentation  Taken 1/20/2023 0779 by Nilsa Leblanc, RN  Safety Promotion/Fall Prevention:    activity supervised    fall prevention program maintained    increased rounding and observation    nonskid shoes/slippers when " out of bed    patient and family education    room near nurse's station  Intervention: Prevent Skin Injury  Intervention: Prevent and Manage VTE (Venous Thromboembolism) Risk    Goal: Optimal Comfort and Wellbeing  Outcome: Progressing  Intervention: Monitor Pain and Promote Comfort  Recent Flowsheet Documentation  Taken 1/20/2023 7039 by Nilsa Leblanc RN  Pain Management Interventions:    repositioned    medication (see MAR)     Problem: Risk for Delirium  Goal: Optimal Coping  Outcome: Progressing  Goal: Improved Behavioral Control  Outcome: Progressing  Intervention: Prevent and Manage Agitation  Intervention: Minimize Safety Risk  Goal: Improved Attention and Thought Clarity  Outcome: Progressing  Goal: Improved Sleep  Outcome: Progressing     Problem: Pain Acute  Goal: Optimal Pain Control and Function  Outcome: Progressing  Intervention: Develop Pain Management Plan  Intervention: Prevent or Manage Pain     Problem: Fall Injury Risk  Goal: Absence of Fall and Fall-Related Injury  Outcome: Progressing  Intervention: Identify and Manage Contributors  Intervention: Promote Injury-Free Environment    Problem: COPD (Chronic Obstructive Pulmonary Disease) Comorbidity  Goal: Maintenance of COPD Symptom Control  Outcome: Progressing  Intervention: Maintain COPD-Symptom Control     Problem: Diabetes Comorbidity  Goal: Blood Glucose Level Within Targeted Range  Outcome: Progressing     Problem: Pain Chronic (Persistent) (Comorbidity Management)  Goal: Acceptable Pain Control and Functional Ability  Outcome: Progressing  Intervention: Develop Pain Management Plan  Intervention: Manage Persistent Pain    Problem: UTI (Urinary Tract Infection)  Goal: Improved Infection Symptoms  Outcome: Progressing

## 2023-01-22 LAB
ANION GAP SERPL CALCULATED.3IONS-SCNC: 7 MMOL/L (ref 5–18)
BACTERIA UR CULT: ABNORMAL
BASOPHILS # BLD AUTO: 0 10E3/UL (ref 0–0.2)
BASOPHILS NFR BLD AUTO: 0 %
BUN SERPL-MCNC: 21 MG/DL (ref 8–28)
CALCIUM SERPL-MCNC: 8.3 MG/DL (ref 8.5–10.5)
CHLORIDE BLD-SCNC: 109 MMOL/L (ref 98–107)
CO2 SERPL-SCNC: 23 MMOL/L (ref 22–31)
CREAT SERPL-MCNC: 0.97 MG/DL (ref 0.6–1.1)
EOSINOPHIL # BLD AUTO: 0.2 10E3/UL (ref 0–0.7)
EOSINOPHIL NFR BLD AUTO: 2 %
ERYTHROCYTE [DISTWIDTH] IN BLOOD BY AUTOMATED COUNT: 14.9 % (ref 10–15)
GFR SERPL CREATININE-BSD FRML MDRD: 59 ML/MIN/1.73M2
GLUCOSE BLD-MCNC: 83 MG/DL (ref 70–125)
GLUCOSE BLDC GLUCOMTR-MCNC: 83 MG/DL (ref 70–99)
GLUCOSE BLDC GLUCOMTR-MCNC: 84 MG/DL (ref 70–99)
GLUCOSE BLDC GLUCOMTR-MCNC: 99 MG/DL (ref 70–99)
HCT VFR BLD AUTO: 26.8 % (ref 35–47)
HGB BLD-MCNC: 8.5 G/DL (ref 11.7–15.7)
IMM GRANULOCYTES # BLD: 0.1 10E3/UL
IMM GRANULOCYTES NFR BLD: 1 %
LYMPHOCYTES # BLD AUTO: 0.8 10E3/UL (ref 0.8–5.3)
LYMPHOCYTES NFR BLD AUTO: 6 %
MAGNESIUM SERPL-MCNC: 2 MG/DL (ref 1.8–2.6)
MCH RBC QN AUTO: 29.9 PG (ref 26.5–33)
MCHC RBC AUTO-ENTMCNC: 31.7 G/DL (ref 31.5–36.5)
MCV RBC AUTO: 94 FL (ref 78–100)
MONOCYTES # BLD AUTO: 0.5 10E3/UL (ref 0–1.3)
MONOCYTES NFR BLD AUTO: 4 %
NEUTROPHILS # BLD AUTO: 10.5 10E3/UL (ref 1.6–8.3)
NEUTROPHILS NFR BLD AUTO: 87 %
NRBC # BLD AUTO: 0 10E3/UL
NRBC BLD AUTO-RTO: 0 /100
PLATELET # BLD AUTO: 150 10E3/UL (ref 150–450)
POTASSIUM BLD-SCNC: 4 MMOL/L (ref 3.5–5)
RBC # BLD AUTO: 2.84 10E6/UL (ref 3.8–5.2)
SODIUM SERPL-SCNC: 139 MMOL/L (ref 136–145)
WBC # BLD AUTO: 12 10E3/UL (ref 4–11)

## 2023-01-22 PROCEDURE — 258N000003 HC RX IP 258 OP 636: Performed by: STUDENT IN AN ORGANIZED HEALTH CARE EDUCATION/TRAINING PROGRAM

## 2023-01-22 PROCEDURE — 80048 BASIC METABOLIC PNL TOTAL CA: CPT | Performed by: HOSPITALIST

## 2023-01-22 PROCEDURE — 99222 1ST HOSP IP/OBS MODERATE 55: CPT | Performed by: INTERNAL MEDICINE

## 2023-01-22 PROCEDURE — 250N000011 HC RX IP 250 OP 636: Performed by: STUDENT IN AN ORGANIZED HEALTH CARE EDUCATION/TRAINING PROGRAM

## 2023-01-22 PROCEDURE — 83735 ASSAY OF MAGNESIUM: CPT | Performed by: HOSPITALIST

## 2023-01-22 PROCEDURE — 250N000013 HC RX MED GY IP 250 OP 250 PS 637: Performed by: STUDENT IN AN ORGANIZED HEALTH CARE EDUCATION/TRAINING PROGRAM

## 2023-01-22 PROCEDURE — 120N000001 HC R&B MED SURG/OB

## 2023-01-22 PROCEDURE — 87040 BLOOD CULTURE FOR BACTERIA: CPT | Performed by: HOSPITALIST

## 2023-01-22 PROCEDURE — 99233 SBSQ HOSP IP/OBS HIGH 50: CPT | Performed by: HOSPITALIST

## 2023-01-22 PROCEDURE — 250N000009 HC RX 250

## 2023-01-22 PROCEDURE — 85025 COMPLETE CBC W/AUTO DIFF WBC: CPT | Performed by: HOSPITALIST

## 2023-01-22 PROCEDURE — 250N000012 HC RX MED GY IP 250 OP 636 PS 637: Performed by: HOSPITALIST

## 2023-01-22 PROCEDURE — 250N000013 HC RX MED GY IP 250 OP 250 PS 637: Performed by: HOSPITALIST

## 2023-01-22 PROCEDURE — 99231 SBSQ HOSP IP/OBS SF/LOW 25: CPT | Performed by: PHYSICIAN ASSISTANT

## 2023-01-22 PROCEDURE — 94640 AIRWAY INHALATION TREATMENT: CPT

## 2023-01-22 PROCEDURE — 36415 COLL VENOUS BLD VENIPUNCTURE: CPT | Performed by: HOSPITALIST

## 2023-01-22 RX ORDER — ALBUTEROL SULFATE 0.83 MG/ML
SOLUTION RESPIRATORY (INHALATION)
Status: COMPLETED
Start: 2023-01-22 | End: 2023-01-22

## 2023-01-22 RX ORDER — ALBUTEROL SULFATE 0.83 MG/ML
2.5 SOLUTION RESPIRATORY (INHALATION) EVERY 6 HOURS PRN
Status: DISCONTINUED | OUTPATIENT
Start: 2023-01-22 | End: 2023-01-26 | Stop reason: HOSPADM

## 2023-01-22 RX ADMIN — PIPERACILLIN AND TAZOBACTAM 3.38 G: 3; .375 INJECTION, POWDER, FOR SOLUTION INTRAVENOUS at 09:41

## 2023-01-22 RX ADMIN — Medication 1 MG: at 21:43

## 2023-01-22 RX ADMIN — AZATHIOPRINE 100 MG: 50 TABLET ORAL at 09:40

## 2023-01-22 RX ADMIN — FUROSEMIDE 20 MG: 20 TABLET ORAL at 09:32

## 2023-01-22 RX ADMIN — ALBUTEROL SULFATE 2 PUFF: 90 AEROSOL, METERED RESPIRATORY (INHALATION) at 09:36

## 2023-01-22 RX ADMIN — ALBUTEROL SULFATE 2.5 MG: 2.5 SOLUTION RESPIRATORY (INHALATION) at 17:24

## 2023-01-22 RX ADMIN — ACETAMINOPHEN 500 MG: 500 TABLET ORAL at 21:44

## 2023-01-22 RX ADMIN — PREDNISONE 4 MG: 2.5 TABLET ORAL at 09:40

## 2023-01-22 RX ADMIN — MONTELUKAST 10 MG: 10 TABLET, FILM COATED ORAL at 21:43

## 2023-01-22 RX ADMIN — PANTOPRAZOLE SODIUM 40 MG: 20 TABLET, DELAYED RELEASE ORAL at 07:04

## 2023-01-22 RX ADMIN — FLUTICASONE FUROATE AND VILANTEROL TRIFENATATE 1 PUFF: 200; 25 POWDER RESPIRATORY (INHALATION) at 09:35

## 2023-01-22 RX ADMIN — CARVEDILOL 12.5 MG: 6.25 TABLET, FILM COATED ORAL at 17:44

## 2023-01-22 RX ADMIN — FLUOXETINE 40 MG: 20 CAPSULE ORAL at 09:32

## 2023-01-22 RX ADMIN — VANCOMYCIN HYDROCHLORIDE 1000 MG: 5 INJECTION, POWDER, LYOPHILIZED, FOR SOLUTION INTRAVENOUS at 21:44

## 2023-01-22 RX ADMIN — UMECLIDINIUM 1 PUFF: 62.5 AEROSOL, POWDER ORAL at 09:35

## 2023-01-22 RX ADMIN — ACETAMINOPHEN 500 MG: 500 TABLET ORAL at 13:47

## 2023-01-22 RX ADMIN — Medication 1 MG: at 01:50

## 2023-01-22 RX ADMIN — POTASSIUM CHLORIDE 20 MEQ: 1500 TABLET, EXTENDED RELEASE ORAL at 09:32

## 2023-01-22 RX ADMIN — ACETAMINOPHEN 500 MG: 500 TABLET ORAL at 09:32

## 2023-01-22 RX ADMIN — CARVEDILOL 12.5 MG: 6.25 TABLET, FILM COATED ORAL at 09:32

## 2023-01-22 RX ADMIN — PIPERACILLIN AND TAZOBACTAM 3.38 G: 3; .375 INJECTION, POWDER, FOR SOLUTION INTRAVENOUS at 17:05

## 2023-01-22 RX ADMIN — PIPERACILLIN AND TAZOBACTAM 3.38 G: 3; .375 INJECTION, POWDER, FOR SOLUTION INTRAVENOUS at 01:33

## 2023-01-22 RX ADMIN — ALBUTEROL SULFATE 2 PUFF: 90 AEROSOL, METERED RESPIRATORY (INHALATION) at 15:47

## 2023-01-22 ASSESSMENT — ACTIVITIES OF DAILY LIVING (ADL)
ADLS_ACUITY_SCORE: 41
ADLS_ACUITY_SCORE: 40
ADLS_ACUITY_SCORE: 41
ADLS_ACUITY_SCORE: 40
ADLS_ACUITY_SCORE: 41
ADLS_ACUITY_SCORE: 40
ADLS_ACUITY_SCORE: 41
DEPENDENT_IADLS:: CLEANING;TRANSPORTATION
ADLS_ACUITY_SCORE: 40

## 2023-01-22 NOTE — PLAN OF CARE
Problem: Plan of Care - These are the overarching goals to be used throughout the patient stay.    Goal: Absence of Hospital-Acquired Illness or Injury  Intervention: Identify and Manage Fall Risk  Recent Flowsheet Documentation  Taken 1/21/2023 8878 by Pat Carvalho RN  Safety Promotion/Fall Prevention:   activity supervised   assistive device/personal items within reach   bed alarm on    Pt A&Ox4. VSS on RA. Voiding adequately, no bloody discharge noted overnight. Up with assist of 1 to bathroom. Denies pain overnight. Zosyn infusing now. Blood cultures pending. Discharge pending consults.

## 2023-01-22 NOTE — PROGRESS NOTES
[facility]    Medicine Progress Note - Hospitalist Service    Date of Admission:  1/20/2023    Assessment & Plan      Zakiya Christian is a 78 year old female admitted on 1/20/2023.  She has a past medical history significant for COPD, hypertension, DVT, chronic lower back pain, diverticulitis, intra-abdominal abscess who presented with generalized weakness, nausea, vomiting and possible abdominal discomfort. Admitted on IV antibiotics for pelvic abscess. Unclear why gynecologic oncology was consulted in ED given no prior history of any malignancy or metastatic disease. General gynecology consulted on 1/21/23 and discussed case. Subsequently general surgery was consulted 1/21/23. 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 Also she actually has never had a hysterectomy (thus previous mention in EMR of this including on prior CT scan are not accurate). Acute pain team following for abscess associated pain as well as PMR.    Continuing conservative medical treatment with IV antibiotics and not pursing invasive surgical intervention if improving, as per general surgery eval. Biochemically improving on lab markers and clinically stable. Consultation to ID to guide antibiotic therapy plan and duration/discharge planning.     #Sepsis  #Intra-abdominal abscess possibly involving the myometrium  #Initial intra-abdominal abscess of diverticular colon source in 4/2022  #Blood cultures positive  -CT abdomen pelvis showed right-sided pelvic abscess possibly involving the myometrium  -Patient reported possible vaginal discharge/bleeding   -Lactic acid resolved.  -Consulted gynecology.  -Directly discussed with gynecologic oncology. Discussed with general gynecology.  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   -Order repeat blood cultures.  -ID consulted to guide antibiotics and  "duration.  -Continue Zosyn and vancomycin for now pending initial ID eval.  -Order IV zosyn and IV vancomycin broad coverage for now. Further adjustments per ID.  -General surgery consulted given diverticular colon abscess is initial source - same abscess per EMR chart review and discussion with patient. Notify me if this is deemed not the case after initial surgery eval.   -See extensive discussion above.   -Pain team for abscess pain as well as PMR; IV dilaudid available PRN     #History of renal angiomyolipoma  -Previously embolized and fully treated per patient  -NOT maligancy    #SHRUTHI  -Hold potentially nephrogenic or nephrotoxic medications.   -Administer IVF bolus now and initiate maintenance IVF.  -Cr 1.5 --> 1.2, mild improvement; baseline more recently in 1.10 in 9/2022; prior to that 7/2022 was at 0.7    #History of DVT  -Was on Xarelto 20 mg daily at home.  -Xarelto until initial general surgery evaluation.    #COPD  -No acute exacerbation.  -Wean off nasal cannula with goal SPO2 88 to 90%.  -Continue home medications.  -Completed weaned off of O2 1/22/23    #PMR  -Order home medications.  -IV dilaudid available PRN   -Pain team consulted         Diet: Combination Diet Regular Diet Adult    DVT Prophylaxis: Pneumatic Compression Devices, Anti-embolisim stockings (TEDs), Ambulate every shift and VTE Prophylaxis contraindicated while at risk of possibly requiring acute/emergent surgical intervention  Morrell Catheter: Not present  Lines: None     Cardiac Monitoring: None  Code Status: Full Code      Clinically Significant Risk Factors            # Hypomagnesemia: Lowest Mg = 1.4 mg/dL in last 2 days, will replace as needed              # Obesity: Estimated body mass index is 35.25 kg/m  as calculated from the following:    Height as of this encounter: 1.6 m (5' 3\").    Weight as of this encounter: 90.3 kg (199 lb)., PRESENT ON ADMISSION         Disposition Plan            Go Cruz MD  Hospitalist " Service  [facility]  Securely message with [employer] (more info)  Text page via [employer] Paging/Directory   ______________________________________________________________________    Interval History   No acute events overnight.    No report of chest pain, shortness of breath reported. Easily awoken and sleeping early in AM. No new complaints at this time.       Physical Exam   Vital Signs: Temp: 98.2  F (36.8  C) Temp src: Oral BP: 123/58 Pulse: 84   Resp: 16 SpO2: 92 % O2 Device: None (Room air)    Weight: 199 lbs 0 oz    GENERAL:  Alert, appears comfortable, in no acute distress, appears stated age   HEAD:  Normocephalic, without obvious abnormality, atraumatic   EYES:  PERRL, conjunctiva/corneas clear, no scleral icterus, EOM's intact   NOSE: Nares normal, septum midline, mucosa normal, no drainage   THROAT: Lips, mucosa, and tongue normal; teeth and gums normal, mouth moist   NECK: Supple, symmetrical, trachea midline   BACK:   Symmetric, no curvature, ROM normal   LUNGS:   Clear to auscultation bilaterally, no rales, rhonchi, or wheezing, symmetric chest rise on inhalation, respirations unlabored   CHEST WALL:  No tenderness or deformity   HEART:  Regular rate and rhythm, S1 and S2 normal, no murmur, rub, or gallop    ABDOMEN:   Soft, no rigidity, bowel sounds active all four quadrants, no masses, no organomegaly, no rebound or guarding   EXTREMITIES: Extremities normal, atraumatic, no cyanosis or edema    SKIN: Dry to touch, no exanthems in the visualized areas   NEURO: Alert, oriented x 4, moves all four extremities freely/spontaneously   PSYCH: Cooperative, behavior is appropriate        Medical Decision Making     51 MINUTES SPENT BY ME on the date of service doing chart review, history, exam, documentation & further activities per the note.    Data     I have personally reviewed the following data over the past 24 hrs:    14.7 (H)  \   8.9 (L)   / 138 (L)     138 105 27 /  99   4.1 24  1.23 (H) \       Procal: N/A CRP: N/A Lactic Acid: 1.1         Imaging results reviewed over the past 24 hrs:   No results found for this or any previous visit (from the past 24 hour(s)).

## 2023-01-22 NOTE — PROGRESS NOTES
Care Management Initial Consult    General Information  Assessment completed with: Patient,    Type of CM/SW Visit: Initial Assessment    Primary Care Provider verified and updated as needed:     Readmission within the last 30 days:           Advance Care Planning:            Communication Assessment  Patient's communication style: spoken language (English or Bilingual)             Cognitive  Cognitive/Neuro/Behavioral: WDL                      Living Environment:   People in home: alone, facility resident     Current living Arrangements: assisted living      Able to return to prior arrangements: yes       Family/Social Support:  Care provided by: self  Provides care for: no one  Marital Status: Single   (Counsin)          Description of Support System: Supportive, Involved         Current Resources:   Patient receiving home care services: No     Community Resources: Housekeeping/Chore Agency  Equipment currently used at home: walker, rolling, cane, straight  Supplies currently used at home: None    Employment/Financial:  Employment Status:          Financial Concerns: No concerns identified   Referral to Financial Worker: No       Lifestyle & Psychosocial Needs:  Social Determinants of Health     Tobacco Use: Not on file   Alcohol Use: Not on file   Financial Resource Strain: Not on file   Food Insecurity: Not on file   Transportation Needs: Not on file   Physical Activity: Not on file   Stress: Not on file   Social Connections: Not on file   Intimate Partner Violence: Not on file   Depression: Not on file   Housing Stability: Not on file       Functional Status:  Prior to admission patient needed assistance:   Dependent ADLs:: Ambulation-walker  Dependent IADLs:: Cleaning, Transportation       Mental Health Status:  Mental Health Status: No Current Concerns       Chemical Dependency Status:  Chemical Dependency Status: No Current Concerns             Values/Beliefs:  Spiritual, Cultural Beliefs, Baptist  Practices, Values that affect care: no               Additional Information:  [facility] met and introduced self and CM services to Pt.   Pt lives at [facility].  Pt has med management, cleaning and 1 meal a day.  Pt usually independent. Pt plans to return.  No CM needs identified.  Family to transport.     ALESSANDRA Verma

## 2023-01-22 NOTE — PROGRESS NOTES
Pt received PRN albuterol neb and tolerated it well. BS diminished with a faint upper air way wheeze. Pt remains on room air.    Linda Simon, RT

## 2023-01-22 NOTE — CONSULTS
Infectious Disease Consultation:  Requesting MD:  Reason for consult:      HISTORY:  PT came to ED due to fall, SOB, dehydration.  She is known to have diverticulitis, abscess, and CV fistula.    This is pasted from surgeon consult:  The patient has had a long history of diverticulitis.  She was first diagnosed back in April and required some drains placed at that time.  His drains were removed sometime afterwards.  More recently it has been found that she does a pelvic abscess and a colovesical fistula.  She was recently seen by colorectal surgery to discuss management of this.  She tells me that she has had ongoing bloody purulent discharge in either her urine or vaginally.  It is difficult for her to tell which.  This is not new for her.  Additionally, she tells me that she is currently on a prednisone taper for her polymyalgia rheumatica.      Her imaging this admission shows:                                                                   IMPRESSION:   1.  No evidence of acute traumatic injury in the abdomen or pelvis.  2.  Interval decrease in the right side pelvic abscess as noted above which on the current study appears to be myometrial in nature involving the right side of the uterine fundus.  3.  Air in the bladder from a presumed a colovesical fistula given her prior history. If needed this can be confirmed with a standard filling cystogram (not a CT cystogram).  4.  Extensive sigmoid diverticulitis without evidence of acute inflammation. Tethering of the adjacent small bowel and the left adnexa to the mid sigmoid again noted.  5.  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  6.  Other noncritical findings as noted above.    White count on admit was 16, being 8 nine days prior.  On maximal abx.  Belly is soft. We (amy) last saw the lady in April 2022 see note from April 14.        Pertinent past history, past infectious disease history:  Reviewed from prior consult, no  "additions    Medications:  Reviewed prior to admission meds as applicable in chart review.  Current meds are reviewed in the EMR listed MAR.     ANTIBIOTICS:    Current:vanco, zosyn   Prior:   Allergy to:    SH/FH and  travel history(if applicable to consult):  Reviewed from prior ID consult no additions  REVIEW OF SYSTEMS:  All other systems negative    EXAMINATION:  BP (!) 147/68   Pulse 80   Temp 98.1  F (36.7  C) (Oral)   Resp 18   Ht 1.6 m (5' 3\")   Wt 90.3 kg (199 lb)   SpO2 (!) 88%   BMI 35.25 kg/m    Alert, awake  Vitals tabulated above, reviewed  HEENT:nl  Neck supple without lymphadenopathy  Sclera clear  CARDIOVASCULAR regular rate and rhythm, no murmur  Lungs CLEAR TO AUSCULTATION   Abdomen soft, NT/ND, absent HEPATOSPLENOMEGALY  Skin normal  Joints normal  Neurologic exam non focal  Wound:  NA        CLINICAL DATABASE FOR---LAB/MICRO/CULTURES/IMAGING STUDIES:  Lab Results   Component Value Date    WBC 12.0 01/22/2023     Lab Results   Component Value Date    RBC 2.84 01/22/2023     Lab Results   Component Value Date    HGB 8.5 01/22/2023     Lab Results   Component Value Date    HCT 26.8 01/22/2023     No components found for: MCT  Lab Results   Component Value Date    MCV 94 01/22/2023     Lab Results   Component Value Date    MCH 29.9 01/22/2023     Lab Results   Component Value Date    MCHC 31.7 01/22/2023     Lab Results   Component Value Date    RDW 14.9 01/22/2023     Lab Results   Component Value Date     01/22/2023       Last Comprehensive Metabolic Panel:  Sodium   Date Value Ref Range Status   01/22/2023 139 136 - 145 mmol/L Final     Potassium   Date Value Ref Range Status   01/22/2023 4.0 3.5 - 5.0 mmol/L Final     Chloride   Date Value Ref Range Status   01/22/2023 109 (H) 98 - 107 mmol/L Final     Carbon Dioxide (CO2)   Date Value Ref Range Status   01/22/2023 23 22 - 31 mmol/L Final     Anion Gap   Date Value Ref Range Status   01/22/2023 7 5 - 18 mmol/L Final     Glucose " "  Date Value Ref Range Status   01/22/2023 83 70 - 125 mg/dL Final     GLUCOSE BY METER POCT   Date Value Ref Range Status   01/22/2023 83 70 - 99 mg/dL Final     Urea Nitrogen   Date Value Ref Range Status   01/22/2023 21 8 - 28 mg/dL Final     Creatinine   Date Value Ref Range Status   01/22/2023 0.97 0.60 - 1.10 mg/dL Final     GFR Estimate   Date Value Ref Range Status   01/22/2023 59 (L) >60 mL/min/1.73m2 Final     Comment:     Effective December 21, 2021 eGFRcr in adults is calculated using the 2021 CKD-EPI creatinine equation which includes age and gender (Kasi et al., NEJ, DOI: 10.1056/RMEZln6859509)   05/17/2021 >60 >60 mL/min/1.73m2 Final     Calcium   Date Value Ref Range Status   01/22/2023 8.3 (L) 8.5 - 10.5 mg/dL Final       Liver Function Studies -   Recent Labs   Lab Test 07/19/22  0741   PROTTOTAL 7.2   ALBUMIN 2.7*   BILITOTAL 0.3   ALKPHOS 84   AST 25   ALT <5*       Erythrocyte Sedimentation Rate   Date Value Ref Range Status   01/11/2023 61 (H) 0 - 30 mm/hr Final       CRP   Date Value Ref Range Status   01/20/2023 5.0 (H) 0.0 - <0.8 mg/dL Final       Imaging above        IMPRESSION:  Pelvic abscess  Non acute diverticulitis  Known CV fistula  White count 16  Admit was NOT for these problems    PLAN:  On maximal abx--probably treating disease that has been there for months however.   Unclear if they will impact things much, need to talk to CRS or GS about what they think we ought to do treatment wise.  Came in on augmentin for a recent \"UTI\" which is present de peng if she has a CV fistula (???)--note E coli and/or KP in urine the last 7 times checked over about 14 months.  The recent data about this \"UTI\" is from [facility] and not available...    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         LISA STORY MD  [facility]  Office [phone]      "

## 2023-01-22 NOTE — PLAN OF CARE
Problem: Plan of Care - These are the overarching goals to be used throughout the patient stay.    Goal: Absence of Hospital-Acquired Illness or Injury  Intervention: Identify and Manage Fall Risk  Recent Flowsheet Documentation  Taken 1/21/2023 2000 by Keon Castañeda RN  Safety Promotion/Fall Prevention:    activity supervised    fall prevention program maintained    increased rounding and observation    nonskid shoes/slippers when out of bed    patient and family education    room near nurse's station   Goal Outcome Evaluation:      Plan of Care Reviewed With: patient    Overall Patient Progress: improvingOverall Patient Progress: improving.   Pt is alert and oriented. Vss on room air. Able to make need known. Has purewick at night but requires assist of two to transfer. She is on iv abx.  On K and Mg protocol

## 2023-01-22 NOTE — PROGRESS NOTES
General Surgery Progress Note:    Hospital Day # 2    ASSESSMENT:   1. Sepsis, due to unspecified organism, unspecified whether acute organ dysfunction present (H)    2. Abdominal infection (H)        Zakiya Christian is a 78 year old female with UTI and chronic colo vesicular  fistula     PLAN:   -ADAT  -No urgent surgical intervention indicated, she is clinically stable and follows with colorectal as outpatient.   -ABX per ID, appreciate their input  -Would recommend getting her clinically stable and once abx plan in place, have her see her colorectal surgeon as outpatient    Fransico Hoff PA-C  [facility]  Surgery Clinic [address]  [address]  [place] [address]?  Office: [phone]      SUBJECTIVE:   Zakiya Christian is doing fine, denies pain, tolerating diet, having BMs, denies any urinary symptoms    Patient Vitals for the past 24 hrs:   BP Temp Temp src Pulse Resp SpO2 Weight   01/22/23 0932 (!) 147/68 -- -- 80 -- -- --   01/22/23 0805 139/65 98.1  F (36.7  C) Oral 85 18 (!) 88 % --   01/22/23 0552 -- -- -- -- -- -- 90.3 kg (199 lb)   01/21/23 2344 123/58 98.2  F (36.8  C) Oral 84 16 92 % --   01/21/23 1700 106/53 98.3  F (36.8  C) Oral 82 18 94 % --   01/21/23 1636 102/51 -- -- 80 18 96 % --   01/21/23 1230 107/53 -- -- 81 -- 94 % --   01/21/23 1200 94/51 -- -- 81 -- 96 % --   01/21/23 1158 (!) 88/47 -- -- -- -- -- --   01/21/23 1146 (!) 81/43 99  F (37.2  C) Oral 82 18 95 % --       Physical Exam:  General: NAD, pleasant  ABD: soft, nondistended, nontender  EXT:no CCE     Lab Results   Component Value Date    WBC 12.0 01/22/2023    HGB 8.5 01/22/2023    HCT 26.8 01/22/2023    MCV 94 01/22/2023     01/22/2023     INR/Prothrombin Time  Recent Labs   Lab 01/22/23  0901      CO2 23   BUN 21     Lab Results   Component Value Date    ALT <5 (L) 07/19/2022    AST 25 07/19/2022    ALKPHOS 84 07/19/2022

## 2023-01-23 LAB
BACTERIA BLD CULT: ABNORMAL
BACTERIA BLD CULT: ABNORMAL
GLUCOSE BLDC GLUCOMTR-MCNC: 101 MG/DL (ref 70–99)
GLUCOSE BLDC GLUCOMTR-MCNC: 112 MG/DL (ref 70–99)
GLUCOSE BLDC GLUCOMTR-MCNC: 136 MG/DL (ref 70–99)
HOLD SPECIMEN: NORMAL
MAGNESIUM SERPL-MCNC: 1.7 MG/DL (ref 1.8–2.6)
POTASSIUM BLD-SCNC: 4.3 MMOL/L (ref 3.5–5)
VANCOMYCIN SERPL-MCNC: 16.6 MG/L

## 2023-01-23 PROCEDURE — 99233 SBSQ HOSP IP/OBS HIGH 50: CPT | Performed by: INTERNAL MEDICINE

## 2023-01-23 PROCEDURE — 250N000013 HC RX MED GY IP 250 OP 250 PS 637: Performed by: STUDENT IN AN ORGANIZED HEALTH CARE EDUCATION/TRAINING PROGRAM

## 2023-01-23 PROCEDURE — 258N000003 HC RX IP 258 OP 636: Performed by: HOSPITALIST

## 2023-01-23 PROCEDURE — 83735 ASSAY OF MAGNESIUM: CPT | Performed by: HOSPITALIST

## 2023-01-23 PROCEDURE — 250N000011 HC RX IP 250 OP 636: Performed by: HOSPITALIST

## 2023-01-23 PROCEDURE — 84132 ASSAY OF SERUM POTASSIUM: CPT | Performed by: HOSPITALIST

## 2023-01-23 PROCEDURE — 250N000013 HC RX MED GY IP 250 OP 250 PS 637: Performed by: INTERNAL MEDICINE

## 2023-01-23 PROCEDURE — 120N000001 HC R&B MED SURG/OB

## 2023-01-23 PROCEDURE — 250N000012 HC RX MED GY IP 250 OP 636 PS 637: Performed by: HOSPITALIST

## 2023-01-23 PROCEDURE — 99232 SBSQ HOSP IP/OBS MODERATE 35: CPT | Performed by: HOSPITALIST

## 2023-01-23 PROCEDURE — 250N000011 HC RX IP 250 OP 636: Performed by: STUDENT IN AN ORGANIZED HEALTH CARE EDUCATION/TRAINING PROGRAM

## 2023-01-23 PROCEDURE — 250N000013 HC RX MED GY IP 250 OP 250 PS 637: Performed by: HOSPITALIST

## 2023-01-23 PROCEDURE — 99231 SBSQ HOSP IP/OBS SF/LOW 25: CPT | Performed by: PHYSICIAN ASSISTANT

## 2023-01-23 PROCEDURE — 80202 ASSAY OF VANCOMYCIN: CPT | Performed by: HOSPITALIST

## 2023-01-23 PROCEDURE — 36415 COLL VENOUS BLD VENIPUNCTURE: CPT | Performed by: HOSPITALIST

## 2023-01-23 RX ORDER — NALOXONE HYDROCHLORIDE 0.4 MG/ML
0.2 INJECTION, SOLUTION INTRAMUSCULAR; INTRAVENOUS; SUBCUTANEOUS
Status: DISCONTINUED | OUTPATIENT
Start: 2023-01-23 | End: 2023-01-26 | Stop reason: HOSPADM

## 2023-01-23 RX ORDER — NALOXONE HYDROCHLORIDE 0.4 MG/ML
0.4 INJECTION, SOLUTION INTRAMUSCULAR; INTRAVENOUS; SUBCUTANEOUS
Status: DISCONTINUED | OUTPATIENT
Start: 2023-01-23 | End: 2023-01-26 | Stop reason: HOSPADM

## 2023-01-23 RX ORDER — GABAPENTIN 300 MG/1
900 CAPSULE ORAL AT BEDTIME
Status: DISCONTINUED | OUTPATIENT
Start: 2023-01-23 | End: 2023-01-26 | Stop reason: HOSPADM

## 2023-01-23 RX ADMIN — FUROSEMIDE 20 MG: 20 TABLET ORAL at 08:08

## 2023-01-23 RX ADMIN — VANCOMYCIN HYDROCHLORIDE 1250 MG: 5 INJECTION, POWDER, LYOPHILIZED, FOR SOLUTION INTRAVENOUS at 20:25

## 2023-01-23 RX ADMIN — CARVEDILOL 12.5 MG: 6.25 TABLET, FILM COATED ORAL at 18:29

## 2023-01-23 RX ADMIN — ALBUTEROL SULFATE 2 PUFF: 90 AEROSOL, METERED RESPIRATORY (INHALATION) at 15:13

## 2023-01-23 RX ADMIN — NYSTATIN: 100000 CREAM TOPICAL at 20:25

## 2023-01-23 RX ADMIN — PIPERACILLIN AND TAZOBACTAM 3.38 G: 3; .375 INJECTION, POWDER, FOR SOLUTION INTRAVENOUS at 08:13

## 2023-01-23 RX ADMIN — ACETAMINOPHEN 500 MG: 500 TABLET ORAL at 20:24

## 2023-01-23 RX ADMIN — CARVEDILOL 12.5 MG: 6.25 TABLET, FILM COATED ORAL at 08:08

## 2023-01-23 RX ADMIN — MONTELUKAST 10 MG: 10 TABLET, FILM COATED ORAL at 20:24

## 2023-01-23 RX ADMIN — UMECLIDINIUM 1 PUFF: 62.5 AEROSOL, POWDER ORAL at 08:09

## 2023-01-23 RX ADMIN — FLUOXETINE 40 MG: 20 CAPSULE ORAL at 08:08

## 2023-01-23 RX ADMIN — PIPERACILLIN AND TAZOBACTAM 3.38 G: 3; .375 INJECTION, POWDER, FOR SOLUTION INTRAVENOUS at 02:27

## 2023-01-23 RX ADMIN — ACETAMINOPHEN 500 MG: 500 TABLET ORAL at 08:08

## 2023-01-23 RX ADMIN — POTASSIUM CHLORIDE 20 MEQ: 1500 TABLET, EXTENDED RELEASE ORAL at 08:08

## 2023-01-23 RX ADMIN — Medication 1 MG: at 22:20

## 2023-01-23 RX ADMIN — FLUTICASONE FUROATE AND VILANTEROL TRIFENATATE 1 PUFF: 200; 25 POWDER RESPIRATORY (INHALATION) at 08:08

## 2023-01-23 RX ADMIN — ACETAMINOPHEN 500 MG: 500 TABLET ORAL at 15:22

## 2023-01-23 RX ADMIN — GABAPENTIN 900 MG: 300 CAPSULE ORAL at 22:42

## 2023-01-23 RX ADMIN — ACETAMINOPHEN 500 MG: 500 TABLET ORAL at 12:31

## 2023-01-23 RX ADMIN — AZATHIOPRINE 100 MG: 50 TABLET ORAL at 08:08

## 2023-01-23 RX ADMIN — PIPERACILLIN AND TAZOBACTAM 3.38 G: 3; .375 INJECTION, POWDER, FOR SOLUTION INTRAVENOUS at 16:37

## 2023-01-23 RX ADMIN — PREDNISONE 4 MG: 2.5 TABLET ORAL at 08:08

## 2023-01-23 RX ADMIN — PANTOPRAZOLE SODIUM 40 MG: 20 TABLET, DELAYED RELEASE ORAL at 08:08

## 2023-01-23 ASSESSMENT — ACTIVITIES OF DAILY LIVING (ADL)
ADLS_ACUITY_SCORE: 41
ADLS_ACUITY_SCORE: 43
ADLS_ACUITY_SCORE: 43
ADLS_ACUITY_SCORE: 41
ADLS_ACUITY_SCORE: 43
ADLS_ACUITY_SCORE: 41
ADLS_ACUITY_SCORE: 41
ADLS_ACUITY_SCORE: 37

## 2023-01-23 NOTE — CONSULTS
Please see Infectious Disease Consult Note by Dr. Carpenter dated 1/22/2023. Thank you.   Admin entry to connect the order to consult.

## 2023-01-23 NOTE — PROGRESS NOTES
Writer assumed cares from [id number]. Patient is alert and oriented X4. VSS. Complained of mild sob and wheezing; improved with prn albuterol. Denies any pain. Ambulated to bathroom with SBA and walker. No concerns at this time. Zosyn administered as ordered.

## 2023-01-23 NOTE — PROGRESS NOTES
[facility]    Medicine Progress Note - Hospitalist Service    Date of Admission:  1/20/2023    Assessment & Plan      Zakiya Christian is a 78 year old female admitted on 1/20/2023.  She has a past medical history significant for COPD, hypertension, DVT, chronic lower back pain, diverticulitis, intra-abdominal abscess who presented with generalized weakness, nausea, vomiting and possible abdominal discomfort. Admitted on IV antibiotics for pelvic abscess. General gynecology consulted on 1/21/23 and discussed case. Subsequently general surgery was consulted 1/21/23. 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 Also she actually has never had a hysterectomy (thus previous mention in EMR of this including on prior CT scan are not accurate). Acute pain team following for abscess associated pain as well as PMR.    Continuing conservative medical treatment with IV antibiotics and not pursing invasive surgical intervention as improving, as per general surgery. Biochemically improving on lab markers and clinically stable. Consultation to ID to guide antibiotic therapy plan and duration/discharge planning appreciated. Remains on maximal IV antibiotic therapy, pending further surgery plan of care for treating this longstanding abscess.     #Sepsis  #Intra-abdominal abscess possibly involving the myometrium  #Initial intra-abdominal abscess of diverticular colon source in 4/2022  #Blood cultures positive  -CT abdomen pelvis showed right-sided pelvic abscess possibly involving the myometrium  -Patient reported possible vaginal discharge/bleeding   -Lactic acid resolved.  -Consulted gynecology.  -Directly discussed with gynecologic oncology. Discussed with general gynecology.  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   -Order repeat blood cultures.  -ID consulted to guide antibiotics and  "duration.  -Continue Zosyn and vancomycin for now pending initial ID eval.  -Order IV zosyn and IV vancomycin broad coverage for now. Further adjustments per ID.  -General surgery consulted given diverticular colon abscess is initial source - same abscess per EMR chart review and discussion with patient. Notify me if this is deemed not the case after initial surgery eval.   -See extensive discussion above.   -Pain team for abscess pain as well as PMR; IV dilaudid available PRN     #History of renal angiomyolipoma  -Previously embolized and fully treated per patient  -NOT maligancy    #SHRUTHI  -Hold potentially nephrogenic or nephrotoxic medications.   -Administer IVF bolus now and initiate maintenance IVF.  -Cr 1.5 --> 1.2, mild improvement; baseline more recently in 1.10 in 9/2022; prior to that 7/2022 was at 0.7    #History of DVT  -Was on Xarelto 20 mg daily at home.  -Xarelto until initial general surgery evaluation.    #COPD  -No acute exacerbation.  -Wean off nasal cannula with goal SPO2 88 to 90%.  -Continue home medications.  -Completed weaned off of O2 1/22/23    #PMR  -Order home medications.  -IV dilaudid available PRN   -Pain team consulted         Diet: Combination Diet Regular Diet Adult    DVT Prophylaxis: Pneumatic Compression Devices, Anti-embolisim stockings (TEDs), Ambulate every shift and VTE Prophylaxis contraindicated while at risk of possibly requiring acute/emergent surgical intervention  Morrell Catheter: Not present  Lines: None     Cardiac Monitoring: None  Code Status: Full Code      Clinically Significant Risk Factors            # Hypomagnesemia: Lowest Mg = 1.4 mg/dL in last 2 days, will replace as needed              # Obesity: Estimated body mass index is 35.22 kg/m  as calculated from the following:    Height as of this encounter: 1.6 m (5' 3\").    Weight as of this encounter: 90.2 kg (198 lb 12.8 oz)., PRESENT ON ADMISSION         Disposition Plan            oG Cruz, " MD  Hospitalist Service  [facility]  Securely message with [employer] (more info)  Text page via [employer] Paging/Directory   ______________________________________________________________________    Interval History   No acute events overnight.    She has ongoing chronic low back pain from the uncomfortable bed and this bothers her more than the abscess associated pain. No report of chest pain, shortness of breath, or other new complaints. Discussed plan of care with patient. Answered all questions to patient's verbalized understanding and satisfaction.    Physical Exam   Vital Signs: Temp: 98.6  F (37  C) Temp src: Oral BP: (!) 140/63 Pulse: 77   Resp: 16 SpO2: 92 % O2 Device: None (Room air)    Weight: 198 lbs 12.8 oz    GENERAL:  Alert, appears comfortable, in no acute distress, appears stated age   HEAD:  Normocephalic, without obvious abnormality, atraumatic   EYES:  PERRL, conjunctiva/corneas clear, no scleral icterus, EOM's intact   NOSE: Nares normal, septum midline, mucosa normal, no drainage   THROAT: Lips, mucosa, and tongue normal; teeth and gums normal, mouth moist   NECK: Supple, symmetrical, trachea midline   BACK:   Symmetric, no curvature, ROM normal   LUNGS:   Clear to auscultation bilaterally, no rales, rhonchi, or wheezing, symmetric chest rise on inhalation, respirations unlabored   CHEST WALL:  No tenderness or deformity   HEART:  Regular rate and rhythm, S1 and S2 normal, no murmur, rub, or gallop    ABDOMEN:   Soft, no rigidity, bowel sounds active all four quadrants, no masses, no organomegaly, no rebound or guarding   EXTREMITIES: Extremities normal, atraumatic, no cyanosis or edema    SKIN: Dry to touch, no exanthems in the visualized areas   NEURO: Alert, oriented x 4, moves all four extremities freely/spontaneously   PSYCH: Cooperative, behavior is appropriate        Medical Decision Making     41 MINUTES SPENT BY ME on the date of service doing chart review, history,  exam, documentation & further activities per the note.      Data     I have personally reviewed the following data over the past 24 hrs:    12.0 (H)  \   8.5 (L)   / 150     139 109 (H) 21 /  84   4.0 23 0.97 \       Imaging results reviewed over the past 24 hrs:   No results found for this or any previous visit (from the past 24 hour(s)).

## 2023-01-23 NOTE — PHARMACY-VANCOMYCIN DOSING SERVICE
Pharmacy Vancomycin Note  Date of Service 2023  Patient's  1945   78 year old, female    Indication: Abscess, Intra-abdominal infection and Sepsis  Day of Therapy: 4  Current vancomycin regimen: 1000 mg IV q24h  Current vancomycin monitoring method: AUC  Current vancomycin therapeutic monitoring goal: 400-600 mg*h/L    InsightRX Prediction of Current Vancomycin Regimen  Loading dose: 1750mg @ 2120 23  Regimen: 1000 mg IV every 24 hours.  Start time: 13:38 on 2023  Exposure target: AUC24 (range)400-600 mg/L.hr   AUC24,ss: 385 mg/L.hr  Probability of AUC24 > 400: 42 %  Ctrough,ss: 11.5 mg/L  Probability of Ctrough,ss > 20: 2 %  Probability of nephrotoxicity (Lodise LAVERNE ): 7 %      Current estimated CrCl = Estimated Creatinine Clearance: 50.1 mL/min (based on SCr of 0.97 mg/dL).    Creatinine for last 3 days  2023:  3:21 PM Creatinine 1.49 mg/dL  2023: 12:25 PM Creatinine 1.23 mg/dL  2023:  9:01 AM Creatinine 0.97 mg/dL    Recent Vancomycin Levels (past 3 days)  2023:  9:27 AM Vancomycin 16.6 mg/L    Vancomycin IV Administrations (past 72 hours)                   vancomycin (VANCOCIN) 1,000 mg in 0.9% NaCl 250 mL intermittent infusion (mg) 1,000 mg New Bag 23     1,000 mg New Bag 23    vancomycin (VANCOCIN) 1,750 mg in 0.9% NaCl 500 mL intermittent infusion (mg) 1,750 mg Given 23                Nephrotoxins and other renal medications (From now, onward)    Start     Dose/Rate Route Frequency Ordered Stop    23 2100  vancomycin (VANCOCIN) 1,000 mg in 0.9% NaCl 250 mL intermittent infusion         1,000 mg  over 60 Minutes Intravenous EVERY 24 HOURS 23 2342      23 1130  furosemide (LASIX) tablet 20 mg        Note to Pharmacy: PTA Sig:Take 20 mg by mouth daily      20 mg Oral DAILY 23 1041      23 0100  piperacillin-tazobactam (ZOSYN) 3.375 g vial to attach to  mL bag        Note to Pharmacy: For  "[facility], [facility] and [facility]: For Zosyn-naive patients, use the \"Zosyn initial dose + extended infusion\" order panel.    3.375 g  over 240 Minutes Intravenous EVERY 8 HOURS 01/20/23 2032               Contrast Orders - past 72 hours (72h ago, onward)    Start     Dose/Rate Route Frequency Stop    01/20/23 1700  iopamidol (ISOVUE-370) solution 75 mL         75 mL Intravenous ONCE 01/20/23 1646          Interpretation of levels and current regimen:  Vancomycin level is reflective of AUC less than 400    Has serum creatinine changed greater than 50% in last 72 hours: No    Urine output:  unable to determine    Renal Function: Improving    InsightRX Prediction of Planned New Vancomycin Regimen  Loading dose: 1750mg @ 2120 1/20/23  Regimen: 1250 mg IV every 24 hours.  Start time: 13:38 on 01/23/2023  Exposure target: AUC24 (range)400-600 mg/L.hr   AUC24,ss: 474 mg/L.hr  Probability of AUC24 > 400: 82 %  Ctrough,ss: 14.3 mg/L  Probability of Ctrough,ss > 20: 10 %  Probability of nephrotoxicity (Lodise LAVERNE 2009): 9 %    Plan:  1. Increase Dose to 1250 mg IV q24h  2. Vancomycin monitoring method: AUC  3. Vancomycin therapeutic monitoring goal: 400-600 mg*h/L  4. Pharmacy will check vancomycin levels as appropriate in 1-3 Days.  5. Serum creatinine levels will be ordered daily for the first week of therapy and at least twice weekly for subsequent weeks.    ZAKIA MOORE [facility]    "

## 2023-01-23 NOTE — PROGRESS NOTES
General Surgery Progress Note:    Hospital Day # 3    ASSESSMENT:   1. Sepsis, due to unspecified organism, unspecified whether acute organ dysfunction present (H)    2. Abdominal infection (H)        Zakiya Christian is a 78 year old female with UTI and chronic colo vesicular  fistula     PLAN:   -ADAT  -No urgent surgical intervention indicated, she is clinically stable and follows with colorectal as outpatient.   -ABX per ID, appreciate their input  -Would recommend getting her clinically stable and once abx plan in place, have her see her colorectal surgeon as outpatient, we will sign off at this point, please reach out if any needs arise    Fransico Hoff PA-C  [facility]  Surgery [address]  [address]  [place] [address]?  Office: [phone]      SUBJECTIVE:   Continues to feel fine, no n/v, tolerating diet, passing gas and BM    Patient Vitals for the past 24 hrs:   BP Temp Temp src Pulse Resp SpO2 Weight   01/23/23 0430 -- -- -- -- -- -- 90.2 kg (198 lb 12.8 oz)   01/22/23 2327 (!) 140/63 98.6  F (37  C) Oral 77 16 92 % --   01/22/23 1744 (!) 149/65 -- -- 72 -- -- --   01/22/23 1535 (!) 143/65 98.2  F (36.8  C) Oral 79 18 95 % --   01/22/23 0932 (!) 147/68 -- -- 80 -- -- --       Physical Exam:  General: NAD, pleasant  ABD: soft, nondistended, nontender  EXT:no CCE     Lab Results   Component Value Date    WBC 12.0 01/22/2023    HGB 8.5 01/22/2023    HCT 26.8 01/22/2023    MCV 94 01/22/2023     01/22/2023     INR/Prothrombin Time  Recent Labs   Lab 01/22/23  0901      CO2 23   BUN 21     Lab Results   Component Value Date    ALT <5 (L) 07/19/2022    AST 25 07/19/2022    ALKPHOS 84 07/19/2022

## 2023-01-23 NOTE — PLAN OF CARE
Problem: Plan of Care - These are the overarching goals to be used throughout the patient stay.    Goal: Optimal Comfort and Wellbeing  Outcome: Progressing  Intervention: Monitor Pain and Promote Comfort  Recent Flowsheet Documentation  Taken 1/23/2023 0811 by Lenard Burleson RN  Pain Management Interventions:    declines    emotional support    pillow support provided    repositioned    rest     Patient is A/Ox4, VSS on RA. SBA with walker and gait belt when ambulating. Voiding adequately. Full sensation per Pt. IV saline locked, patent. No new skin issues noted. Patient rating pain 1-4/10 during shift, rest, and repositioning used to manage pain. Patient is on the magnesium and potassium protocol, recheck in the morning.

## 2023-01-23 NOTE — PLAN OF CARE
Problem: Pain Chronic (Persistent) (Comorbidity Management)  Goal: Acceptable Pain Control and Functional Ability  Outcome: Progressing    Patient is alert and oriented x4; CMS intact; able to sleep between cares; tolerates medications.  Diet: tolerating normal diet; adaquate fluid intake   Activity:ambulates with standby assist with walker and gate belt.   Vital signs:vital signs within normal limits on room air.   IV: IV saline locked in between antibiotic infusions. Left IV removed due to leaking and Right IV was painful and not able to flush; IV removed and replace on right anterior forearm and infuses without issue.  Pain: pain is managed with tylenol   Respiratory: shortness of breath with wheeze; has  treatment of nebulizer and inhaler; on room air.  2053: BG 84  : purewick in place due to incontinence  Incision:  Labs: Potassium and Magnesium protocols; labs have not been drawn yet this morning.   Discharge plan: return to assisted living when medically stable with use of oral antibiotics; family to transport. TBD.    Patient's assisted living called to obtain 's contact information to discuss patient's disposition for discharge.     PRN Medications:  Melatonin 1 mg; medication effective to help with sleep.

## 2023-01-23 NOTE — PROGRESS NOTES
"[facility]    Infectious Disease Progress Note    Date of Service : 01/23/2023     Assessment & Plan   Zakiya Christian is a 78 year old female who was admitted on 1/20/2023.     ASSESSMENT:  Pelvic abscess   E coli bacteremia 1/20/2023  Non acute diverticulitis  Known CV fistula  White count 16  Admit was NOT for these problems    Discussion/MDM/note by Dr Carpenter  On maximal abx--probably treating disease that has been there for months however.   Unclear if they will impact things much, need to talk to CRS or GS about what they think we ought to do treatment wise.  Came in on augmentin for a recent \"UTI\" which is present de peng if she has a CV fistula (???)--note E coli and/or KP in urine the last 7 times checked over about 14 months.  The recent data about this \"UTI\" is from [facility] and not available...     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     RECOMMENDATIONS:    1. Antibiotics zosyn  2. Follow culture results   3. Focus/de-escalate antibiotics based on final culture results  4. Monitor CBC, CMP      Teri Hernandez MD  [facility]  [phone]      Interval History   resting    Physical Exam   Temp: 98.9  F (37.2  C) Temp src: Oral BP: (!) 140/61 Pulse: 74   Resp: 18 SpO2: 95 % O2 Device: None (Room air)    Vitals:    01/21/23 0459 01/22/23 0552 01/23/23 0430   Weight: 90.3 kg (199 lb) 90.3 kg (199 lb) 90.2 kg (198 lb 12.8 oz)     Vital Signs with Ranges  Temp:  [98.2  F (36.8  C)-98.9  F (37.2  C)] 98.9  F (37.2  C)  Pulse:  [72-79] 74  Resp:  [16-18] 18  BP: (140-149)/(61-65) 140/61  SpO2:  [92 %-95 %] 95 %    Constitutional: resting  Lungs: normal breathing pattern, no crackles or wheezing  Cardiovascular: no orthopena  Abdomen: non distended  Skin: warm  Neuro: deconditioned      Medications     - MEDICATION INSTRUCTIONS -         acetaminophen  500 mg Oral 4x Daily     " azaTHIOprine  100 mg Oral Daily     carvedilol  12.5 mg Oral BID w/meals     FLUoxetine  40 mg Oral QAM     fluticasone-vilanterol  1 puff Inhalation Daily    And     umeclidinium  1 puff Inhalation Daily     furosemide  20 mg Oral Daily     montelukast  10 mg Oral At Bedtime     nystatin   Topical BID     pantoprazole  40 mg Oral QAM AC     piperacillin-tazobactam  3.375 g Intravenous Q8H     potassium chloride ER  20 mEq Oral Daily     predniSONE  4 mg Oral Daily     [Held by provider] rivaroxaban ANTICOAGULANT  20 mg Oral Daily with supper     sodium chloride (PF)  3 mL Intracatheter Q8H     vancomycin  1,000 mg Intravenous Q24H       Data   All microbiology laboratory data reviewed.  Recent Labs   Lab Test 01/22/23  0901 01/21/23  1225 01/20/23  1521   WBC 12.0* 14.7* 16.1*   HGB 8.5* 8.9* 9.2*   HCT 26.8* 28.8* 29.6*   MCV 94 95 95    138* 156     Recent Labs   Lab Test 01/22/23  0901 01/21/23  1225 01/20/23  1521   CR 0.97 1.23* 1.49*     Recent Labs   Lab Test 01/11/23  0540   SED 61*     No lab results found.    Invalid input(s):     MICROBIOLOGY:    Reviewed    7-Day Micro Results     Collected Updated Procedure Result Status      01/22/2023 0901 01/23/2023 0131 Blood Culture Hand, Left [[id number]]    Blood from Hand, Left    Preliminary result Component Value   Culture No growth after 12 hours  [P]                01/22/2023 0901 01/23/2023 0131 Blood Culture Arm, Right [[id number]]    Blood from Arm, Right    Preliminary result Component Value   Culture No growth after 12 hours  [P]                01/20/2023 2122 01/22/2023 0655 Urine Culture [[id number]]    (Abnormal)   Urine, Clean Catch    Final result Component Value   Culture >100,000 CFU/mL Escherichia coli        Susceptibility      Escherichia coli      STACY      Ampicillin 4 ug/mL Susceptible      Ampicillin/ Sulbactam <=2 ug/mL Susceptible      Cefazolin <=4 ug/mL Susceptible  [1]       Cefepime <=1 ug/mL Susceptible      Cefoxitin  <=4 ug/mL Susceptible      Ceftazidime <=1 ug/mL Susceptible      Ceftriaxone <=1 ug/mL Susceptible      Ciprofloxacin >=4 ug/mL Resistant     Gentamicin <=1 ug/mL Susceptible      Levofloxacin >=8 ug/mL Resistant     Nitrofurantoin <=16 ug/mL Susceptible      Piperacillin/Tazobactam <=4 ug/mL Susceptible      Tobramycin <=1 ug/mL Susceptible      Trimethoprim/Sulfamethoxazole >16/304 ug/mL Resistant                  [1]  Cefazolin STACY breakpoints are for the treatment of uncomplicated urinary tract infections. For the treatment of systemic infections, please contact the laboratory for additional testing.                 01/20/2023 1926 01/22/2023 2216 Blood Culture Peripheral Blood [[id number]]   Peripheral Blood    Preliminary result Component Value   Culture No growth after 2 days  [P]                01/20/2023 1524 01/20/2023 1616 Symptomatic Influenza A/B & SARS-CoV2 (COVID-19) Virus PCR Multiplex Nasopharyngeal [[id number]]    Swab from Nasopharyngeal    Final result Component Value   Influenza A PCR Negative   Influenza B PCR Negative   RSV PCR Negative   SARS CoV2 PCR Negative   NEGATIVE: SARS-CoV-2 (COVID-19) RNA not detected, presumed negative.            01/20/2023 1521 01/23/2023 0733 Blood Culture Peripheral Blood [[id number]]    (Abnormal)   Peripheral Blood    Final result Component Value   Culture Positive on the 1st day of incubation    Escherichia coli    1 of 2 bottles        Susceptibility      Escherichia coli      STACY      Ampicillin <=2 ug/mL Susceptible      Ampicillin/ Sulbactam <=2 ug/mL Susceptible      Cefepime <=1 ug/mL Susceptible      Ceftazidime <=1 ug/mL Susceptible      Ceftriaxone <=1 ug/mL Susceptible      Ciprofloxacin >=4 ug/mL Resistant     Gentamicin <=1 ug/mL Susceptible      Levofloxacin >=8 ug/mL Resistant     Meropenem <=0.25 ug/mL Susceptible      Piperacillin/Tazobactam <=4 ug/mL Susceptible      Tobramycin <=1 ug/mL Susceptible      Trimethoprim/Sulfamethoxazole  >16/304 ug/mL Resistant                          01/20/2023 1521 01/21/2023 1346 Verigene GN Panel [[id number]]    (Abnormal)   Peripheral Blood    Final result Component Value   Acinetobacter species Not Detected   Citrobacter species Not Detected   Enterobacter species Not Detected   Proteus species Not Detected   Escherichia coli Detected   Positive for Escherichia coli by Verigene multiplex nucleic acid test. Final identification and antimicrobial susceptibility testing will be verified by standard methods. Verigene test will not distinguish E. coli from Shigella species including Shigella dysenteriae, Shigella flexneri, Shigella boydii, and Shigella sonnei. Specimens containing Shigella species or E. coli will be reported as positive for E. coli.   Klebsiella pneumoniae Not Detected   Klebsiella oxytoca Not Detected   Pseudomonas aeruginosa Not Detected   CTX-M Not Detected   KPC Not Detected   NDM Not Detected   VIM Not Detected   IMP Not Detected   OXA Not Detected                   RADIOLOGY:    Reviewed  CT Abdomen Pelvis w Contrast    Result Date: 1/20/2023  EXAM: CT ABDOMEN PELVIS W CONTRAST LOCATION: [facility] DATE/TIME: 1/20/2023 4:51 PM INDICATION: Weakness, hypotension, fall with acute on chronic abdominal pain with previously known fistula and abscess COMPARISON: CTA AP 12/09/2022 and older studies. TECHNIQUE: CT scan of the abdomen and pelvis was performed following injection of IV contrast. Multiplanar reformats were obtained. Dose reduction techniques were used. CONTRAST: 75ml Isovue 370 FINDINGS: LOWER CHEST: Please see chest CTA report HEPATOBILIARY: Prior cholecystectomy. PANCREAS: Mild diffuse atrophy. SPLEEN: Normal. ADRENAL GLANDS: Normal. KIDNEYS/BLADDER: No change in the nonobstructing small calculus in the lower pole calyx on the right. 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 No change in the  curvilinear elliptical density centrally within the fat, new since the April 2022 but unchanged from the recent study ( AML embolization 10/5/2022). There are also bilateral renal cysts which need no follow-up. Air is seen in the bladder. BOWEL: Redundant colon especially the sigmoid with extensive distal colonic diverticulosis. Tethering of the small bowel wall and the left adnexa to the mid sigmoid colon again noted. No acute inflammatory changes. No bowel obstruction. Incidental descending duodenal diverticulum. LYMPH NODES: Normal. VASCULATURE: Moderate arterial calcifications. No aneurysm. PELVIC ORGANS: There has been interval decrease in the rim-enhancing fluid collection in the pelvis which is less well-defined now measures approximately 2 cm (central cavity  was 3.5 cm before). On the current study this appears intramural within the right uterine fundus MUSCULOSKELETAL: Subacute right lateral seventh 8 9 rib fractures are seen with callus formation no acute fractures. Moderate facet arthropathy in the lower lumbar spine.     IMPRESSION: 1.  No evidence of acute traumatic injury in the abdomen or pelvis. 2.  Interval decrease in the right side pelvic abscess as noted above which on the current study appears to be myometrial in nature involving the right side of the uterine fundus. 3.  Air in the bladder from a presumed a colovesical fistula given her prior history. If needed this can be confirmed with a standard filling cystogram (not a CT cystogram). 4.  Extensive sigmoid diverticulitis without evidence of acute inflammation. Tethering of the adjacent small bowel and the left adnexa to the mid sigmoid again noted. 5.  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 6.  Other noncritical findings as noted above.    Cervical spine CT w/o contrast    Result Date: 1/20/2023  EXAM: CT HEAD W/O CONTRAST, CT CERVICAL SPINE W/O CONTRAST LOCATION: [facility] DATE/TIME: 1/20/2023  4:47 PM INDICATION: Head and neck injury, on thinners, pain in lower cervical upper thoracic COMPARISON: 12/01/2022 CT head, 09/18/2022 CT cervical spine TECHNIQUE: 1) Routine CT Head without IV contrast. Multiplanar reformats. Dose reduction techniques were used. 2) Routine CT Cervical Spine without IV contrast. Multiplanar reformats. Dose reduction techniques were used. FINDINGS: HEAD CT: INTRACRANIAL CONTENTS: No intracranial hemorrhage, extraaxial collection, or mass effect.  No CT evidence of acute infarct. Moderate presumed chronic small vessel ischemic changes. Moderate generalized volume loss. No hydrocephalus. VISUALIZED ORBITS/SINUSES/MASTOIDS: No intraorbital abnormality. No paranasal sinus mucosal disease. No middle ear or mastoid effusion. BONES/SOFT TISSUES: No acute abnormality. CERVICAL SPINE CT: VERTEBRA: Normal vertebral body heights. Slight right cervical curve. No fracture or posttraumatic subluxation. CANAL/FORAMINA: Mild degenerative changes without significant canal narrowing at any level. Multilevel mild neural foraminal narrowing. PARASPINAL: Prominent medial deviation of ICAs within the neck. 7 mm nodular opacity left lung apex; probably present previously however it was incompletely visualized. If indicated, CT chest may be performed.     IMPRESSION: HEAD CT: 1.  No acute intracranial process. CERVICAL SPINE CT: 1.  No CT evidence for acute fracture or post traumatic subluxation.    CT Chest Pulmonary Embolism w Contrast    Result Date: 1/20/2023  EXAM: CT CHEST PULMONARY EMBOLISM W CONTRAST LOCATION: [facility] DATE/TIME: 1/20/2023 4:50 PM INDICATION: Weakness, hypoxia, low blood pressure in the field COMPARISON: 4/5/2022 and 12/9/2022 TECHNIQUE: CT chest pulmonary angiogram during arterial phase injection of IV contrast. Multiplanar reformats and MIP reconstructions were performed. Dose reduction techniques were used. CONTRAST: 75ml Isovue 370 FINDINGS:  ANGIOGRAM CHEST: Pulmonary arteries are normal caliber and negative for pulmonary emboli. Thoracic aorta demonstrates significant atherosclerotic plaque but is negative for dissection. No CT evidence of right heart strain. LUNGS AND PLEURA: Mild motion artifact. Scattered tiny peripheral pulmonary nodules unchanged with largest left upper lobe on image 91 measuring 4 x 3 mm. Stable linear scarring right middle lobe. Some flattening of trachea and mainstem bronchi consistent with tracheobronchomalacia. No new focal infiltrate. MEDIASTINUM/AXILLAE: No lymphadenopathy. CORONARY ARTERY CALCIFICATION: Severe. UPPER ABDOMEN: Normal. MUSCULOSKELETAL: Subacute fractures of right ribs 6-9 now have surrounding callus. There are healed fractures involving left ribs 5 and 6 which were not seen on previous exam.     IMPRESSION: 1.  No pulmonary emboli identified. No acute thoracic abnormality evident. 2.  Tracheobronchomalacia. 3.  Prior bilateral rib fractures. No acute fracture identified.    Head CT w/o contrast    Result Date: 1/20/2023  EXAM: CT HEAD W/O CONTRAST, CT CERVICAL SPINE W/O CONTRAST LOCATION: [facility] DATE/TIME: 1/20/2023 4:47 PM INDICATION: Head and neck injury, on thinners, pain in lower cervical upper thoracic COMPARISON: 12/01/2022 CT head, 09/18/2022 CT cervical spine TECHNIQUE: 1) Routine CT Head without IV contrast. Multiplanar reformats. Dose reduction techniques were used. 2) Routine CT Cervical Spine without IV contrast. Multiplanar reformats. Dose reduction techniques were used. FINDINGS: HEAD CT: INTRACRANIAL CONTENTS: No intracranial hemorrhage, extraaxial collection, or mass effect.  No CT evidence of acute infarct. Moderate presumed chronic small vessel ischemic changes. Moderate generalized volume loss. No hydrocephalus. VISUALIZED ORBITS/SINUSES/MASTOIDS: No intraorbital abnormality. No paranasal sinus mucosal disease. No middle ear or mastoid effusion. BONES/SOFT  TISSUES: No acute abnormality. CERVICAL SPINE CT: VERTEBRA: Normal vertebral body heights. Slight right cervical curve. No fracture or posttraumatic subluxation. CANAL/FORAMINA: Mild degenerative changes without significant canal narrowing at any level. Multilevel mild neural foraminal narrowing. PARASPINAL: Prominent medial deviation of ICAs within the neck. 7 mm nodular opacity left lung apex; probably present previously however it was incompletely visualized. If indicated, CT chest may be performed.     IMPRESSION: HEAD CT: 1.  No acute intracranial process. CERVICAL SPINE CT: 1.  No CT evidence for acute fracture or post traumatic subluxation.     Attestation:  Total time on the floor involved in the patient's care: 50 minutes. Total time spent in chart review, evaluation, reviewed sign outs, management: >50%

## 2023-01-24 ENCOUNTER — APPOINTMENT (OUTPATIENT)
Dept: OCCUPATIONAL THERAPY | Facility: CLINIC | Age: 78
DRG: 872 | End: 2023-01-24
Attending: HOSPITALIST
Payer: COMMERCIAL

## 2023-01-24 ENCOUNTER — APPOINTMENT (OUTPATIENT)
Dept: ULTRASOUND IMAGING | Facility: CLINIC | Age: 78
DRG: 872 | End: 2023-01-24
Attending: EMERGENCY MEDICINE
Payer: COMMERCIAL

## 2023-01-24 LAB
ALBUMIN SERPL-MCNC: 2.5 G/DL (ref 3.5–5)
ALP SERPL-CCNC: 44 U/L (ref 45–120)
ALT SERPL W P-5'-P-CCNC: <9 U/L (ref 0–45)
ANION GAP SERPL CALCULATED.3IONS-SCNC: 7 MMOL/L (ref 5–18)
AST SERPL W P-5'-P-CCNC: 11 U/L (ref 0–40)
BASOPHILS # BLD AUTO: 0 10E3/UL (ref 0–0.2)
BASOPHILS NFR BLD AUTO: 1 %
BILIRUB SERPL-MCNC: 0.4 MG/DL (ref 0–1)
BUN SERPL-MCNC: 12 MG/DL (ref 8–28)
CALCIUM SERPL-MCNC: 8.8 MG/DL (ref 8.5–10.5)
CHLORIDE BLD-SCNC: 112 MMOL/L (ref 98–107)
CO2 SERPL-SCNC: 24 MMOL/L (ref 22–31)
CREAT SERPL-MCNC: 0.98 MG/DL (ref 0.6–1.1)
EOSINOPHIL # BLD AUTO: 0.2 10E3/UL (ref 0–0.7)
EOSINOPHIL NFR BLD AUTO: 4 %
ERYTHROCYTE [DISTWIDTH] IN BLOOD BY AUTOMATED COUNT: 14.7 % (ref 10–15)
GFR SERPL CREATININE-BSD FRML MDRD: 58 ML/MIN/1.73M2
GLUCOSE BLD-MCNC: 87 MG/DL (ref 70–125)
GLUCOSE BLDC GLUCOMTR-MCNC: 115 MG/DL (ref 70–99)
GLUCOSE BLDC GLUCOMTR-MCNC: 122 MG/DL (ref 70–99)
GLUCOSE BLDC GLUCOMTR-MCNC: 84 MG/DL (ref 70–99)
HCT VFR BLD AUTO: 27.3 % (ref 35–47)
HGB BLD-MCNC: 8.5 G/DL (ref 11.7–15.7)
IMM GRANULOCYTES # BLD: 0 10E3/UL
IMM GRANULOCYTES NFR BLD: 1 %
LYMPHOCYTES # BLD AUTO: 1.6 10E3/UL (ref 0.8–5.3)
LYMPHOCYTES NFR BLD AUTO: 27 %
MAGNESIUM SERPL-MCNC: 1.8 MG/DL (ref 1.8–2.6)
MCH RBC QN AUTO: 29.1 PG (ref 26.5–33)
MCHC RBC AUTO-ENTMCNC: 31.1 G/DL (ref 31.5–36.5)
MCV RBC AUTO: 94 FL (ref 78–100)
MONOCYTES # BLD AUTO: 0.3 10E3/UL (ref 0–1.3)
MONOCYTES NFR BLD AUTO: 5 %
NEUTROPHILS # BLD AUTO: 3.6 10E3/UL (ref 1.6–8.3)
NEUTROPHILS NFR BLD AUTO: 62 %
NRBC # BLD AUTO: 0 10E3/UL
NRBC BLD AUTO-RTO: 0 /100
PLATELET # BLD AUTO: 162 10E3/UL (ref 150–450)
POTASSIUM BLD-SCNC: 3.8 MMOL/L (ref 3.5–5)
PROT SERPL-MCNC: 6.2 G/DL (ref 6–8)
RBC # BLD AUTO: 2.92 10E6/UL (ref 3.8–5.2)
SODIUM SERPL-SCNC: 143 MMOL/L (ref 136–145)
WBC # BLD AUTO: 5.7 10E3/UL (ref 4–11)

## 2023-01-24 PROCEDURE — 250N000013 HC RX MED GY IP 250 OP 250 PS 637: Performed by: HOSPITALIST

## 2023-01-24 PROCEDURE — 85025 COMPLETE CBC W/AUTO DIFF WBC: CPT | Performed by: INTERNAL MEDICINE

## 2023-01-24 PROCEDURE — 76856 US EXAM PELVIC COMPLETE: CPT

## 2023-01-24 PROCEDURE — 250N000013 HC RX MED GY IP 250 OP 250 PS 637: Performed by: INTERNAL MEDICINE

## 2023-01-24 PROCEDURE — 83735 ASSAY OF MAGNESIUM: CPT | Performed by: HOSPITALIST

## 2023-01-24 PROCEDURE — 36415 COLL VENOUS BLD VENIPUNCTURE: CPT | Performed by: INTERNAL MEDICINE

## 2023-01-24 PROCEDURE — 97535 SELF CARE MNGMENT TRAINING: CPT | Mod: GO

## 2023-01-24 PROCEDURE — 250N000011 HC RX IP 250 OP 636: Performed by: STUDENT IN AN ORGANIZED HEALTH CARE EDUCATION/TRAINING PROGRAM

## 2023-01-24 PROCEDURE — 250N000013 HC RX MED GY IP 250 OP 250 PS 637: Performed by: STUDENT IN AN ORGANIZED HEALTH CARE EDUCATION/TRAINING PROGRAM

## 2023-01-24 PROCEDURE — 250N000011 HC RX IP 250 OP 636: Performed by: INTERNAL MEDICINE

## 2023-01-24 PROCEDURE — 250N000012 HC RX MED GY IP 250 OP 636 PS 637: Performed by: HOSPITALIST

## 2023-01-24 PROCEDURE — 120N000001 HC R&B MED SURG/OB

## 2023-01-24 PROCEDURE — 99232 SBSQ HOSP IP/OBS MODERATE 35: CPT | Performed by: EMERGENCY MEDICINE

## 2023-01-24 PROCEDURE — 80053 COMPREHEN METABOLIC PANEL: CPT | Performed by: INTERNAL MEDICINE

## 2023-01-24 PROCEDURE — 99233 SBSQ HOSP IP/OBS HIGH 50: CPT | Performed by: INTERNAL MEDICINE

## 2023-01-24 PROCEDURE — 97165 OT EVAL LOW COMPLEX 30 MIN: CPT | Mod: GO

## 2023-01-24 RX ORDER — CEFTRIAXONE 2 G/1
2 INJECTION, POWDER, FOR SOLUTION INTRAMUSCULAR; INTRAVENOUS EVERY 24 HOURS
Status: DISCONTINUED | OUTPATIENT
Start: 2023-01-24 | End: 2023-01-26 | Stop reason: HOSPADM

## 2023-01-24 RX ORDER — METRONIDAZOLE 500 MG/1
500 TABLET ORAL 2 TIMES DAILY
Status: DISCONTINUED | OUTPATIENT
Start: 2023-01-24 | End: 2023-01-25

## 2023-01-24 RX ADMIN — ALBUTEROL SULFATE 2 PUFF: 90 AEROSOL, METERED RESPIRATORY (INHALATION) at 09:31

## 2023-01-24 RX ADMIN — PIPERACILLIN AND TAZOBACTAM 3.38 G: 3; .375 INJECTION, POWDER, FOR SOLUTION INTRAVENOUS at 09:27

## 2023-01-24 RX ADMIN — FUROSEMIDE 20 MG: 20 TABLET ORAL at 09:28

## 2023-01-24 RX ADMIN — AZATHIOPRINE 100 MG: 50 TABLET ORAL at 09:28

## 2023-01-24 RX ADMIN — ACETAMINOPHEN 500 MG: 500 TABLET ORAL at 21:03

## 2023-01-24 RX ADMIN — GABAPENTIN 900 MG: 300 CAPSULE ORAL at 21:04

## 2023-01-24 RX ADMIN — UMECLIDINIUM 1 PUFF: 62.5 AEROSOL, POWDER ORAL at 09:34

## 2023-01-24 RX ADMIN — PANTOPRAZOLE SODIUM 40 MG: 20 TABLET, DELAYED RELEASE ORAL at 09:28

## 2023-01-24 RX ADMIN — ALBUTEROL SULFATE 2 PUFF: 90 AEROSOL, METERED RESPIRATORY (INHALATION) at 16:58

## 2023-01-24 RX ADMIN — PIPERACILLIN AND TAZOBACTAM 3.38 G: 3; .375 INJECTION, POWDER, FOR SOLUTION INTRAVENOUS at 01:22

## 2023-01-24 RX ADMIN — ACETAMINOPHEN 500 MG: 500 TABLET ORAL at 11:57

## 2023-01-24 RX ADMIN — POTASSIUM CHLORIDE 20 MEQ: 1500 TABLET, EXTENDED RELEASE ORAL at 09:28

## 2023-01-24 RX ADMIN — NYSTATIN: 100000 CREAM TOPICAL at 09:34

## 2023-01-24 RX ADMIN — METRONIDAZOLE 500 MG: 500 TABLET ORAL at 21:06

## 2023-01-24 RX ADMIN — FLUOXETINE 40 MG: 20 CAPSULE ORAL at 09:28

## 2023-01-24 RX ADMIN — ACETAMINOPHEN 500 MG: 500 TABLET ORAL at 09:28

## 2023-01-24 RX ADMIN — CARVEDILOL 12.5 MG: 6.25 TABLET, FILM COATED ORAL at 09:28

## 2023-01-24 RX ADMIN — ACETAMINOPHEN 500 MG: 500 TABLET ORAL at 16:50

## 2023-01-24 RX ADMIN — MONTELUKAST 10 MG: 10 TABLET, FILM COATED ORAL at 21:04

## 2023-01-24 RX ADMIN — Medication 1 MG: at 21:11

## 2023-01-24 RX ADMIN — CARVEDILOL 12.5 MG: 6.25 TABLET, FILM COATED ORAL at 18:54

## 2023-01-24 RX ADMIN — FLUTICASONE FUROATE AND VILANTEROL TRIFENATATE 1 PUFF: 200; 25 POWDER RESPIRATORY (INHALATION) at 09:31

## 2023-01-24 RX ADMIN — PIPERACILLIN AND TAZOBACTAM 3.38 G: 3; .375 INJECTION, POWDER, FOR SOLUTION INTRAVENOUS at 16:50

## 2023-01-24 RX ADMIN — PREDNISONE 4 MG: 2.5 TABLET ORAL at 09:28

## 2023-01-24 ASSESSMENT — ACTIVITIES OF DAILY LIVING (ADL)
ADLS_ACUITY_SCORE: 43

## 2023-01-24 NOTE — PROGRESS NOTES
"[facility]    Infectious Disease Progress Note    Date of Service : 01/24/2023     Assessment & Plan   Zakiya Christian is a 78 year old female who was admitted on 1/20/2023.     ASSESSMENT:  Pelvic abscess- active issue   E coli bacteremia 1/20/2023  Non acute diverticulitis  Known CV fistula  White count 16  Admit was NOT for these problems    Discussion/MDM/note by Dr Carpenter  On maximal abx--probably treating disease that has been there for months however.   Unclear if they will impact things much, need to talk to CRS or GS about what they think we ought to do treatment wise.  Came in on augmentin for a recent \"UTI\" which is present de peng if she has a CV fistula (???)--note E coli and/or KP in urine the last 7 times checked over about 14 months.  The recent data about this \"UTI\" is from [facility] and not available...     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     RECOMMENDATIONS:    1. Antibiotics zosyn--> Ceftriaxone and Metronidazole on 1/24/2023. May need IV abx on discharge  2. Follow culture results   3. Stopped vancomycin on 1/24/2023  4. Focus/de-escalate antibiotics based on final culture results and ?pelvic MRI-- Gyn Onc following  5. Monitor CBC, CMP  6. Updated patient      Teri Hernandez MD  [facility]  [phone]      Interval History   Resting  No pain  Updated patient re: US results    Physical Exam   Temp: 97.4  F (36.3  C) Temp src: Oral BP: 116/56 Pulse: 66   Resp: 18 SpO2: 93 % O2 Device: None (Room air)    Vitals:    01/21/23 0459 01/22/23 0552 01/23/23 0430   Weight: 90.3 kg (199 lb) 90.3 kg (199 lb) 90.2 kg (198 lb 12.8 oz)     Vital Signs with Ranges  Temp:  [97.4  F (36.3  C)-98.9  F (37.2  C)] 97.4  F (36.3  C)  Pulse:  [66-76] 66  Resp:  [18] 18  BP: (116-140)/(56-65) 116/56  SpO2:  [93 %-97 %] 93 %    Constitutional: resting  Lungs: normal breathing pattern, no " crackles or wheezing  Cardiovascular: no orthopena  Abdomen: non distended  Skin: warm  Neuro: deconditioned      Medications     - MEDICATION INSTRUCTIONS -         acetaminophen  500 mg Oral 4x Daily     azaTHIOprine  100 mg Oral Daily     carvedilol  12.5 mg Oral BID w/meals     FLUoxetine  40 mg Oral QAM     fluticasone-vilanterol  1 puff Inhalation Daily    And     umeclidinium  1 puff Inhalation Daily     furosemide  20 mg Oral Daily     gabapentin  900 mg Oral At Bedtime     montelukast  10 mg Oral At Bedtime     nystatin   Topical BID     pantoprazole  40 mg Oral QAM AC     piperacillin-tazobactam  3.375 g Intravenous Q8H     potassium chloride ER  20 mEq Oral Daily     predniSONE  4 mg Oral Daily     [Held by provider] rivaroxaban ANTICOAGULANT  20 mg Oral Daily with supper     sodium chloride (PF)  3 mL Intracatheter Q8H     vancomycin  1,250 mg Intravenous Q24H       Data   All microbiology laboratory data reviewed.  Recent Labs   Lab Test 01/24/23  0712 01/22/23  0901 01/21/23  1225   WBC 5.7 12.0* 14.7*   HGB 8.5* 8.5* 8.9*   HCT 27.3* 26.8* 28.8*   MCV 94 94 95    150 138*     Recent Labs   Lab Test 01/24/23  0712 01/22/23  0901 01/21/23  1225   CR 0.98 0.97 1.23*     Recent Labs   Lab Test 01/11/23  0540   SED 61*     No lab results found.    Invalid input(s):     MICROBIOLOGY:    Reviewed    7-Day Micro Results     Collected Updated Procedure Result Status      01/22/2023 0901 01/23/2023 1332 Blood Culture Hand, Left [[id number]]    Blood from Hand, Left    Preliminary result Component Value   Culture No growth after 1 day  [P]                01/22/2023 0901 01/23/2023 1332 Blood Culture Arm, Right [[id number]]    Blood from Arm, Right    Preliminary result Component Value   Culture No growth after 1 day  [P]                01/20/2023 2122 01/22/2023 0655 Urine Culture [[id number]]    (Abnormal)   Urine, Clean Catch    Final result Component Value   Culture >100,000 CFU/mL Escherichia  coli        Susceptibility      Escherichia coli      STACY      Ampicillin 4 ug/mL Susceptible      Ampicillin/ Sulbactam <=2 ug/mL Susceptible      Cefazolin <=4 ug/mL Susceptible  [1]       Cefepime <=1 ug/mL Susceptible      Cefoxitin <=4 ug/mL Susceptible      Ceftazidime <=1 ug/mL Susceptible      Ceftriaxone <=1 ug/mL Susceptible      Ciprofloxacin >=4 ug/mL Resistant     Gentamicin <=1 ug/mL Susceptible      Levofloxacin >=8 ug/mL Resistant     Nitrofurantoin <=16 ug/mL Susceptible      Piperacillin/Tazobactam <=4 ug/mL Susceptible      Tobramycin <=1 ug/mL Susceptible      Trimethoprim/Sulfamethoxazole >16/304 ug/mL Resistant                  [1]  Cefazolin STACY breakpoints are for the treatment of uncomplicated urinary tract infections. For the treatment of systemic infections, please contact the laboratory for additional testing.                 01/20/2023 1926 01/23/2023 2216 Blood Culture Peripheral Blood [[id number]]   Peripheral Blood    Preliminary result Component Value   Culture No growth after 3 days  [P]                01/20/2023 1524 01/20/2023 1616 Symptomatic Influenza A/B & SARS-CoV2 (COVID-19) Virus PCR Multiplex Nasopharyngeal [[id number]]    Swab from Nasopharyngeal    Final result Component Value   Influenza A PCR Negative   Influenza B PCR Negative   RSV PCR Negative   SARS CoV2 PCR Negative   NEGATIVE: SARS-CoV-2 (COVID-19) RNA not detected, presumed negative.            01/20/2023 1521 01/23/2023 0733 Blood Culture Peripheral Blood [[id number]]    (Abnormal)   Peripheral Blood    Final result Component Value   Culture Positive on the 1st day of incubation    Escherichia coli    1 of 2 bottles        Susceptibility      Escherichia coli      STACY      Ampicillin <=2 ug/mL Susceptible      Ampicillin/ Sulbactam <=2 ug/mL Susceptible      Cefepime <=1 ug/mL Susceptible      Ceftazidime <=1 ug/mL Susceptible      Ceftriaxone <=1 ug/mL Susceptible      Ciprofloxacin >=4 ug/mL Resistant      Gentamicin <=1 ug/mL Susceptible      Levofloxacin >=8 ug/mL Resistant     Meropenem <=0.25 ug/mL Susceptible      Piperacillin/Tazobactam <=4 ug/mL Susceptible      Tobramycin <=1 ug/mL Susceptible      Trimethoprim/Sulfamethoxazole >16/304 ug/mL Resistant                          01/20/2023 1521 01/21/2023 1346 Verigene GN Panel [[id number]]    (Abnormal)   Peripheral Blood    Final result Component Value   Acinetobacter species Not Detected   Citrobacter species Not Detected   Enterobacter species Not Detected   Proteus species Not Detected   Escherichia coli Detected   Positive for Escherichia coli by Verigene multiplex nucleic acid test. Final identification and antimicrobial susceptibility testing will be verified by standard methods. Verigene test will not distinguish E. coli from Shigella species including Shigella dysenteriae, Shigella flexneri, Shigella boydii, and Shigella sonnei. Specimens containing Shigella species or E. coli will be reported as positive for E. coli.   Klebsiella pneumoniae Not Detected   Klebsiella oxytoca Not Detected   Pseudomonas aeruginosa Not Detected   CTX-M Not Detected   KPC Not Detected   NDM Not Detected   VIM Not Detected   IMP Not Detected   OXA Not Detected                   RADIOLOGY:    Reviewed  CT Abdomen Pelvis w Contrast    Result Date: 1/20/2023  EXAM: CT ABDOMEN PELVIS W CONTRAST LOCATION: [facility] DATE/TIME: 1/20/2023 4:51 PM INDICATION: Weakness, hypotension, fall with acute on chronic abdominal pain with previously known fistula and abscess COMPARISON: CTA AP 12/09/2022 and older studies. TECHNIQUE: CT scan of the abdomen and pelvis was performed following injection of IV contrast. Multiplanar reformats were obtained. Dose reduction techniques were used. CONTRAST: 75ml Isovue 370 FINDINGS: LOWER CHEST: Please see chest CTA report HEPATOBILIARY: Prior cholecystectomy. PANCREAS: Mild diffuse atrophy. SPLEEN: Normal. ADRENAL GLANDS:  Normal. KIDNEYS/BLADDER: No change in the nonobstructing small calculus in the lower pole calyx on the right. 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 No change in the curvilinear elliptical density centrally within the fat, new since the April 2022 but unchanged from the recent study ( AML embolization 10/5/2022). There are also bilateral renal cysts which need no follow-up. Air is seen in the bladder. BOWEL: Redundant colon especially the sigmoid with extensive distal colonic diverticulosis. Tethering of the small bowel wall and the left adnexa to the mid sigmoid colon again noted. No acute inflammatory changes. No bowel obstruction. Incidental descending duodenal diverticulum. LYMPH NODES: Normal. VASCULATURE: Moderate arterial calcifications. No aneurysm. PELVIC ORGANS: There has been interval decrease in the rim-enhancing fluid collection in the pelvis which is less well-defined now measures approximately 2 cm (central cavity  was 3.5 cm before). On the current study this appears intramural within the right uterine fundus MUSCULOSKELETAL: Subacute right lateral seventh 8 9 rib fractures are seen with callus formation no acute fractures. Moderate facet arthropathy in the lower lumbar spine.     IMPRESSION: 1.  No evidence of acute traumatic injury in the abdomen or pelvis. 2.  Interval decrease in the right side pelvic abscess as noted above which on the current study appears to be myometrial in nature involving the right side of the uterine fundus. 3.  Air in the bladder from a presumed a colovesical fistula given her prior history. If needed this can be confirmed with a standard filling cystogram (not a CT cystogram). 4.  Extensive sigmoid diverticulitis without evidence of acute inflammation. Tethering of the adjacent small bowel and the left adnexa to the mid sigmoid again noted. 5.  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 6.   Other noncritical findings as noted above.    Cervical spine CT w/o contrast    Result Date: 1/20/2023  EXAM: CT HEAD W/O CONTRAST, CT CERVICAL SPINE W/O CONTRAST LOCATION: [facility] DATE/TIME: 1/20/2023 4:47 PM INDICATION: Head and neck injury, on thinners, pain in lower cervical upper thoracic COMPARISON: 12/01/2022 CT head, 09/18/2022 CT cervical spine TECHNIQUE: 1) Routine CT Head without IV contrast. Multiplanar reformats. Dose reduction techniques were used. 2) Routine CT Cervical Spine without IV contrast. Multiplanar reformats. Dose reduction techniques were used. FINDINGS: HEAD CT: INTRACRANIAL CONTENTS: No intracranial hemorrhage, extraaxial collection, or mass effect.  No CT evidence of acute infarct. Moderate presumed chronic small vessel ischemic changes. Moderate generalized volume loss. No hydrocephalus. VISUALIZED ORBITS/SINUSES/MASTOIDS: No intraorbital abnormality. No paranasal sinus mucosal disease. No middle ear or mastoid effusion. BONES/SOFT TISSUES: No acute abnormality. CERVICAL SPINE CT: VERTEBRA: Normal vertebral body heights. Slight right cervical curve. No fracture or posttraumatic subluxation. CANAL/FORAMINA: Mild degenerative changes without significant canal narrowing at any level. Multilevel mild neural foraminal narrowing. PARASPINAL: Prominent medial deviation of ICAs within the neck. 7 mm nodular opacity left lung apex; probably present previously however it was incompletely visualized. If indicated, CT chest may be performed.     IMPRESSION: HEAD CT: 1.  No acute intracranial process. CERVICAL SPINE CT: 1.  No CT evidence for acute fracture or post traumatic subluxation.    CT Chest Pulmonary Embolism w Contrast    Result Date: 1/20/2023  EXAM: CT CHEST PULMONARY EMBOLISM W CONTRAST LOCATION: [facility] DATE/TIME: 1/20/2023 4:50 PM INDICATION: Weakness, hypoxia, low blood pressure in the field COMPARISON: 4/5/2022 and 12/9/2022  TECHNIQUE: CT chest pulmonary angiogram during arterial phase injection of IV contrast. Multiplanar reformats and MIP reconstructions were performed. Dose reduction techniques were used. CONTRAST: 75ml Isovue 370 FINDINGS: ANGIOGRAM CHEST: Pulmonary arteries are normal caliber and negative for pulmonary emboli. Thoracic aorta demonstrates significant atherosclerotic plaque but is negative for dissection. No CT evidence of right heart strain. LUNGS AND PLEURA: Mild motion artifact. Scattered tiny peripheral pulmonary nodules unchanged with largest left upper lobe on image 91 measuring 4 x 3 mm. Stable linear scarring right middle lobe. Some flattening of trachea and mainstem bronchi consistent with tracheobronchomalacia. No new focal infiltrate. MEDIASTINUM/AXILLAE: No lymphadenopathy. CORONARY ARTERY CALCIFICATION: Severe. UPPER ABDOMEN: Normal. MUSCULOSKELETAL: Subacute fractures of right ribs 6-9 now have surrounding callus. There are healed fractures involving left ribs 5 and 6 which were not seen on previous exam.     IMPRESSION: 1.  No pulmonary emboli identified. No acute thoracic abnormality evident. 2.  Tracheobronchomalacia. 3.  Prior bilateral rib fractures. No acute fracture identified.    Head CT w/o contrast    Result Date: 1/20/2023  EXAM: CT HEAD W/O CONTRAST, CT CERVICAL SPINE W/O CONTRAST LOCATION: [facility] DATE/TIME: 1/20/2023 4:47 PM INDICATION: Head and neck injury, on thinners, pain in lower cervical upper thoracic COMPARISON: 12/01/2022 CT head, 09/18/2022 CT cervical spine TECHNIQUE: 1) Routine CT Head without IV contrast. Multiplanar reformats. Dose reduction techniques were used. 2) Routine CT Cervical Spine without IV contrast. Multiplanar reformats. Dose reduction techniques were used. FINDINGS: HEAD CT: INTRACRANIAL CONTENTS: No intracranial hemorrhage, extraaxial collection, or mass effect.  No CT evidence of acute infarct. Moderate presumed chronic small  vessel ischemic changes. Moderate generalized volume loss. No hydrocephalus. VISUALIZED ORBITS/SINUSES/MASTOIDS: No intraorbital abnormality. No paranasal sinus mucosal disease. No middle ear or mastoid effusion. BONES/SOFT TISSUES: No acute abnormality. CERVICAL SPINE CT: VERTEBRA: Normal vertebral body heights. Slight right cervical curve. No fracture or posttraumatic subluxation. CANAL/FORAMINA: Mild degenerative changes without significant canal narrowing at any level. Multilevel mild neural foraminal narrowing. PARASPINAL: Prominent medial deviation of ICAs within the neck. 7 mm nodular opacity left lung apex; probably present previously however it was incompletely visualized. If indicated, CT chest may be performed.     IMPRESSION: HEAD CT: 1.  No acute intracranial process. CERVICAL SPINE CT: 1.  No CT evidence for acute fracture or post traumatic subluxation.     Attestation:  Total time on the floor involved in the patient's care: 50 minutes. Total time spent in chart review, evaluation, reviewed sign outs, management: >50%

## 2023-01-24 NOTE — PROGRESS NOTES
01/24/23 0840   Appointment Info   Signing Clinician's Name / Credentials (OT) Amalia Carney, OTR/L   Living Environment   People in Home alone   Current Living Arrangements assisted living   Living Environment Comments has asssit with bathing, one meal a day, medications, orders groceries on line and does lite cooking an dshe does her own laundry   Self-Care   Usual Activity Tolerance good   Current Activity Tolerance moderate   Instrumental Activities of Daily Living (IADL)   IADL Comments has walk in shower with chair and bars, standard toilet with bars   General Information   Onset of Illness/Injury or Date of Surgery 02/20/23   Referring Physician Kelly Camp   Patient/Family Therapy Goal Statement (OT) I am going home today.  They promised!   Additional Occupational Profile Info/Pertinent History of Current Problem Pt fell athome due to weakness form UTI, pelvic abscess with colovesical fistula and polymyalgia rheumatica.  H/O DM, COPD, HTN   Existing Precautions/Restrictions fall   Cognitive Status Examination   Affect/Mental Status (Cognitive) WNL   Cognitive Status Comments [place]   Range of Motion Comprehensive   General Range of Motion no range of motion deficits identified   Strength Comprehensive (MMT)   General Manual Muscle Testing (MMT) Assessment no strength deficits identified   Bed Mobility   Comment (Bed Mobility) sba, sob   Transfers   Transfer Comments sba, sob   Activities of Daily Living   BADL Assessment/Intervention lower body dressing;toileting   Lower Body Dressing Assessment/Training   [place] (Lower Body Dressing) supervision   Toileting   [place] (Toileting) supervision   Clinical Impression   Criteria for Skilled Therapeutic Interventions Met (OT) Yes, treatment indicated   OT Diagnosis decreased ind with adls   OT Problem List-Impairments impacting ADL activity tolerance impaired   Assessment of Occupational Performance 1-3 Performance Deficits   Identified  Performance Deficits toileting, le dressing, transfers   Planned Therapy Interventions (OT) ADL retraining   Clinical Decision Making Complexity (OT) low complexity   Risk & Benefits of therapy have been explained evaluation/treatment results reviewed;care plan/treatment goals reviewed;risks/benefits reviewed;current/potential barriers reviewed;participants voiced agreement with care plan;participants included;patient   OT Total Evaluation Time   OT Eval, Low Complexity Minutes (62471) 20   OT Goals   Therapy Frequency (OT) One time eval and treatment   OT Predicted Duration/Target Date for Goal Attainment 01/24/23   OT Goals Lower Body Dressing;Toilet Transfer/Toileting   OT: Lower Body Dressing Modified independent;Goal Met;Completed   OT: Toilet Transfer/Toileting Modified independent;toilet transfer;cleaning and garment management;Goal Met;Completed   Interventions   Interventions Quick Adds Self-Care/Home Management   Self-Care/Home Management   Self-Care/Home Mgmt/ADL, Compensatory, Meal Prep Minutes (12763) 10   Symptoms Noted During/After Treatment (Meal Preparation/Planning Training) none   Treatment Detail/Skilled Intervention Pt SOB with minimal activity.  Reviewed PLB an denergy conservation techniques with her.  She was familiar with them but appreciated the reminders.  Also reviewed correct hand placement with transfers for safety.  Sfter one correction, she incorporated it into her practice and did well.  Pt declined PT eval and agreed with her .  Her mobility is mod i for walking, transfer,s and bed mob after this OT session.   OT Discharge Planning   OT Plan dc OT   OT Discharge Recommendation (DC Rec) (S)  home  (at assisted living as before)   OT Rationale for DC Rec pt has returned to her baseline function.  She declines home PT at this time and will contact her primary if she changes her mind at home   OT Brief overview of current status pt is mod I with basic adls and mobility   Total Session  Time   Timed Code Treatment Minutes 10   Total Session Time (sum of timed and untimed services) 30

## 2023-01-24 NOTE — PLAN OF CARE
Problem: Plan of Care - These are the overarching goals to be used throughout the patient stay.    Goal: Optimal Comfort and Wellbeing  Outcome: Progressing     Problem: Risk for Delirium  Goal: Optimal Coping  Outcome: Progressing  Goal: Improved Behavioral Control  Outcome: Progressing  Goal: Improved Attention and Thought Clarity  Outcome: Progressing  Goal: Improved Sleep  Outcome: Progressing     Problem: Pain Acute  Goal: Optimal Pain Control and Function  Outcome: Progressing     Problem: Pain Chronic (Persistent) (Comorbidity Management)  Goal: Acceptable Pain Control and Functional Ability  Outcome: Progressing     Goal Outcome Evaluation:  Patient is alert and oriented x4; CMS intact; able to sleep between cares; tolerates medications.  Diet: tolerating normal diet; adaquate fluid intake   Activity:ambulates with standby assist with walker and gate belt.   Vital signs:vital signs within normal limits on room air.   IV: IV saline locked in between antibiotic infusions.   Pain: denies pain.     Respiratory: shortness of breath with wheeze; has  treatment of nebulizer and inhaler; on room air.  : purewick in place due to incontinence  Labs: Potassium and Magnesium protocols; CMP, CBC with platelets.  Discharge plan: return to assisted living when medically stable with use of oral antibiotics; family to transport. TBD; per report from last RN; awaiting Infectious disease to sign off.

## 2023-01-24 NOTE — PLAN OF CARE
Problem: Pain Chronic (Persistent) (Comorbidity Management)  Goal: Acceptable Pain Control and Functional Ability  Outcome: Progressing  Intervention: Manage Persistent Pain  Recent Flowsheet Documentation  Taken 1/24/2023 0845 by Giulia Bach RN  Medication Review/Management: medications reviewed     Problem: UTI (Urinary Tract Infection)  Goal: Improved Infection Symptoms  Outcome: Progressing     Problem: Muscle Strength Impairment  Goal: Improved Muscle Strength  Outcome: Progressing     Problem: Plan of Care - These are the overarching goals to be used throughout the patient stay.    Goal: Absence of Hospital-Acquired Illness or Injury  Intervention: Identify and Manage Fall Risk  Recent Flowsheet Documentation  Taken 1/24/2023 0845 by Giulia Bach RN  Safety Promotion/Fall Prevention:    activity supervised    assistive device/personal items within reach    bed alarm on    fall prevention program maintained    increased rounding and observation    increase visualization of patient    lighting adjusted    mobility aid in reach    nonskid shoes/slippers when out of bed    patient and family education    room door open    room near nurse's station    room organization consistent    safety round/check completed    toileting scheduled    supervised activity  Intervention: Prevent and Manage VTE (Venous Thromboembolism) Risk  Recent Flowsheet Documentation  Taken 1/24/2023 0845 by Giulia Bach RN  VTE Prevention/Management: foot pump device off     Problem: Risk for Delirium  Goal: Improved Behavioral Control  Intervention: Minimize Safety Risk  Recent Flowsheet Documentation  Taken 1/24/2023 0845 by Giulia Bach RN  Enhanced Safety Measures:    bed alarm set    room near unit station     Problem: Pain Acute  Goal: Optimal Pain Control and Function  Intervention: Prevent or Manage Pain  Recent Flowsheet Documentation  Taken 1/24/2023 0845 by Giulia Bach RN  Medication Review/Management: medications  reviewed     Problem: Fall Injury Risk  Goal: Absence of Fall and Fall-Related Injury  Intervention: Identify and Manage Contributors  Recent Flowsheet Documentation  Taken 1/24/2023 0845 by Giulia Bach RN  Medication Review/Management: medications reviewed  Intervention: Promote Injury-Free Environment  Recent Flowsheet Documentation  Taken 1/24/2023 0845 by Giulia Bach RN  Safety Promotion/Fall Prevention:    activity supervised    assistive device/personal items within reach    bed alarm on    fall prevention program maintained    increased rounding and observation    increase visualization of patient    lighting adjusted    mobility aid in reach    nonskid shoes/slippers when out of bed    patient and family education    room door open    room near nurse's station    room organization consistent    safety round/check completed    toileting scheduled    supervised activity     Problem: COPD (Chronic Obstructive Pulmonary Disease) Comorbidity  Goal: Maintenance of COPD Symptom Control  Intervention: Maintain COPD-Symptom Control  Recent Flowsheet Documentation  Taken 1/24/2023 0845 by Giulia Bach RN  Medication Review/Management: medications reviewed     Problem: Hypertension Comorbidity  Goal: Blood Pressure in Desired Range  Intervention: Maintain Blood Pressure Management  Recent Flowsheet Documentation  Taken 1/24/2023 0845 by Giulia Bach RN  Medication Review/Management: medications reviewed   Goal Outcome Evaluation: Patient doing well today. Denies pain or discomfort. Receiving zosyn abx via IV. Awaiting ID visit to determine next steps. Had abdominal and transvaginal ultrasound. Tolerated procedure with no concerns.

## 2023-01-24 NOTE — PROGRESS NOTES
Pt is not appropriate for skilled PT services at this time due to OT evaluation.  Plan was discussed with OT.  Will D/C current PT orders.  Please reorder if status changes. Thank you.    1/24/2023 by Meir Banda, PT

## 2023-01-24 NOTE — PROGRESS NOTES
[facility] MEDICINE PROGRESS NOTE      Identification/Summary: 78 year old female on hospital day number 5 after being admitted  For sepsis due to pelvic abscess.  PMHx includes recurrent UTI, intra-abdominal abscess  Requiring drain placement that has since been removed.  She has been on zosyn since  Admission with improvement in the white count.      Problem list:    1.  Pelvic abscess/ uterine abscess:  Ultrasound done today confirms complex fluid   Collection 2.7x2.3x1.4 within endometrial cavity; gyn consult for clinical direction  2.  CV fistula: outpatient CRS  3.  Non-acute diverticulitis: on zosyn  4.  E coli bacteremia: 1/20/2023  5.  UTI: positive urine culture 1/5/2023 and 1/20/2023 e coli  With cipro/bactrim resistance;   On day number 5 iv antibiotics; pending ID abx plan (what oral and for how long)  6.  PMR: on prolonged prednisone taper (4 mg currently) and azathioprine  6.  Disposition: need clinical plan and antibiotic plan;       Kelly Camp MD  [facility] Medicine  [facility]  Phone: #[phone]        Interval History/Subjective: I want to go home;     Physical Exam/Objective:  Temp:  [97.4  F (36.3  C)-98.6  F (37  C)] 97.6  F (36.4  C)  Pulse:  [66-76] 68  Resp:  [16-18] 16  BP: (116-171)/(56-74) 171/74  SpO2:  [93 %-97 %] 95 %  Body mass index is 35.22 kg/m .    GENERAL:  Alert, appears comfortable, in no acute distress, appears stated age   HEAD:  Normocephalic, without obvious abnormality, atraumatic   EYES:  PERRL, conjunctiva/corneas clear, no scleral icterus, EOM's intact   NOSE: Nares normal, septum midline, mucosa normal, no drainage   THROAT: Lips, mucosa, and tongue normal; teeth and gums normal, mouth moist   NECK: Supple, symmetrical, trachea midline   BACK:   Symmetric, no curvature, ROM normal   LUNGS:   Clear to auscultation bilaterally, no rales, rhonchi, or wheezing, symmetric chest rise on inhalation,  respirations unlabored   CHEST WALL:  No tenderness or deformity   HEART:  Regular rate and rhythm, S1 and S2 normal, no murmur, rub, or gallop    ABDOMEN:   Soft, non-tender, bowel sounds active all four quadrants, no masses, no organomegaly, no rebound or guarding   EXTREMITIES: Extremities normal, atraumatic, no cyanosis or edema    SKIN: Dry to touch, no exanthems in the visualized areas   NEURO: Alert, oriented x3, moves all four extremities freely   PSYCH: Cooperative, behavior is appropriate      Data reviewed today: I personally reviewed all new medications, labs, imaging/diagnostics reports over the past 24 hours. Pertinent findings include:    Imaging:   Recent Results (from the past 24 hour(s))   US Pelvic Complete with Transvaginal    Narrative    EXAM: US PELVIC TRANSABDOMINAL AND TRANSVAGINAL  LOCATION: [facility]  DATE/TIME: 1/24/2023 2:17 PM    INDICATION: Prolonged history of sigmoid diverticulitis with pelvic abscess drainage. Immunocompromised due to long-standing prednisone use. Evaluate uterine collection.  COMPARISON: CT 01/20/2023 and multiple prior studies.  TECHNIQUE: Transabdominal scans were performed. Endovaginal ultrasound was performed to better visualize the adnexa.    FINDINGS: Study done by the technologist only.    UTERUS: 5.8 x 4.3 x 2.6 cm. Myometrium is normal.     ENDOMETRIUM: There is a complex 2.2 x 2.7 x 1.4 cm fluid collection with echogenic septations. This appears to be within the endometrial cavity itself rather than myometrial in location. Color overlay does not demonstrate hypervascularity within the   irregular soft tissue components within it.    RIGHT OVARY: 1.7 x 1 x 1 cm. Normal.    LEFT OVARY: 1.5 x 1.3 x 1.3 cm. Normal.    No significant free fluid.      Impression    IMPRESSION:  1.  Complex 2.7 x 2.3 x 1.4 cm fluid collection/abscess within the uterus. This seems to be within the endometrial cavity itself on the cine loops rather than  myometrial in location. It is certainly within the uterus itself. Does patient have vaginal   drainage on exam?   2.  If hysteroscopic treatment is planned for drainage, this should be readily apparent.   3.  If treatment planned would be altered based on location, a sonohysterogram could be performed if patient can tolerate this could consider an MRI.   4.  Ovaries are unremarkable.             Labs:  Most Recent 3 BMP's:Recent Labs   Lab Test 01/24/23  0841 01/24/23  0712 01/23/23  2044 01/23/23  1611 01/23/23  0927 01/22/23  2053 01/22/23  0901 01/22/23  0057 01/21/23  1225   NA  --  143  --   --   --   --  139  --  138   POTASSIUM  --  3.8  --   --  4.3  --  4.0  --  4.1   CHLORIDE  --  112*  --   --   --   --  109*  --  105   CO2  --  24  --   --   --   --  23  --  24   BUN  --  12  --   --   --   --  21  --  27   CR  --  0.98  --   --   --   --  0.97  --  1.23*   ANIONGAP  --  7  --   --   --   --  7  --  9   VICENTA  --  8.8  --   --   --   --  8.3*  --  7.9*   GLC 84 87 136*   < >  --    < > 83   < > 94  94    < > = values in this interval not displayed.       Medications:   Personally Reviewed.  Medications     - MEDICATION INSTRUCTIONS -         acetaminophen  500 mg Oral 4x Daily     azaTHIOprine  100 mg Oral Daily     carvedilol  12.5 mg Oral BID w/meals     FLUoxetine  40 mg Oral QAM     fluticasone-vilanterol  1 puff Inhalation Daily    And     umeclidinium  1 puff Inhalation Daily     furosemide  20 mg Oral Daily     gabapentin  900 mg Oral At Bedtime     montelukast  10 mg Oral At Bedtime     nystatin   Topical BID     pantoprazole  40 mg Oral QAM AC     piperacillin-tazobactam  3.375 g Intravenous Q8H     potassium chloride ER  20 mEq Oral Daily     predniSONE  4 mg Oral Daily     [Held by provider] rivaroxaban ANTICOAGULANT  20 mg Oral Daily with supper     sodium chloride (PF)  3 mL Intracatheter Q8H     vancomycin  1,250 mg Intravenous Q24H

## 2023-01-24 NOTE — PROGRESS NOTES
Pt. States discomfort from RLS and that she has been taking meds for years.  Reviewed PTA meds, none listed for RLS.  Mandeep hussein MD.    Received a phone call from nursing asking that I address the issue of restless leg syndrome for which she has been taking medications for the longest time.  PT admission medications were reviewed the only medication missing from the current orders is gabapentin.  Gabapentin at 900 mg every night has been renewed

## 2023-01-24 NOTE — PLAN OF CARE
Goal Outcome Evaluation:    Problem: Fall Injury Risk  Goal: Absence of Fall and Fall-Related Injury  Outcome: Progressing        VSS on room air.  Ambulates with SBA, gait belt and walker to bathroom.  Steady gait.  Denies pain this shift.  Tolerating regular diet.  Alert and oriented x4.  Bed alarm activated and use of call light reinforced for safety.

## 2023-01-24 NOTE — PROGRESS NOTES
GYN/Oncology Staff note:     I reviewed this patient's images and clinical course to date. We were curbsided from the ED regarding potential involvement of the abscess and the lateral aspect of the uterus. With the current images to date, I cannot tell the difference between an abscess and the uterus. Thus, we are unable to differentiate a normal versus abnormal uterus. A pelvic ultrasound would be a more affordable way to evaluate the gynecologic organs, however, if the patient's pain level would make the US intolerable, a pelvic MRI may help delineate the gynecologic structures. We'll follow peripherally. Please let us know if we can provide any additional assistance.     Eri Soares MD  Gynecologic Oncology  [facility]: [phone]

## 2023-01-25 ENCOUNTER — APPOINTMENT (OUTPATIENT)
Dept: MRI IMAGING | Facility: CLINIC | Age: 78
DRG: 872 | End: 2023-01-25
Attending: EMERGENCY MEDICINE
Payer: COMMERCIAL

## 2023-01-25 LAB
BACTERIA BLD CULT: NO GROWTH
CREAT SERPL-MCNC: 0.9 MG/DL (ref 0.6–1.1)
GFR SERPL CREATININE-BSD FRML MDRD: 65 ML/MIN/1.73M2
GLUCOSE BLDC GLUCOMTR-MCNC: 113 MG/DL (ref 70–99)
GLUCOSE BLDC GLUCOMTR-MCNC: 115 MG/DL (ref 70–99)
GLUCOSE BLDC GLUCOMTR-MCNC: 84 MG/DL (ref 70–99)
MAGNESIUM SERPL-MCNC: 1.6 MG/DL (ref 1.8–2.6)
POTASSIUM BLD-SCNC: 4 MMOL/L (ref 3.5–5)

## 2023-01-25 PROCEDURE — 99232 SBSQ HOSP IP/OBS MODERATE 35: CPT | Performed by: EMERGENCY MEDICINE

## 2023-01-25 PROCEDURE — 99233 SBSQ HOSP IP/OBS HIGH 50: CPT | Performed by: INTERNAL MEDICINE

## 2023-01-25 PROCEDURE — 72197 MRI PELVIS W/O & W/DYE: CPT

## 2023-01-25 PROCEDURE — 250N000013 HC RX MED GY IP 250 OP 250 PS 637: Performed by: HOSPITALIST

## 2023-01-25 PROCEDURE — 255N000002 HC RX 255 OP 636: Performed by: EMERGENCY MEDICINE

## 2023-01-25 PROCEDURE — 250N000011 HC RX IP 250 OP 636: Performed by: INTERNAL MEDICINE

## 2023-01-25 PROCEDURE — 250N000013 HC RX MED GY IP 250 OP 250 PS 637: Performed by: EMERGENCY MEDICINE

## 2023-01-25 PROCEDURE — 36415 COLL VENOUS BLD VENIPUNCTURE: CPT | Performed by: HOSPITALIST

## 2023-01-25 PROCEDURE — 82565 ASSAY OF CREATININE: CPT | Performed by: HOSPITALIST

## 2023-01-25 PROCEDURE — 83735 ASSAY OF MAGNESIUM: CPT | Performed by: HOSPITALIST

## 2023-01-25 PROCEDURE — 120N000001 HC R&B MED SURG/OB

## 2023-01-25 PROCEDURE — 84132 ASSAY OF SERUM POTASSIUM: CPT | Performed by: HOSPITALIST

## 2023-01-25 PROCEDURE — 250N000013 HC RX MED GY IP 250 OP 250 PS 637: Performed by: INTERNAL MEDICINE

## 2023-01-25 PROCEDURE — 250N000013 HC RX MED GY IP 250 OP 250 PS 637: Performed by: STUDENT IN AN ORGANIZED HEALTH CARE EDUCATION/TRAINING PROGRAM

## 2023-01-25 PROCEDURE — A9585 GADOBUTROL INJECTION: HCPCS | Performed by: EMERGENCY MEDICINE

## 2023-01-25 PROCEDURE — 250N000012 HC RX MED GY IP 250 OP 636 PS 637: Performed by: HOSPITALIST

## 2023-01-25 RX ORDER — METRONIDAZOLE 500 MG/1
500 TABLET ORAL 3 TIMES DAILY
Status: DISCONTINUED | OUTPATIENT
Start: 2023-01-25 | End: 2023-01-26 | Stop reason: HOSPADM

## 2023-01-25 RX ORDER — CEFTRIAXONE 2 G/1
2 INJECTION, POWDER, FOR SOLUTION INTRAMUSCULAR; INTRAVENOUS EVERY 24 HOURS
Qty: 560 ML | Refills: 0
Start: 2023-01-26 | End: 2023-02-01

## 2023-01-25 RX ORDER — GADOBUTROL 604.72 MG/ML
9 INJECTION INTRAVENOUS ONCE
Status: COMPLETED | OUTPATIENT
Start: 2023-01-25 | End: 2023-01-25

## 2023-01-25 RX ORDER — LIDOCAINE 40 MG/G
CREAM TOPICAL
Status: DISCONTINUED | OUTPATIENT
Start: 2023-01-26 | End: 2023-01-26 | Stop reason: HOSPADM

## 2023-01-25 RX ADMIN — GADOBUTROL 9 ML: 604.72 INJECTION INTRAVENOUS at 20:08

## 2023-01-25 RX ADMIN — METRONIDAZOLE 500 MG: 500 TABLET ORAL at 10:13

## 2023-01-25 RX ADMIN — Medication 1 MG: at 21:57

## 2023-01-25 RX ADMIN — ACETAMINOPHEN 500 MG: 500 TABLET ORAL at 20:43

## 2023-01-25 RX ADMIN — ALBUTEROL SULFATE 2 PUFF: 90 AEROSOL, METERED RESPIRATORY (INHALATION) at 20:44

## 2023-01-25 RX ADMIN — ACETAMINOPHEN 500 MG: 500 TABLET ORAL at 13:19

## 2023-01-25 RX ADMIN — CARVEDILOL 12.5 MG: 6.25 TABLET, FILM COATED ORAL at 10:13

## 2023-01-25 RX ADMIN — UMECLIDINIUM 1 PUFF: 62.5 AEROSOL, POWDER ORAL at 10:12

## 2023-01-25 RX ADMIN — ALBUTEROL SULFATE 2 PUFF: 90 AEROSOL, METERED RESPIRATORY (INHALATION) at 10:11

## 2023-01-25 RX ADMIN — PANTOPRAZOLE SODIUM 40 MG: 20 TABLET, DELAYED RELEASE ORAL at 06:42

## 2023-01-25 RX ADMIN — GABAPENTIN 900 MG: 300 CAPSULE ORAL at 20:43

## 2023-01-25 RX ADMIN — CARVEDILOL 12.5 MG: 6.25 TABLET, FILM COATED ORAL at 17:37

## 2023-01-25 RX ADMIN — METRONIDAZOLE 500 MG: 500 TABLET ORAL at 20:43

## 2023-01-25 RX ADMIN — MAGNESIUM 64 MG (MAGNESIUM CHLORIDE) TABLET,DELAYED RELEASE 535 MG: at 10:14

## 2023-01-25 RX ADMIN — NYSTATIN: 100000 CREAM TOPICAL at 10:14

## 2023-01-25 RX ADMIN — ACETAMINOPHEN 500 MG: 500 TABLET ORAL at 17:01

## 2023-01-25 RX ADMIN — MONTELUKAST 10 MG: 10 TABLET, FILM COATED ORAL at 21:57

## 2023-01-25 RX ADMIN — FLUOXETINE 40 MG: 20 CAPSULE ORAL at 10:13

## 2023-01-25 RX ADMIN — METRONIDAZOLE 500 MG: 500 TABLET ORAL at 15:03

## 2023-01-25 RX ADMIN — FLUTICASONE FUROATE AND VILANTEROL TRIFENATATE 1 PUFF: 200; 25 POWDER RESPIRATORY (INHALATION) at 10:12

## 2023-01-25 RX ADMIN — AZATHIOPRINE 100 MG: 50 TABLET ORAL at 10:12

## 2023-01-25 RX ADMIN — FUROSEMIDE 20 MG: 20 TABLET ORAL at 10:13

## 2023-01-25 RX ADMIN — PREDNISONE 4 MG: 2.5 TABLET ORAL at 10:13

## 2023-01-25 RX ADMIN — ACETAMINOPHEN 500 MG: 500 TABLET ORAL at 10:13

## 2023-01-25 RX ADMIN — POTASSIUM CHLORIDE 20 MEQ: 1500 TABLET, EXTENDED RELEASE ORAL at 10:13

## 2023-01-25 RX ADMIN — CEFTRIAXONE SODIUM 2 G: 2 INJECTION, POWDER, FOR SOLUTION INTRAMUSCULAR; INTRAVENOUS at 20:43

## 2023-01-25 ASSESSMENT — ACTIVITIES OF DAILY LIVING (ADL)
ADLS_ACUITY_SCORE: 43
ADLS_ACUITY_SCORE: 37
ADLS_ACUITY_SCORE: 37
ADLS_ACUITY_SCORE: 38
ADLS_ACUITY_SCORE: 37
ADLS_ACUITY_SCORE: 43
ADLS_ACUITY_SCORE: 37
ADLS_ACUITY_SCORE: 43

## 2023-01-25 NOTE — PLAN OF CARE
Problem: Plan of Care - These are the overarching goals to be used throughout the patient stay.    Goal: Optimal Comfort and Wellbeing  Outcome: Progressing   Goal Outcome Evaluation:  Patient is alert and oriented X 4. Denied pain. Voided through the pure wick. IV saline locked. VSS. Bed alarms activated for safety. Call light within reach.

## 2023-01-25 NOTE — PROGRESS NOTES
"[facility]    Infectious Disease Progress Note    Date of Service : 01/25/2023     Assessment & Plan   Zakiya Christian is a 78 year old female who was admitted on 1/20/2023.     ASSESSMENT:  Pelvic abscess- active issue   E coli bacteremia 1/20/2023  Non acute diverticulitis  Known CV fistula  White count 16- improved  Admit was NOT for these problems    Discussion/MDM/note by Dr Carpenter  On maximal abx--probably treating disease that has been there for months however.   Unclear if they will impact things much, need to talk to CRS or GS about what they think we ought to do treatment wise.  Came in on augmentin for a recent \"UTI\" which is present de peng if she has a CV fistula (???)--note E coli and/or KP in urine the last 7 times checked over about 14 months.  The recent data about this \"UTI\" is from [facility] and not available...     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     RECOMMENDATIONS:    1. Antibiotics zosyn--> Ceftriaxone and Metronidazole on 1/24/2023.  IV Ceftriaxone and PO Metronidazole on discharge x 21 days with close Gyn follow up and imaging per Gyn Onc  2. Follow culture results   3. Stopped vancomycin on 1/24/2023  4. Focus/de-escalate antibiotics based on final culture results and ?pelvic MRI-- Gyn Onc following  5. Monitor CBC, CMP  6. Updated patient, nurse and discussed with hospitalist  7. Patient will be getting PICC. Prefers to stay at her [facility]. Care Coordinator to arrange for IV OPAT at [facility]. Orders placed.  8. Appreciate input from hospitalist.       Teri Hernandez MD  [facility]  [phone]      Interval History   Resting  No pain  Updated patient re: US results and IV abx plan    Physical Exam   Temp: 97.2  F (36.2  C) Temp src: Oral BP: 112/54 Pulse: 62   Resp: 17 SpO2: 97 % O2 Device: None (Room air)    Vitals:    01/21/23 0459 01/22/23 0552 01/23/23 0430 "   Weight: 90.3 kg (199 lb) 90.3 kg (199 lb) 90.2 kg (198 lb 12.8 oz)     Vital Signs with Ranges  Temp:  [97.2  F (36.2  C)-97.7  F (36.5  C)] 97.2  F (36.2  C)  Pulse:  [59-68] 62  Resp:  [16-17] 17  BP: (112-171)/(54-74) 112/54  SpO2:  [95 %-97 %] 97 %    Constitutional: resting, sitting in chair at bedside  Lungs: normal breathing pattern, no crackles or wheezing  Cardiovascular: no orthopena  Abdomen: non distended  Skin: warm  Neuro: deconditioned      Medications     - MEDICATION INSTRUCTIONS -         acetaminophen  500 mg Oral 4x Daily     azaTHIOprine  100 mg Oral Daily     carvedilol  12.5 mg Oral BID w/meals     cefTRIAXone  2 g Intravenous Q24H     FLUoxetine  40 mg Oral QAM     fluticasone-vilanterol  1 puff Inhalation Daily    And     umeclidinium  1 puff Inhalation Daily     furosemide  20 mg Oral Daily     gabapentin  900 mg Oral At Bedtime     metroNIDAZOLE  500 mg Oral BID     montelukast  10 mg Oral At Bedtime     nystatin   Topical BID     pantoprazole  40 mg Oral QAM AC     potassium chloride ER  20 mEq Oral Daily     predniSONE  4 mg Oral Daily     [Held by provider] rivaroxaban ANTICOAGULANT  20 mg Oral Daily with supper     sodium chloride (PF)  3 mL Intracatheter Q8H       Data   All microbiology laboratory data reviewed.  Recent Labs   Lab Test 01/24/23  0712 01/22/23  0901 01/21/23  1225   WBC 5.7 12.0* 14.7*   HGB 8.5* 8.5* 8.9*   HCT 27.3* 26.8* 28.8*   MCV 94 94 95    150 138*     Recent Labs   Lab Test 01/24/23  0712 01/22/23  0901 01/21/23  1225   CR 0.98 0.97 1.23*     Recent Labs   Lab Test 01/11/23  0540   SED 61*     No lab results found.    Invalid input(s):     MICROBIOLOGY:    Reviewed    7-Day Micro Results     Collected Updated Procedure Result Status      01/22/2023 0901 01/24/2023 1331 Blood Culture Hand, Left [[id number]]    Blood from Hand, Left    Preliminary result Component Value   Culture No growth after 2 days  [P]                01/22/2023 0901 01/24/2023  1331 Blood Culture Arm, Right [[id number]]    Blood from Arm, Right    Preliminary result Component Value   Culture No growth after 2 days  [P]                01/20/2023 2122 01/22/2023 0655 Urine Culture [[id number]]    (Abnormal)   Urine, Clean Catch    Final result Component Value   Culture >100,000 CFU/mL Escherichia coli        Susceptibility      Escherichia coli      STACY      Ampicillin 4 ug/mL Susceptible      Ampicillin/ Sulbactam <=2 ug/mL Susceptible      Cefazolin <=4 ug/mL Susceptible  [1]       Cefepime <=1 ug/mL Susceptible      Cefoxitin <=4 ug/mL Susceptible      Ceftazidime <=1 ug/mL Susceptible      Ceftriaxone <=1 ug/mL Susceptible      Ciprofloxacin >=4 ug/mL Resistant     Gentamicin <=1 ug/mL Susceptible      Levofloxacin >=8 ug/mL Resistant     Nitrofurantoin <=16 ug/mL Susceptible      Piperacillin/Tazobactam <=4 ug/mL Susceptible      Tobramycin <=1 ug/mL Susceptible      Trimethoprim/Sulfamethoxazole >16/304 ug/mL Resistant                  [1]  Cefazolin STACY breakpoints are for the treatment of uncomplicated urinary tract infections. For the treatment of systemic infections, please contact the laboratory for additional testing.                 01/20/2023 1926 01/24/2023 2216 Blood Culture Peripheral Blood [[id number]]   Peripheral Blood    Preliminary result Component Value   Culture No growth after 4 days  [P]                01/20/2023 1524 01/20/2023 1616 Symptomatic Influenza A/B & SARS-CoV2 (COVID-19) Virus PCR Multiplex Nasopharyngeal [[id number]]    Swab from Nasopharyngeal    Final result Component Value   Influenza A PCR Negative   Influenza B PCR Negative   RSV PCR Negative   SARS CoV2 PCR Negative   NEGATIVE: SARS-CoV-2 (COVID-19) RNA not detected, presumed negative.            01/20/2023 1521 01/23/2023 0733 Blood Culture Peripheral Blood [[id number]]    (Abnormal)   Peripheral Blood    Final result Component Value   Culture Positive on the 1st day of incubation     Escherichia coli    1 of 2 bottles        Susceptibility      Escherichia coli      STACY      Ampicillin <=2 ug/mL Susceptible      Ampicillin/ Sulbactam <=2 ug/mL Susceptible      Cefepime <=1 ug/mL Susceptible      Ceftazidime <=1 ug/mL Susceptible      Ceftriaxone <=1 ug/mL Susceptible      Ciprofloxacin >=4 ug/mL Resistant     Gentamicin <=1 ug/mL Susceptible      Levofloxacin >=8 ug/mL Resistant     Meropenem <=0.25 ug/mL Susceptible      Piperacillin/Tazobactam <=4 ug/mL Susceptible      Tobramycin <=1 ug/mL Susceptible      Trimethoprim/Sulfamethoxazole >16/304 ug/mL Resistant                          01/20/2023 1521 01/21/2023 1346 Verigene GN Panel [[id number]]    (Abnormal)   Peripheral Blood    Final result Component Value   Acinetobacter species Not Detected   Citrobacter species Not Detected   Enterobacter species Not Detected   Proteus species Not Detected   Escherichia coli Detected   Positive for Escherichia coli by [facility] multiplex nucleic acid test. Final identification and antimicrobial susceptibility testing will be verified by standard methods. Verigene test will not distinguish E. coli from Shigella species including Shigella dysenteriae, Shigella flexneri, Shigella boydii, and Shigella sonnei. Specimens containing Shigella species or E. coli will be reported as positive for E. coli.   Klebsiella pneumoniae Not Detected   Klebsiella oxytoca Not Detected   Pseudomonas aeruginosa Not Detected   CTX-M Not Detected   KPC Not Detected   NDM Not Detected   VIM Not Detected   IMP Not Detected   OXA Not Detected                   RADIOLOGY:    Reviewed  CT Abdomen Pelvis w Contrast    Result Date: 1/20/2023  EXAM: CT ABDOMEN PELVIS W CONTRAST LOCATION: [facility] DATE/TIME: 1/20/2023 4:51 PM INDICATION: Weakness, hypotension, fall with acute on chronic abdominal pain with previously known fistula and abscess COMPARISON: CTA AP 12/09/2022 and older studies. TECHNIQUE: CT  scan of the abdomen and pelvis was performed following injection of IV contrast. Multiplanar reformats were obtained. Dose reduction techniques were used. CONTRAST: 75ml Isovue 370 FINDINGS: LOWER CHEST: Please see chest CTA report HEPATOBILIARY: Prior cholecystectomy. PANCREAS: Mild diffuse atrophy. SPLEEN: Normal. ADRENAL GLANDS: Normal. KIDNEYS/BLADDER: No change in the nonobstructing small calculus in the lower pole calyx on the right. 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 No change in the curvilinear elliptical density centrally within the fat, new since the April 2022 but unchanged from the recent study ( AML embolization 10/5/2022). There are also bilateral renal cysts which need no follow-up. Air is seen in the bladder. BOWEL: Redundant colon especially the sigmoid with extensive distal colonic diverticulosis. Tethering of the small bowel wall and the left adnexa to the mid sigmoid colon again noted. No acute inflammatory changes. No bowel obstruction. Incidental descending duodenal diverticulum. LYMPH NODES: Normal. VASCULATURE: Moderate arterial calcifications. No aneurysm. PELVIC ORGANS: There has been interval decrease in the rim-enhancing fluid collection in the pelvis which is less well-defined now measures approximately 2 cm (central cavity  was 3.5 cm before). On the current study this appears intramural within the right uterine fundus MUSCULOSKELETAL: Subacute right lateral seventh 8 9 rib fractures are seen with callus formation no acute fractures. Moderate facet arthropathy in the lower lumbar spine.     IMPRESSION: 1.  No evidence of acute traumatic injury in the abdomen or pelvis. 2.  Interval decrease in the right side pelvic abscess as noted above which on the current study appears to be myometrial in nature involving the right side of the uterine fundus. 3.  Air in the bladder from a presumed a colovesical fistula given her prior  history. If needed this can be confirmed with a standard filling cystogram (not a CT cystogram). 4.  Extensive sigmoid diverticulitis without evidence of acute inflammation. Tethering of the adjacent small bowel and the left adnexa to the mid sigmoid again noted. 5.  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 6.  Other noncritical findings as noted above.    Cervical spine CT w/o contrast    Result Date: 1/20/2023  EXAM: CT HEAD W/O CONTRAST, CT CERVICAL SPINE W/O CONTRAST LOCATION: [facility] DATE/TIME: 1/20/2023 4:47 PM INDICATION: Head and neck injury, on thinners, pain in lower cervical upper thoracic COMPARISON: 12/01/2022 CT head, 09/18/2022 CT cervical spine TECHNIQUE: 1) Routine CT Head without IV contrast. Multiplanar reformats. Dose reduction techniques were used. 2) Routine CT Cervical Spine without IV contrast. Multiplanar reformats. Dose reduction techniques were used. FINDINGS: HEAD CT: INTRACRANIAL CONTENTS: No intracranial hemorrhage, extraaxial collection, or mass effect.  No CT evidence of acute infarct. Moderate presumed chronic small vessel ischemic changes. Moderate generalized volume loss. No hydrocephalus. VISUALIZED ORBITS/SINUSES/MASTOIDS: No intraorbital abnormality. No paranasal sinus mucosal disease. No middle ear or mastoid effusion. BONES/SOFT TISSUES: No acute abnormality. CERVICAL SPINE CT: VERTEBRA: Normal vertebral body heights. Slight right cervical curve. No fracture or posttraumatic subluxation. CANAL/FORAMINA: Mild degenerative changes without significant canal narrowing at any level. Multilevel mild neural foraminal narrowing. PARASPINAL: Prominent medial deviation of ICAs within the neck. 7 mm nodular opacity left lung apex; probably present previously however it was incompletely visualized. If indicated, CT chest may be performed.     IMPRESSION: HEAD CT: 1.  No acute intracranial process. CERVICAL SPINE CT: 1.  No CT evidence for  acute fracture or post traumatic subluxation.    CT Chest Pulmonary Embolism w Contrast    Result Date: 1/20/2023  EXAM: CT CHEST PULMONARY EMBOLISM W CONTRAST LOCATION: [facility] DATE/TIME: 1/20/2023 4:50 PM INDICATION: Weakness, hypoxia, low blood pressure in the field COMPARISON: 4/5/2022 and 12/9/2022 TECHNIQUE: CT chest pulmonary angiogram during arterial phase injection of IV contrast. Multiplanar reformats and MIP reconstructions were performed. Dose reduction techniques were used. CONTRAST: 75ml Isovue 370 FINDINGS: ANGIOGRAM CHEST: Pulmonary arteries are normal caliber and negative for pulmonary emboli. Thoracic aorta demonstrates significant atherosclerotic plaque but is negative for dissection. No CT evidence of right heart strain. LUNGS AND PLEURA: Mild motion artifact. Scattered tiny peripheral pulmonary nodules unchanged with largest left upper lobe on image 91 measuring 4 x 3 mm. Stable linear scarring right middle lobe. Some flattening of trachea and mainstem bronchi consistent with tracheobronchomalacia. No new focal infiltrate. MEDIASTINUM/AXILLAE: No lymphadenopathy. CORONARY ARTERY CALCIFICATION: Severe. UPPER ABDOMEN: Normal. MUSCULOSKELETAL: Subacute fractures of right ribs 6-9 now have surrounding callus. There are healed fractures involving left ribs 5 and 6 which were not seen on previous exam.     IMPRESSION: 1.  No pulmonary emboli identified. No acute thoracic abnormality evident. 2.  Tracheobronchomalacia. 3.  Prior bilateral rib fractures. No acute fracture identified.    Head CT w/o contrast    Result Date: 1/20/2023  EXAM: CT HEAD W/O CONTRAST, CT CERVICAL SPINE W/O CONTRAST LOCATION: [facility] DATE/TIME: 1/20/2023 4:47 PM INDICATION: Head and neck injury, on thinners, pain in lower cervical upper thoracic COMPARISON: 12/01/2022 CT head, 09/18/2022 CT cervical spine TECHNIQUE: 1) Routine CT Head without IV contrast. Multiplanar  reformats. Dose reduction techniques were used. 2) Routine CT Cervical Spine without IV contrast. Multiplanar reformats. Dose reduction techniques were used. FINDINGS: HEAD CT: INTRACRANIAL CONTENTS: No intracranial hemorrhage, extraaxial collection, or mass effect.  No CT evidence of acute infarct. Moderate presumed chronic small vessel ischemic changes. Moderate generalized volume loss. No hydrocephalus. VISUALIZED ORBITS/SINUSES/MASTOIDS: No intraorbital abnormality. No paranasal sinus mucosal disease. No middle ear or mastoid effusion. BONES/SOFT TISSUES: No acute abnormality. CERVICAL SPINE CT: VERTEBRA: Normal vertebral body heights. Slight right cervical curve. No fracture or posttraumatic subluxation. CANAL/FORAMINA: Mild degenerative changes without significant canal narrowing at any level. Multilevel mild neural foraminal narrowing. PARASPINAL: Prominent medial deviation of ICAs within the neck. 7 mm nodular opacity left lung apex; probably present previously however it was incompletely visualized. If indicated, CT chest may be performed.     IMPRESSION: HEAD CT: 1.  No acute intracranial process. CERVICAL SPINE CT: 1.  No CT evidence for acute fracture or post traumatic subluxation.     Attestation:  Total time on the floor involved in the patient's care: 50 minutes. Total time spent in chart review, evaluation, reviewed sign outs, management: >50%

## 2023-01-25 NOTE — PLAN OF CARE
Problem: Pain Chronic (Persistent) (Comorbidity Management)  Goal: Acceptable Pain Control and Functional Ability  Outcome: Progressing  Intervention: Manage Persistent Pain  Recent Flowsheet Documentation  Taken 1/25/2023 0900 by Giulia Bach RN  Medication Review/Management: medications reviewed     Problem: UTI (Urinary Tract Infection)  Goal: Improved Infection Symptoms  Outcome: Progressing     Problem: Muscle Strength Impairment  Goal: Improved Muscle Strength  Outcome: Progressing   Goal Outcome Evaluation: Patient doing well today. No c/o pain or discomfort. Denies pain or discomfort. Wants to leave today, but awaiting ID decision.

## 2023-01-25 NOTE — PROGRESS NOTES
[facility] MEDICINE PROGRESS NOTE      Identification/Summary: 78 year old female on hospital day number 6 after being admitted  For sepsis due to pelvic abscess.      PMHx includes recurrent UTI, intra-abdominal abscess requiring drain placement that has since been removed.  She has been on zosyn since admission with improvement in the white count.      Problem list:    1.  Pelvic abscess/ uterine abscess:  Case discussed with gyn/on today by me   Via phone; OUTPATIENT follow up next week      2.  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  Non-acute diverticulitis: on zosyn  4.  E coli bacteremia: blood culture finalized;  5.  UTI: cultures finalized from 1/5 and 1/20; e coli; resistant to cipro/levaquin/bactrim   Need antibiotic plan  6.  Disposition: page out to ID for discussion regarding antibiotic plan      Kelly Camp MD  [facility]  [facility]  Phone: #[phone]        Interval History/Subjective:  Pt is angry for the second day in a row; she feels normal   And wants to go home    Physical Exam/Objective:  Temp:  [97.2  F (36.2  C)-97.9  F (36.6  C)] 97.9  F (36.6  C)  Pulse:  [59-68] 68  Resp:  [16-18] 18  BP: (112-160)/(54-70) 150/67  SpO2:  [93 %-97 %] 93 %  Body mass index is 35.22 kg/m .    GENERAL:  Alert, appears comfortable, in no acute distress, appears stated age   HEAD:  Normocephalic, without obvious abnormality, atraumatic   EYES:  PERRL, conjunctiva/corneas clear, no scleral icterus, EOM's intact   NOSE: Nares normal, septum midline, mucosa normal, no drainage   THROAT: Lips, mucosa, and tongue normal; teeth and gums normal, mouth moist   NECK: Supple, symmetrical, trachea midline   BACK:   Symmetric, no curvature, ROM normal   LUNGS:   Clear to auscultation bilaterally, no rales, rhonchi, or wheezing, symmetric chest rise on inhalation, respirations unlabored   CHEST WALL:  No tenderness or  deformity   HEART:  Regular rate and rhythm, S1 and S2 normal, no murmur, rub, or gallop    ABDOMEN:   Soft, non-tender, bowel sounds active all four quadrants, no masses, no organomegaly, no rebound or guarding   EXTREMITIES: Extremities normal, atraumatic, no cyanosis or edema    SKIN: Dry to touch, no exanthems in the visualized areas   NEURO: Alert, oriented x3, moves all four extremities freely   PSYCH: Cooperative, behavior is appropriate      Data reviewed today: I personally reviewed all new medications, labs, imaging/diagnostics reports over the past 24 hours. Pertinent findings include:    Imaging:   No results found for this or any previous visit (from the past 24 hour(s)).    Labs:  Most Recent 3 BMP's:  Recent Labs   Lab Test 01/25/23  0848 01/25/23  0714 01/24/23  2219 01/24/23  1706 01/24/23  0841 01/24/23  0712 01/23/23  1611 01/23/23  0927 01/22/23  2053 01/22/23  0901 01/22/23  0057 01/21/23  1225   NA  --   --   --   --   --  143  --   --   --  139  --  138   POTASSIUM  --  4.0  --   --   --  3.8  --  4.3  --  4.0  --  4.1   CHLORIDE  --   --   --   --   --  112*  --   --   --  109*  --  105   CO2  --   --   --   --   --  24  --   --   --  23  --  24   BUN  --   --   --   --   --  12  --   --   --  21  --  27   CR  --  0.90  --   --   --  0.98  --   --   --  0.97  --  1.23*   ANIONGAP  --   --   --   --   --  7  --   --   --  7  --  9   VICENTA  --   --   --   --   --  8.8  --   --   --  8.3*  --  7.9*   GLC 84  --  115* 122*   < > 87   < >  --    < > 83   < > 94  94    < > = values in this interval not displayed.       Medications:   Personally Reviewed.  Medications     - MEDICATION INSTRUCTIONS -         acetaminophen  500 mg Oral 4x Daily     azaTHIOprine  100 mg Oral Daily     carvedilol  12.5 mg Oral BID w/meals     cefTRIAXone  2 g Intravenous Q24H     FLUoxetine  40 mg Oral QAM     fluticasone-vilanterol  1 puff Inhalation Daily    And     umeclidinium  1 puff Inhalation Daily     furosemide   20 mg Oral Daily     gabapentin  900 mg Oral At Bedtime     magnesium chloride  535 mg Oral Daily     metroNIDAZOLE  500 mg Oral TID     montelukast  10 mg Oral At Bedtime     nystatin   Topical BID     pantoprazole  40 mg Oral QAM AC     potassium chloride ER  20 mEq Oral Daily     predniSONE  4 mg Oral Daily     [Held by provider] rivaroxaban ANTICOAGULANT  20 mg Oral Daily with supper     sodium chloride (PF)  3 mL Intracatheter Q8H

## 2023-01-25 NOTE — PROGRESS NOTES
Care Management Follow Up    Length of Stay (days): 5    Expected Discharge Date: 01/26/2023     Concerns to be Addressed: medical progression      Patient plan of care discussed at interdisciplinary rounds: Yes    Anticipated Discharge Disposition: back to [facility] vs TCU     Anticipated Discharge Services:  Possible IV abx  Anticipated Discharge DME:  Per treatment team     Patient/family educated on Medicare website which has current facility and service quality ratings:  yes  Education Provided on the Discharge Plan:  yes  Patient/Family in Agreement with the Plan:  yes    Referrals Placed by CM/SW:  Post acute care facilities   Private pay costs discussed: Not applicable    Additional Information:    8:53 AM  PETER reviewed chart.  Pt lives at [facility] in [facility] (RN: Abigail [phone]).  Final ID recommendations are pending.  PETER met with Pt to discuss discharge plans.  Pt is in agreement with going to TCU if needing IV abx upon discharge.  PETER left voice mail for TCU admissions to put Pt on their radar as a possibility.  PETER updated Abigail RYAN at Pt's facility regarding discharge plans and pending ID recommendations.     ALESSANDRA Singer

## 2023-01-26 VITALS
OXYGEN SATURATION: 94 % | TEMPERATURE: 97.6 F | RESPIRATION RATE: 16 BRPM | BODY MASS INDEX: 35.22 KG/M2 | HEART RATE: 71 BPM | HEIGHT: 63 IN | SYSTOLIC BLOOD PRESSURE: 170 MMHG | DIASTOLIC BLOOD PRESSURE: 75 MMHG | WEIGHT: 198.8 LBS

## 2023-01-26 PROBLEM — F33.42 RECURRENT MAJOR DEPRESSION IN FULL REMISSION (H): Status: RESOLVED | Noted: 2022-04-24 | Resolved: 2023-01-26

## 2023-01-26 LAB
CREAT SERPL-MCNC: 0.98 MG/DL (ref 0.6–1.1)
GFR SERPL CREATININE-BSD FRML MDRD: 58 ML/MIN/1.73M2
GLUCOSE BLDC GLUCOMTR-MCNC: 103 MG/DL (ref 70–99)
MAGNESIUM SERPL-MCNC: 1.5 MG/DL (ref 1.8–2.6)
POTASSIUM BLD-SCNC: 4.1 MMOL/L (ref 3.5–5)

## 2023-01-26 PROCEDURE — 99207 PR NO CHARGE LOS: CPT | Performed by: INTERNAL MEDICINE

## 2023-01-26 PROCEDURE — 82565 ASSAY OF CREATININE: CPT | Performed by: HOSPITALIST

## 2023-01-26 PROCEDURE — 250N000013 HC RX MED GY IP 250 OP 250 PS 637: Performed by: STUDENT IN AN ORGANIZED HEALTH CARE EDUCATION/TRAINING PROGRAM

## 2023-01-26 PROCEDURE — 250N000012 HC RX MED GY IP 250 OP 636 PS 637: Performed by: HOSPITALIST

## 2023-01-26 PROCEDURE — 250N000011 HC RX IP 250 OP 636: Performed by: EMERGENCY MEDICINE

## 2023-01-26 PROCEDURE — 84132 ASSAY OF SERUM POTASSIUM: CPT | Performed by: EMERGENCY MEDICINE

## 2023-01-26 PROCEDURE — 272N000450 HC KIT 4FR POWER PICC SINGLE LUMEN

## 2023-01-26 PROCEDURE — 250N000013 HC RX MED GY IP 250 OP 250 PS 637: Performed by: EMERGENCY MEDICINE

## 2023-01-26 PROCEDURE — 999N000206 HC STATISTICAL VASC ACCESS NURSE TIME, 61+ MINUTES

## 2023-01-26 PROCEDURE — 99239 HOSP IP/OBS DSCHRG MGMT >30: CPT | Performed by: EMERGENCY MEDICINE

## 2023-01-26 PROCEDURE — 250N000013 HC RX MED GY IP 250 OP 250 PS 637: Performed by: INTERNAL MEDICINE

## 2023-01-26 PROCEDURE — 250N000013 HC RX MED GY IP 250 OP 250 PS 637: Performed by: HOSPITALIST

## 2023-01-26 PROCEDURE — 36415 COLL VENOUS BLD VENIPUNCTURE: CPT | Performed by: HOSPITALIST

## 2023-01-26 PROCEDURE — 36569 INSJ PICC 5 YR+ W/O IMAGING: CPT

## 2023-01-26 PROCEDURE — 250N000009 HC RX 250: Performed by: INTERNAL MEDICINE

## 2023-01-26 PROCEDURE — 83735 ASSAY OF MAGNESIUM: CPT | Performed by: EMERGENCY MEDICINE

## 2023-01-26 RX ORDER — METRONIDAZOLE 500 MG/1
500 TABLET ORAL 3 TIMES DAILY
Qty: 42 TABLET | Refills: 0 | DISCHARGE
Start: 2023-01-26 | End: 2023-02-01

## 2023-01-26 RX ORDER — MAGNESIUM SULFATE 4 G/50ML
4 INJECTION INTRAVENOUS ONCE
Status: COMPLETED | OUTPATIENT
Start: 2023-01-26 | End: 2023-01-26

## 2023-01-26 RX ADMIN — UMECLIDINIUM 1 PUFF: 62.5 AEROSOL, POWDER ORAL at 09:59

## 2023-01-26 RX ADMIN — FUROSEMIDE 20 MG: 20 TABLET ORAL at 10:00

## 2023-01-26 RX ADMIN — PANTOPRAZOLE SODIUM 40 MG: 20 TABLET, DELAYED RELEASE ORAL at 06:46

## 2023-01-26 RX ADMIN — AZATHIOPRINE 100 MG: 50 TABLET ORAL at 09:59

## 2023-01-26 RX ADMIN — ACETAMINOPHEN 500 MG: 500 TABLET ORAL at 09:59

## 2023-01-26 RX ADMIN — FLUOXETINE 40 MG: 20 CAPSULE ORAL at 10:00

## 2023-01-26 RX ADMIN — METRONIDAZOLE 500 MG: 500 TABLET ORAL at 13:54

## 2023-01-26 RX ADMIN — CARVEDILOL 12.5 MG: 6.25 TABLET, FILM COATED ORAL at 10:00

## 2023-01-26 RX ADMIN — POTASSIUM CHLORIDE 20 MEQ: 1500 TABLET, EXTENDED RELEASE ORAL at 10:00

## 2023-01-26 RX ADMIN — PREDNISONE 4 MG: 2.5 TABLET ORAL at 10:00

## 2023-01-26 RX ADMIN — FLUTICASONE FUROATE AND VILANTEROL TRIFENATATE 1 PUFF: 200; 25 POWDER RESPIRATORY (INHALATION) at 09:59

## 2023-01-26 RX ADMIN — ACETAMINOPHEN 500 MG: 500 TABLET ORAL at 15:31

## 2023-01-26 RX ADMIN — LIDOCAINE HYDROCHLORIDE 4 ML: 10 INJECTION, SOLUTION EPIDURAL; INFILTRATION; INTRACAUDAL; PERINEURAL at 08:50

## 2023-01-26 RX ADMIN — ACETAMINOPHEN 500 MG: 500 TABLET ORAL at 11:23

## 2023-01-26 RX ADMIN — MAGNESIUM SULFATE HEPTAHYDRATE 4 G: 80 INJECTION, SOLUTION INTRAVENOUS at 11:23

## 2023-01-26 RX ADMIN — MAGNESIUM 64 MG (MAGNESIUM CHLORIDE) TABLET,DELAYED RELEASE 535 MG: at 10:00

## 2023-01-26 RX ADMIN — METRONIDAZOLE 500 MG: 500 TABLET ORAL at 09:59

## 2023-01-26 ASSESSMENT — ACTIVITIES OF DAILY LIVING (ADL)
ADLS_ACUITY_SCORE: 37
ADLS_ACUITY_SCORE: 38
ADLS_ACUITY_SCORE: 37
ADLS_ACUITY_SCORE: 38
ADLS_ACUITY_SCORE: 37
ADLS_ACUITY_SCORE: 38

## 2023-01-26 NOTE — DISCHARGE SUMMARY
[facility] MEDICINE  DISCHARGE SUMMARY     Primary Care Physician: Ami Noriega  Admission Date: 1/20/2023   Discharge Provider: Kelly Camp MD Discharge Date: 1/26/2023   Diet:   Active Diet and Nourishment Order   Procedures     Combination Diet Regular Diet Adult     Diet       Code Status: Full Code   Activity: as tolerates        Condition at Discharge: improved     REASON FOR PRESENTATION(See Admission Note for Details)     abdominal pain    PRINCIPAL & ACTIVE DISCHARGE DIAGNOSES       1.  Abdominal pain  2.  Pelvic/intrauterine abscess  3.  Sigmoid diverticulitis  4.  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  Bacteremia, due to e coli  6.  UTI due to e coli  7.  Sepsis  8.  Leukocytosis due to pelvic abscess  9.  SHRUTHI  10.  Anemia due to chronic disease  11.  History of COPD  12.  PMR, history of      PENDING LABS     Unresulted Labs Ordered in the Past 30 Days of this Admission     Date and Time Order Name Status Description    1/22/2023  7:17 AM Blood Culture Arm, Right Preliminary     1/22/2023  7:17 AM Blood Culture Hand, Left Preliminary           RECOMMENDATIONS TO OUTPATIENT PROVIDER FOR F/U VISIT     Follow-up Appointments     Follow Up and recommended labs and tests      FOLLOW UP GYN/ONC NEXT WEEK (THE CLINIC IS SUPPOSED TO CONTACT HER)  FOLLOW UP COLORECTAL SURGERY ON 2/7 (PREVIOUSLY SCHEDULED)    IT IS VERY IMPORTANT SHE DOES NOT MISS THESE APPOINTMENTS                 DISPOSITION     Skilled Nursing Facility    SUMMARY OF HOSPITAL COURSE:      78 year old female admitted to [facility] on 1/20/2023 for sepsis due to an intra-abdominal abscess.  Pts history starts in 4/2022 when she presented with a diverticular abscess and an incidental left renal angiomyolipoma.  The abscess was drained with a percutaneous drain placed on 4/11/2022 then again on 4/22/2022.  After abscessogram the drain was removed on 5/13/2022.  In 6/2022 she  had return to hospital with recurrent sigmoid diverticulitis and hemorrhage into the renal angiomyolipoma. She had embolization in 9/2022. She had urology follow up in 10/2022.    She presents with abdominal pain.  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  She had a UTI.  She had mild sepsis and was admitted   For empiric antibiotic coverage.    Pts urine is e coli positive on 1/20/2022 along with 1/2 blood culture positive.  She had an e coli  Positive on 1/5/2022 as well.  She is resistant to cipro, levo and bactrim.    Clinically she felt improved.  CT imaging on arrival showed 2 x 2.7 x 1.4 cm fluid collection in the endometrial cavity.  Follow-up pelvic ultrasound and pelvic MRI confirms a abscess within the uterus.  She has tethering of bowel along the adnexa and uterus.  The fistula is confirmed to be between the small bowel and sigmoid colon.    Had several conversations with specialist I discussed the case with the gynecology oncology team.  Dr. Soares saw the patient.  Clinically they reviewed the case.  She will be seen next week for clinical follow-up.  Dr. Hernandez of infectious disease consulted.  Due to her recurrent E. coli UTI and 1 E. coli bacteremia and intrauterine abscess IV antibiotics are recommended for 3 weeks.  During this window she will have 2 clinical follow-up visits to make a plan regarding her fistula and abscess.  She has an appoint with colorectal surgery on February 7.  She improved quickly and had several days here for IV antibiotics.  Will be sent to transitional care which is different from her normal assisted living.    She will have close follow-up with the above listed specialties in the next 2 weeks    Discharge Medications with Med changes:     Current Discharge Medication List      START taking these medications    Details   cefTRIAXone (ROCEPHIN) 2 GM vial Inject 2 g into the vein every 24 hours for 28 days  Qty:  560 mL, Refills: 0    Comments: Weekly labs: CBC with diff, CMP, ESR, CRP. Fax to Teri Hernandez MD at [phone] Infectious Disease. PICC line cares.  Associated Diagnoses: Sepsis, due to unspecified organism, unspecified whether acute organ dysfunction present (H); Intra-abdominal abscess (H); E coli bacteremia         CONTINUE these medications which have CHANGED    Details   metroNIDAZOLE (FLAGYL) 500 MG tablet Take 1 tablet (500 mg) by mouth 3 times daily  Qty: 42 tablet, Refills: 0    Associated Diagnoses: Abdominal infection (H); History of diverticular abscess         CONTINUE these medications which have NOT CHANGED    Details   acetaminophen (TYLENOL) 500 MG tablet Take 500 mg by mouth 4 times daily And q6h PRN - max 2 PRN/day      albuterol (PROAIR HFA/PROVENTIL HFA/VENTOLIN HFA) 108 (90 Base) MCG/ACT inhaler Inhale 2 puffs into the lungs every 6 hours as needed for shortness of breath / dyspnea or wheezing      albuterol (PROVENTIL) (5 MG/ML) 0.5% neb solution Take 5 mg by nebulization every 6 hours as needed for wheezing or shortness of breath / dyspnea      alendronate (FOSAMAX) 70 MG tablet Take 70 mg by mouth every 7 days      Ascorbic Acid (VITAMIN C) 500 MG CAPS Take 1,000 mg by mouth daily      aspirin 81 MG EC tablet Take 81 mg by mouth daily      azaTHIOprine (IMURAN) 50 MG tablet Take 100 mg by mouth daily      calcium carbonate 600 mg-vitamin D 400 units (CALTRATE) 600-400 MG-UNIT per tablet Take 1 tablet by mouth 2 times daily (with meals)      calcium polycarbophil (FIBERCON) 625 MG tablet Take 2 tablets by mouth 2 times daily (with meals)      carvedilol (COREG) 12.5 MG tablet Take 12.5 mg by mouth 2 times daily (with meals)      cholecalciferol 50 MCG (2000 UT) CAPS Take 4,000 Units by mouth daily      coenzyme Q-10 capsule Take 1 capsule by mouth daily      FLUoxetine (PROZAC) 40 MG capsule Take 40 mg by mouth every morning      Fluticasone-Umeclidin-Vilanterol (TRELEGY ELLIPTA)  200-62.5-25 MCG/INH oral inhaler Inhale 1 puff into the lungs every morning      furosemide (LASIX) 20 MG tablet Take 20 mg by mouth daily      gabapentin (NEURONTIN) 300 MG capsule Take 900 mg by mouth At Bedtime      losartan (COZAAR) 25 MG tablet Take 12.5 mg by mouth every morning      montelukast (SINGULAIR) 10 MG tablet Take 10 mg by mouth At Bedtime      nystatin (MYCOSTATIN) 298136 UNIT/GM external cream Apply topically 2 times daily      ondansetron (ZOFRAN) 4 MG tablet Take 4 mg by mouth every 6 hours as needed for nausea      potassium chloride ER (KLOR-CON M) 10 MEQ CR tablet Take 20 mEq by mouth daily      pravastatin (PRAVACHOL) 40 MG tablet Take 40 mg by mouth At Bedtime      predniSONE (DELTASONE) 1 MG tablet Take 2-4 mg by mouth daily Slow taper. 4mg daily 1/11/23-2/10/23. 3mg 2/11/23-3/12/23. Then 2mg 3/13/23 - 3/15/23 then stop      rivaroxaban ANTICOAGULANT (XARELTO) 20 MG TABS tablet Take 1 tablet (20 mg) by mouth daily (with dinner)    Associated Diagnoses: Personal history of DVT (deep vein thrombosis)      ciprofloxacin (CIPRO) 500 MG tablet Take 500 mg by mouth 2 times daily      omeprazole (PRILOSEC) 40 MG DR capsule Take 40 mg by mouth daily                   Rationale for medication changes:              Consults       GYNECOLOGIC ONCOLOGY IP CONSULT  PHARMACY TO DOSE VANCO  SURGERY GENERAL IP CONSULT  INFECTIOUS DISEASES IP CONSULT  OCCUPATIONAL THERAPY ADULT IP CONSULT  PHYSICAL THERAPY ADULT IP CONSULT  OB GYN IP CONSULT  CARE MANAGEMENT / SOCIAL WORK IP CONSULT  VASCULAR ACCESS ADULT IP CONSULT    Immunizations given this encounter     Most Recent Immunizations   Administered Date(s) Administered     COVID-19 Vaccine 18+ (Moderna) 07/07/2022     COVID-19 Vaccine Bivalent Booster 12+ (Pfizer) 01/11/2023     FLU 6-35 months 09/16/2009     FLUAD(HD)65+ QUAD 10/10/2022     Flu 65+ Years 09/20/2019     Influenza (H1N1) 12/04/2009     Influenza (IIV3) PF 09/06/2013     Influenza Vaccine 65+  (Fluzone HD) 09/21/2021     Influenza Vaccine, 6+MO IM (QUADRIVALENT W/PRESERVATIVES) 10/16/2018     Pneumo Conj 13-V (2010&after) 04/08/2015     Pneumococcal 23 valent 03/03/2009     TD (ADULT, 7+) 11/22/2002     Tdap (Adacel,Boostrix) 08/18/2020     Zoster vaccine recombinant adjuvanted (SHINGRIX) 11/23/2020     Zoster vaccine, live 03/20/2014           Anticoagulation Information      Recent INR results: No results for input(s): INR in the last 168 hours.  Warfarin doses (if applicable) or name of other anticoagulant:       SIGNIFICANT IMAGING FINDINGS     Results for orders placed or performed during the hospital encounter of 01/20/23   Head CT w/o contrast    Impression    IMPRESSION:  HEAD CT:  1.  No acute intracranial process.    CERVICAL SPINE CT:  1.  No CT evidence for acute fracture or post traumatic subluxation.   Cervical spine CT w/o contrast    Impression    IMPRESSION:  HEAD CT:  1.  No acute intracranial process.    CERVICAL SPINE CT:  1.  No CT evidence for acute fracture or post traumatic subluxation.   CT Chest Pulmonary Embolism w Contrast    Impression    IMPRESSION:  1.  No pulmonary emboli identified. No acute thoracic abnormality evident.  2.  Tracheobronchomalacia.  3.  Prior bilateral rib fractures. No acute fracture identified.   CT Abdomen Pelvis w Contrast    Impression    IMPRESSION:   1.  No evidence of acute traumatic injury in the abdomen or pelvis.  2.  Interval decrease in the right side pelvic abscess as noted above which on the current study appears to be myometrial in nature involving the right side of the uterine fundus.  3.  Air in the bladder from a presumed a colovesical fistula given her prior history. If needed this can be confirmed with a standard filling cystogram (not a CT cystogram).  4.  Extensive sigmoid diverticulitis without evidence of acute inflammation. Tethering of the adjacent small bowel and the left adnexa to the mid sigmoid again noted.  5.  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  6.  Other noncritical findings as noted above.   US Pelvic Complete with Transvaginal    Impression    IMPRESSION:  1.  Complex 2.7 x 2.3 x 1.4 cm fluid collection/abscess within the uterus. This seems to be within the endometrial cavity itself on the cine loops rather than myometrial in location. It is certainly within the uterus itself. Does patient have vaginal   drainage on exam?   2.  If hysteroscopic treatment is planned for drainage, this should be readily apparent.   3.  If treatment planned would be altered based on location, a sonohysterogram could be performed if patient can tolerate this could consider an MRI.   4.  Ovaries are unremarkable.         MR Pelvis (GYN) wo & w Contrast    Impression    IMPRESSION:  1.  Peripherally enhancing fluid collection/abscess appears to be within the endometrial cavity as seen on ultrasound. There is tethering of bowel along the adnexa and uterus. Collection is presumably sequela of previous episodes of diverticulitis with   underlying fistula.  2.  Fistula between sigmoid colon and small bowel.       SIGNIFICANT LABORATORY FINDINGS     Most Recent 3 BMP's:Recent Labs   Lab Test 01/26/23  0821 01/26/23  0639 01/25/23  2119 01/25/23  1609 01/25/23  0848 01/25/23  0714 01/24/23  0841 01/24/23  0712 01/22/23  2053 01/22/23  0901 01/22/23  0057 01/21/23  1225   NA  --   --   --   --   --   --   --  143  --  139  --  138   POTASSIUM  --  4.1  --   --   --  4.0  --  3.8   < > 4.0  --  4.1   CHLORIDE  --   --   --   --   --   --   --  112*  --  109*  --  105   CO2  --   --   --   --   --   --   --  24  --  23  --  24   BUN  --   --   --   --   --   --   --  12  --  21  --  27   CR  --  0.98  --   --   --  0.90  --  0.98  --  0.97  --  1.23*   ANIONGAP  --   --   --   --   --   --   --  7  --  7  --  9   VICENTA  --   --   --   --   --   --   --  8.8  --  8.3*  --  7.9*   *  --  113* 115*   < >  --    < > 87   < > 83   < > 94   94    < > = values in this interval not displayed.           Discharge Orders        Follow Up (TCM)    Follow up with Teri Hernandez MD, Infectious Disease at the [facility] (near [facility]) in 3-4 weeks (or next available). Phone: [phone]     General info for SNF    Length of Stay Estimate: 3 weeks  Condition at Discharge: stable  Level of care: daily iv antibiotics  Rehabilitation Potential: good  Admission H&P remains valid and up-to-date: yes  Use Nursing Home Standing Orders: Yes     Follow Up and recommended labs and tests    FOLLOW UP GYN/ONC NEXT WEEK (THE CLINIC IS SUPPOSED TO CONTACT HER)  FOLLOW UP COLORECTAL SURGERY ON 2/7 (PREVIOUSLY SCHEDULED)    IT IS VERY IMPORTANT SHE DOES NOT MISS THESE APPOINTMENTS     Reason for your hospital stay    Pelvic abscess     IV access    Single lumen PICC RUE     Activity - Up ad brandon     Full Code     Fall precautions     Diet    regular       Examination   Physical Exam   Temp:  [97.6  F (36.4  C)-98.2  F (36.8  C)] 97.6  F (36.4  C)  Pulse:  [] 71  Resp:  [16-20] 16  BP: (134-170)/(64-75) 170/75  SpO2:  [94 %-98 %] 94 %  Wt Readings from Last 1 Encounters:   01/23/23 90.2 kg (198 lb 12.8 oz)       General Appearance: I am not happy, I dont want to go to TCU for antibiotics; I want to go home  Respiratory: clear  Cardiovascular: regular  GI: soft, NDNT, BS  Skin: no rashes      Please see EMR for more detailed significant labs, imaging, consultant notes etc.    I, Kelly Camp MD, personally saw the patient today and spent greater than 30 minutes discharging this patient.    Kelly Camp MD  [facility]    CC:Ami Noriega

## 2023-01-26 NOTE — CONSULTS
Care Management Discharge Note    Discharge Date: 01/26/2023       Discharge Disposition: Transitional Care    Discharge Services: IV abx    Discharge DME: None    Discharge Transportation: health plan transportation    Private pay costs discussed: transportation costs    PAS Confirmation Code:  (none required)  Patient/family educated on Medicare website which has current facility and service quality ratings: yes    Education Provided on the Discharge Plan:  yes  Persons Notified of Discharge Plans: patient   Patient/Family in Agreement with the Plan:  yes    Handoff Referral Completed: sending orders when available     Additional Information:    9:54 AM  SW reviewed chart and ID recommendations.  Spoke with RN at Pt's [facility] (RN: Abigail [phone]) who reports they are unable to accept Pt back on IV abx.  [facility]on [facility] campus as [facility]s [facility]) will be able to accept and provide IV abx.  SW paged provider to determine when to coordinate discharge for.    SW spoke with Pt.  Pt shares that she is disappointed she can't return to her apartment yet.  SW validated these feelings.  Pt shares that it has been a hard year with many different changes and challenges.  SW provided supportive listening to Pt.  Pt is ultimately in agreement with discharge plan.  Pt requests medical transport.  Discussed transport costs.     10:48 AM  Discussed updates with provider.  [facility] wheel chair transport arranged for 1550.  No PAS needed for this facility.     ALESSANDRA Singer

## 2023-01-26 NOTE — PLAN OF CARE
Problem: Pain Acute  Goal: Optimal Pain Control and Function  Outcome: Progressing  Intervention: Develop Pain Management Plan  Intervention: Prevent or Manage Pain   Goal Outcome Evaluation:  Patient is alert and oriented X 4. Denied pain or discomfort. IV saline locked. Discharge plan pending PICC placement. Call light within reach.

## 2023-01-26 NOTE — PLAN OF CARE
Patient ready for discharge. Discharge instructions discussed and questions answered. All belongings sent with patient. Patient transferred to TCU via wheelchair transport.

## 2023-01-26 NOTE — PROCEDURES
"PICC Line Insertion Procedure Note  Pt. Name: Zakiya Christian  MRN:   [id number]       Procedure: Insertion of a  single Lumen  4 fr  Bard SOLO (valved) Power PICC, Lot number [id number]    Indications: antibiotics    Contraindications : n/a    Procedure Details   Patient identified with 2 identifiers and \"Time Out\" conducted.  .     Central line insertion bundle followed: hand hygeine performed prior to procedure, site cleansed with cholraprep, hat, mask, sterile gloves,sterile gown worn, patient draped with maximum barrier head to toe drape, sterile field maintained.    The vein was assessed and found to be compressible and of adequate size. 4 ml 1% Lidocaine administered sq to the insertion site. A 4 Fr PICC was inserted into the basilic vein of the right arm with ultrasound guidance. 1 attempt(s) required to access vein.   Catheter threaded without difficulty. Good blood return noted.    Modified Seldinger Technique used for insertion.    The 8 sharps that are included in the PICC insertion kit were accounted for and disposed of in the sharps container prior to breakdown of the sterile field.    Catheter secured with Statlock, biopatch and Tegaderm dressing applied.    Findings:  Total catheter length  38 cm, with 0 cm exposed. Mid upper arm circumference is 28 cm. Catheter was flushed with 20 cc NS. Patient  tolerated procedure well.    Tip placement verified by 3CG. Tip placement in the SVC.    CLABSI prevention brochure left at bedside.    Patient's primary RN notified PICC is ready for use.    Comments:        Sarbjit Cadena, FERNANDON, RN  [facility]  "

## 2023-01-26 NOTE — PROGRESS NOTES
"[facility]    Infectious Disease Progress Note    01/26/2023     Chart reviewed  ID plan in place. Anticipating discharge on 1/26/2023 with close Gynecology follow up     Assessment & Plan   Zakiya Christian is a 78 year old female who was admitted on 1/20/2023.     ASSESSMENT:  Pelvic abscess- active issue   E coli bacteremia 1/20/2023  Non acute diverticulitis  Known CV fistula  White count 16- improved  Admit was NOT for these problems    Discussion/MDM/note by Dr Carpenter  On maximal abx--probably treating disease that has been there for months however.   Unclear if they will impact things much, need to talk to CRS or [facility] about what they think we ought to do treatment wise.  Came in on augmentin for a recent \"UTI\" which is present de peng if she has a CV fistula (???)--note E coli and/or KP in urine the last 7 times checked over about 14 months.  The recent data about this \"UTI\" is from [facility] and not available...     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     RECOMMENDATIONS:    1. Antibiotics zosyn--> Ceftriaxone and Metronidazole on 1/24/2023.  IV Ceftriaxone and PO Metronidazole on discharge x 21 days with close Gyn follow up and imaging per Gyn Onc  2. Follow culture results   3. Stopped vancomycin on 1/24/2023  4. Focus/de-escalate antibiotics based on final culture results and ?pelvic MRI-- Gyn Onc following  5. Monitor CBC, CMP  6. Updated patient, nurse and discussed with hospitalist  7. Patient will be getting PICC. Prefers to stay at her [facility]. Care Coordinator to arrange for IV OPAT at [facility]. Orders placed.  8. Appreciate input from hospitalist.   9. ID will sign off. Please call with questions      Teri Hernandez MD  [facility]  [phone]      Interval History   Resting  No pain  Updated patient re: US results and IV abx plan    Physical Exam   Temp: 97.6  F (36.4  C) Temp src: " Oral BP: (!) 170/75 Pulse: 71   Resp: 16 SpO2: 94 % O2 Device: None (Room air)    Vitals:    01/21/23 0459 01/22/23 0552 01/23/23 0430   Weight: 90.3 kg (199 lb) 90.3 kg (199 lb) 90.2 kg (198 lb 12.8 oz)     Vital Signs with Ranges  Temp:  [97.6  F (36.4  C)-98.2  F (36.8  C)] 97.6  F (36.4  C)  Pulse:  [] 71  Resp:  [16-20] 16  BP: (134-170)/(64-75) 170/75  SpO2:  [94 %-98 %] 94 %    Previous note  Constitutional: resting, sitting in chair at bedside  Lungs: normal breathing pattern, no crackles or wheezing  Cardiovascular: no orthopena  Abdomen: non distended  Skin: warm  Neuro: deconditioned      Medications     - MEDICATION INSTRUCTIONS -         acetaminophen  500 mg Oral 4x Daily     azaTHIOprine  100 mg Oral Daily     carvedilol  12.5 mg Oral BID w/meals     cefTRIAXone  2 g Intravenous Q24H     FLUoxetine  40 mg Oral QAM     fluticasone-vilanterol  1 puff Inhalation Daily    And     umeclidinium  1 puff Inhalation Daily     furosemide  20 mg Oral Daily     gabapentin  900 mg Oral At Bedtime     magnesium chloride  535 mg Oral Daily     magnesium sulfate  4 g Intravenous Once     metroNIDAZOLE  500 mg Oral TID     montelukast  10 mg Oral At Bedtime     nystatin   Topical BID     pantoprazole  40 mg Oral QAM AC     potassium chloride ER  20 mEq Oral Daily     predniSONE  4 mg Oral Daily     [Held by provider] rivaroxaban ANTICOAGULANT  20 mg Oral Daily with supper     sodium chloride (PF)  3 mL Intracatheter Q8H       Data   All microbiology laboratory data reviewed.  Recent Labs   Lab Test 01/24/23  0712 01/22/23  0901 01/21/23  1225   WBC 5.7 12.0* 14.7*   HGB 8.5* 8.5* 8.9*   HCT 27.3* 26.8* 28.8*   MCV 94 94 95    150 138*     Recent Labs   Lab Test 01/26/23  0639 01/25/23  0714 01/24/23  0712   CR 0.98 0.90 0.98     Recent Labs   Lab Test 01/11/23  0540   SED 61*     No lab results found.    Invalid input(s):     MICROBIOLOGY:    Reviewed    7-Day Micro Results     Collected Updated  Procedure Result Status      01/22/2023 0901 01/25/2023 1332 Blood Culture Hand, Left [[id number]]    Blood from Hand, Left    Preliminary result Component Value   Culture No growth after 3 days  [P]                01/22/2023 0901 01/25/2023 1332 Blood Culture Arm, Right [[id number]]    Blood from Arm, Right    Preliminary result Component Value   Culture No growth after 3 days  [P]                01/20/2023 2122 01/22/2023 0655 Urine Culture [[id number]]    (Abnormal)   Urine, Clean Catch    Final result Component Value   Culture >100,000 CFU/mL Escherichia coli        Susceptibility      Escherichia coli      STACY      Ampicillin 4 ug/mL Susceptible      Ampicillin/ Sulbactam <=2 ug/mL Susceptible      Cefazolin <=4 ug/mL Susceptible  [1]       Cefepime <=1 ug/mL Susceptible      Cefoxitin <=4 ug/mL Susceptible      Ceftazidime <=1 ug/mL Susceptible      Ceftriaxone <=1 ug/mL Susceptible      Ciprofloxacin >=4 ug/mL Resistant     Gentamicin <=1 ug/mL Susceptible      Levofloxacin >=8 ug/mL Resistant     Nitrofurantoin <=16 ug/mL Susceptible      Piperacillin/Tazobactam <=4 ug/mL Susceptible      Tobramycin <=1 ug/mL Susceptible      Trimethoprim/Sulfamethoxazole >16/304 ug/mL Resistant                  [1]  Cefazolin STACY breakpoints are for the treatment of uncomplicated urinary tract infections. For the treatment of systemic infections, please contact the laboratory for additional testing.                 01/20/2023 1926 01/25/2023 2216 Blood Culture Peripheral Blood [[id number]]   Peripheral Blood    Final result Component Value   Culture No Growth               01/20/2023 1524 01/20/2023 1616 Symptomatic Influenza A/B & SARS-CoV2 (COVID-19) Virus PCR Multiplex Nasopharyngeal [[id number]]    Swab from Nasopharyngeal    Final result Component Value   Influenza A PCR Negative   Influenza B PCR Negative   RSV PCR Negative   SARS CoV2 PCR Negative   NEGATIVE: SARS-CoV-2 (COVID-19) RNA not detected, presumed  negative.            01/20/2023 1521 01/23/2023 0733 Blood Culture Peripheral Blood [[id number]]    (Abnormal)   Peripheral Blood    Final result Component Value   Culture Positive on the 1st day of incubation    Escherichia coli    1 of 2 bottles        Susceptibility      Escherichia coli      STACY      Ampicillin <=2 ug/mL Susceptible      Ampicillin/ Sulbactam <=2 ug/mL Susceptible      Cefepime <=1 ug/mL Susceptible      Ceftazidime <=1 ug/mL Susceptible      Ceftriaxone <=1 ug/mL Susceptible      Ciprofloxacin >=4 ug/mL Resistant     Gentamicin <=1 ug/mL Susceptible      Levofloxacin >=8 ug/mL Resistant     Meropenem <=0.25 ug/mL Susceptible      Piperacillin/Tazobactam <=4 ug/mL Susceptible      Tobramycin <=1 ug/mL Susceptible      Trimethoprim/Sulfamethoxazole >16/304 ug/mL Resistant                          01/20/2023 1521 01/21/2023 1346 Verigene GN Panel [[id number]]    (Abnormal)   Peripheral Blood    Final result Component Value   Acinetobacter species Not Detected   Citrobacter species Not Detected   Enterobacter species Not Detected   Proteus species Not Detected   Escherichia coli Detected   Positive for Escherichia coli by Verigene multiplex nucleic acid test. Final identification and antimicrobial susceptibility testing will be verified by standard methods. Verigene test will not distinguish E. coli from Shigella species including Shigella dysenteriae, Shigella flexneri, Shigella boydii, and Shigella sonnei. Specimens containing Shigella species or E. coli will be reported as positive for E. coli.   Klebsiella pneumoniae Not Detected   Klebsiella oxytoca Not Detected   Pseudomonas aeruginosa Not Detected   CTX-M Not Detected   KPC Not Detected   NDM Not Detected   VIM Not Detected   IMP Not Detected   OXA Not Detected                   RADIOLOGY:    Reviewed  CT Abdomen Pelvis w Contrast    Result Date: 1/20/2023  EXAM: CT ABDOMEN PELVIS W CONTRAST LOCATION: [facility]  DATE/TIME: 1/20/2023 4:51 PM INDICATION: Weakness, hypotension, fall with acute on chronic abdominal pain with previously known fistula and abscess COMPARISON: CTA AP 12/09/2022 and older studies. TECHNIQUE: CT scan of the abdomen and pelvis was performed following injection of IV contrast. Multiplanar reformats were obtained. Dose reduction techniques were used. CONTRAST: 75ml Isovue 370 FINDINGS: LOWER CHEST: Please see chest CTA report HEPATOBILIARY: Prior cholecystectomy. PANCREAS: Mild diffuse atrophy. SPLEEN: Normal. ADRENAL GLANDS: Normal. KIDNEYS/BLADDER: No change in the nonobstructing small calculus in the lower pole calyx on the right. 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 No change in the curvilinear elliptical density centrally within the fat, new since the April 2022 but unchanged from the recent study ( AML embolization 10/5/2022). There are also bilateral renal cysts which need no follow-up. Air is seen in the bladder. BOWEL: Redundant colon especially the sigmoid with extensive distal colonic diverticulosis. Tethering of the small bowel wall and the left adnexa to the mid sigmoid colon again noted. No acute inflammatory changes. No bowel obstruction. Incidental descending duodenal diverticulum. LYMPH NODES: Normal. VASCULATURE: Moderate arterial calcifications. No aneurysm. PELVIC ORGANS: There has been interval decrease in the rim-enhancing fluid collection in the pelvis which is less well-defined now measures approximately 2 cm (central cavity  was 3.5 cm before). On the current study this appears intramural within the right uterine fundus MUSCULOSKELETAL: Subacute right lateral seventh 8 9 rib fractures are seen with callus formation no acute fractures. Moderate facet arthropathy in the lower lumbar spine.     IMPRESSION: 1.  No evidence of acute traumatic injury in the abdomen or pelvis. 2.  Interval decrease in the right side  pelvic abscess as noted above which on the current study appears to be myometrial in nature involving the right side of the uterine fundus. 3.  Air in the bladder from a presumed a colovesical fistula given her prior history. If needed this can be confirmed with a standard filling cystogram (not a CT cystogram). 4.  Extensive sigmoid diverticulitis without evidence of acute inflammation. Tethering of the adjacent small bowel and the left adnexa to the mid sigmoid again noted. 5.  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 6.  Other noncritical findings as noted above.    Cervical spine CT w/o contrast    Result Date: 1/20/2023  EXAM: CT HEAD W/O CONTRAST, CT CERVICAL SPINE W/O CONTRAST LOCATION: [facility] DATE/TIME: 1/20/2023 4:47 PM INDICATION: Head and neck injury, on thinners, pain in lower cervical upper thoracic COMPARISON: 12/01/2022 CT head, 09/18/2022 CT cervical spine TECHNIQUE: 1) Routine CT Head without IV contrast. Multiplanar reformats. Dose reduction techniques were used. 2) Routine CT Cervical Spine without IV contrast. Multiplanar reformats. Dose reduction techniques were used. FINDINGS: HEAD CT: INTRACRANIAL CONTENTS: No intracranial hemorrhage, extraaxial collection, or mass effect.  No CT evidence of acute infarct. Moderate presumed chronic small vessel ischemic changes. Moderate generalized volume loss. No hydrocephalus. VISUALIZED ORBITS/SINUSES/MASTOIDS: No intraorbital abnormality. No paranasal sinus mucosal disease. No middle ear or mastoid effusion. BONES/SOFT TISSUES: No acute abnormality. CERVICAL SPINE CT: VERTEBRA: Normal vertebral body heights. Slight right cervical curve. No fracture or posttraumatic subluxation. CANAL/FORAMINA: Mild degenerative changes without significant canal narrowing at any level. Multilevel mild neural foraminal narrowing. PARASPINAL: Prominent medial deviation of ICAs within the neck. 7 mm nodular opacity left lung  apex; probably present previously however it was incompletely visualized. If indicated, CT chest may be performed.     IMPRESSION: HEAD CT: 1.  No acute intracranial process. CERVICAL SPINE CT: 1.  No CT evidence for acute fracture or post traumatic subluxation.    CT Chest Pulmonary Embolism w Contrast    Result Date: 1/20/2023  EXAM: CT CHEST PULMONARY EMBOLISM W CONTRAST LOCATION: [facility] DATE/TIME: 1/20/2023 4:50 PM INDICATION: Weakness, hypoxia, low blood pressure in the field COMPARISON: 4/5/2022 and 12/9/2022 TECHNIQUE: CT chest pulmonary angiogram during arterial phase injection of IV contrast. Multiplanar reformats and MIP reconstructions were performed. Dose reduction techniques were used. CONTRAST: 75ml Isovue 370 FINDINGS: ANGIOGRAM CHEST: Pulmonary arteries are normal caliber and negative for pulmonary emboli. Thoracic aorta demonstrates significant atherosclerotic plaque but is negative for dissection. No CT evidence of right heart strain. LUNGS AND PLEURA: Mild motion artifact. Scattered tiny peripheral pulmonary nodules unchanged with largest left upper lobe on image 91 measuring 4 x 3 mm. Stable linear scarring right middle lobe. Some flattening of trachea and mainstem bronchi consistent with tracheobronchomalacia. No new focal infiltrate. MEDIASTINUM/AXILLAE: No lymphadenopathy. CORONARY ARTERY CALCIFICATION: Severe. UPPER ABDOMEN: Normal. MUSCULOSKELETAL: Subacute fractures of right ribs 6-9 now have surrounding callus. There are healed fractures involving left ribs 5 and 6 which were not seen on previous exam.     IMPRESSION: 1.  No pulmonary emboli identified. No acute thoracic abnormality evident. 2.  Tracheobronchomalacia. 3.  Prior bilateral rib fractures. No acute fracture identified.    Head CT w/o contrast    Result Date: 1/20/2023  EXAM: CT HEAD W/O CONTRAST, CT CERVICAL SPINE W/O CONTRAST LOCATION: [facility] DATE/TIME: 1/20/2023  4:47 PM INDICATION: Head and neck injury, on thinners, pain in lower cervical upper thoracic COMPARISON: 12/01/2022 CT head, 09/18/2022 CT cervical spine TECHNIQUE: 1) Routine CT Head without IV contrast. Multiplanar reformats. Dose reduction techniques were used. 2) Routine CT Cervical Spine without IV contrast. Multiplanar reformats. Dose reduction techniques were used. FINDINGS: HEAD CT: INTRACRANIAL CONTENTS: No intracranial hemorrhage, extraaxial collection, or mass effect.  No CT evidence of acute infarct. Moderate presumed chronic small vessel ischemic changes. Moderate generalized volume loss. No hydrocephalus. VISUALIZED ORBITS/SINUSES/MASTOIDS: No intraorbital abnormality. No paranasal sinus mucosal disease. No middle ear or mastoid effusion. BONES/SOFT TISSUES: No acute abnormality. CERVICAL SPINE CT: VERTEBRA: Normal vertebral body heights. Slight right cervical curve. No fracture or posttraumatic subluxation. CANAL/FORAMINA: Mild degenerative changes without significant canal narrowing at any level. Multilevel mild neural foraminal narrowing. PARASPINAL: Prominent medial deviation of ICAs within the neck. 7 mm nodular opacity left lung apex; probably present previously however it was incompletely visualized. If indicated, CT chest may be performed.     IMPRESSION: HEAD CT: 1.  No acute intracranial process. CERVICAL SPINE CT: 1.  No CT evidence for acute fracture or post traumatic subluxation.

## 2023-01-26 NOTE — PLAN OF CARE
Problem: UTI (Urinary Tract Infection)  Goal: Improved Infection Symptoms  Outcome: Progressing   Goal Outcome Evaluation:             Receiving iv antibiotics, afebrile. No c/o pain; voiding without any problems. Using purewick during the night.

## 2023-01-26 NOTE — PLAN OF CARE
Problem: Plan of Care - These are the overarching goals to be used throughout the patient stay.    Goal: Optimal Comfort and Wellbeing  Outcome: Progressing     Problem: UTI (Urinary Tract Infection)  Goal: Improved Infection Symptoms  Outcome: Progressing     Patient is A/Ox4, VSS on RA. SBA with walker and gait belt when ambulating. Voiding adequately. Dressing is CDI, full sensation per Pt. IV saline locked, patent. No new skin issues noted. Patient denying pain during shift. PICC placed today, blood return noted, patent. Transport to TCU arranged for 3:30pm today.

## 2023-01-26 NOTE — PROGRESS NOTES
[facility] GERIATRICS    PRIMARY CARE PROVIDER AND CLINIC:  Ami Noriega MD, [address] [facility] [place] [address]  Chief Complaint   Patient presents with     Hospital F/U      [place] Medical Record Number:  [id number]  Place of Service where encounter took place:  [facility] ([facility]) [[id number]]    Zakiya Christian  is a 78 year old  (1945), admitted to the above facility from  [facility]. Hospital stay 1/20/23 through 1/23/23..   HPI:    78 year old female admitted to [facility] on 1/20/2023 for sepsis due to an intra-abdominal abscess.  Pts history starts in 4/2022 when she presented with a diverticular abscess and an incidental left renal angiomyolipoma.  The abscess was drained with a percutaneous drain placed on 4/11/2022 then again on 4/22/2022.  After abscessogram the drain was removed on 5/13/2022.  In 6/2022 she had return to hospital with recurrent sigmoid diverticulitis and hemorrhage into the renal angiomyolipoma. She had embolization in 9/2022. She had urology follow up in 10/2022.     She presents with abdominal pain.  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  She had a UTI.  She had mild sepsis and was admitted. For empiric antibiotic coverage. Pts urine is e coli positive on 1/20/2022 along with 1/2 blood culture positive.  She had an e coli Positive on 1/5/2022 as well.  She is resistant to cipro, levo and bactrim. Clinically she felt improved.  CT imaging on arrival showed 2 x 2.7 x 1.4 cm fluid collection in the endometrial cavity.  Follow-up pelvic ultrasound and pelvic MRI confirms a abscess within the uterus.  She has tethering of bowel along the adnexa and uterus.  The fistula is confirmed to be between the small bowel and sigmoid colon.     Had several conversations with specialist I discussed the case with the gynecology oncology team.  Dr. Soares saw the patient.  Clinically  they reviewed the case.  She will be seen next week for clinical follow-up.  Dr. Hernandez of infectious disease consulted.  Due to her recurrent E. coli UTI and 1 E. coli bacteremia and intrauterine abscess IV antibiotics are recommended for 3 weeks.  During this window she will have 2 clinical follow-up visits to make a plan regarding her fistula and abscess.  She has an appoint with colorectal surgery on February 7.  She improved quickly and had several days here for IV antibiotics.  Will be sent to transitional care which is different from her normal assisted living.    The primary encounter diagnosis was Physical deconditioning. Diagnoses of Generalized muscle weakness, Impaired mobility and activities of daily living, Abdominal pain, generalized, Colonic diverticular abscess, Sigmoid diverticulitis, Intra-abdominal abscess (H), Bacteremia, Urinary tract infection without hematuria, site unspecified, Sepsis, due to unspecified organism, unspecified whether acute organ dysfunction present (H), Leukocytosis, unspecified type, Acute kidney injury (H), Chronic kidney disease, stage 3a (H), Primary hypertension, Morbid obesity (H), Mixed hyperlipidemia, Lower extremity edema, Anemia of chronic disease, Polymyalgia rheumatica (H), Chronic obstructive pulmonary disease, unspecified COPD type (H), Anxiety, Depression, unspecified depression type, Type 2 diabetes mellitus with other specified complication, without long-term current use of insulin (H), and Personal history of DVT (deep vein thrombosis) were also pertinent to this visit.    Met with patient who denies any chest pain, palpitations, shortness of breath, JOHN, lightheadedness, dizziness, or cough. Denies any abdominal discomfort. Denies N&V. Denies B&B concerns. Denies dysuria or frequency. Denies loose or constipation. Reports LBM today Appetite good. Sleeping well. Nursing denies any acute concerns.     BP Readings from Last 3 Encounters:   01/27/23 103/60    01/26/23 (!) 170/75   12/01/22 117/58     Wt Readings from Last 5 Encounters:   01/27/23 90.2 kg (198 lb 14.4 oz)   01/23/23 90.2 kg (198 lb 12.8 oz)   12/01/22 86 kg (189 lb 9.5 oz)   08/29/22 85.9 kg (189 lb 4.8 oz)   08/15/22 85.3 kg (188 lb)     CODE STATUS/ADVANCE DIRECTIVES DISCUSSION:  Full Code  CPR/Full code   ALLERGIES:   Allergies   Allergen Reactions     Simvastatin Hives      PAST MEDICAL HISTORY:   Past Medical History:   Diagnosis Date     Diabetes (H)      Hypertension      Obese      PMR (polymyalgia rheumatica) (H)       PAST SURGICAL HISTORY:   has a past surgical history that includes IR Abscess Tube Change (4/22/2022) and PICC/Midline Placement (1/26/2023).  FAMILY HISTORY: family history is not on file.  SOCIAL HISTORY:     Patient's living condition: lives in an assisted living facility    Post Discharge Medication Reconciliation Status:   MED REC REQUIRED  Post Medication Reconciliation Status:  Discharge medications reconciled and changed, see notes/orders         Current Outpatient Medications   Medication Sig     albuterol (PROVENTIL) (2.5 MG/3ML) 0.083% neb solution Take 1 vial (2.5 mg) by nebulization every 6 hours as needed for shortness of breath, wheezing or cough     coenzyme Q-10 capsule Take 1 capsule (100 mg) by mouth daily     acetaminophen (TYLENOL) 500 MG tablet Take 500 mg by mouth 4 times daily And q6h PRN - max 2 PRN/day     albuterol (PROAIR HFA/PROVENTIL HFA/VENTOLIN HFA) 108 (90 Base) MCG/ACT inhaler Inhale 2 puffs into the lungs every 6 hours as needed for shortness of breath / dyspnea or wheezing     alendronate (FOSAMAX) 70 MG tablet Take 70 mg by mouth every 7 days     Ascorbic Acid (VITAMIN C) 500 MG CAPS Take 1,000 mg by mouth daily     aspirin 81 MG EC tablet Take 81 mg by mouth daily     azaTHIOprine (IMURAN) 50 MG tablet Take 100 mg by mouth daily     calcium carbonate 600 mg-vitamin D 400 units (CALTRATE) 600-400 MG-UNIT per tablet Take 1 tablet by mouth 2  times daily (with meals)     calcium polycarbophil (FIBERCON) 625 MG tablet Take 2 tablets by mouth 2 times daily (with meals)     carvedilol (COREG) 12.5 MG tablet Take 12.5 mg by mouth 2 times daily (with meals) HOLD if SBP<100 and/or HR<55     cefTRIAXone (ROCEPHIN) 2 GM vial Inject 2 g into the vein every 24 hours for 28 days     cholecalciferol 50 MCG (2000 UT) CAPS Take 4,000 Units by mouth daily     ciprofloxacin (CIPRO) 500 MG tablet Take 500 mg by mouth 2 times daily     FLUoxetine (PROZAC) 40 MG capsule Take 40 mg by mouth every morning     Fluticasone-Umeclidin-Vilanterol (TRELEGY ELLIPTA) 200-62.5-25 MCG/INH oral inhaler Inhale 1 puff into the lungs every morning     furosemide (LASIX) 20 MG tablet Take 20 mg by mouth daily HOLD if SBP<100     gabapentin (NEURONTIN) 300 MG capsule Take 900 mg by mouth At Bedtime     losartan (COZAAR) 25 MG tablet Take 12.5 mg by mouth every morning HOLD if SBP<100     metroNIDAZOLE (FLAGYL) 500 MG tablet Take 1 tablet (500 mg) by mouth 3 times daily     montelukast (SINGULAIR) 10 MG tablet Take 10 mg by mouth At Bedtime     nystatin (MYCOSTATIN) 277732 UNIT/GM external cream Apply topically 2 times daily     omeprazole (PRILOSEC) 40 MG DR capsule Take 40 mg by mouth daily     ondansetron (ZOFRAN) 4 MG tablet Take 4 mg by mouth every 6 hours as needed for nausea     potassium chloride ER (KLOR-CON M) 10 MEQ CR tablet Take 20 mEq by mouth daily     pravastatin (PRAVACHOL) 40 MG tablet Take 40 mg by mouth At Bedtime     predniSONE (DELTASONE) 1 MG tablet Take 2-4 mg by mouth daily Slow taper. 4mg daily 1/11/23-2/10/23. 3mg 2/11/23-3/12/23. Then 2mg 3/13/23 - 3/15/23 then stop     rivaroxaban ANTICOAGULANT (XARELTO) 20 MG TABS tablet Take 1 tablet (20 mg) by mouth daily (with dinner)     No current facility-administered medications for this visit.       ROS:  10 point ROS of systems including Constitutional, Eyes, Respiratory, Cardiovascular, Gastroenterology, Genitourinary,  "Integumentary, Musculoskeletal, Psychiatric were all negative except for pertinent positives noted in my HPI.    Vitals:  /60   Pulse 69   Temp 97.6  F (36.4  C)   Resp 18   Ht 1.6 m (5' 3\")   Wt 90.2 kg (198 lb 14.4 oz)   SpO2 95%   BMI 35.23 kg/m    Exam:  GENERAL APPEARANCE:  Alert, in no distress, oriented, cooperative  ENT:  Mouth and posterior oropharynx normal, moist mucous membranes, [place]  EYES:  EOM, conjunctivae, lids, pupils and irises normal  NECK:  No adenopathy,masses or thyromegaly  RESP:  respiratory effort and palpation of chest normal, lungs clear to auscultation , no respiratory distress  CV:  Palpation and auscultation of heart done , regular rate and rhythm, no murmur, rub, or gallop, no edema, +2 pedal pulses  ABDOMEN:  normal bowel sounds, soft, nontender, no hepatosplenomegaly or other masses, no guarding or rebound  M/S:   Ambulates with walker  SKIN:  Inspection of skin and subcutaneous tissue baseline, Palpation of skin and subcutaneous tissue baseline  NEURO:   Cranial nerves 2-12 are normal tested and grossly at patient's baseline, no purposeful movement in upper and lower extremities  PSYCH:  oriented X 3, normal insight, judgement and memory, affect and mood normal    Lab/Diagnostic data:    Most Recent 3 CBC's:  Recent Labs   Lab Test 01/24/23  0712 01/22/23  0901 01/21/23  1225   WBC 5.7 12.0* 14.7*   HGB 8.5* 8.5* 8.9*   MCV 94 94 95    150 138*     Most Recent 3 BMP's:  Recent Labs   Lab Test 01/26/23  0821 01/26/23  0639 01/25/23  2119 01/25/23  1609 01/25/23  0848 01/25/23  0714 01/24/23  0841 01/24/23  0712 01/22/23  2053 01/22/23  0901 01/22/23  0057 01/21/23  1225   NA  --   --   --   --   --   --   --  143  --  139  --  138   POTASSIUM  --  4.1  --   --   --  4.0  --  3.8   < > 4.0  --  4.1   CHLORIDE  --   --   --   --   --   --   --  112*  --  109*  --  105   CO2  --   --   --   --   --   --   --  24  --  23  --  24   BUN  --   --   --   --   --   --   -- "  12  --  21  --  27   CR  --  0.98  --   --   --  0.90  --  0.98  --  0.97  --  1.23*   ANIONGAP  --   --   --   --   --   --   --  7  --  7  --  9   VICENTA  --   --   --   --   --   --   --  8.8  --  8.3*  --  7.9*   *  --  113* 115*   < >  --    < > 87   < > 83   < > 94  94    < > = values in this interval not displayed.     Most Recent 2 LFT's:  Recent Labs   Lab Test 01/24/23  0712 07/19/22  0741   AST 11 25   ALT <9 <5*   ALKPHOS 44* 84   BILITOTAL 0.4 0.3     Most Recent Cholesterol Panel:  Recent Labs   Lab Test 05/17/21  0922   CHOL 137   LDL 71   HDL 47*   TRIG 94     Most Recent TSH and T4:  Recent Labs   Lab Test 08/08/19  1222   TSH 2.10     Most Recent Hemoglobin A1c:  Recent Labs   Lab Test 04/05/22  1304   A1C 6.2*     Most Recent Urinalysis:  Recent Labs   Lab Test 01/20/23  2122   COLOR Yellow   APPEARANCE Turbid*   URINEGLC Negative   URINEBILI Negative   URINEKETONE Negative   SG >1.050*   UBLD >1.0 mg/dL*   URINEPH 6.0   PROTEIN 50*   NITRITE Negative   LEUKEST 500 Sara/uL*   RBCU >182*   WBCU >182*     Most Recent ESR & CRP:  Recent Labs   Lab Test 01/20/23  1521 01/11/23  0540   SED  --  61*   CRP 5.0*  --    CRPI  --  <3.00     Most Recent Anemia Panel:  Recent Labs   Lab Test 01/24/23  0712   WBC 5.7   HGB 8.5*   HCT 27.3*   MCV 94          ASSESSMENT/PLAN:    (R53.81) Physical deconditioning  (primary encounter diagnosis)  (M62.81) Generalized muscle weakness  (Z74.09,  Z78.9) Impaired mobility and activities of daily living  Comment: Acute on chronic. S/T below diagnosis. Lives in [facility] setting.   Plan:   -Continue Physical therapy and Occupational therapy as directed  -SW to remain involved for safe discharge planning    (A41.9) Sepsis, due to unspecified organism, unspecified whether acute organ dysfunction present (H)  (R10.84) Abdominal pain, generalized  (K57.20) Colonic diverticular abscess  (K57.32) Sigmoid diverticulitis  (K65.1) Intra-abdominal abscess (H)  Comment: Acute  on chronic. admitted to [facility] on 1/20/2023 for sepsis due to an intra-abdominal abscess.  Pts history starts in 4/2022 when she presented with a diverticular abscess and an incidental left renal angiomyolipoma.  The abscess was drained with a percutaneous drain placed on 4/11/2022 then again on 4/22/2022.  After abscessogram the drain was removed on 5/13/2022.  In 6/2022 she had return to hospital with recurrent sigmoid diverticulitis and hemorrhage into the renal angiomyolipoma. She had embolization in 9/2022. She had urology follow up in 10/2022. She presented back to hospital with abdominal pain.  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 She had mild sepsis and was admitted. For empiric antibiotic coverage. CT imaging on arrival showed 2 x 2.7 x 1.4 cm fluid collection in the endometrial cavity.  Follow-up pelvic ultrasound and pelvic MRI confirms a abscess within the uterus.  She has tethering of bowel along the adnexa and uterus.  The fistula is confirmed to be between the small bowel and sigmoid colon.  Plan:   -Continue IV rocephin daily x 3 full weeks per ID recommendations  -Continue metronidazole 500mg TID  -Tylenol 500mg QID and every 6 hours PRN  -Continue ciprofloxacin 500mg BID  -Continue Omeprazole 40mg daily.   -Continue prednisone taper as directed. Slow taper. 4mg daily 1/11/23-2/10/23. 3mg 2/11/23-3/12/23. Then 2mg 3/13/23 - 3/15/23 then stop  -Zofran PRN  -Gabapentin 900mg at HS  -Follow up with GYN/ONC clinic within 1 week. They are to contact patient directly. Will have staff to assist as well.   -Follow up with colorectal surgery. Scheduled for 2/7/23. Can call Office: [phone] to schedule  -Follow up with Teri Hernandez MD, Infectious Disease at the [facility] clinic (near [facility]) in 3-4 weeks (or next available). Phone: [phone]  -Continue weekly labs of CBC with diff, CMP, ESR, CRP. Fax to Teri   MD David at [phone] Infectious Disease.  -Routine PICC line cares and dressing changes.    (R78.81) Bacteremia  (N39.0) Urinary tract infection without hematuria, site unspecified  (D72.829) Leukocytosis, unspecified type  (N17.9) Acute kidney injury (H)  (N18.31) Chronic kidney disease, stage 3a (H)  Comment: Acute on chronic, Pts urine is e coli positive on 1/20/2022 along with 1/2 blood culture positive.  She had an e coli Positive on 1/5/2022 as well.  She is resistant to cipro, levo and bactrim.  Plan:   -Monitor urinary status  -Follow up with [facility] as directed. Scheduled for 4/23/23  -Continue antibiotic as directed  -Continue weekly labs of CBC with diff, CMP, ESR, CRP    (I10) Primary hypertension  (E66.01) Morbid obesity (H)  (E78.2) Mixed hyperlipidemia  (R60.0) Lower extremity edema  Comment: Chronic. Based on JNC-8 goals,  patients age of 78 year old, presence of diabetes or CKD, and goals of care goal BP is  <140/90 mm Hg. Patient is stable with current plan of care and routine assessment..  Plan:   -Monitor BP and HR  -Continue carvedilol 12.5mg BID with meals. HOLD if SBP<100 and/or HR<55  -Continue daily asa and statin  -Continue furosemide 20mg daily. HOLD if SBP<100  -Continue losartan 12.5mg daily. HOLD if SBP<100  -Continue weekly labs of CBC with diff, CMP, ESR, CRP    (D63.8) Anemia of chronic disease  Comment: Chronic. baseline hgb~ mid 8s  Plan:   -Monitor bleeding risks.   -Continue weekly labs of CBC with diff, CMP, ESR, CRP    (M35.3) Polymyalgia rheumatica (H)  Comment: Chronic. Followed by rheumatology with [facility] rheumatology clinic. Back in April 2022, they recommend stopping this medication altogether while in hospital, unknown why this restarted--would recommend rheumatology input again.   Plan:   -Monitor for worsening s/sx of concerns  -Monitor pain  -Continue azathioprine 100mg daily for now as directed.   -Continue prednisone taper as directed  -Continue weekly labs  "of CBC with diff, CMP, ESR, CRP    (J44.9) Chronic obstructive pulmonary disease, unspecified COPD type (H)  Comment: Chronic. No recent exacerbations. History of covid  Plan:   -Monitor respiratory status  -Continue albuterol nebulization every 6 hours PRN  -Continue Trelegy Ellipta inhaler daily  -Continue singular daily at HS  -Continue weekly labs of CBC with diff, CMP, ESR, CRP    (Z86.718) Personal history of DVT  Comment: Acute on chronic. Noted Nov 2021. Doppler in March2022 with ongoing nonocclusive DVT in the proximal right femoral vein, occlusive in the mid femoral vein segments - now felt to be chronic and per radiology, \"overall improved from 11/05/2021\".   Plan:  -Continue xarelto daily  -Monitor bleeding risks  -Continue weekly labs of CBC with diff, CMP, ESR, CRP    (F41.9) Anxiety  (F32.A) Depression, unspecified depression type  Comment: Chronic. Situational stress contributing. She reports feeling down due to not able to return back home to [facility] due to IV antibiotic. Offered ACP on site while on TCU. She declines at this time.  Plan:   -Monitor mood and behaviors  -Monitor for changes in mobility, eating and sleeping patterns  -Continue prozac 40mg daily  -Continue weekly labs of CBC with diff, CMP, ESR, CRP     (E11.69) Type 2 diabetes mellitus with other specified complication, without long-term current use of insulin (H)  Comment: Acute on chronic. History of being on metformin in the past which was stopped due to SHRUTHI concerns. Blood Glucose remains stable without. Last Hgb A1c in April 2022 was 6.2%  Plan:   -Monitor for worsening s/sx of concerns  -Follow up with PCP post TCU    Electronically signed by:  Adrianna Bonner DNP, APRN                    "

## 2023-01-27 ENCOUNTER — TRANSITIONAL CARE UNIT VISIT (OUTPATIENT)
Dept: GERIATRICS | Facility: CLINIC | Age: 78
End: 2023-01-27
Payer: COMMERCIAL

## 2023-01-27 VITALS
WEIGHT: 198.9 LBS | HEART RATE: 69 BPM | HEIGHT: 63 IN | SYSTOLIC BLOOD PRESSURE: 103 MMHG | DIASTOLIC BLOOD PRESSURE: 60 MMHG | OXYGEN SATURATION: 95 % | TEMPERATURE: 97.6 F | BODY MASS INDEX: 35.24 KG/M2 | RESPIRATION RATE: 18 BRPM

## 2023-01-27 DIAGNOSIS — M35.3 POLYMYALGIA RHEUMATICA (H): ICD-10-CM

## 2023-01-27 DIAGNOSIS — N18.31 CHRONIC KIDNEY DISEASE, STAGE 3A (H): ICD-10-CM

## 2023-01-27 DIAGNOSIS — E78.2 MIXED HYPERLIPIDEMIA: ICD-10-CM

## 2023-01-27 DIAGNOSIS — N39.0 URINARY TRACT INFECTION WITHOUT HEMATURIA, SITE UNSPECIFIED: ICD-10-CM

## 2023-01-27 DIAGNOSIS — A41.9 SEPSIS, DUE TO UNSPECIFIED ORGANISM, UNSPECIFIED WHETHER ACUTE ORGAN DYSFUNCTION PRESENT (H): ICD-10-CM

## 2023-01-27 DIAGNOSIS — Z78.9 IMPAIRED MOBILITY AND ACTIVITIES OF DAILY LIVING: ICD-10-CM

## 2023-01-27 DIAGNOSIS — E66.01 MORBID OBESITY (H): ICD-10-CM

## 2023-01-27 DIAGNOSIS — K57.20 COLONIC DIVERTICULAR ABSCESS: ICD-10-CM

## 2023-01-27 DIAGNOSIS — R78.81 BACTEREMIA: ICD-10-CM

## 2023-01-27 DIAGNOSIS — D63.8 ANEMIA OF CHRONIC DISEASE: ICD-10-CM

## 2023-01-27 DIAGNOSIS — R10.84 ABDOMINAL PAIN, GENERALIZED: ICD-10-CM

## 2023-01-27 DIAGNOSIS — K57.32 SIGMOID DIVERTICULITIS: ICD-10-CM

## 2023-01-27 DIAGNOSIS — R53.81 PHYSICAL DECONDITIONING: Primary | ICD-10-CM

## 2023-01-27 DIAGNOSIS — D72.829 LEUKOCYTOSIS, UNSPECIFIED TYPE: ICD-10-CM

## 2023-01-27 DIAGNOSIS — F32.A DEPRESSION, UNSPECIFIED DEPRESSION TYPE: ICD-10-CM

## 2023-01-27 DIAGNOSIS — M62.81 GENERALIZED MUSCLE WEAKNESS: ICD-10-CM

## 2023-01-27 DIAGNOSIS — I10 PRIMARY HYPERTENSION: ICD-10-CM

## 2023-01-27 DIAGNOSIS — N17.9 ACUTE KIDNEY INJURY (H): ICD-10-CM

## 2023-01-27 DIAGNOSIS — F41.9 ANXIETY: ICD-10-CM

## 2023-01-27 DIAGNOSIS — Z74.09 IMPAIRED MOBILITY AND ACTIVITIES OF DAILY LIVING: ICD-10-CM

## 2023-01-27 DIAGNOSIS — R60.0 LOWER EXTREMITY EDEMA: ICD-10-CM

## 2023-01-27 DIAGNOSIS — E11.69 TYPE 2 DIABETES MELLITUS WITH OTHER SPECIFIED COMPLICATION, WITHOUT LONG-TERM CURRENT USE OF INSULIN (H): ICD-10-CM

## 2023-01-27 DIAGNOSIS — K65.1 INTRA-ABDOMINAL ABSCESS (H): ICD-10-CM

## 2023-01-27 DIAGNOSIS — J44.9 CHRONIC OBSTRUCTIVE PULMONARY DISEASE, UNSPECIFIED COPD TYPE (H): ICD-10-CM

## 2023-01-27 DIAGNOSIS — Z86.718 PERSONAL HISTORY OF DVT (DEEP VEIN THROMBOSIS): ICD-10-CM

## 2023-01-27 LAB
BACTERIA BLD CULT: NO GROWTH
BACTERIA BLD CULT: NO GROWTH

## 2023-01-27 PROCEDURE — 99309 SBSQ NF CARE MODERATE MDM 30: CPT | Performed by: NURSE PRACTITIONER

## 2023-01-27 RX ORDER — ALBUTEROL SULFATE 0.83 MG/ML
2.5 SOLUTION RESPIRATORY (INHALATION) EVERY 6 HOURS PRN
Qty: 90 ML | Refills: 1 | Status: SHIPPED | OUTPATIENT
Start: 2023-01-27 | End: 2023-02-01

## 2023-01-27 RX ORDER — UBIDECARENONE 30 MG
1 CAPSULE ORAL DAILY
Qty: 30 CAPSULE | Refills: 1 | Status: SHIPPED | OUTPATIENT
Start: 2023-01-27 | End: 2023-02-01

## 2023-01-27 NOTE — LETTER
1/27/2023        RE: Zakiya Christian  [address]  [place] [address]        [facility] GERIATRICS    PRIMARY CARE PROVIDER AND CLINIC:  Ami Noriega MD, [address] [facility] [place] [id number]  Chief Complaint   Patient presents with     Hospital F/U      [facility] Medical Record Number:  [id number]  Place of Service where encounter took place:  [facility] ([facility]) [[id number]]    Zakiya Christian  is a 78 year old  (1945), admitted to the above facility from  [facility]. Hospital stay 1/20/23 through 1/23/23..   HPI:    78 year old female admitted to [facility] on 1/20/2023 for sepsis due to an intra-abdominal abscess.  Pts history starts in 4/2022 when she presented with a diverticular abscess and an incidental left renal angiomyolipoma.  The abscess was drained with a percutaneous drain placed on 4/11/2022 then again on 4/22/2022.  After abscessogram the drain was removed on 5/13/2022.  In 6/2022 she had return to hospital with recurrent sigmoid diverticulitis and hemorrhage into the renal angiomyolipoma. She had embolization in 9/2022. She had urology follow up in 10/2022.     She presents with abdominal pain.  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  She had a UTI.  She had mild sepsis and was admitted. For empiric antibiotic coverage. Pts urine is e coli positive on 1/20/2022 along with 1/2 blood culture positive.  She had an e coli Positive on 1/5/2022 as well.  She is resistant to cipro, levo and bactrim. Clinically she felt improved.  CT imaging on arrival showed 2 x 2.7 x 1.4 cm fluid collection in the endometrial cavity.  Follow-up pelvic ultrasound and pelvic MRI confirms a abscess within the uterus.  She has tethering of bowel along the adnexa and uterus.  The fistula is confirmed to be between the small bowel and sigmoid colon.     Had several conversations with specialist  I discussed the case with the gynecology oncology team.  Dr. Soares saw the patient.  Clinically they reviewed the case.  She will be seen next week for clinical follow-up.  Dr. Hernandez of infectious disease consulted.  Due to her recurrent E. coli UTI and 1 E. coli bacteremia and intrauterine abscess IV antibiotics are recommended for 3 weeks.  During this window she will have 2 clinical follow-up visits to make a plan regarding her fistula and abscess.  She has an appoint with colorectal surgery on February 7.  She improved quickly and had several days here for IV antibiotics.  Will be sent to transitional care which is different from her normal assisted living.    The primary encounter diagnosis was Physical deconditioning. Diagnoses of Generalized muscle weakness, Impaired mobility and activities of daily living, Abdominal pain, generalized, Colonic diverticular abscess, Sigmoid diverticulitis, Intra-abdominal abscess (H), Bacteremia, Urinary tract infection without hematuria, site unspecified, Sepsis, due to unspecified organism, unspecified whether acute organ dysfunction present (H), Leukocytosis, unspecified type, Acute kidney injury (H), Chronic kidney disease, stage 3a (H), Primary hypertension, Morbid obesity (H), Mixed hyperlipidemia, Lower extremity edema, Anemia of chronic disease, Polymyalgia rheumatica (H), Chronic obstructive pulmonary disease, unspecified COPD type (H), Anxiety, Depression, unspecified depression type, Type 2 diabetes mellitus with other specified complication, without long-term current use of insulin (H), and Personal history of DVT (deep vein thrombosis) were also pertinent to this visit.    Met with patient who denies any chest pain, palpitations, shortness of breath, JOHN, lightheadedness, dizziness, or cough. Denies any abdominal discomfort. Denies N&V. Denies B&B concerns. Denies dysuria or frequency. Denies loose or constipation. Reports LBM today Appetite good. Sleeping well.  Nursing denies any acute concerns.     BP Readings from Last 3 Encounters:   01/27/23 103/60   01/26/23 (!) 170/75   12/01/22 117/58     Wt Readings from Last 5 Encounters:   01/27/23 90.2 kg (198 lb 14.4 oz)   01/23/23 90.2 kg (198 lb 12.8 oz)   12/01/22 86 kg (189 lb 9.5 oz)   08/29/22 85.9 kg (189 lb 4.8 oz)   08/15/22 85.3 kg (188 lb)     CODE STATUS/ADVANCE DIRECTIVES DISCUSSION:  Full Code  CPR/Full code   ALLERGIES:   Allergies   Allergen Reactions     Simvastatin Hives      PAST MEDICAL HISTORY:   Past Medical History:   Diagnosis Date     Diabetes (H)      Hypertension      Obese      PMR (polymyalgia rheumatica) (H)       PAST SURGICAL HISTORY:   has a past surgical history that includes IR Abscess Tube Change (4/22/2022) and PICC/Midline Placement (1/26/2023).  FAMILY HISTORY: family history is not on file.  SOCIAL HISTORY:     Patient's living condition: lives in an assisted living facility    Post Discharge Medication Reconciliation Status:   MED REC REQUIRED  Post Medication Reconciliation Status:  Discharge medications reconciled and changed, see notes/orders         Current Outpatient Medications   Medication Sig     albuterol (PROVENTIL) (2.5 MG/3ML) 0.083% neb solution Take 1 vial (2.5 mg) by nebulization every 6 hours as needed for shortness of breath, wheezing or cough     coenzyme Q-10 capsule Take 1 capsule (100 mg) by mouth daily     acetaminophen (TYLENOL) 500 MG tablet Take 500 mg by mouth 4 times daily And q6h PRN - max 2 PRN/day     albuterol (PROAIR HFA/PROVENTIL HFA/VENTOLIN HFA) 108 (90 Base) MCG/ACT inhaler Inhale 2 puffs into the lungs every 6 hours as needed for shortness of breath / dyspnea or wheezing     alendronate (FOSAMAX) 70 MG tablet Take 70 mg by mouth every 7 days     Ascorbic Acid (VITAMIN C) 500 MG CAPS Take 1,000 mg by mouth daily     aspirin 81 MG EC tablet Take 81 mg by mouth daily     azaTHIOprine (IMURAN) 50 MG tablet Take 100 mg by mouth daily     calcium carbonate  600 mg-vitamin D 400 units (CALTRATE) 600-400 MG-UNIT per tablet Take 1 tablet by mouth 2 times daily (with meals)     calcium polycarbophil (FIBERCON) 625 MG tablet Take 2 tablets by mouth 2 times daily (with meals)     carvedilol (COREG) 12.5 MG tablet Take 12.5 mg by mouth 2 times daily (with meals) HOLD if SBP<100 and/or HR<55     cefTRIAXone (ROCEPHIN) 2 GM vial Inject 2 g into the vein every 24 hours for 28 days     cholecalciferol 50 MCG (2000 UT) CAPS Take 4,000 Units by mouth daily     ciprofloxacin (CIPRO) 500 MG tablet Take 500 mg by mouth 2 times daily     FLUoxetine (PROZAC) 40 MG capsule Take 40 mg by mouth every morning     Fluticasone-Umeclidin-Vilanterol (TRELEGY ELLIPTA) 200-62.5-25 MCG/INH oral inhaler Inhale 1 puff into the lungs every morning     furosemide (LASIX) 20 MG tablet Take 20 mg by mouth daily HOLD if SBP<100     gabapentin (NEURONTIN) 300 MG capsule Take 900 mg by mouth At Bedtime     losartan (COZAAR) 25 MG tablet Take 12.5 mg by mouth every morning HOLD if SBP<100     metroNIDAZOLE (FLAGYL) 500 MG tablet Take 1 tablet (500 mg) by mouth 3 times daily     montelukast (SINGULAIR) 10 MG tablet Take 10 mg by mouth At Bedtime     nystatin (MYCOSTATIN) 128457 UNIT/GM external cream Apply topically 2 times daily     omeprazole (PRILOSEC) 40 MG DR capsule Take 40 mg by mouth daily     ondansetron (ZOFRAN) 4 MG tablet Take 4 mg by mouth every 6 hours as needed for nausea     potassium chloride ER (KLOR-CON M) 10 MEQ CR tablet Take 20 mEq by mouth daily     pravastatin (PRAVACHOL) 40 MG tablet Take 40 mg by mouth At Bedtime     predniSONE (DELTASONE) 1 MG tablet Take 2-4 mg by mouth daily Slow taper. 4mg daily 1/11/23-2/10/23. 3mg 2/11/23-3/12/23. Then 2mg 3/13/23 - 3/15/23 then stop     rivaroxaban ANTICOAGULANT (XARELTO) 20 MG TABS tablet Take 1 tablet (20 mg) by mouth daily (with dinner)     No current facility-administered medications for this visit.       ROS:  10 point ROS of systems  "including Constitutional, Eyes, Respiratory, Cardiovascular, Gastroenterology, Genitourinary, Integumentary, Musculoskeletal, Psychiatric were all negative except for pertinent positives noted in my HPI.    Vitals:  /60   Pulse 69   Temp 97.6  F (36.4  C)   Resp 18   Ht 1.6 m (5' 3\")   Wt 90.2 kg (198 lb 14.4 oz)   SpO2 95%   BMI 35.23 kg/m    Exam:  GENERAL APPEARANCE:  Alert, in no distress, oriented, cooperative  ENT:  Mouth and posterior oropharynx normal, moist mucous membranes, [place]  EYES:  EOM, conjunctivae, lids, pupils and irises normal  NECK:  No adenopathy,masses or thyromegaly  RESP:  respiratory effort and palpation of chest normal, lungs clear to auscultation , no respiratory distress  CV:  Palpation and auscultation of heart done , regular rate and rhythm, no murmur, rub, or gallop, no edema, +2 pedal pulses  ABDOMEN:  normal bowel sounds, soft, nontender, no hepatosplenomegaly or other masses, no guarding or rebound  M/S:   Ambulates with walker  SKIN:  Inspection of skin and subcutaneous tissue baseline, Palpation of skin and subcutaneous tissue baseline  NEURO:   Cranial nerves 2-12 are normal tested and grossly at patient's baseline, no purposeful movement in upper and lower extremities  PSYCH:  oriented X 3, normal insight, judgement and memory, affect and mood normal    Lab/Diagnostic data:    Most Recent 3 CBC's:  Recent Labs   Lab Test 01/24/23  0712 01/22/23  0901 01/21/23  1225   WBC 5.7 12.0* 14.7*   HGB 8.5* 8.5* 8.9*   MCV 94 94 95    150 138*     Most Recent 3 BMP's:  Recent Labs   Lab Test 01/26/23  0821 01/26/23  0639 01/25/23  2119 01/25/23  1609 01/25/23  0848 01/25/23  0714 01/24/23  0841 01/24/23  0712 01/22/23  2053 01/22/23  0901 01/22/23  0057 01/21/23  1225   NA  --   --   --   --   --   --   --  143  --  139  --  138   POTASSIUM  --  4.1  --   --   --  4.0  --  3.8   < > 4.0  --  4.1   CHLORIDE  --   --   --   --   --   --   --  112*  --  109*  --  105   CO2 "  --   --   --   --   --   --   --  24  --  23  --  24   BUN  --   --   --   --   --   --   --  12  --  21  --  27   CR  --  0.98  --   --   --  0.90  --  0.98  --  0.97  --  1.23*   ANIONGAP  --   --   --   --   --   --   --  7  --  7  --  9   VICENTA  --   --   --   --   --   --   --  8.8  --  8.3*  --  7.9*   *  --  113* 115*   < >  --    < > 87   < > 83   < > 94  94    < > = values in this interval not displayed.     Most Recent 2 LFT's:  Recent Labs   Lab Test 01/24/23  0712 07/19/22  0741   AST 11 25   ALT <9 <5*   ALKPHOS 44* 84   BILITOTAL 0.4 0.3     Most Recent Cholesterol Panel:  Recent Labs   Lab Test 05/17/21  0922   CHOL 137   LDL 71   HDL 47*   TRIG 94     Most Recent TSH and T4:  Recent Labs   Lab Test 08/08/19  1222   TSH 2.10     Most Recent Hemoglobin A1c:  Recent Labs   Lab Test 04/05/22  1304   A1C 6.2*     Most Recent Urinalysis:  Recent Labs   Lab Test 01/20/23 2122   COLOR Yellow   APPEARANCE Turbid*   URINEGLC Negative   URINEBILI Negative   URINEKETONE Negative   SG >1.050*   UBLD >1.0 mg/dL*   URINEPH 6.0   PROTEIN 50*   NITRITE Negative   LEUKEST 500 Sara/uL*   RBCU >182*   WBCU >182*     Most Recent ESR & CRP:  Recent Labs   Lab Test 01/20/23  1521 01/11/23  0540   SED  --  61*   CRP 5.0*  --    CRPI  --  <3.00     Most Recent Anemia Panel:  Recent Labs   Lab Test 01/24/23  0712   WBC 5.7   HGB 8.5*   HCT 27.3*   MCV 94          ASSESSMENT/PLAN:    (R53.81) Physical deconditioning  (primary encounter diagnosis)  (M62.81) Generalized muscle weakness  (Z74.09,  Z78.9) Impaired mobility and activities of daily living  Comment: Acute on chronic. S/T below diagnosis. Lives in intermediate setting.   Plan:   -Continue Physical therapy and Occupational therapy as directed  -SW to remain involved for safe discharge planning    (A41.9) Sepsis, due to unspecified organism, unspecified whether acute organ dysfunction present (H)  (R10.84) Abdominal pain, generalized  (K57.20) Colonic diverticular  abscess  (K57.32) Sigmoid diverticulitis  (K65.1) Intra-abdominal abscess (H)  Comment: Acute on chronic. admitted to [facility] on 1/20/2023 for sepsis due to an intra-abdominal abscess.  Pts history starts in 4/2022 when she presented with a diverticular abscess and an incidental left renal angiomyolipoma.  The abscess was drained with a percutaneous drain placed on 4/11/2022 then again on 4/22/2022.  After abscessogram the drain was removed on 5/13/2022.  In 6/2022 she had return to hospital with recurrent sigmoid diverticulitis and hemorrhage into the renal angiomyolipoma. She had embolization in 9/2022. She had urology follow up in 10/2022. She presented back to hospital with abdominal pain.  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 She had mild sepsis and was admitted. For empiric antibiotic coverage. CT imaging on arrival showed 2 x 2.7 x 1.4 cm fluid collection in the endometrial cavity.  Follow-up pelvic ultrasound and pelvic MRI confirms a abscess within the uterus.  She has tethering of bowel along the adnexa and uterus.  The fistula is confirmed to be between the small bowel and sigmoid colon.  Plan:   -Continue IV rocephin daily x 3 full weeks per ID recommendations  -Continue metronidazole 500mg TID  -Tylenol 500mg QID and every 6 hours PRN  -Continue ciprofloxacin 500mg BID  -Continue Omeprazole 40mg daily.   -Continue prednisone taper as directed. Slow taper. 4mg daily 1/11/23-2/10/23. 3mg 2/11/23-3/12/23. Then 2mg 3/13/23 - 3/15/23 then stop  -Zofran PRN  -Gabapentin 900mg at HS  -Follow up with GYN/ONC clinic within 1 week. They are to contact patient directly. Will have staff to assist as well.   -Follow up with colorectal surgery. Scheduled for 2/7/23. Can call Office: [phone] to schedule  -Follow up with Teri Hernandez MD, Infectious Disease at the [facility] clinic (near [facility]) in 3-4 weeks (or next available).  Phone: [phone]  -Continue weekly labs of CBC with diff, CMP, ESR, CRP. Fax to Teri Hernandez MD at [phone] Infectious Disease.  -Routine PICC line cares and dressing changes.    (R78.81) Bacteremia  (N39.0) Urinary tract infection without hematuria, site unspecified  (D72.829) Leukocytosis, unspecified type  (N17.9) Acute kidney injury (H)  (N18.31) Chronic kidney disease, stage 3a (H)  Comment: Acute on chronic, Pts urine is e coli positive on 1/20/2022 along with 1/2 blood culture positive.  She had an e coli Positive on 1/5/2022 as well.  She is resistant to cipro, levo and bactrim.  Plan:   -Monitor urinary status  -Follow up with [place] [facility]rology as directed. Scheduled for 4/23/23  -Continue antibiotic as directed  -Continue weekly labs of CBC with diff, CMP, ESR, CRP    (I10) Primary hypertension  (E66.01) Morbid obesity (H)  (E78.2) Mixed hyperlipidemia  (R60.0) Lower extremity edema  Comment: Chronic. Based on JNC-8 goals,  patients age of 78 year old, presence of diabetes or CKD, and goals of care goal BP is  <140/90 mm Hg. Patient is stable with current plan of care and routine assessment..  Plan:   -Monitor BP and HR  -Continue carvedilol 12.5mg BID with meals. HOLD if SBP<100 and/or HR<55  -Continue daily asa and statin  -Continue furosemide 20mg daily. HOLD if SBP<100  -Continue losartan 12.5mg daily. HOLD if SBP<100  -Continue weekly labs of CBC with diff, CMP, ESR, CRP    (D63.8) Anemia of chronic disease  Comment: Chronic. baseline hgb~ mid 8s  Plan:   -Monitor bleeding risks.   -Continue weekly labs of CBC with diff, CMP, ESR, CRP    (M35.3) Polymyalgia rheumatica (H)  Comment: Chronic. Followed by rheumatology with [facility] rheumatology clinic. Back in April 2022, they recommend stopping this medication altogether while in hospital, unknown why this restarted--would recommend rheumatology input again.   Plan:   -Monitor for worsening s/sx of concerns  -Monitor pain  -Continue azathioprine 100mg  "daily for now as directed.   -Continue prednisone taper as directed  -Continue weekly labs of CBC with diff, CMP, ESR, CRP    (J44.9) Chronic obstructive pulmonary disease, unspecified COPD type (H)  Comment: Chronic. No recent exacerbations. History of covid  Plan:   -Monitor respiratory status  -Continue albuterol nebulization every 6 hours PRN  -Continue Trelegy Ellipta inhaler daily  -Continue singular daily at HS  -Continue weekly labs of CBC with diff, CMP, ESR, CRP    (Z86.718) Personal history of DVT  Comment: Acute on chronic. Noted Nov 2021. Doppler in March2022 with ongoing nonocclusive DVT in the proximal right femoral vein, occlusive in the mid femoral vein segments - now felt to be chronic and per radiology, \"overall improved from 11/05/2021\".   Plan:  -Continue xarelto daily  -Monitor bleeding risks  -Continue weekly labs of CBC with diff, CMP, ESR, CRP    (F41.9) Anxiety  (F32.A) Depression, unspecified depression type  Comment: Chronic. Situational stress contributing. She reports feeling down due to not able to return back home to [facility] due to IV antibiotic. Offered ACP on site while on TCU. She declines at this time.  Plan:   -Monitor mood and behaviors  -Monitor for changes in mobility, eating and sleeping patterns  -Continue prozac 40mg daily  -Continue weekly labs of CBC with diff, CMP, ESR, CRP     (E11.69) Type 2 diabetes mellitus with other specified complication, without long-term current use of insulin (H)  Comment: Acute on chronic. History of being on metformin in the past which was stopped due to SHRUTHI concerns. Blood Glucose remains stable without. Last Hgb A1c in April 2022 was 6.2%  Plan:   -Monitor for worsening s/sx of concerns  -Follow up with PCP post TCU    Electronically signed by:  Adrianna Bonner DNP, APRN                          Sincerely,        Adrianna Bonner, ELISA CNP    "

## 2023-01-30 ENCOUNTER — TRANSITIONAL CARE UNIT VISIT (OUTPATIENT)
Dept: GERIATRICS | Facility: CLINIC | Age: 78
End: 2023-01-30
Payer: COMMERCIAL

## 2023-01-30 ENCOUNTER — LAB REQUISITION (OUTPATIENT)
Dept: LAB | Facility: CLINIC | Age: 78
End: 2023-01-30
Payer: COMMERCIAL

## 2023-01-30 VITALS
HEIGHT: 63 IN | WEIGHT: 191 LBS | BODY MASS INDEX: 33.84 KG/M2 | HEART RATE: 71 BPM | SYSTOLIC BLOOD PRESSURE: 125 MMHG | TEMPERATURE: 97 F | OXYGEN SATURATION: 95 % | RESPIRATION RATE: 16 BRPM | DIASTOLIC BLOOD PRESSURE: 56 MMHG

## 2023-01-30 DIAGNOSIS — R78.81 BACTEREMIA: ICD-10-CM

## 2023-01-30 DIAGNOSIS — K65.1 INTRA-ABDOMINAL ABSCESS (H): Primary | ICD-10-CM

## 2023-01-30 DIAGNOSIS — N17.9 ACUTE KIDNEY INJURY (H): ICD-10-CM

## 2023-01-30 DIAGNOSIS — K57.20 COLONIC DIVERTICULAR ABSCESS: ICD-10-CM

## 2023-01-30 DIAGNOSIS — N18.31 CHRONIC KIDNEY DISEASE, STAGE 3A (H): ICD-10-CM

## 2023-01-30 DIAGNOSIS — Z51.81 ENCOUNTER FOR THERAPEUTIC DRUG LEVEL MONITORING: ICD-10-CM

## 2023-01-30 DIAGNOSIS — N39.0 URINARY TRACT INFECTION WITHOUT HEMATURIA, SITE UNSPECIFIED: ICD-10-CM

## 2023-01-30 PROCEDURE — 99309 SBSQ NF CARE MODERATE MDM 30: CPT | Performed by: NURSE PRACTITIONER

## 2023-01-30 NOTE — LETTER
1/30/2023        RE: Zakiya Christian  [address]  [place] [address]        [facility]    Chief Complaint   Patient presents with     RECHECK     HPI:  Zakiya Christian is a 78 year old  (1945), who is being seen today for an episodic care visit at: [facility] [[id number]].     Zakiya was admitted to [facility] on 1/20/2023 for sepsis due to an intra-abdominal abscess.  Pts history starts in 4/2022 when she presented with a diverticular abscess and an incidental left renal angiomyolipoma.  The abscess was drained with a percutaneous drain placed on 4/11/2022 then again on 4/22/2022.  After abscessogram the drain was removed on 5/13/2022.  In 6/2022 she had return to hospital with recurrent sigmoid diverticulitis and hemorrhage into the renal angiomyolipoma. She had embolization in 9/2022. She had urology follow up in 10/2022.  She presented to ED with abdominal pain on 1/20/23.  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  She had a UTI.  She had mild sepsis and was admitted for empiric antibiotic coverage. Pts urine was e coli positive on 1/20/2022 along with 1/2 blood culture positive.  She had an e coli Positive on 1/5/2022 as well.  She is resistant to cipro, levo and bactrim. Clinically she felt improved.  CT imaging on arrival showed 2 x 2.7 x 1.4 cm fluid collection in the endometrial cavity.  Follow-up pelvic ultrasound and pelvic MRI confirms a abscess within the uterus.  She has tethering of bowel along the adnexa and uterus.  The fistula is confirmed to be between the small bowel and sigmoid colon.  Dr. Soares, Gyn/Onc, saw the patient.  Clinically they reviewed the case.  She will be seen next week for clinical follow-up.  Dr. Hernandez of infectious disease consulted.  Due to her recurrent E. coli UTI and 1 E. coli bacteremia and intrauterine abscess IV antibiotics are recommended for 3 weeks.  " During this window she will have 2 clinical follow-up visits to make a plan regarding her fistula and abscess.  She has an appoint with colorectal surgery on February 7.  She improved quickly and had several days of IV antibiotics.  Will be sent to transitional care which is different from her normal assisted living to complete her IV antibiotics .    Today's concern is: Zakiya reports she is not having pain, she is feeling much better and is participating in therapy. She reports no dyspnea, no cough, no congestion, no difficulty with bowels or bladder.    Allergies, and PMH/PSH reviewed in [facility] today.  REVIEW OF SYSTEMS:  4 point ROS including Respiratory, CV, GI and , other than that noted in the HPI,  is negative    Objective:   /56   Pulse 71   Temp 97  F (36.1  C)   Resp 16   Ht 1.6 m (5' 3\")   Wt 86.6 kg (191 lb)   SpO2 95%   BMI 33.83 kg/m    GENERAL APPEARANCE:  Alert, in no distress   ENT:  Mouth and posterior oropharynx normal, moist mucous membranes, hearing acuity - hard of hearing   CHEST/RESP:  respiratory effort normal, no respiratory distress, lung sounds CTA    CV:  Rate and rhythm reg, no murmur, trace peripheral edema  M/S:   extremities normal,   NEUROLOGIC EXAM: no focal deficit,   PSYCH:  Alert and oriented to person-place-time , affect pleasant        Most Recent 3 CBC's:Recent Labs   Lab Test 01/24/23  0712 01/22/23  0901 01/21/23  1225   WBC 5.7 12.0* 14.7*   HGB 8.5* 8.5* 8.9*   MCV 94 94 95    150 138*     Most Recent 3 BMP's:Recent Labs   Lab Test 01/26/23  0821 01/26/23  0639 01/25/23  2119 01/25/23  1609 01/25/23  0848 01/25/23  0714 01/24/23  0841 01/24/23  0712 01/22/23  2053 01/22/23  0901 01/22/23  0057 01/21/23  1225   NA  --   --   --   --   --   --   --  143  --  139  --  138   POTASSIUM  --  4.1  --   --   --  4.0  --  3.8   < > 4.0  --  4.1   CHLORIDE  --   --   --   --   --   --   --  112*  --  109*  --  105   CO2  --   --   --   --   --   --   --  24  -- "  23  --  24   BUN  --   --   --   --   --   --   --  12  --  21  --  27   CR  --  0.98  --   --   --  0.90  --  0.98  --  0.97  --  1.23*   ANIONGAP  --   --   --   --   --   --   --  7  --  7  --  9   VICENTA  --   --   --   --   --   --   --  8.8  --  8.3*  --  7.9*   *  --  113* 115*   < >  --    < > 87   < > 83   < > 94  94    < > = values in this interval not displayed.     Most Recent 2 LFT's:Recent Labs   Lab Test 01/24/23  0712 07/19/22  0741   AST 11 25   ALT <9 <5*   ALKPHOS 44* 84   BILITOTAL 0.4 0.3     Most Recent ESR & CRP:Recent Labs   Lab Test 01/20/23  1521 01/11/23  0540   SED  --  61*   CRP 5.0*  --    CRPI  --  <3.00       Assessment/Plan:  Intra-abdominal abscess (H)  Colonic diverticular abscess  Bacteremia  Continues on IV antibiotics daily. Has multiple appointments planned over next few weeks to plan for surgical intervention.  She is feeling better, without pain or other side effects.  -continue IV antibiotics  -weekly labs, scheduled for tuesdays    Urinary tract infection without hematuria, site unspecified  No further symptoms.  resolved    Acute kidney injury (H)  Chronic kidney disease, stage 3a (H)  Baseline creat 0.6 - 0.8. elevated to 1.49 on hospital admission, then gradually improved. Most recent creatinine 0.98, BUN 12, eGFR 58. Improving.   -Avoid nephrotoxic medications  -Renal dosing of medications  -monitor kidney function 1/31/2023     MED REC REQUIRED  Post Medication Reconciliation Status: patient was not discharged from an inpatient facility or TCU      Orders:  No new orders today, awaiting lab work    Electronically signed by: ELISA Cook CNP             Sincerely,        ELISA Cook CNP

## 2023-01-30 NOTE — PROGRESS NOTES
[facility] GERIATRICS    Chief Complaint   Patient presents with     RECHECK     HPI:  Zakiya Christian is a 78 year old  (1945), who is being seen today for an episodic care visit at: [facility] ([facility]) [[id number]].     Zakiya was admitted to [facility] on 1/20/2023 for sepsis due to an intra-abdominal abscess.  Pts history starts in 4/2022 when she presented with a diverticular abscess and an incidental left renal angiomyolipoma.  The abscess was drained with a percutaneous drain placed on 4/11/2022 then again on 4/22/2022.  After abscessogram the drain was removed on 5/13/2022.  In 6/2022 she had return to hospital with recurrent sigmoid diverticulitis and hemorrhage into the renal angiomyolipoma. She had embolization in 9/2022. She had urology follow up in 10/2022.  She presented to ED with abdominal pain on 1/20/23.  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  She had a UTI.  She had mild sepsis and was admitted for empiric antibiotic coverage. Pts urine was e coli positive on 1/20/2022 along with 1/2 blood culture positive.  She had an e coli Positive on 1/5/2022 as well.  She is resistant to cipro, levo and bactrim. Clinically she felt improved.  CT imaging on arrival showed 2 x 2.7 x 1.4 cm fluid collection in the endometrial cavity.  Follow-up pelvic ultrasound and pelvic MRI confirms a abscess within the uterus.  She has tethering of bowel along the adnexa and uterus.  The fistula is confirmed to be between the small bowel and sigmoid colon.  Dr. Soares, Gyn/Onc, saw the patient.  Clinically they reviewed the case.  She will be seen next week for clinical follow-up.  Dr. Hernandez of infectious disease consulted.  Due to her recurrent E. coli UTI and 1 E. coli bacteremia and intrauterine abscess IV antibiotics are recommended for 3 weeks.  During this window she will have 2 clinical follow-up visits to make a plan regarding her fistula  "and abscess.  She has an appoint with colorectal surgery on February 7.  She improved quickly and had several days of IV antibiotics.  Will be sent to transitional care which is different from her normal assisted living to complete her IV antibiotics .    Today's concern is: Zakiya reports she is not having pain, she is feeling much better and is participating in therapy. She reports no dyspnea, no cough, no congestion, no difficulty with bowels or bladder.    Allergies, and PMH/PSH reviewed in EPIC today.  REVIEW OF SYSTEMS:  4 point ROS including Respiratory, CV, GI and , other than that noted in the HPI,  is negative    Objective:   /56   Pulse 71   Temp 97  F (36.1  C)   Resp 16   Ht 1.6 m (5' 3\")   Wt 86.6 kg (191 lb)   SpO2 95%   BMI 33.83 kg/m    GENERAL APPEARANCE:  Alert, in no distress   ENT:  Mouth and posterior oropharynx normal, moist mucous membranes, hearing acuity - hard of hearing   CHEST/RESP:  respiratory effort normal, no respiratory distress, lung sounds CTA    CV:  Rate and rhythm reg, no murmur, trace peripheral edema  M/S:   extremities normal,   NEUROLOGIC EXAM: no focal deficit,   PSYCH:  Alert and oriented to person-place-time , affect pleasant        Most Recent 3 CBC's:Recent Labs   Lab Test 01/24/23  0712 01/22/23  0901 01/21/23  1225   WBC 5.7 12.0* 14.7*   HGB 8.5* 8.5* 8.9*   MCV 94 94 95    150 138*     Most Recent 3 BMP's:Recent Labs   Lab Test 01/26/23  0821 01/26/23  0639 01/25/23  2119 01/25/23  1609 01/25/23  0848 01/25/23  0714 01/24/23  0841 01/24/23  0712 01/22/23  2053 01/22/23  0901 01/22/23  0057 01/21/23  1225   NA  --   --   --   --   --   --   --  143  --  139  --  138   POTASSIUM  --  4.1  --   --   --  4.0  --  3.8   < > 4.0  --  4.1   CHLORIDE  --   --   --   --   --   --   --  112*  --  109*  --  105   CO2  --   --   --   --   --   --   --  24  --  23  --  24   BUN  --   --   --   --   --   --   --  12  --  21  --  27   CR  --  0.98  --   --   " --  0.90  --  0.98  --  0.97  --  1.23*   ANIONGAP  --   --   --   --   --   --   --  7  --  7  --  9   VICENTA  --   --   --   --   --   --   --  8.8  --  8.3*  --  7.9*   *  --  113* 115*   < >  --    < > 87   < > 83   < > 94  94    < > = values in this interval not displayed.     Most Recent 2 LFT's:Recent Labs   Lab Test 01/24/23  0712 07/19/22  0741   AST 11 25   ALT <9 <5*   ALKPHOS 44* 84   BILITOTAL 0.4 0.3     Most Recent ESR & CRP:Recent Labs   Lab Test 01/20/23  1521 01/11/23  0540   SED  --  61*   CRP 5.0*  --    CRPI  --  <3.00       Assessment/Plan:  Intra-abdominal abscess (H)  Colonic diverticular abscess  Bacteremia  Continues on IV antibiotics daily. Has multiple appointments planned over next few weeks to plan for surgical intervention.  She is feeling better, without pain or other side effects.  -continue IV antibiotics  -weekly labs, scheduled for tuesdays    Urinary tract infection without hematuria, site unspecified  No further symptoms.  resolved    Acute kidney injury (H)  Chronic kidney disease, stage 3a (H)  Baseline creat 0.6 - 0.8. elevated to 1.49 on hospital admission, then gradually improved. Most recent creatinine 0.98, BUN 12, eGFR 58. Improving.   -Avoid nephrotoxic medications  -Renal dosing of medications  -monitor kidney function 1/31/2023     MED REC REQUIRED  Post Medication Reconciliation Status: patient was not discharged from an inpatient facility or TCU      Orders:  No new orders today, awaiting lab work    Electronically signed by: ELISA Cook CNP

## 2023-01-31 ENCOUNTER — TRANSFERRED RECORDS (OUTPATIENT)
Dept: HEALTH INFORMATION MANAGEMENT | Facility: CLINIC | Age: 78
End: 2023-01-31

## 2023-01-31 LAB
ALBUMIN SERPL BCG-MCNC: 3.4 G/DL (ref 3.5–5.2)
ALP SERPL-CCNC: 48 U/L (ref 35–104)
ALT SERPL W P-5'-P-CCNC: 8 U/L (ref 10–35)
ANION GAP SERPL CALCULATED.3IONS-SCNC: 12 MMOL/L (ref 7–15)
AST SERPL W P-5'-P-CCNC: 19 U/L (ref 10–35)
BASOPHILS # BLD AUTO: 0.1 10E3/UL (ref 0–0.2)
BASOPHILS NFR BLD AUTO: 1 %
BILIRUB SERPL-MCNC: 0.2 MG/DL
BUN SERPL-MCNC: 20.3 MG/DL (ref 8–23)
CALCIUM SERPL-MCNC: 9.5 MG/DL (ref 8.8–10.2)
CHLORIDE SERPL-SCNC: 101 MMOL/L (ref 98–107)
CREAT SERPL-MCNC: 1.37 MG/DL (ref 0.51–0.95)
CRP SERPL-MCNC: <3 MG/L
DEPRECATED HCO3 PLAS-SCNC: 26 MMOL/L (ref 22–29)
EOSINOPHIL # BLD AUTO: 0.2 10E3/UL (ref 0–0.7)
EOSINOPHIL NFR BLD AUTO: 4 %
ERYTHROCYTE [DISTWIDTH] IN BLOOD BY AUTOMATED COUNT: 14.6 % (ref 10–15)
ERYTHROCYTE [SEDIMENTATION RATE] IN BLOOD BY WESTERGREN METHOD: 55 MM/HR (ref 0–30)
GFR SERPL CREATININE-BSD FRML MDRD: 39 ML/MIN/1.73M2
GLUCOSE SERPL-MCNC: 88 MG/DL (ref 70–99)
HCT VFR BLD AUTO: 32.3 % (ref 35–47)
HGB BLD-MCNC: 9.8 G/DL (ref 11.7–15.7)
IMM GRANULOCYTES # BLD: 0 10E3/UL
IMM GRANULOCYTES NFR BLD: 1 %
LYMPHOCYTES # BLD AUTO: 1.9 10E3/UL (ref 0.8–5.3)
LYMPHOCYTES NFR BLD AUTO: 36 %
MCH RBC QN AUTO: 29.3 PG (ref 26.5–33)
MCHC RBC AUTO-ENTMCNC: 30.3 G/DL (ref 31.5–36.5)
MCV RBC AUTO: 97 FL (ref 78–100)
MONOCYTES # BLD AUTO: 0.5 10E3/UL (ref 0–1.3)
MONOCYTES NFR BLD AUTO: 10 %
NEUTROPHILS # BLD AUTO: 2.5 10E3/UL (ref 1.6–8.3)
NEUTROPHILS NFR BLD AUTO: 48 %
NRBC # BLD AUTO: 0 10E3/UL
NRBC BLD AUTO-RTO: 0 /100
PLATELET # BLD AUTO: 346 10E3/UL (ref 150–450)
POTASSIUM SERPL-SCNC: 4.7 MMOL/L (ref 3.4–5.3)
PROT SERPL-MCNC: 6.6 G/DL (ref 6.4–8.3)
RBC # BLD AUTO: 3.34 10E6/UL (ref 3.8–5.2)
SODIUM SERPL-SCNC: 139 MMOL/L (ref 136–145)
WBC # BLD AUTO: 5.1 10E3/UL (ref 4–11)

## 2023-01-31 PROCEDURE — P9604 ONE-WAY ALLOW PRORATED TRIP: HCPCS | Mod: ORL | Performed by: NURSE PRACTITIONER

## 2023-01-31 PROCEDURE — 85652 RBC SED RATE AUTOMATED: CPT | Mod: ORL | Performed by: NURSE PRACTITIONER

## 2023-01-31 PROCEDURE — 80053 COMPREHEN METABOLIC PANEL: CPT | Mod: ORL | Performed by: NURSE PRACTITIONER

## 2023-01-31 PROCEDURE — 85025 COMPLETE CBC W/AUTO DIFF WBC: CPT | Mod: ORL | Performed by: NURSE PRACTITIONER

## 2023-01-31 PROCEDURE — 36415 COLL VENOUS BLD VENIPUNCTURE: CPT | Mod: ORL | Performed by: NURSE PRACTITIONER

## 2023-01-31 PROCEDURE — 86140 C-REACTIVE PROTEIN: CPT | Mod: ORL | Performed by: NURSE PRACTITIONER

## 2023-02-01 ENCOUNTER — DISCHARGE SUMMARY NURSING HOME (OUTPATIENT)
Dept: GERIATRICS | Facility: CLINIC | Age: 78
End: 2023-02-01
Payer: COMMERCIAL

## 2023-02-01 VITALS
BODY MASS INDEX: 34.07 KG/M2 | OXYGEN SATURATION: 95 % | SYSTOLIC BLOOD PRESSURE: 156 MMHG | DIASTOLIC BLOOD PRESSURE: 67 MMHG | WEIGHT: 192.3 LBS | TEMPERATURE: 96.8 F | RESPIRATION RATE: 16 BRPM | HEART RATE: 93 BPM | HEIGHT: 63 IN

## 2023-02-01 DIAGNOSIS — K65.1 INTRA-ABDOMINAL ABSCESS (H): Primary | ICD-10-CM

## 2023-02-01 DIAGNOSIS — R78.81 BACTEREMIA: ICD-10-CM

## 2023-02-01 DIAGNOSIS — F32.A DEPRESSION, UNSPECIFIED DEPRESSION TYPE: ICD-10-CM

## 2023-02-01 DIAGNOSIS — Z86.718 PERSONAL HISTORY OF DVT (DEEP VEIN THROMBOSIS): ICD-10-CM

## 2023-02-01 DIAGNOSIS — E78.2 MIXED HYPERLIPIDEMIA: ICD-10-CM

## 2023-02-01 DIAGNOSIS — Z78.9 IMPAIRED MOBILITY AND ACTIVITIES OF DAILY LIVING: ICD-10-CM

## 2023-02-01 DIAGNOSIS — D63.8 ANEMIA OF CHRONIC DISEASE: ICD-10-CM

## 2023-02-01 DIAGNOSIS — Z87.19 HISTORY OF DIVERTICULAR ABSCESS: ICD-10-CM

## 2023-02-01 DIAGNOSIS — M62.81 GENERALIZED MUSCLE WEAKNESS: ICD-10-CM

## 2023-02-01 DIAGNOSIS — Z74.09 IMPAIRED MOBILITY AND ACTIVITIES OF DAILY LIVING: ICD-10-CM

## 2023-02-01 DIAGNOSIS — N18.31 CHRONIC KIDNEY DISEASE, STAGE 3A (H): ICD-10-CM

## 2023-02-01 DIAGNOSIS — F41.9 ANXIETY: ICD-10-CM

## 2023-02-01 DIAGNOSIS — K57.20 COLONIC DIVERTICULAR ABSCESS: ICD-10-CM

## 2023-02-01 DIAGNOSIS — I10 PRIMARY HYPERTENSION: ICD-10-CM

## 2023-02-01 DIAGNOSIS — R78.81 E COLI BACTEREMIA: ICD-10-CM

## 2023-02-01 DIAGNOSIS — D72.829 LEUKOCYTOSIS, UNSPECIFIED TYPE: ICD-10-CM

## 2023-02-01 DIAGNOSIS — N17.9 ACUTE KIDNEY INJURY (H): ICD-10-CM

## 2023-02-01 DIAGNOSIS — R53.81 PHYSICAL DECONDITIONING: ICD-10-CM

## 2023-02-01 DIAGNOSIS — J44.9 CHRONIC OBSTRUCTIVE PULMONARY DISEASE, UNSPECIFIED COPD TYPE (H): ICD-10-CM

## 2023-02-01 DIAGNOSIS — K57.32 SIGMOID DIVERTICULITIS: ICD-10-CM

## 2023-02-01 DIAGNOSIS — K65.9 ABDOMINAL INFECTION (H): ICD-10-CM

## 2023-02-01 DIAGNOSIS — A41.9 SEPSIS, DUE TO UNSPECIFIED ORGANISM, UNSPECIFIED WHETHER ACUTE ORGAN DYSFUNCTION PRESENT (H): ICD-10-CM

## 2023-02-01 DIAGNOSIS — M35.3 POLYMYALGIA RHEUMATICA (H): ICD-10-CM

## 2023-02-01 DIAGNOSIS — E66.01 MORBID OBESITY (H): ICD-10-CM

## 2023-02-01 DIAGNOSIS — R10.84 ABDOMINAL PAIN, GENERALIZED: ICD-10-CM

## 2023-02-01 DIAGNOSIS — N39.0 URINARY TRACT INFECTION WITHOUT HEMATURIA, SITE UNSPECIFIED: ICD-10-CM

## 2023-02-01 DIAGNOSIS — B96.20 E COLI BACTEREMIA: ICD-10-CM

## 2023-02-01 DIAGNOSIS — R60.0 LOWER EXTREMITY EDEMA: ICD-10-CM

## 2023-02-01 PROCEDURE — 99316 NF DSCHRG MGMT 30 MIN+: CPT | Performed by: NURSE PRACTITIONER

## 2023-02-01 RX ORDER — FLUTICASONE FUROATE, UMECLIDINIUM BROMIDE AND VILANTEROL TRIFENATATE 200; 62.5; 25 UG/1; UG/1; UG/1
1 POWDER RESPIRATORY (INHALATION) EVERY MORNING
Qty: 60 EACH | Refills: 0 | Status: SHIPPED | OUTPATIENT
Start: 2023-02-01

## 2023-02-01 RX ORDER — CEFTRIAXONE 2 G/1
2 INJECTION, POWDER, FOR SOLUTION INTRAMUSCULAR; INTRAVENOUS EVERY 24 HOURS
Qty: 440 ML | Refills: 0 | Status: SHIPPED | OUTPATIENT
Start: 2023-02-01 | End: 2023-02-23

## 2023-02-01 RX ORDER — LOSARTAN POTASSIUM 25 MG/1
12.5 TABLET ORAL EVERY MORNING
Qty: 30 TABLET | Refills: 0 | Status: SHIPPED | OUTPATIENT
Start: 2023-02-01

## 2023-02-01 RX ORDER — MULTIVIT-MIN/IRON/FOLIC ACID/K 18-600-40
1000 CAPSULE ORAL DAILY
Qty: 30 CAPSULE | Refills: 0 | Status: SHIPPED | OUTPATIENT
Start: 2023-02-01 | End: 2023-08-21

## 2023-02-01 RX ORDER — AZATHIOPRINE 50 MG/1
100 TABLET ORAL DAILY
Qty: 60 TABLET | Refills: 0 | Status: SHIPPED | OUTPATIENT
Start: 2023-02-01 | End: 2023-11-01

## 2023-02-01 RX ORDER — PRAVASTATIN SODIUM 40 MG
40 TABLET ORAL AT BEDTIME
Qty: 30 TABLET | Refills: 0 | Status: SHIPPED | OUTPATIENT
Start: 2023-02-01 | End: 2023-08-21

## 2023-02-01 RX ORDER — FUROSEMIDE 20 MG
20 TABLET ORAL DAILY
Qty: 30 TABLET | Refills: 0 | Status: SHIPPED | OUTPATIENT
Start: 2023-02-01 | End: 2023-08-21

## 2023-02-01 RX ORDER — FLUOXETINE 40 MG/1
40 CAPSULE ORAL EVERY MORNING
Qty: 30 CAPSULE | Refills: 0 | Status: SHIPPED | OUTPATIENT
Start: 2023-02-01

## 2023-02-01 RX ORDER — PREDNISONE 1 MG/1
2-4 TABLET ORAL DAILY
Qty: 90 TABLET | Refills: 0 | Status: SHIPPED | OUTPATIENT
Start: 2023-02-01 | End: 2023-11-01

## 2023-02-01 RX ORDER — OMEPRAZOLE 40 MG/1
40 CAPSULE, DELAYED RELEASE ORAL DAILY
Qty: 30 CAPSULE | Refills: 0 | Status: SHIPPED | OUTPATIENT
Start: 2023-02-01

## 2023-02-01 RX ORDER — METRONIDAZOLE 500 MG/1
500 TABLET ORAL 3 TIMES DAILY
Qty: 24 TABLET | Refills: 0 | Status: SHIPPED | OUTPATIENT
Start: 2023-02-01 | End: 2023-02-09

## 2023-02-01 RX ORDER — ALBUTEROL SULFATE 90 UG/1
2 AEROSOL, METERED RESPIRATORY (INHALATION) EVERY 6 HOURS PRN
Qty: 18 G | Refills: 0 | Status: SHIPPED | OUTPATIENT
Start: 2023-02-01

## 2023-02-01 RX ORDER — CARVEDILOL 12.5 MG/1
12.5 TABLET ORAL 2 TIMES DAILY WITH MEALS
Qty: 60 TABLET | Refills: 0 | Status: SHIPPED | OUTPATIENT
Start: 2023-02-01

## 2023-02-01 RX ORDER — NYSTATIN 100000 U/G
CREAM TOPICAL 2 TIMES DAILY
Qty: 60 G | Refills: 0 | Status: SHIPPED | OUTPATIENT
Start: 2023-02-01

## 2023-02-01 RX ORDER — ONDANSETRON 4 MG/1
4 TABLET, FILM COATED ORAL EVERY 6 HOURS PRN
Qty: 30 TABLET | Refills: 0 | Status: SHIPPED | OUTPATIENT
Start: 2023-02-01

## 2023-02-01 RX ORDER — MONTELUKAST SODIUM 10 MG/1
10 TABLET ORAL AT BEDTIME
Qty: 30 TABLET | Refills: 0 | Status: SHIPPED | OUTPATIENT
Start: 2023-02-01 | End: 2023-08-21 | Stop reason: SINTOL

## 2023-02-01 RX ORDER — ASPIRIN 81 MG/1
81 TABLET ORAL DAILY
Qty: 30 TABLET | Refills: 0 | Status: SHIPPED | OUTPATIENT
Start: 2023-02-01 | End: 2023-08-21 | Stop reason: ALTCHOICE

## 2023-02-01 RX ORDER — ALENDRONATE SODIUM 70 MG/1
70 TABLET ORAL
Qty: 4 TABLET | Refills: 0 | Status: SHIPPED | OUTPATIENT
Start: 2023-02-01

## 2023-02-01 RX ORDER — UBIDECARENONE 30 MG
1 CAPSULE ORAL DAILY
Qty: 30 CAPSULE | Refills: 0 | Status: SHIPPED | OUTPATIENT
Start: 2023-02-01 | End: 2023-08-21

## 2023-02-01 RX ORDER — CALCIUM POLYCARBOPHIL 625 MG 625 MG/1
2 TABLET ORAL 2 TIMES DAILY WITH MEALS
Qty: 120 TABLET | Refills: 0 | Status: SHIPPED | OUTPATIENT
Start: 2023-02-01

## 2023-02-01 RX ORDER — ACETAMINOPHEN 500 MG
500 TABLET ORAL 4 TIMES DAILY
Qty: 240 TABLET | Refills: 0 | Status: SHIPPED | OUTPATIENT
Start: 2023-02-01 | End: 2023-11-04

## 2023-02-01 RX ORDER — GABAPENTIN 300 MG/1
900 CAPSULE ORAL AT BEDTIME
Qty: 90 CAPSULE | Refills: 0 | Status: SHIPPED | OUTPATIENT
Start: 2023-02-01

## 2023-02-01 RX ORDER — POTASSIUM CHLORIDE 750 MG/1
20 TABLET, EXTENDED RELEASE ORAL DAILY
Qty: 60 TABLET | Refills: 0 | Status: SHIPPED | OUTPATIENT
Start: 2023-02-01 | End: 2023-08-21

## 2023-02-01 NOTE — LETTER
2/1/2023        RE: Zakiya Christian  [address]  [place] [address]        [facility] GERIATRICS DISCHARGE SUMMARY  PATIENT'S NAME: Zakiya Christian  YOB: 1945  MEDICAL RECORD NUMBER:  [id number]  Place of Service where encounter took place:  [facility] (SNF) [[id number]]    PRIMARY CARE PROVIDER AND CLINIC RESPONSIBLE AFTER TRANSFER:   Ami Noriega MD, [address] [facility] [id number]    [facility] [place]     Transferring providers: ELISA Reynolds CNP, Delicia Loja MD  Recent Hospitalization/ED:  [facility] Hospital stay 1/20/23 to 1/23/23.  Date of SNF Admission: January 23, 2023  Date of SNF (anticipated) Discharge: February 03, 2023  Discharged to: previous assisted living  Cognitive Scores: BIMS: 15/15  Physical Function: Ambulating 100 ft with walker  DME: SNF  coordinating DME needs     CODE STATUS/ADVANCE DIRECTIVES DISCUSSION:  Full Code   ALLERGIES: Simvastatin    NURSING FACILITY COURSE   Medication Changes/Rationale:     See below    Summary of nursing facility stay:     ASSESSMENT/PLAN:     (R53.81) Physical deconditioning  (primary encounter diagnosis)  (M62.81) Generalized muscle weakness  (Z74.09,  Z78.9) Impaired mobility and activities of daily living  Comment: Acute on chronic. S/T below diagnosis. Lives in group home setting.   Plan:   -Continue Physical therapy and Occupational therapy as directed  -SW to remain involved for safe discharge planning  -Discharging on Friday on 2/3/23 with services     (A41.9) Sepsis, due to unspecified organism, unspecified whether acute organ dysfunction present (H)  (R10.84) Abdominal pain, generalized  (K57.20) Colonic diverticular abscess  (K57.32) Sigmoid diverticulitis  (K65.1) Intra-abdominal abscess (H)  Comment: Acute on chronic. admitted to [facility] on 1/20/2023 for sepsis due to an intra-abdominal abscess.  Pts history starts in 4/2022 when she  presented with a diverticular abscess and an incidental left renal angiomyolipoma.  The abscess was drained with a percutaneous drain placed on 4/11/2022 then again on 4/22/2022.  After abscessogram the drain was removed on 5/13/2022.  In 6/2022 she had return to hospital with recurrent sigmoid diverticulitis and hemorrhage into the renal angiomyolipoma. She had embolization in 9/2022. She had urology follow up in 10/2022. She presented back to hospital with abdominal pain.  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 She had mild sepsis and was admitted. For empiric antibiotic coverage. CT imaging on arrival showed 2 x 2.7 x 1.4 cm fluid collection in the endometrial cavity.  Follow-up pelvic ultrasound and pelvic MRI confirms a abscess within the uterus.  She has tethering of bowel along the adnexa and uterus.  The fistula is confirmed to be between the small bowel and sigmoid colon.  Plan:   -Continue IV rocephin daily x 3 full weeks per ID recommendations--due to stop 2/23/23  -Continue metronidazole 500mg TID  -Tylenol 500mg QID and every 6 hours PRN  -Continue ciprofloxacin 500mg BID  -Continue Omeprazole 40mg daily.   -Continue prednisone taper as directed. Slow taper. 4mg daily 1/11/23-2/10/23. 3mg 2/11/23-3/12/23. Then 2mg 3/13/23 - 3/15/23 then stop  -Zofran PRN  -Gabapentin 900mg at HS  -Follow up with GYN/ONC clinic within 1 week. Scheduled for 2/9/23  -Follow up with colorectal surgery. Scheduled for 2/7/23. Can call Office: [phone] to schedule  -Follow up with Teri Hernandez MD, Infectious Disease at the [facility] clinic (near [facility]) in 3-4 weeks (or next available). Phone: [phone]  -Continue weekly labs of CBC with diff, CMP, ESR, CRP. Fax to Teri Hernandez MD at [phone] Infectious Disease.  -Routine PICC line cares and dressing changes.     (R78.81) Bacteremia  (N39.0) Urinary tract infection without  hematuria, site unspecified  (D72.829) Leukocytosis, unspecified type  (N17.9) Acute kidney injury (H)  (N18.31) Chronic kidney disease, stage 3a (H)  Comment: Acute on chronic, Pts urine is e coli positive on 1/20/2022 along with 1/2 blood culture positive.  She had an e coli Positive on 1/5/2022 as well.  She is resistant to cipro, levo and bactrim.  Plan:   -Monitor urinary status  -Follow up with [place] [facility] as directed. Scheduled for 4/23/23  -Continue antibiotic as directed  -Continue weekly labs of CBC with diff, CMP, ESR, CRP     (I10) Primary hypertension  (E66.01) Morbid obesity (H)  (E78.2) Mixed hyperlipidemia  (R60.0) Lower extremity edema  Comment: Chronic. Based on JNC-8 goals,  patients age of 78 year old, presence of diabetes or CKD, and goals of care goal BP is  <140/90 mm Hg. Patient is stable with current plan of care and routine assessment..  Plan:   -Monitor BP and HR  -Continue carvedilol 12.5mg BID with meals. HOLD if SBP<100 and/or HR<55  -Continue daily asa and statin  -Continue furosemide 20mg daily. HOLD if SBP<100  -Continue losartan 12.5mg daily. HOLD if SBP<100  -Continue weekly labs of CBC with diff, CMP, ESR, CRP     (D63.8) Anemia of chronic disease  Comment: Chronic. baseline hgb~ mid 8s  Plan:   -Monitor bleeding risks.   -Continue weekly labs of CBC with diff, CMP, ESR, CRP     (M35.3) Polymyalgia rheumatica (H)  Comment: Chronic. Followed by rheumatology with [facility] rheumatology clinic. Back in April 2022, they recommend stopping this medication altogether while in hospital, unknown why this restarted--would recommend rheumatology input again.   Plan:   -Monitor for worsening s/sx of concerns  -Monitor pain  -Continue azathioprine 100mg daily for now as directed.   -Continue prednisone taper as directed  -Continue weekly labs of CBC with diff, CMP, ESR, CRP     (J44.9) Chronic obstructive pulmonary disease, unspecified COPD type (H)  Comment: Chronic. No recent exacerbations.  "History of covid  Plan:   -Monitor respiratory status  -Continue albuterol nebulization every 6 hours PRN  -Continue Trelegy Ellipta inhaler daily  -Continue singular daily at HS  -Continue weekly labs of CBC with diff, CMP, ESR, CRP     (Z86.718) Personal history of DVT  Comment: Acute on chronic. Noted Nov 2021. Doppler in March2022 with ongoing nonocclusive DVT in the proximal right femoral vein, occlusive in the mid femoral vein segments - now felt to be chronic and per radiology, \"overall improved from 11/05/2021\".   Plan:  -Continue xarelto daily  -Monitor bleeding risks  -Continue weekly labs of CBC with diff, CMP, ESR, CRP     (F41.9) Anxiety  (F32.A) Depression, unspecified depression type  Comment: Chronic. Situational stress contributing. She reports feeling down due to not able to return back home to [facility] due to IV antibiotic. Offered ACP on site while on TCU. She declines at this time.  Plan:   -Monitor mood and behaviors  -Monitor for changes in mobility, eating and sleeping patterns  -Continue prozac 40mg daily  -Continue weekly labs of CBC with diff, CMP, ESR, CRP      (E11.69) Type 2 diabetes mellitus with other specified complication, without long-term current use of insulin (H)  Comment: Acute on chronic. History of being on metformin in the past which was stopped due to SHRUTHI concerns. Blood Glucose remains stable without. Last Hgb A1c in April 2022 was 6.2%  Plan:   -Monitor for worsening s/sx of concerns  -Follow up with PCP post TCU    Discharge Medications:  MED REC REQUIRED  Post Medication Reconciliation Status:  Medication reconciliation previously completed during another office visit       Current Outpatient Medications   Medication Sig Dispense Refill     acetaminophen (TYLENOL) 500 MG tablet Take 500 mg by mouth 4 times daily And q6h PRN - max 2 PRN/day       albuterol (PROAIR HFA/PROVENTIL HFA/VENTOLIN HFA) 108 (90 Base) MCG/ACT inhaler Inhale 2 puffs into the lungs every 6 hours as " needed for shortness of breath / dyspnea or wheezing       albuterol (PROVENTIL) (2.5 MG/3ML) 0.083% neb solution Take 1 vial (2.5 mg) by nebulization every 6 hours as needed for shortness of breath, wheezing or cough 90 mL 1     alendronate (FOSAMAX) 70 MG tablet Take 70 mg by mouth every 7 days       Ascorbic Acid (VITAMIN C) 500 MG CAPS Take 1,000 mg by mouth daily       aspirin 81 MG EC tablet Take 81 mg by mouth daily       azaTHIOprine (IMURAN) 50 MG tablet Take 100 mg by mouth daily       calcium carbonate 600 mg-vitamin D 400 units (CALTRATE) 600-400 MG-UNIT per tablet Take 1 tablet by mouth 2 times daily (with meals)       calcium polycarbophil (FIBERCON) 625 MG tablet Take 2 tablets by mouth 2 times daily (with meals)       carvedilol (COREG) 12.5 MG tablet Take 12.5 mg by mouth 2 times daily (with meals) HOLD if SBP<100 and/or HR<55       cefTRIAXone (ROCEPHIN) 2 GM vial Inject 2 g into the vein every 24 hours for 28 days 560 mL 0     cholecalciferol 50 MCG (2000 UT) CAPS Take 4,000 Units by mouth daily       coenzyme Q-10 capsule Take 1 capsule (100 mg) by mouth daily 30 capsule 1     FLUoxetine (PROZAC) 40 MG capsule Take 40 mg by mouth every morning       Fluticasone-Umeclidin-Vilanterol (TRELEGY ELLIPTA) 200-62.5-25 MCG/INH oral inhaler Inhale 1 puff into the lungs every morning       furosemide (LASIX) 20 MG tablet Take 20 mg by mouth daily HOLD if SBP<100       gabapentin (NEURONTIN) 300 MG capsule Take 900 mg by mouth At Bedtime       losartan (COZAAR) 25 MG tablet Take 12.5 mg by mouth every morning HOLD if SBP<100       metroNIDAZOLE (FLAGYL) 500 MG tablet Take 1 tablet (500 mg) by mouth 3 times daily 42 tablet 0     montelukast (SINGULAIR) 10 MG tablet Take 10 mg by mouth At Bedtime       nystatin (MYCOSTATIN) 848680 UNIT/GM external cream Apply topically 2 times daily       omeprazole (PRILOSEC) 40 MG DR capsule Take 40 mg by mouth daily       ondansetron (ZOFRAN) 4 MG tablet Take 4 mg by mouth  "every 6 hours as needed for nausea       potassium chloride ER (KLOR-CON M) 10 MEQ CR tablet Take 20 mEq by mouth daily       pravastatin (PRAVACHOL) 40 MG tablet Take 40 mg by mouth At Bedtime       predniSONE (DELTASONE) 1 MG tablet Take 2-4 mg by mouth daily Slow taper. 4mg daily 1/11/23-2/10/23. 3mg 2/11/23-3/12/23. Then 2mg 3/13/23 - 3/15/23 then stop       rivaroxaban ANTICOAGULANT (XARELTO) 20 MG TABS tablet Take 1 tablet (20 mg) by mouth daily (with dinner)        Controlled medications:   not applicable/none     Past Medical History:   Past Medical History:   Diagnosis Date     Diabetes (H)      Hypertension      Obese      PMR (polymyalgia rheumatica) (H)      Physical Exam:   Vitals: BP (!) 156/67   Pulse 93   Temp 96.8  F (36  C)   Resp 16   Ht 1.6 m (5' 3\")   Wt 87.2 kg (192 lb 4.8 oz)   SpO2 95%   BMI 34.06 kg/m    BMI: Body mass index is 34.06 kg/m .  GENERAL APPEARANCE:  Alert, in no distress, cooperative  ENT:  Mouth and posterior oropharynx normal, moist mucous membranes, [place]  EYES:  EOM, conjunctivae, lids, pupils and irises normal  NECK:  No adenopathy,masses or thyromegaly  RESP:  respiratory effort and palpation of chest normal, lungs clear to auscultation , no respiratory distress  CV:  Palpation and auscultation of heart done , regular rate and rhythm, no murmur, rub, or gallop, trace edema to BLE  ABDOMEN:  normal bowel sounds, soft, nontender, no hepatosplenomegaly or other masses  M/S:   Ambulates with walker  SKIN:  Inspection of skin and subcutaneous tissue baseline, Palpation of skin and subcutaneous tissue baseline  NEURO:   Cranial nerves 2-12 are normal tested and grossly at patient's baseline, no purposeful movement in upper and lower extremities  PSYCH:  oriented X 3, normal insight, judgement and memory, affect and mood normal     SNF labs:   Most Recent 3 CBC's:  Recent Labs   Lab Test 01/31/23  0554 01/24/23  0712 01/22/23  0901   WBC 5.1 5.7 12.0*   HGB 9.8* 8.5* 8.5* "   MCV 97 94 94    162 150     Most Recent 3 BMP's:  Recent Labs   Lab Test 01/31/23  0554 01/26/23  0821 01/26/23  0639 01/25/23  2119 01/25/23  0848 01/25/23  0714 01/24/23  0841 01/24/23  0712 01/22/23 2053 01/22/23  0901     --   --   --   --   --   --  143  --  139   POTASSIUM 4.7  --  4.1  --   --  4.0  --  3.8   < > 4.0   CHLORIDE 101  --   --   --   --   --   --  112*  --  109*   CO2 26  --   --   --   --   --   --  24  --  23   BUN 20.3  --   --   --   --   --   --  12  --  21   CR 1.37*  --  0.98  --   --  0.90  --  0.98  --  0.97   ANIONGAP 12  --   --   --   --   --   --  7  --  7   VICENTA 9.5  --   --   --   --   --   --  8.8  --  8.3*   GLC 88 103*  --  113*   < >  --    < > 87   < > 83    < > = values in this interval not displayed.     Most Recent 2 LFT's:  Recent Labs   Lab Test 01/31/23  0554 01/24/23  0712   AST 19 11   ALT 8* <9   ALKPHOS 48 44*   BILITOTAL 0.2 0.4     Most Recent ESR & CRP:  Recent Labs   Lab Test 01/31/23  0554 01/20/23  1521   SED 55*  --    CRP  --  5.0*   CRPI <3.00  --      Most Recent Anemia Panel:  Recent Labs   Lab Test 01/31/23  0554   WBC 5.1   HGB 9.8*   HCT 32.3*   MCV 97          DISCHARGE PLAN:    Follow up labs: CMP, CBC with diff, ESR and CRP due Weekly on mondays to be drawn by home care with results to Fax to Teri Hernandez MD at [phone] Infectious Disease.    Medical Follow Up:      Follow up with primary care provider in 1-2 weeks  Follow up with specialist as directed above     Current [facility] scheduled appointments:    Discharge Services: Home Care:  Occupational Therapy, Physical Therapy, Registered Nurse and Home Health Aide    Discharge Instructions Verbalized to Patient at Discharge:     Weight bearing restrictions:  Weight bearing as tolerated.     Continue to follow your diet:  regular.     24-hour supervision is recommended for safety.     PICC cares and dressing changes once a week and PRN    TOTAL DISCHARGE TIME:   Greater  than 30 minutes  Electronically signed by:  Adrianna Bonner DNP, APRN    Documentation of Face to Face and Certification for Home Health Services    I certify that patient: Zakiya Christian is under my care and that I, or a nurse practitioner or physician's assistant working with me, had a face-to-face encounter that meets the physician face-to-face encounter requirements with this patient on: 2/1/2023.    This encounter with the patient was in whole, or in part, for the following medical condition, which is the primary reason for home health care: The primary encounter diagnosis was Intra-abdominal abscess (H). Diagnoses of Colonic diverticular abscess, Urinary tract infection without hematuria, site unspecified, Bacteremia, Acute kidney injury (H), Chronic kidney disease, stage 3a (H), Physical deconditioning, Generalized muscle weakness, Impaired mobility and activities of daily living, Abdominal pain, generalized, Sepsis, due to unspecified organism, unspecified whether acute organ dysfunction present (H), Sigmoid diverticulitis, Leukocytosis, unspecified type, Primary hypertension, Morbid obesity (H), Mixed hyperlipidemia, Lower extremity edema, Anemia of chronic disease, Polymyalgia rheumatica (H), Chronic obstructive pulmonary disease, unspecified COPD type (H), Anxiety, and Depression, unspecified depression type were also pertinent to this visit..    I certify that, based on my findings, the following services are medically necessary home health services: Nursing, Occupational Therapy and Physical Therapy.    My clinical findings support the need for the above services because: Nurse is needed: To assess medication management, IV assistance/training/education/PICC changes after changes in medications or other medical regimen.., Occupational Therapy Services are needed to assess and treat cognitive ability and address ADL safety due to impairment in deconditioning S/T hospitalization. and Physical Therapy  Services are needed to assess and treat the following functional impairments: Deconditioning S/T hospitalization.    Further, I certify that my clinical findings support that this patient is homebound (i.e. absences from home require considerable and taxing effort and are for medical reasons or [facility] services or infrequently or of short duration when for other reasons) because: Requires assistance of another person or specialized equipment to access medical services because patient: Is unable to walk greater than 100 feet without rest. and Requires supervision of another for safe transfer...    Based on the above findings. I certify that this patient is confined to the home and needs intermittent skilled nursing care, physical therapy and/or speech therapy.  The patient is under my care, and I have initiated the establishment of the plan of care.  This patient will be followed by a physician who will periodically review the plan of care.  Physician/Provider to provide follow up care: Ami Noriega    Attending [facility] physician (the Medicare certified PECOS provider): MD Tabatha, signing F2F and only signing for initial order. Please send all follow up questions and concerns or needed follow up signatures to the PCP, who [facility] has on file as:  Ami Noriega.    Physician Signature: See electronic signature associated with these discharge orders.  Date: 2/1/2023                  Sincerely,        ELISA Reynolds CNP

## 2023-02-01 NOTE — PROGRESS NOTES
[facility] GERIATRICS DISCHARGE SUMMARY  PATIENT'S NAME: Zakiya Christian  YOB: 1945  MEDICAL RECORD NUMBER:  [id number]  Place of Service where encounter took place:  [facility] (SNF) [[id number]]    PRIMARY CARE PROVIDER AND CLINIC RESPONSIBLE AFTER TRANSFER:   Ami Noriega MD, [address] [facility] [address]    [facility]     Transferring providers: ELISA Reynolds CNP, Delicia Loja MD  Recent Hospitalization/ED:  [facility] stay 1/20/23 to 1/23/23.  Date of SNF Admission: January 23, 2023  Date of SNF (anticipated) Discharge: February 03, 2023  Discharged to: previous assisted living  Cognitive Scores: BIMS: 15/15  Physical Function: Ambulating 100 ft with walker  DME: SNF  coordinating DME needs     CODE STATUS/ADVANCE DIRECTIVES DISCUSSION:  Full Code   ALLERGIES: Simvastatin    NURSING FACILITY COURSE   Medication Changes/Rationale:     See below    Summary of nursing facility stay:     ASSESSMENT/PLAN:     (R53.81) Physical deconditioning  (primary encounter diagnosis)  (M62.81) Generalized muscle weakness  (Z74.09,  Z78.9) Impaired mobility and activities of daily living  Comment: Acute on chronic. S/T below diagnosis. Lives in [facility] setting.   Plan:   -Continue Physical therapy and Occupational therapy as directed  -SW to remain involved for safe discharge planning  -Discharging on Friday on 2/3/23 with services     (A41.9) Sepsis, due to unspecified organism, unspecified whether acute organ dysfunction present (H)  (R10.84) Abdominal pain, generalized  (K57.20) Colonic diverticular abscess  (K57.32) Sigmoid diverticulitis  (K65.1) Intra-abdominal abscess (H)  Comment: Acute on chronic. admitted to [facility] on 1/20/2023 for sepsis due to an intra-abdominal abscess.  Pts history starts in 4/2022 when she presented with a diverticular abscess and an incidental left renal angiomyolipoma.  The abscess was  drained with a percutaneous drain placed on 4/11/2022 then again on 4/22/2022.  After abscessogram the drain was removed on 5/13/2022.  In 6/2022 she had return to hospital with recurrent sigmoid diverticulitis and hemorrhage into the renal angiomyolipoma. She had embolization in 9/2022. She had urology follow up in 10/2022. She presented back to hospital with abdominal pain.  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 She had mild sepsis and was admitted. For empiric antibiotic coverage. CT imaging on arrival showed 2 x 2.7 x 1.4 cm fluid collection in the endometrial cavity.  Follow-up pelvic ultrasound and pelvic MRI confirms a abscess within the uterus.  She has tethering of bowel along the adnexa and uterus.  The fistula is confirmed to be between the small bowel and sigmoid colon.  Plan:   -Continue IV rocephin daily x 3 full weeks per ID recommendations--due to stop 2/23/23  -Continue metronidazole 500mg TID  -Tylenol 500mg QID and every 6 hours PRN  -Continue ciprofloxacin 500mg BID  -Continue Omeprazole 40mg daily.   -Continue prednisone taper as directed. Slow taper. 4mg daily 1/11/23-2/10/23. 3mg 2/11/23-3/12/23. Then 2mg 3/13/23 - 3/15/23 then stop  -Zofran PRN  -Gabapentin 900mg at HS  -Follow up with GYN/ONC clinic within 1 week. Scheduled for 2/9/23  -Follow up with colorectal surgery. Scheduled for 2/7/23. Can call Office: [phone] to schedule  -Follow up with Teri Hernandez MD, Infectious Disease at the [facility] clinic (near [facility]) in 3-4 weeks (or next available). Phone: [phone]  -Continue weekly labs of CBC with diff, CMP, ESR, CRP. Fax to Teri Hernandez MD at [phone] Infectious Disease.  -Routine PICC line cares and dressing changes.     (R78.81) Bacteremia  (N39.0) Urinary tract infection without hematuria, site unspecified  (D72.829) Leukocytosis, unspecified type  (N17.9) Acute kidney injury  (H)  (N18.31) Chronic kidney disease, stage 3a (H)  Comment: Acute on chronic, Pts urine is e coli positive on 1/20/2022 along with 1/2 blood culture positive.  She had an e coli Positive on 1/5/2022 as well.  She is resistant to cipro, levo and bactrim.  Plan:   -Monitor urinary status  -Follow up with [facility] as directed. Scheduled for 4/23/23  -Continue antibiotic as directed  -Continue weekly labs of CBC with diff, CMP, ESR, CRP     (I10) Primary hypertension  (E66.01) Morbid obesity (H)  (E78.2) Mixed hyperlipidemia  (R60.0) Lower extremity edema  Comment: Chronic. Based on JNC-8 goals,  patients age of 78 year old, presence of diabetes or CKD, and goals of care goal BP is  <140/90 mm Hg. Patient is stable with current plan of care and routine assessment..  Plan:   -Monitor BP and HR  -Continue carvedilol 12.5mg BID with meals. HOLD if SBP<100 and/or HR<55  -Continue daily asa and statin  -Continue furosemide 20mg daily. HOLD if SBP<100  -Continue losartan 12.5mg daily. HOLD if SBP<100  -Continue weekly labs of CBC with diff, CMP, ESR, CRP     (D63.8) Anemia of chronic disease  Comment: Chronic. baseline hgb~ mid 8s  Plan:   -Monitor bleeding risks.   -Continue weekly labs of CBC with diff, CMP, ESR, CRP     (M35.3) Polymyalgia rheumatica (H)  Comment: Chronic. Followed by rheumatology with [facility] rheumatology clinic. Back in April 2022, they recommend stopping this medication altogether while in hospital, unknown why this restarted--would recommend rheumatology input again.   Plan:   -Monitor for worsening s/sx of concerns  -Monitor pain  -Continue azathioprine 100mg daily for now as directed.   -Continue prednisone taper as directed  -Continue weekly labs of CBC with diff, CMP, ESR, CRP     (J44.9) Chronic obstructive pulmonary disease, unspecified COPD type (H)  Comment: Chronic. No recent exacerbations. History of covid  Plan:   -Monitor respiratory status  -Continue albuterol nebulization every 6 hours  "PRN  -Continue Trelegy Ellipta inhaler daily  -Continue singular daily at HS  -Continue weekly labs of CBC with diff, CMP, ESR, CRP     (Z86.718) Personal history of DVT  Comment: Acute on chronic. Noted Nov 2021. Doppler in March2022 with ongoing nonocclusive DVT in the proximal right femoral vein, occlusive in the mid femoral vein segments - now felt to be chronic and per radiology, \"overall improved from 11/05/2021\".   Plan:  -Continue xarelto daily  -Monitor bleeding risks  -Continue weekly labs of CBC with diff, CMP, ESR, CRP     (F41.9) Anxiety  (F32.A) Depression, unspecified depression type  Comment: Chronic. Situational stress contributing. She reports feeling down due to not able to return back home to [facility] due to IV antibiotic. Offered ACP on site while on TCU. She declines at this time.  Plan:   -Monitor mood and behaviors  -Monitor for changes in mobility, eating and sleeping patterns  -Continue prozac 40mg daily  -Continue weekly labs of CBC with diff, CMP, ESR, CRP      (E11.69) Type 2 diabetes mellitus with other specified complication, without long-term current use of insulin (H)  Comment: Acute on chronic. History of being on metformin in the past which was stopped due to SHRUTHI concerns. Blood Glucose remains stable without. Last Hgb A1c in April 2022 was 6.2%  Plan:   -Monitor for worsening s/sx of concerns  -Follow up with PCP post TCU    Discharge Medications:  MED REC REQUIRED  Post Medication Reconciliation Status:  Medication reconciliation previously completed during another office visit       Current Outpatient Medications   Medication Sig Dispense Refill     acetaminophen (TYLENOL) 500 MG tablet Take 500 mg by mouth 4 times daily And q6h PRN - max 2 PRN/day       albuterol (PROAIR HFA/PROVENTIL HFA/VENTOLIN HFA) 108 (90 Base) MCG/ACT inhaler Inhale 2 puffs into the lungs every 6 hours as needed for shortness of breath / dyspnea or wheezing       albuterol (PROVENTIL) (2.5 MG/3ML) 0.083% neb " solution Take 1 vial (2.5 mg) by nebulization every 6 hours as needed for shortness of breath, wheezing or cough 90 mL 1     alendronate (FOSAMAX) 70 MG tablet Take 70 mg by mouth every 7 days       Ascorbic Acid (VITAMIN C) 500 MG CAPS Take 1,000 mg by mouth daily       aspirin 81 MG EC tablet Take 81 mg by mouth daily       azaTHIOprine (IMURAN) 50 MG tablet Take 100 mg by mouth daily       calcium carbonate 600 mg-vitamin D 400 units (CALTRATE) 600-400 MG-UNIT per tablet Take 1 tablet by mouth 2 times daily (with meals)       calcium polycarbophil (FIBERCON) 625 MG tablet Take 2 tablets by mouth 2 times daily (with meals)       carvedilol (COREG) 12.5 MG tablet Take 12.5 mg by mouth 2 times daily (with meals) HOLD if SBP<100 and/or HR<55       cefTRIAXone (ROCEPHIN) 2 GM vial Inject 2 g into the vein every 24 hours for 28 days 560 mL 0     cholecalciferol 50 MCG (2000 UT) CAPS Take 4,000 Units by mouth daily       coenzyme Q-10 capsule Take 1 capsule (100 mg) by mouth daily 30 capsule 1     FLUoxetine (PROZAC) 40 MG capsule Take 40 mg by mouth every morning       Fluticasone-Umeclidin-Vilanterol (TRELEGY ELLIPTA) 200-62.5-25 MCG/INH oral inhaler Inhale 1 puff into the lungs every morning       furosemide (LASIX) 20 MG tablet Take 20 mg by mouth daily HOLD if SBP<100       gabapentin (NEURONTIN) 300 MG capsule Take 900 mg by mouth At Bedtime       losartan (COZAAR) 25 MG tablet Take 12.5 mg by mouth every morning HOLD if SBP<100       metroNIDAZOLE (FLAGYL) 500 MG tablet Take 1 tablet (500 mg) by mouth 3 times daily 42 tablet 0     montelukast (SINGULAIR) 10 MG tablet Take 10 mg by mouth At Bedtime       nystatin (MYCOSTATIN) 857740 UNIT/GM external cream Apply topically 2 times daily       omeprazole (PRILOSEC) 40 MG DR capsule Take 40 mg by mouth daily       ondansetron (ZOFRAN) 4 MG tablet Take 4 mg by mouth every 6 hours as needed for nausea       potassium chloride ER (KLOR-CON M) 10 MEQ CR tablet Take 20 mEq  "by mouth daily       pravastatin (PRAVACHOL) 40 MG tablet Take 40 mg by mouth At Bedtime       predniSONE (DELTASONE) 1 MG tablet Take 2-4 mg by mouth daily Slow taper. 4mg daily 1/11/23-2/10/23. 3mg 2/11/23-3/12/23. Then 2mg 3/13/23 - 3/15/23 then stop       rivaroxaban ANTICOAGULANT (XARELTO) 20 MG TABS tablet Take 1 tablet (20 mg) by mouth daily (with dinner)        Controlled medications:   not applicable/none     Past Medical History:   Past Medical History:   Diagnosis Date     Diabetes (H)      Hypertension      Obese      PMR (polymyalgia rheumatica) (H)      Physical Exam:   Vitals: BP (!) 156/67   Pulse 93   Temp 96.8  F (36  C)   Resp 16   Ht 1.6 m (5' 3\")   Wt 87.2 kg (192 lb 4.8 oz)   SpO2 95%   BMI 34.06 kg/m    BMI: Body mass index is 34.06 kg/m .  GENERAL APPEARANCE:  Alert, in no distress, cooperative  ENT:  Mouth and posterior oropharynx normal, moist mucous membranes, [place]  EYES:  EOM, conjunctivae, lids, pupils and irises normal  NECK:  No adenopathy,masses or thyromegaly  RESP:  respiratory effort and palpation of chest normal, lungs clear to auscultation , no respiratory distress  CV:  Palpation and auscultation of heart done , regular rate and rhythm, no murmur, rub, or gallop, trace edema to BLE  ABDOMEN:  normal bowel sounds, soft, nontender, no hepatosplenomegaly or other masses  M/S:   Ambulates with walker  SKIN:  Inspection of skin and subcutaneous tissue baseline, Palpation of skin and subcutaneous tissue baseline  NEURO:   Cranial nerves 2-12 are normal tested and grossly at patient's baseline, no purposeful movement in upper and lower extremities  PSYCH:  oriented X 3, normal insight, judgement and memory, affect and mood normal     SNF labs:   Most Recent 3 CBC's:  Recent Labs   Lab Test 01/31/23  0554 01/24/23  0712 01/22/23  0901   WBC 5.1 5.7 12.0*   HGB 9.8* 8.5* 8.5*   MCV 97 94 94    162 150     Most Recent 3 BMP's:  Recent Labs   Lab Test 01/31/23  0554 " 01/26/23  0821 01/26/23  0639 01/25/23  2119 01/25/23  0848 01/25/23  0714 01/24/23  0841 01/24/23  0712 01/22/23 2053 01/22/23  0901     --   --   --   --   --   --  143  --  139   POTASSIUM 4.7  --  4.1  --   --  4.0  --  3.8   < > 4.0   CHLORIDE 101  --   --   --   --   --   --  112*  --  109*   CO2 26  --   --   --   --   --   --  24  --  23   BUN 20.3  --   --   --   --   --   --  12  --  21   CR 1.37*  --  0.98  --   --  0.90  --  0.98  --  0.97   ANIONGAP 12  --   --   --   --   --   --  7  --  7   VICENTA 9.5  --   --   --   --   --   --  8.8  --  8.3*   GLC 88 103*  --  113*   < >  --    < > 87   < > 83    < > = values in this interval not displayed.     Most Recent 2 LFT's:  Recent Labs   Lab Test 01/31/23  0554 01/24/23  0712   AST 19 11   ALT 8* <9   ALKPHOS 48 44*   BILITOTAL 0.2 0.4     Most Recent ESR & CRP:  Recent Labs   Lab Test 01/31/23  0554 01/20/23  1521   SED 55*  --    CRP  --  5.0*   CRPI <3.00  --      Most Recent Anemia Panel:  Recent Labs   Lab Test 01/31/23 0554   WBC 5.1   HGB 9.8*   HCT 32.3*   MCV 97          DISCHARGE PLAN:    Follow up labs: CMP, CBC with diff, ESR and CRP due Weekly on mondays to be drawn by home care with results to Fax to Teri Hernandez MD at [phone] Infectious Disease.    Medical Follow Up:      Follow up with primary care provider in 1-2 weeks  Follow up with specialist as directed above     Current [place] scheduled appointments:    Discharge Services: Home Care:  Occupational Therapy, Physical Therapy, Registered Nurse and Home Health Aide    Discharge Instructions Verbalized to Patient at Discharge:     Weight bearing restrictions:  Weight bearing as tolerated.     Continue to follow your diet:  regular.     24-hour supervision is recommended for safety.     PICC cares and dressing changes once a week and PRN    TOTAL DISCHARGE TIME:   Greater than 30 minutes  Electronically signed by:  Adrianna Bonner DNP, APRN    Documentation of Face to  Face and Certification for Home Health Services    I certify that patient: Zakiya Christian is under my care and that I, or a nurse practitioner or physician's assistant working with me, had a face-to-face encounter that meets the physician face-to-face encounter requirements with this patient on: 2/1/2023.    This encounter with the patient was in whole, or in part, for the following medical condition, which is the primary reason for home health care: The primary encounter diagnosis was Intra-abdominal abscess (H). Diagnoses of Colonic diverticular abscess, Urinary tract infection without hematuria, site unspecified, Bacteremia, Acute kidney injury (H), Chronic kidney disease, stage 3a (H), Physical deconditioning, Generalized muscle weakness, Impaired mobility and activities of daily living, Abdominal pain, generalized, Sepsis, due to unspecified organism, unspecified whether acute organ dysfunction present (H), Sigmoid diverticulitis, Leukocytosis, unspecified type, Primary hypertension, Morbid obesity (H), Mixed hyperlipidemia, Lower extremity edema, Anemia of chronic disease, Polymyalgia rheumatica (H), Chronic obstructive pulmonary disease, unspecified COPD type (H), Anxiety, and Depression, unspecified depression type were also pertinent to this visit..    I certify that, based on my findings, the following services are medically necessary home health services: Nursing, Occupational Therapy and Physical Therapy.    My clinical findings support the need for the above services because: Nurse is needed: To assess medication management, IV assistance/training/education/PICC changes after changes in medications or other medical regimen.., Occupational Therapy Services are needed to assess and treat cognitive ability and address ADL safety due to impairment in deconditioning S/T hospitalization. and Physical Therapy Services are needed to assess and treat the following functional impairments: Deconditioning S/T  hospitalization.    Further, I certify that my clinical findings support that this patient is homebound (i.e. absences from home require considerable and taxing effort and are for medical reasons or Gnosticism services or infrequently or of short duration when for other reasons) because: Requires assistance of another person or specialized equipment to access medical services because patient: Is unable to walk greater than 100 feet without rest. and Requires supervision of another for safe transfer...    Based on the above findings. I certify that this patient is confined to the home and needs intermittent skilled nursing care, physical therapy and/or speech therapy.  The patient is under my care, and I have initiated the establishment of the plan of care.  This patient will be followed by a physician who will periodically review the plan of care.  Physician/Provider to provide follow up care: Ami Noriega    Attending [facility] physician (the Medicare certified PECOS provider): MD Tabatha, signing F2F and only signing for initial order. Please send all follow up questions and concerns or needed follow up signatures to the PCP, who Morrice has on file as:  Ami Noriega.    Physician Signature: See electronic signature associated with these discharge orders.  Date: 2/1/2023      I was present with the medical student/advanced practice registered nurse, Tami Mcarthur, who participated in the service and in the documentation of this note. I have verified the history and personally performed the physical exam and medical decision making. I agree with the assessment and plan of care as documented in the note.

## 2023-02-04 ENCOUNTER — TRANSFERRED RECORDS (OUTPATIENT)
Dept: HEALTH INFORMATION MANAGEMENT | Facility: CLINIC | Age: 78
End: 2023-02-04

## 2023-02-05 ENCOUNTER — LAB REQUISITION (OUTPATIENT)
Dept: LAB | Facility: CLINIC | Age: 78
End: 2023-02-05
Payer: COMMERCIAL

## 2023-02-05 DIAGNOSIS — R78.81 BACTEREMIA: ICD-10-CM

## 2023-02-05 DIAGNOSIS — A41.9 SEPSIS, UNSPECIFIED ORGANISM (H): ICD-10-CM

## 2023-02-06 ENCOUNTER — TELEPHONE (OUTPATIENT)
Dept: INFECTIOUS DISEASES | Facility: CLINIC | Age: 78
End: 2023-02-06
Payer: COMMERCIAL

## 2023-02-06 NOTE — TELEPHONE ENCOUNTER
[facility] calling, need orders signed from provider regarding update on pt's cipro.     PH:[phone].   FAX: [phone]

## 2023-02-06 NOTE — TELEPHONE ENCOUNTER
Pt's PCP office calling for clarification on medications and if the pt should be on cipro. I informed that the pt should not be on cipro and the pt should be on the below ( which is from 1/26 ID inpatient note);  1. Antibiotics zosyn--> Ceftriaxone and Metronidazole on 1/24/2023.  IV Ceftriaxone and PO Metronidazole on discharge x 21 days with close Gyn follow up and imaging per Gyn Onc  2. Follow culture results

## 2023-02-06 NOTE — TELEPHONE ENCOUNTER
Discontinue Ciprofloxacin     Teri Hernandez MD   [facility]   Answering Service: [phone]   On-Call ID provider: [phone], option: 9

## 2023-02-07 ENCOUNTER — HOSPITAL ENCOUNTER (OUTPATIENT)
Facility: HOSPITAL | Age: 78
End: 2023-02-07
Attending: COLON & RECTAL SURGERY | Admitting: COLON & RECTAL SURGERY
Payer: COMMERCIAL

## 2023-02-07 DIAGNOSIS — K63.1 PERFORATION OF INTESTINE (H): ICD-10-CM

## 2023-02-07 DIAGNOSIS — K63.1 PERFORATION OF INTESTINE (H): Primary | ICD-10-CM

## 2023-02-07 LAB
BASOPHILS # BLD AUTO: 0 10E3/UL (ref 0–0.2)
BASOPHILS NFR BLD AUTO: 0 %
EOSINOPHIL # BLD AUTO: 0.1 10E3/UL (ref 0–0.7)
EOSINOPHIL NFR BLD AUTO: 1 %
ERYTHROCYTE [DISTWIDTH] IN BLOOD BY AUTOMATED COUNT: 14.4 % (ref 10–15)
ERYTHROCYTE [SEDIMENTATION RATE] IN BLOOD BY WESTERGREN METHOD: 54 MM/HR (ref 0–30)
HCT VFR BLD AUTO: 28.9 % (ref 35–47)
HGB BLD-MCNC: 8.8 G/DL (ref 11.7–15.7)
IMM GRANULOCYTES # BLD: 0 10E3/UL
IMM GRANULOCYTES NFR BLD: 0 %
LYMPHOCYTES # BLD AUTO: 0.9 10E3/UL (ref 0.8–5.3)
LYMPHOCYTES NFR BLD AUTO: 12 %
MCH RBC QN AUTO: 29 PG (ref 26.5–33)
MCHC RBC AUTO-ENTMCNC: 30.4 G/DL (ref 31.5–36.5)
MCV RBC AUTO: 95 FL (ref 78–100)
MONOCYTES # BLD AUTO: 0.5 10E3/UL (ref 0–1.3)
MONOCYTES NFR BLD AUTO: 7 %
NEUTROPHILS # BLD AUTO: 5.9 10E3/UL (ref 1.6–8.3)
NEUTROPHILS NFR BLD AUTO: 79 %
PLATELET # BLD AUTO: 293 10E3/UL (ref 150–450)
RBC # BLD AUTO: 3.03 10E6/UL (ref 3.8–5.2)
WBC # BLD AUTO: 7.4 10E3/UL (ref 4–11)

## 2023-02-07 PROCEDURE — 80053 COMPREHEN METABOLIC PANEL: CPT | Performed by: FAMILY MEDICINE

## 2023-02-07 PROCEDURE — 85652 RBC SED RATE AUTOMATED: CPT | Performed by: FAMILY MEDICINE

## 2023-02-07 PROCEDURE — 36415 COLL VENOUS BLD VENIPUNCTURE: CPT | Performed by: FAMILY MEDICINE

## 2023-02-07 PROCEDURE — 85025 COMPLETE CBC W/AUTO DIFF WBC: CPT | Performed by: FAMILY MEDICINE

## 2023-02-07 PROCEDURE — 86140 C-REACTIVE PROTEIN: CPT | Performed by: FAMILY MEDICINE

## 2023-02-08 LAB
ALBUMIN SERPL BCG-MCNC: 3.4 G/DL (ref 3.5–5.2)
ALP SERPL-CCNC: 46 U/L (ref 35–104)
ALT SERPL W P-5'-P-CCNC: 9 U/L (ref 10–35)
ANION GAP SERPL CALCULATED.3IONS-SCNC: 14 MMOL/L (ref 7–15)
AST SERPL W P-5'-P-CCNC: 21 U/L (ref 10–35)
BILIRUB SERPL-MCNC: <0.2 MG/DL
BUN SERPL-MCNC: 19.6 MG/DL (ref 8–23)
CALCIUM SERPL-MCNC: 9.4 MG/DL (ref 8.8–10.2)
CHLORIDE SERPL-SCNC: 102 MMOL/L (ref 98–107)
CREAT SERPL-MCNC: 0.94 MG/DL (ref 0.51–0.95)
CRP SERPL-MCNC: 4.13 MG/L
DEPRECATED HCO3 PLAS-SCNC: 22 MMOL/L (ref 22–29)
GFR SERPL CREATININE-BSD FRML MDRD: 61 ML/MIN/1.73M2
GLUCOSE SERPL-MCNC: 125 MG/DL (ref 70–99)
POTASSIUM SERPL-SCNC: 4.6 MMOL/L (ref 3.4–5.3)
PROT SERPL-MCNC: 6.7 G/DL (ref 6.4–8.3)
SODIUM SERPL-SCNC: 138 MMOL/L (ref 136–145)

## 2023-02-14 ENCOUNTER — LAB (OUTPATIENT)
Dept: LAB | Facility: CLINIC | Age: 78
End: 2023-02-14
Payer: COMMERCIAL

## 2023-02-14 DIAGNOSIS — K65.1 PERITONEAL ABSCESS (H): Primary | ICD-10-CM

## 2023-02-14 LAB
ALBUMIN SERPL BCG-MCNC: 3.5 G/DL (ref 3.5–5.2)
ALP SERPL-CCNC: 43 U/L (ref 35–104)
ALT SERPL W P-5'-P-CCNC: <5 U/L (ref 10–35)
ANION GAP SERPL CALCULATED.3IONS-SCNC: 10 MMOL/L (ref 7–15)
AST SERPL W P-5'-P-CCNC: 15 U/L (ref 10–35)
BASOPHILS # BLD AUTO: 0 10E3/UL (ref 0–0.2)
BASOPHILS NFR BLD AUTO: 0 %
BILIRUB SERPL-MCNC: <0.2 MG/DL
BUN SERPL-MCNC: 16.9 MG/DL (ref 8–23)
CALCIUM SERPL-MCNC: 9.5 MG/DL (ref 8.8–10.2)
CHLORIDE SERPL-SCNC: 103 MMOL/L (ref 98–107)
CREAT SERPL-MCNC: 0.88 MG/DL (ref 0.51–0.95)
CRP SERPL-MCNC: 3.93 MG/L
DEPRECATED HCO3 PLAS-SCNC: 26 MMOL/L (ref 22–29)
EOSINOPHIL # BLD AUTO: 0.1 10E3/UL (ref 0–0.7)
EOSINOPHIL NFR BLD AUTO: 2 %
ERYTHROCYTE [DISTWIDTH] IN BLOOD BY AUTOMATED COUNT: 14 % (ref 10–15)
ERYTHROCYTE [SEDIMENTATION RATE] IN BLOOD BY WESTERGREN METHOD: 61 MM/HR (ref 0–30)
GFR SERPL CREATININE-BSD FRML MDRD: 66 ML/MIN/1.73M2
GLUCOSE SERPL-MCNC: 110 MG/DL (ref 70–99)
HCT VFR BLD AUTO: 30.3 % (ref 35–47)
HGB BLD-MCNC: 9.3 G/DL (ref 11.7–15.7)
IMM GRANULOCYTES # BLD: 0 10E3/UL
IMM GRANULOCYTES NFR BLD: 0 %
LYMPHOCYTES # BLD AUTO: 0.7 10E3/UL (ref 0.8–5.3)
LYMPHOCYTES NFR BLD AUTO: 9 %
MCH RBC QN AUTO: 28.8 PG (ref 26.5–33)
MCHC RBC AUTO-ENTMCNC: 30.7 G/DL (ref 31.5–36.5)
MCV RBC AUTO: 94 FL (ref 78–100)
MONOCYTES # BLD AUTO: 0.4 10E3/UL (ref 0–1.3)
MONOCYTES NFR BLD AUTO: 5 %
NEUTROPHILS # BLD AUTO: 6.1 10E3/UL (ref 1.6–8.3)
NEUTROPHILS NFR BLD AUTO: 83 %
PLATELET # BLD AUTO: 221 10E3/UL (ref 150–450)
POTASSIUM SERPL-SCNC: 5 MMOL/L (ref 3.4–5.3)
PROT SERPL-MCNC: 7.1 G/DL (ref 6.4–8.3)
RBC # BLD AUTO: 3.23 10E6/UL (ref 3.8–5.2)
SODIUM SERPL-SCNC: 139 MMOL/L (ref 136–145)
WBC # BLD AUTO: 7.3 10E3/UL (ref 4–11)

## 2023-02-14 PROCEDURE — 80053 COMPREHEN METABOLIC PANEL: CPT

## 2023-02-14 PROCEDURE — 85025 COMPLETE CBC W/AUTO DIFF WBC: CPT

## 2023-02-14 PROCEDURE — 85652 RBC SED RATE AUTOMATED: CPT

## 2023-02-14 PROCEDURE — 86140 C-REACTIVE PROTEIN: CPT

## 2023-02-20 ENCOUNTER — OFFICE VISIT (OUTPATIENT)
Dept: INFECTIOUS DISEASES | Facility: CLINIC | Age: 78
End: 2023-02-20
Payer: COMMERCIAL

## 2023-02-20 VITALS
HEART RATE: 74 BPM | OXYGEN SATURATION: 98 % | DIASTOLIC BLOOD PRESSURE: 62 MMHG | TEMPERATURE: 97.7 F | SYSTOLIC BLOOD PRESSURE: 110 MMHG

## 2023-02-20 DIAGNOSIS — K65.9 ABDOMINAL INFECTION (H): Primary | ICD-10-CM

## 2023-02-20 PROCEDURE — 99215 OFFICE O/P EST HI 40 MIN: CPT | Performed by: INTERNAL MEDICINE

## 2023-02-20 NOTE — PATIENT INSTRUCTIONS
Yury Sigala,  Thank you for taking the time to see us today.   We discussed the lab results and that surgery is going to be scheduled by Gynecology and Colorectal Surgery at [facility]. The date is not finalized yet.    We will continue the IV Ceftriaxone until 2/23/2023 and then start Oral Augmentin until the surgery and resection of infected tissue/abscess.     Follow up (virtual/phone) with us in 2-3 weeks. Please call with questions.  If symptoms get worse, or recur, then please go to ER, preferably at [facility] (because of Gyn and Colorectal Surgery availability, as these surgical specialties do not round at [facility])    Teri Hernandez MD  [facility]  Answering Service: [phone]  On-Call ID provider: [phone], option: 9

## 2023-02-20 NOTE — PROGRESS NOTES
"[facility] INFECTIOUS DISEASE CLINIC     Infectious Disease Progress Note    02/20/2023          Assessment & Plan   Zakiya Christian is a 78 year old female who was admitted on 1/20/2023.     ASSESSMENT:  Pelvic abscess- active issue   E coli bacteremia 1/20/2023- on IV Ceftriaxone until 2/23/2023, and PO metronidazole   Non acute diverticulitis  Known CV fistula  White count improved      Discussion/MDM/previous note  On maximal abx--probably treating disease that has been there for months however.   Unclear if they will impact things much, need to talk to CRS or GS about what they think we ought to do treatment wise.  Came in on augmentin for a recent \"UTI\" which is present de peng if she has a CV fistula (???)--note E coli and/or KP in urine the last 7 times checked over about 14 months.  The recent data about this \"UTI\" is from [facility] and not available...     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     RECOMMENDATIONS:      We discussed the lab results and that surgery is going to be scheduled by Gynecology and Colorectal Surgery at [facility]. The date is not finalized yet.    We will continue the IV Ceftriaxone until 2/23/2023 and then start Oral Augmentin until the surgery and resection of infected tissue/abscess.     Follow up (virtual/phone) with us in 2-3 weeks. Please call with questions.  If symptoms get worse, or recur, then please go to ER, preferably at [facility] (because of Gyn and Colorectal Surgery availability, as these surgical specialties do not round at [facility])    Teri Hernandez MD  [facility]  Answering Service: [phone]  On-Call ID provider: [phone], option: 9          Interval History   No abdominal pain  Tolerating PO abx      Physical Exam   Temp: 97.7  F (36.5  C) Temp src: Oral BP: 110/62 Pulse: 74     SpO2: 98 %      There were no vitals filed for " this visit.  Vital Signs with Ranges  Temp:  [97.7  F (36.5  C)] 97.7  F (36.5  C)  Pulse:  [74] 74  BP: (110)/(62) 110/62  SpO2:  [98 %] 98 %      Constitutional: NAD  Lungs: normal breathing pattern, no crackles or wheezing  Cardiovascular: no orthopena  Abdomen: non tender  Skin: warm  Neuro: deconditioned, AOX 3, walker    Medications   Ceftriaxone  Flagyl completed          Data   All microbiology laboratory data reviewed.  Recent Labs   Lab Test 02/14/23  1353 02/07/23  1615 01/31/23  0554   WBC 7.3 7.4 5.1   HGB 9.3* 8.8* 9.8*   HCT 30.3* 28.9* 32.3*   MCV 94 95 97    293 346     Recent Labs   Lab Test 02/14/23  1300 02/07/23  1615 01/31/23  0554   CR 0.88 0.94 1.37*     Recent Labs   Lab Test 02/14/23  1300   SED 61*     No lab results found.    Invalid input(s):     MICROBIOLOGY:    Reviewed    7-Day Micro Results     No results found for the last 168 hours.           RADIOLOGY:    Reviewed  CT Abdomen Pelvis w Contrast    Result Date: 1/20/2023  EXAM: CT ABDOMEN PELVIS W CONTRAST LOCATION: [facility] DATE/TIME: 1/20/2023 4:51 PM INDICATION: Weakness, hypotension, fall with acute on chronic abdominal pain with previously known fistula and abscess COMPARISON: CTA AP 12/09/2022 and older studies. TECHNIQUE: CT scan of the abdomen and pelvis was performed following injection of IV contrast. Multiplanar reformats were obtained. Dose reduction techniques were used. CONTRAST: 75ml Isovue 370 FINDINGS: LOWER CHEST: Please see chest CTA report HEPATOBILIARY: Prior cholecystectomy. PANCREAS: Mild diffuse atrophy. SPLEEN: Normal. ADRENAL GLANDS: Normal. KIDNEYS/BLADDER: No change in the nonobstructing small calculus in the lower pole calyx on the right. 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 No change in the curvilinear elliptical density centrally within the fat, new since the April 2022 but unchanged from the recent  study ( AML embolization 10/5/2022). There are also bilateral renal cysts which need no follow-up. Air is seen in the bladder. BOWEL: Redundant colon especially the sigmoid with extensive distal colonic diverticulosis. Tethering of the small bowel wall and the left adnexa to the mid sigmoid colon again noted. No acute inflammatory changes. No bowel obstruction. Incidental descending duodenal diverticulum. LYMPH NODES: Normal. VASCULATURE: Moderate arterial calcifications. No aneurysm. PELVIC ORGANS: There has been interval decrease in the rim-enhancing fluid collection in the pelvis which is less well-defined now measures approximately 2 cm (central cavity  was 3.5 cm before). On the current study this appears intramural within the right uterine fundus MUSCULOSKELETAL: Subacute right lateral seventh 8 9 rib fractures are seen with callus formation no acute fractures. Moderate facet arthropathy in the lower lumbar spine.     IMPRESSION: 1.  No evidence of acute traumatic injury in the abdomen or pelvis. 2.  Interval decrease in the right side pelvic abscess as noted above which on the current study appears to be myometrial in nature involving the right side of the uterine fundus. 3.  Air in the bladder from a presumed a colovesical fistula given her prior history. If needed this can be confirmed with a standard filling cystogram (not a CT cystogram). 4.  Extensive sigmoid diverticulitis without evidence of acute inflammation. Tethering of the adjacent small bowel and the left adnexa to the mid sigmoid again noted. 5.  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 6.  Other noncritical findings as noted above.    Cervical spine CT w/o contrast    Result Date: 1/20/2023  EXAM: CT HEAD W/O CONTRAST, CT CERVICAL SPINE W/O CONTRAST LOCATION: [facility] DATE/TIME: 1/20/2023 4:47 PM INDICATION: Head and neck injury, on thinners, pain in lower cervical upper thoracic COMPARISON:  12/01/2022 CT head, 09/18/2022 CT cervical spine TECHNIQUE: 1) Routine CT Head without IV contrast. Multiplanar reformats. Dose reduction techniques were used. 2) Routine CT Cervical Spine without IV contrast. Multiplanar reformats. Dose reduction techniques were used. FINDINGS: HEAD CT: INTRACRANIAL CONTENTS: No intracranial hemorrhage, extraaxial collection, or mass effect.  No CT evidence of acute infarct. Moderate presumed chronic small vessel ischemic changes. Moderate generalized volume loss. No hydrocephalus. VISUALIZED ORBITS/SINUSES/MASTOIDS: No intraorbital abnormality. No paranasal sinus mucosal disease. No middle ear or mastoid effusion. BONES/SOFT TISSUES: No acute abnormality. CERVICAL SPINE CT: VERTEBRA: Normal vertebral body heights. Slight right cervical curve. No fracture or posttraumatic subluxation. CANAL/FORAMINA: Mild degenerative changes without significant canal narrowing at any level. Multilevel mild neural foraminal narrowing. PARASPINAL: Prominent medial deviation of ICAs within the neck. 7 mm nodular opacity left lung apex; probably present previously however it was incompletely visualized. If indicated, CT chest may be performed.     IMPRESSION: HEAD CT: 1.  No acute intracranial process. CERVICAL SPINE CT: 1.  No CT evidence for acute fracture or post traumatic subluxation.    CT Chest Pulmonary Embolism w Contrast    Result Date: 1/20/2023  EXAM: CT CHEST PULMONARY EMBOLISM W CONTRAST LOCATION: [facility] DATE/TIME: 1/20/2023 4:50 PM INDICATION: Weakness, hypoxia, low blood pressure in the field COMPARISON: 4/5/2022 and 12/9/2022 TECHNIQUE: CT chest pulmonary angiogram during arterial phase injection of IV contrast. Multiplanar reformats and MIP reconstructions were performed. Dose reduction techniques were used. CONTRAST: 75ml Isovue 370 FINDINGS: ANGIOGRAM CHEST: Pulmonary arteries are normal caliber and negative for pulmonary emboli. Thoracic aorta  demonstrates significant atherosclerotic plaque but is negative for dissection. No CT evidence of right heart strain. LUNGS AND PLEURA: Mild motion artifact. Scattered tiny peripheral pulmonary nodules unchanged with largest left upper lobe on image 91 measuring 4 x 3 mm. Stable linear scarring right middle lobe. Some flattening of trachea and mainstem bronchi consistent with tracheobronchomalacia. No new focal infiltrate. MEDIASTINUM/AXILLAE: No lymphadenopathy. CORONARY ARTERY CALCIFICATION: Severe. UPPER ABDOMEN: Normal. MUSCULOSKELETAL: Subacute fractures of right ribs 6-9 now have surrounding callus. There are healed fractures involving left ribs 5 and 6 which were not seen on previous exam.     IMPRESSION: 1.  No pulmonary emboli identified. No acute thoracic abnormality evident. 2.  Tracheobronchomalacia. 3.  Prior bilateral rib fractures. No acute fracture identified.    Head CT w/o contrast    Result Date: 1/20/2023  EXAM: CT HEAD W/O CONTRAST, CT CERVICAL SPINE W/O CONTRAST LOCATION: [facility] DATE/TIME: 1/20/2023 4:47 PM INDICATION: Head and neck injury, on thinners, pain in lower cervical upper thoracic COMPARISON: 12/01/2022 CT head, 09/18/2022 CT cervical spine TECHNIQUE: 1) Routine CT Head without IV contrast. Multiplanar reformats. Dose reduction techniques were used. 2) Routine CT Cervical Spine without IV contrast. Multiplanar reformats. Dose reduction techniques were used. FINDINGS: HEAD CT: INTRACRANIAL CONTENTS: No intracranial hemorrhage, extraaxial collection, or mass effect.  No CT evidence of acute infarct. Moderate presumed chronic small vessel ischemic changes. Moderate generalized volume loss. No hydrocephalus. VISUALIZED ORBITS/SINUSES/MASTOIDS: No intraorbital abnormality. No paranasal sinus mucosal disease. No middle ear or mastoid effusion. BONES/SOFT TISSUES: No acute abnormality. CERVICAL SPINE CT: VERTEBRA: Normal vertebral body heights. Slight right  cervical curve. No fracture or posttraumatic subluxation. CANAL/FORAMINA: Mild degenerative changes without significant canal narrowing at any level. Multilevel mild neural foraminal narrowing. PARASPINAL: Prominent medial deviation of ICAs within the neck. 7 mm nodular opacity left lung apex; probably present previously however it was incompletely visualized. If indicated, CT chest may be performed.     IMPRESSION: HEAD CT: 1.  No acute intracranial process. CERVICAL SPINE CT: 1.  No CT evidence for acute fracture or post traumatic subluxation.     Total Time Spent 45 minutes with >50% of the time spent in chart review, evaluation, management, counseling, education and care coordination.

## 2023-02-21 ENCOUNTER — APPOINTMENT (OUTPATIENT)
Dept: LAB | Facility: CLINIC | Age: 78
End: 2023-02-21
Payer: COMMERCIAL

## 2023-02-21 PROCEDURE — 85025 COMPLETE CBC W/AUTO DIFF WBC: CPT | Mod: ORL | Performed by: FAMILY MEDICINE

## 2023-02-21 PROCEDURE — 85652 RBC SED RATE AUTOMATED: CPT | Mod: ORL | Performed by: FAMILY MEDICINE

## 2023-02-21 PROCEDURE — 80053 COMPREHEN METABOLIC PANEL: CPT | Mod: ORL | Performed by: FAMILY MEDICINE

## 2023-02-21 PROCEDURE — 86140 C-REACTIVE PROTEIN: CPT | Mod: ORL | Performed by: FAMILY MEDICINE

## 2023-02-21 NOTE — PROGRESS NOTES
Nayeli called, I informed of the IV abx stop date and ok to pull PICC line after the last dose, as the pt will start oral abx.

## 2023-03-06 ENCOUNTER — VIRTUAL VISIT (OUTPATIENT)
Dept: INFECTIOUS DISEASES | Facility: CLINIC | Age: 78
End: 2023-03-06
Payer: COMMERCIAL

## 2023-03-06 DIAGNOSIS — K65.9 ABDOMINAL INFECTION (H): ICD-10-CM

## 2023-03-06 PROCEDURE — 99213 OFFICE O/P EST LOW 20 MIN: CPT | Mod: VID | Performed by: INTERNAL MEDICINE

## 2023-03-06 NOTE — PATIENT INSTRUCTIONS
Yury Sigala,  Thank you for taking the time for the virtual visit today. We will continue Augmentin and awaiting the sigmoidoscopy procedure and surgery date for abscess.  Follow up 4/3/2023 and call with questions  If condition worsens, please go to Emergency room

## 2023-03-06 NOTE — PROGRESS NOTES
"[facility] INFECTIOUS DISEASE CLINIC     Infectious Disease Progress Note    03/06/2023          Assessment & Plan   Zakiya Christian is a 78 year old female who was admitted on 1/20/2023.     ASSESSMENT:  Pelvic abscess- active issue   E coli bacteremia 1/20/2023- on IV Ceftriaxone until 2/23/2023, and PO metronidazole   Non acute diverticulitis  Known CV fistula  White count improved      Discussion/MDM/previous note  On maximal abx--probably treating disease that has been there for months however.   Unclear if they will impact things much, need to talk to CRS or GS about what they think we ought to do treatment wise.  Came in on augmentin for a recent \"UTI\" which is present de peng if she has a CV fistula (???)--note E coli and/or KP in urine the last 7 times checked over about 14 months.  The recent data about this \"UTI\" is from [facility] and not available...     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     RECOMMENDATIONS:      We discussed the lab results and that surgery is going to be scheduled by Gynecology and Colorectal Surgery at [facility]. The date is not finalized yet. Sigmoidoscopy scheduled for 3/21/2023    We will continue the IV Ceftriaxone until 2/23/2023 and then start Oral Augmentin until the surgery and resection of infected tissue/abscess.     Follow up (virtual/phone) with us on 4/3/2023. . Please call with questions.  If symptoms get worse, or recur, then please go to ER, preferably at [facility] (because of Gyn and Colorectal Surgery availability, as these surgical specialties do not round at [facility])    Teri Hernandez MD  [facility]  Answering Service: [phone]  On-Call ID provider: [phone], option: 9          Interval History   No abdominal pain  Tolerating PO abx  Leg pain  Sigmoidoscopy on 3/21/2023      Physical Exam                      There were no " vitals filed for this visit.  Vital Signs with Ranges       Telephone visit  Constitutional: NAD  Neuro: coherent    Medications   Augmentin     Previous note: Ceftriaxone  Flagyl completed          Data   All microbiology laboratory data reviewed.  Recent Labs   Lab Test 02/21/23  1100 02/14/23  1353 02/07/23  1615   WBC 5.7 7.3 7.4   HGB 8.9* 9.3* 8.8*   HCT 29.7* 30.3* 28.9*   MCV 97 94 95    221 293     Recent Labs   Lab Test 02/21/23  1100 02/14/23  1300 02/07/23  1615   CR 0.89 0.88 0.94     Recent Labs   Lab Test 02/21/23  1100   SED 62*     No lab results found.    Invalid input(s):     MICROBIOLOGY:    Reviewed    7-Day Micro Results     No results found for the last 168 hours.           RADIOLOGY:    Reviewed  CT Abdomen Pelvis w Contrast    Result Date: 1/20/2023  EXAM: CT ABDOMEN PELVIS W CONTRAST LOCATION: [facility] DATE/TIME: 1/20/2023 4:51 PM INDICATION: Weakness, hypotension, fall with acute on chronic abdominal pain with previously known fistula and abscess COMPARISON: CTA AP 12/09/2022 and older studies. TECHNIQUE: CT scan of the abdomen and pelvis was performed following injection of IV contrast. Multiplanar reformats were obtained. Dose reduction techniques were used. CONTRAST: 75ml Isovue 370 FINDINGS: LOWER CHEST: Please see chest CTA report HEPATOBILIARY: Prior cholecystectomy. PANCREAS: Mild diffuse atrophy. SPLEEN: Normal. ADRENAL GLANDS: Normal. KIDNEYS/BLADDER: No change in the nonobstructing small calculus in the lower pole calyx on the right. 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 No change in the curvilinear elliptical density centrally within the fat, new since the April 2022 but unchanged from the recent study ( AML embolization 10/5/2022). There are also bilateral renal cysts which need no follow-up. Air is seen in the bladder. BOWEL: Redundant colon especially the sigmoid with  extensive distal colonic diverticulosis. Tethering of the small bowel wall and the left adnexa to the mid sigmoid colon again noted. No acute inflammatory changes. No bowel obstruction. Incidental descending duodenal diverticulum. LYMPH NODES: Normal. VASCULATURE: Moderate arterial calcifications. No aneurysm. PELVIC ORGANS: There has been interval decrease in the rim-enhancing fluid collection in the pelvis which is less well-defined now measures approximately 2 cm (central cavity  was 3.5 cm before). On the current study this appears intramural within the right uterine fundus MUSCULOSKELETAL: Subacute right lateral seventh 8 9 rib fractures are seen with callus formation no acute fractures. Moderate facet arthropathy in the lower lumbar spine.     IMPRESSION: 1.  No evidence of acute traumatic injury in the abdomen or pelvis. 2.  Interval decrease in the right side pelvic abscess as noted above which on the current study appears to be myometrial in nature involving the right side of the uterine fundus. 3.  Air in the bladder from a presumed a colovesical fistula given her prior history. If needed this can be confirmed with a standard filling cystogram (not a CT cystogram). 4.  Extensive sigmoid diverticulitis without evidence of acute inflammation. Tethering of the adjacent small bowel and the left adnexa to the mid sigmoid again noted. 5.  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 6.  Other noncritical findings as noted above.    Cervical spine CT w/o contrast    Result Date: 1/20/2023  EXAM: CT HEAD W/O CONTRAST, CT CERVICAL SPINE W/O CONTRAST LOCATION: [facility] DATE/TIME: 1/20/2023 4:47 PM INDICATION: Head and neck injury, on thinners, pain in lower cervical upper thoracic COMPARISON: 12/01/2022 CT head, 09/18/2022 CT cervical spine TECHNIQUE: 1) Routine CT Head without IV contrast. Multiplanar reformats. Dose reduction techniques were used. 2) Routine CT Cervical  Spine without IV contrast. Multiplanar reformats. Dose reduction techniques were used. FINDINGS: HEAD CT: INTRACRANIAL CONTENTS: No intracranial hemorrhage, extraaxial collection, or mass effect.  No CT evidence of acute infarct. Moderate presumed chronic small vessel ischemic changes. Moderate generalized volume loss. No hydrocephalus. VISUALIZED ORBITS/SINUSES/MASTOIDS: No intraorbital abnormality. No paranasal sinus mucosal disease. No middle ear or mastoid effusion. BONES/SOFT TISSUES: No acute abnormality. CERVICAL SPINE CT: VERTEBRA: Normal vertebral body heights. Slight right cervical curve. No fracture or posttraumatic subluxation. CANAL/FORAMINA: Mild degenerative changes without significant canal narrowing at any level. Multilevel mild neural foraminal narrowing. PARASPINAL: Prominent medial deviation of ICAs within the neck. 7 mm nodular opacity left lung apex; probably present previously however it was incompletely visualized. If indicated, CT chest may be performed.     IMPRESSION: HEAD CT: 1.  No acute intracranial process. CERVICAL SPINE CT: 1.  No CT evidence for acute fracture or post traumatic subluxation.    CT Chest Pulmonary Embolism w Contrast    Result Date: 1/20/2023  EXAM: CT CHEST PULMONARY EMBOLISM W CONTRAST LOCATION: [facility] DATE/TIME: 1/20/2023 4:50 PM INDICATION: Weakness, hypoxia, low blood pressure in the field COMPARISON: 4/5/2022 and 12/9/2022 TECHNIQUE: CT chest pulmonary angiogram during arterial phase injection of IV contrast. Multiplanar reformats and MIP reconstructions were performed. Dose reduction techniques were used. CONTRAST: 75ml Isovue 370 FINDINGS: ANGIOGRAM CHEST: Pulmonary arteries are normal caliber and negative for pulmonary emboli. Thoracic aorta demonstrates significant atherosclerotic plaque but is negative for dissection. No CT evidence of right heart strain. LUNGS AND PLEURA: Mild motion artifact. Scattered tiny peripheral  pulmonary nodules unchanged with largest left upper lobe on image 91 measuring 4 x 3 mm. Stable linear scarring right middle lobe. Some flattening of trachea and mainstem bronchi consistent with tracheobronchomalacia. No new focal infiltrate. MEDIASTINUM/AXILLAE: No lymphadenopathy. CORONARY ARTERY CALCIFICATION: Severe. UPPER ABDOMEN: Normal. MUSCULOSKELETAL: Subacute fractures of right ribs 6-9 now have surrounding callus. There are healed fractures involving left ribs 5 and 6 which were not seen on previous exam.     IMPRESSION: 1.  No pulmonary emboli identified. No acute thoracic abnormality evident. 2.  Tracheobronchomalacia. 3.  Prior bilateral rib fractures. No acute fracture identified.    Head CT w/o contrast    Result Date: 1/20/2023  EXAM: CT HEAD W/O CONTRAST, CT CERVICAL SPINE W/O CONTRAST LOCATION: [facility] DATE/TIME: 1/20/2023 4:47 PM INDICATION: Head and neck injury, on thinners, pain in lower cervical upper thoracic COMPARISON: 12/01/2022 CT head, 09/18/2022 CT cervical spine TECHNIQUE: 1) Routine CT Head without IV contrast. Multiplanar reformats. Dose reduction techniques were used. 2) Routine CT Cervical Spine without IV contrast. Multiplanar reformats. Dose reduction techniques were used. FINDINGS: HEAD CT: INTRACRANIAL CONTENTS: No intracranial hemorrhage, extraaxial collection, or mass effect.  No CT evidence of acute infarct. Moderate presumed chronic small vessel ischemic changes. Moderate generalized volume loss. No hydrocephalus. VISUALIZED ORBITS/SINUSES/MASTOIDS: No intraorbital abnormality. No paranasal sinus mucosal disease. No middle ear or mastoid effusion. BONES/SOFT TISSUES: No acute abnormality. CERVICAL SPINE CT: VERTEBRA: Normal vertebral body heights. Slight right cervical curve. No fracture or posttraumatic subluxation. CANAL/FORAMINA: Mild degenerative changes without significant canal narrowing at any level. Multilevel mild neural foraminal  narrowing. PARASPINAL: Prominent medial deviation of ICAs within the neck. 7 mm nodular opacity left lung apex; probably present previously however it was incompletely visualized. If indicated, CT chest may be performed.     IMPRESSION: HEAD CT: 1.  No acute intracranial process. CERVICAL SPINE CT: 1.  No CT evidence for acute fracture or post traumatic subluxation.          Zakiya is a 78 year old who is being evaluated via a billable video visit.      How would you like to obtain your AVS? Mail a copy  If the video visit is dropped, the invitation should be resent by: Text to cell phone: [phone]  Will anyone else be joining your video visit? No      Video-Visit Details    Type of service:  Video Visit -- phone visit. Patient was not able to join video visit and did a phone visit  Phone Start Time: 3:40 pm  Video End Time:3:59 pm    Originating Location (pt. Location): Home  istant Location (provider location):  On-site  Platform used for Video Visit: Other: Phone     Total Time Spent 35 minutes with >50% of the time spent in chart review, evaluation, management, counseling, education and care coordination.

## 2023-03-14 ENCOUNTER — LAB REQUISITION (OUTPATIENT)
Dept: LAB | Facility: CLINIC | Age: 78
End: 2023-03-14
Payer: COMMERCIAL

## 2023-03-14 ENCOUNTER — TRANSFERRED RECORDS (OUTPATIENT)
Dept: HEALTH INFORMATION MANAGEMENT | Facility: CLINIC | Age: 78
End: 2023-03-14

## 2023-03-14 DIAGNOSIS — Z01.818 ENCOUNTER FOR OTHER PREPROCEDURAL EXAMINATION: ICD-10-CM

## 2023-03-14 LAB
ANION GAP SERPL CALCULATED.3IONS-SCNC: 15 MMOL/L (ref 7–15)
BUN SERPL-MCNC: 27.3 MG/DL (ref 8–23)
CALCIUM SERPL-MCNC: 8.8 MG/DL (ref 8.8–10.2)
CHLORIDE SERPL-SCNC: 102 MMOL/L (ref 98–107)
CREAT SERPL-MCNC: 1.11 MG/DL (ref 0.51–0.95)
DEPRECATED HCO3 PLAS-SCNC: 23 MMOL/L (ref 22–29)
GFR SERPL CREATININE-BSD FRML MDRD: 50 ML/MIN/1.73M2
GLUCOSE SERPL-MCNC: 113 MG/DL (ref 70–99)
HBA1C MFR BLD: 5.9 % (ref 4.2–6.1)
POTASSIUM SERPL-SCNC: 4.6 MMOL/L (ref 3.4–5.3)
SODIUM SERPL-SCNC: 140 MMOL/L (ref 136–145)

## 2023-03-14 PROCEDURE — 80048 BASIC METABOLIC PNL TOTAL CA: CPT | Mod: ORL | Performed by: FAMILY MEDICINE

## 2023-03-28 ENCOUNTER — TELEPHONE (OUTPATIENT)
Dept: INFECTIOUS DISEASES | Facility: CLINIC | Age: 78
End: 2023-03-28
Payer: COMMERCIAL

## 2023-03-28 NOTE — TELEPHONE ENCOUNTER
Patient is almost out of Augmentin and will need a refill asap. Date of surgery is still to be determine so until she hears from surgery scheduling, she will need refills from Dr. Hernandez.

## 2023-04-03 ENCOUNTER — VIRTUAL VISIT (OUTPATIENT)
Dept: INFECTIOUS DISEASES | Facility: CLINIC | Age: 78
End: 2023-04-03
Payer: COMMERCIAL

## 2023-04-03 DIAGNOSIS — N73.9 PELVIC ABSCESS IN FEMALE: Primary | ICD-10-CM

## 2023-04-03 DIAGNOSIS — K65.9 ABDOMINAL INFECTION (H): ICD-10-CM

## 2023-04-03 PROCEDURE — 99214 OFFICE O/P EST MOD 30 MIN: CPT | Mod: VID | Performed by: INTERNAL MEDICINE

## 2023-04-03 NOTE — PROGRESS NOTES
"Virtual Visit Details    Type of service:  Video Visit - converted to Telephone visit as video not working   Video Start Time: 3:28 pm  Video End Time:3:55 pm    Originating Location (pt. Location): Home  Distant Location (provider location):  On-site  Platform used for Video Visit: Phone      [facility] INFECTIOUS DISEASE CLINIC     Infectious Disease Progress Note    04/03/2023          Assessment & Plan   Zakiya Christian is a 78 year old female who was admitted on 1/20/2023.     ASSESSMENT:  Pelvic abscess- active issue   E coli bacteremia 1/20/2023- on IV Ceftriaxone until 2/23/2023, and PO metronidazole   Non acute diverticulitis  Known CV fistula  White count improved      Discussion/MDM/previous note  On maximal abx--probably treating disease that has been there for months however.   Unclear if they will impact things much, need to talk to CRS or GS about what they think we ought to do treatment wise.  Came in on augmentin for a recent \"UTI\" which is present de peng if she has a CV fistula (???)--note E coli and/or KP in urine the last 7 times checked over about 14 months.  The recent data about this \"UTI\" is from [facility] and not available...     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     RECOMMENDATIONS:      We discussed the lab results and that surgery is going to be scheduled by Gynecology and Colorectal Surgery at [facility]. The date is not finalized yet. Sigmoidoscopy  3/21/2023- unable to complete due to diverticulosis and stool    Received IV Ceftriaxone until 2/23/2023 and then start Oral Augmentin until the surgery and resection of infected tissue/abscess.     Follow up (virtual/phone) with us on 4/3/2023. Please call with questions.  If symptoms get worse, or recur, then please go to ER, preferably at [facility] (because of Gyn and Colorectal Surgery availability, as these surgical specialties do " not round at [facility])    Patient needs Gyn follow up re: pelvis abscess/mass. Patient was at [facility] in Jan 2023 and Gyn reviewed the case remotely as no inpatient Gyn service at [facility]    Teri Hernandez MD  [facility]  Answering Service: [phone]  On-Call ID provider: [phone], option: 9      Total Time Spent 35 minutes with >50% of the time spent in chart review, evaluation, management, counseling, education and care coordination.       Interval History   No abdominal pain  Frustrated that has not received a surgery date  Sigmoidoscopy on 3/21/2023--Sigmoidoscopy was attempted by GI and not completed as stool and divertculosis  Tolerating PO abx  Leg pain        Physical Exam                      There were no vitals filed for this visit.  Vital Signs with Ranges       Telephone visit  Constitutional: NAD  Neuro: coherent    Medications   Augmentin     Previous note: Ceftriaxone  Flagyl completed          Data   All microbiology laboratory data reviewed.  Recent Labs   Lab Test 02/21/23  1100 02/14/23  1353 02/07/23  1615   WBC 5.7 7.3 7.4   HGB 8.9* 9.3* 8.8*   HCT 29.7* 30.3* 28.9*   MCV 97 94 95    221 293     Recent Labs   Lab Test 03/14/23  1250 02/21/23  1100 02/14/23  1300   CR 1.11* 0.89 0.88     Recent Labs   Lab Test 02/21/23  1100   SED 62*     No lab results found.    Invalid input(s):     MICROBIOLOGY:    Reviewed    7-Day Micro Results     No results found for the last 168 hours.           RADIOLOGY:    Reviewed  CT Abdomen Pelvis w Contrast    Result Date: 1/20/2023  EXAM: CT ABDOMEN PELVIS W CONTRAST LOCATION: [facility] DATE/TIME: 1/20/2023 4:51 PM INDICATION: Weakness, hypotension, fall with acute on chronic abdominal pain with previously known fistula and abscess COMPARISON: CTA AP 12/09/2022 and older studies. TECHNIQUE: CT scan of the abdomen and pelvis was performed following injection of IV  contrast. Multiplanar reformats were obtained. Dose reduction techniques were used. CONTRAST: 75ml Isovue 370 FINDINGS: LOWER CHEST: Please see chest CTA report HEPATOBILIARY: Prior cholecystectomy. PANCREAS: Mild diffuse atrophy. SPLEEN: Normal. ADRENAL GLANDS: Normal. KIDNEYS/BLADDER: No change in the nonobstructing small calculus in the lower pole calyx on the right. 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 No change in the curvilinear elliptical density centrally within the fat, new since the April 2022 but unchanged from the recent study ( AML embolization 10/5/2022). There are also bilateral renal cysts which need no follow-up. Air is seen in the bladder. BOWEL: Redundant colon especially the sigmoid with extensive distal colonic diverticulosis. Tethering of the small bowel wall and the left adnexa to the mid sigmoid colon again noted. No acute inflammatory changes. No bowel obstruction. Incidental descending duodenal diverticulum. LYMPH NODES: Normal. VASCULATURE: Moderate arterial calcifications. No aneurysm. PELVIC ORGANS: There has been interval decrease in the rim-enhancing fluid collection in the pelvis which is less well-defined now measures approximately 2 cm (central cavity  was 3.5 cm before). On the current study this appears intramural within the right uterine fundus MUSCULOSKELETAL: Subacute right lateral seventh 8 9 rib fractures are seen with callus formation no acute fractures. Moderate facet arthropathy in the lower lumbar spine.     IMPRESSION: 1.  No evidence of acute traumatic injury in the abdomen or pelvis. 2.  Interval decrease in the right side pelvic abscess as noted above which on the current study appears to be myometrial in nature involving the right side of the uterine fundus. 3.  Air in the bladder from a presumed a colovesical fistula given her prior history. If needed this can be confirmed with a standard filling  cystogram (not a CT cystogram). 4.  Extensive sigmoid diverticulitis without evidence of acute inflammation. Tethering of the adjacent small bowel and the left adnexa to the mid sigmoid again noted. 5.  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 6.  Other noncritical findings as noted above.    Cervical spine CT w/o contrast    Result Date: 1/20/2023  EXAM: CT HEAD W/O CONTRAST, CT CERVICAL SPINE W/O CONTRAST LOCATION: [facility] DATE/TIME: 1/20/2023 4:47 PM INDICATION: Head and neck injury, on thinners, pain in lower cervical upper thoracic COMPARISON: 12/01/2022 CT head, 09/18/2022 CT cervical spine TECHNIQUE: 1) Routine CT Head without IV contrast. Multiplanar reformats. Dose reduction techniques were used. 2) Routine CT Cervical Spine without IV contrast. Multiplanar reformats. Dose reduction techniques were used. FINDINGS: HEAD CT: INTRACRANIAL CONTENTS: No intracranial hemorrhage, extraaxial collection, or mass effect.  No CT evidence of acute infarct. Moderate presumed chronic small vessel ischemic changes. Moderate generalized volume loss. No hydrocephalus. VISUALIZED ORBITS/SINUSES/MASTOIDS: No intraorbital abnormality. No paranasal sinus mucosal disease. No middle ear or mastoid effusion. BONES/SOFT TISSUES: No acute abnormality. CERVICAL SPINE CT: VERTEBRA: Normal vertebral body heights. Slight right cervical curve. No fracture or posttraumatic subluxation. CANAL/FORAMINA: Mild degenerative changes without significant canal narrowing at any level. Multilevel mild neural foraminal narrowing. PARASPINAL: Prominent medial deviation of ICAs within the neck. 7 mm nodular opacity left lung apex; probably present previously however it was incompletely visualized. If indicated, CT chest may be performed.     IMPRESSION: HEAD CT: 1.  No acute intracranial process. CERVICAL SPINE CT: 1.  No CT evidence for acute fracture or post traumatic subluxation.    CT Chest Pulmonary  Embolism w Contrast    Result Date: 1/20/2023  EXAM: CT CHEST PULMONARY EMBOLISM W CONTRAST LOCATION: [facility] DATE/TIME: 1/20/2023 4:50 PM INDICATION: Weakness, hypoxia, low blood pressure in the field COMPARISON: 4/5/2022 and 12/9/2022 TECHNIQUE: CT chest pulmonary angiogram during arterial phase injection of IV contrast. Multiplanar reformats and MIP reconstructions were performed. Dose reduction techniques were used. CONTRAST: 75ml Isovue 370 FINDINGS: ANGIOGRAM CHEST: Pulmonary arteries are normal caliber and negative for pulmonary emboli. Thoracic aorta demonstrates significant atherosclerotic plaque but is negative for dissection. No CT evidence of right heart strain. LUNGS AND PLEURA: Mild motion artifact. Scattered tiny peripheral pulmonary nodules unchanged with largest left upper lobe on image 91 measuring 4 x 3 mm. Stable linear scarring right middle lobe. Some flattening of trachea and mainstem bronchi consistent with tracheobronchomalacia. No new focal infiltrate. MEDIASTINUM/AXILLAE: No lymphadenopathy. CORONARY ARTERY CALCIFICATION: Severe. UPPER ABDOMEN: Normal. MUSCULOSKELETAL: Subacute fractures of right ribs 6-9 now have surrounding callus. There are healed fractures involving left ribs 5 and 6 which were not seen on previous exam.     IMPRESSION: 1.  No pulmonary emboli identified. No acute thoracic abnormality evident. 2.  Tracheobronchomalacia. 3.  Prior bilateral rib fractures. No acute fracture identified.    Head CT w/o contrast    Result Date: 1/20/2023  EXAM: CT HEAD W/O CONTRAST, CT CERVICAL SPINE W/O CONTRAST LOCATION: [facility] DATE/TIME: 1/20/2023 4:47 PM INDICATION: Head and neck injury, on thinners, pain in lower cervical upper thoracic COMPARISON: 12/01/2022 CT head, 09/18/2022 CT cervical spine TECHNIQUE: 1) Routine CT Head without IV contrast. Multiplanar reformats. Dose reduction techniques were used. 2) Routine CT Cervical  Spine without IV contrast. Multiplanar reformats. Dose reduction techniques were used. FINDINGS: HEAD CT: INTRACRANIAL CONTENTS: No intracranial hemorrhage, extraaxial collection, or mass effect.  No CT evidence of acute infarct. Moderate presumed chronic small vessel ischemic changes. Moderate generalized volume loss. No hydrocephalus. VISUALIZED ORBITS/SINUSES/MASTOIDS: No intraorbital abnormality. No paranasal sinus mucosal disease. No middle ear or mastoid effusion. BONES/SOFT TISSUES: No acute abnormality. CERVICAL SPINE CT: VERTEBRA: Normal vertebral body heights. Slight right cervical curve. No fracture or posttraumatic subluxation. CANAL/FORAMINA: Mild degenerative changes without significant canal narrowing at any level. Multilevel mild neural foraminal narrowing. PARASPINAL: Prominent medial deviation of ICAs within the neck. 7 mm nodular opacity left lung apex; probably present previously however it was incompletely visualized. If indicated, CT chest may be performed.     IMPRESSION: HEAD CT: 1.  No acute intracranial process. CERVICAL SPINE CT: 1.  No CT evidence for acute fracture or post traumatic subluxation.

## 2023-04-03 NOTE — PATIENT INSTRUCTIONS
Yury Sigala,  Thank you for taking the time for the virtual visit today  -Please schedule appt with Gyn regarding follow up for pelvic mass/abscess, fistula  -Labs ordered  -Continue augmentin  -ID follow up in 3-4 weeks, earlier if needed

## 2023-04-04 ENCOUNTER — TELEPHONE (OUTPATIENT)
Dept: INFECTIOUS DISEASES | Facility: CLINIC | Age: 78
End: 2023-04-04
Payer: COMMERCIAL

## 2023-04-04 NOTE — TELEPHONE ENCOUNTER
Pt scheduled labs at [facility] for Thursday 4/6. They have not received the labs orders from Dr. Hernandez yet.     Please fax orders to fax # [phone] Attn: Ele

## 2023-04-06 ENCOUNTER — TRANSFERRED RECORDS (OUTPATIENT)
Dept: HEALTH INFORMATION MANAGEMENT | Facility: CLINIC | Age: 78
End: 2023-04-06

## 2023-04-06 ENCOUNTER — LAB REQUISITION (OUTPATIENT)
Dept: LAB | Facility: CLINIC | Age: 78
End: 2023-04-06
Payer: COMMERCIAL

## 2023-04-06 DIAGNOSIS — K65.9 PERITONITIS, UNSPECIFIED (H): ICD-10-CM

## 2023-04-06 DIAGNOSIS — N73.9 FEMALE PELVIC INFLAMMATORY DISEASE, UNSPECIFIED: ICD-10-CM

## 2023-04-06 LAB
ALBUMIN SERPL BCG-MCNC: 3.8 G/DL (ref 3.5–5.2)
ALP SERPL-CCNC: 52 U/L (ref 35–104)
ALT SERPL W P-5'-P-CCNC: 9 U/L (ref 10–35)
ANION GAP SERPL CALCULATED.3IONS-SCNC: 13 MMOL/L (ref 7–15)
AST SERPL W P-5'-P-CCNC: 16 U/L (ref 10–35)
BILIRUB SERPL-MCNC: 0.2 MG/DL
BUN SERPL-MCNC: 23.1 MG/DL (ref 8–23)
CALCIUM SERPL-MCNC: 9.2 MG/DL (ref 8.8–10.2)
CHLORIDE SERPL-SCNC: 100 MMOL/L (ref 98–107)
CREAT SERPL-MCNC: 1.02 MG/DL (ref 0.51–0.95)
DEPRECATED HCO3 PLAS-SCNC: 27 MMOL/L (ref 22–29)
GFR SERPL CREATININE-BSD FRML MDRD: 56 ML/MIN/1.73M2
GLUCOSE SERPL-MCNC: 97 MG/DL (ref 70–99)
POTASSIUM SERPL-SCNC: 4.9 MMOL/L (ref 3.4–5.3)
PROT SERPL-MCNC: 6.6 G/DL (ref 6.4–8.3)
SODIUM SERPL-SCNC: 140 MMOL/L (ref 136–145)

## 2023-04-06 PROCEDURE — 80053 COMPREHEN METABOLIC PANEL: CPT | Mod: ORL | Performed by: FAMILY MEDICINE

## 2023-05-01 ENCOUNTER — HOSPITAL ENCOUNTER (OUTPATIENT)
Dept: MRI IMAGING | Facility: HOSPITAL | Age: 78
Discharge: HOME | End: 2023-05-01
Attending: UROLOGY | Admitting: UROLOGY
Payer: COMMERCIAL

## 2023-05-01 DIAGNOSIS — D17.71 BENIGN LIPOMATOUS NEOPLASM OF KIDNEY: ICD-10-CM

## 2023-05-01 PROCEDURE — 255N000002 HC RX 255 OP 636: Performed by: UROLOGY

## 2023-05-01 PROCEDURE — 74183 MRI ABD W/O CNTR FLWD CNTR: CPT

## 2023-05-01 PROCEDURE — A9585 GADOBUTROL INJECTION: HCPCS | Performed by: UROLOGY

## 2023-05-01 RX ORDER — GADOBUTROL 604.72 MG/ML
0.1 INJECTION INTRAVENOUS ONCE
Status: COMPLETED | OUTPATIENT
Start: 2023-05-01 | End: 2023-05-01

## 2023-05-01 RX ADMIN — GADOBUTROL 9 ML: 604.72 INJECTION INTRAVENOUS at 13:38

## 2023-05-02 ENCOUNTER — LAB REQUISITION (OUTPATIENT)
Dept: LAB | Facility: CLINIC | Age: 78
End: 2023-05-02
Payer: COMMERCIAL

## 2023-05-02 DIAGNOSIS — Z01.818 ENCOUNTER FOR OTHER PREPROCEDURAL EXAMINATION: ICD-10-CM

## 2023-05-02 PROCEDURE — 80048 BASIC METABOLIC PNL TOTAL CA: CPT | Mod: ORL | Performed by: FAMILY MEDICINE

## 2023-05-03 LAB
ANION GAP SERPL CALCULATED.3IONS-SCNC: 15 MMOL/L (ref 7–15)
BUN SERPL-MCNC: 27.5 MG/DL (ref 8–23)
CALCIUM SERPL-MCNC: 9.6 MG/DL (ref 8.8–10.2)
CHLORIDE SERPL-SCNC: 99 MMOL/L (ref 98–107)
CREAT SERPL-MCNC: 1.18 MG/DL (ref 0.51–0.95)
DEPRECATED HCO3 PLAS-SCNC: 25 MMOL/L (ref 22–29)
GFR SERPL CREATININE-BSD FRML MDRD: 47 ML/MIN/1.73M2
GLUCOSE SERPL-MCNC: 104 MG/DL (ref 70–99)
POTASSIUM SERPL-SCNC: 4.8 MMOL/L (ref 3.4–5.3)
SODIUM SERPL-SCNC: 139 MMOL/L (ref 136–145)

## 2023-06-06 ENCOUNTER — TRANSITIONAL CARE UNIT VISIT (OUTPATIENT)
Dept: GERIATRICS | Facility: CLINIC | Age: 78
End: 2023-06-06
Payer: COMMERCIAL

## 2023-06-06 VITALS
WEIGHT: 199.9 LBS | DIASTOLIC BLOOD PRESSURE: 46 MMHG | OXYGEN SATURATION: 94 % | TEMPERATURE: 96.9 F | RESPIRATION RATE: 21 BRPM | SYSTOLIC BLOOD PRESSURE: 117 MMHG | HEIGHT: 63 IN | BODY MASS INDEX: 35.42 KG/M2 | HEART RATE: 86 BPM

## 2023-06-06 DIAGNOSIS — N32.1 VESICOCOLONIC FISTULA: ICD-10-CM

## 2023-06-06 DIAGNOSIS — K55.069 INFERIOR MESENTERIC VEIN THROMBOSIS (H): Primary | ICD-10-CM

## 2023-06-06 DIAGNOSIS — Z74.09 IMPAIRED MOBILITY AND ACTIVITIES OF DAILY LIVING: ICD-10-CM

## 2023-06-06 DIAGNOSIS — E11.69 TYPE 2 DIABETES MELLITUS WITH OTHER SPECIFIED COMPLICATION, WITHOUT LONG-TERM CURRENT USE OF INSULIN (H): ICD-10-CM

## 2023-06-06 DIAGNOSIS — N18.31 CHRONIC KIDNEY DISEASE, STAGE 3A (H): ICD-10-CM

## 2023-06-06 DIAGNOSIS — R53.81 PHYSICAL DECONDITIONING: ICD-10-CM

## 2023-06-06 DIAGNOSIS — E66.01 MORBID OBESITY (H): ICD-10-CM

## 2023-06-06 DIAGNOSIS — E78.2 MIXED HYPERLIPIDEMIA: ICD-10-CM

## 2023-06-06 DIAGNOSIS — N71.9 UTERINE ABSCESS: ICD-10-CM

## 2023-06-06 DIAGNOSIS — I10 PRIMARY HYPERTENSION: ICD-10-CM

## 2023-06-06 DIAGNOSIS — M35.3 POLYMYALGIA RHEUMATICA (H): ICD-10-CM

## 2023-06-06 DIAGNOSIS — Z78.9 IMPAIRED MOBILITY AND ACTIVITIES OF DAILY LIVING: ICD-10-CM

## 2023-06-06 DIAGNOSIS — F32.A DEPRESSION, UNSPECIFIED DEPRESSION TYPE: ICD-10-CM

## 2023-06-06 DIAGNOSIS — F41.9 ANXIETY: ICD-10-CM

## 2023-06-06 DIAGNOSIS — K57.32 SIGMOID DIVERTICULITIS: ICD-10-CM

## 2023-06-06 DIAGNOSIS — J44.9 CHRONIC OBSTRUCTIVE PULMONARY DISEASE, UNSPECIFIED COPD TYPE (H): ICD-10-CM

## 2023-06-06 PROCEDURE — 99310 SBSQ NF CARE HIGH MDM 45: CPT | Performed by: PHYSICIAN ASSISTANT

## 2023-06-06 RX ORDER — PREDNISONE 1 MG/1
3 TABLET ORAL DAILY
COMMUNITY
End: 2023-08-21

## 2023-06-06 RX ORDER — POLYETHYLENE GLYCOL 3350 17 G/17G
0.5 POWDER, FOR SOLUTION ORAL DAILY PRN
COMMUNITY

## 2023-06-06 NOTE — PROGRESS NOTES
[facility] GERIATRICS    PRIMARY CARE PROVIDER AND CLINIC:  Ami Noriega MD, [address] [place] [facility] [place] [id number]  Chief Complaint   Patient presents with     Hospital F/U      [place] Medical Record Number:  [id number]  Place of Service where encounter took place:  [facility] ([facility]) [[id number]]    Zakiya Christian  is a 78 year old  (1945), admitted to the above facility from  [facility]. Hospital stay 5/15/23 through 6/5/23..   HPI:    Patient is a 79yo female with PMHx NIDDM, CKD-2, asthma, PMR, anemia, Hx DVT on chronic AC with Xarelto, nonocclusive inferior mesenteric vein DVT, angiomyolipoma of left kidney, hypertension, hyperlipidemia, depression, GERD, morbid obesity, who was admitted at [facility] from 5/15 - 6/5, 2023 after undergoing previously scheduled open sigmoid colectomy, takedown of colovesicular fistula, creation of end-colostomy, QUINTON/BSO, and placement of bilateral ureteral catheters. Patient with hx of diverticulitis in 04/2022 treated with perc drain placement, IV antibiotics. In 06/2022 with admission, recurrent diverticulitis and hemorrhage into renal angiomyolipoma. Subsequently presented in 01/2023 following a fall with ongoing abdominal pain, found to have E.coli bacteremia resistant to FQ, bactrim. 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  She was treated with IV ceftriaxone transitioned to PO augmentin and seen in collaboration with CRS, Gyn/Onc who ultimately recommended the above mentioned surgery. Surgery was performed without complication, postoperatively patient had slow recovery in regards to return of bowel function, adequate oral intake. There was consideration of initiating TPN due to nutritional state but ultimately held as it appeared she was consuming the minimum intake required. She was noted to have acute on chronic anemia and required 1u PRBCs on 5/31. Following therapy  evaluations, she was referred to TCU for ongoing rehab, medical management.    Patient is seen as initial visit today. She is resting in bed on interview, wakes easily to voice. Offers no complaints at this time, denies sob/cp. Ostomy is stooling well. Continues to be generally weak, being out of bed has been taxing. No concerns from nursing staff.    CODE STATUS/ADVANCE DIRECTIVES DISCUSSION:  Full Code  CPR/Full code   ALLERGIES:   Allergies   Allergen Reactions     Simvastatin Hives      PAST MEDICAL HISTORY:   Past Medical History:   Diagnosis Date     Diabetes (H)      Hypertension      Obese      PMR (polymyalgia rheumatica) (H)       PAST SURGICAL HISTORY:   has a past surgical history that includes IR Abscess Tube Change (4/22/2022) and PICC/Midline Placement (1/26/2023).  FAMILY HISTORY: family history is not on file.  SOCIAL HISTORY:     Patient's living condition: lives in an assisted living facility    Post Discharge Medication Reconciliation Status:   MED REC REQUIRED  Post Medication Reconciliation Status: discharge medications reconciled and changed, per note/orders       Current Outpatient Medications   Medication Sig     acetaminophen (TYLENOL) 500 MG tablet Take 1 tablet (500 mg) by mouth 4 times daily And q6h PRN - max 2 PRN/day     albuterol (PROAIR HFA/PROVENTIL HFA/VENTOLIN HFA) 108 (90 Base) MCG/ACT inhaler Inhale 2 puffs into the lungs every 6 hours as needed for shortness of breath or wheezing     alendronate (FOSAMAX) 70 MG tablet Take 1 tablet (70 mg) by mouth every 7 days     ASPIRIN EC PO Take 81 mg by mouth daily     azaTHIOprine 100 MG TABS Take 150 mg by mouth     Calcium Carbonate-Vitamin D 600-10 MG-MCG TABS Take 1 tablet by mouth 2 times daily (with meals)     calcium polycarbophil (FIBERCON) 625 MG tablet Take 2 tablets (1,250 mg) by mouth 2 times daily (with meals)     carvedilol (COREG) 12.5 MG tablet Take 1 tablet (12.5 mg) by mouth 2 times daily (with meals) HOLD if SBP<100  and/or HR<55     FLUoxetine (PROZAC) 40 MG capsule Take 1 capsule (40 mg) by mouth every morning     Fluticasone-Umeclidin-Vilanterol (TRELEGY ELLIPTA) 200-62.5-25 MCG/ACT oral inhaler Inhale 1 puff into the lungs every morning     furosemide (LASIX) 20 MG tablet Take 1 tablet (20 mg) by mouth daily HOLD if SBP<100     gabapentin (NEURONTIN) 300 MG capsule Take 3 capsules (900 mg) by mouth At Bedtime     losartan (COZAAR) 25 MG tablet Take 0.5 tablets (12.5 mg) by mouth every morning HOLD if SBP<100     megestrol (MEGACE) 20 MG tablet Take 20 mg by mouth 4 times daily     montelukast (SINGULAIR) 10 MG tablet Take 1 tablet (10 mg) by mouth At Bedtime     omeprazole (PRILOSEC) 40 MG DR capsule Take 1 capsule (40 mg) by mouth daily     ondansetron (ZOFRAN) 4 MG tablet Take 1 tablet (4 mg) by mouth every 6 hours as needed for nausea     polyethylene glycol (MIRALAX) 17 g packet Take 17 g by mouth daily as needed for constipation     potassium chloride ER (KLOR-CON M) 10 MEQ CR tablet Take 2 tablets (20 mEq) by mouth daily     pravastatin (PRAVACHOL) 40 MG tablet Take 1 tablet (40 mg) by mouth At Bedtime     predniSONE (DELTASONE) 1 MG tablet Take 3 mg by mouth daily     rivaroxaban ANTICOAGULANT (XARELTO) 20 MG TABS tablet Take 1 tablet (20 mg) by mouth daily (with dinner)     amoxicillin-clavulanate (AUGMENTIN) 875-125 MG tablet Take 1 tablet by mouth 2 times daily     Ascorbic Acid (VITAMIN C) 500 MG CAPS Take 1,000 mg by mouth daily     aspirin 81 MG EC tablet Take 1 tablet (81 mg) by mouth daily     azaTHIOprine (IMURAN) 50 MG tablet Take 2 tablets (100 mg) by mouth daily     cholecalciferol 50 MCG (2000 UT) CAPS Take 4,000 Units by mouth daily     coenzyme Q-10 capsule Take 1 capsule (100 mg) by mouth daily     nystatin (MYCOSTATIN) 089121 UNIT/GM external cream Apply topically 2 times daily     predniSONE (DELTASONE) 1 MG tablet Take 2-4 tablets (2-4 mg) by mouth daily Slow taper. 4mg daily 1/11/23-2/10/23. 3mg  "2/11/23-3/12/23. Then 2mg 3/13/23 - 3/15/23 then stop     No current facility-administered medications for this visit.       ROS:  4 point ROS including Respiratory, CV, GI and , other than that noted in the HPI,  is negative    Vitals:  /46   Pulse 86   Temp 96.9  F (36.1  C)   Resp 21   Ht 1.6 m (5' 3\")   Wt 90.7 kg (199 lb 14.4 oz)   SpO2 94%   BMI 35.41 kg/m    Exam:  GEN: well-developed, well-nourished, appears comfortable  HEENT: NCAT, EOM intact bilaterally, nose & mouth patent, mucous membranes moist  CHEST: lungs CTA bilaterally, no increased work of breathing, no wheeze, crackles, rhonchi  HEART: RRR, S1 & S2  ABD: soft, nontender, nondistended, no guarding or rigidity, +BS in all 4 quadrants; stoma to LUQ with pink mucosa, stool present in collection bag; midline incision with dressing in place, scant amount of tan-colored drainage from mid-inferior pole without evidence of dehiscense, erythema  MSK: AROM bilateral UE/LE, pedal & radial pulses 2+ bilaterally  NEURO: awake, alert. CN II-XII grossly intact. Sensation grossly intact to light touch.   SKIN: warm & dry without rash, no pedal edema    Lab/Diagnostic data:  Recent labs in [facility] reviewed by me today.      Recent labs from progress note dated 6/4:    CBC and INR Basic Metabolic   Recent Labs     06/03/23  0845 06/02/23  0747 05/31/23  1640 05/31/23  0649 05/27/23  1208 05/26/23  0720 05/25/23  0643   WBC  --  7.6  --   --  11.2 H 9.4 7.5   HGB 8.1 L 8.4 L 7.9 L   < > 7.9 L 7.6 L 7.5 L   PLT  --   --   --   --  296 231 199    < > = values in this interval not displayed.                 Recent Labs     06/02/23  0747 05/31/23  0649 05/27/23  1208 05/26/23  0720 05/25/23  0643 05/23/23  0706 05/22/23  0739 05/21/23  0843 05/20/23  1052 05/19/23  0555   SODIUM 138 139 139 140 141  --  139 139   < > 132 L   POTASSIUM  --   --  5.1 4.8 4.2   < > 3.4 L 3.8   < > 4.5   [id number]  --   --  27 26 25  --  22 21 L  --  17 L   BUN  --   --   --   " --   --   --  9 12  --  24 H   CREATININE 0.99 H 0.90 0.79 0.77 0.72   < > 0.78 0.91 H   < > 1.71 H    < > = values in this interval not displayed.           ASSESSMENT/PLAN:    Diverticulitis of sigmoid colon with abscess, vesicocolonic fistula, intrauterine abscess s/p open sigmoid colectomy, takedown of colovesicular fistula, creation of end-colostomy (CRS), QUINTON/BSO (Gyn/Onc) 5/17/23  As detailed in HPI and hospital admission, pt with diverticulitis initially in 2022 treated with perc drain, subsequently presented in 01/2023 with abdominal pain and identified to have the above. Attempted treatment with IV antibiotics however imaging with persistent pathology. Pt is now s/p above procedure. Postop course with slow return of bowel function, nutrition.  -Pain control with 500mg QID, 500mg q6h PRN  -Stoma cares with teaching  -Miralax 17g daily PRN constipation  -Follow-up with CRS, Gyn/Onc as directed    Acute on chronic anemia, blood loss  Hgb baseline 9-10, initially dropped to 7.2. received 1u PRBCs for Hgb 6.8 on 5/31. Discharge value 8.1.  -CBC on 6/8 to ensure stability    Hypocalcemia  Hypomagnesemia  Moderate protein-calorie malnutrition  Replaced while inpatient, due to malnutrition.  -RD to follow  -Continue megace 20mg QID    Physical deconditioning  Impaired mobility and ADLs  Generalized muscle weakness  In setting of prolonged hospitalization, complex medical history.  -PT/OT evaluations  -SW following for safe discharge    Acute on Chronic kidney disease, stage 3  Baseline Cr 0.9. postop peaked at 2. Discharge value 0.93.  -BMP on 6/8 to ensure stability     NIDDM  Diet controlled.  -BID accuchecks x48h    Asthma  COPD  Not in acute exacerbation, chronic.  -Continue Trelegy Ellipta, Albuterol inhalers    PMR  Chronic, stable. Received stress dose steroids postoperatively for persistent hypotention.  -Continues on prednisone at home dose of 3mg daily  -Continues on azathioprine    Hx DVT, inferior  mesenteric vein thrombus  Superficial thrombophlebitis, LUE  Xarelto resumed on 5/25. Noted swelling to LUE, doppler with superficial thrombophlebitis involving cephalic, basilic vv.  -Continues on Xarelto    Anxiety  Depression  Chronic, admits to mild exacerbation of depression with situation.  -ACP referral  -Continues on fluoxetine 40mg daily  -Continue gabapentin 900mg at bedtime     HTN / HLD  Chronic.  -Continues on coreg 12.5mg BID, losartan 12.5mg daily, lasix 20mg daily, pravastatin 40mg daily    Obesity, BMI 35  Increased risk of all-cause mortality. Contributes to nursing complexity.    GERD  Chronic, stable.  -Continues on pantoprazole 40mg daily    Orders:  -BMP, CBC on 6/8  -Accuchecks BID    Total time spent with patient visit at the skilled nursing facility was 45 min including patient visit and review of past records.     Electronically signed by:  Fabio Pham PA-C

## 2023-06-06 NOTE — LETTER
6/6/2023        RE: Zakiya Christian  [address]  [place] [address]        [facility] GERIATRICS    PRIMARY CARE PROVIDER AND CLINIC:  Ami Noriega MD, [address] [facility] [address]  Chief Complaint   Patient presents with     Hospital F/U      [facility] Medical Record Number:  [id number]  Place of Service where encounter took place:  [facility] ([facility]) [[id number]]    Zakiya Christian  is a 78 year old  (1945), admitted to the above facility from  [facility]. Hospital stay 5/15/23 through 6/5/23..   HPI:    Patient is a 77yo female with PMHx NIDDM, CKD-2, asthma, PMR, anemia, Hx DVT on chronic AC with Xarelto, nonocclusive inferior mesenteric vein DVT, angiomyolipoma of left kidney, hypertension, hyperlipidemia, depression, GERD, morbid obesity, who was admitted at [facility] from 5/15 - 6/5, 2023 after undergoing previously scheduled open sigmoid colectomy, takedown of colovesicular fistula, creation of end-colostomy, QUINTON/BSO, and placement of bilateral ureteral catheters. Patient with hx of diverticulitis in 04/2022 treated with perc drain placement, IV antibiotics. In 06/2022 with admission, recurrent diverticulitis and hemorrhage into renal angiomyolipoma. Subsequently presented in 01/2023 following a fall with ongoing abdominal pain, found to have E.coli bacteremia resistant to FQ, bactrim. 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  She was treated with IV ceftriaxone transitioned to PO augmentin and seen in collaboration with CRS, Gyn/Onc who ultimately recommended the above mentioned surgery. Surgery was performed without complication, postoperatively patient had slow recovery in regards to return of bowel function, adequate oral intake. There was consideration of initiating TPN due to nutritional state but ultimately held as it appeared she was consuming the minimum intake required. She  was noted to have acute on chronic anemia and required 1u PRBCs on 5/31. Following therapy evaluations, she was referred to TCU for ongoing rehab, medical management.    Patient is seen as initial visit today. She is resting in bed on interview, wakes easily to voice. Offers no complaints at this time, denies sob/cp. Ostomy is stooling well. Continues to be generally weak, being out of bed has been taxing. No concerns from nursing staff.    CODE STATUS/ADVANCE DIRECTIVES DISCUSSION:  Full Code  CPR/Full code   ALLERGIES:   Allergies   Allergen Reactions     Simvastatin Hives      PAST MEDICAL HISTORY:   Past Medical History:   Diagnosis Date     Diabetes (H)      Hypertension      Obese      PMR (polymyalgia rheumatica) (H)       PAST SURGICAL HISTORY:   has a past surgical history that includes IR Abscess Tube Change (4/22/2022) and PICC/Midline Placement (1/26/2023).  FAMILY HISTORY: family history is not on file.  SOCIAL HISTORY:     Patient's living condition: lives in an assisted living facility    Post Discharge Medication Reconciliation Status:   MED REC REQUIRED  Post Medication Reconciliation Status: discharge medications reconciled and changed, per note/orders       Current Outpatient Medications   Medication Sig     acetaminophen (TYLENOL) 500 MG tablet Take 1 tablet (500 mg) by mouth 4 times daily And q6h PRN - max 2 PRN/day     albuterol (PROAIR HFA/PROVENTIL HFA/VENTOLIN HFA) 108 (90 Base) MCG/ACT inhaler Inhale 2 puffs into the lungs every 6 hours as needed for shortness of breath or wheezing     alendronate (FOSAMAX) 70 MG tablet Take 1 tablet (70 mg) by mouth every 7 days     ASPIRIN EC PO Take 81 mg by mouth daily     azaTHIOprine 100 MG TABS Take 150 mg by mouth     Calcium Carbonate-Vitamin D 600-10 MG-MCG TABS Take 1 tablet by mouth 2 times daily (with meals)     calcium polycarbophil (FIBERCON) 625 MG tablet Take 2 tablets (1,250 mg) by mouth 2 times daily (with meals)     carvedilol (COREG)  12.5 MG tablet Take 1 tablet (12.5 mg) by mouth 2 times daily (with meals) HOLD if SBP<100 and/or HR<55     FLUoxetine (PROZAC) 40 MG capsule Take 1 capsule (40 mg) by mouth every morning     Fluticasone-Umeclidin-Vilanterol (TRELEGY ELLIPTA) 200-62.5-25 MCG/ACT oral inhaler Inhale 1 puff into the lungs every morning     furosemide (LASIX) 20 MG tablet Take 1 tablet (20 mg) by mouth daily HOLD if SBP<100     gabapentin (NEURONTIN) 300 MG capsule Take 3 capsules (900 mg) by mouth At Bedtime     losartan (COZAAR) 25 MG tablet Take 0.5 tablets (12.5 mg) by mouth every morning HOLD if SBP<100     megestrol (MEGACE) 20 MG tablet Take 20 mg by mouth 4 times daily     montelukast (SINGULAIR) 10 MG tablet Take 1 tablet (10 mg) by mouth At Bedtime     omeprazole (PRILOSEC) 40 MG DR capsule Take 1 capsule (40 mg) by mouth daily     ondansetron (ZOFRAN) 4 MG tablet Take 1 tablet (4 mg) by mouth every 6 hours as needed for nausea     polyethylene glycol (MIRALAX) 17 g packet Take 17 g by mouth daily as needed for constipation     potassium chloride ER (KLOR-CON M) 10 MEQ CR tablet Take 2 tablets (20 mEq) by mouth daily     pravastatin (PRAVACHOL) 40 MG tablet Take 1 tablet (40 mg) by mouth At Bedtime     predniSONE (DELTASONE) 1 MG tablet Take 3 mg by mouth daily     rivaroxaban ANTICOAGULANT (XARELTO) 20 MG TABS tablet Take 1 tablet (20 mg) by mouth daily (with dinner)     amoxicillin-clavulanate (AUGMENTIN) 875-125 MG tablet Take 1 tablet by mouth 2 times daily     Ascorbic Acid (VITAMIN C) 500 MG CAPS Take 1,000 mg by mouth daily     aspirin 81 MG EC tablet Take 1 tablet (81 mg) by mouth daily     azaTHIOprine (IMURAN) 50 MG tablet Take 2 tablets (100 mg) by mouth daily     cholecalciferol 50 MCG (2000 UT) CAPS Take 4,000 Units by mouth daily     coenzyme Q-10 capsule Take 1 capsule (100 mg) by mouth daily     nystatin (MYCOSTATIN) 457257 UNIT/GM external cream Apply topically 2 times daily     predniSONE (DELTASONE) 1 MG  "tablet Take 2-4 tablets (2-4 mg) by mouth daily Slow taper. 4mg daily 1/11/23-2/10/23. 3mg 2/11/23-3/12/23. Then 2mg 3/13/23 - 3/15/23 then stop     No current facility-administered medications for this visit.       ROS:  4 point ROS including Respiratory, CV, GI and , other than that noted in the HPI,  is negative    Vitals:  /46   Pulse 86   Temp 96.9  F (36.1  C)   Resp 21   Ht 1.6 m (5' 3\")   Wt 90.7 kg (199 lb 14.4 oz)   SpO2 94%   BMI 35.41 kg/m    Exam:  GEN: well-developed, well-nourished, appears comfortable  HEENT: NCAT, EOM intact bilaterally, nose & mouth patent, mucous membranes moist  CHEST: lungs CTA bilaterally, no increased work of breathing, no wheeze, crackles, rhonchi  HEART: RRR, S1 & S2  ABD: soft, nontender, nondistended, no guarding or rigidity, +BS in all 4 quadrants; stoma to LUQ with pink mucosa, stool present in collection bag; midline incision with dressing in place, scant amount of tan-colored drainage from mid-inferior pole without evidence of dehiscense, erythema  MSK: AROM bilateral UE/LE, pedal & radial pulses 2+ bilaterally  NEURO: awake, alert. CN II-XII grossly intact. Sensation grossly intact to light touch.   SKIN: warm & dry without rash, no pedal edema    Lab/Diagnostic data:  Recent labs in [facility] reviewed by me today.      Recent labs from progress note dated 6/4:    CBC and INR Basic Metabolic             Recent Labs     06/03/23  0845 06/02/23  0747 05/31/23  1640 05/31/23  0649 05/27/23  1208 05/26/23  0720 05/25/23  0643   WBC  --  7.6  --   --  11.2 H 9.4 7.5   HGB 8.1 L 8.4 L 7.9 L   < > 7.9 L 7.6 L 7.5 L   PLT  --   --   --   --  296 231 199    < > = values in this interval not displayed.                 Recent Labs     06/02/23  0747 05/31/23  0649 05/27/23  1208 05/26/23  0720 05/25/23  0643 05/23/23  0706 05/22/23  0739 05/21/23  0843 05/20/23  1052 05/19/23  0555   SODIUM 138 139 139 140 141  --  139 139   < > 132 L   POTASSIUM  --   --  5.1 4.8 4.2  "  < > 3.4 L 3.8   < > 4.5   OG4GNOER  --   --  27 26 25  --  22 21 L  --  17 L   BUN  --   --   --   --   --   --  9 12  --  24 H   CREATININE 0.99 H 0.90 0.79 0.77 0.72   < > 0.78 0.91 H   < > 1.71 H    < > = values in this interval not displayed.           ASSESSMENT/PLAN:    Diverticulitis of sigmoid colon with abscess, vesicocolonic fistula, intrauterine abscess s/p open sigmoid colectomy, takedown of colovesicular fistula, creation of end-colostomy (CRS), QUINTON/BSO (Gyn/Onc) 5/17/23  As detailed in HPI and hospital admission, pt with diverticulitis initially in 2022 treated with perc drain, subsequently presented in 01/2023 with abdominal pain and identified to have the above. Attempted treatment with IV antibiotics however imaging with persistent pathology. Pt is now s/p above procedure. Postop course with slow return of bowel function, nutrition.  -Pain control with 500mg QID, 500mg q6h PRN  -Stoma cares with teaching  -Miralax 17g daily PRN constipation  -Follow-up with CRS, Gyn/Onc as directed    Acute on chronic anemia, blood loss  Hgb baseline 9-10, initially dropped to 7.2. received 1u PRBCs for Hgb 6.8 on 5/31. Discharge value 8.1.  -CBC on 6/8 to ensure stability    Hypocalcemia  Hypomagnesemia  Moderate protein-calorie malnutrition  Replaced while inpatient, due to malnutrition.  -RD to follow  -Continue megace 20mg QID    Physical deconditioning  Impaired mobility and ADLs  Generalized muscle weakness  In setting of prolonged hospitalization, complex medical history.  -PT/OT evaluations  -SW following for safe discharge    Acute on Chronic kidney disease, stage 3  Baseline Cr 0.9. postop peaked at 2. Discharge value 0.93.  -BMP on 6/8 to ensure stability     NIDDM  Diet controlled.  -BID accuchecks x48h    Asthma  COPD  Not in acute exacerbation, chronic.  -Continue Trelegy Ellipta, Albuterol inhalers    PMR  Chronic, stable. Received stress dose steroids postoperatively for persistent  hypotention.  -Continues on prednisone at home dose of 3mg daily  -Continues on azathioprine    Hx DVT, inferior mesenteric vein thrombus  Superficial thrombophlebitis, LUE  Xarelto resumed on 5/25. Noted swelling to LUE, doppler with superficial thrombophlebitis involving cephalic, basilic vv.  -Continues on Xarelto    Anxiety  Depression  Chronic, admits to mild exacerbation of depression with situation.  -ACP referral  -Continues on fluoxetine 40mg daily  -Continue gabapentin 900mg at bedtime     HTN / HLD  Chronic.  -Continues on coreg 12.5mg BID, losartan 12.5mg daily, lasix 20mg daily, pravastatin 40mg daily    Obesity, BMI 35  Increased risk of all-cause mortality. Contributes to nursing complexity.    GERD  Chronic, stable.  -Continues on pantoprazole 40mg daily    Orders:  -BMP, CBC on 6/8  -Accuchecks BID    Total time spent with patient visit at the skilled nursing facility was 45 min including patient visit and review of past records.     Electronically signed by:  Fabio Pham PA-C                       Sincerely,        Fabio Pham PA-C

## 2023-06-07 ENCOUNTER — LAB REQUISITION (OUTPATIENT)
Dept: LAB | Facility: CLINIC | Age: 78
End: 2023-06-07
Payer: COMMERCIAL

## 2023-06-07 DIAGNOSIS — N18.9 CHRONIC KIDNEY DISEASE, UNSPECIFIED: ICD-10-CM

## 2023-06-07 DIAGNOSIS — D64.9 ANEMIA, UNSPECIFIED: ICD-10-CM

## 2023-06-07 PROBLEM — K55.069: Status: ACTIVE | Noted: 2023-06-07

## 2023-06-07 RX ORDER — MEGESTROL ACETATE 20 MG/1
20 TABLET ORAL 4 TIMES DAILY
COMMUNITY
Start: 2023-06-05 | End: 2023-08-21

## 2023-06-07 RX ORDER — AZATHIOPRINE 100 MG/1
150 TABLET ORAL
COMMUNITY
End: 2023-08-21

## 2023-06-08 ENCOUNTER — TRANSITIONAL CARE UNIT VISIT (OUTPATIENT)
Dept: GERIATRICS | Facility: CLINIC | Age: 78
End: 2023-06-08
Payer: COMMERCIAL

## 2023-06-08 VITALS
TEMPERATURE: 97.2 F | RESPIRATION RATE: 16 BRPM | HEART RATE: 84 BPM | WEIGHT: 191.3 LBS | OXYGEN SATURATION: 96 % | HEIGHT: 63 IN | DIASTOLIC BLOOD PRESSURE: 55 MMHG | BODY MASS INDEX: 33.89 KG/M2 | SYSTOLIC BLOOD PRESSURE: 120 MMHG

## 2023-06-08 DIAGNOSIS — J44.9 CHRONIC OBSTRUCTIVE PULMONARY DISEASE, UNSPECIFIED COPD TYPE (H): ICD-10-CM

## 2023-06-08 DIAGNOSIS — Z74.09 IMPAIRED MOBILITY AND ACTIVITIES OF DAILY LIVING: ICD-10-CM

## 2023-06-08 DIAGNOSIS — N18.31 CHRONIC KIDNEY DISEASE, STAGE 3A (H): ICD-10-CM

## 2023-06-08 DIAGNOSIS — F41.9 ANXIETY: ICD-10-CM

## 2023-06-08 DIAGNOSIS — Z78.9 IMPAIRED MOBILITY AND ACTIVITIES OF DAILY LIVING: ICD-10-CM

## 2023-06-08 DIAGNOSIS — Z90.49 S/P COLECTOMY: Primary | ICD-10-CM

## 2023-06-08 DIAGNOSIS — I10 PRIMARY HYPERTENSION: ICD-10-CM

## 2023-06-08 DIAGNOSIS — E11.69 TYPE 2 DIABETES MELLITUS WITH OTHER SPECIFIED COMPLICATION, WITHOUT LONG-TERM CURRENT USE OF INSULIN (H): ICD-10-CM

## 2023-06-08 DIAGNOSIS — F32.A DEPRESSION, UNSPECIFIED DEPRESSION TYPE: ICD-10-CM

## 2023-06-08 DIAGNOSIS — R53.81 PHYSICAL DECONDITIONING: ICD-10-CM

## 2023-06-08 DIAGNOSIS — E78.2 MIXED HYPERLIPIDEMIA: ICD-10-CM

## 2023-06-08 DIAGNOSIS — N71.9 UTERINE ABSCESS: ICD-10-CM

## 2023-06-08 DIAGNOSIS — M35.3 POLYMYALGIA RHEUMATICA (H): ICD-10-CM

## 2023-06-08 DIAGNOSIS — E66.01 MORBID OBESITY (H): ICD-10-CM

## 2023-06-08 DIAGNOSIS — K57.20 COLONIC DIVERTICULAR ABSCESS: ICD-10-CM

## 2023-06-08 LAB
ANION GAP SERPL CALCULATED.3IONS-SCNC: 14 MMOL/L (ref 7–15)
BASOPHILS # BLD AUTO: 0.1 10E3/UL (ref 0–0.2)
BASOPHILS NFR BLD AUTO: 1 %
BUN SERPL-MCNC: 17.3 MG/DL (ref 8–23)
CALCIUM SERPL-MCNC: 9.5 MG/DL (ref 8.8–10.2)
CHLORIDE SERPL-SCNC: 104 MMOL/L (ref 98–107)
CREAT SERPL-MCNC: 0.85 MG/DL (ref 0.51–0.95)
DEPRECATED HCO3 PLAS-SCNC: 22 MMOL/L (ref 22–29)
EOSINOPHIL # BLD AUTO: 0.2 10E3/UL (ref 0–0.7)
EOSINOPHIL NFR BLD AUTO: 3 %
ERYTHROCYTE [DISTWIDTH] IN BLOOD BY AUTOMATED COUNT: 15.7 % (ref 10–15)
GFR SERPL CREATININE-BSD FRML MDRD: 69 ML/MIN/1.73M2
GLUCOSE SERPL-MCNC: 90 MG/DL (ref 70–99)
HCT VFR BLD AUTO: 28.4 % (ref 35–47)
HGB BLD-MCNC: 8.1 G/DL (ref 11.7–15.7)
IMM GRANULOCYTES # BLD: 0 10E3/UL
IMM GRANULOCYTES NFR BLD: 1 %
LYMPHOCYTES # BLD AUTO: 1.8 10E3/UL (ref 0.8–5.3)
LYMPHOCYTES NFR BLD AUTO: 24 %
MCH RBC QN AUTO: 27.6 PG (ref 26.5–33)
MCHC RBC AUTO-ENTMCNC: 28.5 G/DL (ref 31.5–36.5)
MCV RBC AUTO: 97 FL (ref 78–100)
MONOCYTES # BLD AUTO: 0.6 10E3/UL (ref 0–1.3)
MONOCYTES NFR BLD AUTO: 8 %
NEUTROPHILS # BLD AUTO: 4.8 10E3/UL (ref 1.6–8.3)
NEUTROPHILS NFR BLD AUTO: 63 %
NRBC # BLD AUTO: 0 10E3/UL
NRBC BLD AUTO-RTO: 0 /100
PLATELET # BLD AUTO: 461 10E3/UL (ref 150–450)
POTASSIUM SERPL-SCNC: 4.4 MMOL/L (ref 3.4–5.3)
RBC # BLD AUTO: 2.94 10E6/UL (ref 3.8–5.2)
SODIUM SERPL-SCNC: 140 MMOL/L (ref 136–145)
WBC # BLD AUTO: 7.5 10E3/UL (ref 4–11)

## 2023-06-08 PROCEDURE — 36415 COLL VENOUS BLD VENIPUNCTURE: CPT | Mod: ORL | Performed by: PHYSICIAN ASSISTANT

## 2023-06-08 PROCEDURE — 85025 COMPLETE CBC W/AUTO DIFF WBC: CPT | Mod: ORL | Performed by: PHYSICIAN ASSISTANT

## 2023-06-08 PROCEDURE — 80048 BASIC METABOLIC PNL TOTAL CA: CPT | Mod: ORL | Performed by: PHYSICIAN ASSISTANT

## 2023-06-08 PROCEDURE — 99309 SBSQ NF CARE MODERATE MDM 30: CPT | Performed by: PHYSICIAN ASSISTANT

## 2023-06-08 PROCEDURE — P9603 ONE-WAY ALLOW PRORATED MILES: HCPCS | Mod: ORL | Performed by: PHYSICIAN ASSISTANT

## 2023-06-08 NOTE — LETTER
6/8/2023        RE: Zakiya Christian  [address]  [place] [address]        [facility] GERIATRICS    Chief Complaint   Patient presents with     RECHECK     HPI:  Zakiya Christian is a 78 year old  (1945), who is being seen today for an episodic care visit at: [facility] ([facility]) [[id number]].     Brief summary: Patient is a 79yo female with PMHx NIDDM, CKD-2, asthma, PMR, anemia, Hx DVT on chronic AC with Xarelto, nonocclusive inferior mesenteric vein DVT, angiomyolipoma of left kidney, hypertension, hyperlipidemia, depression, GERD, morbid obesity, who was admitted at [facility] from 5/15 - 6/5, 2023 after undergoing previously scheduled open sigmoid colectomy, takedown of colovesicular fistula, creation of end-colostomy, QUINTON/BSO, and placement of bilateral ureteral catheters. Patient with hx of diverticulitis in 04/2022 treated with perc drain placement, IV antibiotics. In 06/2022 with admission, recurrent diverticulitis and hemorrhage into renal angiomyolipoma. Subsequently presented in 01/2023 following a fall with ongoing abdominal pain, found to have E.coli bacteremia resistant to FQ, bactrim. 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  She was treated with IV ceftriaxone transitioned to PO augmentin and seen in collaboration with CRS, Gyn/Onc who ultimately recommended the above mentioned surgery. Surgery was performed without complication, postoperatively patient had slow recovery in regards to return of bowel function, adequate oral intake. There was consideration of initiating TPN due to nutritional state but ultimately held as it appeared she was consuming the minimum intake required. She was noted to have acute on chronic anemia and required 1u PRBCs on 5/31. Following therapy evaluations, she was referred to TCU for ongoing rehab, medical management.    Patient is seen in follow-up. She is resting in bed on  "interview, offers no complaints today. Specifically denies sob, cp. No abdominal pain, has been stooling via ostomy without issue. Eating is improving, family brought in Ensure shakes and she is drinking at least one daily. Denies pain.    Allergies, and PMH/PSH reviewed in EPIC today.  REVIEW OF SYSTEMS:  4 point ROS including Respiratory, CV, GI and , other than that noted in the HPI,  is negative    Objective:   /55   Pulse 84   Temp 97.2  F (36.2  C)   Resp 16   Ht 1.6 m (5' 3\")   Wt 86.8 kg (191 lb 4.8 oz)   SpO2 96%   BMI 33.89 kg/m      GEN: well-developed, well-nourished, appears comfortable  HEENT: NCAT, EOM intact bilaterally, nose & mouth patent, mucous membranes moist  CHEST: lungs CTA bilaterally, no increased work of breathing, no wheeze, crackles, rhonchi  HEART: RRR, S1 & S2  ABD: soft, nontender, nondistended, no guarding or rigidity, +BS in all 4 quadrants; stoma to LUQ with pink mucosa, stool present in collection bag; midline incision with dressing in place, scant amount of tan-colored drainage from mid-inferior pole without evidence of dehiscense, erythema  MSK: AROM bilateral UE/LE, pedal & radial pulses 2+ bilaterally  NEURO: awake, alert. CN II-XII grossly intact. Sensation grossly intact to light touch.   SKIN: warm & dry without rash, no pedal edema      Most Recent 3 CBC's:  Recent Labs   Lab Test 06/08/23  0455 02/21/23  1100 02/14/23  1353   WBC 7.5 5.7 7.3   HGB 8.1* 8.9* 9.3*   MCV 97 97 94   * 247 221     Most Recent 3 BMP's:  Recent Labs   Lab Test 06/08/23  0455 05/02/23  1710 04/06/23  1335    139 140   POTASSIUM 4.4 4.8 4.9   CHLORIDE 104 99 100   CO2 22 25 27   BUN 17.3 27.5* 23.1*   CR 0.85 1.18* 1.02*   ANIONGAP 14 15 13   VICENTA 9.5 9.6 9.2   GLC 90 104* 97       Assessment/Plan:    Diverticulitis of sigmoid colon with abscess, vesicocolonic fistula, intrauterine abscess s/p open sigmoid colectomy, takedown of colovesicular fistula, creation of " end-colostomy (CRS), QUINTON/BSO (Gyn/Onc) 5/17/23  As detailed in HPI and hospital admission, pt with diverticulitis initially in 2022 treated with perc drain, subsequently presented in 01/2023 with abdominal pain and identified to have the above. Attempted treatment with IV antibiotics however imaging with persistent pathology. Pt is now s/p above procedure. Postop course with slow return of bowel function, nutrition.  -Pain control with tylenol 500mg QID, 500mg q6h PRN  -Stoma cares with teaching  -Miralax 17g daily PRN constipation  -Follow-up with CRS, Gyn/Onc as directed     Acute on chronic anemia, blood loss  Hgb baseline 9-10, initially dropped to 7.2. received 1u PRBCs for Hgb 6.8 on 5/31. Discharge value 8.1. stable on repeat.     Hypocalcemia  Hypomagnesemia  Moderate protein-calorie malnutrition  Replaced while inpatient, due to malnutrition.  -RD to follow  -Ensure supplements daily  -Continue megace 20mg QID     Physical deconditioning  Impaired mobility and ADLs  Generalized muscle weakness  In setting of prolonged hospitalization, complex medical history.  -PT/OT continuing  -SW following for safe discharge     Acute on Chronic kidney disease, stage 3  Baseline Cr 0.9. postop peaked at 2. Discharge value 0.93.     NIDDM  Diet controlled.  -BID accuchecks x48h     Asthma  COPD  Not in acute exacerbation, chronic.  -Continue Trelegy Ellipta, Albuterol inhalers     PMR  Chronic, stable. Received stress dose steroids postoperatively for persistent hypotention.  -Continues on prednisone at home dose of 3mg daily  -Continues on azathioprine     Hx DVT, inferior mesenteric vein thrombus  Superficial thrombophlebitis, LUE  Xarelto resumed on 5/25. Noted swelling to LUE, doppler with superficial thrombophlebitis involving cephalic, basilic vv.  -Continues on Xarelto     Anxiety  Depression  Chronic, admits to mild exacerbation of depression with situation.  -ACP referral  -Continues on fluoxetine 40mg  daily  -Continue gabapentin 900mg at bedtime      HTN / HLD  Chronic.  -Continues on coreg 12.5mg BID, losartan 12.5mg daily, lasix 20mg daily, pravastatin 40mg daily     Obesity, BMI 35  Increased risk of all-cause mortality. Contributes to nursing complexity.     GERD  Chronic, stable.  -Continues on pantoprazole 40mg daily    MED REC REQUIRED  Post Medication Reconciliation Status: medication reconcilation previously completed during another office visit      Orders:  NNO    Electronically signed by: Fabio Pham PA-C             Sincerely,        Fabio Pham PA-C

## 2023-06-08 NOTE — PROGRESS NOTES
[facility] GERIATRICS    Chief Complaint   Patient presents with     RECHECK     HPI:  Zakiya Christian is a 78 year old  (1945), who is being seen today for an episodic care visit at: [facility] ([facility]) [[id number]].     Brief summary: Patient is a 79yo female with PMHx NIDDM, CKD-2, asthma, PMR, anemia, Hx DVT on chronic AC with Xarelto, nonocclusive inferior mesenteric vein DVT, angiomyolipoma of left kidney, hypertension, hyperlipidemia, depression, GERD, morbid obesity, who was admitted at [facility] from 5/15 - 6/5, 2023 after undergoing previously scheduled open sigmoid colectomy, takedown of colovesicular fistula, creation of end-colostomy, QUINTON/BSO, and placement of bilateral ureteral catheters. Patient with hx of diverticulitis in 04/2022 treated with perc drain placement, IV antibiotics. In 06/2022 with admission, recurrent diverticulitis and hemorrhage into renal angiomyolipoma. Subsequently presented in 01/2023 following a fall with ongoing abdominal pain, found to have E.coli bacteremia resistant to FQ, bactrim. 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  She was treated with IV ceftriaxone transitioned to PO augmentin and seen in collaboration with CRS, Gyn/Onc who ultimately recommended the above mentioned surgery. Surgery was performed without complication, postoperatively patient had slow recovery in regards to return of bowel function, adequate oral intake. There was consideration of initiating TPN due to nutritional state but ultimately held as it appeared she was consuming the minimum intake required. She was noted to have acute on chronic anemia and required 1u PRBCs on 5/31. Following therapy evaluations, she was referred to TCU for ongoing rehab, medical management.    Patient is seen in follow-up. She is resting in bed on interview, offers no complaints today. Specifically denies sob, cp. No abdominal pain, has been  "stooling via ostomy without issue. Eating is improving, family brought in Ensure shakes and she is drinking at least one daily. Denies pain.    Allergies, and PMH/PSH reviewed in EPIC today.  REVIEW OF SYSTEMS:  4 point ROS including Respiratory, CV, GI and , other than that noted in the HPI,  is negative    Objective:   /55   Pulse 84   Temp 97.2  F (36.2  C)   Resp 16   Ht 1.6 m (5' 3\")   Wt 86.8 kg (191 lb 4.8 oz)   SpO2 96%   BMI 33.89 kg/m      GEN: well-developed, well-nourished, appears comfortable  HEENT: NCAT, EOM intact bilaterally, nose & mouth patent, mucous membranes moist  CHEST: lungs CTA bilaterally, no increased work of breathing, no wheeze, crackles, rhonchi  HEART: RRR, S1 & S2  ABD: soft, nontender, nondistended, no guarding or rigidity, +BS in all 4 quadrants; stoma to LUQ with pink mucosa, stool present in collection bag; midline incision with dressing in place, scant amount of tan-colored drainage from mid-inferior pole without evidence of dehiscense, erythema  MSK: AROM bilateral UE/LE, pedal & radial pulses 2+ bilaterally  NEURO: awake, alert. CN II-XII grossly intact. Sensation grossly intact to light touch.   SKIN: warm & dry without rash, no pedal edema      Most Recent 3 CBC's:  Recent Labs   Lab Test 06/08/23  0455 02/21/23  1100 02/14/23  1353   WBC 7.5 5.7 7.3   HGB 8.1* 8.9* 9.3*   MCV 97 97 94   * 247 221     Most Recent 3 BMP's:  Recent Labs   Lab Test 06/08/23  0455 05/02/23  1710 04/06/23  1335    139 140   POTASSIUM 4.4 4.8 4.9   CHLORIDE 104 99 100   CO2 22 25 27   BUN 17.3 27.5* 23.1*   CR 0.85 1.18* 1.02*   ANIONGAP 14 15 13   VICENTA 9.5 9.6 9.2   GLC 90 104* 97       Assessment/Plan:    Diverticulitis of sigmoid colon with abscess, vesicocolonic fistula, intrauterine abscess s/p open sigmoid colectomy, takedown of colovesicular fistula, creation of end-colostomy (CRS), QUINTON/BSO (Gyn/Onc) 5/17/23  As detailed in HPI and hospital admission, pt with " diverticulitis initially in 2022 treated with perc drain, subsequently presented in 01/2023 with abdominal pain and identified to have the above. Attempted treatment with IV antibiotics however imaging with persistent pathology. Pt is now s/p above procedure. Postop course with slow return of bowel function, nutrition.  -Pain control with tylenol 500mg QID, 500mg q6h PRN  -Stoma cares with teaching  -Miralax 17g daily PRN constipation  -Follow-up with CRS, Gyn/Onc as directed     Acute on chronic anemia, blood loss  Hgb baseline 9-10, initially dropped to 7.2. received 1u PRBCs for Hgb 6.8 on 5/31. Discharge value 8.1. stable on repeat.     Hypocalcemia  Hypomagnesemia  Moderate protein-calorie malnutrition  Replaced while inpatient, due to malnutrition.  -RD to follow  -Ensure supplements daily  -Continue megace 20mg QID     Physical deconditioning  Impaired mobility and ADLs  Generalized muscle weakness  In setting of prolonged hospitalization, complex medical history.  -PT/OT continuing  -SW following for safe discharge     Acute on Chronic kidney disease, stage 3  Baseline Cr 0.9. postop peaked at 2. Discharge value 0.93.     NIDDM  Diet controlled.  -BID accuchecks x48h     Asthma  COPD  Not in acute exacerbation, chronic.  -Continue Trelegy Ellipta, Albuterol inhalers     PMR  Chronic, stable. Received stress dose steroids postoperatively for persistent hypotention.  -Continues on prednisone at home dose of 3mg daily  -Continues on azathioprine     Hx DVT, inferior mesenteric vein thrombus  Superficial thrombophlebitis, LUE  Xarelto resumed on 5/25. Noted swelling to LUE, doppler with superficial thrombophlebitis involving cephalic, basilic vv.  -Continues on Xarelto     Anxiety  Depression  Chronic, admits to mild exacerbation of depression with situation.  -ACP referral  -Continues on fluoxetine 40mg daily  -Continue gabapentin 900mg at bedtime      HTN / HLD  Chronic.  -Continues on coreg 12.5mg BID,  losartan 12.5mg daily, lasix 20mg daily, pravastatin 40mg daily     Obesity, BMI 35  Increased risk of all-cause mortality. Contributes to nursing complexity.     GERD  Chronic, stable.  -Continues on pantoprazole 40mg daily    MED REC REQUIRED  Post Medication Reconciliation Status: medication reconcilation previously completed during another office visit      Orders:  NNO    Electronically signed by: Fabio Pham PA-C

## 2023-06-12 ENCOUNTER — TRANSITIONAL CARE UNIT VISIT (OUTPATIENT)
Dept: GERIATRICS | Facility: CLINIC | Age: 78
End: 2023-06-12
Payer: COMMERCIAL

## 2023-06-12 VITALS
DIASTOLIC BLOOD PRESSURE: 59 MMHG | SYSTOLIC BLOOD PRESSURE: 109 MMHG | BODY MASS INDEX: 33.89 KG/M2 | HEART RATE: 90 BPM | RESPIRATION RATE: 18 BRPM | HEIGHT: 63 IN | OXYGEN SATURATION: 95 % | TEMPERATURE: 96.8 F | WEIGHT: 191.3 LBS

## 2023-06-12 DIAGNOSIS — N18.31 CHRONIC KIDNEY DISEASE, STAGE 3A (H): ICD-10-CM

## 2023-06-12 DIAGNOSIS — M35.3 POLYMYALGIA RHEUMATICA (H): ICD-10-CM

## 2023-06-12 DIAGNOSIS — R53.81 PHYSICAL DECONDITIONING: ICD-10-CM

## 2023-06-12 DIAGNOSIS — K55.069 INFERIOR MESENTERIC VEIN THROMBOSIS (H): ICD-10-CM

## 2023-06-12 DIAGNOSIS — J44.9 CHRONIC OBSTRUCTIVE PULMONARY DISEASE, UNSPECIFIED COPD TYPE (H): ICD-10-CM

## 2023-06-12 DIAGNOSIS — E66.01 MORBID OBESITY (H): ICD-10-CM

## 2023-06-12 DIAGNOSIS — Z90.49 S/P COLECTOMY: Primary | ICD-10-CM

## 2023-06-12 DIAGNOSIS — Z74.09 IMPAIRED MOBILITY AND ACTIVITIES OF DAILY LIVING: ICD-10-CM

## 2023-06-12 DIAGNOSIS — E44.0 MODERATE PROTEIN-CALORIE MALNUTRITION (H): ICD-10-CM

## 2023-06-12 DIAGNOSIS — Z78.9 IMPAIRED MOBILITY AND ACTIVITIES OF DAILY LIVING: ICD-10-CM

## 2023-06-12 DIAGNOSIS — F32.A DEPRESSION, UNSPECIFIED DEPRESSION TYPE: ICD-10-CM

## 2023-06-12 DIAGNOSIS — F41.9 ANXIETY: ICD-10-CM

## 2023-06-12 DIAGNOSIS — I10 PRIMARY HYPERTENSION: ICD-10-CM

## 2023-06-12 DIAGNOSIS — E78.2 MIXED HYPERLIPIDEMIA: ICD-10-CM

## 2023-06-12 DIAGNOSIS — E11.69 TYPE 2 DIABETES MELLITUS WITH OTHER SPECIFIED COMPLICATION, WITHOUT LONG-TERM CURRENT USE OF INSULIN (H): ICD-10-CM

## 2023-06-12 DIAGNOSIS — K57.20 COLONIC DIVERTICULAR ABSCESS: ICD-10-CM

## 2023-06-12 PROCEDURE — 99309 SBSQ NF CARE MODERATE MDM 30: CPT | Performed by: PHYSICIAN ASSISTANT

## 2023-06-12 NOTE — PROGRESS NOTES
[facility] GERIATRICS    Chief Complaint   Patient presents with     RECHECK     HPI:  Zakiya Christian is a 78 year old  (1945), who is being seen today for an episodic care visit at: [facility] ([facility]) [[id number]].      Brief summary: Patient is a 79yo female with PMHx NIDDM, CKD-2, asthma, PMR, anemia, Hx DVT on chronic AC with Xarelto, nonocclusive inferior mesenteric vein DVT, angiomyolipoma of left kidney, hypertension, hyperlipidemia, depression, GERD, morbid obesity, who was admitted at [facility] from 5/15 - 6/5, 2023 after undergoing previously scheduled open sigmoid colectomy, takedown of colovesicular fistula, creation of end-colostomy, QUINTON/BSO, and placement of bilateral ureteral catheters. Patient with hx of diverticulitis in 04/2022 treated with perc drain placement, IV antibiotics. In 06/2022 with admission, recurrent diverticulitis and hemorrhage into renal angiomyolipoma. Subsequently presented in 01/2023 following a fall with ongoing abdominal pain, found to have E.coli bacteremia resistant to FQ, bactrim. 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  She was treated with IV ceftriaxone transitioned to PO augmentin and seen in collaboration with CRS, Gyn/Onc who ultimately recommended the above mentioned surgery. Surgery was performed without complication, postoperatively patient had slow recovery in regards to return of bowel function, adequate oral intake. There was consideration of initiating TPN due to nutritional state but ultimately held as it appeared she was consuming the minimum intake required. She was noted to have acute on chronic anemia and required 1u PRBCs on 5/31. Following therapy evaluations, she was referred to TCU for ongoing rehab, medical management.     Patient is seen in follow-up. She is sitting up in chair at bedside, finishing lunch. Reports itching to her surgical and ostomy site due to the tape.  "Denies sob, cp, abdominal pain. Is continuing to have stool output in ostomy. No nausea/vomiting. No urinary symptoms. Staff are without concern.    Allergies, and PMH/PSH reviewed in [facility] today.  REVIEW OF SYSTEMS:  4 point ROS including Respiratory, CV, GI and , other than that noted in the HPI,  is negative    Objective:   /59   Pulse 90   Temp 96.8  F (36  C)   Resp 18   Ht 1.6 m (5' 3\")   Wt 86.8 kg (191 lb 4.8 oz)   SpO2 95%   BMI 33.89 kg/m      GEN: well-developed, well-nourished, appears comfortable  HEENT: NCAT, EOM intact bilaterally, nose & mouth patent, mucous membranes moist  CHEST: lungs CTA bilaterally, no increased work of breathing, no wheeze, crackles, rhonchi  HEART: RRR, S1 & S2  ABD: soft, nontender, nondistended, no guarding or rigidity, +BS in all 4 quadrants; stoma to LUQ with pink mucosa, stool present in collection bag; midline incision with dressing in place, no surrounding erythema  MSK: AROM bilateral UE/LE, pedal & radial pulses 2+ bilaterally  NEURO: awake, alert. CN II-XII grossly intact. Sensation grossly intact to light touch.   SKIN: warm & dry without rash, no pedal edema      Most Recent 3 CBC's:  Recent Labs   Lab Test 06/08/23  0455 02/21/23  1100 02/14/23  1353   WBC 7.5 5.7 7.3   HGB 8.1* 8.9* 9.3*   MCV 97 97 94   * 247 221     Most Recent 3 BMP's:  Recent Labs   Lab Test 06/08/23  0455 05/02/23  1710 04/06/23  1335    139 140   POTASSIUM 4.4 4.8 4.9   CHLORIDE 104 99 100   CO2 22 25 27   BUN 17.3 27.5* 23.1*   CR 0.85 1.18* 1.02*   ANIONGAP 14 15 13   VICENTA 9.5 9.6 9.2   GLC 90 104* 97       Assessment/Plan:    Diverticulitis of sigmoid colon with abscess, vesicocolonic fistula, intrauterine abscess s/p open sigmoid colectomy, takedown of colovesicular fistula, creation of end-colostomy (CRS), QUINTON/BSO (Gyn/Onc) 5/17/23  As detailed in HPI and hospital admission, pt with diverticulitis initially in 2022 treated with perc drain, subsequently " presented in 01/2023 with abdominal pain and identified to have the above. Attempted treatment with IV antibiotics however imaging with persistent pathology. Pt is now s/p above procedure. Postop course with slow return of bowel function, nutrition.  -Pain control with tylenol 500mg QID, 500mg q6h PRN  -Stoma cares with teaching  -Miralax 17g daily PRN constipation  -Follow-up with CRS, Gyn/Onc as directed     Acute on chronic anemia, blood loss  Hgb baseline 9-10, initially dropped to 7.2. received 1u PRBCs for Hgb 6.8 on 5/31. Discharge value 8.1. Stable on repeat.     Hypocalcemia, resolved  Hypomagnesemia, resolved  Moderate protein-calorie malnutrition  Replaced while inpatient, due to malnutrition. Appetite improving. Weight stable at 191#.  -RD to follow  -Ensure supplements daily  -Continue megace 20mg QID     Physical deconditioning  Impaired mobility and ADLs  Generalized muscle weakness  In setting of prolonged hospitalization, complex medical history.  -PT/OT continuing  -SW following for safe discharge     Acute on Chronic kidney disease, stage 3  Baseline Cr 0.9. postop peaked at 2. Discharge value 0.93.     NIDDM  Diet controlled. BG ranging 112-180  -Discontinue accuchecks     Asthma  COPD  Not in acute exacerbation, chronic.  -Continue Trelegy Ellipta, Albuterol inhalers     PMR  Chronic, stable. Received stress dose steroids postoperatively for persistent hypotention.  -Continues on prednisone at home dose of 3mg daily  -Continues on azathioprine     Hx DVT, inferior mesenteric vein thrombus  Superficial thrombophlebitis, LUE  Xarelto resumed on 5/25. Noted swelling to LUE, doppler with superficial thrombophlebitis involving cephalic, basilic vv.  -Continues on Xarelto     Anxiety  Depression  Chronic, admits to mild exacerbation of depression with situation.  -ACP following  -Continues on fluoxetine 40mg daily  -Continue gabapentin 900mg at bedtime      HTN / HLD  Chronic. SBPs ranging  108-148.  -Continues on coreg 12.5mg BID, losartan 12.5mg daily, lasix 20mg daily, pravastatin 40mg daily     Obesity, BMI 35  Increased risk of all-cause mortality. Contributes to nursing complexity.     GERD  Chronic, stable.  -Continues on pantoprazole 40mg daily    MED REC REQUIRED  Post Medication Reconciliation Status: patient was not discharged from an inpatient facility or TCU      Orders:  -Discontinue accuchecks    Electronically signed by: Fabio Pham PA-C

## 2023-06-12 NOTE — LETTER
6/12/2023        RE: Zakiya Christian  [address]  [place] [address]        [facility] GERIATRICS    Chief Complaint   Patient presents with     RECHECK     HPI:  Zakiya Christian is a 78 year old  (1945), who is being seen today for an episodic care visit at: [facility] ([facility]) [[id number]].      Brief summary: Patient is a 77yo female with PMHx NIDDM, CKD-2, asthma, PMR, anemia, Hx DVT on chronic AC with Xarelto, nonocclusive inferior mesenteric vein DVT, angiomyolipoma of left kidney, hypertension, hyperlipidemia, depression, GERD, morbid obesity, who was admitted at [facility] from 5/15 - 6/5, 2023 after undergoing previously scheduled open sigmoid colectomy, takedown of colovesicular fistula, creation of end-colostomy, QUINTON/BSO, and placement of bilateral ureteral catheters. Patient with hx of diverticulitis in 04/2022 treated with perc drain placement, IV antibiotics. In 06/2022 with admission, recurrent diverticulitis and hemorrhage into renal angiomyolipoma. Subsequently presented in 01/2023 following a fall with ongoing abdominal pain, found to have E.coli bacteremia resistant to FQ, bactrim. 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  She was treated with IV ceftriaxone transitioned to PO augmentin and seen in collaboration with CRS, Gyn/Onc who ultimately recommended the above mentioned surgery. Surgery was performed without complication, postoperatively patient had slow recovery in regards to return of bowel function, adequate oral intake. There was consideration of initiating TPN due to nutritional state but ultimately held as it appeared she was consuming the minimum intake required. She was noted to have acute on chronic anemia and required 1u PRBCs on 5/31. Following therapy evaluations, she was referred to TCU for ongoing rehab, medical management.     Patient is seen in follow-up. She is sitting up in  "chair at bedside, finishing lunch. Reports itching to her surgical and ostomy site due to the tape. Denies sob, cp, abdominal pain. Is continuing to have stool output in ostomy. No nausea/vomiting. No urinary symptoms. Staff are without concern.    Allergies, and PMH/PSH reviewed in [facility] today.  REVIEW OF SYSTEMS:  4 point ROS including Respiratory, CV, GI and , other than that noted in the HPI,  is negative    Objective:   /59   Pulse 90   Temp 96.8  F (36  C)   Resp 18   Ht 1.6 m (5' 3\")   Wt 86.8 kg (191 lb 4.8 oz)   SpO2 95%   BMI 33.89 kg/m      GEN: well-developed, well-nourished, appears comfortable  HEENT: NCAT, EOM intact bilaterally, nose & mouth patent, mucous membranes moist  CHEST: lungs CTA bilaterally, no increased work of breathing, no wheeze, crackles, rhonchi  HEART: RRR, S1 & S2  ABD: soft, nontender, nondistended, no guarding or rigidity, +BS in all 4 quadrants; stoma to LUQ with pink mucosa, stool present in collection bag; midline incision with dressing in place, no surrounding erythema  MSK: AROM bilateral UE/LE, pedal & radial pulses 2+ bilaterally  NEURO: awake, alert. CN II-XII grossly intact. Sensation grossly intact to light touch.   SKIN: warm & dry without rash, no pedal edema      Most Recent 3 CBC's:  Recent Labs   Lab Test 06/08/23  0455 02/21/23  1100 02/14/23  1353   WBC 7.5 5.7 7.3   HGB 8.1* 8.9* 9.3*   MCV 97 97 94   * 247 221     Most Recent 3 BMP's:  Recent Labs   Lab Test 06/08/23  0455 05/02/23  1710 04/06/23  1335    139 140   POTASSIUM 4.4 4.8 4.9   CHLORIDE 104 99 100   CO2 22 25 27   BUN 17.3 27.5* 23.1*   CR 0.85 1.18* 1.02*   ANIONGAP 14 15 13   VICENTA 9.5 9.6 9.2   GLC 90 104* 97       Assessment/Plan:    Diverticulitis of sigmoid colon with abscess, vesicocolonic fistula, intrauterine abscess s/p open sigmoid colectomy, takedown of colovesicular fistula, creation of end-colostomy (CRS), QUINTON/BSO (Gyn/Onc) 5/17/23  As detailed in HPI and " hospital admission, pt with diverticulitis initially in 2022 treated with perc drain, subsequently presented in 01/2023 with abdominal pain and identified to have the above. Attempted treatment with IV antibiotics however imaging with persistent pathology. Pt is now s/p above procedure. Postop course with slow return of bowel function, nutrition.  -Pain control with tylenol 500mg QID, 500mg q6h PRN  -Stoma cares with teaching  -Miralax 17g daily PRN constipation  -Follow-up with CRS, Gyn/Onc as directed     Acute on chronic anemia, blood loss  Hgb baseline 9-10, initially dropped to 7.2. received 1u PRBCs for Hgb 6.8 on 5/31. Discharge value 8.1. Stable on repeat.     Hypocalcemia, resolved  Hypomagnesemia, resolved  Moderate protein-calorie malnutrition  Replaced while inpatient, due to malnutrition. Appetite improving. Weight stable at 191#.  -RD to follow  -Ensure supplements daily  -Continue megace 20mg QID     Physical deconditioning  Impaired mobility and ADLs  Generalized muscle weakness  In setting of prolonged hospitalization, complex medical history.  -PT/OT continuing  -SW following for safe discharge     Acute on Chronic kidney disease, stage 3  Baseline Cr 0.9. postop peaked at 2. Discharge value 0.93.     NIDDM  Diet controlled. BG ranging 112-180  -Discontinue accuchecks     Asthma  COPD  Not in acute exacerbation, chronic.  -Continue Trelegy Ellipta, Albuterol inhalers     PMR  Chronic, stable. Received stress dose steroids postoperatively for persistent hypotention.  -Continues on prednisone at home dose of 3mg daily  -Continues on azathioprine     Hx DVT, inferior mesenteric vein thrombus  Superficial thrombophlebitis, LUE  Xarelto resumed on 5/25. Noted swelling to LUE, doppler with superficial thrombophlebitis involving cephalic, basilic vv.  -Continues on Xarelto     Anxiety  Depression  Chronic, admits to mild exacerbation of depression with situation.  -ACP following  -Continues on fluoxetine  40mg daily  -Continue gabapentin 900mg at bedtime      HTN / HLD  Chronic. SBPs ranging 108-148.  -Continues on coreg 12.5mg BID, losartan 12.5mg daily, lasix 20mg daily, pravastatin 40mg daily     Obesity, BMI 35  Increased risk of all-cause mortality. Contributes to nursing complexity.     GERD  Chronic, stable.  -Continues on pantoprazole 40mg daily    MED REC REQUIRED  Post Medication Reconciliation Status: patient was not discharged from an inpatient facility or TCU      Orders:  -Discontinue accuchecks    Electronically signed by: Fabio Pham PA-C             Sincerely,        Fabio Pham PA-C

## 2023-06-14 ENCOUNTER — TRANSITIONAL CARE UNIT VISIT (OUTPATIENT)
Dept: GERIATRICS | Facility: CLINIC | Age: 78
End: 2023-06-14
Payer: COMMERCIAL

## 2023-06-14 VITALS
HEIGHT: 63 IN | WEIGHT: 192.3 LBS | RESPIRATION RATE: 16 BRPM | DIASTOLIC BLOOD PRESSURE: 55 MMHG | BODY MASS INDEX: 34.07 KG/M2 | OXYGEN SATURATION: 96 % | SYSTOLIC BLOOD PRESSURE: 122 MMHG | HEART RATE: 73 BPM | TEMPERATURE: 97.5 F

## 2023-06-14 DIAGNOSIS — N18.31 CHRONIC KIDNEY DISEASE, STAGE 3A (H): ICD-10-CM

## 2023-06-14 DIAGNOSIS — Z90.49 S/P COLECTOMY: Primary | ICD-10-CM

## 2023-06-14 DIAGNOSIS — E66.01 MORBID OBESITY (H): ICD-10-CM

## 2023-06-14 DIAGNOSIS — E11.69 TYPE 2 DIABETES MELLITUS WITH OTHER SPECIFIED COMPLICATION, WITHOUT LONG-TERM CURRENT USE OF INSULIN (H): ICD-10-CM

## 2023-06-14 DIAGNOSIS — R53.81 PHYSICAL DECONDITIONING: ICD-10-CM

## 2023-06-14 DIAGNOSIS — M35.3 POLYMYALGIA RHEUMATICA (H): ICD-10-CM

## 2023-06-14 DIAGNOSIS — Z78.9 IMPAIRED MOBILITY AND ACTIVITIES OF DAILY LIVING: ICD-10-CM

## 2023-06-14 DIAGNOSIS — E44.0 MODERATE PROTEIN-CALORIE MALNUTRITION (H): ICD-10-CM

## 2023-06-14 DIAGNOSIS — Z74.09 IMPAIRED MOBILITY AND ACTIVITIES OF DAILY LIVING: ICD-10-CM

## 2023-06-14 DIAGNOSIS — F32.A DEPRESSION, UNSPECIFIED DEPRESSION TYPE: ICD-10-CM

## 2023-06-14 DIAGNOSIS — K57.20 COLONIC DIVERTICULAR ABSCESS: ICD-10-CM

## 2023-06-14 PROCEDURE — 99309 SBSQ NF CARE MODERATE MDM 30: CPT | Performed by: PHYSICIAN ASSISTANT

## 2023-06-14 NOTE — PROGRESS NOTES
[facility] GERIATRICS    Chief Complaint   Patient presents with     RECHECK     HPI:  Zakiya Christian is a 78 year old  (1945), who is being seen today for an episodic care visit at: [facility] ([facility]) [[id number]].     Brief summary: Patient is a 79yo female with PMHx NIDDM, CKD-2, asthma, PMR, anemia, Hx DVT on chronic AC with Xarelto, nonocclusive inferior mesenteric vein DVT, angiomyolipoma of left kidney, hypertension, hyperlipidemia, depression, GERD, morbid obesity, who was admitted at [facility] from 5/15 - 6/5, 2023 after undergoing previously scheduled open sigmoid colectomy, takedown of colovesicular fistula, creation of end-colostomy, QUINTON/BSO, and placement of bilateral ureteral catheters. Patient with hx of diverticulitis in 04/2022 treated with perc drain placement, IV antibiotics. In 06/2022 with admission, recurrent diverticulitis and hemorrhage into renal angiomyolipoma. Subsequently presented in 01/2023 following a fall with ongoing abdominal pain, found to have E.coli bacteremia resistant to FQ, bactrim. 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  She was treated with IV ceftriaxone transitioned to PO augmentin and seen in collaboration with CRS, Gyn/Onc who ultimately recommended the above mentioned surgery. Surgery was performed without complication, postoperatively patient had slow recovery in regards to return of bowel function, adequate oral intake. There was consideration of initiating TPN due to nutritional state but ultimately held as it appeared she was consuming the minimum intake required. She was noted to have acute on chronic anemia and required 1u PRBCs on 5/31. Following therapy evaluations, she was referred to TCU for ongoing rehab, medical management.    Patient is seen today in follow-up. She is sitting in chair at bedside, feeling well. Expresses concern of returning home as she is not yet comfortable  "managing her ostomy. She will need to manage it at discharge as the REGINALD cannot assist. She does have home care planned, but acknowledges it will be a few days a week and she may require more assistance. Is hoping to have more teaching at TCU to become more comfortable with managing the ostomy. Is considering hiring someone to help. She otherwise feels appetite is improving, therapy is progressing. Currently walking 200 feet.    Allergies, and PMH/PSH reviewed in [facility] today.  REVIEW OF SYSTEMS:  4 point ROS including Respiratory, CV, GI and , other than that noted in the HPI,  is negative    Objective:   /55   Pulse 73   Temp 97.5  F (36.4  C)   Resp 16   Ht 1.6 m (5' 3\")   Wt 87.2 kg (192 lb 4.8 oz)   SpO2 96%   BMI 34.06 kg/m      GEN: well-developed, well-nourished, appears comfortable  HEENT: NCAT, EOM intact bilaterally, nose & mouth patent, mucous membranes moist  CHEST: lungs CTA bilaterally, no increased work of breathing, no wheeze, crackles, rhonchi  HEART: RRR, S1 & S2  ABD: soft, nontender, nondistended, no guarding or rigidity, +BS in all 4 quadrants; stoma to LUQ with pink mucosa, stool present in collection bag; midline incision with dressing in place, no surrounding erythema  MSK: AROM bilateral UE/LE, pedal & radial pulses 2+ bilaterally  NEURO: awake, alert. CN II-XII grossly intact. Sensation grossly intact to light touch.   SKIN: warm & dry without rash, no pedal edema      Most Recent 3 CBC's:  Recent Labs   Lab Test 06/08/23  0455 02/21/23  1100 02/14/23  1353   WBC 7.5 5.7 7.3   HGB 8.1* 8.9* 9.3*   MCV 97 97 94   * 247 221     Most Recent 3 BMP's:  Recent Labs   Lab Test 06/08/23  0455 05/02/23  1710 04/06/23  1335    139 140   POTASSIUM 4.4 4.8 4.9   CHLORIDE 104 99 100   CO2 22 25 27   BUN 17.3 27.5* 23.1*   CR 0.85 1.18* 1.02*   ANIONGAP 14 15 13   VICENTA 9.5 9.6 9.2   GLC 90 104* 97       Assessment/Plan:    Diverticulitis of sigmoid colon with abscess, " vesicocolonic fistula, intrauterine abscess s/p open sigmoid colectomy, takedown of colovesicular fistula, creation of end-colostomy (CRS), QUINTON/BSO (Gyn/Onc) 5/17/23  As detailed in HPI and hospital admission, pt with diverticulitis initially in 2022 treated with perc drain, subsequently presented in 01/2023 with abdominal pain and identified to have the above. Attempted treatment with IV antibiotics however imaging with persistent pathology. Pt is now s/p above procedure. Postop course with slow return of bowel function, nutrition.  -Pain control with tylenol 500mg QID, 500mg q6h PRN  -Stoma cares with teaching, pt encouraged to self-perform  -Miralax 17g daily PRN constipation  -Follow-up with CRS, Gyn/Onc as directed     Acute on chronic anemia, blood loss  Hgb baseline 9-10, initially dropped to 7.2. received 1u PRBCs for Hgb 6.8 on 5/31. Discharge value 8.1. Stable on repeat.     Hypocalcemia, resolved  Hypomagnesemia, resolved  Moderate protein-calorie malnutrition  Replaced while inpatient, due to malnutrition. Appetite improving. Weight stable at 192#.  -RD to follow  -Ensure supplements daily  -Continue megace 20mg QID     Physical deconditioning  Impaired mobility and ADLs  Generalized muscle weakness  In setting of prolonged hospitalization, complex medical history. Walking 200 feet with FWW, min-mod assist with transfers and bathing.  -PT/OT continuing  -SW following for safe discharge     Acute on Chronic kidney disease, stage 3  Baseline Cr 0.9. postop peaked at 2. Discharge value 0.93.     NIDDM  Diet controlled. BG ranging 112-180.     Asthma  COPD  Not in acute exacerbation, chronic.  -Continue Trelegy Ellipta, Albuterol inhalers     PMR  Chronic, stable. Received stress dose steroids postoperatively for persistent hypotention.  -Continues on prednisone at home dose of 3mg daily  -Continues on azathioprine     Hx DVT, inferior mesenteric vein thrombus  Superficial thrombophlebitis, LUE,  resolved  Xarelto resumed on 5/25. Noted swelling to LUE, doppler with superficial thrombophlebitis involving cephalic, basilic vv.  -Continues on Xarelto     Anxiety  Depression  Chronic, admits to mild exacerbation of depression with situation.  -ACP following  -Continues on fluoxetine 40mg daily  -Continue gabapentin 900mg at bedtime      HTN / HLD  Chronic. SBPs controlled.  -Continues on coreg 12.5mg BID, losartan 12.5mg daily, lasix 20mg daily, pravastatin 40mg daily     Obesity, BMI 35  Increased risk of all-cause mortality. Contributes to nursing complexity.     GERD  Chronic, stable.  -Continues on pantoprazole 40mg daily       MED REC REQUIRED  Post Medication Reconciliation Status: patient was not discharged from an inpatient facility or TCU      Orders:  NNO    Electronically signed by: Fabio Pham PA-C

## 2023-06-14 NOTE — LETTER
6/14/2023        RE: Zakiya Christian  [address]  [place] [address]        [facility] GERIATRICS    Chief Complaint   Patient presents with     RECHECK     HPI:  Zakiya Christian is a 78 year old  (1945), who is being seen today for an episodic care visit at: [facility] ([facility]) [[id number]].     Brief summary: Patient is a 79yo female with PMHx NIDDM, CKD-2, asthma, PMR, anemia, Hx DVT on chronic AC with Xarelto, nonocclusive inferior mesenteric vein DVT, angiomyolipoma of left kidney, hypertension, hyperlipidemia, depression, GERD, morbid obesity, who was admitted at [facility] from 5/15 - 6/5, 2023 after undergoing previously scheduled open sigmoid colectomy, takedown of colovesicular fistula, creation of end-colostomy, QUINTON/BSO, and placement of bilateral ureteral catheters. Patient with hx of diverticulitis in 04/2022 treated with perc drain placement, IV antibiotics. In 06/2022 with admission, recurrent diverticulitis and hemorrhage into renal angiomyolipoma. Subsequently presented in 01/2023 following a fall with ongoing abdominal pain, found to have E.coli bacteremia resistant to FQ, bactrim. 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  She was treated with IV ceftriaxone transitioned to PO augmentin and seen in collaboration with CRS, Gyn/Onc who ultimately recommended the above mentioned surgery. Surgery was performed without complication, postoperatively patient had slow recovery in regards to return of bowel function, adequate oral intake. There was consideration of initiating TPN due to nutritional state but ultimately held as it appeared she was consuming the minimum intake required. She was noted to have acute on chronic anemia and required 1u PRBCs on 5/31. Following therapy evaluations, she was referred to TCU for ongoing rehab, medical management.    Patient is seen today in follow-up. She is sitting in  "chair at bedside, feeling well. Expresses concern of returning home as she is not yet comfortable managing her ostomy. She will need to manage it at discharge as the [facility] cannot assist. She does have home care planned, but acknowledges it will be a few days a week and she may require more assistance. Is hoping to have more teaching at TCU to become more comfortable with managing the ostomy. Is considering hiring someone to help. She otherwise feels appetite is improving, therapy is progressing. Currently walking 200 feet.    Allergies, and PMH/PSH reviewed in EPIC today.  REVIEW OF SYSTEMS:  4 point ROS including Respiratory, CV, GI and , other than that noted in the HPI,  is negative    Objective:   /55   Pulse 73   Temp 97.5  F (36.4  C)   Resp 16   Ht 1.6 m (5' 3\")   Wt 87.2 kg (192 lb 4.8 oz)   SpO2 96%   BMI 34.06 kg/m      GEN: well-developed, well-nourished, appears comfortable  HEENT: NCAT, EOM intact bilaterally, nose & mouth patent, mucous membranes moist  CHEST: lungs CTA bilaterally, no increased work of breathing, no wheeze, crackles, rhonchi  HEART: RRR, S1 & S2  ABD: soft, nontender, nondistended, no guarding or rigidity, +BS in all 4 quadrants; stoma to LUQ with pink mucosa, stool present in collection bag; midline incision with dressing in place, no surrounding erythema  MSK: AROM bilateral UE/LE, pedal & radial pulses 2+ bilaterally  NEURO: awake, alert. CN II-XII grossly intact. Sensation grossly intact to light touch.   SKIN: warm & dry without rash, no pedal edema      Most Recent 3 CBC's:  Recent Labs   Lab Test 06/08/23  0455 02/21/23  1100 02/14/23  1353   WBC 7.5 5.7 7.3   HGB 8.1* 8.9* 9.3*   MCV 97 97 94   * 247 221     Most Recent 3 BMP's:  Recent Labs   Lab Test 06/08/23  0455 05/02/23  1710 04/06/23  1335    139 140   POTASSIUM 4.4 4.8 4.9   CHLORIDE 104 99 100   CO2 22 25 27   BUN 17.3 27.5* 23.1*   CR 0.85 1.18* 1.02*   ANIONGAP 14 15 13   VICENTA 9.5 9.6 9.2 "   [facility] 90 104* 97       Assessment/Plan:    Diverticulitis of sigmoid colon with abscess, vesicocolonic fistula, intrauterine abscess s/p open sigmoid colectomy, takedown of colovesicular fistula, creation of end-colostomy (CRS), QUINTON/BSO (Gyn/Onc) 5/17/23  As detailed in HPI and hospital admission, pt with diverticulitis initially in 2022 treated with perc drain, subsequently presented in 01/2023 with abdominal pain and identified to have the above. Attempted treatment with IV antibiotics however imaging with persistent pathology. Pt is now s/p above procedure. Postop course with slow return of bowel function, nutrition.  -Pain control with tylenol 500mg QID, 500mg q6h PRN  -Stoma cares with teaching, pt encouraged to self-perform  -Miralax 17g daily PRN constipation  -Follow-up with CRS, Gyn/Onc as directed     Acute on chronic anemia, blood loss  Hgb baseline 9-10, initially dropped to 7.2. received 1u PRBCs for Hgb 6.8 on 5/31. Discharge value 8.1. Stable on repeat.     Hypocalcemia, resolved  Hypomagnesemia, resolved  Moderate protein-calorie malnutrition  Replaced while inpatient, due to malnutrition. Appetite improving. Weight stable at 192#.  -RD to follow  -Ensure supplements daily  -Continue megace 20mg QID     Physical deconditioning  Impaired mobility and ADLs  Generalized muscle weakness  In setting of prolonged hospitalization, complex medical history. Walking 200 feet with FWW, min-mod assist with transfers and bathing.  -PT/OT continuing  -SW following for safe discharge     Acute on Chronic kidney disease, stage 3  Baseline Cr 0.9. postop peaked at 2. Discharge value 0.93.     NIDDM  Diet controlled. BG ranging 112-180.     Asthma  COPD  Not in acute exacerbation, chronic.  -Continue Trelegy Ellipta, Albuterol inhalers     PMR  Chronic, stable. Received stress dose steroids postoperatively for persistent hypotention.  -Continues on prednisone at home dose of 3mg daily  -Continues on  azathioprine     Hx DVT, inferior mesenteric vein thrombus  Superficial thrombophlebitis, LUE, resolved  Xarelto resumed on 5/25. Noted swelling to LUE, doppler with superficial thrombophlebitis involving cephalic, basilic vv.  -Continues on Xarelto     Anxiety  Depression  Chronic, admits to mild exacerbation of depression with situation.  -ACP following  -Continues on fluoxetine 40mg daily  -Continue gabapentin 900mg at bedtime      HTN / HLD  Chronic. SBPs controlled.  -Continues on coreg 12.5mg BID, losartan 12.5mg daily, lasix 20mg daily, pravastatin 40mg daily     Obesity, BMI 35  Increased risk of all-cause mortality. Contributes to nursing complexity.     GERD  Chronic, stable.  -Continues on pantoprazole 40mg daily       MED REC REQUIRED  Post Medication Reconciliation Status: patient was not discharged from an inpatient facility or TCU      Orders:  NNO    Electronically signed by: Fabio Pham PA-C             Sincerely,        Fabio Pham PA-C

## 2023-06-16 ENCOUNTER — DISCHARGE SUMMARY NURSING HOME (OUTPATIENT)
Dept: GERIATRICS | Facility: CLINIC | Age: 78
End: 2023-06-16
Payer: COMMERCIAL

## 2023-06-16 VITALS
DIASTOLIC BLOOD PRESSURE: 68 MMHG | WEIGHT: 194.6 LBS | BODY MASS INDEX: 34.48 KG/M2 | OXYGEN SATURATION: 96 % | HEART RATE: 80 BPM | SYSTOLIC BLOOD PRESSURE: 129 MMHG | RESPIRATION RATE: 19 BRPM | TEMPERATURE: 97.9 F | HEIGHT: 63 IN

## 2023-06-16 DIAGNOSIS — E11.69 TYPE 2 DIABETES MELLITUS WITH OTHER SPECIFIED COMPLICATION, WITHOUT LONG-TERM CURRENT USE OF INSULIN (H): ICD-10-CM

## 2023-06-16 DIAGNOSIS — M35.3 POLYMYALGIA RHEUMATICA (H): ICD-10-CM

## 2023-06-16 DIAGNOSIS — R53.81 PHYSICAL DECONDITIONING: ICD-10-CM

## 2023-06-16 DIAGNOSIS — N18.31 CHRONIC KIDNEY DISEASE, STAGE 3A (H): ICD-10-CM

## 2023-06-16 DIAGNOSIS — E78.2 MIXED HYPERLIPIDEMIA: ICD-10-CM

## 2023-06-16 DIAGNOSIS — Z90.49 S/P COLECTOMY: Primary | ICD-10-CM

## 2023-06-16 DIAGNOSIS — I10 PRIMARY HYPERTENSION: ICD-10-CM

## 2023-06-16 DIAGNOSIS — Z78.9 IMPAIRED MOBILITY AND ACTIVITIES OF DAILY LIVING: ICD-10-CM

## 2023-06-16 DIAGNOSIS — Z74.09 IMPAIRED MOBILITY AND ACTIVITIES OF DAILY LIVING: ICD-10-CM

## 2023-06-16 DIAGNOSIS — J44.9 CHRONIC OBSTRUCTIVE PULMONARY DISEASE, UNSPECIFIED COPD TYPE (H): ICD-10-CM

## 2023-06-16 DIAGNOSIS — F32.A DEPRESSION, UNSPECIFIED DEPRESSION TYPE: ICD-10-CM

## 2023-06-16 DIAGNOSIS — E66.01 MORBID OBESITY (H): ICD-10-CM

## 2023-06-16 DIAGNOSIS — K57.20 COLONIC DIVERTICULAR ABSCESS: ICD-10-CM

## 2023-06-16 DIAGNOSIS — F41.9 ANXIETY: ICD-10-CM

## 2023-06-16 DIAGNOSIS — E44.0 MODERATE PROTEIN-CALORIE MALNUTRITION (H): ICD-10-CM

## 2023-06-16 PROCEDURE — 99316 NF DSCHRG MGMT 30 MIN+: CPT | Performed by: PHYSICIAN ASSISTANT

## 2023-06-16 NOTE — PROGRESS NOTES
[facility] GERIATRICS DISCHARGE SUMMARY  PATIENT'S NAME: Zakiya Christian  YOB: 1945  MEDICAL RECORD NUMBER:  [id number]  Place of Service where encounter took place:  [facility] (SNF) [[id number]]    PRIMARY CARE PROVIDER AND CLINIC RESPONSIBLE AFTER TRANSFER:   Ami Noriega MD, [address] [facility] [place] [address]    Non-FMG Provider     Transferring providers: Fabio Pham PA-C, Delicia Loja MD  Recent Hospitalization/ED:  Hospital  [facility] stay 5/15/23 to 6/5/23.  Date of SNF Admission: June 05, 2023  Date of SNF (anticipated) Discharge: June 19, 2023  Discharged to: previous assisted living  Cognitive Scores: SLUMS: 27/30  Physical Function: Ambulating 90 ft with FWW  DME: SNF  coordinating DME needs     CODE STATUS/ADVANCE DIRECTIVES DISCUSSION:  Full Code   ALLERGIES: Simvastatin    NURSING FACILITY COURSE     Summary of nursing facility stay:     Brief summary: Patient is a 79yo female with PMHx NIDDM, CKD-2, asthma, PMR, anemia, Hx DVT on chronic AC with Xarelto, nonocclusive inferior mesenteric vein DVT, angiomyolipoma of left kidney, hypertension, hyperlipidemia, depression, GERD, morbid obesity, who was admitted at [facility] from 5/15 - 6/5, 2023 after undergoing previously scheduled open sigmoid colectomy, takedown of colovesicular fistula, creation of end-colostomy, QUINTON/BSO, and placement of bilateral ureteral catheters. Patient with hx of diverticulitis in 04/2022 treated with perc drain placement, IV antibiotics. In 06/2022 with admission, recurrent diverticulitis and hemorrhage into renal angiomyolipoma. Subsequently presented in 01/2023 following a fall with ongoing abdominal pain, found to have E.coli bacteremia resistant to FQ, bactrim. 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  She was treated with IV ceftriaxone transitioned to PO augmentin and seen in collaboration  with CRS, Gyn/Onc who ultimately recommended the above mentioned surgery. Surgery was performed without complication, postoperatively patient had slow recovery in regards to return of bowel function, adequate oral intake. There was consideration of initiating TPN due to nutritional state but ultimately held as it appeared she was consuming the minimum intake required. She was noted to have acute on chronic anemia and required 1u PRBCs on 5/31. Following therapy evaluations, she was referred to U for ongoing rehab, medical management.    Following admission at TCU, patient progressed in therapies. Was able to manage ostomy and felt comfortable discharging back to [facility]. The following were managed during stay:    Diverticulitis of sigmoid colon with abscess, vesicocolonic fistula, intrauterine abscess s/p open sigmoid colectomy, takedown of colovesicular fistula, creation of end-colostomy (CRS), QUINTON/BSO (Gyn/Onc) 5/17/23  As detailed in HPI and hospital admission, pt with diverticulitis initially in 2022 treated with perc drain, subsequently presented in 01/2023 with abdominal pain and identified to have the above. Attempted treatment with IV antibiotics however imaging with persistent pathology. Pt is now s/p above procedure. Postop course with slow return of bowel function, nutrition.  -Pain control with tylenol 500mg QID, 500mg q6h PRN  -Stoma cares with teaching, pt encouraged to self-perform  -Miralax 17g daily PRN constipation  -Follow-up with CRS, Gyn/Onc as directed     Acute on chronic anemia, blood loss  Hgb baseline 9-10, initially dropped to 7.2. received 1u PRBCs for Hgb 6.8 on 5/31. Discharge value 8.1. Stable on repeat.     Hypocalcemia, resolved  Hypomagnesemia, resolved  Moderate protein-calorie malnutrition  Replaced while inpatient, due to malnutrition. Appetite improving. Weight stable at 192#.  -Ensure supplements daily  -Continue megace 20mg QID     Physical deconditioning  Impaired mobility and  ADLs  Generalized muscle weakness  In setting of prolonged hospitalization, complex medical history. Walking 90 feet with FWW, min-mod assist with transfers and bathing.  -PT/OT continuing     Acute on Chronic kidney disease, stage 3  Baseline Cr 0.9. postop peaked at 2. Discharge value 0.93.     NIDDM  Diet controlled. BG ranging 112-180.     Asthma  COPD  Not in acute exacerbation, chronic.  -Continue Trelegy Ellipta, Albuterol inhalers     PMR  Chronic, stable. Received stress dose steroids postoperatively for persistent hypotention.  -Continues on prednisone at home dose of 3mg daily  -Continues on azathioprine     Hx DVT, inferior mesenteric vein thrombus  Superficial thrombophlebitis, LUE, resolved  Xarelto resumed on 5/25. Noted swelling to LUE, doppler with superficial thrombophlebitis involving cephalic, basilic vv.  -Continues on Xarelto     Anxiety  Depression  Chronic, admits to mild exacerbation of depression with situation.  -ACP following  -Continues on fluoxetine 40mg daily  -Continue gabapentin 900mg at bedtime      HTN / HLD  Chronic. SBPs controlled.  -Continues on coreg 12.5mg BID, losartan 12.5mg daily, lasix 20mg daily, pravastatin 40mg daily     Obesity, BMI 35  Increased risk of all-cause mortality. Contributes to nursing complexity.     GERD  Chronic, stable.  -Continues on pantoprazole 40mg daily    Discharge Medications:  MED REC REQUIRED  Post Medication Reconciliation Status: patient was not discharged from an inpatient facility or TCU       Current Outpatient Medications   Medication Sig Dispense Refill     acetaminophen (TYLENOL) 500 MG tablet Take 1 tablet (500 mg) by mouth 4 times daily And q6h PRN - max 2 PRN/day 240 tablet 0     albuterol (PROAIR HFA/PROVENTIL HFA/VENTOLIN HFA) 108 (90 Base) MCG/ACT inhaler Inhale 2 puffs into the lungs every 6 hours as needed for shortness of breath or wheezing 18 g 0     alendronate (FOSAMAX) 70 MG tablet Take 1 tablet (70 mg) by mouth every 7  days 4 tablet 0     amoxicillin-clavulanate (AUGMENTIN) 875-125 MG tablet Take 1 tablet by mouth 2 times daily 42 tablet 3     Ascorbic Acid (VITAMIN C) 500 MG CAPS Take 1,000 mg by mouth daily 30 capsule 0     aspirin 81 MG EC tablet Take 1 tablet (81 mg) by mouth daily 30 tablet 0     ASPIRIN EC PO Take 81 mg by mouth daily       azaTHIOprine (IMURAN) 50 MG tablet Take 2 tablets (100 mg) by mouth daily 60 tablet 0     azaTHIOprine 100 MG TABS Take 150 mg by mouth       Calcium Carbonate-Vitamin D 600-10 MG-MCG TABS Take 1 tablet by mouth 2 times daily (with meals) 60 tablet 0     calcium polycarbophil (FIBERCON) 625 MG tablet Take 2 tablets (1,250 mg) by mouth 2 times daily (with meals) 120 tablet 0     carvedilol (COREG) 12.5 MG tablet Take 1 tablet (12.5 mg) by mouth 2 times daily (with meals) HOLD if SBP<100 and/or HR<55 60 tablet 0     cholecalciferol 50 MCG (2000 UT) CAPS Take 4,000 Units by mouth daily 30 capsule 0     coenzyme Q-10 capsule Take 1 capsule (100 mg) by mouth daily 30 capsule 0     FLUoxetine (PROZAC) 40 MG capsule Take 1 capsule (40 mg) by mouth every morning 30 capsule 0     Fluticasone-Umeclidin-Vilanterol (TRELEGY ELLIPTA) 200-62.5-25 MCG/ACT oral inhaler Inhale 1 puff into the lungs every morning 60 each 0     furosemide (LASIX) 20 MG tablet Take 1 tablet (20 mg) by mouth daily HOLD if SBP<100 30 tablet 0     gabapentin (NEURONTIN) 300 MG capsule Take 3 capsules (900 mg) by mouth At Bedtime 90 capsule 0     losartan (COZAAR) 25 MG tablet Take 0.5 tablets (12.5 mg) by mouth every morning HOLD if SBP<100 30 tablet 0     megestrol (MEGACE) 20 MG tablet Take 20 mg by mouth 4 times daily       montelukast (SINGULAIR) 10 MG tablet Take 1 tablet (10 mg) by mouth At Bedtime 30 tablet 0     nystatin (MYCOSTATIN) 231216 UNIT/GM external cream Apply topically 2 times daily 60 g 0     omeprazole (PRILOSEC) 40 MG DR capsule Take 1 capsule (40 mg) by mouth daily 30 capsule 0     ondansetron (ZOFRAN) 4  "MG tablet Take 1 tablet (4 mg) by mouth every 6 hours as needed for nausea 30 tablet 0     polyethylene glycol (MIRALAX) 17 g packet Take 17 g by mouth daily as needed for constipation       potassium chloride ER (KLOR-CON M) 10 MEQ CR tablet Take 2 tablets (20 mEq) by mouth daily 60 tablet 0     pravastatin (PRAVACHOL) 40 MG tablet Take 1 tablet (40 mg) by mouth At Bedtime 30 tablet 0     predniSONE (DELTASONE) 1 MG tablet Take 3 mg by mouth daily       predniSONE (DELTASONE) 1 MG tablet Take 2-4 tablets (2-4 mg) by mouth daily Slow taper. 4mg daily 1/11/23-2/10/23. 3mg 2/11/23-3/12/23. Then 2mg 3/13/23 - 3/15/23 then stop 90 tablet 0     rivaroxaban ANTICOAGULANT (XARELTO) 20 MG TABS tablet Take 1 tablet (20 mg) by mouth daily (with dinner) 30 tablet 0        Controlled medications:   not applicable/none     Past Medical History:   Past Medical History:   Diagnosis Date     Diabetes (H)      Hypertension      Obese      PMR (polymyalgia rheumatica) (H)      Physical Exam:   Vitals: /68   Pulse 80   Temp 97.9  F (36.6  C)   Resp 19   Ht 1.6 m (5' 3\")   Wt 88.3 kg (194 lb 9.6 oz)   SpO2 96%   BMI 34.47 kg/m    BMI: Body mass index is 34.47 kg/m .   GEN: well-developed, well-nourished, appears comfortable  HEENT: NCAT, EOM intact bilaterally, nose & mouth patent, mucous membranes moist  CHEST: lungs CTA bilaterally, no increased work of breathing, no wheeze, crackles, rhonchi  HEART: RRR, S1 & S2  ABD: soft, nontender, nondistended, no guarding or rigidity, +BS in all 4 quadrants; stoma to LUQ with pink mucosa, stool present in collection bag; midline incision with dressing in place, no surrounding erythema  MSK: AROM bilateral UE/LE, pedal & radial pulses 2+ bilaterally  NEURO: awake, alert. CN II-XII grossly intact. Sensation grossly intact to light touch.   SKIN: warm & dry without rash, no pedal edema    SNF labs: Recent labs in [facility] reviewed by me today.  and   Most Recent 3 CBC's:  Recent Labs "   Lab Test 06/08/23  0455 02/21/23  1100 02/14/23  1353   WBC 7.5 5.7 7.3   HGB 8.1* 8.9* 9.3*   MCV 97 97 94   * 247 221     Most Recent 3 BMP's:  Recent Labs   Lab Test 06/08/23  0455 05/02/23  1710 04/06/23  1335    139 140   POTASSIUM 4.4 4.8 4.9   CHLORIDE 104 99 100   CO2 22 25 27   BUN 17.3 27.5* 23.1*   CR 0.85 1.18* 1.02*   ANIONGAP 14 15 13   VICENTA 9.5 9.6 9.2   GLC 90 104* 97       DISCHARGE PLAN:    Follow up labs: No labs orders/due    Medical Follow Up:      Follow up with primary care provider in 1-2 weeks    Discharge Services: Home Care:  Occupational Therapy, Physical Therapy, Registered Nurse and Home Health Aide    Discharge Instructions Verbalized to Patient at Discharge:     None    TOTAL DISCHARGE TIME:   Greater than 30 minutes  Electronically signed by:  Fabio Pham PA-C     Documentation of Face to Face and Certification for Home Health Services    I certify that services are/were furnished while this patient was under the care of a physician and that a physician or an allowed non-physician practitioner (NPP), had a face-to-face encounter that meets the physician face-to-face encounter requirements. The encounter was in whole, or in part, related to the primary reason for home health. The patient is confined to his/her home and needs intermittent skilled nursing, physical therapy, speech-language pathology, or the continued need for occupational therapy. A plan of care has been established by a physician and is periodically reviewed by a physician.  Date of Face-to-Face Encounter: 6/16/2023.    I certify that, based on my findings, the following services are medically necessary home health services: Nursing, Occupational Therapy and Physical Therapy.    My clinical findings support the need for the above skilled services because: Requires assistance of another person or specialized equipment to access medical services because patient: Range of motion limitations prevents  ability to exit home safely...    Patient to re-establish plan of care with their PCP within 7-10 days after leaving the facility to reestablish care.  Medicare certified PECOS provider: Fabio Pham PA-C  Date: June 19, 2023

## 2023-06-16 NOTE — LETTER
6/16/2023        RE: Zakiya Christian  [address]  [place] [address]        [facility] GERIATRICS DISCHARGE SUMMARY  PATIENT'S NAME: Zakiya Christain  YOB: 1945  MEDICAL RECORD NUMBER:  [id number]  Place of Service where encounter took place:  [facility] (SNF) [[id number]]    PRIMARY CARE PROVIDER AND CLINIC RESPONSIBLE AFTER TRANSFER:   Ami Noriega MD, [address] [facility] [id number]    Non-G Provider     Transferring providers: Fabio Pham PA-C, Delicia Loja MD  Recent Hospitalization/ED:  Hospital   stay 5/15/23 to 6/5/23.  Date of SNF Admission: June 05, 2023  Date of SNF (anticipated) Discharge: June 19, 2023  Discharged to: previous assisted living  Cognitive Scores: SLUMS: 27/30  Physical Function: Ambulating 90 ft with FWW  DME: SNF  coordinating DME needs     CODE STATUS/ADVANCE DIRECTIVES DISCUSSION:  Full Code   ALLERGIES: Simvastatin    NURSING FACILITY COURSE     Summary of nursing facility stay:     Brief summary: Patient is a 77yo female with PMHx NIDDM, CKD-2, asthma, PMR, anemia, Hx DVT on chronic AC with Xarelto, nonocclusive inferior mesenteric vein DVT, angiomyolipoma of left kidney, hypertension, hyperlipidemia, depression, GERD, morbid obesity, who was admitted at  from 5/15 - 6/5, 2023 after undergoing previously scheduled open sigmoid colectomy, takedown of colovesicular fistula, creation of end-colostomy, QUINTON/BSO, and placement of bilateral ureteral catheters. Patient with hx of diverticulitis in 04/2022 treated with perc drain placement, IV antibiotics. In 06/2022 with admission, recurrent diverticulitis and hemorrhage into renal angiomyolipoma. Subsequently presented in 01/2023 following a fall with ongoing abdominal pain, found to have E.coli bacteremia resistant to FQ, bactrim. CT imaging revealed acute diverticulitis with likely colovesicular fistula and intrauterine  abscess.  She was treated with IV ceftriaxone transitioned to PO augmentin and seen in collaboration with CRS, Gyn/Onc who ultimately recommended the above mentioned surgery. Surgery was performed without complication, postoperatively patient had slow recovery in regards to return of bowel function, adequate oral intake. There was consideration of initiating TPN due to nutritional state but ultimately held as it appeared she was consuming the minimum intake required. She was noted to have acute on chronic anemia and required 1u PRBCs on 5/31. Following therapy evaluations, she was referred to TCU for ongoing rehab, medical management.    Following admission at TCU, patient progressed in therapies. Was able to manage ostomy and felt comfortable discharging back to [facility]. The following were managed during stay:    Diverticulitis of sigmoid colon with abscess, vesicocolonic fistula, intrauterine abscess s/p open sigmoid colectomy, takedown of colovesicular fistula, creation of end-colostomy (CRS), QUINTON/BSO (Gyn/Onc) 5/17/23  As detailed in HPI and hospital admission, pt with diverticulitis initially in 2022 treated with perc drain, subsequently presented in 01/2023 with abdominal pain and identified to have the above. Attempted treatment with IV antibiotics however imaging with persistent pathology. Pt is now s/p above procedure. Postop course with slow return of bowel function, nutrition.  -Pain control with tylenol 500mg QID, 500mg q6h PRN  -Stoma cares with teaching, pt encouraged to self-perform  -Miralax 17g daily PRN constipation  -Follow-up with CRS, Gyn/Onc as directed     Acute on chronic anemia, blood loss  Hgb baseline 9-10, initially dropped to 7.2. received 1u PRBCs for Hgb 6.8 on 5/31. Discharge value 8.1. Stable on repeat.     Hypocalcemia, resolved  Hypomagnesemia, resolved  Moderate protein-calorie malnutrition  Replaced while inpatient, due to malnutrition. Appetite improving. Weight stable at  192#.  -Ensure supplements daily  -Continue megace 20mg QID     Physical deconditioning  Impaired mobility and ADLs  Generalized muscle weakness  In setting of prolonged hospitalization, complex medical history. Walking 90 feet with FWW, min-mod assist with transfers and bathing.  -PT/OT continuing     Acute on Chronic kidney disease, stage 3  Baseline Cr 0.9. postop peaked at 2. Discharge value 0.93.     NIDDM  Diet controlled. BG ranging 112-180.     Asthma  COPD  Not in acute exacerbation, chronic.  -Continue Trelegy Ellipta, Albuterol inhalers     PMR  Chronic, stable. Received stress dose steroids postoperatively for persistent hypotention.  -Continues on prednisone at home dose of 3mg daily  -Continues on azathioprine     Hx DVT, inferior mesenteric vein thrombus  Superficial thrombophlebitis, LUE, resolved  Xarelto resumed on 5/25. Noted swelling to LUE, doppler with superficial thrombophlebitis involving cephalic, basilic vv.  -Continues on Xarelto     Anxiety  Depression  Chronic, admits to mild exacerbation of depression with situation.  -ACP following  -Continues on fluoxetine 40mg daily  -Continue gabapentin 900mg at bedtime      HTN / HLD  Chronic. SBPs controlled.  -Continues on coreg 12.5mg BID, losartan 12.5mg daily, lasix 20mg daily, pravastatin 40mg daily     Obesity, BMI 35  Increased risk of all-cause mortality. Contributes to nursing complexity.     GERD  Chronic, stable.  -Continues on pantoprazole 40mg daily    Discharge Medications:  MED REC REQUIRED  Post Medication Reconciliation Status: patient was not discharged from an inpatient facility or TCU       Current Outpatient Medications   Medication Sig Dispense Refill     acetaminophen (TYLENOL) 500 MG tablet Take 1 tablet (500 mg) by mouth 4 times daily And q6h PRN - max 2 PRN/day 240 tablet 0     albuterol (PROAIR HFA/PROVENTIL HFA/VENTOLIN HFA) 108 (90 Base) MCG/ACT inhaler Inhale 2 puffs into the lungs every 6 hours as needed for  shortness of breath or wheezing 18 g 0     alendronate (FOSAMAX) 70 MG tablet Take 1 tablet (70 mg) by mouth every 7 days 4 tablet 0     amoxicillin-clavulanate (AUGMENTIN) 875-125 MG tablet Take 1 tablet by mouth 2 times daily 42 tablet 3     Ascorbic Acid (VITAMIN C) 500 MG CAPS Take 1,000 mg by mouth daily 30 capsule 0     aspirin 81 MG EC tablet Take 1 tablet (81 mg) by mouth daily 30 tablet 0     ASPIRIN EC PO Take 81 mg by mouth daily       azaTHIOprine (IMURAN) 50 MG tablet Take 2 tablets (100 mg) by mouth daily 60 tablet 0     azaTHIOprine 100 MG TABS Take 150 mg by mouth       Calcium Carbonate-Vitamin D 600-10 MG-MCG TABS Take 1 tablet by mouth 2 times daily (with meals) 60 tablet 0     calcium polycarbophil (FIBERCON) 625 MG tablet Take 2 tablets (1,250 mg) by mouth 2 times daily (with meals) 120 tablet 0     carvedilol (COREG) 12.5 MG tablet Take 1 tablet (12.5 mg) by mouth 2 times daily (with meals) HOLD if SBP<100 and/or HR<55 60 tablet 0     cholecalciferol 50 MCG (2000 UT) CAPS Take 4,000 Units by mouth daily 30 capsule 0     coenzyme Q-10 capsule Take 1 capsule (100 mg) by mouth daily 30 capsule 0     FLUoxetine (PROZAC) 40 MG capsule Take 1 capsule (40 mg) by mouth every morning 30 capsule 0     Fluticasone-Umeclidin-Vilanterol (TRELEGY ELLIPTA) 200-62.5-25 MCG/ACT oral inhaler Inhale 1 puff into the lungs every morning 60 each 0     furosemide (LASIX) 20 MG tablet Take 1 tablet (20 mg) by mouth daily HOLD if SBP<100 30 tablet 0     gabapentin (NEURONTIN) 300 MG capsule Take 3 capsules (900 mg) by mouth At Bedtime 90 capsule 0     losartan (COZAAR) 25 MG tablet Take 0.5 tablets (12.5 mg) by mouth every morning HOLD if SBP<100 30 tablet 0     megestrol (MEGACE) 20 MG tablet Take 20 mg by mouth 4 times daily       montelukast (SINGULAIR) 10 MG tablet Take 1 tablet (10 mg) by mouth At Bedtime 30 tablet 0     nystatin (MYCOSTATIN) 416687 UNIT/GM external cream Apply topically 2 times daily 60 g 0      "omeprazole (PRILOSEC) 40 MG DR capsule Take 1 capsule (40 mg) by mouth daily 30 capsule 0     ondansetron (ZOFRAN) 4 MG tablet Take 1 tablet (4 mg) by mouth every 6 hours as needed for nausea 30 tablet 0     polyethylene glycol (MIRALAX) 17 g packet Take 17 g by mouth daily as needed for constipation       potassium chloride ER (KLOR-CON M) 10 MEQ CR tablet Take 2 tablets (20 mEq) by mouth daily 60 tablet 0     pravastatin (PRAVACHOL) 40 MG tablet Take 1 tablet (40 mg) by mouth At Bedtime 30 tablet 0     predniSONE (DELTASONE) 1 MG tablet Take 3 mg by mouth daily       predniSONE (DELTASONE) 1 MG tablet Take 2-4 tablets (2-4 mg) by mouth daily Slow taper. 4mg daily 1/11/23-2/10/23. 3mg 2/11/23-3/12/23. Then 2mg 3/13/23 - 3/15/23 then stop 90 tablet 0     rivaroxaban ANTICOAGULANT (XARELTO) 20 MG TABS tablet Take 1 tablet (20 mg) by mouth daily (with dinner) 30 tablet 0        Controlled medications:   not applicable/none     Past Medical History:   Past Medical History:   Diagnosis Date     Diabetes (H)      Hypertension      Obese      PMR (polymyalgia rheumatica) (H)      Physical Exam:   Vitals: /68   Pulse 80   Temp 97.9  F (36.6  C)   Resp 19   Ht 1.6 m (5' 3\")   Wt 88.3 kg (194 lb 9.6 oz)   SpO2 96%   BMI 34.47 kg/m    BMI: Body mass index is 34.47 kg/m .   GEN: well-developed, well-nourished, appears comfortable  HEENT: NCAT, EOM intact bilaterally, nose & mouth patent, mucous membranes moist  CHEST: lungs CTA bilaterally, no increased work of breathing, no wheeze, crackles, rhonchi  HEART: RRR, S1 & S2  ABD: soft, nontender, nondistended, no guarding or rigidity, +BS in all 4 quadrants; stoma to LUQ with pink mucosa, stool present in collection bag; midline incision with dressing in place, no surrounding erythema  MSK: AROM bilateral UE/LE, pedal & radial pulses 2+ bilaterally  NEURO: awake, alert. CN II-XII grossly intact. Sensation grossly intact to light touch.   SKIN: warm & dry without " rash, no pedal edema    SNF labs: Recent labs in EPIC reviewed by me today.  and   Most Recent 3 CBC's:  Recent Labs   Lab Test 06/08/23  0455 02/21/23  1100 02/14/23  1353   WBC 7.5 5.7 7.3   HGB 8.1* 8.9* 9.3*   MCV 97 97 94   * 247 221     Most Recent 3 BMP's:  Recent Labs   Lab Test 06/08/23  0455 05/02/23  1710 04/06/23  1335    139 140   POTASSIUM 4.4 4.8 4.9   CHLORIDE 104 99 100   CO2 22 25 27   BUN 17.3 27.5* 23.1*   CR 0.85 1.18* 1.02*   ANIONGAP 14 15 13   VICENTA 9.5 9.6 9.2   GLC 90 104* 97       DISCHARGE PLAN:    Follow up labs: No labs orders/due    Medical Follow Up:      Follow up with primary care provider in 1-2 weeks    Discharge Services: Home Care:  Occupational Therapy, Physical Therapy, Registered Nurse and Home Health Aide    Discharge Instructions Verbalized to Patient at Discharge:     None    TOTAL DISCHARGE TIME:   Greater than 30 minutes  Electronically signed by:  Fabio Pham PA-C     Documentation of Face to Face and Certification for Home Health Services    I certify that services are/were furnished while this patient was under the care of a physician and that a physician or an allowed non-physician practitioner (NPP), had a face-to-face encounter that meets the physician face-to-face encounter requirements. The encounter was in whole, or in part, related to the primary reason for home health. The patient is confined to his/her home and needs intermittent skilled nursing, physical therapy, speech-language pathology, or the continued need for occupational therapy. A plan of care has been established by a physician and is periodically reviewed by a physician.  Date of Face-to-Face Encounter: 6/16/2023.    I certify that, based on my findings, the following services are medically necessary home health services: Nursing, Occupational Therapy and Physical Therapy.    My clinical findings support the need for the above skilled services because: Requires assistance of another  person or specialized equipment to access medical services because patient: Range of motion limitations prevents ability to exit home safely...    Patient to re-establish plan of care with their PCP within 7-10 days after leaving the facility to reestablish care.  Medicare certified PECOS provider: Fabio Pham PA-C  Date: June 19, 2023                      Sincerely,        Fabio Pham PA-C

## 2023-08-03 ENCOUNTER — LAB REQUISITION (OUTPATIENT)
Dept: LAB | Facility: CLINIC | Age: 78
End: 2023-08-03
Payer: COMMERCIAL

## 2023-08-03 DIAGNOSIS — N39.3 STRESS INCONTINENCE (FEMALE) (MALE): ICD-10-CM

## 2023-08-03 PROCEDURE — 87186 SC STD MICRODIL/AGAR DIL: CPT | Mod: ORL | Performed by: FAMILY MEDICINE

## 2023-08-06 LAB — BACTERIA UR CULT: ABNORMAL

## 2023-08-15 ENCOUNTER — VIRTUAL VISIT (OUTPATIENT)
Dept: PHARMACY | Facility: PHYSICIAN GROUP | Age: 78
End: 2023-08-15
Payer: COMMERCIAL

## 2023-08-15 DIAGNOSIS — E11.9 TYPE 2 DIABETES MELLITUS WITHOUT COMPLICATION, WITHOUT LONG-TERM CURRENT USE OF INSULIN (H): ICD-10-CM

## 2023-08-15 DIAGNOSIS — J44.9 CHRONIC OBSTRUCTIVE PULMONARY DISEASE, UNSPECIFIED COPD TYPE (H): ICD-10-CM

## 2023-08-15 DIAGNOSIS — D50.9 IRON DEFICIENCY ANEMIA, UNSPECIFIED IRON DEFICIENCY ANEMIA TYPE: Primary | ICD-10-CM

## 2023-08-15 DIAGNOSIS — K21.9 GASTROESOPHAGEAL REFLUX DISEASE WITHOUT ESOPHAGITIS: ICD-10-CM

## 2023-08-15 DIAGNOSIS — I10 BENIGN ESSENTIAL HYPERTENSION: ICD-10-CM

## 2023-08-15 DIAGNOSIS — G25.81 RESTLESS LEGS SYNDROME (RLS): ICD-10-CM

## 2023-08-15 DIAGNOSIS — M35.3 POLYMYALGIA RHEUMATICA (H): ICD-10-CM

## 2023-08-15 DIAGNOSIS — K59.04 CHRONIC IDIOPATHIC CONSTIPATION: ICD-10-CM

## 2023-08-15 DIAGNOSIS — M81.0 AGE-RELATED OSTEOPOROSIS WITHOUT CURRENT PATHOLOGICAL FRACTURE: ICD-10-CM

## 2023-08-15 DIAGNOSIS — Z86.718 PERSONAL HISTORY OF DVT (DEEP VEIN THROMBOSIS): ICD-10-CM

## 2023-08-15 PROCEDURE — 99207 PR NO CHARGE LOS: CPT | Performed by: PHARMACIST

## 2023-08-15 NOTE — PROGRESS NOTES
Medication Therapy Management (MTM) Encounter    ASSESSMENT:                            Medication Adherence/Access: No issues identified -reconciled med list with assisted living list today.  Patient requests new copy to be sent to nursing staff.  -----------------------------------------  .  Anemia: Education provided today to help with tolerability of iron to correct anemia.  Every other day dosing has data to support sufficient repletion of iron -recommended to reduce to every other day  dosing today.  Education provided on iron rich foods.  -----------------------------------------  .  RLS: Needs improvement.  Educated patient today on importance of correcting anemia which may be contributing to RLS symptoms.  May consider vitamin B-12 level in the future if correcting anemia does not improve RLS.   -----------------------------------------  .  COPD: Stable.  Recommend stopping montelukast due to secondary neuropsych risk (see below).  If further allergic symptom control needed, recommend second-generation antihistamine in the future.  -----------------------------------------  .  History of DVT: Unclear whether DVT was provoked.  May consider reevaluation of clot and stopping long-term anticoagulation in the future.    -----------------------------------------  .  Polymyalgia rheumatica/rash: Needs improvement.  Encourage patient to discuss further with rheumatology and consider second opinion if not feeling supported in current regimen.  -----------------------------------------  .  Hypertension: Stable.  May consider tapering off carvedilol and increasing losartan (history of CKD -renal benefit) and carvedilol held off and beta-blockade which may increase risk of lightheadedness leading to falls.  Will discuss further at future visit    -----------------------------------------  .  Anxiety/depression: Needs improvement.  Stop montelukast may help reduce neuropsych effects.  If no improvement seen in 4 weeks,  may decrease fluoxetine to reduce anxiety as fluoxetine can be activating to some patients.  May explore other alternatives in the future.  -----------------------------------------  .  Constipation: Stable.  -----------------------------------------  .  GERD/nausea: Stable.  Chronic PPI recommended due to chronic anticoagulation for DVT and DOACs are active in stomach lining, increasing risk for GI bleed  -----------------------------------------  .  Osteoporosis: Patient at goal 1200 mg daily per NOF for osteoporosis.  Not meeting vitamin D goal of 2000 IU daily.  Patient has been on bisphosphonate for 5 years -May consider treatment holiday if DEXA scan stable after upcoming scan.  PPI benefit outweighs risk for this patient (see above).  Recommend vitamin D level at future visit about 3 months after reinitiating vitamin D therapy.  -----------------------------------------  .  Diabetes: A1c at goal less than 7%.  Recommend to continue diet and exercise to control diabetes.  Education provided to patient no need to self-monitor blood sugar as A1c sufficient with history of A1c at goal and not on diabetic therapies-care team to continue to monitor blood sugar control with A1c only.  Recommend stopping aspirin as patient on long-term anticoagulation and has history of falls -unnecessary due to elevated bleed risk, primary prevention.  At goal on statin and ARB.  Due for fasting lipid panel and microalbumin.    PLAN:                            Start Vitamin D 1000 international units daily.  Take Vitron every other day with food at bedtime.  Work on increasing food intake containing iron as well including iron fortified cereal, beans, lentils, dark leafy green vegetables such as broccoli and spinach.  Stop montelukast.   Stop  checking blood sugars daily with fingersticks.  We will monitor your diabetes with A1c at the clinic which is sufficient as your diabetes is very well controlled.  Stop aspirin due to risk of  bleed.    Follow-up: 9/12 at 11 AM for phone visit with john Hyman. 9/29 with Ami Noriega in person at [facility].  Due for fasting lipid, A1C, and microalbumin at that visit    SUBJECTIVE/OBJECTIVE:                          Zakiya Christian is a 78 year old female called for an initial visit. She was referred to me from Ami Noriega MD .      Reason for visit: Medication Therapy Management - Restless Legs.    Allergies/ADRs: Reviewed in chart  Past Medical History: Reviewed in chart  Tobacco: She reports that she has quit smoking. Her smoking use included cigarettes. She has never used smokeless tobacco.  Alcohol: Less than 1 beverage / month  Caffeine: Diet Yg Tea 3-4 bottles daily      Medication Adherence/Access: Patient is in assisted living where pill packs are filled and given to patient daily to manage.  Pharmacy supporting assisted living - [facility]  -----------------------------------------  .  Anemia:  Vitron  -1 tab daily    Took tab daily for 3 days and felt significant GI upset.  Didn't take today and feels better. Had been taking  with food in the morning with all other meds.   -----------------------------------------  .  RLS:   Ropinerole 0.25 mg once daily  Gabapentin 300 mg capsule 3 capsules at bedtime    Patient currently taking every night to help with restless legs.  Has been managing restless legs for a long time and unsure current therapy working well.  Denies side effects including dizziness, oversedation, edema.    Magnesium (6/4/23) -1.6 mg/dL  -----------------------------------------  .  COPD:   ICS/LAMA/LABA - Trelegy Ellipta 200/62.5/25 mcg - one puff once daily  Albuterol (ProAir/Ventolin/Proventil) - uses about 2 times per week  Montelukast 10 mg 1 tablet by mouth daily in the evening      Side effects: None.    Patient reports the following symptoms:  none.    -----------------------------------------  .  History of DVT:   Xarelto 20 mg once daily with food    History of DVT November 2021.  Ultrasound confirmed presence remaining March 2022 -recommended chronic anticoagulation at that time.  Patient denies bruising or bleeding.    -----------------------------------------  .  Polymyalgia rheumatica/rash:   Azathioprine 50 mg 3 tablets once daily  Prednisone 1 mg tablet -2 mg 5 days and 1 mg even days  Nystatin 100 units/g cream topically as needed for rash    Follows with Dr. Gonzalez at [facility].  Working to taper off prednisone and azathioprine.  Patient feels current regimen not working well -is considering second opinion.  Denies side effects.  -----------------------------------------  .  Hypertension:   Carvedilol 12.5 mg tablet -1 tab twice daily  Losartan 25 mg 1/2 tablet daily    Patient reports no current medication side effects.  Patient has a history of falls.  Denies side dizziness, lightheadedness or falls currently.  Patient has occasional blood pressure readings at assisted living.  Does not recall values.    BP Readings from Last 3 Encounters:   06/16/23 129/68   06/15/23 (!) 140/66   06/14/23 122/55     Pulse Readings from Last 3 Encounters:   06/16/23 80   06/15/23 78   06/14/23 73     Last Comprehensive Metabolic Panel: 6/8/23  Lab Results   Component Value        POTASSIUM 4.4    CHLORIDE 104    CO2 22    ANIONGAP 14    GLC 90    BUN 17.3    CR 0.85    GFRESTIMATED 69    VICENTA 9.5     -----------------------------------------  .    Anxiety/depression:   Fluoxetine 40 mg once daily    Patient feels that mood ebbs and flows.  Currently stable, however occasionally has days where feeling more depressed and isolated, occasionally has trouble sleeping with mind racing.  Denies thoughts of self-harm or harm to others.  Denies side effects.  -----------------------------------------  .  Constipation:   MiraLAX 17 g scoop as needed  daily  FiberCon tablets 2 tablets once daily    Works well.  Denies side effects.  Works on keeping hydrated to help with kidneys and constipation.  -----------------------------------------  .  GERD/nausea:   Omeprazole 40 mg once daily   Ondansetron 4 mg disintegrating tablet every 6 hours as needed    Patient reports no current symptoms.   The patient does not have a history of GI bleed. Patient feels that current regimen is not effective.    -----------------------------------------  .  Osteoporosis:   calcium 600 mg twice daily   Vitamin D not taking currently   alendronate (Fosamax) 70mg weekly (has been on current therapy 2018)    Patient is not experiencing side effects.  Patient takes alendronate on empty stomach with full glass of water and remains seated upright for 30 minutes after taking  DEXA History: Scheduled for 2023  Patient is getting approximately 300-600 mg/day of calcium in their diet.  Risk factors: post-menopausal  recent fracture  chronic PPI use  Last vitamin D level:  Lab Results   Component Value Date    VITDT 74 2022     -----------------------------------------  .    Type 2 Diabetes:    Diet controlled.    Aspirin 81mg daily -primary prevention  Pravastatin 40 mg once daily.  Blood sugar monitorin-2 time(s) daily; Ranges: (patient reported) always around 100.  Patient does not mind checking blood sugar, however sometimes inconvenient as he needs assistance from staff at assisted living to provide supplies.  Current diabetes symptoms: none  Diet/Exercise: Controls with monitoring diet and is active as she can be.  Eye exam: due  Foot exam: due  Urine Albumin: Due  A1c (3/14/2023): 5.9%    Cholesterol   Lab Test 21  0922   CHOL 137   HDL 47*   LDL 71   TRIG 94         Today's Vitals: There were no vitals taken for this visit. -Virtual visit  ----------------      I spent 56 minutes with this patient today. All changes were made via collaborative practice agreement  with Ami Noriega MD. A copy of the visit note was provided to the patient's provider(s).    A summary of these recommendations was mailed to the patient.  Updated med list faxed to assisted living facility at patient request.    Rochelle Paula, Pharm D., MPH  MTM Pharmacist - [facility]  Secure Voicemail: [phone]  In Office: Monday - Thursday        Telemedicine Visit Details  Type of service:  Telephone visit  Start Time:  11:04 AM  End Time:  12 PM     Medication Therapy Recommendations  Age-related osteoporosis without current pathological fracture    Current Medication: calcium carbonate (OS-VICENTA) 1500 (600 Ca) MG tablet   Rationale: Synergistic therapy - Needs additional medication therapy - Indication   Recommendation: Start Medication - Start vitamin D once daily   Status: Accepted per CPA         Chronic obstructive pulmonary disease, unspecified COPD type (H)    Current Medication: montelukast (SINGULAIR) 10 MG tablet (Discontinued)   Rationale: Unsafe medication for the patient - Adverse medication event - Safety   Recommendation: Discontinue Medication - Stopped due to risk of neuropsych effects   Status: Accepted per CPA         Iron deficiency anemia, unspecified iron deficiency anemia type    Current Medication: ferrous fumarate 65 mg, [place]. FE,-Vitamin C 125 mg (VITRON C)  MG TABS tablet   Rationale: Undesirable effect - Adverse medication event - Safety   Recommendation: Provide Education - Take every other day with food to prevent GI upset   Status: Accepted per CPA         Personal history of DVT (deep vein thrombosis)    Current Medication: aspirin 81 MG EC tablet (Discontinued)   Rationale: Duplicate Therapy - Unnecessary medication therapy - Indication   Recommendation: Discontinue Medication - Stop aspirin due to chronic anticoagulation   Status: Accepted per CPA         Type 2 diabetes mellitus without complication, without long-term current use of insulin (H)    Rationale:  No medical indication at this time - Unnecessary medication therapy - Indication   Recommendation: Discontinue Medication - Stop fingersticks   Status: Accepted per CPA

## 2023-08-21 RX ORDER — VITAMIN B COMPLEX
1 TABLET ORAL DAILY
COMMUNITY

## 2023-08-21 RX ORDER — PRAVASTATIN SODIUM 40 MG
40 TABLET ORAL DAILY
COMMUNITY
End: 2023-11-03

## 2023-08-21 NOTE — PATIENT INSTRUCTIONS
"Recommendations from MTM Pharmacist visit:                                                    MTM (medication therapy management) is a service provided by a clinical pharmacist designed to help you get the most of out of your medicines.  You may be sent a phone or email survey evaluating today's visit.  Please provide feedback you have for the service he received today if you are able.      Start Vitamin D 1000 international units daily.  Take Vitron every other day with food at bedtime.  Work on increasing food intake containing iron as well including iron fortified cereal, beans, lentils, dark leafy green vegetables such as broccoli and spinach.  Stop montelukast.   Stop  checking blood sugars daily with fingersticks.  We will monitor your diabetes with A1c at the clinic which is sufficient as your diabetes is very well controlled.  Stop aspirin due to risk of bleed.    Follow-up: 9/12 at 11 AM for phone visit with john Hyman. 9/29 with Ami Noriega in person at [facility].  Due for fasting lipid, A1C, and microalbumin at that visit      It was great speaking with you today.  I value your experience and would be very thankful for your time in providing feedback in our clinic survey. In the next few days, you may receive an email or text message from [employer] with a link to a survey related to your  clinical pharmacist.\"     To schedule another MTM appointment, please call the clinic directly at [phone]. You may also schedule with me at the clinic .     My Clinical Pharmacist's contact information:                                                      Please feel free to contact me with any questions or concerns you have.      Rochelle Paula, Pharm D., MPH  MTM Pharmacist - [facility]  Secure Voicemail: [phone]  Days in the office: Monday - Thursday          "

## 2023-08-30 NOTE — CONSULTS
Care Management Initial Consult    General Information  Assessment completed with: Patient, Friend, Pt and friend Sweetie ANTOINE  Type of CM/SW Visit: Initial Assessment  Primary Care Provider verified and updated as needed: Yes   Readmission within the last 30 days: no previous admission in last 30 days   Reason for Consult: discharge planning, health care directive, transportation  Advance Care Planning: Advance Care Planning Reviewed: no concerns identified        Communication Assessment  Patient's communication style: spoken language (English or Bilingual)        Cognitive  Cognitive/Neuro/Behavioral: WDL                      Living Environment:   People in home: facility resident     Current living Arrangements: other (see comments) (Transitional Care Unit.)  Name of Facility: [facility]   Able to return to prior arrangements: yes     Family/Social Support:  Care provided by: friend, self, other (see comments) (TCU staff.)  Provides care for: no one, unable/limited ability to care for self  Marital Status: Single  Other (specify) (Friend Sweetie ANTOINE)          Description of Support System: Supportive, Involved    Support Assessment: Adequate family and caregiver support, Adequate social supports    Current Resources:   Patient receiving home care services: No  Community Resources: Transitional Care  Equipment currently used at home: walker, rolling, other (see comments) (All needed DME is available to pt at the TCU.)  Supplies currently used at home: None    Employment/Financial:  Employment Status: retired     Financial Concerns: No concerns identified   Referral to Financial Worker: No     Lifestyle & Psychosocial Needs:  Social Determinants of Health     Tobacco Use: Not on file   Alcohol Use: Not on file   Financial Resource Strain: Not on file   Food Insecurity: Not on file   Transportation Needs: Not on file   Physical Activity: Not on file   Stress: Not on file   Social Connections: Not on file   Intimate  "Partner Violence: Not on file   Depression: Not on file   Housing Stability: Not on file     Functional Status:  Prior to admission patient needed assistance:   Dependent ADLs::  (Currently in TCU.)  Dependent IADLs::  (Currently in TCU.)  Assessment of Functional Status: Not at  functional baseline    Mental Health Status:  Mental Health Status: No Current Concerns       Chemical Dependency Status:  Chemical Dependency Status: No Current Concerns           Values/Beliefs:  Spiritual, Cultural Beliefs, Islam Practices, Values that affect care:  (None stated by pt or friend.)             Additional Information:  Writer met with pt and her friend to introduce Care Management service(CM) and obtain an initial assessment. Pt is currently at [facility]) and intends on returning there on 6/30. Writer has notified/provided CM contact info to [facility]. Pt is concerned about TCU day coverage ending through insurance on July 8th. Pt and her friend were directed to continue working with the SW staff at the TCU to address moving forward with appropriate care. Writer also provided a pamphlet for [employer] as pt and friend do not have danilo in the TCU staff assisting in the matter thus far.    6/28 per SUJATHA Razo.-\"77-year-old with a history of colonic diverticular abscess status postdrainage in April of this year, hypertension hyperlipidemia, [facility] vomiting and diarrhea, both nonbloody and nonmelanotic.  States she has had nausea vomiting and diarrhea intermittently the past few weeks however today was much worse.  States that \"I just have not felt right since being here.\"  Denies any chest pain or trouble breathing although she was mildly hypoxic on arrival.  Denies any pleuritic pain.  No significant leg swelling.  Normal urination.  No abdominal pain.  On arrival she looks well.  Heart and lungs normal.  No wheezing.  No crackles.  Abdomen is benign and obese.  Legs are normal. " " I took her off oxygen as I think the initial pulse oximeter reading may have been inaccurate.  We will monitor her very closely.  Otherwise we will do basic labs give her some fluids antiemetics and reevaluate.\"    No pending consults. Anticipate return to TCU on 6/30 per transport via friend Sweetie SOLARESFrandy without issue. CM to follow for changes in current anticipated discharge disposition.    Jatinder Hood RN        " Significant other

## 2023-09-12 ENCOUNTER — VIRTUAL VISIT (OUTPATIENT)
Dept: PHARMACY | Facility: PHYSICIAN GROUP | Age: 78
End: 2023-09-12
Payer: COMMERCIAL

## 2023-09-12 DIAGNOSIS — M81.0 AGE-RELATED OSTEOPOROSIS WITHOUT CURRENT PATHOLOGICAL FRACTURE: ICD-10-CM

## 2023-09-12 DIAGNOSIS — G25.81 RESTLESS LEGS SYNDROME (RLS): ICD-10-CM

## 2023-09-12 DIAGNOSIS — I10 BENIGN ESSENTIAL HYPERTENSION: ICD-10-CM

## 2023-09-12 DIAGNOSIS — D50.9 IRON DEFICIENCY ANEMIA, UNSPECIFIED IRON DEFICIENCY ANEMIA TYPE: Primary | ICD-10-CM

## 2023-09-12 DIAGNOSIS — K59.04 CHRONIC IDIOPATHIC CONSTIPATION: ICD-10-CM

## 2023-09-12 DIAGNOSIS — M35.3 POLYMYALGIA RHEUMATICA (H): ICD-10-CM

## 2023-09-12 DIAGNOSIS — E11.9 TYPE 2 DIABETES MELLITUS WITHOUT COMPLICATION, WITHOUT LONG-TERM CURRENT USE OF INSULIN (H): ICD-10-CM

## 2023-09-12 PROCEDURE — 99207 PR NO CHARGE LOS: CPT | Performed by: PHARMACIST

## 2023-09-12 ASSESSMENT — ANXIETY QUESTIONNAIRES
IF YOU CHECKED OFF ANY PROBLEMS ON THIS QUESTIONNAIRE, HOW DIFFICULT HAVE THESE PROBLEMS MADE IT FOR YOU TO DO YOUR WORK, TAKE CARE OF THINGS AT HOME, OR GET ALONG WITH OTHER PEOPLE: SOMEWHAT DIFFICULT
GAD7 TOTAL SCORE: 4
GAD7 TOTAL SCORE: 4
3. WORRYING TOO MUCH ABOUT DIFFERENT THINGS: NOT AT ALL
1. FEELING NERVOUS, ANXIOUS, OR ON EDGE: NEARLY EVERY DAY
7. FEELING AFRAID AS IF SOMETHING AWFUL MIGHT HAPPEN: NOT AT ALL
5. BEING SO RESTLESS THAT IT IS HARD TO SIT STILL: NOT AT ALL
6. BECOMING EASILY ANNOYED OR IRRITABLE: SEVERAL DAYS
2. NOT BEING ABLE TO STOP OR CONTROL WORRYING: NOT AT ALL

## 2023-09-12 ASSESSMENT — PATIENT HEALTH QUESTIONNAIRE - PHQ9
SUM OF ALL RESPONSES TO PHQ QUESTIONS 1-9: 10
5. POOR APPETITE OR OVEREATING: NOT AT ALL

## 2023-09-12 NOTE — PATIENT INSTRUCTIONS
Recommendations from MTM Pharmacist visit:                                                    MTM (medication therapy management) is a service provided by a clinical pharmacist designed to help you get the most of out of your medicines.  You may be sent a phone or email survey evaluating today's visit.  Please provide feedback you have for the service he received today if you are able.      Hold Miralax now that you are having loose stools. You may try 1/2 capful daily as needed if you start to notice hardened stool or you go more than a day without a bowel movement.     Continue to work on incorporating iron-rich foods into your diet. Cream of Wheat and other cereals are often great sources in addition to red meat, green leafy vegetables, and beans.     Continue to work with your Assisted Living Staff to get updated prescription from Dr. Gonzalez for prednisone. As we evaluate your iron levels, you may have a discussion with Ami Noriega regarding a referral for another rheumatologist for a second opinion on what treatments may work better for you.    After we assess your iron levels, if you are still feeling low energy and overall flat emotion, we may consider starting a medication called bupropion to help your mood. We are not starting this yet because the goal is to see if energy and mood improve with your iron levels increasing to goal.      Follow-up: 9/22 at 9:30 am for labs - Due for fasting lipid, iron/Hemoglobin, A1C, and microalbumin at that visit. 9/29 with Ami Noriega in person at [facility] for follow up visit. 11/1 at 11am for  Follow up phone visit with Rochelle Paula Medication Therapy Management pharmacist.      It was great speaking with you today.  I value your experience and would be very thankful for your time in providing feedback in our clinic survey. In the next few days, you may receive an email or text message from [employer] with a  "link to a survey related to your  clinical pharmacist.\"     To schedule another MTM appointment, please call the clinic directly at [phone]. You may also schedule with me at the clinic .     My Clinical Pharmacist's contact information:                                                      Please feel free to contact me with any questions or concerns you have.      Rochelle Paula, Pharm D., MPH  MTM Pharmacist - [facility]  Secure Voicemail: [phone]  Days in the office: Monday - Thursday          "

## 2023-09-12 NOTE — PROGRESS NOTES
Medication Therapy Management (MTM) Encounter    ASSESSMENT:                            Medication Adherence/Access: No issues identified -----------------------------------------  .  Anemia: Education provided on iron rich foods to continue to focus on to improve anemia. May consider infusion if anemia remains after upcoming visit.  -----------------------------------------  .  RLS: Needs improvement.  Educated patient today on importance of correcting anemia which may be contributing to RLS symptoms.  May consider vitamin B-12 level in the future if correcting anemia does not improve RLS.   -----------------------------------------  .    Polymyalgia rheumatica/rash: Needs improvement.  Encourage patient to discuss further with rheumatology and consider second opinion if not feeling supported in current regimen.  -----------------------------------------  .  Hypertension: Stable.  May consider tapering off carvedilol and increasing losartan (history of CKD -renal benefit) and carvedilol held off and beta-blockade which may increase risk of lightheadedness leading to falls.  Will discuss further at future visit    -----------------------------------------  .  Anxiety/depression: Needs improvement.  No significant improvement seen in mood after stopping montelukast. Discussed fatigue/flat affect may be secondary to anemia - will continue to monitor/correct anemia before augmenting mental health meds. Depression symptoms higher than anxiety, would recommend starting low dose bupropion to help boost mood/energy if correcting anemia not sufficient in improving fatigue/depression.  -----------------------------------------  .  Constipation: Hold Miralax with soft stool. Restart 1/2 capful to prevent constipation once stools formed.  -----------------------------------------  .  Osteoporosis: Patient at goal 1200 mg daily per NOF for osteoporosis.  Meeting vitamin D goal of 1000 IU daily.  Patient has been on  bisphosphonate for 5 years -still osteoporotic in right femur, recommend to continue alendronate.  PPI benefit outweighs risk for this patient on chronic DOAC, active in GI tract.  Recommend vitamin D level at future visit about 3 months after reinitiating vitamin D therapy (around Jan 1, 2024).  -----------------------------------------  .  Diabetes: A1c at goal less than 7%.  Recommend to continue diet and exercise to control diabetes.  Education provided to patient no need to self-monitor blood sugar as A1c sufficient with history of A1c at goal and not on diabetic therapies-care team to continue to monitor blood sugar control with A1c only. Previously stopped aspirin with MTM -unnecessary due to elevated bleed risk, primary prevention.  At goal on statin and ARB.  Due for fasting lipid panel and microalbumin.    PLAN:                            Hold Miralax now that you are having loose stools. You may try 1/2 capful daily as needed if you start to notice hardened stool or you go more than a day without a bowel movement.     Continue to work on incorporating iron-rich foods into your diet. Cream of Wheat and other cereals are often great sources in addition to red meat, green leafy vegetables, and beans.     Continue to work with your Assisted Living Staff to get updated prescription from Dr. Gonzalez for prednisone. As we evaluate your iron levels, you may have a discussion with Ami Noriega regarding a referral for another rheumatologist for a second opinion on what treatments may work better for you.    After we assess your iron levels, if you are still feeling low energy and overall flat emotion, we may consider starting a medication called bupropion to help your mood. We are not starting this yet because the goal is to see if energy and mood improve with your iron levels increasing to goal.      Follow-up: 9/22 at 9:30 am for labs - Due for fasting lipid, iron/Hemoglobin, A1C, and  microalbumin at that visit. 9/29 with Ami Noriega in person at [facility] for follow up visit. 11/1 at 11am for  Follow up phone visit with Rochelle Paula Medication Therapy Management pharmacist.    SUBJECTIVE/OBJECTIVE:                          Zakiya Christian is a 78 year old female called for a follow up  visit. Today's visit is a follow up from 8/15/23.    Reason for visit: Medication Therapy Management - Anemia, Fatigue, Depression.    Allergies/ADRs: Reviewed in chart  Past Medical History: Reviewed in chart  Tobacco: She reports that she has quit smoking. Her smoking use included cigarettes. She has never used smokeless tobacco.  Alcohol: Less than 1 beverage / month  Caffeine: Diet Yg Tea 3-4 bottles daily      Medication Adherence/Access: Patient is in assisted living where pill packs are filled and given to patient daily to manage.  Pharmacy supporting assisted living - [facility]  -----------------------------------------  .  Anemia:  Patient has tried Vitron C several times with food and before bed - not able to tolerate. Is focusing on eating iron rich foods such as fortified cereal and vegetables. Has upcoming lab visit 2 weeks and PCP visit to further assess.  -----------------------------------------  .  RLS:   Ropinerole 0.25 mg once daily  Gabapentin 300 mg capsule 3 capsules at bedtime    Patient currently taking every night to help with restless legs.  Has been managing restless legs for a long time and unsure current therapy working well.  Denies side effects including dizziness, oversedation, edema.    Magnesium (6/4/23) -1.6 mg/dL  -----------------------------------------  .    Polymyalgia rheumatica/rash:   Azathioprine 50 mg 3 tablets once daily  Prednisone 1 mg tablet - 3 mg every other day alternating with 2mg every other day   Nystatin 100 units/g cream topically as needed for rash    Follows with Dr. Gonzalez at [facility].  Working  to taper off prednisone and azathioprine.  Patient feels current regimen not working well -is considering second opinion.  Assisted Living is helping her to clarify prednisone SIG - last note provided to PCP has above SIG in August. Denies side effects.  -----------------------------------------  .  Hypertension:   Carvedilol 12.5 mg tablet -1 tab twice daily  Losartan 25 mg 1/2 tablet daily    Patient reports no current medication side effects.  Patient has a history of falls.  Denies side dizziness, lightheadedness or falls currently.  Patient has occasional blood pressure readings at assisted living.  Does not recall values.    BP Readings from Last 3 Encounters:   06/16/23 129/68   06/15/23 (!) 140/66   06/14/23 122/55     Pulse Readings from Last 3 Encounters:   06/16/23 80   06/15/23 78   06/14/23 73     Last Comprehensive Metabolic Panel: 6/8/23  Lab Results   Component Value        POTASSIUM 4.4    CHLORIDE 104    CO2 22    ANIONGAP 14    GLC 90    BUN 17.3    CR 0.85    GFRESTIMATED 69    VICENTA 9.5     -----------------------------------------  .    Anxiety/depression:   Fluoxetine 40 mg once daily    Patient feels that mood ebbs and flows.  Currently stable, however occasionally has days where feeling more depressed and isolated, feels currently, anxiety is well managed. Most bothersome symptoms are fatigue and overall flat affect. Denies thoughts of self-harm or harm to others.  Denies side effects.        9/12/2023    11:26 AM   PHQ   PHQ-9 Total Score 10   Q9: Thoughts of better off dead/self-harm past 2 weeks Not at all         9/12/2023    11:26 AM   AG-7 SCORE   Total Score 4         -----------------------------------------  .  Constipation:   MiraLAX 17 g scoop as needed daily  FiberCon tablets 2 tablets once daily    Patient has colostomy bag. Had been having hard bowel movements for several weeks then after taking 1 capful Miralax daily x 2 weeks- has had loose stool with large volume  starting today. It caused significant volume and mess with colostomy and patient is some what frustrated, but feels better as constipation was causing nausea.  -----------------------------------------  .    Osteoporosis:   calcium 600 mg twice daily   Vitamin D 1000 international units daily  alendronate (Fosamax) 70mg weekly (has been on current therapy 6/2018)    Patient is not experiencing side effects.  Patient takes alendronate on empty stomach with full glass of water and remains seated upright for 30 minutes after taking  DEXA History: 8/17/2023 - osteoporosis still noted in right femur  Patient is getting approximately 300-600 mg/day of calcium in their diet.  Risk factors: post-menopausal  recent fracture  chronic PPI use  Last vitamin D level:  Lab Results   Component Value Date    VITDT 74 04/08/2022     -----------------------------------------  .    Type 2 Diabetes:    Diet controlled.    Aspirin stopped due to age.  Pravastatin 40 mg once daily.  Blood sugar monitoring:  stopped after last Medication Therapy Management visit. Will monitor with A1C only.  Current diabetes symptoms: none  Diet/Exercise: Controls with monitoring diet and is active as she can be.  Eye exam: due  Foot exam: due  Urine Albumin: Due  A1c (3/14/2023): 5.9%    Cholesterol   Lab Test 05/17/21  0922   CHOL 137   HDL 47*   LDL 71   TRIG 94         Today's Vitals: There were no vitals taken for this visit. -Virtual visit  ----------------      I spent 35 minutes with this patient today. All changes were made via collaborative practice agreement with Ami Noriega MD. A copy of the visit note was provided to the patient's provider(s).    A summary of these recommendations was mailed to the patient.  Updated med list faxed to [facility] living facility at patient request.    Rochelle Paula, Pharm D., MPH  MTM Pharmacist - [facility]  Secure Voicemail: [phone]  In Office: Monday - Thursday        Telemedicine Visit  Details  Type of service:  Telephone visit  Start Time:  11:15 AM  End Time: 11:50 AM     Medication Therapy Recommendations  Chronic idiopathic constipation    Current Medication: polyethylene glycol (MIRALAX) 17 g packet   Rationale: Dose too high - Dosage too high - Safety   Recommendation: Decrease Dose - decrease to 1/2 capful prn daily   Status: Patient Agreed - Adherence/Education

## 2023-09-25 ENCOUNTER — LAB REQUISITION (OUTPATIENT)
Dept: LAB | Facility: CLINIC | Age: 78
End: 2023-09-25
Payer: COMMERCIAL

## 2023-09-25 ENCOUNTER — TRANSFERRED RECORDS (OUTPATIENT)
Dept: HEALTH INFORMATION MANAGEMENT | Facility: CLINIC | Age: 78
End: 2023-09-25

## 2023-09-25 DIAGNOSIS — E78.2 MIXED HYPERLIPIDEMIA: ICD-10-CM

## 2023-09-25 DIAGNOSIS — E11.22 TYPE 2 DIABETES MELLITUS WITH DIABETIC CHRONIC KIDNEY DISEASE (H): ICD-10-CM

## 2023-09-25 LAB
ANION GAP SERPL CALCULATED.3IONS-SCNC: 15 MMOL/L (ref 7–15)
BUN SERPL-MCNC: 16.2 MG/DL (ref 8–23)
CALCIUM SERPL-MCNC: 9.6 MG/DL (ref 8.8–10.2)
CHLORIDE SERPL-SCNC: 101 MMOL/L (ref 98–107)
CHOLEST SERPL-MCNC: 187 MG/DL
CREAT SERPL-MCNC: 0.97 MG/DL (ref 0.51–0.95)
DEPRECATED HCO3 PLAS-SCNC: 25 MMOL/L (ref 22–29)
EGFRCR SERPLBLD CKD-EPI 2021: 59 ML/MIN/1.73M2
GLUCOSE SERPL-MCNC: 158 MG/DL (ref 70–99)
HBA1C MFR BLD: 5.8 % (ref 4.2–6.1)
HDLC SERPL-MCNC: 59 MG/DL
LDLC SERPL CALC-MCNC: 111 MG/DL
NONHDLC SERPL-MCNC: 128 MG/DL
POTASSIUM SERPL-SCNC: 3.8 MMOL/L (ref 3.4–5.3)
SODIUM SERPL-SCNC: 141 MMOL/L (ref 136–145)
TRIGL SERPL-MCNC: 85 MG/DL

## 2023-09-25 PROCEDURE — 80061 LIPID PANEL: CPT | Mod: ORL | Performed by: FAMILY MEDICINE

## 2023-09-25 PROCEDURE — 80048 BASIC METABOLIC PNL TOTAL CA: CPT | Mod: ORL | Performed by: FAMILY MEDICINE

## 2023-10-23 ENCOUNTER — LAB REQUISITION (OUTPATIENT)
Dept: LAB | Facility: CLINIC | Age: 78
End: 2023-10-23
Payer: COMMERCIAL

## 2023-10-23 DIAGNOSIS — R35.0 FREQUENCY OF MICTURITION: ICD-10-CM

## 2023-10-23 PROCEDURE — 87086 URINE CULTURE/COLONY COUNT: CPT | Mod: ORL | Performed by: FAMILY MEDICINE

## 2023-10-26 LAB
BACTERIA UR CULT: ABNORMAL
BACTERIA UR CULT: ABNORMAL

## 2023-11-01 ENCOUNTER — OFFICE VISIT (OUTPATIENT)
Dept: PHARMACY | Facility: PHYSICIAN GROUP | Age: 78
End: 2023-11-01
Payer: COMMERCIAL

## 2023-11-01 VITALS
BODY MASS INDEX: 35.18 KG/M2 | HEART RATE: 76 BPM | SYSTOLIC BLOOD PRESSURE: 100 MMHG | DIASTOLIC BLOOD PRESSURE: 50 MMHG | WEIGHT: 198.6 LBS

## 2023-11-01 DIAGNOSIS — G25.81 RESTLESS LEGS SYNDROME (RLS): ICD-10-CM

## 2023-11-01 DIAGNOSIS — K59.04 CHRONIC IDIOPATHIC CONSTIPATION: ICD-10-CM

## 2023-11-01 DIAGNOSIS — M35.3 POLYMYALGIA RHEUMATICA (H): ICD-10-CM

## 2023-11-01 DIAGNOSIS — D50.9 IRON DEFICIENCY ANEMIA, UNSPECIFIED IRON DEFICIENCY ANEMIA TYPE: Primary | ICD-10-CM

## 2023-11-01 PROCEDURE — 99207 PR NO CHARGE LOS: CPT | Performed by: PHARMACIST

## 2023-11-01 RX ORDER — AZATHIOPRINE 50 MG/1
150 TABLET ORAL DAILY
COMMUNITY

## 2023-11-01 RX ORDER — ROSUVASTATIN CALCIUM 10 MG/1
10 TABLET, COATED ORAL AT BEDTIME
COMMUNITY

## 2023-11-01 RX ORDER — PREDNISONE 1 MG/1
TABLET ORAL SEE ADMIN INSTRUCTIONS
COMMUNITY
End: 2024-02-27

## 2023-11-01 NOTE — PROGRESS NOTES
Medication Therapy Management (MTM) Encounter    ASSESSMENT:                          -----------------------------------------  .    Medication Adherence/Access: No issues identified   -----------------------------------------  .  Anemia: Education provided on iron rich foods to continue to focus on to improve anemia. Will send food list of iron-rich foods for patient to consider - likes oatmeal and will start with this. May consider infusion if anemia remains.  -----------------------------------------  .  RLS: Needs improvement. Recommended moving ropinirole earlier in the evening to help manage evening RLS symptoms - onset within an hour of taking. Education provided on sedation and dizziness - low risk as patient has been on for years and on lowest dose, but effective. Also expect gabapentin at bed time to continue to manage symptoms. May also consider dividing gabapentin dose in the future if changing ropinirole dosing not effective. Educated patient today on importance of correcting anemia which may be contributing to RLS symptoms.  May consider vitamin B-12 level in the future if correcting anemia does not improve RLS.   -----------------------------------------  .    Polymyalgia rheumatica/rash: Needs improvement.  Encourage patient to discuss further with new rheumatologist and transferring records from old to new rheumatologist to help with continuity of care.  -----------------------------------------  .  Constipation: Recommend consistent  low dose Miralax to help with consistent, predictable bowel movements .  -----------------------------------------  .    PLAN:                            Change Miralax to 1/2 capful (or even as little as 1/4 capful) every day. This is to     Continue to work on incorporating iron-rich foods into your diet. Oatmeal, Cream of Wheat and other cereals are often great sources in addition to red meat, green leafy vegetables, and beans (see attached list).    After we  assess your iron levels, if you are still feeling low energy and overall flat emotion, we may consider starting a medication called bupropion to help your mood. We are not starting this yet because the goal is to see if energy and mood improve with your iron levels increasing to goal.    We will have your Assisted Living nurses move ropinirole to your supper dosing pack to help it work sooner and prevent your restless legs symptoms earlier in the day. We will keep gabapentin and bedtime to help further with those symptoms as you sleep. By getting more iron in your diet I also expect your restless legs symptoms to improve.      Follow-up: 12/6  at 10:30 am for  Follow up phone visit with Rochelle Paula Medication Therapy Management pharmacist.    SUBJECTIVE/OBJECTIVE:                          Zakiya Christian is a 78 year old female called for a follow up  visit. Today's visit is a follow up from 9/12/23.    Reason for visit: Medication Therapy Management - Anemia, Fatigue, Depression.    Allergies/ADRs: Reviewed in chart  Past Medical History: Reviewed in chart  Tobacco: She reports that she has quit smoking. Her smoking use included cigarettes. She has never used smokeless tobacco.  Alcohol: Less than 1 beverage / month  Caffeine: Diet Yg Tea 3-4 bottles daily      Medication Adherence/Access: Patient is in assisted living where pill packs are filled and given to patient daily to manage.  Pharmacy supporting assisted living - [facility]  -----------------------------------------  .  Anemia:  Patient has tried Vitron C several times with food and before bed - not able to tolerate. Is focusing on eating iron rich foods such as fortified cereal and vegetables. Somewhat difficult to manage as eats 1 meal per day at Assisted Living (little control over menu). Foods previously discussed with Medication Therapy Management don't sound appetizing.     Hemoglobin 6/30/23 - 10.0 g/dL  Hemoglobin 9/25/23 - 10.7  g/dL  -----------------------------------------  .  RLS:   Ropinerole 0.25 mg once daily at bedtime  Gabapentin 300 mg capsule 3 capsules at bedtime    Patient currently taking every night to help with restless legs at bedtime. Feeling that RLS symptoms have been worsening, especially in the early evening/later afternoon. More ache than before and activity somewhat helpful to relieve this, but switches from one leg to another it seems. When wakes up in the morning, symptoms are resolved. Denies side effects including dizziness, oversedation, edema.    Magnesium (6/4/23) -1.6 mg/dL  -----------------------------------------  .    Polymyalgia rheumatica/rash:   Azathioprine 50 mg 3 tablets once daily  Prednisone 1 mg tablet - 1 mg every other day alternating with 2mg every other day   Nystatin 100 units/g cream topically as needed for rash    Follows with Dr. Gonzalez at [place] [facility].  Working to taper off prednisone and azathioprine.  Patient feels current regimen not working well - has second opinion with new Rheumatologist in Jan. Last visit with Dr. Gonzalez scheduled for December.  Would like to find better options for managing PMR. She's feeling better overall from initial dx, but still has fatigue and some pain that Rheum doesn't believe is related to PMR, but patient would like second opinion.  -----------------------------------------  .        Anxiety/depression:   Fluoxetine 40 mg once daily    Patient feels that mood ebbs and flows.  Currently stable, however occasionally has days where feeling more depressed and isolated, feels currently, anxiety is well managed. Most bothersome symptoms are fatigue and overall flat affect. Denies thoughts of self-harm or harm to others.  Denies side effects.        9/12/2023    11:26 AM   PHQ   PHQ-9 Total Score 10   Q9: Thoughts of better off dead/self-harm past 2 weeks Not at all         9/12/2023    11:26 AM   AG-7 SCORE   Total Score 4          -----------------------------------------  .  Constipation:   MiraLAX 17 g - 1/2 - 1 capful every other day as needed   FiberCon tablets - 2 tablets twice daily    Patient has colostomy bag. Struggling with how to best manage bowel movements. When having constipation, will try 1/2 to 1 capful of Miralax about every other day. Once resolves, this causes significant volume and mess with colostomy and patient is some what frustrated, but feels better as constipation was causing nausea.        Last clinic visit vitals: /50   Pulse 76   Wt 198 lb 9.6 oz (90.1 kg)   BMI 35.18 kg/m   -Virtual visit  ----------------      I spent 37 minutes with this patient today. All changes were made via collaborative practice agreement with Ami Noriega MD. A copy of the visit note was provided to the patient's provider(s).    A summary of these recommendations was mailed to the patient.  Updated med list faxed to assisted living facility at patient request.    Rochelle Paula, Pharm D., MPH  MTM Pharmacist - [facility]  Secure Voicemail: [phone]  In Office: Monday - Thursday        Telemedicine Visit Details  Type of service:  Telephone visit  Start Time:  11:13 AM  End Time: 11:50 AM       Medication Therapy Recommendations  Chronic idiopathic constipation    Current Medication: polyethylene glycol (MIRALAX) 17 GM/Dose powder   Rationale: Does not understand instructions - Adherence - Adherence   Recommendation: Provide Education   Status: Patient Agreed - Adherence/Education         Restless legs syndrome (RLS)    Current Medication: rOPINIRole (REQUIP) 0.25 MG tablet   Rationale: Incorrect administration - Adverse medication event - Safety   Recommendation: Provide Education   Status: Patient Agreed - Adherence/Education

## 2023-11-01 NOTE — PATIENT INSTRUCTIONS
"Recommendations from MTM Pharmacist visit:                                                    MTM (medication therapy management) is a service provided by a clinical pharmacist designed to help you get the most of out of your medicines.  You may be sent a phone or email survey evaluating today's visit.  Please provide feedback you have for the service he received today if you are able.      Change Miralax to 1/2 capful (or even as little as 1/4 capful) every day. This is to     Continue to work on incorporating iron-rich foods into your diet. Oatmeal, Cream of Wheat and other cereals are often great sources in addition to red meat, green leafy vegetables, and beans (see attached list).    After we assess your iron levels, if you are still feeling low energy and overall flat emotion, we may consider starting a medication called bupropion to help your mood. We are not starting this yet because the goal is to see if energy and mood improve with your iron levels increasing to goal.    We will have your Assisted Living nurses move ropinirole to your supper dosing pack to help it work sooner and prevent your restless legs symptoms earlier in the day. We will keep gabapentin and bedtime to help further with those symptoms as you sleep. By getting more iron in your diet I also expect your restless legs symptoms to improve.      Follow-up: 12/6  at 10:30 am for  Follow up phone visit with Rochelle Paula, Medication Therapy Management pharmacist.      It was great speaking with you today.  I value your experience and would be very thankful for your time in providing feedback in our clinic survey. In the next few days, you may receive an email or text message from [employer] with a link to a survey related to your  clinical pharmacist.\"     To schedule another MTM appointment, please call the clinic directly at [phone]. You may also schedule with me at the clinic .     My Clinical Pharmacist's contact information:    "                                                   Please feel free to contact me with any questions or concerns you have.      Rochelle Paula, Pharm D., MPH  MTM Pharmacist - [facility]  Secure Voicemail: [phone]  Days in the office: Monday - Thursday

## 2023-11-03 RX ORDER — ROPINIROLE 0.25 MG/1
0.25 TABLET, FILM COATED ORAL EVERY EVENING
COMMUNITY

## 2023-11-04 ENCOUNTER — APPOINTMENT (OUTPATIENT)
Dept: RADIOLOGY | Facility: CLINIC | Age: 78
DRG: 689 | End: 2023-11-04
Attending: EMERGENCY MEDICINE
Payer: COMMERCIAL

## 2023-11-04 ENCOUNTER — APPOINTMENT (OUTPATIENT)
Dept: CT IMAGING | Facility: CLINIC | Age: 78
DRG: 689 | End: 2023-11-04
Attending: EMERGENCY MEDICINE
Payer: COMMERCIAL

## 2023-11-04 ENCOUNTER — HOSPITAL ENCOUNTER (INPATIENT)
Facility: CLINIC | Age: 78
LOS: 3 days | Discharge: NURSING, REHAB OR LONG-TERM CARE | DRG: 689 | End: 2023-11-07
Attending: EMERGENCY MEDICINE | Admitting: STUDENT IN AN ORGANIZED HEALTH CARE EDUCATION/TRAINING PROGRAM
Payer: COMMERCIAL

## 2023-11-04 DIAGNOSIS — J44.1 COPD EXACERBATION (H): ICD-10-CM

## 2023-11-04 DIAGNOSIS — R11.2 NAUSEA AND VOMITING, UNSPECIFIED VOMITING TYPE: ICD-10-CM

## 2023-11-04 DIAGNOSIS — E86.0 MILD DEHYDRATION: ICD-10-CM

## 2023-11-04 DIAGNOSIS — R09.02 HYPOXIA: ICD-10-CM

## 2023-11-04 DIAGNOSIS — N39.0 URINARY TRACT INFECTION WITHOUT HEMATURIA, SITE UNSPECIFIED: ICD-10-CM

## 2023-11-04 PROBLEM — E66.9 OBESITY: Status: ACTIVE | Noted: 2022-04-24

## 2023-11-04 PROBLEM — R78.81 BACTEREMIA: Status: ACTIVE | Noted: 2023-02-03

## 2023-11-04 PROBLEM — F41.8 DEPRESSION WITH ANXIETY: Status: ACTIVE | Noted: 2023-05-15

## 2023-11-04 PROBLEM — D17.71 ANGIOMYOLIPOMA OF KIDNEY: Status: ACTIVE | Noted: 2023-05-15

## 2023-11-04 PROBLEM — N32.1 VESICOCOLIC FISTULA: Status: ACTIVE | Noted: 2023-05-15

## 2023-11-04 PROBLEM — N18.2 STAGE 2 CHRONIC KIDNEY DISEASE: Status: ACTIVE | Noted: 2023-05-15

## 2023-11-04 PROBLEM — N39.3 FEMALE STRESS INCONTINENCE: Status: ACTIVE | Noted: 2023-09-07

## 2023-11-04 PROBLEM — E11.9 DM2 (DIABETES MELLITUS, TYPE 2) (H): Status: ACTIVE | Noted: 2023-05-15

## 2023-11-04 PROBLEM — E78.5 HLD (HYPERLIPIDEMIA): Status: ACTIVE | Noted: 2022-06-28

## 2023-11-04 PROBLEM — D64.9 NORMOCYTIC ANEMIA: Status: ACTIVE | Noted: 2023-05-15

## 2023-11-04 PROBLEM — H91.93 HEARING LOSS, BILATERAL: Status: ACTIVE | Noted: 2023-05-15

## 2023-11-04 PROBLEM — Z86.718 HISTORY OF DVT (DEEP VEIN THROMBOSIS): Status: ACTIVE | Noted: 2023-05-15

## 2023-11-04 PROBLEM — I10 HTN (HYPERTENSION): Status: ACTIVE | Noted: 2023-05-15

## 2023-11-04 PROBLEM — K55.069: Status: ACTIVE | Noted: 2023-05-15

## 2023-11-04 PROBLEM — Z87.39 HISTORY OF GOUT: Status: ACTIVE | Noted: 2023-05-15

## 2023-11-04 PROBLEM — R93.5 ABNORMAL ABDOMINAL CT SCAN: Status: ACTIVE | Noted: 2023-09-22

## 2023-11-04 PROBLEM — N82.3 RECTOVAGINAL FISTULA: Status: ACTIVE | Noted: 2023-11-04

## 2023-11-04 PROBLEM — Z86.69 HISTORY OF MIGRAINE: Status: ACTIVE | Noted: 2023-05-15

## 2023-11-04 PROBLEM — J45.909 ASTHMA: Status: ACTIVE | Noted: 2023-05-15

## 2023-11-04 PROBLEM — K21.9 GERD (GASTROESOPHAGEAL REFLUX DISEASE): Status: ACTIVE | Noted: 2023-05-15

## 2023-11-04 PROBLEM — I82.409 DEEP VEIN THROMBOSIS (DVT) (H): Status: ACTIVE | Noted: 2023-11-04

## 2023-11-04 LAB
ALBUMIN SERPL BCG-MCNC: 3.7 G/DL (ref 3.5–5.2)
ALP SERPL-CCNC: 49 U/L (ref 35–104)
ALT SERPL W P-5'-P-CCNC: 18 U/L (ref 0–50)
ANION GAP SERPL CALCULATED.3IONS-SCNC: 11 MMOL/L (ref 7–15)
AST SERPL W P-5'-P-CCNC: 34 U/L (ref 0–45)
BASE EXCESS BLDV CALC-SCNC: 2.9 MMOL/L
BASOPHILS # BLD AUTO: 0 10E3/UL (ref 0–0.2)
BASOPHILS NFR BLD AUTO: 1 %
BILIRUB SERPL-MCNC: 0.4 MG/DL
BUN SERPL-MCNC: 20.4 MG/DL (ref 8–23)
CALCIUM SERPL-MCNC: 9.6 MG/DL (ref 8.8–10.2)
CHLORIDE SERPL-SCNC: 102 MMOL/L (ref 98–107)
CREAT SERPL-MCNC: 1.1 MG/DL (ref 0.51–0.95)
CREAT SERPL-MCNC: 1.1 MG/DL (ref 0.51–0.95)
DEPRECATED HCO3 PLAS-SCNC: 26 MMOL/L (ref 22–29)
EGFRCR SERPLBLD CKD-EPI 2021: 51 ML/MIN/1.73M2
EGFRCR SERPLBLD CKD-EPI 2021: 51 ML/MIN/1.73M2
EOSINOPHIL # BLD AUTO: 0.1 10E3/UL (ref 0–0.7)
EOSINOPHIL NFR BLD AUTO: 3 %
ERYTHROCYTE [DISTWIDTH] IN BLOOD BY AUTOMATED COUNT: 15.7 % (ref 10–15)
GLUCOSE SERPL-MCNC: 109 MG/DL (ref 70–99)
HCO3 BLDV-SCNC: 28 MMOL/L (ref 24–30)
HCT VFR BLD AUTO: 32.5 % (ref 35–47)
HGB BLD-MCNC: 10 G/DL (ref 11.7–15.7)
IMM GRANULOCYTES # BLD: 0 10E3/UL
IMM GRANULOCYTES NFR BLD: 0 %
LACTATE SERPL-SCNC: 1 MMOL/L (ref 0.7–2)
LYMPHOCYTES # BLD AUTO: 0.7 10E3/UL (ref 0.8–5.3)
LYMPHOCYTES NFR BLD AUTO: 23 %
MAGNESIUM SERPL-MCNC: 2.1 MG/DL (ref 1.7–2.3)
MCH RBC QN AUTO: 29.3 PG (ref 26.5–33)
MCHC RBC AUTO-ENTMCNC: 30.8 G/DL (ref 31.5–36.5)
MCV RBC AUTO: 95 FL (ref 78–100)
MONOCYTES # BLD AUTO: 0.3 10E3/UL (ref 0–1.3)
MONOCYTES NFR BLD AUTO: 9 %
NEUTROPHILS # BLD AUTO: 2 10E3/UL (ref 1.6–8.3)
NEUTROPHILS NFR BLD AUTO: 64 %
NRBC # BLD AUTO: 0 10E3/UL
NRBC BLD AUTO-RTO: 0 /100
NT-PROBNP SERPL-MCNC: 203 PG/ML (ref 0–1800)
OXYHGB MFR BLDV: 77.2 % (ref 70–75)
PCO2 BLDV: 48 MM HG (ref 35–50)
PH BLDV: 7.37 [PH] (ref 7.35–7.45)
PLATELET # BLD AUTO: 245 10E3/UL (ref 150–450)
PO2 BLDV: 46 MM HG (ref 25–47)
POTASSIUM SERPL-SCNC: 4.9 MMOL/L (ref 3.4–5.3)
PROCALCITONIN SERPL IA-MCNC: 0.13 NG/ML
PROT SERPL-MCNC: 7.1 G/DL (ref 6.4–8.3)
RBC # BLD AUTO: 3.41 10E6/UL (ref 3.8–5.2)
SAO2 % BLDV: 79 % (ref 70–75)
SARS-COV-2 RNA RESP QL NAA+PROBE: NEGATIVE
SODIUM SERPL-SCNC: 139 MMOL/L (ref 135–145)
TROPONIN T SERPL HS-MCNC: 16 NG/L
TROPONIN T SERPL HS-MCNC: 21 NG/L
WBC # BLD AUTO: 3.1 10E3/UL (ref 4–11)

## 2023-11-04 PROCEDURE — 87149 DNA/RNA DIRECT PROBE: CPT | Performed by: EMERGENCY MEDICINE

## 2023-11-04 PROCEDURE — 250N000012 HC RX MED GY IP 250 OP 636 PS 637: Performed by: STUDENT IN AN ORGANIZED HEALTH CARE EDUCATION/TRAINING PROGRAM

## 2023-11-04 PROCEDURE — 96375 TX/PRO/DX INJ NEW DRUG ADDON: CPT

## 2023-11-04 PROCEDURE — 99285 EMERGENCY DEPT VISIT HI MDM: CPT | Mod: 25

## 2023-11-04 PROCEDURE — 36415 COLL VENOUS BLD VENIPUNCTURE: CPT | Performed by: STUDENT IN AN ORGANIZED HEALTH CARE EDUCATION/TRAINING PROGRAM

## 2023-11-04 PROCEDURE — 36415 COLL VENOUS BLD VENIPUNCTURE: CPT | Performed by: EMERGENCY MEDICINE

## 2023-11-04 PROCEDURE — 258N000003 HC RX IP 258 OP 636: Performed by: EMERGENCY MEDICINE

## 2023-11-04 PROCEDURE — 83605 ASSAY OF LACTIC ACID: CPT | Performed by: EMERGENCY MEDICINE

## 2023-11-04 PROCEDURE — 83880 ASSAY OF NATRIURETIC PEPTIDE: CPT | Performed by: EMERGENCY MEDICINE

## 2023-11-04 PROCEDURE — 71046 X-RAY EXAM CHEST 2 VIEWS: CPT

## 2023-11-04 PROCEDURE — 250N000009 HC RX 250: Performed by: STUDENT IN AN ORGANIZED HEALTH CARE EDUCATION/TRAINING PROGRAM

## 2023-11-04 PROCEDURE — 84484 ASSAY OF TROPONIN QUANT: CPT | Performed by: STUDENT IN AN ORGANIZED HEALTH CARE EDUCATION/TRAINING PROGRAM

## 2023-11-04 PROCEDURE — 250N000013 HC RX MED GY IP 250 OP 250 PS 637: Performed by: STUDENT IN AN ORGANIZED HEALTH CARE EDUCATION/TRAINING PROGRAM

## 2023-11-04 PROCEDURE — 84484 ASSAY OF TROPONIN QUANT: CPT | Performed by: EMERGENCY MEDICINE

## 2023-11-04 PROCEDURE — 83735 ASSAY OF MAGNESIUM: CPT | Performed by: EMERGENCY MEDICINE

## 2023-11-04 PROCEDURE — 999N000157 HC STATISTIC RCP TIME EA 10 MIN

## 2023-11-04 PROCEDURE — 93005 ELECTROCARDIOGRAM TRACING: CPT | Performed by: STUDENT IN AN ORGANIZED HEALTH CARE EDUCATION/TRAINING PROGRAM

## 2023-11-04 PROCEDURE — 120N000001 HC R&B MED SURG/OB

## 2023-11-04 PROCEDURE — 93005 ELECTROCARDIOGRAM TRACING: CPT

## 2023-11-04 PROCEDURE — 250N000011 HC RX IP 250 OP 636: Mod: JZ | Performed by: EMERGENCY MEDICINE

## 2023-11-04 PROCEDURE — 96365 THER/PROPH/DIAG IV INF INIT: CPT

## 2023-11-04 PROCEDURE — 94640 AIRWAY INHALATION TREATMENT: CPT

## 2023-11-04 PROCEDURE — 250N000011 HC RX IP 250 OP 636: Mod: JZ | Performed by: STUDENT IN AN ORGANIZED HEALTH CARE EDUCATION/TRAINING PROGRAM

## 2023-11-04 PROCEDURE — 93010 ELECTROCARDIOGRAM REPORT: CPT | Performed by: INTERNAL MEDICINE

## 2023-11-04 PROCEDURE — 99223 1ST HOSP IP/OBS HIGH 75: CPT | Performed by: STUDENT IN AN ORGANIZED HEALTH CARE EDUCATION/TRAINING PROGRAM

## 2023-11-04 PROCEDURE — 82805 BLOOD GASES W/O2 SATURATION: CPT | Performed by: STUDENT IN AN ORGANIZED HEALTH CARE EDUCATION/TRAINING PROGRAM

## 2023-11-04 PROCEDURE — 96361 HYDRATE IV INFUSION ADD-ON: CPT

## 2023-11-04 PROCEDURE — 84145 PROCALCITONIN (PCT): CPT | Performed by: EMERGENCY MEDICINE

## 2023-11-04 PROCEDURE — 85004 AUTOMATED DIFF WBC COUNT: CPT | Performed by: EMERGENCY MEDICINE

## 2023-11-04 PROCEDURE — 74176 CT ABD & PELVIS W/O CONTRAST: CPT

## 2023-11-04 PROCEDURE — 87635 SARS-COV-2 COVID-19 AMP PRB: CPT | Performed by: STUDENT IN AN ORGANIZED HEALTH CARE EDUCATION/TRAINING PROGRAM

## 2023-11-04 PROCEDURE — 87077 CULTURE AEROBIC IDENTIFY: CPT | Performed by: EMERGENCY MEDICINE

## 2023-11-04 PROCEDURE — 80053 COMPREHEN METABOLIC PANEL: CPT | Performed by: EMERGENCY MEDICINE

## 2023-11-04 PROCEDURE — 258N000003 HC RX IP 258 OP 636: Performed by: STUDENT IN AN ORGANIZED HEALTH CARE EDUCATION/TRAINING PROGRAM

## 2023-11-04 RX ORDER — ACETAMINOPHEN 500 MG
500 TABLET ORAL EVERY 6 HOURS PRN
Status: DISCONTINUED | OUTPATIENT
Start: 2023-11-04 | End: 2023-11-07 | Stop reason: HOSPADM

## 2023-11-04 RX ORDER — CEFEPIME HYDROCHLORIDE 1 G/1
1 INJECTION, POWDER, FOR SOLUTION INTRAMUSCULAR; INTRAVENOUS EVERY 12 HOURS
Status: DISCONTINUED | OUTPATIENT
Start: 2023-11-04 | End: 2023-11-05

## 2023-11-04 RX ORDER — ALBUTEROL SULFATE 0.83 MG/ML
2.5 SOLUTION RESPIRATORY (INHALATION)
Status: DISCONTINUED | OUTPATIENT
Start: 2023-11-04 | End: 2023-11-05

## 2023-11-04 RX ORDER — METHYLPREDNISOLONE SODIUM SUCCINATE 125 MG/2ML
125 INJECTION, POWDER, LYOPHILIZED, FOR SOLUTION INTRAMUSCULAR; INTRAVENOUS ONCE
Status: COMPLETED | OUTPATIENT
Start: 2023-11-04 | End: 2023-11-04

## 2023-11-04 RX ORDER — ONDANSETRON 2 MG/ML
4 INJECTION INTRAMUSCULAR; INTRAVENOUS ONCE
Status: COMPLETED | OUTPATIENT
Start: 2023-11-04 | End: 2023-11-04

## 2023-11-04 RX ORDER — CEFTRIAXONE 1 G/1
1 INJECTION, POWDER, FOR SOLUTION INTRAMUSCULAR; INTRAVENOUS ONCE
Status: COMPLETED | OUTPATIENT
Start: 2023-11-04 | End: 2023-11-04

## 2023-11-04 RX ORDER — ONDANSETRON 4 MG/1
4 TABLET, ORALLY DISINTEGRATING ORAL EVERY 6 HOURS PRN
Status: DISCONTINUED | OUTPATIENT
Start: 2023-11-04 | End: 2023-11-07 | Stop reason: HOSPADM

## 2023-11-04 RX ORDER — POTASSIUM CHLORIDE 1500 MG/1
20 TABLET, EXTENDED RELEASE ORAL DAILY PRN
Status: ON HOLD | COMMUNITY
End: 2023-11-07

## 2023-11-04 RX ORDER — FUROSEMIDE 20 MG
20 TABLET ORAL DAILY PRN
COMMUNITY

## 2023-11-04 RX ORDER — PANTOPRAZOLE SODIUM 40 MG/1
40 TABLET, DELAYED RELEASE ORAL
Status: DISCONTINUED | OUTPATIENT
Start: 2023-11-04 | End: 2023-11-07 | Stop reason: HOSPADM

## 2023-11-04 RX ORDER — CARVEDILOL 6.25 MG/1
12.5 TABLET ORAL 2 TIMES DAILY WITH MEALS
Status: DISCONTINUED | OUTPATIENT
Start: 2023-11-04 | End: 2023-11-07 | Stop reason: HOSPADM

## 2023-11-04 RX ORDER — NITROFURANTOIN 25; 75 MG/1; MG/1
100 CAPSULE ORAL 2 TIMES DAILY
Status: ON HOLD | COMMUNITY
End: 2023-11-07

## 2023-11-04 RX ORDER — IPRATROPIUM BROMIDE AND ALBUTEROL SULFATE 2.5; .5 MG/3ML; MG/3ML
3 SOLUTION RESPIRATORY (INHALATION)
Status: DISCONTINUED | OUTPATIENT
Start: 2023-11-04 | End: 2023-11-06

## 2023-11-04 RX ORDER — SODIUM CHLORIDE, SODIUM LACTATE, POTASSIUM CHLORIDE, CALCIUM CHLORIDE 600; 310; 30; 20 MG/100ML; MG/100ML; MG/100ML; MG/100ML
INJECTION, SOLUTION INTRAVENOUS CONTINUOUS
Status: DISCONTINUED | OUTPATIENT
Start: 2023-11-04 | End: 2023-11-05

## 2023-11-04 RX ORDER — AZATHIOPRINE 50 MG/1
150 TABLET ORAL DAILY
Status: DISCONTINUED | OUTPATIENT
Start: 2023-11-04 | End: 2023-11-07 | Stop reason: HOSPADM

## 2023-11-04 RX ORDER — FLUTICASONE PROPIONATE 50 MCG
2 SPRAY, SUSPENSION (ML) NASAL DAILY
COMMUNITY

## 2023-11-04 RX ORDER — PREDNISONE 1 MG/1
2 TABLET ORAL DAILY
Status: DISCONTINUED | OUTPATIENT
Start: 2023-11-04 | End: 2023-11-07 | Stop reason: HOSPADM

## 2023-11-04 RX ORDER — FLUTICASONE FUROATE AND VILANTEROL 200; 25 UG/1; UG/1
1 POWDER RESPIRATORY (INHALATION) DAILY
Status: DISCONTINUED | OUTPATIENT
Start: 2023-11-04 | End: 2023-11-07 | Stop reason: HOSPADM

## 2023-11-04 RX ORDER — ENOXAPARIN SODIUM 100 MG/ML
40 INJECTION SUBCUTANEOUS EVERY 24 HOURS
Status: DISCONTINUED | OUTPATIENT
Start: 2023-11-04 | End: 2023-11-04

## 2023-11-04 RX ORDER — POLYETHYLENE GLYCOL 3350 17 G/17G
17 POWDER, FOR SOLUTION ORAL DAILY PRN
Status: DISCONTINUED | OUTPATIENT
Start: 2023-11-04 | End: 2023-11-07 | Stop reason: HOSPADM

## 2023-11-04 RX ORDER — ROPINIROLE 0.25 MG/1
0.25 TABLET, FILM COATED ORAL
Status: DISCONTINUED | OUTPATIENT
Start: 2023-11-04 | End: 2023-11-07 | Stop reason: HOSPADM

## 2023-11-04 RX ORDER — ONDANSETRON 2 MG/ML
4 INJECTION INTRAMUSCULAR; INTRAVENOUS EVERY 6 HOURS PRN
Status: DISCONTINUED | OUTPATIENT
Start: 2023-11-04 | End: 2023-11-07 | Stop reason: HOSPADM

## 2023-11-04 RX ORDER — ROSUVASTATIN CALCIUM 5 MG/1
10 TABLET, COATED ORAL AT BEDTIME
Status: DISCONTINUED | OUTPATIENT
Start: 2023-11-04 | End: 2023-11-07 | Stop reason: HOSPADM

## 2023-11-04 RX ORDER — GABAPENTIN 300 MG/1
600 CAPSULE ORAL AT BEDTIME
Status: DISCONTINUED | OUTPATIENT
Start: 2023-11-04 | End: 2023-11-07 | Stop reason: HOSPADM

## 2023-11-04 RX ORDER — ACETAMINOPHEN 500 MG
500 TABLET ORAL EVERY 6 HOURS PRN
COMMUNITY

## 2023-11-04 RX ADMIN — CARVEDILOL 12.5 MG: 6.25 TABLET, FILM COATED ORAL at 18:22

## 2023-11-04 RX ADMIN — AZATHIOPRINE 150 MG: 50 TABLET ORAL at 17:16

## 2023-11-04 RX ADMIN — ONDANSETRON 4 MG: 2 INJECTION INTRAMUSCULAR; INTRAVENOUS at 16:27

## 2023-11-04 RX ADMIN — SODIUM CHLORIDE, POTASSIUM CHLORIDE, SODIUM LACTATE AND CALCIUM CHLORIDE: 600; 310; 30; 20 INJECTION, SOLUTION INTRAVENOUS at 17:20

## 2023-11-04 RX ADMIN — PANTOPRAZOLE SODIUM 40 MG: 40 TABLET, DELAYED RELEASE ORAL at 17:20

## 2023-11-04 RX ADMIN — ROPINIROLE HYDROCHLORIDE 0.25 MG: 0.25 TABLET, FILM COATED ORAL at 17:16

## 2023-11-04 RX ADMIN — ACETAMINOPHEN 500 MG: 500 TABLET ORAL at 23:32

## 2023-11-04 RX ADMIN — CEFEPIME 1 G: 1 INJECTION, POWDER, FOR SOLUTION INTRAMUSCULAR; INTRAVENOUS at 16:27

## 2023-11-04 RX ADMIN — METHYLPREDNISOLONE SODIUM SUCCINATE 125 MG: 125 INJECTION, POWDER, FOR SOLUTION INTRAMUSCULAR; INTRAVENOUS at 14:10

## 2023-11-04 RX ADMIN — RIVAROXABAN 20 MG: 20 TABLET, FILM COATED ORAL at 17:16

## 2023-11-04 RX ADMIN — Medication 1 MG: at 22:32

## 2023-11-04 RX ADMIN — ROSUVASTATIN CALCIUM 10 MG: 5 TABLET, FILM COATED ORAL at 22:46

## 2023-11-04 RX ADMIN — FLUTICASONE FUROATE AND VILANTEROL TRIFENATATE 1 PUFF: 200; 25 POWDER RESPIRATORY (INHALATION) at 17:17

## 2023-11-04 RX ADMIN — PROCHLORPERAZINE EDISYLATE 5 MG: 5 INJECTION INTRAMUSCULAR; INTRAVENOUS at 11:10

## 2023-11-04 RX ADMIN — IPRATROPIUM BROMIDE AND ALBUTEROL SULFATE 3 ML: .5; 3 SOLUTION RESPIRATORY (INHALATION) at 19:35

## 2023-11-04 RX ADMIN — ONDANSETRON 4 MG: 2 INJECTION INTRAMUSCULAR; INTRAVENOUS at 23:25

## 2023-11-04 RX ADMIN — UMECLIDINIUM 1 PUFF: 62.5 AEROSOL, POWDER ORAL at 17:17

## 2023-11-04 RX ADMIN — SODIUM CHLORIDE 1000 ML: 9 INJECTION, SOLUTION INTRAVENOUS at 11:08

## 2023-11-04 RX ADMIN — GABAPENTIN 600 MG: 300 CAPSULE ORAL at 22:32

## 2023-11-04 RX ADMIN — CEFTRIAXONE 1 G: 1 INJECTION, POWDER, FOR SOLUTION INTRAMUSCULAR; INTRAVENOUS at 13:08

## 2023-11-04 RX ADMIN — ONDANSETRON 4 MG: 2 INJECTION INTRAMUSCULAR; INTRAVENOUS at 11:10

## 2023-11-04 RX ADMIN — PREDNISONE 2 MG: 1 TABLET ORAL at 17:20

## 2023-11-04 ASSESSMENT — ACTIVITIES OF DAILY LIVING (ADL)
ADLS_ACUITY_SCORE: 35
ADLS_ACUITY_SCORE: 37
ADLS_ACUITY_SCORE: 35

## 2023-11-04 ASSESSMENT — ENCOUNTER SYMPTOMS
SHORTNESS OF BREATH: 0
MYALGIAS: 0
NAUSEA: 1
FEVER: 0
VOMITING: 1

## 2023-11-04 NOTE — PROGRESS NOTES
Met with patient  to review role of care management, progression of care and possible need for services at discharge, including OP services, home care, or skilled nursing care. Patient alert, oriented and engaged in the conversation.     Assessed, smitha at the [facility] living, her cousin Sivakumar will transport, independent w/walker. CM to follow.

## 2023-11-04 NOTE — PHARMACY-ADMISSION MEDICATION HISTORY
Pharmacist Admission Medication History    Admission medication history is complete. The information provided in this note is only as accurate as the sources available at the time of the update.    Medication reconciliation/reorder completed by provider prior to medication history? No    Information Source(s): Patient and CareEverywhere/SureScripts via in-person    Pertinent Information:   --Patient instructed by Urgent Care  to not take any potassium x 3 days starting 11/3/2023.    --Patient was prescribed Macrobid and ondansetron but has not started.  --Patient states she has not taken any medictions including inhalers since Wednesday d/t n/v    Changes made to PTA medication list:  Added: Potassium, Lasix, ondansetron, Macrobid  Deleted: none  Changed: prednisone taper instructions added     Medication Affordability:  Not including over the counter (OTC) medications, was there a time in the past 3 months when you did not take your medications as prescribed because of cost?: No    Allergies reviewed with patient and updates made in EHR: yes    Medications available for use during hospital stay: None and cannot obtain.    Medication History Completed By: Chicho Moctezuma RPH 11/4/2023 2:57 PM    PTA Med List   Medication Sig Note Last Dose    acetaminophen (TYLENOL) 500 MG tablet Take 500 mg by mouth every 6 hours as needed for mild pain  Unknown    albuterol (PROAIR HFA/PROVENTIL HFA/VENTOLIN HFA) 108 (90 Base) MCG/ACT inhaler Inhale 2 puffs into the lungs every 6 hours as needed for shortness of breath or wheezing  Unknown    alendronate (FOSAMAX) 70 MG tablet Take 1 tablet (70 mg) by mouth every 7 days 11/4/2023: monday 10/30/2023 at am    azaTHIOprine (IMURAN) 50 MG tablet Take 150 mg by mouth daily  11/1/2023 at am    calcium carbonate (OS-VICENTA) 1500 (600 Ca) MG tablet Take 600 mg by mouth 2 times daily (with meals)  11/1/2023 at am    calcium polycarbophil (FIBERCON) 625 MG tablet Take 2 tablets (1,250  "mg) by mouth 2 times daily (with meals)  11/1/2023 at am    carvedilol (COREG) 12.5 MG tablet Take 1 tablet (12.5 mg) by mouth 2 times daily (with meals) HOLD if SBP<100 and/or HR<55  11/1/2023 at am    FLUoxetine (PROZAC) 40 MG capsule Take 1 capsule (40 mg) by mouth every morning  11/1/2023 at am    fluticasone (FLONASE) 50 MCG/ACT nasal spray Spray 2 sprays into both nostrils daily  11/1/2023 at am    Fluticasone-Umeclidin-Vilanterol (TRELEGY ELLIPTA) 200-62.5-25 MCG/ACT oral inhaler Inhale 1 puff into the lungs every morning  11/1/2023 at am    furosemide (LASIX) 20 MG tablet Take 20 mg by mouth daily as needed (\"swelling\")  Unknown    gabapentin (NEURONTIN) 300 MG capsule Take 3 capsules (900 mg) by mouth At Bedtime  11/1/2023 at hs    losartan (COZAAR) 25 MG tablet Take 0.5 tablets (12.5 mg) by mouth every morning HOLD if SBP<100  11/1/2023 at am    nitroFURantoin macrocrystal-monohydrate (MACROBID) 100 MG capsule Take 100 mg by mouth 2 times daily X 5 days  NOT started at NOT started    nystatin (MYCOSTATIN) 097864 UNIT/GM external cream Apply topically 2 times daily  11/1/2023 at pm    omeprazole (PRILOSEC) 40 MG DR capsule Take 1 capsule (40 mg) by mouth daily  11/1/2023 at am    ondansetron (ZOFRAN) 4 MG tablet Take 1 tablet (4 mg) by mouth every 6 hours as needed for nausea  not used yet    polyethylene glycol (MIRALAX) 17 GM/Dose powder Take 17 g by mouth daily as needed  Unknown    potassium chloride ER (K-TAB) 20 MEQ CR tablet Take 20 mEq by mouth daily as needed (with prn furosemide) 11/3/2023 Hold x 3 days for [facility] 11/4/2023: 11/3/2023 Hold x 3 days for [facility] Unknown    predniSONE (DELTASONE) 1 MG tablet Take by mouth See Admin Instructions 10/9/2023  - 11/9/23 take 2mg daily;  11/10/2023 - 12/10/2023 take 1mg every other day alternating with 2mg every other day;  12/11/2023 - 01/11/2024 take 1mg daily;  01/12/2024 - 02/12/2024 take 1mg every other day;   Then stop  " 11/1/2023 at 2mg in morning    rivaroxaban ANTICOAGULANT (XARELTO) 20 MG TABS tablet Take 1 tablet (20 mg) by mouth daily (with dinner)  11/1/2023 at 1600    rOPINIRole (REQUIP) 0.25 MG tablet Take 0.25 mg by mouth daily (with dinner)  11/1/2023 at 1700    rosuvastatin (CRESTOR) 10 MG tablet Take 10 mg by mouth at bedtime  11/1/2023 at hs    Vitamin D3 (VITAMIN D, CHOLECALCIFEROL,) 25 mcg (1000 units) tablet Take 1 tablet by mouth daily  11/1/2023 at am

## 2023-11-04 NOTE — ED NOTES
Bed: [facility]-12  Expected date:   Expected time:   Means of arrival: Ambulance  Comments:  [facility]

## 2023-11-04 NOTE — ED NOTES
Patient ambulated to bathroom. Once back in bed and repositioned patient's oxygen saturation was 81% RA. This writer applied one liter of supplemental oxygen via nasal cannula and patient recovered within 3 minutes. Provider made aware.

## 2023-11-04 NOTE — ED TRIAGE NOTES
Patient presents to the ED via EMS from an independent living in [place] for nausea/vomiting for the last 4 days. Was seen at [facility] urgent care yesterday and diagnosed with a UTI. Has not take any of the medications yet because she can't keep anything down. Has a colostomy with no output x 2 days that she has noticed. Denies pain. History of COPD, was 88-90% on room air. Placed on 1L nasal cannula.

## 2023-11-04 NOTE — H&P
[facility]    History and Physical - Hospitalist Service       Date of Admission:  11/4/2023    Assessment & Plan      Zakiya Christian is a 78 year old female admitted on 11/4/2023.  She has a past medical history significant for COPD, hypertension, DVT, PMR, diverticulitis status post colostomy bag, recurrent urinary tract infection who presented to the hospital with nausea and vomiting.    Recurrent UTI  Nausea/vomiting  -Recurrent UTI over the last few weeks.  -Urine culture on 10/23 grew Enterobacter with multidrug-resistant.  It was sensitive to cefepime.    Plan  -Repeat urinalysis and culture  -Start cefepime  -EKG    Acute kidney injury  -Baseline creatinine around 0.8  -Creatinine on presentation 1.1  -Most likely prerenal due to decreased p.o. intake    Plan  -Start LR at 50 cc/h  -Continue to monitor    COPD  -Per patient chronic wheezing  -Chronic dry cough  -No symptoms suggestive of COPD exacerbation  -Wheezing on physical examination, per patient chronic    Plan  -Check  VBG  -Target SPO2 88 to 92%.  -Continue home Trelegy Ellipta or equivalent  -Scheduled DuoNebs  -Albuterol as needed  -Patient would like to hold off high-dose steroid    Hypertension  -At home on Coreg 12.5 mg twice daily, losartan 12.5 mg daily    Plan  -Resume home Coreg  -Hold losartan given SHRUTHI    History of DVT  -At home on Xarelto 20 mg daily    Plan  -Continue home Xarelto    History of PMR  -Per patient has been slowly weaned off prednisone over the past few weeks  -Currently on 1 to 2 mg/day.  She is also on azathioprine    Plan  -Continue prednisone 2 mg daily (no need for stress steroid, patient would like to avoid high-dose steroid given side effects)  -Continue azathioprine  -Resume gabapentin on a lower dose of 600 mg nightly instead of 900 mg given SHRUTHI                  Diet: Combination Diet Regular Diet AdultRegular diet  DVT Prophylaxis: DOAC  Morrell Catheter: Not present  Lines: PRESENT        "      Cardiac Monitoring: None  Code Status: Full CodeFull code    Clinically Significant Risk Factors Present on Admission               # Drug Induced Coagulation Defect: home medication list includes an anticoagulant medication    # Hypertension: Noted on problem list   # Acute Respiratory Failure: Documented O2 saturation < 91%.  Continue supplemental oxygen as needed     # Obesity: Estimated body mass index is 33.66 kg/m  as calculated from the following:    Height as of this encounter: 1.6 m (5' 3\").    Weight as of this encounter: 86.2 kg (190 lb).       # Asthma: noted on problem list        Disposition Plan admit to inpatient     Expected Discharge Date: 11/06/2023                  PAGE MENENDEZ MD  Hospitalist Service  [facility]  Securely message with [facility] (more info)  Text page via [employer] Paging/Directory     ______________________________________________________________________    Chief Complaint   Nausea and vomiting    History is obtained from the patient    History of Present Illness   Zakiya Christian is a 78 year old female admitted on 11/4/2023.  She has a past medical history significant for COPD, hypertension, DVT, PMR, diverticulitis status post colostomy bag, recurrent urinary tract infection who presented to the hospital with nausea and vomiting.    Patient stated that she has been dealing with recurrent UTI for the last few weeks.  She was recently diagnosed with UTI on 10/23/2023.  Urine culture grew Enterobacter, most likely drug-resistant.  It appears that she was recently started on nitrofurantoin.  However, she stated that over the last week she has been experiencing persistent nausea, vomiting, feeling unwell.  She denies any fever, chills, chest pain or shortness of breath.  She continues to have dry cough.  She denies any abdominal pain or burning with urination.  She eventually was evaluated at an urgent care clinic on 11/3 and was prescribed a new " antibiotic however, she did not have the chance to  the antibiotic and she presented to the hospital due persistent nausea, vomiting and decreased p.o. intake.     Vitals on presentation: Blood pressure around 150/60, heart rate around 80, SPO2 around 92% on room air.    Labs significant for mildly elevated procalcitonin, creatinine 1.1, no leukocytosis.  CT abdomen pelvis without acute abnormalities.  Chest x-ray without infiltrate or signs of pneumonia.        Past Medical History    Past Medical History:   Diagnosis Date    Diabetes (H)     Hypertension     Obese     PMR (polymyalgia rheumatica) (H24)        Past Surgical History   Past Surgical History:   Procedure Laterality Date    IR ABSCESS TUBE CHANGE  4/22/2022    PICC SINGLE LUMEN PLACEMENT  1/26/2023            Prior to Admission Medications   Prior to Admission Medications   Prescriptions Last Dose Informant Patient Reported? Taking?   FLUoxetine (PROZAC) 40 MG capsule 11/1/2023 at am  No Yes   Sig: Take 1 capsule (40 mg) by mouth every morning   Fluticasone-Umeclidin-Vilanterol (TRELEGY ELLIPTA) 200-62.5-25 MCG/ACT oral inhaler 11/1/2023 at am  No Yes   Sig: Inhale 1 puff into the lungs every morning   Vitamin D3 (VITAMIN D, CHOLECALCIFEROL,) 25 mcg (1000 units) tablet 11/1/2023 at am  Yes Yes   Sig: Take 1 tablet by mouth daily   acetaminophen (TYLENOL) 500 MG tablet Unknown  Yes Yes   Sig: Take 500 mg by mouth every 6 hours as needed for mild pain   albuterol (PROAIR HFA/PROVENTIL HFA/VENTOLIN HFA) 108 (90 Base) MCG/ACT inhaler Unknown  No Yes   Sig: Inhale 2 puffs into the lungs every 6 hours as needed for shortness of breath or wheezing   alendronate (FOSAMAX) 70 MG tablet 10/30/2023 at am  No Yes   Sig: Take 1 tablet (70 mg) by mouth every 7 days   azaTHIOprine (IMURAN) 50 MG tablet 11/1/2023 at am  Yes Yes   Sig: Take 150 mg by mouth daily   calcium carbonate (OS-VICENTA) 1500 (600 Ca) MG tablet 11/1/2023 at am  Yes Yes   Sig: Take 600 mg by  "mouth 2 times daily (with meals)   calcium polycarbophil (FIBERCON) 625 MG tablet 11/1/2023 at am  No Yes   Sig: Take 2 tablets (1,250 mg) by mouth 2 times daily (with meals)   carvedilol (COREG) 12.5 MG tablet 11/1/2023 at am  No Yes   Sig: Take 1 tablet (12.5 mg) by mouth 2 times daily (with meals) HOLD if SBP<100 and/or HR<55   fluticasone (FLONASE) 50 MCG/ACT nasal spray 11/1/2023 at am  Yes Yes   Sig: Spray 2 sprays into both nostrils daily   furosemide (LASIX) 20 MG tablet Unknown  Yes Yes   Sig: Take 20 mg by mouth daily as needed (\"swelling\")   gabapentin (NEURONTIN) 300 MG capsule 11/1/2023 at hs  No Yes   Sig: Take 3 capsules (900 mg) by mouth At Bedtime   losartan (COZAAR) 25 MG tablet 11/1/2023 at am  No Yes   Sig: Take 0.5 tablets (12.5 mg) by mouth every morning HOLD if SBP<100   nitroFURantoin macrocrystal-monohydrate (MACROBID) 100 MG capsule NOT started at NOT started  Yes Yes   Sig: Take 100 mg by mouth 2 times daily X 5 days   nystatin (MYCOSTATIN) 491690 UNIT/GM external cream 11/1/2023 at pm  No Yes   Sig: Apply topically 2 times daily   omeprazole (PRILOSEC) 40 MG DR capsule 11/1/2023 at am  No Yes   Sig: Take 1 capsule (40 mg) by mouth daily   ondansetron (ZOFRAN) 4 MG tablet not used yet  No Yes   Sig: Take 1 tablet (4 mg) by mouth every 6 hours as needed for nausea   polyethylene glycol (MIRALAX) 17 GM/Dose powder Unknown  Yes Yes   Sig: Take 17 g by mouth daily as needed   potassium chloride ER (K-TAB) 20 MEQ CR tablet Unknown  Yes Yes   Sig: Take 20 mEq by mouth daily as needed (with prn furosemide) 11/3/2023 Hold x 3 days for [facility]   predniSONE (DELTASONE) 1 MG tablet 11/1/2023 at 2mg in morning  Yes Yes   Sig: Take by mouth See Admin Instructions 10/9/2023  - 11/9/23 take 2mg daily;  11/10/2023 - 12/10/2023 take 1mg every other day alternating with 2mg every other day;  12/11/2023 - 01/11/2024 take 1mg daily;  01/12/2024 - 02/12/2024 take 1mg every other day;   Then stop "   rOPINIRole (REQUIP) 0.25 MG tablet 11/1/2023 at 1700  Yes Yes   Sig: Take 0.25 mg by mouth daily (with dinner)   rivaroxaban ANTICOAGULANT (XARELTO) 20 MG TABS tablet 11/1/2023 at 1600  No Yes   Sig: Take 1 tablet (20 mg) by mouth daily (with dinner)   rosuvastatin (CRESTOR) 10 MG tablet 11/1/2023 at hs  Yes Yes   Sig: Take 10 mg by mouth at bedtime      Facility-Administered Medications: None      ]    Physical Exam   Vital Signs: Temp: 98.3  F (36.8  C) Temp src: Oral BP: (!) 168/77 Pulse: 83   Resp: 19 SpO2: 91 % O2 Device: Nasal cannula Oxygen Delivery: 1 LPM  Weight: 190 lbs 0 oz    Physical Exam  Constitutional:       General: She is not in acute distress.     Appearance: She is not toxic-appearing.   Cardiovascular:      Rate and Rhythm: Normal rate.   Pulmonary:      Effort: Pulmonary effort is normal. No respiratory distress.      Breath sounds: Wheezing present.      Comments: Mild bibasilar wheezing  Abdominal:      General: Abdomen is flat. There is no distension.      Palpations: Abdomen is soft.      Tenderness: There is no abdominal tenderness. There is no guarding.      Comments: Colostomy bag in place.   Musculoskeletal:      Right lower leg: No edema.      Left lower leg: No edema.   Skin:     General: Skin is warm and dry.   Neurological:      Mental Status: She is alert.   Psychiatric:         Mood and Affect: Mood normal.          Medical Decision Making       80 MINUTES SPENT BY ME on the date of service doing chart review, history, exam, documentation & further activities per the note.      Data     I have personally reviewed the following data over the past 24 hrs:    3.1 (L)  \   10.0 (L)   / 245     139 102 20.4 /  109 (H)   4.9 26 1.10 (H); 1.10 (H) \     ALT: 18 AST: 34 AP: 49 TBILI: 0.4   ALB: 3.7 TOT PROTEIN: 7.1 LIPASE: N/A     Trop: 21 (H) BNP: 203     Procal: 0.13 (H) CRP: N/A Lactic Acid: 1.0         Imaging results reviewed over the past 24 hrs:   Recent Results (from the past 24  hour(s))   CT Abdomen Pelvis w/o Contrast    Narrative    EXAM: CT ABDOMEN PELVIS W/O CONTRAST  LOCATION: [facility]  DATE: 11/4/2023    INDICATION: Intractable vomiting, acute kidney injury, dehydration, UTI.  COMPARISON: 09/22/2023.  TECHNIQUE: CT scan of the abdomen and pelvis was performed without IV contrast. Multiplanar reformats were obtained. Dose reduction techniques were used.  CONTRAST: None.    FINDINGS:   LOWER CHEST: Coronary calcifications. Small hiatus hernia.    HEPATOBILIARY: Mild hepatic steatosis. Gallbladder seen.    PANCREAS: Normal.    SPLEEN: Normal.    ADRENAL GLANDS: Normal.    KIDNEYS/BLADDER: Renal cysts. Stable exophytic fat attenuating lesion along the left renal lower pole. No hydronephrosis.    BOWEL: Left lower quadrant ostomy; no significant change of parastomal hernia. No bowel obstruction or acute inflammation.    LYMPH NODES: No adenopathy.    VASCULATURE: Nonaneurysmal aorta with moderate atherosclerosis.    PELVIC ORGANS: Hysterectomy.    MUSCULOSKELETAL: Bony demineralization.     OTHER: No free fluid or air.       Impression    IMPRESSION:   1.  No acute findings to explain symptoms.       Chest XR,  PA & LAT    Narrative    EXAM: XR CHEST 2 VIEWS  LOCATION: [facility]  DATE: 11/4/2023    INDICATION: sob  COMPARISON: CTA chest 01/20/2023 and chest radiograph 06/29/2022.      Impression    IMPRESSION: Heart size and pulmonary vascularity are normal. No focal consolidation, pleural effusion, or pneumothorax. Old left rib fracture deformities.

## 2023-11-04 NOTE — ED PROVIDER NOTES
EMERGENCY DEPARTMENT ENCOUNTER      NAME: Zakiya Christian  AGE: 78 year old female  YOB: 1945  MRN: [id number]  EVALUATION DATE & TIME: 11/4/2023 10:36 AM    PCP: Ami Noriega    ED PROVIDER: Lai Ocampo MD      Chief Complaint   Patient presents with    Nausea    decreased colostomy  output     FINAL IMPRESSION:  1. COPD exacerbation (H)    2. Hypoxia    3. Urinary tract infection without hematuria, site unspecified    4. Nausea and vomiting, unspecified vomiting type    5. Mild dehydration        ED COURSE & MEDICAL DECISION MAKING:    Pertinent Labs & Imaging studies reviewed. (See chart for details)  78 year old female presents to the Emergency Department for evaluation of nausea and vomiting.  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  She presents the emergency department via EMS for nausea vomiting and inability to take oral intake.  Patient had similar symptoms in September ultimately was admitted to the hospital.  She reports after that initial hospital stay she was doing well until several days ago her symptoms seem to return.  She was seen at walk-in clinic yesterday diagnosed with a bladder infection based on urinalysis and subsequently started on Macrobid.  She has not been taking that medication since discharge from the walk-in clinic.  On examination patient was noted to have normal vital signs.  Her colostomy bag was empty but the colostomy itself seem to be appropriate.  No significant discomfort to palpation of her abdomen.  No distention.  She was noted to be mildly hypoxic at arrival has related a bit of a cough.  No fever.  Clinically patient appearing mildly dehydrated.  Reviewed laboratory testing from yesterday which demonstrated mild acute kidney injury and hyperkalemia.  Were going to repeat some of those labs and also broaden her work-up here in the emergency department.  Urinalysis was nitrate positive  yesterday.  Certainly would support a diagnosis of acute urinary tract infection with the patient reports is an acute on chronic issue for her.  If there is any systemic involvement the Macrobid would be insufficient.  I recommended to the patient that we repeat her laboratory testing initiate fluids obtain some imaging of both her chest and abdomen for additional assessment.  Concerns for aspiration given her repetitive vomiting and now hypoxia.  Overall plan to reassess after ED diagnostics and rehydration.    1:09 PM  After fluids antiemetics the patient is appearing improved.  No abnormal findings on her x-ray and she is not requiring any oxygenation supplementation at this point.  CT scan of the abdomen pelvis overall reassuring.  Complicating factor for her acute urinary tract infection diagnosed yesterday as ability to take oral intake.  At this time were going to administer dose of intravenous antibiotics and then going to p.o. trial to see if the patient's appropriate for discharge and continued outpatient management.  Updated the patient and her cousin on these test results and plan of care.    1:44 PM  At rest in the bed patient requiring minimal oxygenation supplementation we are actually able to turn it off and the patient was maintaining O2 saturations above 92% on room air, however, ambulating the patient she does develop acute dyspnea and drops her O2 saturations down to 80%.  This recovers with rest and in bed with supplemental oxygenation currently on 1 L nasal cannula.  I think this complicates patient's plan initially for discharge.  She is still nauseous but improved to the point where she has tolerated some oral intake.  I suspect that her hypoxia is acute on chronic related to COPD perhaps made worse by her repetitive vomiting and no component of aspiration although no focal infiltrates identified on her imaging.  Patient is chronically anticoagulated.  Overall plan of care is for admission to  the hospital for acute hypoxia, UTI, nausea vomiting dehydration.  I am going to administer dose of steroids for treatment of presumed COPD exacerbation acute on chronic.  Admitting services paged.    2:02 PM  Discussed with hospitalist.       10:48 AM I met with the patient to gather history and to perform my initial exam. I discussed the plan for care while in the Emergency Department. Appropriate PPE was worn during patient encounters.  1:43 PM I updated patient on results.    Medical Decision Making    History:  Supplemental history from: EMS  External Record(s) reviewed: Outpatient Record: 9/22/2023 & 11/3/2023 (See HPI) and Prior Labs: UA 9/22/2023 (See HPI)    Work Up:  Chart documentation includes differential considered and any EKGs or imaging independently interpreted by provider, where specified.  In additional to work up documented, I considered the following work up: Documented in chart, if applicable.    External consultation:  Discussion of management with another provider: Documented in chart, if applicable    Complicating factors:  Care impacted by chronic illness: Anticoagulated State, Chronic Kidney Disease, Chronic Lung Disease, Diabetes, Hyperlipidemia, Hypertension, and Smoking / Nicotine Use  Care affected by social determinants of health: N/A    Disposition considerations: Admit.        At the conclusion of the encounter I discussed the results of all of the tests and the disposition. The questions were answered. The patient or family acknowledged understanding and was agreeable with the care plan.     MEDICATIONS GIVEN IN THE EMERGENCY:  Medications   methylPREDNISolone sodium succinate (solu-MEDROL) injection 125 mg (has no administration in time range)   sodium chloride 0.9% BOLUS 1,000 mL (0 mLs Intravenous Stopped 11/4/23 1315)   ondansetron (ZOFRAN) injection 4 mg (4 mg Intravenous $Given 11/4/23 1110)   prochlorperazine (COMPAZINE) injection 5 mg (5 mg Intravenous $Given 11/4/23 1110)    cefTRIAXone (ROCEPHIN) 1 g vial to attach to  mL bag for ADULTS or NS 50 mL bag for PEDS (1 g Intravenous $New Bag 11/4/23 8614)       NEW PRESCRIPTIONS STARTED AT TODAY'S ER VISIT  New Prescriptions    No medications on file          =================================================================    HPI    Patient information was obtained from: Patient & EMS    Use of : N/A        Zakiya Christian is a 78 year old female with a pertinent history of colostomy, obesity, COPD, asthma, CKD stage II, DM type II, former smoker, hyperlipidemia, and hypertension who presents to this ED via EMS for evaluation of nausea and vomiting.    Per chart review, patient was seen on 11/3 (prior to arrival) at [facility] for nausea and vomiting. UA abnormal with WBC and bacteria. CBC showed 3.9 WBC, 3.53 RBC, hemoglobin 10.4, 31 MCHC, and lymphocytes 0.7. Potassium 5.3, glucose 108, calcium 1.14, and creatinine 1.4. Estimated GFR 38.    Per chart review, patient was seen on 9/22 (~1 month ago) at [place] for nausea and vomiting. Labs showed non fasting hyperglycemia, creatinine elevated at 1.01, leukopenia with 3.9, and hemoglobin 10/7 with platelet count WNL. UA with 3-5 RBC, more than 100 WBC, bacteria, and abnormal specific gravity, protein, and leukocyte esterase. CT without acute findings. Given IVF, Zofran, and Reglan. Discharged in stable condition.    Per EMS, patient comes from an independent living facility with nausea and vomiting for ~4 days. Diagnosed with UTI ~1 day ago but has not taken any medications that she was prescribed as she has been unable to keep anything down. Colostomy with no output for ~2 days. Patient was 88-90% on room air. Placed on 1 L NC.    Per patient, she has had nausea ans vomiting for ~1 week that is similar to her episode in September (~2 months ago). Endorses nonproductive cough. She notes that she has not taken any kind of medication and can not keep anything down. Denies pain,  fever, chest pain, and shortness of breath.      REVIEW OF SYSTEMS   Review of Systems   Constitutional:  Negative for fever.   Respiratory:  Negative for shortness of breath.    Cardiovascular:  Negative for chest pain.   Gastrointestinal:  Positive for nausea and vomiting.   Musculoskeletal:  Negative for myalgias.        PAST MEDICAL HISTORY:  Past Medical History:   Diagnosis Date    Diabetes (H)     Hypertension     Obese     PMR (polymyalgia rheumatica) (H24)        PAST SURGICAL HISTORY:  Past Surgical History:   Procedure Laterality Date    IR ABSCESS TUBE CHANGE  4/22/2022    PICC SINGLE LUMEN PLACEMENT  1/26/2023                CURRENT MEDICATIONS:    acetaminophen (TYLENOL) 500 MG tablet  albuterol (PROAIR HFA/PROVENTIL HFA/VENTOLIN HFA) 108 (90 Base) MCG/ACT inhaler  alendronate (FOSAMAX) 70 MG tablet  azaTHIOprine (IMURAN) 50 MG tablet  calcium carbonate (OS-VICENTA) 1500 (600 Ca) MG tablet  calcium polycarbophil (FIBERCON) 625 MG tablet  carvedilol (COREG) 12.5 MG tablet  FLUoxetine (PROZAC) 40 MG capsule  Fluticasone-Umeclidin-Vilanterol (TRELEGY ELLIPTA) 200-62.5-25 MCG/ACT oral inhaler  gabapentin (NEURONTIN) 300 MG capsule  losartan (COZAAR) 25 MG tablet  nystatin (MYCOSTATIN) 306605 UNIT/GM external cream  omeprazole (PRILOSEC) 40 MG DR capsule  ondansetron (ZOFRAN) 4 MG tablet  polyethylene glycol (MIRALAX) 17 GM/Dose powder  predniSONE (DELTASONE) 1 MG tablet  rivaroxaban ANTICOAGULANT (XARELTO) 20 MG TABS tablet  rOPINIRole (REQUIP) 0.25 MG tablet  rosuvastatin (CRESTOR) 10 MG tablet  Vitamin D3 (VITAMIN D, CHOLECALCIFEROL,) 25 mcg (1000 units) tablet        ALLERGIES:  Allergies   Allergen Reactions    Simvastatin Hives       FAMILY HISTORY:  History reviewed. No pertinent family history.    SOCIAL HISTORY:   Social History     Socioeconomic History    Marital status: Single   Tobacco Use    Smoking status: Former     Types: Cigarettes    Smokeless tobacco: Never       VITALS:  BP (!) 210/80    "Pulse 88   Temp 98.2  F (36.8  C) (Oral)   Resp 20   Ht 1.6 m (5' 3\")   Wt 86.2 kg (190 lb)   SpO2 99%   BMI 33.66 kg/m      PHYSICAL EXAM    PHYSICAL EXAM    Constitutional: Chronically ill-appearing elderly female, appears fatigued.  HENT: Normocephalic, Atraumatic, Bilateral external ears normal, Oropharynx normal, mucous membranes dry.  Nose normal. Neck-  Normal range of motion, No tenderness, Supple, No stridor.   Eyes: PERRL, EOMI, Conjunctiva normal, No discharge.   Respiratory: Normal breath sounds, No respiratory distress, No wheezing, Speaks full sentences easily. No cough.   Cardiovascular: Normal heart rate, Regular rhythm, No murmurs Chest wall nontender.    GI: There is a colostomy bag noted to left abdomen.  There is no significant output in the colostomy bag.  The colostomy site itself appears appropriate.  : No cva tenderness    Musculoskeletal: 2+ DP pulses. No edema. No cyanosis. Good range of motion in all major joints. No tenderness to palpation. No tenderness of the CTLS spine.   Integument: Warm, Dry, No erythema, No rash. No petechiae.   Neurologic: Alert & oriented x 3, Normal motor function, Normal sensory function, No focal deficits noted.   Psychiatric: Affect normal, Judgment normal, Mood normal. Cooperative.     LAB:  All pertinent labs reviewed and interpreted.  Results for orders placed or performed during the hospital encounter of 11/04/23   Chest XR,  PA & LAT    Impression    IMPRESSION: Heart size and pulmonary vascularity are normal. No focal consolidation, pleural effusion, or pneumothorax. Old left rib fracture deformities.   CT Abdomen Pelvis w/o Contrast    Impression    IMPRESSION:   1.  No acute findings to explain symptoms.       Comprehensive metabolic panel   Result Value Ref Range    Sodium 139 135 - 145 mmol/L    Potassium 4.9 3.4 - 5.3 mmol/L    Carbon Dioxide (CO2) 26 22 - 29 mmol/L    Anion Gap 11 7 - 15 mmol/L    Urea Nitrogen 20.4 8.0 - 23.0 mg/dL    " Creatinine 1.10 (H) 0.51 - 0.95 mg/dL    GFR Estimate 51 (L) >60 mL/min/1.73m2    Calcium 9.6 8.8 - 10.2 mg/dL    Chloride 102 98 - 107 mmol/L    Glucose 109 (H) 70 - 99 mg/dL    Alkaline Phosphatase 49 35 - 104 U/L    AST 34 0 - 45 U/L    ALT 18 0 - 50 U/L    Protein Total 7.1 6.4 - 8.3 g/dL    Albumin 3.7 3.5 - 5.2 g/dL    Bilirubin Total 0.4 <=1.2 mg/dL   Lactic acid whole blood   Result Value Ref Range    Lactic Acid 1.0 0.7 - 2.0 mmol/L   Result Value Ref Range    Procalcitonin 0.13 (H) <0.05 ng/mL   Result Value Ref Range    Troponin T, High Sensitivity 21 (H) <=14 ng/L   Result Value Ref Range    Magnesium 2.1 1.7 - 2.3 mg/dL   N terminal pro BNP outpatient   Result Value Ref Range    N Terminal Pro BNP Outpatient 203 0 - 1,800 pg/mL   CBC with platelets and differential   Result Value Ref Range    WBC Count 3.1 (L) 4.0 - 11.0 10e3/uL    RBC Count 3.41 (L) 3.80 - 5.20 10e6/uL    Hemoglobin 10.0 (L) 11.7 - 15.7 g/dL    Hematocrit 32.5 (L) 35.0 - 47.0 %    MCV 95 78 - 100 fL    MCH 29.3 26.5 - 33.0 pg    MCHC 30.8 (L) 31.5 - 36.5 g/dL    RDW 15.7 (H) 10.0 - 15.0 %    Platelet Count 245 150 - 450 10e3/uL    % Neutrophils 64 %    % Lymphocytes 23 %    % Monocytes 9 %    % Eosinophils 3 %    % Basophils 1 %    % Immature Granulocytes 0 %    NRBCs per 100 WBC 0 <1 /100    Absolute Neutrophils 2.0 1.6 - 8.3 10e3/uL    Absolute Lymphocytes 0.7 (L) 0.8 - 5.3 10e3/uL    Absolute Monocytes 0.3 0.0 - 1.3 10e3/uL    Absolute Eosinophils 0.1 0.0 - 0.7 10e3/uL    Absolute Basophils 0.0 0.0 - 0.2 10e3/uL    Absolute Immature Granulocytes 0.0 <=0.4 10e3/uL    Absolute NRBCs 0.0 10e3/uL       RADIOLOGY:  Reviewed all pertinent imaging. Please see official radiology report.  Chest XR,  PA & LAT   Final Result   IMPRESSION: Heart size and pulmonary vascularity are normal. No focal consolidation, pleural effusion, or pneumothorax. Old left rib fracture deformities.      CT Abdomen Pelvis w/o Contrast   Final Result   IMPRESSION:     1.  No acute findings to explain symptoms.              I, Julee Kristen, am serving as a scribe to document services personally performed by Lai Ocampo MD based on my observation and the provider's statements to me. I, Lai Ocampo MD, attest that Julee Peterson is acting in a scribe capacity, has observed my performance of the services and has documented them in accordance with my direction.    Lai Ocampo MD  [facility] EMERGENCY ROOM  [address] [id number] [phone]       Lai Ocampo MD  11/04/23 3349

## 2023-11-05 LAB
ALBUMIN UR-MCNC: 10 MG/DL
ANION GAP SERPL CALCULATED.3IONS-SCNC: 8 MMOL/L (ref 7–15)
APPEARANCE UR: CLEAR
BACTERIA #/AREA URNS HPF: ABNORMAL /HPF
BASOPHILS # BLD AUTO: 0 10E3/UL (ref 0–0.2)
BASOPHILS NFR BLD AUTO: 0 %
BILIRUB UR QL STRIP: NEGATIVE
BUN SERPL-MCNC: 17.6 MG/DL (ref 8–23)
CALCIUM SERPL-MCNC: 9 MG/DL (ref 8.8–10.2)
CHLORIDE SERPL-SCNC: 104 MMOL/L (ref 98–107)
COLOR UR AUTO: YELLOW
CREAT SERPL-MCNC: 1 MG/DL (ref 0.51–0.95)
DEPRECATED HCO3 PLAS-SCNC: 26 MMOL/L (ref 22–29)
EGFRCR SERPLBLD CKD-EPI 2021: 57 ML/MIN/1.73M2
ENTEROCOCCUS FAECALIS: NOT DETECTED
ENTEROCOCCUS FAECIUM: NOT DETECTED
EOSINOPHIL # BLD AUTO: 0 10E3/UL (ref 0–0.7)
EOSINOPHIL NFR BLD AUTO: 0 %
ERYTHROCYTE [DISTWIDTH] IN BLOOD BY AUTOMATED COUNT: 15.6 % (ref 10–15)
GLUCOSE SERPL-MCNC: 114 MG/DL (ref 70–99)
GLUCOSE UR STRIP-MCNC: NEGATIVE MG/DL
HCT VFR BLD AUTO: 29.4 % (ref 35–47)
HGB BLD-MCNC: 9.1 G/DL (ref 11.7–15.7)
HGB UR QL STRIP: NEGATIVE
IMM GRANULOCYTES # BLD: 0 10E3/UL
IMM GRANULOCYTES NFR BLD: 0 %
KETONES UR STRIP-MCNC: ABNORMAL MG/DL
LACTATE SERPL-SCNC: 0.6 MMOL/L (ref 0.7–2)
LEUKOCYTE ESTERASE UR QL STRIP: ABNORMAL
LISTERIA SPECIES (DETECTED/NOT DETECTED): NOT DETECTED
LYMPHOCYTES # BLD AUTO: 0.3 10E3/UL (ref 0.8–5.3)
LYMPHOCYTES NFR BLD AUTO: 10 %
MAGNESIUM SERPL-MCNC: 2.1 MG/DL (ref 1.7–2.3)
MCH RBC QN AUTO: 29.3 PG (ref 26.5–33)
MCHC RBC AUTO-ENTMCNC: 31 G/DL (ref 31.5–36.5)
MCV RBC AUTO: 95 FL (ref 78–100)
MONOCYTES # BLD AUTO: 0.2 10E3/UL (ref 0–1.3)
MONOCYTES NFR BLD AUTO: 6 %
MUCOUS THREADS #/AREA URNS LPF: PRESENT /LPF
NEUTROPHILS # BLD AUTO: 2.9 10E3/UL (ref 1.6–8.3)
NEUTROPHILS NFR BLD AUTO: 84 %
NITRATE UR QL: NEGATIVE
NRBC # BLD AUTO: 0 10E3/UL
NRBC BLD AUTO-RTO: 0 /100
PH UR STRIP: 6 [PH] (ref 5–7)
PLATELET # BLD AUTO: 205 10E3/UL (ref 150–450)
POTASSIUM SERPL-SCNC: 4.8 MMOL/L (ref 3.4–5.3)
RBC # BLD AUTO: 3.11 10E6/UL (ref 3.8–5.2)
RBC URINE: 1 /HPF
SODIUM SERPL-SCNC: 138 MMOL/L (ref 135–145)
SP GR UR STRIP: 1.02 (ref 1–1.03)
SQUAMOUS EPITHELIAL: 1 /HPF
STAPHYLOCOCCUS AUREUS: NOT DETECTED
STAPHYLOCOCCUS EPIDERMIDIS: DETECTED
STAPHYLOCOCCUS LUGDUNENSIS: NOT DETECTED
STREPTOCOCCUS AGALACTIAE: NOT DETECTED
STREPTOCOCCUS ANGINOSUS GROUP: NOT DETECTED
STREPTOCOCCUS PNEUMONIAE: NOT DETECTED
STREPTOCOCCUS PYOGENES: NOT DETECTED
STREPTOCOCCUS SPECIES: NOT DETECTED
UROBILINOGEN UR STRIP-MCNC: <2 MG/DL
WBC # BLD AUTO: 3.4 10E3/UL (ref 4–11)
WBC URINE: 19 /HPF

## 2023-11-05 PROCEDURE — 99233 SBSQ HOSP IP/OBS HIGH 50: CPT | Performed by: STUDENT IN AN ORGANIZED HEALTH CARE EDUCATION/TRAINING PROGRAM

## 2023-11-05 PROCEDURE — 94640 AIRWAY INHALATION TREATMENT: CPT | Mod: 76

## 2023-11-05 PROCEDURE — 250N000011 HC RX IP 250 OP 636: Mod: JZ | Performed by: STUDENT IN AN ORGANIZED HEALTH CARE EDUCATION/TRAINING PROGRAM

## 2023-11-05 PROCEDURE — 120N000001 HC R&B MED SURG/OB

## 2023-11-05 PROCEDURE — 999N000157 HC STATISTIC RCP TIME EA 10 MIN

## 2023-11-05 PROCEDURE — 94640 AIRWAY INHALATION TREATMENT: CPT

## 2023-11-05 PROCEDURE — 85025 COMPLETE CBC W/AUTO DIFF WBC: CPT | Performed by: STUDENT IN AN ORGANIZED HEALTH CARE EDUCATION/TRAINING PROGRAM

## 2023-11-05 PROCEDURE — 250N000012 HC RX MED GY IP 250 OP 636 PS 637: Performed by: STUDENT IN AN ORGANIZED HEALTH CARE EDUCATION/TRAINING PROGRAM

## 2023-11-05 PROCEDURE — 250N000013 HC RX MED GY IP 250 OP 250 PS 637: Performed by: STUDENT IN AN ORGANIZED HEALTH CARE EDUCATION/TRAINING PROGRAM

## 2023-11-05 PROCEDURE — 36415 COLL VENOUS BLD VENIPUNCTURE: CPT | Performed by: STUDENT IN AN ORGANIZED HEALTH CARE EDUCATION/TRAINING PROGRAM

## 2023-11-05 PROCEDURE — 80048 BASIC METABOLIC PNL TOTAL CA: CPT | Performed by: STUDENT IN AN ORGANIZED HEALTH CARE EDUCATION/TRAINING PROGRAM

## 2023-11-05 PROCEDURE — 87086 URINE CULTURE/COLONY COUNT: CPT | Performed by: STUDENT IN AN ORGANIZED HEALTH CARE EDUCATION/TRAINING PROGRAM

## 2023-11-05 PROCEDURE — 250N000009 HC RX 250: Performed by: STUDENT IN AN ORGANIZED HEALTH CARE EDUCATION/TRAINING PROGRAM

## 2023-11-05 PROCEDURE — 250N000013 HC RX MED GY IP 250 OP 250 PS 637: Performed by: HOSPITALIST

## 2023-11-05 PROCEDURE — 83605 ASSAY OF LACTIC ACID: CPT | Performed by: STUDENT IN AN ORGANIZED HEALTH CARE EDUCATION/TRAINING PROGRAM

## 2023-11-05 PROCEDURE — 258N000003 HC RX IP 258 OP 636: Performed by: STUDENT IN AN ORGANIZED HEALTH CARE EDUCATION/TRAINING PROGRAM

## 2023-11-05 PROCEDURE — 81001 URINALYSIS AUTO W/SCOPE: CPT | Performed by: STUDENT IN AN ORGANIZED HEALTH CARE EDUCATION/TRAINING PROGRAM

## 2023-11-05 PROCEDURE — 83735 ASSAY OF MAGNESIUM: CPT | Performed by: STUDENT IN AN ORGANIZED HEALTH CARE EDUCATION/TRAINING PROGRAM

## 2023-11-05 RX ORDER — ALBUTEROL SULFATE 90 UG/1
2 AEROSOL, METERED RESPIRATORY (INHALATION) EVERY 6 HOURS PRN
Status: DISCONTINUED | OUTPATIENT
Start: 2023-11-05 | End: 2023-11-07 | Stop reason: HOSPADM

## 2023-11-05 RX ORDER — BENZONATATE 100 MG/1
100 CAPSULE ORAL 3 TIMES DAILY PRN
Status: DISCONTINUED | OUTPATIENT
Start: 2023-11-05 | End: 2023-11-07 | Stop reason: HOSPADM

## 2023-11-05 RX ORDER — CEFEPIME HYDROCHLORIDE 1 G/1
1 INJECTION, POWDER, FOR SOLUTION INTRAMUSCULAR; INTRAVENOUS EVERY 24 HOURS
Status: DISCONTINUED | OUTPATIENT
Start: 2023-11-06 | End: 2023-11-06

## 2023-11-05 RX ADMIN — CEFEPIME 1 G: 1 INJECTION, POWDER, FOR SOLUTION INTRAMUSCULAR; INTRAVENOUS at 04:01

## 2023-11-05 RX ADMIN — CARVEDILOL 12.5 MG: 6.25 TABLET, FILM COATED ORAL at 17:43

## 2023-11-05 RX ADMIN — Medication 1 MG: at 22:28

## 2023-11-05 RX ADMIN — UMECLIDINIUM 1 PUFF: 62.5 AEROSOL, POWDER ORAL at 08:48

## 2023-11-05 RX ADMIN — GABAPENTIN 600 MG: 300 CAPSULE ORAL at 22:28

## 2023-11-05 RX ADMIN — PANTOPRAZOLE SODIUM 40 MG: 40 TABLET, DELAYED RELEASE ORAL at 06:52

## 2023-11-05 RX ADMIN — IPRATROPIUM BROMIDE AND ALBUTEROL SULFATE 3 ML: .5; 3 SOLUTION RESPIRATORY (INHALATION) at 19:54

## 2023-11-05 RX ADMIN — ALBUTEROL SULFATE 2.5 MG: 2.5 SOLUTION RESPIRATORY (INHALATION) at 16:54

## 2023-11-05 RX ADMIN — PREDNISONE 2 MG: 1 TABLET ORAL at 08:48

## 2023-11-05 RX ADMIN — VANCOMYCIN HYDROCHLORIDE 1500 MG: 5 INJECTION, POWDER, LYOPHILIZED, FOR SOLUTION INTRAVENOUS at 17:42

## 2023-11-05 RX ADMIN — AZATHIOPRINE 150 MG: 50 TABLET ORAL at 08:48

## 2023-11-05 RX ADMIN — IPRATROPIUM BROMIDE AND ALBUTEROL SULFATE 3 ML: .5; 3 SOLUTION RESPIRATORY (INHALATION) at 07:49

## 2023-11-05 RX ADMIN — ROSUVASTATIN CALCIUM 10 MG: 5 TABLET, FILM COATED ORAL at 22:28

## 2023-11-05 RX ADMIN — LOSARTAN POTASSIUM 12.5 MG: 25 TABLET, FILM COATED ORAL at 08:48

## 2023-11-05 RX ADMIN — CARVEDILOL 12.5 MG: 6.25 TABLET, FILM COATED ORAL at 08:48

## 2023-11-05 RX ADMIN — IPRATROPIUM BROMIDE AND ALBUTEROL SULFATE 3 ML: .5; 3 SOLUTION RESPIRATORY (INHALATION) at 13:52

## 2023-11-05 RX ADMIN — FLUOXETINE 40 MG: 20 CAPSULE ORAL at 08:48

## 2023-11-05 RX ADMIN — ALBUTEROL SULFATE 2 PUFF: 90 AEROSOL, METERED RESPIRATORY (INHALATION) at 23:06

## 2023-11-05 RX ADMIN — RIVAROXABAN 20 MG: 20 TABLET, FILM COATED ORAL at 17:43

## 2023-11-05 RX ADMIN — FLUTICASONE FUROATE AND VILANTEROL TRIFENATATE 1 PUFF: 200; 25 POWDER RESPIRATORY (INHALATION) at 08:48

## 2023-11-05 RX ADMIN — BENZONATATE 100 MG: 100 CAPSULE ORAL at 23:06

## 2023-11-05 RX ADMIN — ROPINIROLE HYDROCHLORIDE 0.25 MG: 0.25 TABLET, FILM COATED ORAL at 17:43

## 2023-11-05 ASSESSMENT — ACTIVITIES OF DAILY LIVING (ADL)
ADLS_ACUITY_SCORE: 37

## 2023-11-05 NOTE — PROGRESS NOTES
"BP (!) 178/78 (BP Location: Left arm)   Pulse 77   Temp 97.3  F (36.3  C) (Oral)   Resp 18   Ht 1.6 m (5' 3\")   Wt 86.2 kg (190 lb)   SpO2 97%   BMI 33.66 kg/m      The PT was provided a neb per MD order. Aside from a cough, the PT said her breathing was \"okay.\" BS were somewhat diminished, but clear. The PT requested that she not be woke up at 0200 (I will jun as held). Overnight, I encouraged the PT to have the RN call me if she felt that a neb would somehow be helpful.   "

## 2023-11-05 NOTE — PROGRESS NOTES
Pt on RA, SpO2 low 90s, BS upper airway wheezing, increased aeration post neb. Scheduled neb given. RT will continue to follow.    Rodolfo Yuen, RT

## 2023-11-05 NOTE — PROGRESS NOTES
[facility]    Medicine Progress Note - Hospitalist Service    Date of Admission:  11/4/2023    Assessment & Plan   Zakiya Christian is a 78 year old women admitted on 11/4/2023.  She has a past medical history significant for COPD, hypertension, DVT, PMR, diverticulitis status post colostomy bag, recurrent urinary tract infection who presented to the hospital with nausea and vomiting.  Currently being treated with cefepime for possible UTI.  Symptoms improving over the last 24 hours.  Pending urine cultures and sensitivities.  Possible discharge on 11/6.      Recurrent UTI  Nausea/vomiting  -Recurrent UTI over the last few weeks.  -Urine culture on 10/23 grew Enterobacter with multidrug-resistant.  It was sensitive to cefepime.    Plan  -Continue cefepime  -Follow-up with urine and blood cultures      Acute kidney injury  -Baseline creatinine around 0.8  -Creatinine on presentation 1.1  -Most likely prerenal due to decreased p.o. intake  -Creatinine improved from 1.1-1.    Plan  -Stop IV fluid  -Encourage p.o. intake  -Continue to monitor    COPD  Acute hypoxic respiratory failure (resolved)  -Per patient chronic wheezing  -Chronic dry cough  -No symptoms suggestive of COPD exacerbation  -Wheezing on physical examination.  -VBG without signs of COPD exacerbation  -Weaned off nasal cannula over the last 24 hours.  Currently saturating around 90% on room air.    Plan  -Continue home Trelegy Ellipta or equivalent  -Scheduled DuoNebs  -Albuterol as needed  -Patient would like to hold off high-dose steroid  -Target SPO2 88 to 92%.  -Needs to follow-up with pulmonology as an outpatient for further management of poorly controlled COPD.    Hypertension  -At home on Coreg 12.5 mg twice daily, losartan 12.5 mg daily    Plan  -Continue home Coreg  -Restart home losartan    History of DVT  Drug-induced coagulation defect  -At home on Xarelto 20 mg daily    Plan  -Continue home Xarelto    History of  PMR  -Per patient has been slowly weaned off prednisone over the past few weeks  -Currently on 1 to 2 mg/day.  She is also on azathioprine    Plan  -Continue prednisone 2 mg daily.  -Continue azathioprine  -Resume gabapentin on a lower dose of 600 mg nightly            Diet: Combination Diet Regular Diet Adult    DVT Prophylaxis: DOAC  Morrell Catheter: Not present  Lines: PRESENT             Cardiac Monitoring: None  Code Status: Full Code             Disposition Plan  Home     Expected Discharge Date: 11/06/2023             Pending urine culture and sensitivities.         PAGE MENENDEZ MD  Hospitalist Service  [facility]  Securely message with [employer] (more info)  Text page via [facility] Paging/Directory   ______________________________________________________________________    Interval History   Feeling better than yesterday, nausea resolved.  Able to tolerate her breakfast today.  Denies any abdominal pain or burning with urination.  Continues to have chronic intermittent cough and wheezing.  Denies any shortness of breath.    Physical Exam   Vital Signs: Temp: 97.6  F (36.4  C) Temp src: Oral BP: 128/60 Pulse: 70   Resp: 22 SpO2: 90 % O2 Device: None (Room air) Oxygen Delivery: 1 LPM  Weight: 200 lbs 11.2 oz  Physical Exam  Constitutional:       General: She is not in acute distress.     Appearance: She is not ill-appearing or toxic-appearing.   Cardiovascular:      Rate and Rhythm: Normal rate.   Pulmonary:      Effort: Pulmonary effort is normal.      Comments: Wheezing in bilateral lung fields.  Abdominal:      General: Abdomen is flat. There is no distension.      Palpations: Abdomen is soft.      Tenderness: There is no abdominal tenderness. There is no guarding.   Skin:     General: Skin is warm and dry.   Neurological:      Mental Status: She is alert.   Psychiatric:         Mood and Affect: Mood normal.          Medical Decision Making       55 MINUTES SPENT BY ME on the date of  service doing chart review, history, exam, documentation & further activities per the note.      Data     I have personally reviewed the following data over the past 24 hrs:    3.4 (L)  \   9.1 (L)   / 205     138 104 17.6 /  114 (H)   4.8 26 1.00 (H) \     ALT: 18 AST: 34 AP: 49 TBILI: 0.4   ALB: 3.7 TOT PROTEIN: 7.1 LIPASE: N/A     Trop: 16 (H) BNP: 203     Procal: 0.13 (H) CRP: N/A Lactic Acid: 0.6 (L)         Imaging results reviewed over the past 24 hrs:   Recent Results (from the past 24 hour(s))   Chest XR,  PA & LAT    Narrative    EXAM: XR CHEST 2 VIEWS  LOCATION: [facility]  DATE: 11/4/2023    INDICATION: sob  COMPARISON: CTA chest 01/20/2023 and chest radiograph 06/29/2022.      Impression    IMPRESSION: Heart size and pulmonary vascularity are normal. No focal consolidation, pleural effusion, or pneumothorax. Old left rib fracture deformities.

## 2023-11-05 NOTE — UTILIZATION REVIEW
Admission Status; Secondary Review Determination       Under the authority of the Utilization Management Committee, the utilization review process indicated a secondary review on the above patient. The review outcome is based on review of the medical records, discussions with staff, and applying clinical experience noted on the date of the review.     (x) Inpatient Status Appropriate - This patient's medical care is consistent with medical management for inpatient care and reasonable inpatient medical practice.     RATIONALE FOR DETERMINATION     Ms. Christian is a 79 yo female with a PMH of COPD, PMR (chronic steroids) and recurrent UTI who presents to the ED with uncontrollable N/V and SHRUTHI.  Recent PMH of multi-drug resistant Enterobactor UTI (10/23); continues on IV cefepime.  Creat improved on IVF; nausea slowly improving with IV abx and IVF given.  Leukopenia persists but is slightly improved today; remaining in the hospital for ongoing IV abx until further clinical improvement and UC finalized.      At the time of admission with the information available to the attending physician more than 2 nights Hospital complex care was anticipated, based on patient risk of adverse outcome if treated as outpatient and complex care required. Inpatient admission is appropriate.    The information on this document is developed by the utilization review team in order for the business office to ensure compliance. This only denotes the appropriateness of proper admission status and does not reflect the quality of care rendered.   The definitions of Inpatient Status and Observation Status used in making the determination above are those provided in the CMS Coverage Manual, Chapter 1 and Chapter 6, section 70.4.         Sincerely,     Lilliam Man, DO  Utilization Review  Physician Advisor  [facility].

## 2023-11-05 NOTE — PHARMACY-VANCOMYCIN DOSING SERVICE
Pharmacy Vancomycin Initial Note  Date of Service 2023  Patient's  1945  78 year old, female    Indication: Bacteremia    Current estimated CrCl = Estimated Creatinine Clearance: 48.8 mL/min (A) (based on SCr of 1 mg/dL (H)).    Creatinine for last 3 days  2023: 11:03 AM Creatinine 1.10 mg/dL; 11:03 AM Creatinine 1.10 mg/dL  2023:  3:55 AM Creatinine 1.00 mg/dL    Recent Vancomycin Level(s) for last 3 days  No results found for requested labs within last 3 days.      Vancomycin IV Administrations (past 72 hours)        No vancomycin orders with administrations in past 72 hours.                    Nephrotoxins and other renal medications (From now, onward)      Start     Dose/Rate Route Frequency Ordered Stop    23 1730  vancomycin (VANCOCIN) 1,500 mg in 0.9% NaCl 250 mL intermittent infusion         1,500 mg  over 90 Minutes Intravenous EVERY 24 HOURS 23 1704              Contrast Orders - past 72 hours (72h ago, onward)      None            [employer] Prediction of Planned Initial Vancomycin Regimen  Regimen: 1500 mg IV every 24 hours.  Start time: 17:03 on 2023  Exposure target: AUC24 (range)400-600 mg/L.hr   AUC24,ss: 513 mg/L.hr  Probability of AUC24 > 400: 77 %  Ctrough,ss: 15.4 mg/L  Probability of Ctrough,ss > 20: 25 %  Probability of nephrotoxicity (Lodise LAVERNE ): 11 %        Plan:  Start vancomycin  as above  Vancomycin monitoring method: AUC  Vancomycin therapeutic monitoring goal: 400-600 mg*h/L  Pharmacy will check vancomycin levels as appropriate in 1-3 Days.    Serum creatinine levels will be ordered a minimum of twice weekly.      [facility]

## 2023-11-05 NOTE — PLAN OF CARE
Problem: Adult Inpatient Plan of Care  Goal: Optimal Comfort and Wellbeing  Outcome: Progressing   Goal Outcome Evaluation:  VSS and afebrile. Received PRN Tylenol for headache once overnight, with relief. Also received PRN IV Zofran once for nausea, with relief. LR running at 50 mL/hour. Patient manages colostomy independently at home, will ask for assistance as needed while hospitalized. Patient voided when getting ready for bed, but put toilet paper/wipe in the hat so UA could not be taken from that void.

## 2023-11-06 LAB
ANION GAP SERPL CALCULATED.3IONS-SCNC: 8 MMOL/L (ref 7–15)
BACTERIA UR CULT: NORMAL
BUN SERPL-MCNC: 24.1 MG/DL (ref 8–23)
CALCIUM SERPL-MCNC: 8.7 MG/DL (ref 8.8–10.2)
CHLORIDE SERPL-SCNC: 107 MMOL/L (ref 98–107)
CREAT SERPL-MCNC: 1.12 MG/DL (ref 0.51–0.95)
DEPRECATED HCO3 PLAS-SCNC: 28 MMOL/L (ref 22–29)
EGFRCR SERPLBLD CKD-EPI 2021: 50 ML/MIN/1.73M2
ERYTHROCYTE [DISTWIDTH] IN BLOOD BY AUTOMATED COUNT: 15.9 % (ref 10–15)
GLUCOSE SERPL-MCNC: 100 MG/DL (ref 70–99)
HCT VFR BLD AUTO: 30.2 % (ref 35–47)
HGB BLD-MCNC: 9 G/DL (ref 11.7–15.7)
LACTATE SERPL-SCNC: 0.8 MMOL/L (ref 0.7–2)
MAGNESIUM SERPL-MCNC: 1.8 MG/DL (ref 1.7–2.3)
MCH RBC QN AUTO: 29.8 PG (ref 26.5–33)
MCHC RBC AUTO-ENTMCNC: 29.8 G/DL (ref 31.5–36.5)
MCV RBC AUTO: 100 FL (ref 78–100)
PLATELET # BLD AUTO: 184 10E3/UL (ref 150–450)
POTASSIUM SERPL-SCNC: 4.8 MMOL/L (ref 3.4–5.3)
POTASSIUM SERPL-SCNC: 4.8 MMOL/L (ref 3.4–5.3)
RBC # BLD AUTO: 3.02 10E6/UL (ref 3.8–5.2)
SODIUM SERPL-SCNC: 143 MMOL/L (ref 135–145)
WBC # BLD AUTO: 3.9 10E3/UL (ref 4–11)

## 2023-11-06 PROCEDURE — 250N000012 HC RX MED GY IP 250 OP 636 PS 637: Performed by: STUDENT IN AN ORGANIZED HEALTH CARE EDUCATION/TRAINING PROGRAM

## 2023-11-06 PROCEDURE — 83735 ASSAY OF MAGNESIUM: CPT | Performed by: STUDENT IN AN ORGANIZED HEALTH CARE EDUCATION/TRAINING PROGRAM

## 2023-11-06 PROCEDURE — 250N000011 HC RX IP 250 OP 636: Mod: JZ | Performed by: STUDENT IN AN ORGANIZED HEALTH CARE EDUCATION/TRAINING PROGRAM

## 2023-11-06 PROCEDURE — 120N000001 HC R&B MED SURG/OB

## 2023-11-06 PROCEDURE — 82310 ASSAY OF CALCIUM: CPT | Performed by: STUDENT IN AN ORGANIZED HEALTH CARE EDUCATION/TRAINING PROGRAM

## 2023-11-06 PROCEDURE — 94640 AIRWAY INHALATION TREATMENT: CPT

## 2023-11-06 PROCEDURE — 999N000156 HC STATISTIC RCP CONSULT EA 30 MIN

## 2023-11-06 PROCEDURE — 99233 SBSQ HOSP IP/OBS HIGH 50: CPT | Performed by: STUDENT IN AN ORGANIZED HEALTH CARE EDUCATION/TRAINING PROGRAM

## 2023-11-06 PROCEDURE — 250N000011 HC RX IP 250 OP 636: Performed by: HOSPITALIST

## 2023-11-06 PROCEDURE — 85041 AUTOMATED RBC COUNT: CPT | Performed by: STUDENT IN AN ORGANIZED HEALTH CARE EDUCATION/TRAINING PROGRAM

## 2023-11-06 PROCEDURE — 36415 COLL VENOUS BLD VENIPUNCTURE: CPT | Performed by: STUDENT IN AN ORGANIZED HEALTH CARE EDUCATION/TRAINING PROGRAM

## 2023-11-06 PROCEDURE — 84132 ASSAY OF SERUM POTASSIUM: CPT | Performed by: STUDENT IN AN ORGANIZED HEALTH CARE EDUCATION/TRAINING PROGRAM

## 2023-11-06 PROCEDURE — 83605 ASSAY OF LACTIC ACID: CPT | Performed by: STUDENT IN AN ORGANIZED HEALTH CARE EDUCATION/TRAINING PROGRAM

## 2023-11-06 PROCEDURE — 250N000013 HC RX MED GY IP 250 OP 250 PS 637: Performed by: STUDENT IN AN ORGANIZED HEALTH CARE EDUCATION/TRAINING PROGRAM

## 2023-11-06 PROCEDURE — 258N000003 HC RX IP 258 OP 636: Performed by: STUDENT IN AN ORGANIZED HEALTH CARE EDUCATION/TRAINING PROGRAM

## 2023-11-06 PROCEDURE — 999N000157 HC STATISTIC RCP TIME EA 10 MIN

## 2023-11-06 PROCEDURE — 250N000009 HC RX 250: Performed by: STUDENT IN AN ORGANIZED HEALTH CARE EDUCATION/TRAINING PROGRAM

## 2023-11-06 RX ORDER — SODIUM CHLORIDE, SODIUM LACTATE, POTASSIUM CHLORIDE, CALCIUM CHLORIDE 600; 310; 30; 20 MG/100ML; MG/100ML; MG/100ML; MG/100ML
INJECTION, SOLUTION INTRAVENOUS CONTINUOUS
Status: DISCONTINUED | OUTPATIENT
Start: 2023-11-06 | End: 2023-11-07 | Stop reason: HOSPADM

## 2023-11-06 RX ORDER — IPRATROPIUM BROMIDE AND ALBUTEROL SULFATE 2.5; .5 MG/3ML; MG/3ML
3 SOLUTION RESPIRATORY (INHALATION)
Status: DISCONTINUED | OUTPATIENT
Start: 2023-11-06 | End: 2023-11-07 | Stop reason: HOSPADM

## 2023-11-06 RX ADMIN — ROSUVASTATIN CALCIUM 10 MG: 5 TABLET, FILM COATED ORAL at 22:00

## 2023-11-06 RX ADMIN — ROPINIROLE HYDROCHLORIDE 0.25 MG: 0.25 TABLET, FILM COATED ORAL at 16:30

## 2023-11-06 RX ADMIN — UMECLIDINIUM 1 PUFF: 62.5 AEROSOL, POWDER ORAL at 08:44

## 2023-11-06 RX ADMIN — CARVEDILOL 12.5 MG: 6.25 TABLET, FILM COATED ORAL at 08:42

## 2023-11-06 RX ADMIN — PANTOPRAZOLE SODIUM 40 MG: 40 TABLET, DELAYED RELEASE ORAL at 06:35

## 2023-11-06 RX ADMIN — LOSARTAN POTASSIUM 12.5 MG: 25 TABLET, FILM COATED ORAL at 08:42

## 2023-11-06 RX ADMIN — CEFEPIME 1 G: 1 INJECTION, POWDER, FOR SOLUTION INTRAMUSCULAR; INTRAVENOUS at 04:56

## 2023-11-06 RX ADMIN — CARVEDILOL 12.5 MG: 6.25 TABLET, FILM COATED ORAL at 18:49

## 2023-11-06 RX ADMIN — ALBUTEROL SULFATE 2 PUFF: 90 AEROSOL, METERED RESPIRATORY (INHALATION) at 16:44

## 2023-11-06 RX ADMIN — AZATHIOPRINE 150 MG: 50 TABLET ORAL at 08:42

## 2023-11-06 RX ADMIN — FLUOXETINE 40 MG: 20 CAPSULE ORAL at 08:42

## 2023-11-06 RX ADMIN — ONDANSETRON 4 MG: 4 TABLET, ORALLY DISINTEGRATING ORAL at 11:56

## 2023-11-06 RX ADMIN — IPRATROPIUM BROMIDE AND ALBUTEROL SULFATE 3 ML: .5; 3 SOLUTION RESPIRATORY (INHALATION) at 08:19

## 2023-11-06 RX ADMIN — FLUTICASONE FUROATE AND VILANTEROL TRIFENATATE 1 PUFF: 200; 25 POWDER RESPIRATORY (INHALATION) at 08:44

## 2023-11-06 RX ADMIN — GABAPENTIN 600 MG: 300 CAPSULE ORAL at 22:00

## 2023-11-06 RX ADMIN — PREDNISONE 2 MG: 1 TABLET ORAL at 08:42

## 2023-11-06 RX ADMIN — ONDANSETRON 4 MG: 4 TABLET, ORALLY DISINTEGRATING ORAL at 19:56

## 2023-11-06 RX ADMIN — SODIUM CHLORIDE, POTASSIUM CHLORIDE, SODIUM LACTATE AND CALCIUM CHLORIDE: 600; 310; 30; 20 INJECTION, SOLUTION INTRAVENOUS at 16:30

## 2023-11-06 RX ADMIN — ONDANSETRON 4 MG: 2 INJECTION INTRAMUSCULAR; INTRAVENOUS at 04:59

## 2023-11-06 RX ADMIN — RIVAROXABAN 20 MG: 20 TABLET, FILM COATED ORAL at 16:30

## 2023-11-06 RX ADMIN — POLYETHYLENE GLYCOL 3350 17 G: 17 POWDER, FOR SOLUTION ORAL at 08:47

## 2023-11-06 RX ADMIN — PROCHLORPERAZINE EDISYLATE 5 MG: 5 INJECTION INTRAMUSCULAR; INTRAVENOUS at 22:00

## 2023-11-06 ASSESSMENT — ACTIVITIES OF DAILY LIVING (ADL)
ADLS_ACUITY_SCORE: 37
ADLS_ACUITY_SCORE: 24
ADLS_ACUITY_SCORE: 37

## 2023-11-06 NOTE — PROVIDER NOTIFICATION
Paged cross cover via [employer]:    Patient uses an Albuterol inhaler at home that is not currently ordered, she is requesting it. Also, could we get a PRN for some Tessalon or Robitussin?

## 2023-11-06 NOTE — PLAN OF CARE
Problem: Adult Inpatient Plan of Care  Goal: Optimal Comfort and Wellbeing  Outcome: Progressing   Goal Outcome Evaluation:  VSS and afebrile. No complaints of pain. K and Mg protocols in place. Started on Vancomycin on evening shift for positive blood cultures. Did need 1L O2 overnight.

## 2023-11-06 NOTE — PROGRESS NOTES
[facility]    Medicine Progress Note - Hospitalist Service    Date of Admission:  11/4/2023    Assessment & Plan   Zakiya Christian is a 78 year old women admitted on 11/4/2023. She has a past medical history significant for COPD, hypertension, DVT, PMR, diverticulitis status post colostomy bag, recurrent urinary tract infection who presented to the hospital with nausea and vomiting.  Currently being treated with cefepime for UTI (urine culture on 11/3 at [facility] facility grew Proteus mirabilis sensitive to ceftriaxone and cefepime). Urine culture during this hospitalization grew mixed candi. Symptoms improving over the last 24 hours.  Declined discharged on 11/6. She wanted to make sure that she can tolerate her diet well before discharge. Plan to discharge on 11/7/2023.    Recent urinary tract infection due to Proteus  Recurrent UTI  Nausea/vomiting  -Recurrent UTI over the last few weeks.  -On presentation, she was started on cefepime due to to history of multidrug-resistant UTI.  -CT abdomen pelvis without major complications or nephrolithiasis.  -11/6 urine culture at an urgent care clinic ([facility]) grew Proteus mirabilis sensitive to cefepime.  -No symptoms of UTI  -Blood cultures growing Staph epidermidis.  Most likely contaminant    Plan  -Stop cefepime (completed a course of 3 days)  -Consider discharging on chronic suppressive antibiotic with cephalexin 250 mg daily for 6 months to 1 year. (Recurrent UTI most likely due to incontinence)  -Follow-up with urology as an outpatient    Acute kidney injury  -Baseline creatinine around 0.8  -Creatinine on presentation 1.1  -Most likely prerenal due to decreased p.o. intake  -Creatinine initially improved with IV fluid.  Slightly worsened over the last 24 hours.    Plan  -Start LR at 50 cc/h  -Encourage p.o. intake  -Continue to monitor    COPD  Acute hypoxic respiratory failure (resolved)  -Per patient chronic wheezing  -Chronic dry  cough  -No symptoms suggestive of COPD exacerbation  -Wheezing on physical examination.  -VBG without signs of COPD exacerbation  -Weaned off nasal cannula over the last 24 hours.  Currently saturating around 90% on room air.    Plan  -Continue home Trelegy Ellipta or equivalent  -Scheduled DuoNebs  -Albuterol as needed  -Target SPO2 88 to 92%.  -Needs to follow-up with pulmonology as an outpatient for further management of poorly controlled COPD.    Hypertension  -At home on Coreg 12.5 mg twice daily, losartan 12.5 mg daily    Plan  -Continue home Coreg and losartan      History of DVT  Drug-induced coagulation defect  -At home on Xarelto 20 mg daily    Plan  -Continue home Xarelto    History of PMR  -Per patient has been slowly weaned off prednisone over the past few weeks  -Currently on 1 to 2 mg/day.  She is also on azathioprine    Plan  -Continue prednisone 2 mg daily.  -Continue azathioprine  -Resume gabapentin on a lower dose of 600 mg nightly            Diet: Combination Diet Regular Diet Adult    DVT Prophylaxis: DOAC  Morrell Catheter: Not present  Lines: None       Cardiac Monitoring: None  Code Status: Full Code             Disposition Plan  Home    Expected Discharge Date: 11/07/2023    Discharge Delays: *Early Discharge Anticipated    Discharge Comments: pending culture and sensitivities, senior living     Pending urine culture and sensitivities.         PAGE MENENDEZ MD  Hospitalist Service  [facility]  Securely message with [employer] (more info)  Text page via [employer] Paging/Directory   ______________________________________________________________________    Interval History   Feeling better.  Reported some intermittent nausea, however, tolerating her diet better.  Denies any fever or chills.  Denies any burning with urination.  Denies any significant abdominal pain.     Physical Exam   Vital Signs: Temp: 98  F (36.7  C) Temp src: Oral BP: (!) 164/70 Pulse: 70   Resp: 16 SpO2: 92 % O2  Device: None (Room air) Oxygen Delivery: 1 LPM (will trial on RA)  Weight: 202 lbs 9.6 oz  Physical Exam  Constitutional:       General: She is not in acute distress.     Appearance: She is not ill-appearing or toxic-appearing.   Cardiovascular:      Rate and Rhythm: Normal rate.   Pulmonary:      Effort: Pulmonary effort is normal. No respiratory distress.      Breath sounds: Wheezing present.      Comments: Wheezing in bilateral lung fields.  Abdominal:      General: Abdomen is flat. There is no distension.      Palpations: Abdomen is soft.      Tenderness: There is no abdominal tenderness. There is no guarding.      Comments: Colostomy bag in place   Skin:     General: Skin is warm and dry.   Neurological:      Mental Status: She is alert.   Psychiatric:         Mood and Affect: Mood normal.          Medical Decision Making       60 MINUTES SPENT BY ME on the date of service doing chart review, history, exam, documentation & further activities per the note.      Data     I have personally reviewed the following data over the past 24 hrs:    3.9 (L)  \   9.0 (L)   / 184     143 107 24.1 (H) /  100 (H)   4.8; 4.8 28 1.12 (H) \     Procal: N/A CRP: N/A Lactic Acid: 0.8         Imaging results reviewed over the past 24 hrs:   No results found for this or any previous visit (from the past 24 hour(s)).

## 2023-11-06 NOTE — PLAN OF CARE
Problem: Adult Inpatient Plan of Care  Goal: Plan of Care Review  Description: The Plan of Care Review/Shift note should be completed every shift.  The Outcome Evaluation is a brief statement about your assessment that the patient is improving, declining, or no change.  This information will be displayed automatically on your shift  note.  11/6/2023 1757 by Michelle Lange, RN  Outcome: Progressing  11/6/2023 1203 by Michelle Lange, CONNOR  Outcome: Progressing   Goal Outcome Evaluation:    Pt c/o intermittent nausea r/t lack of output in colostomy. Miralax given and ambulation encouraged. PRN Zofran utilized. Small, hard output eventually noted by pt who self-manages her colostomy. Utilized PRN albuterol. IVF LR @50mL/hr.

## 2023-11-06 NOTE — PROGRESS NOTES
"/59 (BP Location: Left arm, Patient Position: Semi-Pereira's, Cuff Size: Adult Regular)   Pulse 79   Temp 97.6  F (36.4  C) (Oral)   Resp 16   Ht 1.6 m (5' 3\")   Wt 91 kg (200 lb 11.2 oz)   SpO2 92%   BMI 35.55 kg/m       The PT was provided a neb on the evening shift (she decline the 0200 TX when I woke her up at ~0200). As for the evening TX, she was clear and diminished both pre and post. Rt will follow as directed.  "

## 2023-11-06 NOTE — PROVIDER NOTIFICATION
11/06/23 0800   RCAT Assessment   Reason for Assessment   (dehydration)   Pulmonary Status 2  (Hx of COPD)   Surgical Status 0   Chest X-ray 0   Respiratory Pattern 2   Mental Status 0   Breath Sounds 0   Cough Effectiveness 0   Level of Activity 0   O2 Required for SpO2>=92% 0   Acuity Level (points) 4   Acuity Level  5   Aerosol Therapy (SVN)   Daily Review of Necessity (SVN) completed   Respiratory Treatment Status (SVN) given   Patient Position HOB elevated   Posttreatment Assessment (SVN) breath sounds unchanged     Pt states she does not use oxygen, nebulizers, or CPAP at home. She does use inhalers. Non productive cough, diminished bs- no change with nebs, no issues with breathing at this time. Will try to wean oxygen to RA today. Pt states she has no issues with her breathing this morning.

## 2023-11-06 NOTE — PROVIDER NOTIFICATION
Paged lauri chavez MD via [employer]:      FYI- call just received from infectious disease lab. BC drawn 11/4 at 1103 are showing Staphylococcus Epidermidis. Was already started on Vancomycin.    No further orders at this time

## 2023-11-06 NOTE — PLAN OF CARE
Problem: Adult Inpatient Plan of Care  Goal: Optimal Comfort and Wellbeing  Outcome: Progressing   Goal Outcome Evaluation:  Pt A&Ox4, she is able to make her needs known.  IVF discontinued today.  Positive blood culture drawn on 11/4/23 @ 1103a on the 2nd day of incubation positive for gram + cocci and clusters, MD notified and Vancomycin ordered.  First dose started this evening.  This writer updated the pt regarding positive blood culture.

## 2023-11-07 VITALS
RESPIRATION RATE: 18 BRPM | WEIGHT: 202.8 LBS | HEART RATE: 72 BPM | TEMPERATURE: 97.7 F | SYSTOLIC BLOOD PRESSURE: 147 MMHG | OXYGEN SATURATION: 90 % | HEIGHT: 63 IN | DIASTOLIC BLOOD PRESSURE: 64 MMHG | BODY MASS INDEX: 35.93 KG/M2

## 2023-11-07 LAB
ANION GAP SERPL CALCULATED.3IONS-SCNC: 9 MMOL/L (ref 7–15)
ATRIAL RATE - MUSE: 80 BPM
BACTERIA BLD CULT: ABNORMAL
BACTERIA BLD CULT: ABNORMAL
BUN SERPL-MCNC: 15.7 MG/DL (ref 8–23)
CALCIUM SERPL-MCNC: 8.5 MG/DL (ref 8.8–10.2)
CHLORIDE SERPL-SCNC: 105 MMOL/L (ref 98–107)
CREAT SERPL-MCNC: 0.92 MG/DL (ref 0.51–0.95)
DEPRECATED HCO3 PLAS-SCNC: 27 MMOL/L (ref 22–29)
DIASTOLIC BLOOD PRESSURE - MUSE: NORMAL MMHG
EGFRCR SERPLBLD CKD-EPI 2021: 63 ML/MIN/1.73M2
GLUCOSE SERPL-MCNC: 104 MG/DL (ref 70–99)
INTERPRETATION ECG - MUSE: NORMAL
MAGNESIUM SERPL-MCNC: 1.8 MG/DL (ref 1.7–2.3)
P AXIS - MUSE: 62 DEGREES
PLATELET # BLD AUTO: 205 10E3/UL (ref 150–450)
POTASSIUM SERPL-SCNC: 5 MMOL/L (ref 3.4–5.3)
PR INTERVAL - MUSE: 172 MS
QRS DURATION - MUSE: 74 MS
QT - MUSE: 382 MS
QTC - MUSE: 440 MS
R AXIS - MUSE: 1 DEGREES
SODIUM SERPL-SCNC: 141 MMOL/L (ref 135–145)
SYSTOLIC BLOOD PRESSURE - MUSE: NORMAL MMHG
T AXIS - MUSE: 41 DEGREES
VENTRICULAR RATE- MUSE: 80 BPM

## 2023-11-07 PROCEDURE — 83735 ASSAY OF MAGNESIUM: CPT | Performed by: STUDENT IN AN ORGANIZED HEALTH CARE EDUCATION/TRAINING PROGRAM

## 2023-11-07 PROCEDURE — 99239 HOSP IP/OBS DSCHRG MGMT >30: CPT | Performed by: HOSPITALIST

## 2023-11-07 PROCEDURE — 36415 COLL VENOUS BLD VENIPUNCTURE: CPT | Performed by: STUDENT IN AN ORGANIZED HEALTH CARE EDUCATION/TRAINING PROGRAM

## 2023-11-07 PROCEDURE — 250N000012 HC RX MED GY IP 250 OP 636 PS 637: Performed by: STUDENT IN AN ORGANIZED HEALTH CARE EDUCATION/TRAINING PROGRAM

## 2023-11-07 PROCEDURE — 80048 BASIC METABOLIC PNL TOTAL CA: CPT | Performed by: STUDENT IN AN ORGANIZED HEALTH CARE EDUCATION/TRAINING PROGRAM

## 2023-11-07 PROCEDURE — 250N000013 HC RX MED GY IP 250 OP 250 PS 637: Performed by: STUDENT IN AN ORGANIZED HEALTH CARE EDUCATION/TRAINING PROGRAM

## 2023-11-07 PROCEDURE — 85049 AUTOMATED PLATELET COUNT: CPT | Performed by: STUDENT IN AN ORGANIZED HEALTH CARE EDUCATION/TRAINING PROGRAM

## 2023-11-07 PROCEDURE — 250N000011 HC RX IP 250 OP 636: Performed by: STUDENT IN AN ORGANIZED HEALTH CARE EDUCATION/TRAINING PROGRAM

## 2023-11-07 RX ORDER — CEFDINIR 300 MG/1
300 CAPSULE ORAL 2 TIMES DAILY
Qty: 14 CAPSULE | Refills: 0 | Status: SHIPPED | OUTPATIENT
Start: 2023-11-07 | End: 2023-11-14

## 2023-11-07 RX ADMIN — LOSARTAN POTASSIUM 12.5 MG: 25 TABLET, FILM COATED ORAL at 08:19

## 2023-11-07 RX ADMIN — UMECLIDINIUM 1 PUFF: 62.5 AEROSOL, POWDER ORAL at 08:18

## 2023-11-07 RX ADMIN — AZATHIOPRINE 150 MG: 50 TABLET ORAL at 08:20

## 2023-11-07 RX ADMIN — PREDNISONE 2 MG: 1 TABLET ORAL at 08:19

## 2023-11-07 RX ADMIN — POLYETHYLENE GLYCOL 3350 17 G: 17 POWDER, FOR SOLUTION ORAL at 08:18

## 2023-11-07 RX ADMIN — ALBUTEROL SULFATE 2 PUFF: 90 AEROSOL, METERED RESPIRATORY (INHALATION) at 11:29

## 2023-11-07 RX ADMIN — ONDANSETRON 4 MG: 4 TABLET, ORALLY DISINTEGRATING ORAL at 11:14

## 2023-11-07 RX ADMIN — FLUTICASONE FUROATE AND VILANTEROL TRIFENATATE 1 PUFF: 200; 25 POWDER RESPIRATORY (INHALATION) at 08:18

## 2023-11-07 RX ADMIN — FLUOXETINE 40 MG: 20 CAPSULE ORAL at 08:19

## 2023-11-07 RX ADMIN — PANTOPRAZOLE SODIUM 40 MG: 40 TABLET, DELAYED RELEASE ORAL at 08:19

## 2023-11-07 RX ADMIN — CARVEDILOL 12.5 MG: 6.25 TABLET, FILM COATED ORAL at 08:19

## 2023-11-07 ASSESSMENT — ACTIVITIES OF DAILY LIVING (ADL)
ADLS_ACUITY_SCORE: 24

## 2023-11-07 NOTE — PROGRESS NOTES
Care Management Discharge Note    Discharge Date: 11/07/2023       Discharge Disposition:  [facility]    Discharge Services: None    Discharge DME: None    Discharge Transportation: family or friend will provide    Education Provided on the Discharge Plan: Yes (AVS per bedside RN)    Persons Notified of Discharge Plans: patient    Patient/Family in Agreement with the Plan: yes        Additional Information:  CM reviewed chart. No CM needs identified or requested. Family to provide transportation at discharge.       CCC referral sent per protocol.     Sudha Resendez RN

## 2023-11-07 NOTE — DISCHARGE SUMMARY
"[facility]  Hospitalist Discharge Summary      Date of Admission:  11/4/2023  Date of Discharge:  11/7/2023  Discharging Provider: Vernon Rodrigez MD  Discharge Service: Hospitalist Service    Discharge Diagnoses   Acute cystitis  Recurrent UTI  Proteus infection  BMI 35, class II obesity    Clinically Significant Risk Factors     # Obesity: Estimated body mass index is 35.92 kg/m  as calculated from the following:    Height as of this encounter: 1.6 m (5' 3\").    Weight as of this encounter: 92 kg (202 lb 12.8 oz).       Follow-ups Needed After Discharge   Follow-up Appointments     Follow-up and recommended labs and tests       Follow up with Urologist (referral placed)            Unresulted Labs Ordered in the Past 30 Days of this Admission       Date and Time Order Name Status Description    11/4/2023 10:55 AM Blood Culture Peripheral Blood Preliminary         These results will be followed up by [facility] lead    Discharge Disposition   Discharged to home  Condition at discharge: Stable    Hospital Course   Zakiya Christian is a 78 year old women admitted on 11/4/2023. She has a past medical history significant for COPD, hypertension, DVT, PMR, diverticulitis status post colostomy bag, recurrent urinary tract infection who presented to the hospital with nausea and vomiting.  Currently being treated with cefepime for UTI (urine culture on 11/3 at [facility] facility grew Proteus mirabilis sensitive to ceftriaxone and cefepime). Urine culture during this hospitalization grew mixed candi. Symptoms improving over the last 24 hours.  Declined discharged on 11/6. She wanted to make sure that she can tolerate her diet well before discharge. Tolerating PO on 11/7. Sensitivities were faxed from [facility], Abx discussed with PHarmD. Cefdinir ordered. Advised pt to follow with urology to address recurrent infections    Last progress note:  Recent urinary tract infection due to Proteus  Recurrent " UTI  Nausea/vomiting  -Recurrent UTI over the last few weeks.  -On presentation, she was started on cefepime due to to history of multidrug-resistant UTI.  -CT abdomen pelvis without major complications or nephrolithiasis.  -11/6 urine culture at an urgent care clinic ([facility]) grew Proteus mirabilis sensitive to cefepime.  -No symptoms of UTI  -Blood cultures growing Staph epidermidis.  Most likely contaminant     Plan  -Stop cefepime (completed a course of 3 days)  -Consider discharging on chronic suppressive antibiotic with cephalexin 250 mg daily for 6 months to 1 year. (Recurrent UTI most likely due to incontinence)  -Follow-up with urology as an outpatient     Acute kidney injury  -Baseline creatinine around 0.8  -Creatinine on presentation 1.1  -Most likely prerenal due to decreased p.o. intake  -Creatinine initially improved with IV fluid.  Slightly worsened over the last 24 hours.     Plan  -Start LR at 50 cc/h  -Encourage p.o. intake  -Continue to monitor     COPD  Acute hypoxic respiratory failure (resolved)  -Per patient chronic wheezing  -Chronic dry cough  -No symptoms suggestive of COPD exacerbation  -Wheezing on physical examination.  -VBG without signs of COPD exacerbation  -Weaned off nasal cannula over the last 24 hours.  Currently saturating around 90% on room air.     Plan  -Continue home Trelegy Ellipta or equivalent  -Scheduled DuoNebs  -Albuterol as needed  -Target SPO2 88 to 92%.  -Needs to follow-up with pulmonology as an outpatient for further management of poorly controlled COPD.     Hypertension  -At home on Coreg 12.5 mg twice daily, losartan 12.5 mg daily     Plan  -Continue home Coreg and losartan        History of DVT  Drug-induced coagulation defect  -At home on Xarelto 20 mg daily     Plan  -Continue home Xarelto     History of PMR  -Per patient has been slowly weaned off prednisone over the past few weeks  -Currently on 1 to 2 mg/day.  She is also on azathioprine      Plan  -Continue prednisone 2 mg daily.  -Continue azathioprine  -Resume gabapentin on a lower dose of 600 mg nightly    Consultations This Hospital Stay   IP RESPIRATORY CARE CHRONIC PULMONARY DISEASE SPECIALIST  PHARMACY TO DOSE VANCO  IP RESPIRATORY CARE CHRONIC PULMONARY DISEASE SPECIALIST    Code Status   Full Code    Time Spent on this Encounter   I, Vernon Rodrigez MD, personally saw the patient today and spent greater than 30 minutes discharging this patient.       Vernon Rodrigez MD  [address] [address]  Phone: [phone]  Fax: [phone]  ______________________________________________________________________    Physical Exam   Vital Signs: Temp: 97.7  F (36.5  C) Temp src: Oral BP: (!) 147/64 (Nurse notified) Pulse: 72   Resp: 18 SpO2: 90 % O2 Device: None (Room air)    Weight: 202 lbs 12.8 oz  NAD in the bed  Alopecia  Alert, oriented, appropriate  Breathing comfortably on RA  HR regular  Abd soft, NT, ostomy       Primary Care Physician   Ami Noriega    Discharge Orders      Adult Urology  Referral      Reason for your hospital stay    Urinary tract infection     Follow-up and recommended labs and tests     Follow up with Urologist (referral placed)     Activity    Your activity upon discharge: activity as tolerated     Diet    Follow this diet upon discharge: Orders Placed This Encounter      Combination Diet Regular Diet Adult       Significant Results and Procedures   Most Recent 3 CBC's:  Recent Labs   Lab Test 11/07/23  0617 11/06/23  0936 11/05/23  0355 11/04/23  1103   WBC  --  3.9* 3.4* 3.1*   HGB  --  9.0* 9.1* 10.0*   MCV  --  100 95 95    184 205 245     Most Recent 3 BMP's:  Recent Labs   Lab Test 11/07/23  0616 11/06/23  0704 11/05/23  0355    143 138   POTASSIUM 5.0 4.8  4.8 4.8   CHLORIDE 105 107 104   CO2 27 28 26   BUN 15.7 24.1* 17.6   CR 0.92 1.12* 1.00*   ANIONGAP 9 8 8   VICENTA 8.5* 8.7*  9.0   * 100* 114*     Most Recent 2 LFT's:  Recent Labs   Lab Test 11/04/23  1103 04/06/23  1335   AST 34 16   ALT 18 9*   ALKPHOS 49 52   BILITOTAL 0.4 0.2   ,   Results for orders placed or performed during the hospital encounter of 11/04/23   Chest XR,  PA & LAT    Narrative    EXAM: XR CHEST 2 VIEWS  LOCATION: [facility]  DATE: 11/4/2023    INDICATION: sob  COMPARISON: CTA chest 01/20/2023 and chest radiograph 06/29/2022.      Impression    IMPRESSION: Heart size and pulmonary vascularity are normal. No focal consolidation, pleural effusion, or pneumothorax. Old left rib fracture deformities.   CT Abdomen Pelvis w/o Contrast    Narrative    EXAM: CT ABDOMEN PELVIS W/O CONTRAST  LOCATION: [facility]  DATE: 11/4/2023    INDICATION: Intractable vomiting, acute kidney injury, dehydration, UTI.  COMPARISON: 09/22/2023.  TECHNIQUE: CT scan of the abdomen and pelvis was performed without IV contrast. Multiplanar reformats were obtained. Dose reduction techniques were used.  CONTRAST: None.    FINDINGS:   LOWER CHEST: Coronary calcifications. Small hiatus hernia.    HEPATOBILIARY: Mild hepatic steatosis. Gallbladder seen.    PANCREAS: Normal.    SPLEEN: Normal.    ADRENAL GLANDS: Normal.    KIDNEYS/BLADDER: Renal cysts. Stable exophytic fat attenuating lesion along the left renal lower pole. No hydronephrosis.    BOWEL: Left lower quadrant ostomy; no significant change of parastomal hernia. No bowel obstruction or acute inflammation.    LYMPH NODES: No adenopathy.    VASCULATURE: Nonaneurysmal aorta with moderate atherosclerosis.    PELVIC ORGANS: Hysterectomy.    MUSCULOSKELETAL: Bony demineralization.     OTHER: No free fluid or air.       Impression    IMPRESSION:   1.  No acute findings to explain symptoms.           Discharge Medications   Current Discharge Medication List        START taking these medications    Details   cefdinir (OMNICEF) 300 MG capsule  Take 1 capsule (300 mg) by mouth 2 times daily for 7 days  Qty: 14 capsule, Refills: 0    Associated Diagnoses: Urinary tract infection without hematuria, site unspecified           CONTINUE these medications which have NOT CHANGED    Details   acetaminophen (TYLENOL) 500 MG tablet Take 500 mg by mouth every 6 hours as needed for mild pain      albuterol (PROAIR HFA/PROVENTIL HFA/VENTOLIN HFA) 108 (90 Base) MCG/ACT inhaler Inhale 2 puffs into the lungs every 6 hours as needed for shortness of breath or wheezing  Qty: 18 g, Refills: 0    Comments: Pharmacy may dispense brand covered by insurance (Proair, or proventil or ventolin or generic albuterol inhaler)  Associated Diagnoses: Chronic obstructive pulmonary disease, unspecified COPD type (H)      alendronate (FOSAMAX) 70 MG tablet Take 1 tablet (70 mg) by mouth every 7 days  Qty: 4 tablet, Refills: 0    Associated Diagnoses: Polymyalgia rheumatica (H24)      azaTHIOprine (IMURAN) 50 MG tablet Take 150 mg by mouth daily      calcium carbonate (OS-VICENTA) 1500 (600 Ca) MG tablet Take 600 mg by mouth 2 times daily (with meals)      calcium polycarbophil (FIBERCON) 625 MG tablet Take 2 tablets (1,250 mg) by mouth 2 times daily (with meals)  Qty: 120 tablet, Refills: 0    Associated Diagnoses: Intra-abdominal abscess (H)      carvedilol (COREG) 12.5 MG tablet Take 1 tablet (12.5 mg) by mouth 2 times daily (with meals) HOLD if SBP<100 and/or HR<55  Qty: 60 tablet, Refills: 0    Associated Diagnoses: Primary hypertension      FLUoxetine (PROZAC) 40 MG capsule Take 1 capsule (40 mg) by mouth every morning  Qty: 30 capsule, Refills: 0    Associated Diagnoses: Anxiety; Depression, unspecified depression type      fluticasone (FLONASE) 50 MCG/ACT nasal spray Spray 2 sprays into both nostrils daily      Fluticasone-Umeclidin-Vilanterol (TRELEGY ELLIPTA) 200-62.5-25 MCG/ACT oral inhaler Inhale 1 puff into the lungs every morning  Qty: 60 each, Refills: 0    Associated Diagnoses:  "Chronic obstructive pulmonary disease, unspecified COPD type (H)      furosemide (LASIX) 20 MG tablet Take 20 mg by mouth daily as needed (\"swelling\")      gabapentin (NEURONTIN) 300 MG capsule Take 3 capsules (900 mg) by mouth At Bedtime  Qty: 90 capsule, Refills: 0    Associated Diagnoses: Intra-abdominal abscess (H)      losartan (COZAAR) 25 MG tablet Take 0.5 tablets (12.5 mg) by mouth every morning HOLD if SBP<100  Qty: 30 tablet, Refills: 0    Associated Diagnoses: Primary hypertension      nystatin (MYCOSTATIN) 137246 UNIT/GM external cream Apply topically 2 times daily  Qty: 60 g, Refills: 0    Associated Diagnoses: Abdominal infection (H)      omeprazole (PRILOSEC) 40 MG DR capsule Take 1 capsule (40 mg) by mouth daily  Qty: 30 capsule, Refills: 0    Associated Diagnoses: Sigmoid diverticulitis      ondansetron (ZOFRAN) 4 MG tablet Take 1 tablet (4 mg) by mouth every 6 hours as needed for nausea  Qty: 30 tablet, Refills: 0    Associated Diagnoses: Abdominal infection (H)      polyethylene glycol (MIRALAX) 17 GM/Dose powder Take 17 g by mouth daily as needed      predniSONE (DELTASONE) 1 MG tablet Take by mouth See Admin Instructions 10/9/2023  - 11/9/23 take 2mg daily;  11/10/2023 - 12/10/2023 take 1mg every other day alternating with 2mg every other day;  12/11/2023 - 01/11/2024 take 1mg daily;  01/12/2024 - 02/12/2024 take 1mg every other day;   Then stop      rivaroxaban ANTICOAGULANT (XARELTO) 20 MG TABS tablet Take 1 tablet (20 mg) by mouth daily (with dinner)  Qty: 30 tablet, Refills: 0    Associated Diagnoses: Personal history of DVT (deep vein thrombosis)      rOPINIRole (REQUIP) 0.25 MG tablet Take 0.25 mg by mouth daily (with dinner)      rosuvastatin (CRESTOR) 10 MG tablet Take 10 mg by mouth at bedtime      Vitamin D3 (VITAMIN D, CHOLECALCIFEROL,) 25 mcg (1000 units) tablet Take 1 tablet by mouth daily           STOP taking these medications       nitroFURantoin macrocrystal-monohydrate " (MACROBID) 100 MG capsule Comments:   Reason for Stopping:         potassium chloride ER (K-TAB) 20 MEQ CR tablet Comments:   Reason for Stopping:             Allergies   Allergies   Allergen Reactions    Simvastatin Hives

## 2023-11-07 NOTE — PLAN OF CARE
Problem: Adult Inpatient Plan of Care  Goal: Optimal Comfort and Wellbeing  Outcome: Progressing   Goal Outcome Evaluation:     Pt calm and pleasant, hard of hearing. Passed a few, small hard stools in her colostomy, pt feels she will be able to clean colostomy herself better once she discharges home. Denies pain. Standby assist x1. Wheezing and coughing, improved slightly with scheduled inhalers.

## 2023-11-07 NOTE — PLAN OF CARE
Pt A7Ox4, VSS, RA. Pt denies any pain, SOB or chest pain. Pt c/o nausea, PRN given see MAR. Pt self manages her ostomy. IVF LR @ 50mL/hr. Pt care ongoing, call light within reach, bed in lowest position, alarms on for safety. Discharge pending.     Problem: Risk for Delirium  Goal: Improved Behavioral Control  Outcome: Progressing  Intervention: Minimize Safety Risk  Recent Flowsheet Documentation  Taken 11/7/2023 0100 by Cat Valdes RN  Enhanced Safety Measures: room near unit station  Goal: Improved Attention and Thought Clarity  Outcome: Progressing  Goal: Improved Sleep  Outcome: Progressing

## 2023-11-07 NOTE — PROVIDER NOTIFICATION
Paged lauri chavez MD via [employer]:       FYI- call just received from infectious disease lab. BC drawn 11/6  at 2353 are showing Staphylococcus Epidermidis.     No further orders at this time

## 2023-11-09 ENCOUNTER — PATIENT OUTREACH (OUTPATIENT)
Dept: CARE COORDINATION | Facility: CLINIC | Age: 78
End: 2023-11-09
Payer: COMMERCIAL

## 2023-11-09 LAB — BACTERIA BLD CULT: NO GROWTH

## 2023-11-09 NOTE — PROGRESS NOTES
Clinic Care Coordination Contact  Care Team Conversations    Patient identified for care management outreach, however [facility] RN staff has already followed up with patient to ensure they are following up with PCP and have needs and resources met. Clinic RN will refer back to PETER HOWELL if needed/appropriate.    Priti Salazar, [facility]  Social Work Care Coordinator - [facility]  Care Coordination  [email or web address]  Cell Phone: [phone]  Gender pronouns: she/her  Employed by [facility]

## 2023-11-22 ENCOUNTER — LAB REQUISITION (OUTPATIENT)
Dept: LAB | Facility: CLINIC | Age: 78
End: 2023-11-22
Payer: COMMERCIAL

## 2023-11-22 DIAGNOSIS — K57.20 DIVERTICULITIS OF LARGE INTESTINE WITH PERFORATION AND ABSCESS WITHOUT BLEEDING: ICD-10-CM

## 2023-11-22 LAB
C DIFF GDH STL QL IA: POSITIVE
C DIFF TOX A+B STL QL IA: POSITIVE
C DIFF TOX B STL QL: POSITIVE

## 2023-11-22 PROCEDURE — 87493 C DIFF AMPLIFIED PROBE: CPT | Mod: ORL | Performed by: FAMILY MEDICINE

## 2023-11-22 PROCEDURE — 87324 CLOSTRIDIUM AG IA: CPT | Mod: ORL | Performed by: FAMILY MEDICINE

## 2023-11-27 ENCOUNTER — LAB REQUISITION (OUTPATIENT)
Dept: LAB | Facility: CLINIC | Age: 78
End: 2023-11-27
Payer: COMMERCIAL

## 2023-11-27 DIAGNOSIS — A04.72 ENTEROCOLITIS DUE TO CLOSTRIDIUM DIFFICILE, NOT SPECIFIED AS RECURRENT: ICD-10-CM

## 2023-11-27 LAB — C DIFF TOX B STL QL: NEGATIVE

## 2023-11-27 PROCEDURE — 87493 C DIFF AMPLIFIED PROBE: CPT | Mod: ORL | Performed by: FAMILY MEDICINE

## 2023-12-06 ENCOUNTER — VIRTUAL VISIT (OUTPATIENT)
Dept: PHARMACY | Facility: PHYSICIAN GROUP | Age: 78
End: 2023-12-06
Payer: COMMERCIAL

## 2023-12-06 DIAGNOSIS — A04.72 CLOSTRIDIUM DIFFICILE DIARRHEA: ICD-10-CM

## 2023-12-06 DIAGNOSIS — G25.81 RESTLESS LEGS SYNDROME (RLS): ICD-10-CM

## 2023-12-06 DIAGNOSIS — N39.0 ACUTE UTI: ICD-10-CM

## 2023-12-06 DIAGNOSIS — D50.9 IRON DEFICIENCY ANEMIA, UNSPECIFIED IRON DEFICIENCY ANEMIA TYPE: Primary | ICD-10-CM

## 2023-12-06 PROCEDURE — 99207 PR NO CHARGE LOS: CPT | Performed by: PHARMACIST

## 2023-12-06 NOTE — PROGRESS NOTES
Medication Therapy Management (MTM) Encounter    ASSESSMENT:                          -----------------------------------------  .    Medication Adherence/Access: See below for considerations   -----------------------------------------  .  UTI/CDIff: Resolved. Recommend deep cleaning of bathroom facilities to prevent CDiff Re-infection.  -----------------------------------------  .    Anemia: Education provided on iron rich foods to continue to focus on to improve anemia. Will resend food list of iron-rich foods for patient to consider - likes oatmeal and will start with this. May consider infusion if anemia remains.  -----------------------------------------  .  RLS: Improving. Believe this to be due to moving ropinerole to several hours before bedtime - patient to verify this.. Also expect gabapentin at bed time to continue to manage symptoms throughout the night. May consider slight increase in ropinerole or dividing gabapentin dose in the future if changing ropinirole timing not fully effective. Educated patient today on importance of correcting anemia which may be contributing to RLS symptoms.  May consider vitamin B-12 level in the future if correcting anemia does not improve RLS.   -----------------------------------------  .        PLAN:                              Continue to work on incorporating iron-rich foods into your diet. Oatmeal, Cream of Wheat and other cereals are often great sources in addition to red meat, green leafy vegetables, and beans (see attached list).    We had your Assisted Living nurses move ropinirole to your evening dosing pack to help it work sooner and prevent your restless legs symptoms earlier in the day - please verify this was done as orders were sent to do so in November. We will keep gabapentin and bedtime to help further with those symptoms as you sleep. By getting more iron in your diet I also expect your restless legs symptoms to improve.    Have your bathroom deep  cleaned now that your C Diff infection has resolved to prevent future infections and spread.      Follow-up: 1/2/24 at 11:15 am in person with Ami Noriega MD at [facility]. And 1/16/2023 at 10:45 am in person with Ami Noriega MD at [facility].      2/27/24 at 10:30 am for  Follow up phone visit with Rochelle Paula, Medication Therapy Management pharmacist.    SUBJECTIVE/OBJECTIVE:                          Zakiya Christian is a 78 year old female called for a follow up  visit. Today's visit is a follow up from 11/4/23. Patient was discharged from [facility] 11/7/23 for UTI and C.Diff Infection.    Reason for visit: Medication Therapy Management - Anemia, Fatigue, Depression.    Allergies/ADRs: Reviewed in chart  Past Medical History: Reviewed in chart  Tobacco: She reports that she has quit smoking. Her smoking use included cigarettes. She has never used smokeless tobacco.  Alcohol: Less than 1 beverage / month  Caffeine: Diet Yg Tea 3-4 bottles daily      Medication Adherence/Access: Patient is in assisted living where pill packs are filled and given to patient daily to manage.  Pharmacy supporting assisted living - [facility]. Full medication reconciliation completed today - JULIO.  -----------------------------------------  .  UTI/CDiff:  Has finished antibiotics after both UTI and CDiff infection (patient unsure which antibiotics, but affirms she completed all courses of antibiotics given to her). Feeling much better after being home and finishing courses of antibiotics. Denies signs and symptoms of UTI or CDiff. Had provided stool sample which was negative for CDiff after being on antibiotics for several days, since then, completed therapy. Appropriately stopped Miralax due to diarrhea.  -----------------------------------------  .    Anemia:  Patient has tried Vitron C several times with food and before bed - not able to tolerate. Is focusing  on eating iron rich foods such as fortified cereal and vegetables. Somewhat difficult to manage as eats 1 meal per day at Assisted Living (little control over menu). Had been working off of list of iron-rich foods provided by Medication Therapy Management but lost this after being home from hospital.     Hemoglobin 6/30/23 - 10.0 g/dL  Hemoglobin 9/25/23 - 10.7 g/dL  -----------------------------------------  .  RLS:   Ropinerole 0.25 mg once daily at about 7pm (evening meds dispensed at that time)  Gabapentin 300 mg capsule 3 capsules at bedtime    Patient unclear if ropinerole has been moved earlier in the evening (was taking at bedtime with gabapentin prior to Medication Therapy Management visit 11/1/23). Due to being sick with CDiff, not sure which meds have been adjusted. Does feel that RLS symptoms somewhat improved over past month, however so wonders if it did get adjusted.  Denies side effects including dizziness, oversedation, edema.    Magnesium (6/4/23) -1.6 mg/dL  -----------------------------------------  .      Last clinic visit vitals: There were no vitals taken for this visit. -Virtual visit    ----------------  Post Discharge Medication Reconciliation Status: discharge medications reconciled, continue medications without change.      I spent  13 minutes with this patient today. All changes were made via collaborative practice agreement with Ami Noriega MD. A copy of the visit note was provided to the patient's provider(s).    A summary of these recommendations was mailed to the patient.  No updates to med list - nothing faxed to [facility] today.    Rochelle Paula, Pharm D., MPH  MTM Pharmacist - [facility]  Secure Voicemail: [phone]  In Office: Monday - Thursday        Telemedicine Visit Details  Type of service:  Telephone visit  Start Time: 10:43 AM  End Time: 10:56 AM         Medication Therapy Recommendations  No medication therapy recommendations to display

## 2023-12-06 NOTE — PATIENT INSTRUCTIONS
"Recommendations from MTM Pharmacist visit:                                                    MTM (medication therapy management) is a service provided by a clinical pharmacist designed to help you get the most of out of your medicines.  You may be sent a phone or email survey evaluating today's visit.  Please provide feedback you have for the service he received today if you are able.      Continue to work on incorporating iron-rich foods into your diet. Oatmeal, Cream of Wheat and other cereals are often great sources in addition to red meat, green leafy vegetables, and beans (see attached list).    We had your Assisted Living nurses move ropinirole to your evening dosing pack to help it work sooner and prevent your restless legs symptoms earlier in the day - please verify this was done as orders were sent to do so in November. We will keep gabapentin and bedtime to help further with those symptoms as you sleep. By getting more iron in your diet I also expect your restless legs symptoms to improve.    Have your bathroom deep cleaned now that your C Diff infection has resolved to prevent future infections and spread.      Follow-up: 1/2/24 at 11:15 am in person with Ami Noriega MD at [facility]. And 1/16/2023 at 10:45 am in person with Ami Noriega MD at [facility].      2/27/24 at 10:30 am for  Follow up phone visit with Rochelle Paula Medication Therapy Management pharmacist.      It was great speaking with you today.  I value your experience and would be very thankful for your time in providing feedback in our clinic survey. In the next few days, you may receive an email or text message from [facility] [employer] with a link to a survey related to your  clinical pharmacist.\"     To schedule another MTM appointment, please call the clinic directly at [phone]. You may also schedule with me at the clinic .     My Clinical Pharmacist's contact " information:                                                      Please feel free to contact me with any questions or concerns you have.      Rochelle Paula, Pharm D., MPH  MTM Pharmacist - [facility]  Secure Voicemail: [phone]  Days in the office: Monday - Thursday

## 2024-01-02 ENCOUNTER — TRANSFERRED RECORDS (OUTPATIENT)
Dept: HEALTH INFORMATION MANAGEMENT | Facility: CLINIC | Age: 79
End: 2024-01-02

## 2024-01-29 ENCOUNTER — LAB REQUISITION (OUTPATIENT)
Dept: LAB | Facility: CLINIC | Age: 79
End: 2024-01-29
Payer: COMMERCIAL

## 2024-01-29 DIAGNOSIS — D64.9 ANEMIA, UNSPECIFIED: ICD-10-CM

## 2024-01-30 LAB — HGB BLD-MCNC: 9.2 G/DL (ref 11.7–15.7)

## 2024-01-30 PROCEDURE — 85018 HEMOGLOBIN: CPT | Mod: ORL | Performed by: FAMILY MEDICINE

## 2024-01-30 PROCEDURE — 36415 COLL VENOUS BLD VENIPUNCTURE: CPT | Mod: ORL | Performed by: FAMILY MEDICINE

## 2024-01-30 PROCEDURE — P9604 ONE-WAY ALLOW PRORATED TRIP: HCPCS | Mod: ORL | Performed by: FAMILY MEDICINE

## 2024-02-27 ENCOUNTER — VIRTUAL VISIT (OUTPATIENT)
Dept: PHARMACY | Facility: PHYSICIAN GROUP | Age: 79
End: 2024-02-27
Payer: COMMERCIAL

## 2024-02-27 DIAGNOSIS — G25.81 RESTLESS LEGS SYNDROME (RLS): ICD-10-CM

## 2024-02-27 DIAGNOSIS — D50.9 IRON DEFICIENCY ANEMIA, UNSPECIFIED IRON DEFICIENCY ANEMIA TYPE: Primary | ICD-10-CM

## 2024-02-27 DIAGNOSIS — K59.04 CHRONIC IDIOPATHIC CONSTIPATION: ICD-10-CM

## 2024-02-27 DIAGNOSIS — I10 BENIGN ESSENTIAL HYPERTENSION: ICD-10-CM

## 2024-02-27 DIAGNOSIS — E11.9 TYPE 2 DIABETES MELLITUS WITHOUT COMPLICATION, WITHOUT LONG-TERM CURRENT USE OF INSULIN (H): ICD-10-CM

## 2024-02-27 DIAGNOSIS — J44.9 CHRONIC OBSTRUCTIVE PULMONARY DISEASE, UNSPECIFIED COPD TYPE (H): ICD-10-CM

## 2024-02-27 DIAGNOSIS — K21.9 GASTROESOPHAGEAL REFLUX DISEASE WITHOUT ESOPHAGITIS: ICD-10-CM

## 2024-02-27 DIAGNOSIS — M35.3 POLYMYALGIA RHEUMATICA (H): ICD-10-CM

## 2024-02-27 DIAGNOSIS — J30.2 SEASONAL ALLERGIC RHINITIS, UNSPECIFIED TRIGGER: ICD-10-CM

## 2024-02-27 DIAGNOSIS — M81.0 AGE-RELATED OSTEOPOROSIS WITHOUT CURRENT PATHOLOGICAL FRACTURE: ICD-10-CM

## 2024-02-27 PROCEDURE — 99207 PR NO CHARGE LOS: CPT | Mod: 93 | Performed by: PHARMACIST

## 2024-02-27 NOTE — PATIENT INSTRUCTIONS
"Recommendations from MTM Pharmacist visit:                                                    MTM (medication therapy management) is a service provided by a clinical pharmacist designed to help you get the most of out of your medicines.  You may be sent a phone or email survey evaluating today's visit.  Please provide feedback you have for the service he received today if you are able.        Come to clinic for lab-only visit to get Iron studies and CBC (scheduled for 3/4/24 at 10:30 am at [facility]). Continue to work on incorporating iron-rich foods into your diet. Oatmeal, Cream of Wheat and other cereals are often great sources in addition to red meat, green leafy vegetables, and beans (see attached list).    Check on the cost of IV iron infusion with your insurance to improve your low iron and hemoglobin.    Place your Flonase nasal spray somewhere obvious to help you remember to restart taking it 2 sprays once daily in the a.m.    We sent a new prescription list to your Assisted Living Facility to update corrected directions on your Trelegy inhaler which you are taking correctly at 1 puff daily.      Follow-up: 4/4/24 at 10:15 am in person with Ami Noriega MD at [facility].       It was great speaking with you today.  I value your experience and would be very thankful for your time in providing feedback in our clinic survey. In the next few days, you may receive an email or text message from [employer] with a link to a survey related to your  clinical pharmacist.\"     To schedule another MTM appointment, please call the clinic directly at [phone]. You may also schedule with me at the clinic .     My Clinical Pharmacist's contact information:                                                      Please feel free to contact me with any questions or concerns you have.      Rochelle Paula, Pharm D., MPH  MTM Pharmacist - [facility]  Secure " Voicemail: [phone]  Days in the office: Monday - Thursday

## 2024-02-27 NOTE — PROGRESS NOTES
Medication Therapy Management (MTM) Encounter    ASSESSMENT:                          -----------------------------------------  .    Medication Adherence/Access: See below for considerations     -----------------------------------------  .  Anemia: Not at goal. Fatigue and leg pain likely secondary to low Hemoglobin.  Patient not able to maintain diet high enough in Fe and cannot tolerate oral iron. Patient has not had recent iron studies - recommend rechecking and if needed, Recommend IV repletion. Patient is hesitant due to cost. Will look into cost and discuss with PCP as review iron studies (to be drawn at clinic as soon as able). Recommend to continue to focus on foods high in iron for now.  -----------------------------------------  .  RLS: Stable. May consider increase in ropinerole at future if needed. Per patient, symptoms of RLS stable - PMR needs improvement.  -----------------------------------------  .      Polymyalgia rheumatica/rash: Defer to rheumatology-  may consider referral to another rheumatologist who is convenient for patient due to relying on others for transportation.  -----------------------------------------  .    Anxiety/depression: Stable. Expect improved pain management to improve mental health as well (See above).    -----------------------------------------  .  Constipation: Stable.  -----------------------------------------  .  COPD/Allergies: Recommend restarting Flonase to rule out post-nasal drip. May consider duoneb for as needed rescue as SARAH may be more effective for reducing need for rescue. Correct dose of Trelegy is 1 puff daily - med list to be signed and updated by PCP to be sent to [facility] today.    -----------------------------------------  .  Hyperlipidemia: LDL not at goal <100 mg/dl - due for Lipid panel after switching from pravastatin to rosuvastatin. Do not believe patient to be experiencing myalgia from statin; believe PMR related pain - continue to  monitor.  -----------------------------------------  .  Hypertension: Stable.      -----------------------------------------  .    GERD/nausea: Due to significant GI surgical history, recommend to continue proton pump inhibitor and as needed ondansetron. Though proton pump inhibitor may reduce bone density, patient has history of severe GERD and nausea improved with proton pump inhibitor.     -----------------------------------------  .  Osteoporosis: At goal on bisphosphonate - DEXA stable, do not recommend drug holiday at this point as R Femur still indicates osteoporosis, no improvement. Recommend to continue alendronate to stabilize osteoporosis, may consider Reclast as more potent and more convenient. Meeting NOF goal of 1200 mg Ca daily and vitamin D 1000 international units daily.  Due for VitaminD level at future visit.    -----------------------------------------  .    Type 2 Diabetes:  A1C at goal <7%.    PLAN:                              Come to clinic for lab-only visit to get Iron studies and CBC (scheduled for 3/4/24 at 10:30 am at [facility]). Continue to work on incorporating iron-rich foods into your diet. Oatmeal, Cream of Wheat and other cereals are often great sources in addition to red meat, green leafy vegetables, and beans (see attached list).    Check on the cost of IV iron infusion with your insurance to improve your low iron and hemoglobin.    Place your Flonase nasal spray somewhere obvious to help you remember to restart taking it 2 sprays once daily in the a.m.    We sent a new prescription list to your Assisted Living Facility to update corrected directions on your Trelegy inhaler which you are taking correctly at 1 puff daily.      Follow-up: 4/4/24 at 10:15 am in person with Ami Noriega MD at [facility].     SUBJECTIVE/OBJECTIVE:                          Zakiya Christian is a 79 year old female called for a follow up  visit. Today's  visit is a follow up from 12/6/23.    Reason for visit: Medication Therapy Management - Restless Legs, Anemia    Allergies/ADRs: Reviewed in chart  Past Medical History: Reviewed in chart  Tobacco: She reports that she has quit smoking. Her smoking use included cigarettes. She has never used smokeless tobacco.  Alcohol: Less than 1 beverage / month  Caffeine: Diet Yg Tea 3-4 bottles daily      Medication Adherence/Access: Patient is in assisted living where pill packs are filled and given to patient daily to manage.  Pharmacy supporting assisted living - [facility].   -----------------------------------------  .      Anemia:  Patient has tried Vitron C several times with food and before bed - not able to tolerate. Is focusing on eating iron rich foods such as fortified cereal and vegetables, but this is hard for patient to remember to do.  Somewhat difficult to manage as eats 1 meal per day at Assisted Living (little control over menu).  NOtes fatigue and leg pain. Xarelto stopped 1/2/24 by PCP due to risk of GI bleed contributing to anemia.    Hemoglobin 6/30/23 - 10.0 g/dL  Hemoglobin 9/25/23 - 10.7 g/dL  Hemoglobin 1/2/24 - 9.9 g/dL  Hemoglobin 1/30/24 -9.2 g/dL  -----------------------------------------  .  RLS:   Ropinerole 0.25 mg once daily at about 7pm (evening meds dispensed at that time)  Gabapentin 300 mg capsule 3 capsules at bedtime    Does feel that RLS symptoms improved with ropinerole with dinner. PMR (see below) remains frustrating and painful.  Denies side effects including dizziness, oversedation, edema.    Magnesium (6/4/23) -1.6 mg/dL  -----------------------------------------  .      Polymyalgia rheumatica/rash:   Azathioprine 50 mg 3 tablets once daily  Prednisone 5 mg for 7 days (last dose today)  Nystatin 100 units/g cream topically as needed for rash    Follows with Dr. Gonzalez at [place] [facility].  Has visit next week to explore options.  Working to taper off prednisone (slow  taper) and azathioprine. Worked to get off prednisone, but pain flared and now on 7 day burst to help.  Patient feels current regimen not working well -is considering second opinion.  Denies side effects.  -----------------------------------------  .        Anxiety/depression:   Fluoxetine 40 mg once daily    Patient feels that mood ebbs and flows.  Currently stable, however occasionally has days where feeling more depressed and isolated, feels currently, anxiety is well managed. Most bothersome symptoms are fatigue and overall flat affect. No side effects reported.        9/12/2023    11:26 AM   PHQ   PHQ-9 Total Score 10   Q9: Thoughts of better off dead/self-harm past 2 weeks Not at all         9/12/2023    11:26 AM   AG-7 SCORE   Total Score 4         -----------------------------------------  .  Constipation:   MiraLAX 17 g - 1/2 - 1 capful every other day as needed   FiberCon tablets - 2 tablets twice daily    Patient has colostomy bag. Frustrated that cannot find a regimen that is stable to help with consisent bowel movements. Current regimen is most helpful as has been in recent memory.   -----------------------------------------  .  COPD/Allergies:   ICS/LAMA/LABA - Trelegy Ellipta 200/62.5/25 mcg - one puff once daily  Albuterol (ProAir/Ventolin/Proventil) - as needed  Flonase 50 mcg/act - 2 sprays once daily in nose      Side effects: None.    Patient reports the following symptoms: increased use of albuterol due to cough.   Patient forgets to take Flonase, notes she hasn't taken in recent memory. Unsure if cough is related to postnasal drip. Med list for Trelegy states to take 2 puffs once daily - patient takes one.    -----------------------------------------  .  Hyperlipidemia:   rosuvastatin 10 mg daily    Patient reports the following medication side effects: aches in legs, but patient notes this pain is more similar to her PMR and is managed with prednisone; denies myalgias in large muscle groups  like back, shoulders, neck      Cholesterol   Lab Test 09/25/23     CHOL 187   HDL 59   *   TRIG 85       -----------------------------------------  .  Hypertension:   Carvedilol 12.5 mg tablet -1 tab twice daily  Losartan 25 mg tab- 1/2 tablet daily    Patient reports no current medication side effects.  Patient has a history of falls.  Denies side dizziness, lightheadedness or falls currently.  Patient has occasional blood pressure readings at assisted living.  Does not recall values.  Last Comprehensive Metabolic Panel: 11/7/23  Lab Results   Component Value        POTASSIUM 5.0    CHLORIDE 105    CO2 27    ANIONGAP 9     (H)    BUN 15.7    CR 0.92    GFRESTIMATED 63    VICENTA 8.5 (L)       -----------------------------------------  .    GERD/nausea:   Omeprazole 40 mg once daily   Ondansetron 4 mg disintegrating tablet every 6 hours as needed    Patient reports no current symptoms.   The patient does not have a history of GI bleed. Patient feels that current regimen is effective.    -----------------------------------------  .  Osteoporosis:   calcium 600 mg twice daily   Vitamin D 1000 international units once daily    alendronate (Fosamax) 70mg weekly (has been on current therapy 6/2018)    Patient is not experiencing side effects.  Patient takes alendronate on empty stomach with full glass of water and remains seated upright for 30 minutes after taking  Patient is getting approximately 300-600 mg/day of calcium in their diet.  Risk factors: post-menopausal  recent fracture  chronic PPI use  Vitamin D Deficiency Screening Results:  Lab Results   Component Value Date    VITDT 74 04/08/2022         -----------------------------------------  .    Type 2 Diabetes:    Diet controlled.    Current diabetes symptoms: none  Diet/Exercise: Controls with monitoring diet and is active as she can be.  Eye exam: due  Foot exam: due  Urine Albumin: Due  A1c (1/2/24): 5.6%        Last clinic visit vitals:  There were no vitals taken for this visit. -Virtual visit    ----------------        I spent 37 minutes with this patient today. All changes were made via collaborative practice agreement with Ami Noriega MD. A copy of the visit note was provided to the patient's provider(s).    A summary of these recommendations was mailed to the patient.  No updates to med list - nothing faxed to [facility] today.    Rochelle Paula, Pharm D., MPH  MTM Pharmacist - [facility]  Secure Voicemail: [phone]  In Office: Monday - Thursday        Telemedicine Visit Details  Type of service:  Telephone visit  Start Time: 10:32 AM  End Time: 11:09 AM           Medication Therapy Recommendations  Iron deficiency anemia, unspecified iron deficiency anemia type    Rationale: Untreated condition - Needs additional medication therapy - Indication   Recommendation: Start Medication - IV Iron   Status: Accepted per Provider         Seasonal allergic rhinitis, unspecified trigger    Current Medication: fluticasone (FLONASE) 50 MCG/ACT nasal spray   Rationale: Patient forgets to take - Adherence - Adherence   Recommendation: Provide Adherence Intervention   Status: Patient Agreed - Adherence/Education

## 2024-03-04 ENCOUNTER — LAB REQUISITION (OUTPATIENT)
Dept: LAB | Facility: CLINIC | Age: 79
End: 2024-03-04
Payer: COMMERCIAL

## 2024-03-04 DIAGNOSIS — E11.40 TYPE 2 DIABETES MELLITUS WITH DIABETIC NEUROPATHY, UNSPECIFIED (H): ICD-10-CM

## 2024-03-04 DIAGNOSIS — E78.2 MIXED HYPERLIPIDEMIA: ICD-10-CM

## 2024-03-04 LAB
FERRITIN SERPL-MCNC: 16 NG/ML (ref 11–328)
IRON BINDING CAPACITY (ROCHE): 385 UG/DL (ref 240–430)
IRON SATN MFR SERPL: 25 % (ref 15–46)
IRON SERPL-MCNC: 97 UG/DL (ref 37–145)
VIT B12 SERPL-MCNC: 295 PG/ML (ref 232–1245)

## 2024-03-04 PROCEDURE — 82728 ASSAY OF FERRITIN: CPT | Mod: ORL | Performed by: FAMILY MEDICINE

## 2024-03-04 PROCEDURE — 83550 IRON BINDING TEST: CPT | Mod: ORL | Performed by: FAMILY MEDICINE

## 2024-03-04 PROCEDURE — 82607 VITAMIN B-12: CPT | Mod: ORL | Performed by: FAMILY MEDICINE

## 2024-03-04 PROCEDURE — 83540 ASSAY OF IRON: CPT | Mod: ORL | Performed by: FAMILY MEDICINE

## 2024-04-04 ENCOUNTER — LAB REQUISITION (OUTPATIENT)
Dept: LAB | Facility: CLINIC | Age: 79
End: 2024-04-04
Payer: COMMERCIAL

## 2024-04-04 DIAGNOSIS — E11.22 TYPE 2 DIABETES MELLITUS WITH DIABETIC CHRONIC KIDNEY DISEASE (H): ICD-10-CM

## 2024-04-04 PROCEDURE — 80048 BASIC METABOLIC PNL TOTAL CA: CPT | Mod: ORL | Performed by: FAMILY MEDICINE

## 2024-04-05 LAB
ANION GAP SERPL CALCULATED.3IONS-SCNC: 10 MMOL/L (ref 7–15)
BUN SERPL-MCNC: 14.7 MG/DL (ref 8–23)
CALCIUM SERPL-MCNC: 9.4 MG/DL (ref 8.8–10.2)
CHLORIDE SERPL-SCNC: 103 MMOL/L (ref 98–107)
CREAT SERPL-MCNC: 1.05 MG/DL (ref 0.51–0.95)
DEPRECATED HCO3 PLAS-SCNC: 29 MMOL/L (ref 22–29)
EGFRCR SERPLBLD CKD-EPI 2021: 53 ML/MIN/1.73M2
GLUCOSE SERPL-MCNC: 131 MG/DL (ref 70–99)
POTASSIUM SERPL-SCNC: 4.6 MMOL/L (ref 3.4–5.3)
SODIUM SERPL-SCNC: 142 MMOL/L (ref 135–145)

## 2024-04-18 ENCOUNTER — LAB REQUISITION (OUTPATIENT)
Dept: LAB | Facility: CLINIC | Age: 79
End: 2024-04-18
Payer: COMMERCIAL

## 2024-04-18 PROCEDURE — 87086 URINE CULTURE/COLONY COUNT: CPT | Mod: ORL | Performed by: FAMILY MEDICINE

## 2024-04-20 LAB — BACTERIA UR CULT: ABNORMAL

## 2024-05-06 ENCOUNTER — LAB REQUISITION (OUTPATIENT)
Dept: LAB | Facility: CLINIC | Age: 79
End: 2024-05-06
Payer: COMMERCIAL

## 2024-05-06 DIAGNOSIS — R30.0 DYSURIA: ICD-10-CM

## 2024-05-06 PROCEDURE — 87086 URINE CULTURE/COLONY COUNT: CPT | Mod: ORL | Performed by: STUDENT IN AN ORGANIZED HEALTH CARE EDUCATION/TRAINING PROGRAM

## 2024-05-07 LAB — BACTERIA UR CULT: NORMAL

## 2024-06-11 ENCOUNTER — LAB REQUISITION (OUTPATIENT)
Dept: LAB | Facility: CLINIC | Age: 79
End: 2024-06-11
Payer: COMMERCIAL

## 2024-06-11 DIAGNOSIS — R50.9 FEVER, UNSPECIFIED: ICD-10-CM

## 2024-06-11 PROCEDURE — 87086 URINE CULTURE/COLONY COUNT: CPT | Mod: ORL

## 2024-06-13 LAB — BACTERIA UR CULT: NORMAL

## 2024-06-20 ENCOUNTER — LAB REQUISITION (OUTPATIENT)
Dept: LAB | Facility: CLINIC | Age: 79
End: 2024-06-20
Payer: COMMERCIAL

## 2024-06-20 ENCOUNTER — TRANSFERRED RECORDS (OUTPATIENT)
Dept: MULTI SPECIALTY CLINIC | Facility: CLINIC | Age: 79
End: 2024-06-20

## 2024-06-20 DIAGNOSIS — Z01.818 ENCOUNTER FOR OTHER PREPROCEDURAL EXAMINATION: ICD-10-CM

## 2024-06-20 LAB — HBA1C MFR BLD: 6.3 % (ref 4.2–6.1)

## 2024-06-20 PROCEDURE — 80048 BASIC METABOLIC PNL TOTAL CA: CPT | Mod: ORL | Performed by: FAMILY MEDICINE

## 2024-06-21 LAB
ANION GAP SERPL CALCULATED.3IONS-SCNC: 11 MMOL/L (ref 7–15)
BUN SERPL-MCNC: 16.7 MG/DL (ref 8–23)
CALCIUM SERPL-MCNC: 9 MG/DL (ref 8.8–10.2)
CHLORIDE SERPL-SCNC: 102 MMOL/L (ref 98–107)
CREAT SERPL-MCNC: 1.26 MG/DL (ref 0.51–0.95)
DEPRECATED HCO3 PLAS-SCNC: 25 MMOL/L (ref 22–29)
EGFRCR SERPLBLD CKD-EPI 2021: 43 ML/MIN/1.73M2
GLUCOSE SERPL-MCNC: 137 MG/DL (ref 70–99)
POTASSIUM SERPL-SCNC: 4.5 MMOL/L (ref 3.4–5.3)
SODIUM SERPL-SCNC: 138 MMOL/L (ref 135–145)

## 2024-06-26 ENCOUNTER — ANESTHESIA EVENT (OUTPATIENT)
Dept: SURGERY | Facility: CLINIC | Age: 79
End: 2024-06-26
Payer: COMMERCIAL

## 2024-06-27 ENCOUNTER — HOSPITAL ENCOUNTER (OUTPATIENT)
Facility: CLINIC | Age: 79
Discharge: HOME | End: 2024-06-27
Attending: INTERNAL MEDICINE | Admitting: INTERNAL MEDICINE
Payer: COMMERCIAL

## 2024-06-27 ENCOUNTER — ANESTHESIA (OUTPATIENT)
Dept: SURGERY | Facility: CLINIC | Age: 79
End: 2024-06-27
Payer: COMMERCIAL

## 2024-06-27 VITALS
RESPIRATION RATE: 16 BRPM | SYSTOLIC BLOOD PRESSURE: 132 MMHG | BODY MASS INDEX: 37.21 KG/M2 | OXYGEN SATURATION: 95 % | TEMPERATURE: 97.6 F | HEART RATE: 71 BPM | WEIGHT: 210 LBS | DIASTOLIC BLOOD PRESSURE: 64 MMHG | HEIGHT: 63 IN

## 2024-06-27 LAB
GLUCOSE BLDC GLUCOMTR-MCNC: 85 MG/DL (ref 70–99)
UPPER GI ENDOSCOPY: NORMAL

## 2024-06-27 PROCEDURE — 82962 GLUCOSE BLOOD TEST: CPT

## 2024-06-27 PROCEDURE — 272N000001 HC OR GENERAL SUPPLY STERILE: Performed by: INTERNAL MEDICINE

## 2024-06-27 PROCEDURE — 250N000009 HC RX 250: Performed by: ANESTHESIOLOGY

## 2024-06-27 PROCEDURE — 250N000011 HC RX IP 250 OP 636: Performed by: ANESTHESIOLOGY

## 2024-06-27 PROCEDURE — 999N000141 HC STATISTIC PRE-PROCEDURE NURSING ASSESSMENT: Performed by: INTERNAL MEDICINE

## 2024-06-27 PROCEDURE — 258N000003 HC RX IP 258 OP 636: Performed by: ANESTHESIOLOGY

## 2024-06-27 PROCEDURE — 360N000075 HC SURGERY LEVEL 2, PER MIN: Performed by: INTERNAL MEDICINE

## 2024-06-27 PROCEDURE — 710N000012 HC RECOVERY PHASE 2, PER MINUTE: Performed by: INTERNAL MEDICINE

## 2024-06-27 PROCEDURE — 88305 TISSUE EXAM BY PATHOLOGIST: CPT | Mod: TC | Performed by: INTERNAL MEDICINE

## 2024-06-27 PROCEDURE — 370N000017 HC ANESTHESIA TECHNICAL FEE, PER MIN: Performed by: INTERNAL MEDICINE

## 2024-06-27 RX ORDER — SIMETHICONE 40MG/0.6ML
133 SUSPENSION, DROPS(FINAL DOSAGE FORM)(ML) ORAL
Status: DISCONTINUED | OUTPATIENT
Start: 2024-06-27 | End: 2024-06-27 | Stop reason: HOSPADM

## 2024-06-27 RX ORDER — SODIUM CHLORIDE, SODIUM LACTATE, POTASSIUM CHLORIDE, CALCIUM CHLORIDE 600; 310; 30; 20 MG/100ML; MG/100ML; MG/100ML; MG/100ML
INJECTION, SOLUTION INTRAVENOUS CONTINUOUS
Status: DISCONTINUED | OUTPATIENT
Start: 2024-06-27 | End: 2024-06-27 | Stop reason: HOSPADM

## 2024-06-27 RX ORDER — NALOXONE HYDROCHLORIDE 0.4 MG/ML
0.4 INJECTION, SOLUTION INTRAMUSCULAR; INTRAVENOUS; SUBCUTANEOUS
Status: DISCONTINUED | OUTPATIENT
Start: 2024-06-27 | End: 2024-06-27 | Stop reason: HOSPADM

## 2024-06-27 RX ORDER — PROCHLORPERAZINE MALEATE 5 MG
5 TABLET ORAL EVERY 6 HOURS PRN
Status: DISCONTINUED | OUTPATIENT
Start: 2024-06-27 | End: 2024-06-27 | Stop reason: HOSPADM

## 2024-06-27 RX ORDER — ONDANSETRON 4 MG/1
4 TABLET, ORALLY DISINTEGRATING ORAL EVERY 6 HOURS PRN
Status: DISCONTINUED | OUTPATIENT
Start: 2024-06-27 | End: 2024-06-27 | Stop reason: HOSPADM

## 2024-06-27 RX ORDER — FLUMAZENIL 0.1 MG/ML
0.2 INJECTION, SOLUTION INTRAVENOUS
Status: DISCONTINUED | OUTPATIENT
Start: 2024-06-27 | End: 2024-06-27 | Stop reason: HOSPADM

## 2024-06-27 RX ORDER — NALOXONE HYDROCHLORIDE 0.4 MG/ML
0.1 INJECTION, SOLUTION INTRAMUSCULAR; INTRAVENOUS; SUBCUTANEOUS
Status: DISCONTINUED | OUTPATIENT
Start: 2024-06-27 | End: 2024-06-27 | Stop reason: HOSPADM

## 2024-06-27 RX ORDER — ATROPINE SULFATE 0.1 MG/ML
1 INJECTION INTRAVENOUS
Status: DISCONTINUED | OUTPATIENT
Start: 2024-06-27 | End: 2024-06-27 | Stop reason: HOSPADM

## 2024-06-27 RX ORDER — LIDOCAINE HYDROCHLORIDE 10 MG/ML
INJECTION, SOLUTION INFILTRATION; PERINEURAL PRN
Status: DISCONTINUED | OUTPATIENT
Start: 2024-06-27 | End: 2024-06-27

## 2024-06-27 RX ORDER — NALOXONE HYDROCHLORIDE 0.4 MG/ML
0.2 INJECTION, SOLUTION INTRAMUSCULAR; INTRAVENOUS; SUBCUTANEOUS
Status: DISCONTINUED | OUTPATIENT
Start: 2024-06-27 | End: 2024-06-27 | Stop reason: HOSPADM

## 2024-06-27 RX ORDER — OXYCODONE HYDROCHLORIDE 5 MG/1
5 TABLET ORAL
Status: DISCONTINUED | OUTPATIENT
Start: 2024-06-27 | End: 2024-06-27 | Stop reason: HOSPADM

## 2024-06-27 RX ORDER — ONDANSETRON 2 MG/ML
4 INJECTION INTRAMUSCULAR; INTRAVENOUS EVERY 6 HOURS PRN
Status: DISCONTINUED | OUTPATIENT
Start: 2024-06-27 | End: 2024-06-27 | Stop reason: HOSPADM

## 2024-06-27 RX ORDER — ONDANSETRON 2 MG/ML
4 INJECTION INTRAMUSCULAR; INTRAVENOUS EVERY 30 MIN PRN
Status: DISCONTINUED | OUTPATIENT
Start: 2024-06-27 | End: 2024-06-27 | Stop reason: HOSPADM

## 2024-06-27 RX ORDER — OXYCODONE HYDROCHLORIDE 5 MG/1
10 TABLET ORAL
Status: DISCONTINUED | OUTPATIENT
Start: 2024-06-27 | End: 2024-06-27 | Stop reason: HOSPADM

## 2024-06-27 RX ORDER — PROPOFOL 10 MG/ML
INJECTION, EMULSION INTRAVENOUS PRN
Status: DISCONTINUED | OUTPATIENT
Start: 2024-06-27 | End: 2024-06-27

## 2024-06-27 RX ORDER — EPINEPHRINE 1 MG/ML
0.1 INJECTION, SOLUTION INTRAMUSCULAR; SUBCUTANEOUS
Status: DISCONTINUED | OUTPATIENT
Start: 2024-06-27 | End: 2024-06-27 | Stop reason: HOSPADM

## 2024-06-27 RX ORDER — DEXAMETHASONE SODIUM PHOSPHATE 10 MG/ML
4 INJECTION, SOLUTION INTRAMUSCULAR; INTRAVENOUS
Status: DISCONTINUED | OUTPATIENT
Start: 2024-06-27 | End: 2024-06-27 | Stop reason: HOSPADM

## 2024-06-27 RX ORDER — LIDOCAINE 40 MG/G
CREAM TOPICAL
Status: DISCONTINUED | OUTPATIENT
Start: 2024-06-27 | End: 2024-06-27 | Stop reason: HOSPADM

## 2024-06-27 RX ORDER — DIPHENHYDRAMINE HYDROCHLORIDE 50 MG/ML
25-50 INJECTION INTRAMUSCULAR; INTRAVENOUS
Status: DISCONTINUED | OUTPATIENT
Start: 2024-06-27 | End: 2024-06-27 | Stop reason: HOSPADM

## 2024-06-27 RX ORDER — FENTANYL CITRATE 50 UG/ML
50-100 INJECTION, SOLUTION INTRAMUSCULAR; INTRAVENOUS EVERY 5 MIN PRN
Status: DISCONTINUED | OUTPATIENT
Start: 2024-06-27 | End: 2024-06-27 | Stop reason: HOSPADM

## 2024-06-27 RX ORDER — ONDANSETRON 4 MG/1
4 TABLET, ORALLY DISINTEGRATING ORAL EVERY 30 MIN PRN
Status: DISCONTINUED | OUTPATIENT
Start: 2024-06-27 | End: 2024-06-27 | Stop reason: HOSPADM

## 2024-06-27 RX ADMIN — LIDOCAINE HYDROCHLORIDE 5 ML: 10 INJECTION, SOLUTION INFILTRATION; PERINEURAL at 10:00

## 2024-06-27 RX ADMIN — PROPOFOL 50 MG: 10 INJECTION, EMULSION INTRAVENOUS at 10:01

## 2024-06-27 RX ADMIN — SODIUM CHLORIDE, POTASSIUM CHLORIDE, SODIUM LACTATE AND CALCIUM CHLORIDE: 600; 310; 30; 20 INJECTION, SOLUTION INTRAVENOUS at 09:54

## 2024-06-27 RX ADMIN — PROPOFOL 50 MG: 10 INJECTION, EMULSION INTRAVENOUS at 10:00

## 2024-06-27 ASSESSMENT — COPD QUESTIONNAIRES: COPD: 1

## 2024-06-27 ASSESSMENT — ACTIVITIES OF DAILY LIVING (ADL)
ADLS_ACUITY_SCORE: 38
ADLS_ACUITY_SCORE: 36

## 2024-06-27 NOTE — H&P
GENERAL PRE-PROCEDURE:   Procedure:  EGD  Date/Time:  6/27/2024 9:12 AM    Verbal consent obtained?: Yes    Written consent obtained?: Yes    Risks and benefits: Risks, benefits and alternatives were discussed    Consent given by:  Patient  Patient states understanding of procedure being performed: Yes    Patient's understanding of procedure matches consent: Yes    Procedure consent matches procedure scheduled: Yes    Expected level of sedation:  Deep  Appropriately NPO:  Yes  ASA Class:  3  Mallampati  :  Grade 2- soft palate, base of uvula, tonsillar pillars, and portion of posterior pharyngeal wall visible  Lungs:  Lungs clear with good breath sounds bilaterally  Heart:  Normal heart sounds and rate and systolic murmur  History & Physical reviewed:  History and physical reviewed and no updates needed  Statement of review:  I have reviewed the lab findings, diagnostic data, medications, and the plan for sedation

## 2024-06-27 NOTE — ANESTHESIA POSTPROCEDURE EVALUATION
Patient: Zakiya Christian    Procedure: Procedure(s):  ESOPHAGOGASTRODUODENOSCOPY WITH BIOPSIES       Anesthesia Type:  MAC    Note:     Postop Pain Control: Uneventful            Sign Out: Well controlled pain   PONV: No   Neuro/Psych: Uneventful            Sign Out: Acceptable/Baseline neuro status   Airway/Respiratory: Uneventful            Sign Out: Acceptable/Baseline resp. status   CV/Hemodynamics: Uneventful            Sign Out: Acceptable CV status; No obvious hypovolemia; No obvious fluid overload   Other NRE: NONE   DID A NON-ROUTINE EVENT OCCUR? No           Last vitals:  Vitals Value Taken Time   /64 06/27/24 1030   Temp 36.4  C (97.6  F) 06/27/24 1030   Pulse 76 06/27/24 1022   Resp 16 06/27/24 1030   SpO2 95 % 06/27/24 1030   Vitals shown include unfiled device data.    Electronically Signed By: Loi Ya MD

## 2024-06-27 NOTE — ANESTHESIA CARE TRANSFER NOTE
Patient: Zakiya Christian    Procedure: Procedure(s):  ESOPHAGOGASTRODUODENOSCOPY WITH BIOPSIES       Diagnosis: Nausea [R11.0]  Diagnosis Additional Information: No value filed.    Anesthesia Type:   MAC     Note:    Oropharynx: oropharynx clear of all foreign objects  Level of Consciousness: drowsy  Oxygen Supplementation: room air    Independent Airway: airway patency satisfactory and stable    Vital Signs Stable: post-procedure vital signs reviewed and stable  Report to RN Given: handoff report given  Patient transferred to: Phase II    Handoff Report: Identifed the Patient, Identified the Reponsible Provider, Reviewed the pertinent medical history, Discussed the surgical course, Reviewed Intra-OP anesthesia mangement and issues during anesthesia, Set expectations for post-procedure period and Allowed opportunity for questions and acknowledgement of understanding      Vitals:  Vitals Value Taken Time   /60 06/27/24 1009   Temp 36.5  C (97.7  F) 06/27/24 1009   Pulse 67 06/27/24 1009   Resp 16 06/27/24 1009   SpO2 96 % 06/27/24 1009       Electronically Signed By: ELISA Viramontes CRNA  June 27, 2024  10:11 AM

## 2024-06-27 NOTE — ANESTHESIA PREPROCEDURE EVALUATION
Anesthesia Pre-Procedure Evaluation    Patient: Zakiya Christian   MRN: [id number] : 1945        Procedure : Procedure(s):  ESOPHAGOGASTRODUODENOSCOPY          Past Medical History:   Diagnosis Date    Depressive disorder     Diabetes (H)     Hypertension     Obese     PMR (polymyalgia rheumatica) (H24)       Past Surgical History:   Procedure Laterality Date    CHOLECYSTECTOMY      COLON SURGERY      colostomy d/t fistula hx    HYSTERECTOMY      IR ABSCESS TUBE CHANGE  2022    PICC SINGLE LUMEN PLACEMENT  2023           Allergies   Allergen Reactions    Simvastatin Hives      Social History     Tobacco Use    Smoking status: Former     Types: Cigarettes    Smokeless tobacco: Never    Tobacco comments:     Quit over 40 years ago   Substance Use Topics    Alcohol use: Yes     Comment: Very rarely      Wt Readings from Last 1 Encounters:   24 95.3 kg (210 lb)        Anesthesia Evaluation   Pt has had prior anesthetic.     No history of anesthetic complications       ROS/MED HX  ENT/Pulmonary:     (+)                      asthma    COPD,              Neurologic:  - neg neurologic ROS     Cardiovascular:     (+)  hypertension- -   -  - -                                      METS/Exercise Tolerance:     Hematologic:     (+)      anemia,          Musculoskeletal:       GI/Hepatic:     (+) GERD,                   Renal/Genitourinary:     (+) renal disease,             Endo:     (+)  type II DM,             Obesity,       Psychiatric/Substance Use:       Infectious Disease:       Malignancy:       Other:            Physical Exam    Airway        Mallampati: III   TM distance: > 3 FB   Neck ROM: full   Mouth opening: > 3 cm    Respiratory Devices and Support         Dental       (+) Minor Abnormalities - some fillings, tiny chips      Cardiovascular          Rhythm and rate: regular and normal     Pulmonary           breath sounds clear to auscultation           OUTSIDE LABS:  CBC:   Lab Results  "  Component Value Date    WBC 3.9 (L) 11/06/2023    WBC 3.4 (L) 11/05/2023    HGB 9.2 (L) 01/30/2024    HGB 9.0 (L) 11/06/2023    HCT 30.2 (L) 11/06/2023    HCT 29.4 (L) 11/05/2023     11/07/2023     11/06/2023     BMP:   Lab Results   Component Value Date     06/20/2024     04/04/2024    POTASSIUM 4.5 06/20/2024    POTASSIUM 4.6 04/04/2024    CHLORIDE 102 06/20/2024    CHLORIDE 103 04/04/2024    CO2 25 06/20/2024    CO2 29 04/04/2024    BUN 16.7 06/20/2024    BUN 14.7 04/04/2024    CR 1.26 (H) 06/20/2024    CR 1.05 (H) 04/04/2024     (H) 06/20/2024     (H) 04/04/2024     COAGS:   Lab Results   Component Value Date    PTT 29 04/13/2022    INR 1.16 (H) 04/13/2022     POC: No results found for: \"BGM\", \"HCG\", \"HCGS\"  HEPATIC:   Lab Results   Component Value Date    ALBUMIN 3.7 11/04/2023    PROTTOTAL 7.1 11/04/2023    ALT 18 11/04/2023    AST 34 11/04/2023    ALKPHOS 49 11/04/2023    BILITOTAL 0.4 11/04/2023     OTHER:   Lab Results   Component Value Date    LACT 0.8 11/06/2023    A1C 6.2 (H) 04/05/2022    VICENTA 9.0 06/20/2024    MAG 1.8 11/07/2023    TSH 2.10 08/08/2019    CRP 5.0 (H) 01/20/2023    SED 62 (H) 02/21/2023       Anesthesia Plan    ASA Status:  3    NPO Status:  NPO Appropriate    Anesthesia Type: MAC.              Consents    Anesthesia Plan(s) and associated risks, benefits, and realistic alternatives discussed. Questions answered and patient/representative(s) expressed understanding.     - Discussed: Risks, Benefits and Alternatives for the PROCEDURE were discussed     - Discussed with:  Patient            Postoperative Care       PONV prophylaxis: Ondansetron (or other 5HT-3)     Comments:               Loi Ya MD    I have reviewed the pertinent notes and labs in the chart from the past 30 days and (re)examined the patient.  Any updates or changes from those notes are reflected in this note.              # Obesity: Estimated body mass index is 37.2 " "kg/m  as calculated from the following:    Height as of this encounter: 1.6 m (5' 3\").    Weight as of this encounter: 95.3 kg (210 lb).      "

## 2024-06-28 LAB
PATH REPORT.COMMENTS IMP SPEC: NORMAL
PATH REPORT.COMMENTS IMP SPEC: NORMAL
PATH REPORT.FINAL DX SPEC: NORMAL
PATH REPORT.GROSS SPEC: NORMAL
PATH REPORT.MICROSCOPIC SPEC OTHER STN: NORMAL
PATH REPORT.RELEVANT HX SPEC: NORMAL
PHOTO IMAGE: NORMAL

## 2024-06-28 PROCEDURE — 88305 TISSUE EXAM BY PATHOLOGIST: CPT | Mod: 26 | Performed by: PATHOLOGY

## 2024-06-28 PROCEDURE — 88342 IMHCHEM/IMCYTCHM 1ST ANTB: CPT | Mod: 26 | Performed by: PATHOLOGY

## 2024-07-09 ENCOUNTER — LAB REQUISITION (OUTPATIENT)
Dept: LAB | Facility: CLINIC | Age: 79
End: 2024-07-09
Payer: COMMERCIAL

## 2024-07-09 DIAGNOSIS — E11.40 TYPE 2 DIABETES MELLITUS WITH DIABETIC NEUROPATHY, UNSPECIFIED (H): ICD-10-CM

## 2024-07-09 PROCEDURE — 82043 UR ALBUMIN QUANTITATIVE: CPT | Mod: ORL | Performed by: FAMILY MEDICINE

## 2024-07-11 LAB
CREAT UR-MCNC: 112 MG/DL
MICROALBUMIN UR-MCNC: 21 MG/L
MICROALBUMIN/CREAT UR: 18.75 MG/G CR (ref 0–25)

## 2024-09-03 ENCOUNTER — HOSPITAL ENCOUNTER (OUTPATIENT)
Dept: ULTRASOUND IMAGING | Facility: CLINIC | Age: 79
Discharge: HOME | End: 2024-09-03
Attending: STUDENT IN AN ORGANIZED HEALTH CARE EDUCATION/TRAINING PROGRAM | Admitting: STUDENT IN AN ORGANIZED HEALTH CARE EDUCATION/TRAINING PROGRAM
Payer: COMMERCIAL

## 2024-09-03 DIAGNOSIS — D17.71 BENIGN LIPOMATOUS NEOPLASM OF KIDNEY: ICD-10-CM

## 2024-09-03 PROCEDURE — 76770 US EXAM ABDO BACK WALL COMP: CPT

## 2024-09-24 ENCOUNTER — LAB REQUISITION (OUTPATIENT)
Dept: LAB | Facility: CLINIC | Age: 79
End: 2024-09-24
Payer: COMMERCIAL

## 2024-09-24 DIAGNOSIS — E78.2 MIXED HYPERLIPIDEMIA: ICD-10-CM

## 2024-09-24 LAB
CHOLEST SERPL-MCNC: 187 MG/DL
FASTING STATUS PATIENT QL REPORTED: ABNORMAL
HDLC SERPL-MCNC: 49 MG/DL
LDLC SERPL CALC-MCNC: 117 MG/DL
NONHDLC SERPL-MCNC: 138 MG/DL
TRIGL SERPL-MCNC: 107 MG/DL

## 2024-09-24 PROCEDURE — 80061 LIPID PANEL: CPT | Mod: ORL | Performed by: FAMILY MEDICINE

## 2024-10-23 ENCOUNTER — OFFICE VISIT (OUTPATIENT)
Dept: PHARMACY | Facility: PHYSICIAN GROUP | Age: 79
End: 2024-10-23
Payer: COMMERCIAL

## 2024-10-23 VITALS — SYSTOLIC BLOOD PRESSURE: 118 MMHG | DIASTOLIC BLOOD PRESSURE: 60 MMHG

## 2024-10-23 DIAGNOSIS — Z76.89 ENCOUNTER FOR WEIGHT MANAGEMENT: Primary | ICD-10-CM

## 2024-10-23 PROCEDURE — 99207 PR NO CHARGE LOS: CPT | Performed by: PHARMACIST

## 2024-10-23 RX ORDER — NALTREXONE HYDROCHLORIDE 50 MG/1
25 TABLET, FILM COATED ORAL DAILY
COMMUNITY

## 2024-10-23 RX ORDER — BUPROPION HYDROCHLORIDE 100 MG/1
100 TABLET, EXTENDED RELEASE ORAL EVERY MORNING
COMMUNITY

## 2024-10-23 NOTE — PATIENT INSTRUCTIONS
"Recommendations from today's MTM visit:                                                         Start bupropion  mg once daily in the morning    Start naltrexone 50 mg - take 1/2 tablet by mouth every day    Follow-up: Return in 30 days (on 11/22/2024) for Follow up, in person @ 10:30 AM.    It was great speaking with you today.  I value your experience and would be very thankful for your time in providing feedback in our clinic survey. In the next few days, you may receive an email or text message from [employer] with a link to a survey related to your  clinical pharmacist.\"     To schedule another MTM appointment, please call the clinic directly or you may call the MTM scheduling line at [phone].    My Clinical Pharmacist's contact information:                                                      Please feel free to contact me with any questions or concerns you have.      Amado Riggs, PharmD, ROSALIO, BCACP  Medication Therapy Management Pharmacist     "

## 2024-10-23 NOTE — PROGRESS NOTES
Medication Therapy Management (MTM) Encounter    ASSESSMENT:                            Medication Adherence/Access: No issues identified.    Weight management:   Patient may benefit from medication intervention. We discussed several options at length and decided on bupropion/naltrexone. Discussed weight loss expectations, side effects, and administration with the patient at length, as well as established a routine follow up.      PLAN:                            Start bupropion  mg once daily in the morning    Start naltrexone 50 mg - take 1/2 tablet by mouth every day    Follow-up: Return in 30 days (on 11/22/2024) for Follow up, in person @ 10:30 AM.      Amado Riggs, PharmD, ROSALIO, BCACP  Medication Therapy Management Pharmacist     SUBJECTIVE/OBJECTIVE:                          Zakiya Christian is a 79 year old female seen for a follow-up visit.       Reason for visit: Weight loss consult.    Allergies/ADRs: Reviewed in chart  Past Medical History: Reviewed in chart  Tobacco: She reports that she has quit smoking. Her smoking use included cigarettes. She has never used smokeless tobacco.  Alcohol: not currently using    Medication Adherence/Access: no issues reported.    Weight Management   Not currently taking medications  Patient reports no current medication side effects.  Nutrition/Eating Habits: Tries to be health conscious. She is also diagnosed with diabetes and keeps her A1c controlled (6.5%)    Exercise/Activity: limited. Patient uses a walker and states she has a lot of fatigue.   Medications Tried/Failed:  Ozempic: 0.5 mg  - stopped due to nausea/vomiting. She is willing to try this class of medications again, but wants to wait until her fatigue lessens    Initial Consult Weight: 212.6 lb       Today's Vitals: /60 (BP Location: Left arm)   ----------------      I spent 40 minutes with this patient today. All changes were made via collaborative practice agreement with Ami Noriega MD. A copy of  the visit note was provided to the patient's provider(s).    A summary of these recommendations was given to the patient.    Amado Riggs PharmD, ROSALIO, BCACP  Medication Therapy Management Pharmacist        Medication Therapy Recommendations  Encounter for weight management   1 Rationale: Untreated condition - Needs additional medication therapy - Indication   Recommendation: Start Medication - naltrexone 50 MG tablet   Status: Accepted per CPA   Identified Date: 10/23/2024 Completed Date: 10/23/2024         2 Rationale: Untreated condition - Needs additional medication therapy - Indication   Recommendation: Start Medication - buPROPion 100 MG 12 hr tablet   Status: Accepted per CPA   Identified Date: 10/23/2024 Completed Date: 10/23/2024

## 2024-11-30 ENCOUNTER — LAB REQUISITION (OUTPATIENT)
Dept: LAB | Facility: CLINIC | Age: 79
End: 2024-11-30
Payer: COMMERCIAL

## 2024-11-30 DIAGNOSIS — J44.9 CHRONIC OBSTRUCTIVE PULMONARY DISEASE, UNSPECIFIED (H): ICD-10-CM

## 2024-11-30 DIAGNOSIS — N18.9 CHRONIC KIDNEY DISEASE, UNSPECIFIED: ICD-10-CM

## 2024-12-02 ENCOUNTER — LAB REQUISITION (OUTPATIENT)
Dept: LAB | Facility: CLINIC | Age: 79
End: 2024-12-02
Payer: COMMERCIAL

## 2024-12-02 DIAGNOSIS — J44.1 CHRONIC OBSTRUCTIVE PULMONARY DISEASE WITH (ACUTE) EXACERBATION (H): ICD-10-CM

## 2024-12-03 LAB
ANION GAP SERPL CALCULATED.3IONS-SCNC: 9 MMOL/L (ref 7–15)
BUN SERPL-MCNC: 44.9 MG/DL (ref 8–23)
CALCIUM SERPL-MCNC: 9.2 MG/DL (ref 8.8–10.4)
CHLORIDE SERPL-SCNC: 105 MMOL/L (ref 98–107)
CREAT SERPL-MCNC: 1.26 MG/DL (ref 0.51–0.95)
EGFRCR SERPLBLD CKD-EPI 2021: 43 ML/MIN/1.73M2
ERYTHROCYTE [DISTWIDTH] IN BLOOD BY AUTOMATED COUNT: 19.9 % (ref 10–15)
GLUCOSE SERPL-MCNC: 78 MG/DL (ref 70–99)
HCO3 SERPL-SCNC: 26 MMOL/L (ref 22–29)
HCT VFR BLD AUTO: 29.7 % (ref 35–47)
HGB BLD-MCNC: 9.4 G/DL (ref 11.7–15.7)
MCH RBC QN AUTO: 35.7 PG (ref 26.5–33)
MCHC RBC AUTO-ENTMCNC: 31.6 G/DL (ref 31.5–36.5)
MCV RBC AUTO: 113 FL (ref 78–100)
PLATELET # BLD AUTO: 129 10E3/UL (ref 150–450)
POTASSIUM SERPL-SCNC: 4.5 MMOL/L (ref 3.4–5.3)
RBC # BLD AUTO: 2.63 10E6/UL (ref 3.8–5.2)
SODIUM SERPL-SCNC: 140 MMOL/L (ref 135–145)
WBC # BLD AUTO: 3.5 10E3/UL (ref 4–11)

## 2024-12-03 PROCEDURE — P9604 ONE-WAY ALLOW PRORATED TRIP: HCPCS | Performed by: PHYSICIAN ASSISTANT

## 2024-12-03 PROCEDURE — 85027 COMPLETE CBC AUTOMATED: CPT | Performed by: PHYSICIAN ASSISTANT

## 2024-12-03 PROCEDURE — 36415 COLL VENOUS BLD VENIPUNCTURE: CPT | Performed by: PHYSICIAN ASSISTANT

## 2024-12-03 PROCEDURE — 80048 BASIC METABOLIC PNL TOTAL CA: CPT | Performed by: PHYSICIAN ASSISTANT

## 2024-12-10 ENCOUNTER — LAB REQUISITION (OUTPATIENT)
Dept: LAB | Facility: CLINIC | Age: 79
End: 2024-12-10
Payer: COMMERCIAL

## 2024-12-10 DIAGNOSIS — N18.32 CHRONIC KIDNEY DISEASE, STAGE 3B (H): ICD-10-CM

## 2024-12-11 LAB
ANION GAP SERPL CALCULATED.3IONS-SCNC: 10 MMOL/L (ref 7–15)
BUN SERPL-MCNC: 36.6 MG/DL (ref 8–23)
CALCIUM SERPL-MCNC: 12.7 MG/DL (ref 8.8–10.4)
CHLORIDE SERPL-SCNC: 96 MMOL/L (ref 98–107)
CREAT SERPL-MCNC: 1.52 MG/DL (ref 0.51–0.95)
EGFRCR SERPLBLD CKD-EPI 2021: 34 ML/MIN/1.73M2
ERYTHROCYTE [DISTWIDTH] IN BLOOD BY AUTOMATED COUNT: 18.3 % (ref 10–15)
GLUCOSE SERPL-MCNC: 79 MG/DL (ref 70–99)
HCO3 SERPL-SCNC: 33 MMOL/L (ref 22–29)
HCT VFR BLD AUTO: 28.8 % (ref 35–47)
HGB BLD-MCNC: 9.3 G/DL (ref 11.7–15.7)
MCH RBC QN AUTO: 37.3 PG (ref 26.5–33)
MCHC RBC AUTO-ENTMCNC: 32.3 G/DL (ref 31.5–36.5)
MCV RBC AUTO: 116 FL (ref 78–100)
PLATELET # BLD AUTO: 123 10E3/UL (ref 150–450)
POTASSIUM SERPL-SCNC: 4.7 MMOL/L (ref 3.4–5.3)
RBC # BLD AUTO: 2.49 10E6/UL (ref 3.8–5.2)
SODIUM SERPL-SCNC: 139 MMOL/L (ref 135–145)
WBC # BLD AUTO: 3.1 10E3/UL (ref 4–11)

## 2024-12-11 PROCEDURE — 80048 BASIC METABOLIC PNL TOTAL CA: CPT | Performed by: PHYSICIAN ASSISTANT

## 2024-12-11 PROCEDURE — 36415 COLL VENOUS BLD VENIPUNCTURE: CPT | Performed by: PHYSICIAN ASSISTANT

## 2024-12-11 PROCEDURE — P9604 ONE-WAY ALLOW PRORATED TRIP: HCPCS | Performed by: PHYSICIAN ASSISTANT

## 2024-12-11 PROCEDURE — 85027 COMPLETE CBC AUTOMATED: CPT | Performed by: PHYSICIAN ASSISTANT

## 2024-12-12 ENCOUNTER — LAB REQUISITION (OUTPATIENT)
Dept: LAB | Facility: CLINIC | Age: 79
End: 2024-12-12
Payer: COMMERCIAL

## 2024-12-12 DIAGNOSIS — R53.1 WEAKNESS: ICD-10-CM

## 2024-12-13 LAB
ANION GAP SERPL CALCULATED.3IONS-SCNC: 6 MMOL/L (ref 7–15)
BUN SERPL-MCNC: 35.6 MG/DL (ref 8–23)
CALCIUM SERPL-MCNC: 11.1 MG/DL (ref 8.8–10.4)
CHLORIDE SERPL-SCNC: 95 MMOL/L (ref 98–107)
CREAT SERPL-MCNC: 1.41 MG/DL (ref 0.51–0.95)
EGFRCR SERPLBLD CKD-EPI 2021: 38 ML/MIN/1.73M2
GLUCOSE SERPL-MCNC: 69 MG/DL (ref 70–99)
HCO3 SERPL-SCNC: 35 MMOL/L (ref 22–29)
POTASSIUM SERPL-SCNC: 4.4 MMOL/L (ref 3.4–5.3)
SODIUM SERPL-SCNC: 136 MMOL/L (ref 135–145)

## 2024-12-13 PROCEDURE — 80048 BASIC METABOLIC PNL TOTAL CA: CPT | Performed by: FAMILY MEDICINE

## 2024-12-13 PROCEDURE — 36415 COLL VENOUS BLD VENIPUNCTURE: CPT | Performed by: FAMILY MEDICINE

## 2024-12-15 ENCOUNTER — LAB REQUISITION (OUTPATIENT)
Dept: LAB | Facility: CLINIC | Age: 79
End: 2024-12-15
Payer: COMMERCIAL

## 2024-12-15 DIAGNOSIS — E83.52 HYPERCALCEMIA: ICD-10-CM

## 2024-12-15 DIAGNOSIS — R11.2 NAUSEA WITH VOMITING, UNSPECIFIED: ICD-10-CM

## 2024-12-16 LAB
ANION GAP SERPL CALCULATED.3IONS-SCNC: 12 MMOL/L (ref 7–15)
BUN SERPL-MCNC: 36.1 MG/DL (ref 8–23)
CALCIUM SERPL-MCNC: 9.9 MG/DL (ref 8.8–10.4)
CHLORIDE SERPL-SCNC: 96 MMOL/L (ref 98–107)
CREAT SERPL-MCNC: 1.45 MG/DL (ref 0.51–0.95)
EGFRCR SERPLBLD CKD-EPI 2021: 36 ML/MIN/1.73M2
ERYTHROCYTE [DISTWIDTH] IN BLOOD BY AUTOMATED COUNT: 18.8 % (ref 10–15)
GLUCOSE SERPL-MCNC: 123 MG/DL (ref 70–99)
HCO3 SERPL-SCNC: 30 MMOL/L (ref 22–29)
HCT VFR BLD AUTO: 29.9 % (ref 35–47)
HGB BLD-MCNC: 9.5 G/DL (ref 11.7–15.7)
MCH RBC QN AUTO: 36.5 PG (ref 26.5–33)
MCHC RBC AUTO-ENTMCNC: 31.8 G/DL (ref 31.5–36.5)
MCV RBC AUTO: 115 FL (ref 78–100)
PLATELET # BLD AUTO: 197 10E3/UL (ref 150–450)
POTASSIUM SERPL-SCNC: 3.9 MMOL/L (ref 3.4–5.3)
RBC # BLD AUTO: 2.6 10E6/UL (ref 3.8–5.2)
SODIUM SERPL-SCNC: 138 MMOL/L (ref 135–145)
WBC # BLD AUTO: 2.9 10E3/UL (ref 4–11)

## 2024-12-16 PROCEDURE — 36415 COLL VENOUS BLD VENIPUNCTURE: CPT | Performed by: PHYSICIAN ASSISTANT

## 2024-12-16 PROCEDURE — 80048 BASIC METABOLIC PNL TOTAL CA: CPT | Performed by: PHYSICIAN ASSISTANT

## 2024-12-16 PROCEDURE — P9604 ONE-WAY ALLOW PRORATED TRIP: HCPCS | Performed by: PHYSICIAN ASSISTANT

## 2024-12-16 PROCEDURE — 85027 COMPLETE CBC AUTOMATED: CPT | Performed by: PHYSICIAN ASSISTANT

## 2024-12-22 ENCOUNTER — LAB REQUISITION (OUTPATIENT)
Dept: LAB | Facility: CLINIC | Age: 79
End: 2024-12-22
Payer: COMMERCIAL

## 2024-12-22 DIAGNOSIS — Z51.81 ENCOUNTER FOR THERAPEUTIC DRUG LEVEL MONITORING: ICD-10-CM

## 2024-12-23 LAB
ANION GAP SERPL CALCULATED.3IONS-SCNC: 11 MMOL/L (ref 7–15)
BUN SERPL-MCNC: 36.6 MG/DL (ref 8–23)
CALCIUM SERPL-MCNC: 8.8 MG/DL (ref 8.8–10.4)
CHLORIDE SERPL-SCNC: 100 MMOL/L (ref 98–107)
CREAT SERPL-MCNC: 1.38 MG/DL (ref 0.51–0.95)
EGFRCR SERPLBLD CKD-EPI 2021: 39 ML/MIN/1.73M2
GLUCOSE SERPL-MCNC: 78 MG/DL (ref 70–99)
HCO3 SERPL-SCNC: 27 MMOL/L (ref 22–29)
POTASSIUM SERPL-SCNC: 4.6 MMOL/L (ref 3.4–5.3)
SODIUM SERPL-SCNC: 138 MMOL/L (ref 135–145)

## 2024-12-23 PROCEDURE — 36415 COLL VENOUS BLD VENIPUNCTURE: CPT | Performed by: FAMILY MEDICINE

## 2024-12-23 PROCEDURE — 80048 BASIC METABOLIC PNL TOTAL CA: CPT | Performed by: FAMILY MEDICINE

## 2024-12-23 PROCEDURE — P9604 ONE-WAY ALLOW PRORATED TRIP: HCPCS | Performed by: FAMILY MEDICINE

## 2025-03-27 NOTE — PROGRESS NOTES
Vitals obtained. Sepsis BPA fired due to temperature of 102.1 and HR of 104. APAP given. Lactic acid results 1.7. MD made aware. Patient resting comfortably, responding appropriately to questions, reporting some discomfort from ALISSA but is otherwise stable.    with patient

## (undated) DEVICE — TUBING SUCTION MEDI-VAC 1/4"X20' N620A

## (undated) DEVICE — FORCEP BIOPSY 2.3MM DISP COATED 000388

## (undated) DEVICE — SOL WATER IRRIG 1000ML BOTTLE 2F7114

## (undated) DEVICE — SUCTION MANIFOLD NEPTUNE 2 SYS 1 PORT 702-025-000

## (undated) RX ORDER — FENTANYL CITRATE 50 UG/ML
INJECTION, SOLUTION INTRAMUSCULAR; INTRAVENOUS
Status: DISPENSED
Start: 2022-04-06

## (undated) RX ORDER — FENTANYL CITRATE 50 UG/ML
INJECTION, SOLUTION INTRAMUSCULAR; INTRAVENOUS
Status: DISPENSED
Start: 2022-04-13

## (undated) RX ORDER — LIDOCAINE HYDROCHLORIDE 10 MG/ML
INJECTION, SOLUTION INFILTRATION; PERINEURAL
Status: DISPENSED
Start: 2022-04-22